# Patient Record
Sex: MALE | Race: WHITE | NOT HISPANIC OR LATINO | Employment: OTHER | URBAN - METROPOLITAN AREA
[De-identification: names, ages, dates, MRNs, and addresses within clinical notes are randomized per-mention and may not be internally consistent; named-entity substitution may affect disease eponyms.]

---

## 2017-01-23 ENCOUNTER — NON-PROVIDER VISIT (OUTPATIENT)
Dept: WOUND CARE | Facility: MEDICAL CENTER | Age: 57
End: 2017-01-23
Attending: NURSE PRACTITIONER
Payer: COMMERCIAL

## 2017-01-23 PROCEDURE — 11055 PARING/CUTG B9 HYPRKER LES 1: CPT

## 2017-01-23 PROCEDURE — 11721 DEBRIDE NAIL 6 OR MORE: CPT | Mod: XS

## 2017-01-23 PROCEDURE — A6402 STERILE GAUZE <= 16 SQ IN: HCPCS

## 2017-01-23 NOTE — CERTIFICATION
"[facility]    [address]    [address]    [place] [address]    [phone] Fax: [phone]    Foot and Nail Recertification  1/23/2017 - 4/23/2017  Referring Physician:LEATHA Lima  Primary Physician:   Consulting Physicians:   Start of Care: 7/14/2016      Subjective:       HPI:  56 year old male living with wife and children. HX of dislocated toes 3-4 years ago while walking, had toes \"fixed,\" but \"it didn't work.\"   12/22/2017 PROCEDURES with Dr Kang:  1.  Left modified Arndt bunionectomy.  2.  Left distal metatarsal osteotomy.  3.  Left distal metatarsal osteotomy of the hallux.  4.  Left distal metatarsal osteotomy, 2, 3, 4, and 5.  5.  Left flexor digitorum longus transfer with a flexor to extensor transfer    of 2 and 3.  6.  Left soft tissue reconstruction, metatarsophalangeal joints 2, 3, 4, and    5.  7.  Left hammertoe correction with proximal interphalangeal arthroplasty, 2    and 3.  8.  Right metatarsophalangeal joint capsulotomy of soft tissue release, 2 and    3.  9.  Right hammertoe correction with proximal interphalangeal arthroplasty, 2    and 3.    Had F/U with Dr. Sutherland 1/5/2017, pt put on antibiotics, still taking them.     History of foot ulceration(s): Yes____ No___x__ Location:   History of foot surgery: Yes__x__ No____Describe:______see above; also,k to fix dislocated toes, shortened rt foot 2nd toe, fix hammer toes   10/13/16=  Hip replacement done 8 weeks ago  ______________________________________________    Employed: Y ___ N __x_ Job description: ____disability_____________    Current Residence: ___lives with wife and kids______  home    Pain: 4-5/10    Past Medical History:   Past Medical History   Diagnosis Date   • Diabetes mellitus (CMS-HCC)    • Gout    • Hypertension    • Hypothyroidism      chronic thyroiditis   • Bunion    • Anxiety and depression    • Hammertoe    • Arthritis    • Dental disorder      upper plate denture   • Anesthesia      " "post op nausea and vomiting   • Chronic autoimmune thyroiditis       + US / + TPO = 57   • Heart burn    • Indigestion    • Snoring          Current Medications:   Current Outpatient Prescriptions on File Prior to Visit   Medication Sig Dispense Refill   • temazepam (RESTORIL) 30 MG capsule Take 30 mg by mouth at bedtime as needed for Sleep.     • LEVOXYL 88 MCG Tab Take 1 Tab by mouth Every morning on an empty stomach. 30 Tab 6   • venlafaxine XR (EFFEXOR XR) 75 MG CAPSULE SR 24 HR Take 75 mg by mouth every day.     • allopurinol (ZYLOPRIM) 300 MG Tab Take 300 mg by mouth every day.     • lamotrigine (LAMICTAL) 100 MG Tab Take 100 mg by mouth every day.     • hydrOXYzine (ATARAX) 50 MG Tab Take 50 mg by mouth 2 Times a Day.     • alprazolam (XANAX) 0.25 MG Tab Take 0.25-0.5 mg by mouth 4 times a day as needed for Sleep.     • losartan (COZAAR) 100 MG Tab Take 100 mg by mouth every day.     • oxycodone-acetaminophen (PERCOCET) 5-325 MG TABS Take 1-2 Tabs by mouth every four hours as needed.     • sitagliptin (JANUVIA) 100 MG TABS Take 100 mg by mouth every day.       No current facility-administered medications on file prior to visit.       Allergies: Demerol and Septra  Social History:   Social History     Social History   • Marital Status:      Spouse Name: N/A   • Number of Children: N/A   • Years of Education: N/A     Occupational History   • Not on file.     Social History Main Topics   • Smoking status: Former Smoker -- 1.00 packs/day for 20 years     Types: Cigarettes   • Smokeless tobacco: Not on file   • Alcohol Use: Yes      Comment: 4-5 per day beer   • Drug Use: No   • Sexual Activity: Not on file     Other Topics Concern   • Not on file     Social History Narrative       Fall Risk Assessment (william all that apply with an X):completed with pt 1/23/2017   65 years or older     Fall within the last 2 years, uses walker x \"several times\"  Ambulatory devices walker  Loss of protective sensation in " feet, impaired X  Use of prostethic/orthotic, years X   Presence of lower extremity/foot/toe amputation   Taking medication that increases risk (per facility policy) x    Interventions Recommended (if any of the above are selected):   Use of Assistive Device:____walker____________________   Supervision with ambulation:  Caregiver   Assistance with ambulation:  Caregiver   Home safety education:  Educational material provided             Objective:     Tests and Measures:  BS today 186, Pulses DP AIDAN palpable; Lt foot DP triphasic, Rt foot DP biphasic; LT PT monophasic; RT PT biphasic  5.07 Monofilament testing (10 pt): Right__3/10_ ______ Left_4/10__  HgbA1C: 12/19/2017 8.2    Vascular   R   L       Not palp  Biphasic with doppler Not palp -   Biphasic with doppler DP pulse     palp palp PT pulse     yes yes Capillary refill < 3 sec         Deformities    R   L        3, 4 and 5  3, 4 and 5 Hammer/claw toe         Hallux Valgus         Prominent Metatarsal Heads         Charcot Foot         Drop Foot         Rocker Bottom         Prior amputation:         Other:  nails missing from RT foot 2nd digit, 0.25x0.25 oncomychotic nail to great toe; Lt foot nails missing from great and 2nd toe   4/5 1-5 dislocation     Pt has 2nd/3rd toes bilaterally with pins, dressings covering those toes. No TX to these those.       Clinical appearance/skin    Dryness___moderate_ _________ Swelling_____none_________ Redness______none_______Temperature__warm, hair growth present__ ______    Callus_____ ___rt toe distal tip____________________________________    Ulceration_none  Pt has peeling skin, similar to tinnea pedis. Pt has F/U with Dr. Kang next week, will ask for DX and scrip, if necessary      Clinical appearance/toenails    Onychomycosis__6/10__ ______________    Ingrown toenails_______    Deformities____hypoplasic nails to great toes, BIL_____________________________    Other______________________________________         Orthotic, protective, supportive devices: post op shoes  Procedures: Pt's feet were washed with soapy water, then dried. In between toes rubbed with gauze to remove debris. No wounds  noted. Peeling dry skin all over both feet similar to tinnea pedis Cuticle and nailbed margins were established and debris removed from around and beneath toes with a curette. Scalpel to trim callus to RT foot great toe distal tip. All 6 onychomycotic nails (pt has rough nail area to brad great toes) were trimmed with clippers and then ground down with dremel, clipped again, then smoothed and finished with dremel and deshawn board. LE were moisturized bilaterally, not feet due to possible tinea pedis. Pinpoint abrasion to RT foot 4th toe dorsum. Cleansed with Betadine, bandaid applied. Pt tolerated procedure well.     Patient Education: Instructed pt on s/s infection - chills, fever, malaise, NV, increased redness/swelling/pain/exudate - and to go to ER/Urgent Care; Pt instructed on implications of LOPS, need to check feet daily and see PCP for any changes/new wounds; that because of diabetes, he is prone to infection; necessity to keep -140 for any healing if he gets any wounds; to wear protective footwear at all times; to return for foot/nail care every two -  three months or sooner if necessary.           Professional Collaboration:  cert sent to referring provider via EPIC.       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

## 2017-02-07 ENCOUNTER — APPOINTMENT (OUTPATIENT)
Dept: RADIOLOGY | Facility: MEDICAL CENTER | Age: 57
End: 2017-02-07
Attending: ORTHOPAEDIC SURGERY
Payer: COMMERCIAL

## 2017-02-07 DIAGNOSIS — M25.561 RIGHT KNEE PAIN, UNSPECIFIED CHRONICITY: ICD-10-CM

## 2017-02-07 PROCEDURE — 73721 MRI JNT OF LWR EXTRE W/O DYE: CPT | Mod: RT

## 2017-02-08 ENCOUNTER — HOSPITAL ENCOUNTER (OUTPATIENT)
Dept: LAB | Facility: MEDICAL CENTER | Age: 57
End: 2017-02-08
Attending: FAMILY MEDICINE
Payer: COMMERCIAL

## 2017-02-08 LAB
ALBUMIN SERPL BCP-MCNC: 4.9 G/DL (ref 3.2–4.9)
ALBUMIN/GLOB SERPL: 1.7 G/DL
ALP SERPL-CCNC: 124 U/L (ref 30–99)
ALT SERPL-CCNC: 69 U/L (ref 2–50)
ANION GAP SERPL CALC-SCNC: 9 MMOL/L (ref 0–11.9)
AST SERPL-CCNC: 62 U/L (ref 12–45)
BILIRUB SERPL-MCNC: 0.9 MG/DL (ref 0.1–1.5)
BUN SERPL-MCNC: 19 MG/DL (ref 8–22)
CALCIUM SERPL-MCNC: 10 MG/DL (ref 8.5–10.5)
CHLORIDE SERPL-SCNC: 98 MMOL/L (ref 96–112)
CO2 SERPL-SCNC: 23 MMOL/L (ref 20–33)
CREAT SERPL-MCNC: 1.12 MG/DL (ref 0.5–1.4)
EST. AVERAGE GLUCOSE BLD GHB EST-MCNC: 252 MG/DL
GLOBULIN SER CALC-MCNC: 2.9 G/DL (ref 1.9–3.5)
GLUCOSE SERPL-MCNC: 351 MG/DL (ref 65–99)
HBA1C MFR BLD: 10.4 % (ref 0–5.6)
POTASSIUM SERPL-SCNC: 4.4 MMOL/L (ref 3.6–5.5)
PROT SERPL-MCNC: 7.8 G/DL (ref 6–8.2)
PSA SERPL DL<=0.01 NG/ML-MCNC: 0.31 NG/ML (ref 0–4)
SODIUM SERPL-SCNC: 130 MMOL/L (ref 135–145)
T4 SERPL-MCNC: 7.8 UG/DL (ref 4–12)
TSH SERPL DL<=0.005 MIU/L-ACNC: 3.96 UIU/ML (ref 0.3–3.7)

## 2017-02-08 PROCEDURE — 84153 ASSAY OF PSA TOTAL: CPT

## 2017-02-08 PROCEDURE — 84436 ASSAY OF TOTAL THYROXINE: CPT

## 2017-02-08 PROCEDURE — 84443 ASSAY THYROID STIM HORMONE: CPT

## 2017-02-08 PROCEDURE — 36415 COLL VENOUS BLD VENIPUNCTURE: CPT

## 2017-02-08 PROCEDURE — 83036 HEMOGLOBIN GLYCOSYLATED A1C: CPT

## 2017-02-08 PROCEDURE — 80053 COMPREHEN METABOLIC PANEL: CPT

## 2017-03-13 ENCOUNTER — HOSPITAL ENCOUNTER (OUTPATIENT)
Dept: LAB | Facility: MEDICAL CENTER | Age: 57
End: 2017-03-13
Attending: INTERNAL MEDICINE
Payer: COMMERCIAL

## 2017-03-13 DIAGNOSIS — E06.3 HYPOTHYROIDISM, ACQUIRED, AUTOIMMUNE: ICD-10-CM

## 2017-03-13 LAB
T4 FREE SERPL-MCNC: 0.73 NG/DL (ref 0.53–1.43)
TSH SERPL DL<=0.005 MIU/L-ACNC: 3.12 UIU/ML (ref 0.3–3.7)

## 2017-03-13 PROCEDURE — 36415 COLL VENOUS BLD VENIPUNCTURE: CPT

## 2017-03-13 PROCEDURE — 84439 ASSAY OF FREE THYROXINE: CPT

## 2017-03-13 PROCEDURE — 84443 ASSAY THYROID STIM HORMONE: CPT

## 2017-03-30 ENCOUNTER — NON-PROVIDER VISIT (OUTPATIENT)
Dept: WOUND CARE | Facility: MEDICAL CENTER | Age: 57
End: 2017-03-30
Attending: FAMILY MEDICINE
Payer: COMMERCIAL

## 2017-03-30 PROCEDURE — 11721 DEBRIDE NAIL 6 OR MORE: CPT | Mod: XS

## 2017-03-30 PROCEDURE — 11055 PARING/CUTG B9 HYPRKER LES 1: CPT

## 2017-03-30 NOTE — CERTIFICATION
"[facility]    [address]    [address]    [place] [address]    [phone] Fax: [phone]    Foot and Nail Encounter 03/30/2017 1/23/2017 - 4/23/2017  Referring Physician:LEATHA Lima  Primary Physician:   Consulting Physicians:   Start of Care: 7/14/2016      Subjective:       HPI:  56 year old male living with wife and children. HX of dislocated toes 3-4 years ago while walking, had toes \"fixed,\" but \"it didn't work.\"     12/22/2017 PROCEDURES with Dr Kang:    1.  Left modified Arndt bunionectomy.  2.  Left distal metatarsal osteotomy.  3.  Left distal metatarsal osteotomy of the hallux.  4.  Left distal metatarsal osteotomy, 2, 3, 4, and 5.  5.  Left flexor digitorum longus transfer with a flexor to extensor transfer    of 2 and 3.  6.  Left soft tissue reconstruction, metatarsophalangeal joints 2, 3, 4, and    5.  7.  Left hammertoe correction with proximal interphalangeal arthroplasty, 2    and 3.  8.  Right metatarsophalangeal joint capsulotomy of soft tissue release, 2 and    3.  9.  Right hammertoe correction with proximal interphalangeal arthroplasty, 2    and 3.    Had F/U with Dr. Sutherland 1/5/2017, pt put on antibiotics, still taking them.     History of foot ulceration(s): Yes____ No___x__ Location:   History of foot surgery: Yes__x__ No____Describe:______see above; also,k to fix dislocated toes, shortened rt foot 2nd toe, fix hammer toes   10/13/16=  Hip replacement done 8 weeks ago  ______________________________________________    Employed: Y ___ N __x_ Job description: ____disability_____________    Current Residence: ___lives with wife and kids______  home    Pain: pt denies foot pain presently    Past Medical History:   Past Medical History   Diagnosis Date   • Diabetes mellitus (CMS-HCC)    • Gout    • Hypertension    • Hypothyroidism      chronic thyroiditis   • Bunion    • Anxiety and depression    • Hammertoe    • Arthritis    • Dental disorder      upper " "plate denture   • Anesthesia      post op nausea and vomiting   • Chronic autoimmune thyroiditis       + US / + TPO = 57   • Heart burn    • Indigestion    • Snoring          Current Medications:   Current Outpatient Prescriptions on File Prior to Visit   Medication Sig Dispense Refill   • temazepam (RESTORIL) 30 MG capsule Take 30 mg by mouth at bedtime as needed for Sleep.     • LEVOXYL 88 MCG Tab Take 1 Tab by mouth Every morning on an empty stomach. 30 Tab 6   • venlafaxine XR (EFFEXOR XR) 75 MG CAPSULE SR 24 HR Take 75 mg by mouth every day.     • allopurinol (ZYLOPRIM) 300 MG Tab Take 300 mg by mouth every day.     • lamotrigine (LAMICTAL) 100 MG Tab Take 100 mg by mouth every day.     • hydrOXYzine (ATARAX) 50 MG Tab Take 50 mg by mouth 2 Times a Day.     • alprazolam (XANAX) 0.25 MG Tab Take 0.25-0.5 mg by mouth 4 times a day as needed for Sleep.     • losartan (COZAAR) 100 MG Tab Take 100 mg by mouth every day.     • oxycodone-acetaminophen (PERCOCET) 5-325 MG TABS Take 1-2 Tabs by mouth every four hours as needed.     • sitagliptin (JANUVIA) 100 MG TABS Take 100 mg by mouth every day.       No current facility-administered medications on file prior to visit.       Allergies: Demerol and Septra  Social History:   Social History     Social History   • Marital Status:      Spouse Name: N/A   • Number of Children: N/A   • Years of Education: N/A     Occupational History   • Not on file.     Social History Main Topics   • Smoking status: Former Smoker -- 1.00 packs/day for 20 years     Types: Cigarettes   • Smokeless tobacco: Not on file   • Alcohol Use: Yes      Comment: 4-5 per day beer   • Drug Use: No   • Sexual Activity: Not on file     Other Topics Concern   • Not on file     Social History Narrative       Fall Risk Assessment (william all that apply with an X):completed with pt 1/23/2017   65 years or older     Fall within the last 2 years, uses walker x \"several times\"  Ambulatory devices " walker  Loss of protective sensation in feet, impaired X  Use of prostethic/orthotic, years X   Presence of lower extremity/foot/toe amputation   Taking medication that increases risk (per facility policy) x    Interventions Recommended (if any of the above are selected):   Use of Assistive Device:____walker____________________   Supervision with ambulation:  Caregiver   Assistance with ambulation:  Caregiver   Home safety education:  Educational material provided             Objective:     Tests and Measures:  BS today 186, Pulses DP AIDAN palpable; Lt foot DP triphasic, Rt foot DP biphasic; LT PT monophasic; RT PT biphasic  5.07 Monofilament testing (10 pt): Right__3/10_ ______ Left_4/10__  HgbA1C: 12/19/2017 8.2    Vascular   R   L       Not palp  Biphasic with doppler Not palp -   Biphasic with doppler DP pulse     palp palp PT pulse     yes yes Capillary refill < 3 sec         Deformities    R   L        3, 4 and 5  3, 4 and 5 Hammer/claw toe         Hallux Valgus         Prominent Metatarsal Heads         Charcot Foot         Drop Foot         Rocker Bottom         Prior amputation:         Other:  nails missing from aidan feet 1st and  2nd digits   4/5 1-5 dislocation          Clinical appearance/skin    Dryness___moderate_ _________ Swelling_____none_________ Redness______none_______Temperature__warm, hair growth present__ ______    Callus_____ ___rt plantar foot____________________________________    Ulceration_none      Clinical appearance/toenails    Onychomycosis__6/10__ ______________    Ingrown toenails_______    Deformities_______________________________    Other___1st and 2nd nails aidan feet were ablated by podiatrist due to problematic deformities per pt___________________________________        Orthotic, protective, supportive devices: post op shoes  Procedures: Pt's feet were washed with soapy water, then dried. In between toes cleaned with gauze to remove debris. No wounds noted. Cuticle and nailbed  margins were established and debris removed from around and beneath toes with a curette. Scalpel to pare down callus to RT plantar foot. All 6 onychomycotic nails were trimmed with clippers and then ground down with dremel, clipped again, then smoothed and finished with dremel. Feet and LE were moisturized bilaterally, except between toes. Pt's tinea pedis noted on last note has resolved.      Patient Education: Instructed pt on s/s infection - chills, fever, malaise, NV, increased redness/swelling/pain/exudate - and to go to ER/Urgent Care; Pt instructed on implications of LOPS, need to check feet daily and see PCP for any changes/new wounds; that because of diabetes, he is prone to infection; necessity to keep -140 for any healing if he gets any wounds; to wear protective footwear at all times; to return for foot/nail care every two -  three months or sooner if necessary.           Professional Collaboration:  cert sent to referring provider via EPIC.       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

## 2017-03-31 ENCOUNTER — HOSPITAL ENCOUNTER (OUTPATIENT)
Dept: RADIOLOGY | Facility: MEDICAL CENTER | Age: 57
DRG: 460 | End: 2017-03-31
Attending: ORTHOPAEDIC SURGERY | Admitting: ORTHOPAEDIC SURGERY
Payer: COMMERCIAL

## 2017-03-31 DIAGNOSIS — Z01.811 PRE-OPERATIVE RESPIRATORY EXAMINATION: ICD-10-CM

## 2017-03-31 DIAGNOSIS — Z01.812 PRE-OPERATIVE LABORATORY EXAMINATION: ICD-10-CM

## 2017-03-31 LAB
ANION GAP SERPL CALC-SCNC: 12 MMOL/L (ref 0–11.9)
APPEARANCE UR: CLEAR
BASOPHILS # BLD AUTO: 0.8 % (ref 0–1.8)
BASOPHILS # BLD: 0.05 K/UL (ref 0–0.12)
BILIRUB UR QL STRIP.AUTO: NEGATIVE
BUN SERPL-MCNC: 20 MG/DL (ref 8–22)
CALCIUM SERPL-MCNC: 10.1 MG/DL (ref 8.5–10.5)
CHLORIDE SERPL-SCNC: 99 MMOL/L (ref 96–112)
CO2 SERPL-SCNC: 27 MMOL/L (ref 20–33)
COLOR UR: COLORLESS
CREAT SERPL-MCNC: 1.11 MG/DL (ref 0.5–1.4)
CULTURE IF INDICATED INDCX: NO UA CULTURE
EOSINOPHIL # BLD AUTO: 0.08 K/UL (ref 0–0.51)
EOSINOPHIL NFR BLD: 1.3 % (ref 0–6.9)
ERYTHROCYTE [DISTWIDTH] IN BLOOD BY AUTOMATED COUNT: 40.1 FL (ref 35.9–50)
GFR SERPL CREATININE-BSD FRML MDRD: >60 ML/MIN/1.73 M 2
GLUCOSE SERPL-MCNC: 111 MG/DL (ref 65–99)
GLUCOSE UR STRIP.AUTO-MCNC: NEGATIVE MG/DL
HCT VFR BLD AUTO: 43.1 % (ref 42–52)
HGB BLD-MCNC: 13.8 G/DL (ref 14–18)
IMM GRANULOCYTES # BLD AUTO: 0.08 K/UL (ref 0–0.11)
IMM GRANULOCYTES NFR BLD AUTO: 1.3 % (ref 0–0.9)
KETONES UR STRIP.AUTO-MCNC: NEGATIVE MG/DL
LEUKOCYTE ESTERASE UR QL STRIP.AUTO: NEGATIVE
LYMPHOCYTES # BLD AUTO: 1.76 K/UL (ref 1–4.8)
LYMPHOCYTES NFR BLD: 28.2 % (ref 22–41)
MCH RBC QN AUTO: 27.3 PG (ref 27–33)
MCHC RBC AUTO-ENTMCNC: 32 G/DL (ref 33.7–35.3)
MCV RBC AUTO: 85.3 FL (ref 81.4–97.8)
MICRO URNS: NORMAL
MONOCYTES # BLD AUTO: 0.62 K/UL (ref 0–0.85)
MONOCYTES NFR BLD AUTO: 9.9 % (ref 0–13.4)
NEUTROPHILS # BLD AUTO: 3.66 K/UL (ref 1.82–7.42)
NEUTROPHILS NFR BLD: 58.5 % (ref 44–72)
NITRITE UR QL STRIP.AUTO: NEGATIVE
NRBC # BLD AUTO: 0 K/UL
NRBC BLD AUTO-RTO: 0 /100 WBC
PH UR STRIP.AUTO: 6 [PH]
PLATELET # BLD AUTO: 208 K/UL (ref 164–446)
PMV BLD AUTO: 8.7 FL (ref 9–12.9)
POTASSIUM SERPL-SCNC: 4.8 MMOL/L (ref 3.6–5.5)
PROT UR QL STRIP: NEGATIVE MG/DL
RBC # BLD AUTO: 5.05 M/UL (ref 4.7–6.1)
RBC UR QL AUTO: NEGATIVE
SODIUM SERPL-SCNC: 138 MMOL/L (ref 135–145)
SP GR UR STRIP.AUTO: 1.01
WBC # BLD AUTO: 6.3 K/UL (ref 4.8–10.8)

## 2017-03-31 PROCEDURE — 81003 URINALYSIS AUTO W/O SCOPE: CPT

## 2017-03-31 PROCEDURE — 85025 COMPLETE CBC W/AUTO DIFF WBC: CPT

## 2017-03-31 PROCEDURE — 36415 COLL VENOUS BLD VENIPUNCTURE: CPT

## 2017-03-31 PROCEDURE — 71010 DX-CHEST-LIMITED (1 VIEW): CPT

## 2017-03-31 PROCEDURE — 80048 BASIC METABOLIC PNL TOTAL CA: CPT

## 2017-03-31 RX ORDER — PIOGLITAZONEHYDROCHLORIDE 15 MG/1
15 TABLET ORAL EVERY MORNING
Status: ON HOLD | COMMUNITY
End: 2017-04-14

## 2017-04-03 ENCOUNTER — SLEEP CENTER VISIT (OUTPATIENT)
Dept: SLEEP MEDICINE | Facility: MEDICAL CENTER | Age: 57
End: 2017-04-03
Payer: COMMERCIAL

## 2017-04-03 VITALS
SYSTOLIC BLOOD PRESSURE: 124 MMHG | HEIGHT: 70 IN | OXYGEN SATURATION: 94 % | TEMPERATURE: 98.1 F | HEART RATE: 98 BPM | BODY MASS INDEX: 36.22 KG/M2 | DIASTOLIC BLOOD PRESSURE: 80 MMHG | RESPIRATION RATE: 16 BRPM | WEIGHT: 253 LBS

## 2017-04-03 DIAGNOSIS — G47.33 OSA (OBSTRUCTIVE SLEEP APNEA): ICD-10-CM

## 2017-04-03 DIAGNOSIS — R06.83 SNORING: ICD-10-CM

## 2017-04-03 DIAGNOSIS — M10.9 GOUT, UNSPECIFIED CAUSE, UNSPECIFIED CHRONICITY, UNSPECIFIED SITE: ICD-10-CM

## 2017-04-03 PROCEDURE — 99244 OFF/OP CNSLTJ NEW/EST MOD 40: CPT | Performed by: INTERNAL MEDICINE

## 2017-04-03 RX ORDER — ESOMEPRAZOLE MAGNESIUM 40 MG/1
40 GRANULE, DELAYED RELEASE ORAL
COMMUNITY
Start: 2014-06-09 | End: 2014-12-01

## 2017-04-03 RX ORDER — HYDROXYZINE PAMOATE 50 MG/1
CAPSULE ORAL
Refills: 1 | COMMUNITY
Start: 2017-02-07 | End: 2017-03-01

## 2017-04-03 RX ORDER — NAPROXEN 500 MG/1
500 TABLET ORAL
COMMUNITY
Start: 2014-06-09 | End: 2014-12-01

## 2017-04-03 RX ORDER — LOSARTAN POTASSIUM 100 MG/1
100 TABLET ORAL
COMMUNITY
Start: 2014-06-09 | End: 2014-12-01

## 2017-04-03 RX ORDER — SITAGLIPTIN 100 MG/1
100 TABLET, FILM COATED ORAL
Refills: 11 | COMMUNITY
Start: 2017-01-11 | End: 2017-02-01

## 2017-04-03 RX ORDER — AMOXICILLIN 500 MG/1
CAPSULE ORAL
Refills: 3 | COMMUNITY
Start: 2017-02-06 | End: 2017-03-01

## 2017-04-03 RX ORDER — LANCETS 33 GAUGE
EACH MISCELLANEOUS
Refills: 11 | Status: ON HOLD | COMMUNITY
Start: 2017-02-10 | End: 2017-04-14

## 2017-04-03 RX ORDER — LEVOTHYROXINE SODIUM 0.07 MG/1
75 TABLET ORAL
COMMUNITY
Start: 2014-06-09 | End: 2014-12-01

## 2017-04-03 RX ORDER — TEMAZEPAM 15 MG/1
15 CAPSULE ORAL
COMMUNITY
Start: 2014-06-09 | End: 2014-12-01

## 2017-04-03 RX ORDER — POLYETHYLENE GLYCOL 3350 17 G/17G
POWDER, FOR SOLUTION ORAL
Refills: 5 | COMMUNITY
Start: 2017-03-16 | End: 2017-11-03

## 2017-04-03 RX ORDER — ZOLPIDEM TARTRATE 5 MG/1
TABLET ORAL
Qty: 3 TAB | Refills: 0 | Status: ON HOLD | OUTPATIENT
Start: 2017-04-03 | End: 2017-04-14

## 2017-04-03 RX ORDER — OXYCODONE HYDROCHLORIDE AND ACETAMINOPHEN 5; 325 MG/1; MG/1
TABLET ORAL
COMMUNITY
Start: 2014-06-09 | End: 2014-12-01

## 2017-04-03 RX ORDER — CLOTRIMAZOLE 1 %
CREAM (GRAM) TOPICAL
Refills: 3 | COMMUNITY
Start: 2017-03-16 | End: 2018-03-16

## 2017-04-03 RX ORDER — ALLOPURINOL 100 MG/1
100 TABLET ORAL
COMMUNITY
Start: 2014-06-09 | End: 2014-12-01

## 2017-04-03 RX ORDER — FLUCONAZOLE 150 MG/1
TABLET ORAL
Refills: 0 | COMMUNITY
Start: 2017-02-01 | End: 2017-02-05

## 2017-04-03 RX ORDER — CEPHALEXIN 500 MG/1
CAPSULE ORAL
Refills: 0 | COMMUNITY
Start: 2017-01-20 | End: 2017-02-01

## 2017-04-03 NOTE — PROGRESS NOTES
CC: Referred by Dr. Chavez for evaluation of sleep apnea      HPI:  57-year-old male kindly referred by Dr. Chavez for evaluation of possible obstructive sleep apnea syndrome. He has a history dating to age 16 of difficulty falling asleep and staying asleep and having very violent sleeping episodes characterized by tearing the bed and pillows apart. He has used temazepam to assist with sleep onset and maintenance in addition to other medications including over-the-counter medications. He has nocturia 1-2 times per night and estimates that he wakes up 2-5 times per night. He usually sleeps 7-8 hours a night. He may nap or return to bed after arising, complains of sleepiness during the day, complains of too much sleep at night, feels tired during the mid afternoon. He may fall asleep when he doesn't mean to her and sleep for 15-60 minutes. He has been told that his snoring is loud and disturbing. He may suffer from restless legs, have teeth grinding, sleepwalking, disturbing dreams, and unusual nocturnal movements. He is a former smoker of a pack a day. Sleep is unrefreshing. Has severe bone pain, currently to have surgery for spondy. Has had 17 orthopedic surgeries.    Review of his sleep diary shows that he napped every day and awoke feeling tired and sore every day.    His roommate questionnaire reveals loud snoring, light snoring, twitching of legs or feet during sleep, grinding teeth, sleep talking, and kicking with legs during sleep. His wife has noted these behaviors for 25 years and they occur most nights. He may have very loud snorting. His total [place] score is 10.    Hx of depression 2 years ago and sleep was even worse then.     Used to work as a , disabled for about 2 years, lives in [place], wife works at [employer] as a . 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    Patient Active Problem List    Diagnosis Date Noted   • JULIAN (obstructive sleep apnea) 04/03/2017   •  Snoring 04/03/2017   • Acquired hallux valgus of left foot 12/22/2016   • Type 2 diabetes mellitus without complication (CMS-HCC) 12/13/2016   • Chronic autoimmune thyroiditis 10/03/2016   • Hypothyroidism, acquired, autoimmune 10/03/2016   • Primary osteoarthritis of left hip 08/18/2016   • Diabetes (CMS-[facility]) 09/05/2014   • Lumbar disc disease 09/05/2014   • Gout 09/05/2014   • Hypertension 09/05/2014   • H/O arthroscopy of knee 09/05/2014   • Hx of elbow surgery 09/05/2014   • Obesity 09/05/2014   • Osteoarthritis 09/05/2014   • H/O shoulder replacement 09/05/2014   • Rotator cuff arthropathy 09/05/2014   • Bilateral bunions 09/05/2014       Past Medical History   Diagnosis Date   • Diabetes mellitus (CMS-HCC)    • Gout    • Hypertension    • Hypothyroidism      chronic thyroiditis   • Bunion    • Hammertoe    • Arthritis    • Anesthesia      post op nausea and vomiting   • Chronic autoimmune thyroiditis       + US / + TPO = 57   • Snoring    • High cholesterol    • Dental disorder      upper plate denture   • Pain      everyplace   • Anxiety and depression      depression/anxiety   • Tonsillitis         Past Surgical History   Procedure Laterality Date   • Hip arth anterior total Left 8/18/2016     Procedure: HIP ARTHROPLASTY ANTERIOR TOTAL;  Surgeon: Emiliano Vang M.D.;  Location: SURGERY [facility];  Service:    • Other orthopedic surgery  6/2014     right shoulder  arthroplasty   • Other orthopedic surgery  11/1/2012     left knee/left ankle/left shoulder   • Other orthopedic surgery  2004     elbow surgery   • Other       septolasty   • Other  2000     [facility]   • Hammertoe correction Bilateral 12/22/2016     Procedure: HAMMERTOE CORRECTION/METATARSALPHALANGEAL JOINT RELEASE 2/3 RIGHT, 2-3 LEFT;  Surgeon: Haroldo Kang M.D.;  Location: SURGERY [facility];  Service:    • Bunionectomy Left 12/22/2016     Procedure: BUNIONECTOMY FOR DISTAL HALLUX VALGUS CORRECTION WITH BRANDY;   Surgeon: Haroldo Kang M.D.;  Location: SURGERY [facility];  Service:    • Orthopedic osteotomy Left 12/22/2016     Procedure: ORTHOPEDIC OSTEOTOMY DISTAL METATARSAL 2-5;  Surgeon: Haroldo Kang M.D.;  Location: SURGERY [facility];  Service:        Family History   Problem Relation Age of Onset   • Heart Disease Mother    • Depression Mother    • Cancer Father    • Sleep Apnea Neg Hx        Social History     Social History   • Marital Status:      Spouse Name: N/A   • Number of Children: N/A   • Years of Education: N/A     Occupational History   • Not on file.     Social History Main Topics   • Smoking status: Former Smoker -- 1.00 packs/day for 20 years     Types: Cigarettes     Quit date: 01/01/2012   • Smokeless tobacco: Former User     Types: Chew     Quit date: 01/01/1997   • Alcohol Use: Yes      Comment: 1 per month   • Drug Use: No   • Sexual Activity: Not on file     Other Topics Concern   • Not on file     Social History Narrative       Current Outpatient Prescriptions   Medication Sig Dispense Refill   • clotrimazole (LOTRIMIN) 1 % Cream APPLY TO AFFECTED AREA EVERY 12 HOURS  3   • ONETOUCH DELICA LANCETS 33G Misc TEST DAILY  11   • polyethylene glycol 3350 (MIRALAX) Powder TAKE 1 CAPFUL WITH ANY LIQUID BY MOUTH ONCE A DAY  5   • zolpidem (AMBIEN) 5 MG Tab Take 1-2 tablets by mouth every evening as needed for insomnia. Bring to sleep study. 3 Tab 0   • pioglitazone (ACTOS) 15 MG Tab Take 15 mg by mouth every morning.     • temazepam (RESTORIL) 30 MG capsule Take 30 mg by mouth at bedtime as needed for Sleep.     • LEVOXYL 88 MCG Tab Take 1 Tab by mouth Every morning on an empty stomach. 30 Tab 6   • venlafaxine XR (EFFEXOR XR) 75 MG CAPSULE SR 24 HR Take 75 mg by mouth every morning.     • lamotrigine (LAMICTAL) 100 MG Tab Take 100 mg by mouth every morning.     • hydrOXYzine (ATARAX) 50 MG Tab Take 200 mg by mouth 2 Times a Day.     • alprazolam (XANAX) 0.25 MG Tab Take 0.25-0.5  "mg by mouth 4 times a day as needed for Sleep.     • losartan (COZAAR) 100 MG Tab Take 100 mg by mouth every morning.     • oxycodone-acetaminophen (PERCOCET) 5-325 MG TABS Take 1-2 Tabs by mouth every four hours as needed.       No current facility-administered medications for this visit.    \"CURRENT RX\"    ALLERGIES: Demerol and Septra    ROS  Constitutional: Denies fever, chills, sweats. He complains of fatigue and weight loss  Eyes: Denies drainage, double vision. He complains of vision loss and he wears glasses. Ears/Nose/Mouth/Throat: Denies earache, difficulty hearing, rhinitis/nasal congestion, injury, recurrent sore throat, persistent hoarseness, decayed teeth/toothaches. Has ringing or buzzing in ears Cardiovascular: Denies chest pain, tightness, palpitations, swelling in legs/feet, fainting, difficulty breathing when lying down but gets better when sitting up.   Respiratory: Denies shortness of breath, cough, sputum, wheezing, painful breathing, coughing up blood.   Sleep: See history of present illness  Gastrointestinal: Complains of heartburn, difficulty swallowing, diarrhea, constipation, but denies nausea and abdominal pain.  Genitourinary: Denies frequent urination, painful urination, blood in urine, discharge.   Musculoskeletal: He complains of painful joints, sore muscles, back pain.   Integumentary: Denies rashes, lumps, color changes.   Neurological: Denies frequent headaches, dizziness. Complains of weakness    PHYSICAL EXAM    MSEW   /80 mmHg  Pulse 98  Temp(Src) 36.7 °C (98.1 °F)  Resp 16  Ht 1.778 m (5' 10\")  Wt 114.76 kg (253 lb)  BMI 36.30 kg/m2  SpO2 94%  Appearance: Well-nourished, well-developed, no acute distress  Eyes:  PERRLA, EOMI  Hearing:  Grossly intact  Nose:  Normal, no lesions or deformities, turbinates moist  Oropharynx:  Tongue normal, posterior pharynx without erythema or exudate  Mallampati classification:  4  Neck: Supple, trachea midline, no " masses  Respiratory effort:  No intercostal retractions or use of accessory muscles  Lung auscultation:  No wheezes rhonchi rubs or rales  Cardiac auscultation:  No murmurs, rubs, or gallops, no regular rhythm, normal rate  Abdomen:  No tenderness, no organomegaly  Extremities:  No cyanosis, clubbing, edema  Gait and Station:  Normal  Digits and nails: No clubbing, cyanosis, petechiae, or nodes  Musculoskeletal:  Grossly normal; has a back brace on  Skin:  No rashes  Orientation:  Oriented time, place, and person  Mood and affect:  No depression, anxiety, agitation  Judgment:  Intact    PROBLEMS:    1. JULIAN (obstructive sleep apnea)  - POLYSOMNOGRAPHY, 4 OR MORE; Future  - zolpidem (AMBIEN) 5 MG Tab; Take 1-2 tablets by mouth every evening as needed for insomnia. Bring to sleep study.  Dispense: 3 Tab; Refill: 0  2. Snoring  3. Gout, unspecified cause, unspecified chronicity, unspecified site  4. Multiple orthopedic surgeries, 17 and ready for number 18 soon.      PLAN:   The patient has signs and symptoms consistent with obstructive sleep apnea hypopnea syndrome. Will schedule to have a nocturnal polysomnogram using zolpidem to assist with sleep onset and maintenance should the need arise. Will return after the results are available to determine further diagnostic needs and/or treatment options.   The risks of untreated sleep apnea were discussed with the patient at length. Patients with JULIAN are at increased risk of cardiovascular disease including coronary artery disease, systemic arterial hypertension, pulmonary arterial hypertension, cardiac arrythmias, and stroke. JULIAN patients have an increased risk of motor vehicle accidents, type 2 diabetes, chronic kidney disease, and non-alcoholic liver disease. The patient was advised to avoid driving a motor vehicle when drowsy.      RTC after PSG.

## 2017-04-03 NOTE — MR AVS SNAPSHOT
"        Cristino Sims Massimo   4/3/2017 11:00 AM   Sleep Center Visit   MRN: [id number]    Department:  [facility]   Dept Phone:  [phone]    Description:  Male : 1960   Provider:  David Anderson M.D.           Reason for Visit     New Patient Evaluate JULIAN      Allergies as of 4/3/2017     Allergen Noted Reactions    Demerol 2014   Vomiting, Nausea    Rxn = years ago     Septra [Bactrim Ds] 2014   Hives    Rxn = approx       You were diagnosed with     JULIAN (obstructive sleep apnea)   [939965]       Snoring   [869005]       Gout, unspecified cause, unspecified chronicity, unspecified site   [[id number]]         Vital Signs     Blood Pressure Pulse Temperature Respirations Height Weight    124/80 mmHg 98 36.7 °C (98.1 °F) 16 1.778 m (5' 10\") 114.76 kg (253 lb)    Body Mass Index Oxygen Saturation Smoking Status             36.30 kg/m2 94% Former Smoker         Basic Information     Date Of Birth Sex Race Ethnicity Preferred Language    1960 Male White Non- English      Your appointments     May 15, 2017 10:15 AM   Standing Order with [facility]   [facility]    [address] [facility] [address]   [phone]            May 22, 2017  8:05 PM   Sleep Study Diagnostic with SLEEP TECH   [facility] (--)    [address]  [place] [address]   [phone]            May 26, 2017 11:40 AM   Follow UP with JAYLON Valencia   [facility] (--)    [address]  [place] [address]   [phone]            2017 10:30 AM   FOOT AND NAIL CARE FOLLOW UP with Micky Moore R.N.   [facility] ([address] [place] [address])    [address]  [place] [address]   [phone]            2017 10:00 AM   Extended Visit - Health Mngt with Tiffanie Marina R.N., Jayla Ledesma, ARIANA   [facility]    [address] [facility]  [address]  [place] [address]   "   [phone]           It is the patient's responsibility to check with your insurance for benefit coverage for visit/visits.  Arrive 15 minutes before your scheduled appt time  24 hour notice is required for all appointment chnages or cancellations.  Please bring the following with you: 1) Picture ID 2) Insurance card 3) New patient forms 4) 2-3 days of detailed food intake logs 5) Blood glucose monitor and blood glucose logs     (If you have them)              Problem List              ICD-10-CM Priority Class Noted - Resolved    Diabetes (CMS-HCC) E11.9   9/5/2014 - Present    Lumbar disc disease M51.9   9/5/2014 - Present    Gout M10.9   9/5/2014 - Present    Hypertension I10   9/5/2014 - Present    H/O arthroscopy of knee Z98.890   9/5/2014 - Present    Hx of elbow surgery Z98.890   9/5/2014 - Present    Obesity E66.9   9/5/2014 - Present    Osteoarthritis M19.90   9/5/2014 - Present    H/O shoulder replacement Z96.619   9/5/2014 - Present    Rotator cuff arthropathy M12.819   9/5/2014 - Present    Bilateral bunions M21.611, M21.612   9/5/2014 - Present    Primary osteoarthritis of left hip M16.12   8/18/2016 - Present    Chronic autoimmune thyroiditis E06.3   10/3/2016 - Present    Hypothyroidism, acquired, autoimmune E03.8   10/3/2016 - Present    Type 2 diabetes mellitus without complication (CMS-HCC) E11.9   12/13/2016 - Present    Acquired hallux valgus of left foot M20.12   12/22/2016 - Present    JULIAN (obstructive sleep apnea) G47.33   4/3/2017 - Present    Snoring R06.83   4/3/2017 - Present      Health Maintenance        Date Due Completion Dates    IMM HEP B VACCINE (1 of 3 - Primary Series) 1960 ---    DIABETES MONOFILAMENT / LE EXAM 1960 ---    RETINAL SCREENING 3/26/1976 ---    URINE ACR / MICROALBUMIN 3/26/1976 ---    IMM DTaP/Tdap/Td Vaccine (1 - Tdap) 3/26/1977 ---    IMM PNEUMOCOCCAL 19-64 (ADULT) MEDIUM RISK SERIES (1 of 1 - PPSV23) 3/26/1977 ---    COLONOSCOPY 3/26/2008 ---    A1C  SCREENING 8/8/2017 2/8/2017, 12/19/2016    FASTING LIPID PROFILE 12/19/2017 12/19/2016, 3/21/2016    SERUM CREATININE 3/31/2018 3/31/2017, 2/8/2017, 12/19/2016, 12/13/2016, 7/26/2016, 3/21/2016            Current Immunizations     Influenza Vaccine Quad Inj (Pf) 9/27/2016 11:12 AM      Below and/or attached are the medications your provider expects you to take. Review all of your home medications and newly ordered medications with your provider and/or pharmacist. Follow medication instructions as directed by your provider and/or pharmacist. Please keep your medication list with you and share with your provider. Update the information when medications are discontinued, doses are changed, or new medications (including over-the-counter products) are added; and carry medication information at all times in the event of emergency situations     Allergies:  DEMEROL - Vomiting,Nausea     SEPTRA - Hives               Medications  Valid as of: April 03, 2017 - 11:38 AM    Generic Name Brand Name Tablet Size Instructions for use    ALPRAZolam (Tab) XANAX 0.25 MG Take 0.25-0.5 mg by mouth 4 times a day as needed for Sleep.        Clotrimazole (Cream) LOTRIMIN 1 % APPLY TO AFFECTED AREA EVERY 12 HOURS        HydrOXYzine HCl (Tab) ATARAX 50 MG Take 200 mg by mouth 2 Times a Day.        LamoTRIgine (Tab) LAMICTAL 100 MG Take 100 mg by mouth every morning.        Lancets (Misc) ONETOUCH DELICA LANCETS 33G  TEST DAILY        Levothyroxine Sodium (Tab) LEVOXYL 88 MCG Take 1 Tab by mouth Every morning on an empty stomach.        Losartan Potassium (Tab) COZAAR 100 MG Take 100 mg by mouth every morning.        Oxycodone-Acetaminophen (Tab) PERCOCET 5-325 MG Take 1-2 Tabs by mouth every four hours as needed.        Pioglitazone HCl (Tab) ACTOS 15 MG Take 15 mg by mouth every morning.        Polyethylene Glycol 3350 (Powder) MIRALAX  TAKE 1 CAPFUL WITH ANY LIQUID BY MOUTH ONCE A DAY        Temazepam (Cap) RESTORIL 30 MG Take 30 mg by  mouth at bedtime as needed for Sleep.        Venlafaxine HCl (CAPSULE SR 24 HR) EFFEXOR XR 75 MG Take 75 mg by mouth every morning.        Zolpidem Tartrate (Tab) AMBIEN 5 MG Take 1-2 tablets by mouth every evening as needed for insomnia. Bring to sleep study.        .                 Medicines prescribed today were sent to:     [facility] #[id number] - [place], [place] - [address] [place] [address]    [address] [place] [address] [place] [address]    Phone: [phone] Fax: [phone]    Open 24 Hours?: No      Medication refill instructions:       If your prescription bottle indicates you have medication refills left, it is not necessary to call your provider’s office. Please contact your pharmacy and they will refill your medication.    If your prescription bottle indicates you do not have any refills left, you may request refills at any time through one of the following ways: The online [employer] system (except Urgent Care), by calling your provider’s office, or by asking your pharmacy to contact your provider’s office with a refill request. Medication refills are processed only during regular business hours and may not be available until the next business day. Your provider may request additional information or to have a follow-up visit with you prior to refilling your medication.   *Please Note: Medication refills are assigned a new Rx number when refilled electronically. Your pharmacy may indicate that no refills were authorized even though a new prescription for the same medication is available at the pharmacy. Please request the medicine by name with the pharmacy before contacting your provider for a refill.        Your To Do List     Future Labs/Procedures Complete By Expires    POLYSOMNOGRAPHY, 4 OR MORE  As directed 4/3/2018         [employer] Access Code: Activation code not generated  Current [employer] Status: Active

## 2017-04-14 ENCOUNTER — HOSPITAL ENCOUNTER (INPATIENT)
Facility: MEDICAL CENTER | Age: 57
LOS: 1 days | DRG: 460 | End: 2017-04-15
Attending: ORTHOPAEDIC SURGERY | Admitting: ORTHOPAEDIC SURGERY
Payer: COMMERCIAL

## 2017-04-14 ENCOUNTER — APPOINTMENT (OUTPATIENT)
Dept: RADIOLOGY | Facility: MEDICAL CENTER | Age: 57
DRG: 460 | End: 2017-04-14
Attending: ORTHOPAEDIC SURGERY
Payer: COMMERCIAL

## 2017-04-14 PROBLEM — M51.36 DEGENERATION OF LUMBAR INTERVERTEBRAL DISC: Status: ACTIVE | Noted: 2017-04-14

## 2017-04-14 PROBLEM — M51.369 DEGENERATION OF LUMBAR INTERVERTEBRAL DISC: Status: ACTIVE | Noted: 2017-04-14

## 2017-04-14 PROBLEM — M54.50 LOWER BACK PAIN: Status: ACTIVE | Noted: 2017-04-14

## 2017-04-14 LAB
EKG IMPRESSION: NORMAL
GLUCOSE BLD-MCNC: 105 MG/DL (ref 65–99)
GLUCOSE BLD-MCNC: 130 MG/DL (ref 65–99)
GLUCOSE BLD-MCNC: 157 MG/DL (ref 65–99)
TROPONIN I SERPL-MCNC: <0.01 NG/ML (ref 0–0.04)

## 2017-04-14 PROCEDURE — 160002 HCHG RECOVERY MINUTES (STAT): Performed by: ORTHOPAEDIC SURGERY

## 2017-04-14 PROCEDURE — 110382 HCHG SHELL REV 271: Performed by: ORTHOPAEDIC SURGERY

## 2017-04-14 PROCEDURE — 160048 HCHG OR STATISTICAL LEVEL 1-5: Performed by: ORTHOPAEDIC SURGERY

## 2017-04-14 PROCEDURE — 160036 HCHG PACU - EA ADDL 30 MINS PHASE I: Performed by: ORTHOPAEDIC SURGERY

## 2017-04-14 PROCEDURE — C1713 ANCHOR/SCREW BN/BN,TIS/BN: HCPCS | Performed by: ORTHOPAEDIC SURGERY

## 2017-04-14 PROCEDURE — 93010 ELECTROCARDIOGRAM REPORT: CPT | Performed by: INTERNAL MEDICINE

## 2017-04-14 PROCEDURE — 502000 HCHG MISC OR IMPLANTS RC 0278: Performed by: ORTHOPAEDIC SURGERY

## 2017-04-14 PROCEDURE — 502626 HCHG SURGIFLO HEMOSTATIC MATRIX 6ML: Performed by: ORTHOPAEDIC SURGERY

## 2017-04-14 PROCEDURE — 500002 HCHG ADHESIVE, DERMABOND: Performed by: ORTHOPAEDIC SURGERY

## 2017-04-14 PROCEDURE — 0SG00AJ FUSION OF LUMBAR VERTEBRAL JOINT WITH INTERBODY FUSION DEVICE, POSTERIOR APPROACH, ANTERIOR COLUMN, OPEN APPROACH: ICD-10-PCS | Performed by: ORTHOPAEDIC SURGERY

## 2017-04-14 PROCEDURE — 110371 HCHG SHELL REV 272: Performed by: ORTHOPAEDIC SURGERY

## 2017-04-14 PROCEDURE — A4606 OXYGEN PROBE USED W OXIMETER: HCPCS | Performed by: ORTHOPAEDIC SURGERY

## 2017-04-14 PROCEDURE — 51798 US URINE CAPACITY MEASURE: CPT

## 2017-04-14 PROCEDURE — 700101 HCHG RX REV CODE 250

## 2017-04-14 PROCEDURE — 84484 ASSAY OF TROPONIN QUANT: CPT

## 2017-04-14 PROCEDURE — A6402 STERILE GAUZE <= 16 SQ IN: HCPCS | Performed by: ORTHOPAEDIC SURGERY

## 2017-04-14 PROCEDURE — 500864 HCHG NEEDLE, SPINAL 18G: Performed by: ORTHOPAEDIC SURGERY

## 2017-04-14 PROCEDURE — A9270 NON-COVERED ITEM OR SERVICE: HCPCS | Performed by: ORTHOPAEDIC SURGERY

## 2017-04-14 PROCEDURE — 160042 HCHG SURGERY MINUTES - EA ADDL 1 MIN LEVEL 5: Performed by: ORTHOPAEDIC SURGERY

## 2017-04-14 PROCEDURE — 500122 HCHG BOVIE, BLADE: Performed by: ORTHOPAEDIC SURGERY

## 2017-04-14 PROCEDURE — 72100 X-RAY EXAM L-S SPINE 2/3 VWS: CPT

## 2017-04-14 PROCEDURE — 502240 HCHG MISC OR SUPPLY RC 0272: Performed by: ORTHOPAEDIC SURGERY

## 2017-04-14 PROCEDURE — 82962 GLUCOSE BLOOD TEST: CPT | Mod: 91

## 2017-04-14 PROCEDURE — 160009 HCHG ANES TIME/MIN: Performed by: ORTHOPAEDIC SURGERY

## 2017-04-14 PROCEDURE — 4A11X4G MONITORING OF PERIPHERAL NERVOUS ELECTRICAL ACTIVITY, INTRAOPERATIVE, EXTERNAL APPROACH: ICD-10-PCS | Performed by: ORTHOPAEDIC SURGERY

## 2017-04-14 PROCEDURE — 770006 HCHG ROOM/CARE - MED/SURG/GYN SEMI*

## 2017-04-14 PROCEDURE — 160035 HCHG PACU - 1ST 60 MINS PHASE I: Performed by: ORTHOPAEDIC SURGERY

## 2017-04-14 PROCEDURE — 700102 HCHG RX REV CODE 250 W/ 637 OVERRIDE(OP): Performed by: ORTHOPAEDIC SURGERY

## 2017-04-14 PROCEDURE — 500885 HCHG PACK, JACKSON TABLE: Performed by: ORTHOPAEDIC SURGERY

## 2017-04-14 PROCEDURE — 501838 HCHG SUTURE GENERAL: Performed by: ORTHOPAEDIC SURGERY

## 2017-04-14 PROCEDURE — 160031 HCHG SURGERY MINUTES - 1ST 30 MINS LEVEL 5: Performed by: ORTHOPAEDIC SURGERY

## 2017-04-14 PROCEDURE — 36415 COLL VENOUS BLD VENIPUNCTURE: CPT

## 2017-04-14 PROCEDURE — A9270 NON-COVERED ITEM OR SERVICE: HCPCS

## 2017-04-14 PROCEDURE — 93005 ELECTROCARDIOGRAM TRACING: CPT | Performed by: ORTHOPAEDIC SURGERY

## 2017-04-14 PROCEDURE — 700111 HCHG RX REV CODE 636 W/ 250 OVERRIDE (IP): Performed by: ORTHOPAEDIC SURGERY

## 2017-04-14 PROCEDURE — 0SB20ZZ EXCISION OF LUMBAR VERTEBRAL DISC, OPEN APPROACH: ICD-10-PCS | Performed by: ORTHOPAEDIC SURGERY

## 2017-04-14 PROCEDURE — 700111 HCHG RX REV CODE 636 W/ 250 OVERRIDE (IP)

## 2017-04-14 PROCEDURE — 700112 HCHG RX REV CODE 229: Performed by: ORTHOPAEDIC SURGERY

## 2017-04-14 PROCEDURE — 700102 HCHG RX REV CODE 250 W/ 637 OVERRIDE(OP)

## 2017-04-14 DEVICE — DBM PUTTY PROGENIX 5.0CC: Type: IMPLANTABLE DEVICE | Status: FUNCTIONAL

## 2017-04-14 DEVICE — IMPLANTABLE DEVICE: Type: IMPLANTABLE DEVICE | Status: FUNCTIONAL

## 2017-04-14 RX ORDER — TEMAZEPAM 15 MG/1
30 CAPSULE ORAL NIGHTLY
Status: DISCONTINUED | OUTPATIENT
Start: 2017-04-14 | End: 2017-04-15 | Stop reason: HOSPADM

## 2017-04-14 RX ORDER — VANCOMYCIN HYDROCHLORIDE 500 MG/10ML
INJECTION, POWDER, LYOPHILIZED, FOR SOLUTION INTRAVENOUS
Status: DISCONTINUED | OUTPATIENT
Start: 2017-04-14 | End: 2017-04-14 | Stop reason: HOSPADM

## 2017-04-14 RX ORDER — ALPRAZOLAM 0.25 MG/1
.25-.5 TABLET ORAL 4 TIMES DAILY PRN
Status: DISCONTINUED | OUTPATIENT
Start: 2017-04-14 | End: 2017-04-14

## 2017-04-14 RX ORDER — SODIUM CHLORIDE 9 MG/ML
1000 INJECTION, SOLUTION INTRAVENOUS
Status: COMPLETED | OUTPATIENT
Start: 2017-04-14 | End: 2017-04-14

## 2017-04-14 RX ORDER — BISACODYL 10 MG
10 SUPPOSITORY, RECTAL RECTAL
Status: DISCONTINUED | OUTPATIENT
Start: 2017-04-14 | End: 2017-04-15 | Stop reason: HOSPADM

## 2017-04-14 RX ORDER — ENEMA 19; 7 G/133ML; G/133ML
1 ENEMA RECTAL
Status: DISCONTINUED | OUTPATIENT
Start: 2017-04-14 | End: 2017-04-15 | Stop reason: HOSPADM

## 2017-04-14 RX ORDER — LAMOTRIGINE 100 MG/1
100 TABLET ORAL EVERY MORNING
Status: DISCONTINUED | OUTPATIENT
Start: 2017-04-14 | End: 2017-04-15 | Stop reason: HOSPADM

## 2017-04-14 RX ORDER — LIDOCAINE HCL/EPINEPHRINE/PF 2%-1:200K
VIAL (ML) INJECTION
Status: DISCONTINUED | OUTPATIENT
Start: 2017-04-14 | End: 2017-04-14 | Stop reason: HOSPADM

## 2017-04-14 RX ORDER — OXYCODONE HCL 5 MG/5 ML
SOLUTION, ORAL ORAL
Status: COMPLETED
Start: 2017-04-14 | End: 2017-04-14

## 2017-04-14 RX ORDER — MIDAZOLAM HYDROCHLORIDE 1 MG/ML
INJECTION INTRAMUSCULAR; INTRAVENOUS
Status: COMPLETED
Start: 2017-04-14 | End: 2017-04-14

## 2017-04-14 RX ORDER — DOCUSATE SODIUM 100 MG/1
100 CAPSULE, LIQUID FILLED ORAL 2 TIMES DAILY
Status: DISCONTINUED | OUTPATIENT
Start: 2017-04-14 | End: 2017-04-15 | Stop reason: HOSPADM

## 2017-04-14 RX ORDER — BACITRACIN 50000 [IU]/1
INJECTION, POWDER, FOR SOLUTION INTRAMUSCULAR
Status: DISCONTINUED | OUTPATIENT
Start: 2017-04-14 | End: 2017-04-14 | Stop reason: HOSPADM

## 2017-04-14 RX ORDER — ALPRAZOLAM 0.5 MG/1
0.5 TABLET ORAL 4 TIMES DAILY PRN
Status: DISCONTINUED | OUTPATIENT
Start: 2017-04-14 | End: 2017-04-15 | Stop reason: HOSPADM

## 2017-04-14 RX ORDER — LEVOTHYROXINE SODIUM 88 UG/1
88 TABLET ORAL
Status: DISCONTINUED | OUTPATIENT
Start: 2017-04-15 | End: 2017-04-15 | Stop reason: HOSPADM

## 2017-04-14 RX ORDER — CEFAZOLIN SODIUM 2 G/100ML
2 INJECTION, SOLUTION INTRAVENOUS EVERY 8 HOURS
Status: COMPLETED | OUTPATIENT
Start: 2017-04-14 | End: 2017-04-15

## 2017-04-14 RX ORDER — ALPRAZOLAM 0.25 MG/1
0.25 TABLET ORAL 4 TIMES DAILY PRN
Status: DISCONTINUED | OUTPATIENT
Start: 2017-04-14 | End: 2017-04-15 | Stop reason: HOSPADM

## 2017-04-14 RX ORDER — HYDROMORPHONE HYDROCHLORIDE 2 MG/ML
INJECTION, SOLUTION INTRAMUSCULAR; INTRAVENOUS; SUBCUTANEOUS
Status: COMPLETED
Start: 2017-04-14 | End: 2017-04-14

## 2017-04-14 RX ORDER — HYDROXYZINE 50 MG/1
100 TABLET, FILM COATED ORAL 2 TIMES DAILY
Status: DISCONTINUED | OUTPATIENT
Start: 2017-04-14 | End: 2017-04-15 | Stop reason: HOSPADM

## 2017-04-14 RX ORDER — LIDOCAINE HYDROCHLORIDE 10 MG/ML
INJECTION, SOLUTION INFILTRATION; PERINEURAL
Status: COMPLETED
Start: 2017-04-14 | End: 2017-04-14

## 2017-04-14 RX ORDER — VENLAFAXINE 37.5 MG/1
75 TABLET ORAL EVERY MORNING
Status: DISCONTINUED | OUTPATIENT
Start: 2017-04-14 | End: 2017-04-15 | Stop reason: HOSPADM

## 2017-04-14 RX ORDER — OXYCODONE HYDROCHLORIDE 10 MG/1
10 TABLET ORAL
Status: DISCONTINUED | OUTPATIENT
Start: 2017-04-14 | End: 2017-04-15 | Stop reason: HOSPADM

## 2017-04-14 RX ORDER — ACETAMINOPHEN 500 MG
1000 TABLET ORAL EVERY 6 HOURS
Status: DISCONTINUED | OUTPATIENT
Start: 2017-04-14 | End: 2017-04-15 | Stop reason: HOSPADM

## 2017-04-14 RX ORDER — LOSARTAN POTASSIUM 50 MG/1
100 TABLET ORAL EVERY MORNING
Status: DISCONTINUED | OUTPATIENT
Start: 2017-04-14 | End: 2017-04-15 | Stop reason: HOSPADM

## 2017-04-14 RX ORDER — OXYCODONE HYDROCHLORIDE 5 MG/1
5 TABLET ORAL
Status: DISCONTINUED | OUTPATIENT
Start: 2017-04-14 | End: 2017-04-15 | Stop reason: HOSPADM

## 2017-04-14 RX ORDER — POLYETHYLENE GLYCOL 3350 17 G/17G
1 POWDER, FOR SOLUTION ORAL 2 TIMES DAILY PRN
Status: DISCONTINUED | OUTPATIENT
Start: 2017-04-14 | End: 2017-04-15 | Stop reason: HOSPADM

## 2017-04-14 RX ORDER — BUPIVACAINE HYDROCHLORIDE 5 MG/ML
INJECTION, SOLUTION PERINEURAL
Status: DISCONTINUED | OUTPATIENT
Start: 2017-04-14 | End: 2017-04-14 | Stop reason: HOSPADM

## 2017-04-14 RX ADMIN — MIDAZOLAM 1 MG: 1 INJECTION INTRAMUSCULAR; INTRAVENOUS at 13:10

## 2017-04-14 RX ADMIN — TEMAZEPAM 30 MG: 15 CAPSULE ORAL at 21:23

## 2017-04-14 RX ADMIN — HYDROXYZINE HYDROCHLORIDE 100 MG: 50 TABLET, FILM COATED ORAL at 21:23

## 2017-04-14 RX ADMIN — CEFAZOLIN SODIUM 2 G: 2 INJECTION, SOLUTION INTRAVENOUS at 21:29

## 2017-04-14 RX ADMIN — HYDROMORPHONE HYDROCHLORIDE 0.2 MG: 1 INJECTION, SOLUTION INTRAMUSCULAR; INTRAVENOUS; SUBCUTANEOUS at 13:20

## 2017-04-14 RX ADMIN — DOCUSATE SODIUM 100 MG: 100 CAPSULE ORAL at 21:24

## 2017-04-14 RX ADMIN — ACETAMINOPHEN 1000 MG: 500 TABLET, FILM COATED ORAL at 17:44

## 2017-04-14 RX ADMIN — HYDROMORPHONE HYDROCHLORIDE 0.2 MG: 2 INJECTION INTRAMUSCULAR; INTRAVENOUS; SUBCUTANEOUS at 12:51

## 2017-04-14 RX ADMIN — MIDAZOLAM 1 MG: 1 INJECTION INTRAMUSCULAR; INTRAVENOUS at 13:00

## 2017-04-14 RX ADMIN — SODIUM CHLORIDE 1000 ML: 9 INJECTION, SOLUTION INTRAVENOUS at 06:45

## 2017-04-14 RX ADMIN — HYDROMORPHONE HYDROCHLORIDE 0.5 MG: 1 INJECTION, SOLUTION INTRAMUSCULAR; INTRAVENOUS; SUBCUTANEOUS at 22:27

## 2017-04-14 RX ADMIN — HYDROMORPHONE HYDROCHLORIDE 0.2 MG: 2 INJECTION INTRAMUSCULAR; INTRAVENOUS; SUBCUTANEOUS at 12:41

## 2017-04-14 RX ADMIN — HYDROMORPHONE HYDROCHLORIDE 0.2 MG: 1 INJECTION, SOLUTION INTRAMUSCULAR; INTRAVENOUS; SUBCUTANEOUS at 14:49

## 2017-04-14 RX ADMIN — LIDOCAINE HYDROCHLORIDE: 10 INJECTION, SOLUTION INFILTRATION; PERINEURAL at 06:45

## 2017-04-14 RX ADMIN — HYDROMORPHONE HYDROCHLORIDE 0.5 MG: 1 INJECTION, SOLUTION INTRAMUSCULAR; INTRAVENOUS; SUBCUTANEOUS at 16:04

## 2017-04-14 RX ADMIN — HYDROMORPHONE HYDROCHLORIDE 0.2 MG: 1 INJECTION, SOLUTION INTRAMUSCULAR; INTRAVENOUS; SUBCUTANEOUS at 15:41

## 2017-04-14 RX ADMIN — ALPRAZOLAM 0.5 MG: 0.5 TABLET ORAL at 22:46

## 2017-04-14 RX ADMIN — HYDROMORPHONE HYDROCHLORIDE 0.2 MG: 2 INJECTION INTRAMUSCULAR; INTRAVENOUS; SUBCUTANEOUS at 12:26

## 2017-04-14 RX ADMIN — OXYCODONE HYDROCHLORIDE 10 MG: 5 SOLUTION ORAL at 12:24

## 2017-04-14 RX ADMIN — HYDROMORPHONE HYDROCHLORIDE 0.2 MG: 2 INJECTION INTRAMUSCULAR; INTRAVENOUS; SUBCUTANEOUS at 12:16

## 2017-04-14 RX ADMIN — HYDROMORPHONE HYDROCHLORIDE 0.2 MG: 2 INJECTION INTRAMUSCULAR; INTRAVENOUS; SUBCUTANEOUS at 12:56

## 2017-04-14 RX ADMIN — HYDROMORPHONE HYDROCHLORIDE 0.2 MG: 2 INJECTION INTRAMUSCULAR; INTRAVENOUS; SUBCUTANEOUS at 12:36

## 2017-04-14 RX ADMIN — HYDROMORPHONE HYDROCHLORIDE 0.2 MG: 2 INJECTION INTRAMUSCULAR; INTRAVENOUS; SUBCUTANEOUS at 13:01

## 2017-04-14 RX ADMIN — FENTANYL CITRATE 50 MCG: 50 INJECTION, SOLUTION INTRAMUSCULAR; INTRAVENOUS at 12:02

## 2017-04-14 RX ADMIN — HYDROMORPHONE HYDROCHLORIDE 0.2 MG: 1 INJECTION, SOLUTION INTRAMUSCULAR; INTRAVENOUS; SUBCUTANEOUS at 13:15

## 2017-04-14 RX ADMIN — HYDROMORPHONE HYDROCHLORIDE 0.2 MG: 2 INJECTION INTRAMUSCULAR; INTRAVENOUS; SUBCUTANEOUS at 12:21

## 2017-04-14 RX ADMIN — HYDROMORPHONE HYDROCHLORIDE 0.2 MG: 2 INJECTION INTRAMUSCULAR; INTRAVENOUS; SUBCUTANEOUS at 12:46

## 2017-04-14 RX ADMIN — OXYCODONE HYDROCHLORIDE 10 MG: 10 TABLET ORAL at 21:21

## 2017-04-14 RX ADMIN — OXYCODONE HYDROCHLORIDE 10 MG: 10 TABLET ORAL at 17:44

## 2017-04-14 RX ADMIN — HYDROMORPHONE HYDROCHLORIDE 0.2 MG: 1 INJECTION, SOLUTION INTRAMUSCULAR; INTRAVENOUS; SUBCUTANEOUS at 13:35

## 2017-04-14 RX ADMIN — MIDAZOLAM 1 MG: 1 INJECTION INTRAMUSCULAR; INTRAVENOUS at 13:45

## 2017-04-14 RX ADMIN — HYDROMORPHONE HYDROCHLORIDE 0.2 MG: 2 INJECTION INTRAMUSCULAR; INTRAVENOUS; SUBCUTANEOUS at 12:31

## 2017-04-14 RX ADMIN — FENTANYL CITRATE 50 MCG: 50 INJECTION, SOLUTION INTRAMUSCULAR; INTRAVENOUS at 11:57

## 2017-04-14 ASSESSMENT — PAIN SCALES - GENERAL
PAINLEVEL_OUTOF10: 5
PAINLEVEL_OUTOF10: 8
PAINLEVEL_OUTOF10: 7
PAINLEVEL_OUTOF10: 8
PAINLEVEL_OUTOF10: ASSUMED PAIN PRESENT
PAINLEVEL_OUTOF10: 7
PAINLEVEL_OUTOF10: 8
PAINLEVEL_OUTOF10: 7

## 2017-04-14 ASSESSMENT — LIFESTYLE VARIABLES
ALCOHOL_USE: NO
EVER_SMOKED: YES

## 2017-04-14 NOTE — IP AVS SNAPSHOT
" Home Care Instructions                                                                                                                  Name:Cristino Keys  Medical Record Number:[id number]  CSN: [id number]    YOB: 1960   Age: 57 y.o.  Sex: male  HT:1.778 m (5' 10\") WT: 116.1 kg (255 lb 15.3 oz)          Admit Date: 4/14/2017     Discharge Date:   Today's Date: 4/15/2017  Attending Doctor:  Bossman Caro M.D.                  Allergies:  Demerol and Septra            Discharge Instructions       Discharge Instructions    Discharged to home by car with relative. Discharged via wheelchair, hospital escort: Yes.  Special equipment needed: Walker    Be sure to schedule a follow-up appointment with your primary care doctor or any specialists as instructed.     Discharge Plan:   Diet Plan: Discussed  Activity Level: Discussed  Confirmed Follow up Appointment: Patient to Call and Schedule Appointment  Confirmed Symptoms Management: Discussed  Medication Reconciliation Updated: Yes  Influenza Vaccine Indication: Not indicated: Previously immunized this influenza season and > 8 years of age    I understand that a diet low in cholesterol, fat, and sodium is recommended for good health. Unless I have been given specific instructions below for another diet, I accept this instruction as my diet prescription.   Other diet: Diet as tolerated    Special Instructions: Discharge instructions for the Orthopedic Patient    Follow up with Primary Care Physician within 2 weeks of discharge to home, regarding:  Review of medications and diagnostic testing.  Surveillance for medical complications.  Workup and treatment of osteoporosis, if appropriate.     -Is this a Joint Replacement patient? No    -Is this patient being discharged with medication to prevent blood clots?  No    · Is patient discharged on Warfarin / Coumadin?   No     · Is patient Post Blood Transfusion?  No    Depression / Suicide Risk    As you are " discharged from this [facility] facility, it is important to learn how to keep safe from harming yourself.    Recognize the warning signs:  · Abrupt changes in personality, positive or negative- including increase in energy   · Giving away possessions  · Change in eating patterns- significant weight changes-  positive or negative  · Change in sleeping patterns- unable to sleep or sleeping all the time   · Unwillingness or inability to communicate  · Depression  · Unusual sadness, discouragement and loneliness  · Talk of wanting to die  · Neglect of personal appearance   · Rebelliousness- reckless behavior  · Withdrawal from people/activities they love  · Confusion- inability to concentrate     If you or a loved one observes any of these behaviors or has concerns about self-harm, here's what you can do:  · Talk about it- your feelings and reasons for harming yourself  · Remove any means that you might use to hurt yourself (examples: pills, rope, extension cords, firearm)  · Get professional help from the community (Mental Health, Substance Abuse, psychological counseling)  · Do not be alone:Call your Safe Contact- someone whom you trust who will be there for you.  · Call your local CRISIS HOTLINE 898-0819 or 620-066-8468  · Call your local Children's Mobile Crisis Response Team [place] (726) 091-3824 or www.[employer]  · Call the toll free National Suicide Prevention Hotlines   · National Suicide Prevention Lifeline 532-715-ESWY (0993)  · National Hope Line Network 800-SUICIDE (821-9212)        Your appointments     May 15, 2017 10:15 AM   Standing Order with [facility]   [facility] (--)    [address] [place] [address]   [phone]            May 28, 2017  8:00 PM   Sleep Study Diagnostic with SLEEP TECH   [facility] (--)    [address] [place] [address]   [phone]            Jun 01, 2017 10:30 AM   FOOT AND NAIL CARE FOLLOW UP with  Micky Moore R.N.   [facility]    [address]  [place] [address]   [phone]            Jun 12, 2017  3:20 PM   Follow UP with JAYLON Valencia   [facility] (--)    [address]  [place] [address]   [phone]            Jun 30, 2017 10:00 AM   Extended Visit - Health Mngt with Tiffanie Marina R.N., Jayla Ledesma, ARIANA   [employer] [facility])    [address]  [address]  [place] [address] [phone]           It is the patient's responsibility to check with your insurance for benefit coverage for visit/visits.  Arrive 15 minutes before your scheduled appt time  24 hour notice is required for all appointment chnages or cancellations.  Please bring the following with you: 1) Picture ID 2) Insurance card 3) New patient forms 4) 2-3 days of detailed food intake logs 5) Blood glucose monitor and blood glucose logs     (If you have them)              Follow-up Information     1. Follow up with Bossman Caro M.D. In 2 weeks.    Specialty:  Orthopaedics    Why:  for appointment    Contact information    [address] [place] [address]  [phone]           Discharge Medication Instructions:    Below are the medications your physician expects you to take upon discharge:    Review all your home medications and newly ordered medications with your doctor and/or pharmacist. Follow medication instructions as directed by your doctor and/or pharmacist.    Please keep your medication list with you and share with your physician.               Medication List      START taking these medications        Instructions    Morning Afternoon Evening Bedtime    oxycodone immediate release 10 MG immediate release tablet   Last time this was given:  10 mg on 4/15/2017  1:35 PM   Commonly known as:  ROXICODONE        Take 1 Tab by mouth every 3 hours as needed (Severe Pain (NRS Pain Scale 7-10; CPOT Pain Scale 6-8)).      Dose:  10 mg                          CONTINUE taking these medications        Instructions    Morning Afternoon Evening Bedtime    alprazolam 0.25 MG Tabs   Last time this was given:  0.5 mg on 4/14/2017 10:46 PM   Commonly known as:  XANAX        Take 0.25-0.5 mg by mouth 4 times a day as needed for Sleep.   Dose:  0.25-0.5 mg                        clotrimazole 1 % Crea   Commonly known as:  LOTRIMIN        APPLY TO AFFECTED AREA EVERY 12 HOURS                        hydrOXYzine 50 MG Tabs   Last time this was given:  100 mg on 4/15/2017  9:19 AM   Commonly known as:  ATARAX        Take 200 mg by mouth 2 Times a Day.   Dose:  200 mg                        lamotrigine 100 MG Tabs   Last time this was given:  100 mg on 4/15/2017  9:19 AM   Commonly known as:  LAMICTAL        Take 100 mg by mouth every morning.   Dose:  100 mg                        LEVOXYL 88 MCG Tabs   Last time this was given:  88 mcg on 4/15/2017  6:27 AM   Generic drug:  levothyroxine        Doctor's comments:  Dispense brand Levoxyl only   Take 1 Tab by mouth Every morning on an empty stomach.   Dose:  88 mcg                        losartan 100 MG Tabs   Last time this was given:  100 mg on 4/15/2017  9:19 AM   Commonly known as:  COZAAR        Take 100 mg by mouth every morning.   Dose:  100 mg                        oxycodone-acetaminophen 5-325 MG Tabs   Commonly known as:  PERCOCET        Take 1-2 Tabs by mouth every four hours as needed.   Dose:  1-2 Tab                        polyethylene glycol 3350 Powd   Commonly known as:  MIRALAX        TAKE 1 CAPFUL WITH ANY LIQUID BY MOUTH ONCE A DAY                        temazepam 30 MG capsule   Last time this was given:  30 mg on 4/14/2017  9:23 PM   Commonly known as:  RESTORIL        Take 30 mg by mouth at bedtime as needed for Sleep.   Dose:  30 mg                        venlafaxine XR 75 MG Cp24   Commonly known as:  EFFEXOR XR        Take 75 mg by mouth every morning.   Dose:  75 mg                         * VICTOZA 18 MG/3ML Sopn injection   Generic drug:  liraglutide        Inject 1.8 mg as instructed every morning.   Dose:  1.8 mg                        * Notice:  This list has 1 medication(s) that are the same as other medications prescribed for you. Read the directions carefully, and ask your doctor or other care provider to review them with you.      ASK your doctor about these medications        Instructions    Morning Afternoon Evening Bedtime    * VICTOZA SC   Ask about: Should I take this medication?        Inject 1.8 mL as instructed every morning.   Dose:  1.8 mL                        * Notice:  This list has 1 medication(s) that are the same as other medications prescribed for you. Read the directions carefully, and ask your doctor or other care provider to review them with you.         Where to Get Your Medications      Information about where to get these medications is not yet available     ! Ask your nurse or doctor about these medications    - oxycodone immediate release 10 MG immediate release tablet            Instructions           Diet / Nutrition:    Follow any diet instructions given to you by your doctor or the dietician, including how much salt (sodium) you are allowed each day.    If you are overweight, talk to your doctor about a weight reduction plan.    Activity:    Remain physically active following your doctor's instructions about exercise and activity.    Rest often.     Any time you become even a little tired or short of breath, SIT DOWN and rest.    Worsening Symptoms:    Report any of the following signs and symptoms to the doctor's office immediately:    *Pain of jaw, arm, or neck  *Chest pain not relieved by medication                               *Dizziness or loss of consciousness  *Difficulty breathing even when at rest   *More tired than usual                                       *Bleeding drainage or swelling of surgical site  *Swelling of feet, ankles,  legs or stomach                 *Fever (>100ºF)  *Pink or blood tinged sputum  *Weight gain (3lbs/day or 5lbs /week)           *Shock from internal defibrillator (if applicable)  *Palpitations or irregular heartbeats                *Cool and/or numb extremities    Stroke Awareness    Common Risk Factors for Stroke include:    Age  Atrial Fibrillation  Carotid Artery Stenosis  Diabetes Mellitus  Excessive alcohol consumption  High blood pressure  Overweight   Physical inactivity  Smoking    Warning signs and symptoms of a stroke include:    *Sudden numbness or weakness of the face, arm or leg (especially on one side of the body).  *Sudden confusion, trouble speaking or understanding.  *Sudden trouble seeing in one or both eyes.  *Sudden trouble walking, dizziness, loss of balance or coordination.Sudden severe headache with no known cause.    It is very important to get treatment quickly when a stroke occurs. If you experience any of the above warning signs, call 911 immediately.                   Disclaimer         Quit Smoking / Tobacco Use:    I understand the use of any tobacco products increases my chance of suffering from future heart disease or stroke and could cause other illnesses which may shorten my life. Quitting the use of tobacco products is the single most important thing I can do to improve my health. For further information on smoking / tobacco cessation call a Toll Free Quit Line at 1-215.398.2785 (*National Cancer [place]) or 1-228.578.7925 (American Lung Association) or you can access the web based program at www.lungusa.org.    [place] Tobacco Users Help Line:  (857) 371-5993       Toll Free: 1-593.810.5472  Quit Tobacco Program [facility] (076)577-2617    Crisis Hotline:    [place] Crisis Hotline:  7-518-FGFWIVI or 1-484.442.5185    [place] Crisis Hotline:    1-793.462.7964 or 051-632-6693    Discharge Survey:   Thank you for choosing [facility]. We hope we did  everything we could to make your hospital stay a pleasant one. You may be receiving a phone survey and we would appreciate your time and participation in answering the questions. Your input is very valuable to us in our efforts to improve our service to our patients and their families.        My signature on this form indicates that:    1. I have reviewed and understand the above information.  2. My questions regarding this information have been answered to my satisfaction.  3. I have formulated a plan with my discharge nurse to obtain my prescribed medications for home.                  Disclaimer         __________________________________                     __________       ________                       Patient Signature                                                 Date                    Time

## 2017-04-14 NOTE — IP AVS SNAPSHOT
" <p align=\"LEFT\"><IMG SRC=\"//[email or web address]/blob$/[email or web address]\" alt=\"Image\" WIDTH=\"50%\" HEIGHT=\"200\" BORDER=\"\"></p>                   Name:Cristino Keys  Medical Record Number:[id number]  CSN: [id number]    YOB: 1960   Age: 57 y.o.  Sex: male  HT:1.778 m (5' 10\") WT: 116.1 kg (255 lb 15.3 oz)          Admit Date: 4/14/2017     Discharge Date:   Today's Date: 4/15/2017  Attending Doctor:  Bossman Caro M.D.                  Allergies:  Demerol and Septra          Your appointments     May 15, 2017 10:15 AM   Standing Order with [facility] [place]   [facility] [place] (--)    [address]  [facility] [address]   [phone]            May 28, 2017  8:00 PM   Sleep Study Diagnostic with SLEEP TECH   [facility] (--)    [address]  [place] [address]   [phone]            Jun 01, 2017 10:30 AM   FOOT AND NAIL CARE FOLLOW UP with Micky Moore R.N.   [facility] ([address] [place] [address])    [address] [place] [address] [address]  [facility] [address]   [phone]            Jun 12, 2017  3:20 PM   Follow UP with JAYLON Valencia   [facility] (--)    [address] [facility] [address]  [place] [address]   [phone]            Jun 30, 2017 10:00 AM   Extended Visit - Health Mngt with Tiffanie Marina R.N., Jayla Ledesma RD   [facility])    [address] [facility]  [address]  [place] [address] [phone]           It is the patient's responsibility to check with your insurance for benefit coverage for visit/visits.  Arrive 15 minutes before your scheduled appt time  24 hour notice is required for all appointment chnages or cancellations.  Please bring the following with you: 1) Picture ID 2) Insurance card 3) New patient forms 4) 2-3 days of detailed food intake logs 5) Blood glucose monitor and blood glucose logs     (If you have them)              Follow-up Information     1. Follow up with Bossman Caro M.D. In 2 weeks.   " Specialty:  Orthopaedics    Why:  for appointment    Contact information    [address] [place] [address]  [phone]           Medication List      Take these Medications        Instructions    alprazolam 0.25 MG Tabs   Commonly known as:  XANAX    Take 0.25-0.5 mg by mouth 4 times a day as needed for Sleep.   Dose:  0.25-0.5 mg       clotrimazole 1 % Crea   Commonly known as:  LOTRIMIN    APPLY TO AFFECTED AREA EVERY 12 HOURS       hydrOXYzine 50 MG Tabs   Commonly known as:  ATARAX    Take 200 mg by mouth 2 Times a Day.   Dose:  200 mg       lamotrigine 100 MG Tabs   Commonly known as:  LAMICTAL    Take 100 mg by mouth every morning.   Dose:  100 mg       LEVOXYL 88 MCG Tabs   Generic drug:  levothyroxine    Doctor's comments:  Dispense brand Levoxyl only   Take 1 Tab by mouth Every morning on an empty stomach.   Dose:  88 mcg       losartan 100 MG Tabs   Commonly known as:  COZAAR    Take 100 mg by mouth every morning.   Dose:  100 mg       oxycodone immediate release 10 MG immediate release tablet   Commonly known as:  ROXICODONE    Take 1 Tab by mouth every 3 hours as needed (Severe Pain (NRS Pain Scale 7-10; CPOT Pain Scale 6-8)).   Dose:  10 mg       oxycodone-acetaminophen 5-325 MG Tabs   Commonly known as:  PERCOCET    Take 1-2 Tabs by mouth every four hours as needed.   Dose:  1-2 Tab       polyethylene glycol 3350 Powd   Commonly known as:  MIRALAX    TAKE 1 CAPFUL WITH ANY LIQUID BY MOUTH ONCE A DAY       temazepam 30 MG capsule   Commonly known as:  RESTORIL    Take 30 mg by mouth at bedtime as needed for Sleep.   Dose:  30 mg       venlafaxine XR 75 MG Cp24   Commonly known as:  EFFEXOR XR    Take 75 mg by mouth every morning.   Dose:  75 mg       * VICTOZA 18 MG/3ML Sopn injection   Generic drug:  liraglutide    Inject 1.8 mg as instructed every morning.   Dose:  1.8 mg       * Notice:  This list has 1 medication(s) that are the same as other medications prescribed for you. Read the directions  carefully, and ask your doctor or other care provider to review them with you.      Ask your Physician about these medications        Instructions    * VICTOZA SC   Ask about: Should I take this medication?    Inject 1.8 mL as instructed every morning.   Dose:  1.8 mL       * Notice:  This list has 1 medication(s) that are the same as other medications prescribed for you. Read the directions carefully, and ask your doctor or other care provider to review them with you.

## 2017-04-14 NOTE — OR NURSING
"Patient complaining of \"chest pain\". States feels like his skin is being \"torn off\".  States he has \"never felt anything like it\".  EKG ordered.  Ice pack to chest.  Dr Hollins notified.    "

## 2017-04-14 NOTE — IP AVS SNAPSHOT
Plutonium Paint Access Code: Activation code not generated  Current Plutonium Paint Status: Active    Pinstripe[employer]  A secure, online tool to manage your health information     [employer]’s Plutonium Paint® is a secure, online tool that connects you to your personalized health information from the privacy of your home -- day or night - making it very easy for you to manage your healthcare. Once the activation process is completed, you can even access your medical information using the Plutonium Paint andree, which is available for free in the Apple Andree store or Google Play store.     Plutonium Paint provides the following levels of access (as shown below):   My Chart Features   [facility] Primary Care Doctor [facility]  Specialists [facility]  Urgent  Care Non-[facility]  Primary Care  Doctor   Email your healthcare team securely and privately 24/7 X X X X   Manage appointments: schedule your next appointment; view details of past/upcoming appointments X      Request prescription refills. X      View recent personal medical records, including lab and immunizations X X X X   View health record, including health history, allergies, medications X X X X   Read reports about your outpatient visits, procedures, consult and ER notes X X X X   See your discharge summary, which is a recap of your hospital and/or ER visit that includes your diagnosis, lab results, and care plan. X X       How to register for Plutonium Paint:  1. Go to  [email or web address].  2. Click on the Sign Up Now box, which takes you to the New Member Sign Up page. You will need to provide the following information:  a. Enter your Plutonium Paint Access Code exactly as it appears at the top of this page. (You will not need to use this code after you’ve completed the sign-up process. If you do not sign up before the expiration date, you must request a new code.)   b. Enter your date of birth.   c. Enter your home email address.   d. Click Submit, and follow the next screen’s instructions.  3. Create a Plutonium Paint ID. This will  be your [employer]knot login ID and cannot be changed, so think of one that is secure and easy to remember.  4. Create a Squareknot password. You can change your password at any time.  5. Enter your Password Reset Question and Answer. This can be used at a later time if you forget your password.   6. Enter your e-mail address. This allows you to receive e-mail notifications when new information is available in Squareknot.  7. Click Sign Up. You can now view your health information.    For assistance activating your Squareknot account, call [phone]

## 2017-04-14 NOTE — IP AVS SNAPSHOT
4/15/2017    Cristino Keys  [address] [address]    Dear Cristino:    Critical access hospital wants to ensure your discharge home is safe and you or your loved ones have had all of your questions answered regarding your care after you leave the hospital.    Below is a list of resources and contact information should you have any questions regarding your hospital stay, follow-up instructions, or active medical symptoms.    Questions or Concerns Regarding… Contact   Medical Questions Related to Your Discharge  (7 days a week, 8am-5pm) Contact a Nurse Care Coordinator   [phone]   Medical Questions Not Related to Your Discharge  (24 hours a day / 7 days a week)  Contact the Nurse Health Line   [phone]    Medications or Discharge Instructions Refer to your discharge packet   or contact your [facility] Primary Care Provider   [phone]   Follow-up Appointment(s) Schedule your appointment via ShareDesk   or contact Scheduling [phone]   Billing Review your statement via ShareDesk  or contact Billing [phone]   Medical Records Review your records via ShareDesk   or contact Medical Records [phone]     You may receive a telephone call within two days of discharge. This call is to make certain you understand your discharge instructions and have the opportunity to have any questions answered. You can also easily access your medical information, test results and upcoming appointments via the ShareDesk free online health management tool. You can learn more and sign up at inContact/ShareDesk. For assistance setting up your ShareDesk account, please call [phone].    Once again, we want to ensure your discharge home is safe and that you have a clear understanding of any next steps in your care. If you have any questions or concerns, please do not hesitate to contact us, we are here for you. Thank you for choosing [facility] for your healthcare needs.    Sincerely,    Your [facility] Healthcare Team

## 2017-04-15 VITALS
HEIGHT: 70 IN | DIASTOLIC BLOOD PRESSURE: 73 MMHG | HEART RATE: 100 BPM | BODY MASS INDEX: 36.64 KG/M2 | TEMPERATURE: 98.7 F | WEIGHT: 255.95 LBS | RESPIRATION RATE: 17 BRPM | SYSTOLIC BLOOD PRESSURE: 131 MMHG | OXYGEN SATURATION: 91 %

## 2017-04-15 PROCEDURE — G8979 MOBILITY GOAL STATUS: HCPCS | Mod: CJ | Performed by: PHYSICAL THERAPIST

## 2017-04-15 PROCEDURE — 700112 HCHG RX REV CODE 229: Performed by: ORTHOPAEDIC SURGERY

## 2017-04-15 PROCEDURE — A9270 NON-COVERED ITEM OR SERVICE: HCPCS | Performed by: ORTHOPAEDIC SURGERY

## 2017-04-15 PROCEDURE — 700111 HCHG RX REV CODE 636 W/ 250 OVERRIDE (IP): Performed by: ORTHOPAEDIC SURGERY

## 2017-04-15 PROCEDURE — 700102 HCHG RX REV CODE 250 W/ 637 OVERRIDE(OP): Performed by: ORTHOPAEDIC SURGERY

## 2017-04-15 PROCEDURE — 700102 HCHG RX REV CODE 250 W/ 637 OVERRIDE(OP): Performed by: PHYSICIAN ASSISTANT

## 2017-04-15 PROCEDURE — G8978 MOBILITY CURRENT STATUS: HCPCS | Mod: CJ | Performed by: PHYSICAL THERAPIST

## 2017-04-15 PROCEDURE — 97161 PT EVAL LOW COMPLEX 20 MIN: CPT | Performed by: PHYSICAL THERAPIST

## 2017-04-15 PROCEDURE — A9270 NON-COVERED ITEM OR SERVICE: HCPCS | Performed by: PHYSICIAN ASSISTANT

## 2017-04-15 RX ORDER — DIAZEPAM 5 MG/1
5 TABLET ORAL EVERY 6 HOURS PRN
Status: DISCONTINUED | OUTPATIENT
Start: 2017-04-15 | End: 2017-04-15 | Stop reason: HOSPADM

## 2017-04-15 RX ORDER — DIAZEPAM 5 MG/1
10 TABLET ORAL ONCE
Status: COMPLETED | OUTPATIENT
Start: 2017-04-15 | End: 2017-04-15

## 2017-04-15 RX ORDER — OXYCODONE HYDROCHLORIDE 10 MG/1
10 TABLET ORAL
Qty: 40 TAB | Refills: 0 | Status: SHIPPED | OUTPATIENT
Start: 2017-04-15 | End: 2017-08-15

## 2017-04-15 RX ADMIN — LOSARTAN POTASSIUM 100 MG: 50 TABLET, FILM COATED ORAL at 09:19

## 2017-04-15 RX ADMIN — HYDROXYZINE HYDROCHLORIDE 100 MG: 50 TABLET, FILM COATED ORAL at 09:19

## 2017-04-15 RX ADMIN — VENLAFAXINE 75 MG: 37.5 TABLET ORAL at 09:19

## 2017-04-15 RX ADMIN — DIAZEPAM 10 MG: 5 TABLET ORAL at 01:02

## 2017-04-15 RX ADMIN — ACETAMINOPHEN 1000 MG: 500 TABLET, FILM COATED ORAL at 06:27

## 2017-04-15 RX ADMIN — OXYCODONE HYDROCHLORIDE 10 MG: 10 TABLET ORAL at 03:24

## 2017-04-15 RX ADMIN — DOCUSATE SODIUM 100 MG: 100 CAPSULE ORAL at 09:19

## 2017-04-15 RX ADMIN — OXYCODONE HYDROCHLORIDE 10 MG: 10 TABLET ORAL at 09:30

## 2017-04-15 RX ADMIN — LAMOTRIGINE 100 MG: 100 TABLET ORAL at 09:19

## 2017-04-15 RX ADMIN — DIAZEPAM 5 MG: 5 TABLET ORAL at 14:28

## 2017-04-15 RX ADMIN — ACETAMINOPHEN 1000 MG: 500 TABLET, FILM COATED ORAL at 13:35

## 2017-04-15 RX ADMIN — LEVOTHYROXINE SODIUM 88 MCG: 88 TABLET ORAL at 06:27

## 2017-04-15 RX ADMIN — OXYCODONE HYDROCHLORIDE 10 MG: 10 TABLET ORAL at 16:41

## 2017-04-15 RX ADMIN — OXYCODONE HYDROCHLORIDE 10 MG: 10 TABLET ORAL at 06:27

## 2017-04-15 RX ADMIN — HYDROMORPHONE HYDROCHLORIDE 0.5 MG: 1 INJECTION, SOLUTION INTRAMUSCULAR; INTRAVENOUS; SUBCUTANEOUS at 11:52

## 2017-04-15 RX ADMIN — ACETAMINOPHEN 1000 MG: 500 TABLET, FILM COATED ORAL at 00:24

## 2017-04-15 RX ADMIN — OXYCODONE HYDROCHLORIDE 10 MG: 10 TABLET ORAL at 00:24

## 2017-04-15 RX ADMIN — CEFAZOLIN SODIUM 2 G: 2 INJECTION, SOLUTION INTRAVENOUS at 03:24

## 2017-04-15 RX ADMIN — OXYCODONE HYDROCHLORIDE 10 MG: 10 TABLET ORAL at 13:35

## 2017-04-15 ASSESSMENT — PAIN SCALES - GENERAL
PAINLEVEL_OUTOF10: 9
PAINLEVEL_OUTOF10: 4
PAINLEVEL_OUTOF10: 8
PAINLEVEL_OUTOF10: 9

## 2017-04-15 ASSESSMENT — GAIT ASSESSMENTS
GAIT LEVEL OF ASSIST: STAND BY ASSIST
ASSISTIVE DEVICE: FRONT WHEEL WALKER
DEVIATION: ANTALGIC;DECREASED HEEL STRIKE;DECREASED TOE OFF
DISTANCE (FEET): 100

## 2017-04-15 NOTE — DISCHARGE INSTRUCTIONS
Discharge Instructions    Discharged to home by car with relative. Discharged via wheelchair, hospital escort: Yes.  Special equipment needed: Walker    Be sure to schedule a follow-up appointment with your primary care doctor or any specialists as instructed.     Discharge Plan:   Diet Plan: Discussed  Activity Level: Discussed  Confirmed Follow up Appointment: Patient to Call and Schedule Appointment  Confirmed Symptoms Management: Discussed  Medication Reconciliation Updated: Yes  Influenza Vaccine Indication: Not indicated: Previously immunized this influenza season and > 8 years of age    I understand that a diet low in cholesterol, fat, and sodium is recommended for good health. Unless I have been given specific instructions below for another diet, I accept this instruction as my diet prescription.   Other diet: Diet as tolerated    Special Instructions: Discharge instructions for the Orthopedic Patient    Follow up with Primary Care Physician within 2 weeks of discharge to home, regarding:  Review of medications and diagnostic testing.  Surveillance for medical complications.  Workup and treatment of osteoporosis, if appropriate.     -Is this a Joint Replacement patient? No    -Is this patient being discharged with medication to prevent blood clots?  No    · Is patient discharged on Warfarin / Coumadin?   No     · Is patient Post Blood Transfusion?  No    Depression / Suicide Risk    As you are discharged from this [facility] facility, it is important to learn how to keep safe from harming yourself.    Recognize the warning signs:  · Abrupt changes in personality, positive or negative- including increase in energy   · Giving away possessions  · Change in eating patterns- significant weight changes-  positive or negative  · Change in sleeping patterns- unable to sleep or sleeping all the time   · Unwillingness or inability to communicate  · Depression  · Unusual sadness, discouragement and  loneliness  · Talk of wanting to die  · Neglect of personal appearance   · Rebelliousness- reckless behavior  · Withdrawal from people/activities they love  · Confusion- inability to concentrate     If you or a loved one observes any of these behaviors or has concerns about self-harm, here's what you can do:  · Talk about it- your feelings and reasons for harming yourself  · Remove any means that you might use to hurt yourself (examples: pills, rope, extension cords, firearm)  · Get professional help from the community (Mental Health, Substance Abuse, psychological counseling)  · Do not be alone:Call your Safe Contact- someone whom you trust who will be there for you.  · Call your local CRISIS HOTLINE 230-2710 or 592-255-2120  · Call your local Children's Mobile Crisis Response Team [place] (904) 954-0201 or www.Clean Mobile  · Call the toll free National Suicide Prevention Hotlines   · National Suicide Prevention Lifeline 359-954-KBQJ (2562)  · National Hope Line Network 800-SUICIDE (214-7416)

## 2017-04-15 NOTE — PROGRESS NOTES
Patient discharged home, all belongings with patient. Per Dr. Caro Hemovac left in place and will be removed by MD on Monday. RX given to patient. Discharge complete.

## 2017-04-15 NOTE — OP REPORT
Date: 4/14/17  Pre-operative Diagnosis: Degenerative disc disease and spondylolisthesis, degenerative spondylolisthesis with bilateral pars insufficiency fractures and lumbar spinal stenosis  Post-operative Diagnosis: Same  Procedure: L4-5 Decompression and Fusion with Instrumentation (PLDFI) and left Transforaminal Lumbar Interbody Fusion (TLIF)  Surgeon: Bossman Caro MD  Anesthesia: General endotracheal anesthesia  Findings: As expected  Estimated Blood Loss: 50mL  Drains: Hemovac sewn to skin  Specimens: None  Complications: None  Condition: Transferred to PACU in stable condition.  Implants:     Screws: 6.0x45mm x4    Rods: 35mm x 5.5mm (left), 40mm x 5.5mm (right) rods    TLIF cage: [id number] OPAL TLIF    Allograft: 5mL allograft used structurally in the disc space and interbody cage.    Autograft: local bone from decompression used structurally in the disc space and interbody cage.    Indications:  This patient was seen in my office and diagnosed with degenerative spondylolisthesis and spinal stenosis. His MRI demonstrated chronic pars insufficiency fractures and a resultant spondylolisthesis with weightbearing. He had failed all conservative measures, and had decades of leg and back pain. I have explained all options of treatment for the patient, and he has elected to proceed with operative management. I have explained all risks, benefits, and alternatives of the proposed procedure. The risks that we have discussed include death, blindness, nerve damage, infection, dural tear, failure of surgery to alleviate pre-operative symptoms, and possible need for further operation. I explained separately the risks of allograft, including infection and disease transfer. In addition to the aforementioned procedure, I discussed with the patient that other procedures may be indicated during the course of surgery. The patient expressed understanding of this and agreed to move forward with operative  management.    Pre-operative:  The proposed incision site was marked in the pre-operative holding area by me. The patient was then taken to the operating room in stable condition. Following smooth induction of general anesthesia, the patient was positioned prone on a Emiliano table in mild reverse Trendelenburg with all down surfaces well-padded. The patient was then prepped and draped in the usual sterile fashion. Pre-operative antibiotics were administered within one hour of the incision. A surgical timeout was performed, and all parties involved in the procedure were in agreement on the correct patient, location, and procedure to be performed.    Level localization and spinal pause:  The proposed levels were identified using C-arm fluoroscopy and the skin was marked for the proposed incision. The pedicles were marked out. An incision 4cm lateral to midline was made, approximately 4cm in length. Electrocautery was used to dissect the subdermal fat layer down to fascia and to coagulate bleeding vessels. The fascia was then incised longitudinally in line with the incision. A pointed dilator was introduced onto the lateral pars of the caudal level to be fused. Serial dilators were used and the XLIF retractor system was then docked. Positioning was confirmed parallel to the disc space to be fused by lateral fluoroscopy. A secondary “spinal pause” was then performed, and the level was confirmed with all parties participating in the operation.    Pedicle screw placement (right side):  These screws were placed in a percutaneous fashion on the right side. Using biplanar fluoroscopy, a Jamshidi needle was gently malleted into place at the appropriate pedicle screw starting position. The needle was advanced just past the pedicle into the vertebral body. The central trocar was then removed, and a K-wire was placed into the cannulated pedicle. The Jamshidi was then removed, with the K-wire anchored in the vertebral body. A  5.0mm tap was then used past the pedicle in anticipation of the screw. A 5.5x45mm screw was placed. The process was repeated for both the L4 and L5 pedicles. Each level and confirmed fluoroscopically using biplanar fluoroscopically.    Pedicle screw placement (left side):  Using a freehand technique with fluoroscopic assistance, a standard entry point was selected based on bony landmarks. A  hole was created with a high speed carlito. Using biplanar fluoroscopy, a Jamshidi needle was gently malleted into place at the appropriate pedicle screw starting position determined by the carlito hole. The needle was advanced just past the pedicle into the vertebral body. The central trocar was then removed, and a K-wire was placed into the cannulated pedicle. The Jamshidi was then removed, with the K-wire anchored in the vertebral body. A 5.0mm tap was then used past the pedicle in anticipation of the screw. A 5.5x45mm screw was placed. The process was repeated for both the L4 and L5 pedicles. Each level and confirmed fluoroscopically using biplanar fluoroscopically.    Neuromonitoring - screw stimulation:  Each screw was stimulated and potentials were all above 10 milliamps.    TLIF:  Based on pre-operative neurological symptoms, a decision was made with the patient to place a TLIF cage on the left side. Using a transpedicular decompression technique and taking care to avoid breach into the previously created pedicle tract, the inferior facet of the cephalad level to be fused, and the superior facet of the caudal level to be fused were removed using a high speed carlito and Kerrison rongeurs. Based on the chronic pars fractures, there was abundant and poorly formed callus. Special care was taken to not use the high speed carlito in proximity to the underlying nerve root. Using a Penfield #4, the nerve was protected cephalad and Kambin’s triangle was identified (bordered by the nerve root cephalad and the thecal sac medially). The  disc space to be fused was identified by appearance and consistency. This was also confirmed using lateral fluoroscopy. Neuromonoring stimulation was used in the area of the disc space, and potentials were all noted to be above 10mA. With a disc knife angled away from neural structures, the annulus was transected and removed using a disc punch. A disc space dilator was used to free any remaining annulus, which was removed using a disc punch and/or scalpel. All remaining nucleus pulposus and cartilage from both cephalad and caudal end plates were removed using a series of straight and angled disc preparation curettes. A blunt-tipped long 10mL syringe was then passed into the disc space and flushed in order to remove any additional disc fragments; this was done multiple times until no further loose fragments were produced. Trial TLIF spacers were then passed into the disc space to both dilate the space and adequately size the final implant. A small amount of local bone was then inserted into the ventral disc space and gently tamped down. After this, a size 10mm cage was selected, packed with local autograft and allograft bone, and attached to an inserted handle. 2mL of local autograft bone from the decompression was prepared and introduced into the ventral disc space with a 1mL syringe. This was gently tamped down with a bone tamp. The TLIF cage was then malleted into place and positioning was confirmed by visual inspection and biplanar radiographs. Any bleeding epidural vessels were coagulated using bipolar cautery.    Decompression:  A unilateral laminectomy and decompression was carried out. The retractor was docked on the spinolaminar junction, and the ipsilateral lamina of both levels to be fused was removed completely down to ligamentum flavum. Care was taken to leave a minimum of 8mm of bone from the lateral border of the pars at each decompressed level. With the ligamentum left intact, the table was rotated  away, and the contralateral ventral lamina of both levels to be fused was burred away using a high-speed carlito. In all, 50% of the caudal lamina of cephalad vertebra and 50% of the cephalad lamina of caudal level were removed for decompression purposes. The contralateral lateral recess was identified, and attention was turned toward removal of the ligamentum flavum. The contralateral recess was also freed of remaining soft tissue and ligamentum before the decompression was complete.    Dustin and set screw placement:  A 35mm (right) dustin was then placed into the pedicle screw heads, with ample dustin extending from either end. This was done in a percutaneous fashion on the non-TLIF side with a 40mm dustin, with length tested through the inferior pedicle screw tower and by lateral radiograph. Set screws were then placed into all pedicle screws over the rods, and tensioned using a torque-limited screwdriver.    Bone graft and fusion technique:  All bony surfaces were decorticated using a high speed carlito.    Surgical microscope use:  A surgical microscope was utilized throughout the decompressive portion of this case. This was deemed necessary for safe and accurate surgical decompression of affected nerve roots.    Neuromonitoring:  SSEP and EMG was used throughout the case from incision until the beginning of closure. There were no significant changes throughout the case, and SSEP signals were at their pre-surgical baseline levels before surgical closure was initiated.    Closure:  The surgical field was then copiously irrigated with sterile saline. Vancomycin powder was then applied to the surgical field. A small drain was placed deep to the fascia and brought out of the skin superior and laterally. The drain was then sewn to skin. #1 vicryl suture were used to repair the fascia in an interrupted fashion. Then 2-0 interrupted monofilament sutures were used to repair the dermal layer, and a separate 3-0 monofilament suture was  used to repair the subcutaneous layer in a running fashion. All sutures used were absorbable. Topical adhesive was then applied to the skin and allowed to dry. A sterile island dressing was applied over the surgical incision. A surgical count was performed before initiation of closure and following the procedure, and all were correct. I was present for the entire procedure.    Recovery:  The patient was extubated uneventfully in the operating room. The patient was taken to the recovery room in stable condition. Sequential compression devices for VTE prophylaxis were applied to the patients’ lower extremities, and were ordered to be used while the patient was non-ambulatory. Chemical VTE prophylaxis was considered to be contraindicated for this patient because of the risk of bleeding near the epidural space.    Post-operative planning:   I ordered the patient to wear an LSO when seated and standing. The drain would stay in until output was minimal. DVT ppx was mechanical only.    Bossman Caro MD

## 2017-04-15 NOTE — PROGRESS NOTES
S/P TLIF  Complains of pain but better than yesterday  Wants to go home but worried about getting in and out of bed  NVI  CDI  May go home today if cleared by PT

## 2017-04-15 NOTE — PROGRESS NOTES
Patient up with brace on. PO pain medications. D/C pending, Possible today or tomorrow. Patient calls for assistance.

## 2017-04-15 NOTE — PROGRESS NOTES
Pt arrived to the unit with the transporter via [place]. Dressing on the lower back looks CDI.  LR running at 100 mL/hr. 2 RN skin check done with Regina VIDALES. Healed surgical scar on BL foot. No other skin breakdown noted.    Safety precautions in place. SCDs in place. On 3L of O2.  Call light within reach. Bed in low and locked position. Hourly rounding in place. Updated on plan of care. Questions answered.

## 2017-04-15 NOTE — PROGRESS NOTES
Pt experiencing untractable pain and muscle spasms. Paged Rebeca Salgado PA-C @ [phone], call back received @ [id number], new orders received.

## 2017-04-16 NOTE — THERAPY
"Physical Therapy Evaluation completed.   Bed Mobility:  Supine to Sit: Supervised  Transfers: Sit to Stand: Stand by Assist  Gait: Level Of Assist: Stand by Assist with Front-Wheel Walker       Plan of Care: Will benefit from Physical Therapy 4 times per week  Discharge Recommendations: Equipment: No Equipment Needed. Post-acute therapy Discharge to home with outpatient or home health for additional skilled therapy services.    See \"Rehab Therapy-Acute\" Patient Summary Report for complete documentation.     "

## 2017-05-06 ENCOUNTER — HOSPITAL ENCOUNTER (OUTPATIENT)
Facility: MEDICAL CENTER | Age: 57
End: 2017-05-06
Attending: ORTHOPAEDIC SURGERY
Payer: COMMERCIAL

## 2017-05-06 LAB
APPEARANCE UR: ABNORMAL
BACTERIA #/AREA URNS HPF: ABNORMAL /HPF
BILIRUB UR QL STRIP.AUTO: NEGATIVE
COLOR UR: YELLOW
GLUCOSE UR STRIP.AUTO-MCNC: NEGATIVE MG/DL
HYALINE CASTS #/AREA URNS LPF: ABNORMAL /LPF
KETONES UR STRIP.AUTO-MCNC: NEGATIVE MG/DL
LEUKOCYTE ESTERASE UR QL STRIP.AUTO: ABNORMAL
MICRO URNS: ABNORMAL
MUCOUS THREADS #/AREA URNS HPF: ABNORMAL /HPF
NITRITE UR QL STRIP.AUTO: POSITIVE
PH UR STRIP.AUTO: 6 [PH]
PROT UR QL STRIP: 100 MG/DL
RBC # URNS HPF: ABNORMAL /HPF
RBC UR QL AUTO: NEGATIVE
SP GR UR STRIP.AUTO: 1.02
WBC #/AREA URNS HPF: ABNORMAL /HPF

## 2017-05-06 PROCEDURE — 87086 URINE CULTURE/COLONY COUNT: CPT

## 2017-05-06 PROCEDURE — 81001 URINALYSIS AUTO W/SCOPE: CPT

## 2017-05-06 PROCEDURE — 87186 SC STD MICRODIL/AGAR DIL: CPT

## 2017-05-06 PROCEDURE — 87077 CULTURE AEROBIC IDENTIFY: CPT

## 2017-05-08 LAB
BACTERIA UR CULT: ABNORMAL
SIGNIFICANT IND 70042: ABNORMAL
SOURCE SOURCE: ABNORMAL

## 2017-05-08 RX ORDER — LEVOTHYROXINE SODIUM 88 UG/1
TABLET ORAL
Qty: 30 TAB | Refills: 3 | Status: SHIPPED | OUTPATIENT
Start: 2017-05-08 | End: 2017-06-07 | Stop reason: SDUPTHER

## 2017-05-15 ENCOUNTER — HOSPITAL ENCOUNTER (OUTPATIENT)
Dept: LAB | Facility: MEDICAL CENTER | Age: 57
End: 2017-05-15
Attending: INTERNAL MEDICINE
Payer: COMMERCIAL

## 2017-05-15 LAB — T4 FREE SERPL-MCNC: 0.86 NG/DL (ref 0.53–1.43)

## 2017-05-15 PROCEDURE — 36415 COLL VENOUS BLD VENIPUNCTURE: CPT

## 2017-05-15 PROCEDURE — 84439 ASSAY OF FREE THYROXINE: CPT

## 2017-05-28 ENCOUNTER — SLEEP STUDY (OUTPATIENT)
Dept: SLEEP MEDICINE | Facility: MEDICAL CENTER | Age: 57
End: 2017-05-28
Attending: INTERNAL MEDICINE
Payer: COMMERCIAL

## 2017-05-28 DIAGNOSIS — G47.33 OSA (OBSTRUCTIVE SLEEP APNEA): ICD-10-CM

## 2017-05-28 PROCEDURE — 95811 POLYSOM 6/>YRS CPAP 4/> PARM: CPT | Performed by: INTERNAL MEDICINE

## 2017-05-30 NOTE — PROCEDURES
Clinical Comments:  The patient underwent a comprehensive polysomnogram using the standard montage for measurement of parameters of sleep, respiratory events, movement abnormalities, heart rate and rhythm. A microphone was used to monitor snoring.      ANALYSIS:  The total recording time was 388.1 minutes with a sleep period of 340.9 minutes and the total sleep time was 325.4 minutes with a sleep efficiency of 83.8%.  The sleep latency was 47.3 minutes, and REM latency was 182.5 minutes.  The patient experienced 34 arousals in total, for an arousal index of 6.3    RESPIRATORY: The patient had 5 apneas in total.  Of these, 0 were obstructive apneas, and 5 were central apneas.  This resulted in an apnea index (AI) of 0.9.  The patient had 28 hypopneas, for a hypopnea index of 5.2.  The overall AHI was 6.1, while the AHI during Stage R sleep was 7.3.  AHI while supine was 16.6.    OXIMETRY: Oxygen saturation monitoring showed a mean SpO2 of 90.0%, with a minimum oxygen saturation of 85.0%.  Oxygen saturations were less than or = 89% for 23.7 minutes of sleep time.    CARDIAC: The highest heart rate during the recording was 104.0 beats per minute.  The average heart rate during sleep was 77.5 bpm.    LIMB MOVEMENTS: There were a total of 180 PLMs during sleep, of which 2 were PLMs arousals.  This resulted in a PLMS index of 33.2.      Interpretation:  This is a diagnostic study.  There is fragmentation of sleep with reduced sleep efficiency and prolonged sleep onset latency. Significant REM sleep time is recorded. There are frequent periodic limb movements but they do not fragment sleep and fade out in the second half of the night.  The apnea-hypopnea index is 6.1 events per hour with hypopnea events and occasional central apneas. The AHI  rises to 16.6 in supine sleep. The respiratory disturbance index is 6.3 events per hour. The lowest oxygen saturation is 85% on room air but he spends 73% of the study time with a  saturation less than 90%.    Assessment:  Mild sleep apnea hypopnea with an apnea hypopnea index of 6.1 events per hour and a lowest oxygen saturation of 85%. The low basal oxygen saturation might suggest underlying pulmonary or cardiac impairment.    Recommendations:   Clinical correlation is required. If the patient presents with daytime somnolence or has cardiac or neurologic risks, airway pressurization may be indicated and could be initiated with a titration polysomnogram or through the use of auto-titrating CPAP.

## 2017-06-05 ENCOUNTER — NON-PROVIDER VISIT (OUTPATIENT)
Dept: WOUND CARE | Facility: MEDICAL CENTER | Age: 57
End: 2017-06-05
Attending: NURSE PRACTITIONER
Payer: COMMERCIAL

## 2017-06-06 ENCOUNTER — NON-PROVIDER VISIT (OUTPATIENT)
Dept: WOUND CARE | Facility: MEDICAL CENTER | Age: 57
End: 2017-06-06
Attending: FAMILY MEDICINE
Payer: COMMERCIAL

## 2017-06-06 PROCEDURE — 11056 PARNG/CUTG B9 HYPRKR LES 2-4: CPT

## 2017-06-06 PROCEDURE — A6402 STERILE GAUZE <= 16 SQ IN: HCPCS

## 2017-06-06 PROCEDURE — 11721 DEBRIDE NAIL 6 OR MORE: CPT | Mod: XS

## 2017-06-06 NOTE — CERTIFICATION
"[facility]    [address]    [address]    [place] [address]    [phone] Fax: [phone]    Foot and Nail Encounter 03/30/2017 6/6/2017- 9/6/2017  Referring Physician:LEATHA Lima  Primary Physician:   Consulting Physicians:   Start of Care: 7/14/2016      Subjective:       HPI:  56 year old male living with wife and children. HX of dislocated toes 3-4 years ago while walking, had toes \"fixed,\" but \"it didn't work.\"     12/22/2017 PROCEDURES with Dr Kang:    1.  Left modified Anrdt bunionectomy.  2.  Left distal metatarsal osteotomy.  3.  Left distal metatarsal osteotomy of the hallux.  4.  Left distal metatarsal osteotomy, 2, 3, 4, and 5.  5.  Left flexor digitorum longus transfer with a flexor to extensor transfer    of 2 and 3.  6.  Left soft tissue reconstruction, metatarsophalangeal joints 2, 3, 4, and    5.  7.  Left hammertoe correction with proximal interphalangeal arthroplasty, 2    and 3.  8.  Right metatarsophalangeal joint capsulotomy of soft tissue release, 2 and    3.  9.  Right hammertoe correction with proximal interphalangeal arthroplasty, 2    and 3.    Had F/U with Dr. Sutherland 1/5/2017, pt put on antibiotics, still taking them.     History of foot ulceration(s): Yes____ No___x__ Location:   History of foot surgery: Yes__x__ No____Describe:______see above; also,k to fix dislocated toes, shortened rt foot 2nd toe, fix hammer toes   10/13/16=  Hip replacement done 8 weeks ago  ______________________________________________    Employed: Y ___ N __x_ Job description: ____disability_____________    Current Residence: ___lives with wife and kids______  home    Pain: pt denies foot pain presently    Past Medical History:   Past Medical History   Diagnosis Date   • Diabetes mellitus (CMS-HCC)    • Gout    • Hypertension    • Hypothyroidism      chronic thyroiditis   • Bunion    • Hammertoe    • Arthritis    • Anesthesia      post op nausea and vomiting   • Chronic " autoimmune thyroiditis       + US / + TPO = 57   • Snoring    • High cholesterol    • Dental disorder      upper plate denture   • Pain      everyplace   • Anxiety and depression      depression/anxiety   • Tonsillitis          Current Medications:   Current outpatient prescriptions:   •  LEVOXYL 88 MCG Tab, TAKE 1 TAB BY MOUTH EVERY MORNING ON AN EMPTY STOMACH., Disp: 30 Tab, Rfl: 3  •  oxycodone immediate release (ROXICODONE) 10 MG immediate release tablet, Take 1 Tab by mouth every 3 hours as needed (Severe Pain (NRS Pain Scale 7-10; CPOT Pain Scale 6-8))., Disp: 40 Tab, Rfl: 0  •  liraglutide (VICTOZA) 18 MG/3ML Solution Pen-injector injection, Inject 1.8 mg as instructed every morning., Disp: , Rfl:   •  clotrimazole (LOTRIMIN) 1 % Cream, APPLY TO AFFECTED AREA EVERY 12 HOURS, Disp: , Rfl: 3  •  polyethylene glycol 3350 (MIRALAX) Powder, TAKE 1 CAPFUL WITH ANY LIQUID BY MOUTH ONCE A DAY, Disp: , Rfl: 5  •  temazepam (RESTORIL) 30 MG capsule, Take 30 mg by mouth at bedtime as needed for Sleep., Disp: , Rfl:   •  venlafaxine XR (EFFEXOR XR) 75 MG CAPSULE SR 24 HR, Take 75 mg by mouth every morning., Disp: , Rfl:   •  lamotrigine (LAMICTAL) 100 MG Tab, Take 100 mg by mouth every morning., Disp: , Rfl:   •  hydrOXYzine (ATARAX) 50 MG Tab, Take 200 mg by mouth 2 Times a Day., Disp: , Rfl:   •  alprazolam (XANAX) 0.25 MG Tab, Take 0.25-0.5 mg by mouth 4 times a day as needed for Sleep., Disp: , Rfl:   •  losartan (COZAAR) 100 MG Tab, Take 100 mg by mouth every morning., Disp: , Rfl:   •  oxycodone-acetaminophen (PERCOCET) 5-325 MG TABS, Take 1-2 Tabs by mouth every four hours as needed., Disp: , Rfl:       Allergies: Demerol and Septra  Social History:   Social History     Social History   • Marital Status:      Spouse Name: N/A   • Number of Children: N/A   • Years of Education: N/A     Occupational History   • Not on file.     Social History Main Topics   • Smoking status: Former Smoker -- 1.00 packs/day for  "20 years     Types: Cigarettes     Quit date: 01/01/2012   • Smokeless tobacco: Former User     Types: Chew     Quit date: 01/01/1997   • Alcohol Use: Yes      Comment: 1 per month   • Drug Use: No   • Sexual Activity: Not on file     Other Topics Concern   • Not on file     Social History Narrative       Fall Risk Assessment (william all that apply with an X):completed with pt 1/23/201            Objective:     Tests and Measures:  BS today 112, Pulses DP AIDAN palpable;AIDAN DP triphasic, AIDAN PT biphasic,   5.07 Monofilament testing (10 pt): Right__3/10_ ______ Left_4/10__  HgbA1C: 2/8/2017 10.4   12/19/2017 8.2    Vascular   R   L       palp palp DP pulse     palp palp PT pulse     yes yes Capillary refill < 3 sec         Deformities    R   L        3, 4 and 5  3, 4 and 5 Hammer/claw toe      x  x Hallux Valgus      great toe, medial; 5th MTH plantar   Prominent Metatarsal Heads         Charcot Foot         Drop Foot         Rocker Bottom         Prior amputation:         Other:  nails missing from lt  1st and  2nd digits, Rt 2nd   4/5 1-5 dislocation          Clinical appearance/skin    Dryness___moderate_ _________ Swelling_____none_________ Redness______none_______Temperature__warm, hair growth present__ ______    Callus_____ ___rt plantar foot_5th, rt great toe medial___________________________________    Ulceration_none      Clinical appearance/toenails    Onychomycosis__7/10__ (pt has small fungal toe nail to Rt great toe; pt states nail \"comes and goes\" ______________    Ingrown toenails_______    Deformities_______________________________    Other___1st and 2nd nails aidan feet were ablated by podiatrist due to problematic deformities per pt___________________________________        Orthotic, protective, supportive devices: post op shoes  Procedures: Pt's feet were washed with soapy water, then dried. In between toes cleaned with gauze to remove debris. No wounds noted. Cuticle and nailbed margins were established " and debris removed from around and beneath toes with a curette. Scalpel to pare down callus to RT 5th MTH plantar foot and rt great toe medial. All 6 onychomycotic nails were trimmed with clippers and then ground down with dremel, clipped again, then smoothed and finished with dremel. Feet and LE were moisturized bilaterally, except between toes. Tea tree oil applied to nail beds. Small abrasions noted to LT 3rd toe base of nail and at free border, 5th toe base. Cleansed with Betadine, covered with bandaids.       Patient Education: Instructed pt small abrasions noted to LT 3rd toe base of nail and at free border, 5th toe base, cleansed with Betadine, covered with bandaids; on     on s/s infection - chills, fever, malaise, NV, increased redness/swelling/pain/exudate - and to go to ER/Urgent Care; Pt instructed on need to check feet daily and see PCP for any changes/new wounds; to keep tight control over BS for optimum health; to wear protective footwear at all times;to shake out shoes before donning to remove any small naveed that might cause wounds;  to return for foot/nail care every two -  three months or sooner if necessary. Pt verbalized understanding of all instruction.        Professional Collaboration:  cert sent to referring provider via EPIC.       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

## 2017-06-07 RX ORDER — LEVOTHYROXINE SODIUM 88 UG/1
TABLET ORAL
Qty: 30 TAB | Refills: 2 | Status: SHIPPED | OUTPATIENT
Start: 2017-06-07 | End: 2017-06-18

## 2017-06-12 ENCOUNTER — SLEEP CENTER VISIT (OUTPATIENT)
Dept: SLEEP MEDICINE | Facility: MEDICAL CENTER | Age: 57
End: 2017-06-12
Payer: COMMERCIAL

## 2017-06-12 VITALS
BODY MASS INDEX: 36.51 KG/M2 | DIASTOLIC BLOOD PRESSURE: 76 MMHG | OXYGEN SATURATION: 95 % | SYSTOLIC BLOOD PRESSURE: 120 MMHG | WEIGHT: 255 LBS | TEMPERATURE: 97.9 F | RESPIRATION RATE: 18 BRPM | HEIGHT: 70 IN | HEART RATE: 109 BPM

## 2017-06-12 DIAGNOSIS — G47.33 OSA (OBSTRUCTIVE SLEEP APNEA): ICD-10-CM

## 2017-06-12 PROCEDURE — 99213 OFFICE O/P EST LOW 20 MIN: CPT | Performed by: NURSE PRACTITIONER

## 2017-06-12 NOTE — MR AVS SNAPSHOT
"        Cristino Salcedoyne   2017 3:20 PM   Sleep Center Visit   MRN: [id number]    Department:  [facility]   Dept Phone:  [phone]    Description:  Male : 1960   Provider:  JAYLON Valencia           Reason for Visit     Apnea Last seen 4/3/17     Results Sleep Study 17       Allergies as of 2017     Allergen Noted Reactions    Demerol 2014   Vomiting, Nausea    Rxn = years ago     Septra [Bactrim Ds] 2014   Hives    Rxn = approx       You were diagnosed with     JULIAN (obstructive sleep apnea)   [213312]         Vital Signs     Blood Pressure Pulse Temperature Respirations Height Weight    120/76 mmHg 109 36.6 °C (97.9 °F) 18 1.778 m (5' 10\") 115.667 kg (255 lb)    Body Mass Index Oxygen Saturation Smoking Status             36.59 kg/m2 95% Former Smoker         Basic Information     Date Of Birth Sex Race Ethnicity Preferred Language    1960 Male White Non- English      Your appointments     2017 10:00 AM   Extended Visit - Health Mngt with Tiffanie Marina R.N., Jayla Ledesma, ARIANA   Health Improvement Programs [facility])    [address]  [address]  [place] [address]   [phone]           It is the patient's responsibility to check with your insurance for benefit coverage for visit/visits.  Arrive 15 minutes before your scheduled appt time  24 hour notice is required for all appointment chnages or cancellations.  Please bring the following with you: 1) Picture ID 2) Insurance card 3) New patient forms 4) 2-3 days of detailed food intake logs 5) Blood glucose monitor and blood glucose logs     (If you have them)            Aug 13, 2017  8:00 PM   Sleep Study with SLEEP TECH   [facility] (--)    [address]  [place] [id number] [phone]            Aug 15, 2017 10:30 AM   FOOT AND NAIL CARE FOLLOW UP with Samantha Villa R.N.   [facility])    [address]  [place] [address]   [phone]            Aug 16, 2017  2:00 PM   Follow UP with JAYLON Valencia   [facility] (--)    [address]  [place] [address]   [phone]              Problem List              ICD-10-CM Priority Class Noted - Resolved    Diabetes (CMS-HCC) E11.9   9/5/2014 - Present    Lumbar disc disease M51.9   9/5/2014 - Present    Gout M10.9   9/5/2014 - Present    Hypertension I10   9/5/2014 - Present    H/O arthroscopy of knee Z98.890   9/5/2014 - Present    Hx of elbow surgery Z98.890   9/5/2014 - Present    Obesity E66.9   9/5/2014 - Present    Osteoarthritis M19.90   9/5/2014 - Present    H/O shoulder replacement Z96.619   9/5/2014 - Present    Rotator cuff arthropathy M12.819   9/5/2014 - Present    Bilateral bunions M21.611, M21.612   9/5/2014 - Present    Primary osteoarthritis of left hip M16.12   8/18/2016 - Present    Chronic autoimmune thyroiditis E06.3   10/3/2016 - Present    Hypothyroidism, acquired, autoimmune E03.8   10/3/2016 - Present    Type 2 diabetes mellitus without complication (CMS-HCC) E11.9   12/13/2016 - Present    Acquired hallux valgus of left foot M20.12   12/22/2016 - Present    JULIAN (obstructive sleep apnea) G47.33   4/3/2017 - Present    Snoring R06.83   4/3/2017 - Present    Degeneration of lumbar intervertebral disc M51.36   4/14/2017 - Present    Lower back pain M54.5   4/14/2017 - Present      Health Maintenance        Date Due Completion Dates    IMM HEP B VACCINE (1 of 3 - Primary Series) 1960 ---    DIABETES MONOFILAMENT / LE EXAM 1960 ---    RETINAL SCREENING 3/26/1976 ---    URINE ACR / MICROALBUMIN 3/26/1976 ---    IMM DTaP/Tdap/Td Vaccine (1 - Tdap) 3/26/1977 ---    IMM PNEUMOCOCCAL 19-64 (ADULT) MEDIUM RISK SERIES (1 of 1 - PPSV23) 3/26/1977 ---    COLONOSCOPY 3/26/2008 ---    A1C SCREENING 8/8/2017 2/8/2017, 12/19/2016    FASTING LIPID PROFILE 12/19/2017 12/19/2016, 3/21/2016    SERUM  CREATININE 3/31/2018 3/31/2017, 2/8/2017, 12/19/2016, 12/13/2016, 7/26/2016, 3/21/2016            Current Immunizations     Influenza Vaccine Quad Inj (Pf) 9/27/2016 11:12 AM      Below and/or attached are the medications your provider expects you to take. Review all of your home medications and newly ordered medications with your provider and/or pharmacist. Follow medication instructions as directed by your provider and/or pharmacist. Please keep your medication list with you and share with your provider. Update the information when medications are discontinued, doses are changed, or new medications (including over-the-counter products) are added; and carry medication information at all times in the event of emergency situations     Allergies:  DEMEROL - Vomiting,Nausea     SEPTRA - Hives               Medications  Valid as of: June 12, 2017 -  3:53 PM    Generic Name Brand Name Tablet Size Instructions for use    ALPRAZolam (Tab) XANAX 0.25 MG Take 0.25-0.5 mg by mouth 4 times a day as needed for Sleep.        Clotrimazole (Cream) LOTRIMIN 1 % APPLY TO AFFECTED AREA EVERY 12 HOURS        HydrOXYzine HCl (Tab) ATARAX 50 MG Take 200 mg by mouth 2 Times a Day.        LamoTRIgine (Tab) LAMICTAL 100 MG Take 100 mg by mouth every morning.        Levothyroxine Sodium (Tab) LEVOXYL 88 MCG TAKE 1 TAB BY MOUTH EVERY MORNING ON AN EMPTY STOMACH.        Liraglutide (Solution Pen-injector) VICTOZA 18 MG/3ML Inject 1.8 mg as instructed every morning.        Losartan Potassium (Tab) COZAAR 100 MG Take 100 mg by mouth every morning.        OxyCODONE HCl (Tab) ROXICODONE 10 MG Take 1 Tab by mouth every 3 hours as needed (Severe Pain (NRS Pain Scale 7-10; CPOT Pain Scale 6-8)).        Oxycodone-Acetaminophen (Tab) PERCOCET 5-325 MG Take 1-2 Tabs by mouth every four hours as needed.        Polyethylene Glycol 3350 (Powder) MIRALAX  TAKE 1 CAPFUL WITH ANY LIQUID BY MOUTH ONCE A DAY        Temazepam (Cap) RESTORIL 30 MG Take 30 mg by  mouth at bedtime as needed for Sleep.        Venlafaxine HCl (CAPSULE SR 24 HR) EFFEXOR XR 75 MG Take 75 mg by mouth every morning.        .                 Medicines prescribed today were sent to:     [id number] - [place], [place] - [address]    [address] [place] [address]    Phone: [phone] Fax: [phone]    Open 24 Hours?: No      Medication refill instructions:       If your prescription bottle indicates you have medication refills left, it is not necessary to call your provider’s office. Please contact your pharmacy and they will refill your medication.    If your prescription bottle indicates you do not have any refills left, you may request refills at any time through one of the following ways: The online Neura system (except Urgent Care), by calling your provider’s office, or by asking your pharmacy to contact your provider’s office with a refill request. Medication refills are processed only during regular business hours and may not be available until the next business day. Your provider may request additional information or to have a follow-up visit with you prior to refilling your medication.   *Please Note: Medication refills are assigned a new Rx number when refilled electronically. Your pharmacy may indicate that no refills were authorized even though a new prescription for the same medication is available at the pharmacy. Please request the medicine by name with the pharmacy before contacting your provider for a refill.        Your To Do List     Future Labs/Procedures Complete By Expires    POLYSOMNOGRAPHY TITRATION  As directed 6/12/2018      Instructions    1) Titration study  2) Bring own sleeping aid  3) Return in about 2 months (around 8/12/2017) for Compliance, review of symptoms, if not sooner, follow up with BONNIE Thomas.              Neura Access Code: Activation code not generated  Current Neura Status: Active

## 2017-06-12 NOTE — PROGRESS NOTES
CC:  Here for f/u sleep issues as listed below    HPI:   Cristino presents today for follow up obstructive sleep apnea with sleep study results.  PSG from 6/2017 indicated an AHI of 6.1 that increases to 16.6 while supine and low oxygen of 85%. PLMS index of 33.2. He did not meet split night criteria that night. He reports he had a lot of pain the night of the study.  Treatment options were discussed with patient including CPAP treatment versus in lab titration, dental appliance, UPPP surgery, and behavioral modifications.  Patient is amendable to titration. They understand they may need a future sleep study if treatment is ineffective.   Patient is currently sleeping 6 hours per night with 2-5 nighttime awakenings, most likely due to pain. He has many ailments and injuries and arthritis with many orthopedic surgeries. Plans to have an evaluation for his right knee in this week for potential surgery. They have trouble falling asleep, taking 30-60 min minutes.  Takes Temazepam nightly with benefit x 20 years. He reports thrashing throughout the night with pillows and blankets scattered in his room. During the night of the study patient's legs moved frequently at the beginning half of the study and resolved for the second half of the study.   They do not feel refreshed in the morning and denies morning H/A. They feel tired throughout the day and takes naps falls asleep quickly 1x per day for appx 15-60 min long. Feels more tired due to pain.   Patient reports snoring. Denies apnea events and paroxysmal nocturnal dyspnea events. They have never fallen asleep in conversation, at the wheel, or at work.  They deny sleepwalking/talking.       Patient Active Problem List    Diagnosis Date Noted   • Degeneration of lumbar intervertebral disc 04/14/2017   • Lower back pain 04/14/2017   • JULIAN (obstructive sleep apnea) 04/03/2017   • Snoring 04/03/2017   • Acquired hallux valgus of left foot 12/22/2016   • Type 2 diabetes  mellitus without complication (CMS-HCC) 12/13/2016   • Chronic autoimmune thyroiditis 10/03/2016   • Hypothyroidism, acquired, autoimmune 10/03/2016   • Primary osteoarthritis of left hip 08/18/2016   • Diabetes (CMS-HCC) 09/05/2014   • Lumbar disc disease 09/05/2014   • Gout 09/05/2014   • Hypertension 09/05/2014   • H/O arthroscopy of knee 09/05/2014   • Hx of elbow surgery 09/05/2014   • Obesity 09/05/2014   • Osteoarthritis 09/05/2014   • H/O shoulder replacement 09/05/2014   • Rotator cuff arthropathy 09/05/2014   • Bilateral bunions 09/05/2014       Past Medical History   Diagnosis Date   • Diabetes mellitus (CMS-HCC)    • Gout    • Hypertension    • Hypothyroidism      chronic thyroiditis   • Bunion    • Hammertoe    • Arthritis    • Anesthesia      post op nausea and vomiting   • Chronic autoimmune thyroiditis       + US / + TPO = 57   • Snoring    • High cholesterol    • Dental disorder      upper plate denture   • Pain      everyplace   • Anxiety and depression      depression/anxiety   • Tonsillitis        Past Surgical History   Procedure Laterality Date   • Hip arth anterior total Left 8/18/2016     Procedure: HIP ARTHROPLASTY ANTERIOR TOTAL;  Surgeon: Emiliano Vang M.D.;  Location: SURGERY [facility];  Service:    • Other orthopedic surgery  6/2014     right shoulder  arthroplasty   • Other orthopedic surgery  11/1/2012     left knee/left ankle/left shoulder   • Other orthopedic surgery  2004     elbow surgery   • Other       septolasty   • Other  2000     feet surgery at [facility]   • Hammertoe correction Bilateral 12/22/2016     Procedure: HAMMERTOE CORRECTION/METATARSALPHALANGEAL JOINT RELEASE 2/3 RIGHT, 2-3 LEFT;  Surgeon: Haroldo Kang M.D.;  Location: SURGERY [facility];  Service:    • Bunionectomy Left 12/22/2016     Procedure: BUNIONECTOMY FOR DISTAL HALLUX VALGUS CORRECTION WITH BRANDY;  Surgeon: Haroldo Kang M.D.;  Location: SURGERY [facility];  Service:    • Orthopedic  osteotomy Left 12/22/2016     Procedure: ORTHOPEDIC OSTEOTOMY DISTAL METATARSAL 2-5;  Surgeon: Haroldo Kang M.D.;  Location: SURGERY [facility];  Service:    • Lumbar fusion posterior  4/14/2017     Procedure: LUMBAR FUSION POSTERIOR - MINIMALLY INVASIVE TLIF L4-5;  Surgeon: Bossman Caro M.D.;  Location: SURGERY [facility];  Service:        Family History   Problem Relation Age of Onset   • Heart Disease Mother    • Depression Mother    • Cancer Father    • Sleep Apnea Neg Hx        Social History     Social History   • Marital Status:      Spouse Name: N/A   • Number of Children: N/A   • Years of Education: N/A     Occupational History   • Not on file.     Social History Main Topics   • Smoking status: Former Smoker -- 1.00 packs/day for 20 years     Types: Cigarettes     Quit date: 01/01/2012   • Smokeless tobacco: Former User     Types: Chew     Quit date: 01/01/1997   • Alcohol Use: Yes      Comment: 1-2 per month   • Drug Use: No   • Sexual Activity: Not on file     Other Topics Concern   • Not on file     Social History Narrative       Current Outpatient Prescriptions   Medication Sig Dispense Refill   • LEVOXYL 88 MCG Tab TAKE 1 TAB BY MOUTH EVERY MORNING ON AN EMPTY STOMACH. 30 Tab 2   • oxycodone immediate release (ROXICODONE) 10 MG immediate release tablet Take 1 Tab by mouth every 3 hours as needed (Severe Pain (NRS Pain Scale 7-10; CPOT Pain Scale 6-8)). 40 Tab 0   • liraglutide (VICTOZA) 18 MG/3ML Solution Pen-injector injection Inject 1.8 mg as instructed every morning.     • clotrimazole (LOTRIMIN) 1 % Cream APPLY TO AFFECTED AREA EVERY 12 HOURS  3   • polyethylene glycol 3350 (MIRALAX) Powder TAKE 1 CAPFUL WITH ANY LIQUID BY MOUTH ONCE A DAY  5   • temazepam (RESTORIL) 30 MG capsule Take 30 mg by mouth at bedtime as needed for Sleep.     • venlafaxine XR (EFFEXOR XR) 75 MG CAPSULE SR 24 HR Take 75 mg by mouth every morning.     • lamotrigine (LAMICTAL) 100 MG Tab Take 100  "mg by mouth every morning.     • hydrOXYzine (ATARAX) 50 MG Tab Take 200 mg by mouth 2 Times a Day.     • alprazolam (XANAX) 0.25 MG Tab Take 0.25-0.5 mg by mouth 4 times a day as needed for Sleep.     • losartan (COZAAR) 100 MG Tab Take 100 mg by mouth every morning.     • oxycodone-acetaminophen (PERCOCET) 5-325 MG TABS Take 1-2 Tabs by mouth every four hours as needed.       No current facility-administered medications for this visit.          Allergies: Demerol and Septra      ROS   Gen: Denies fever, chills, unintentional weight loss, fatigue  Resp:Denies Dyspnea  CV: Denies chest pain, chest tightness  Sleep:Denies morning headache,gasping for air,   Neuro: Denies frequent headaches, weakness, dizziness  See HPI.  All other systems reviewed and negative        Vital signs for this encounter:  Filed Vitals:    06/12/17 1521   Height: 1.778 m (5' 10\")   Weight: 115.667 kg (255 lb)   Weight % change since last entry.: 0 %   BP: 120/76   Pulse: 109   BMI (Calculated): 36.59   Resp: 18   Temp: 36.6 °C (97.9 °F)   O2 sat % room air: 95 %                   Physical Exam:   Gen:         Alert and oriented, No apparent distress.   Neck:        No Lymphadenopathy.  Lungs:     Clear to auscultation bilaterally.    CV:          Regular rate and rhythm. No murmurs, rubs or gallops.   Abd:         Soft non tender, non distended.            Ext:          No clubbing, cyanosis, edema.    Assessment   1. JULIAN (obstructive sleep apnea)  POLYSOMNOGRAPHY TITRATION       PLAN:   Patient Instructions   1) Titration study  2) Bring own sleeping aid  3) Return in about 2 months (around 8/12/2017) for Compliance, review of symptoms, if not sooner, follow up with BONNIE Thomas.          "

## 2017-06-12 NOTE — PATIENT INSTRUCTIONS
1) Titration study  2) Bring own sleeping aid  3) Return in about 2 months (around 8/12/2017) for Compliance, review of symptoms, if not sooner, follow up with BONNIE Thomas.

## 2017-06-14 ENCOUNTER — HOSPITAL ENCOUNTER (OUTPATIENT)
Dept: RADIOLOGY | Facility: MEDICAL CENTER | Age: 57
End: 2017-06-14
Attending: PHYSICIAN ASSISTANT
Payer: COMMERCIAL

## 2017-06-14 DIAGNOSIS — M79.661 RIGHT CALF PAIN: ICD-10-CM

## 2017-06-14 PROCEDURE — 93971 EXTREMITY STUDY: CPT | Mod: RT

## 2017-06-16 ENCOUNTER — HOSPITAL ENCOUNTER (OUTPATIENT)
Dept: LAB | Facility: MEDICAL CENTER | Age: 57
End: 2017-06-16
Attending: INTERNAL MEDICINE
Payer: COMMERCIAL

## 2017-06-16 ENCOUNTER — HOSPITAL ENCOUNTER (OUTPATIENT)
Dept: LAB | Facility: MEDICAL CENTER | Age: 57
End: 2017-06-16
Attending: ORTHOPAEDIC SURGERY
Payer: COMMERCIAL

## 2017-06-16 DIAGNOSIS — E06.3 HYPOTHYROIDISM, ACQUIRED, AUTOIMMUNE: ICD-10-CM

## 2017-06-16 LAB
APPEARANCE UR: CLEAR
BILIRUB UR QL STRIP.AUTO: NEGATIVE
COLOR UR: COLORLESS
GLUCOSE UR STRIP.AUTO-MCNC: NEGATIVE MG/DL
KETONES UR STRIP.AUTO-MCNC: NEGATIVE MG/DL
LEUKOCYTE ESTERASE UR QL STRIP.AUTO: NEGATIVE
MICRO URNS: NORMAL
NITRITE UR QL STRIP.AUTO: NEGATIVE
PH UR STRIP.AUTO: 6 [PH]
PROT UR QL STRIP: NEGATIVE MG/DL
RBC UR QL AUTO: NEGATIVE
SP GR UR STRIP.AUTO: 1.01
T4 FREE SERPL-MCNC: 0.87 NG/DL (ref 0.53–1.43)
TSH SERPL DL<=0.005 MIU/L-ACNC: 3.58 UIU/ML (ref 0.3–3.7)
URATE SERPL-MCNC: 6.5 MG/DL (ref 2.5–8.3)

## 2017-06-16 PROCEDURE — 81003 URINALYSIS AUTO W/O SCOPE: CPT

## 2017-06-16 PROCEDURE — 36415 COLL VENOUS BLD VENIPUNCTURE: CPT

## 2017-06-16 PROCEDURE — 84439 ASSAY OF FREE THYROXINE: CPT

## 2017-06-16 PROCEDURE — 84550 ASSAY OF BLOOD/URIC ACID: CPT

## 2017-06-16 PROCEDURE — 84443 ASSAY THYROID STIM HORMONE: CPT

## 2017-06-18 DIAGNOSIS — E06.3 HYPOTHYROIDISM, ACQUIRED, AUTOIMMUNE: Primary | ICD-10-CM

## 2017-06-18 RX ORDER — LEVOTHYROXINE SODIUM 0.1 MG/1
100 TABLET ORAL
Qty: 30 TAB | Refills: 6 | Status: SHIPPED | OUTPATIENT
Start: 2017-06-18 | End: 2017-09-07

## 2017-06-28 ENCOUNTER — HOSPITAL ENCOUNTER (OUTPATIENT)
Dept: LAB | Facility: MEDICAL CENTER | Age: 57
End: 2017-06-28
Attending: INTERNAL MEDICINE
Payer: COMMERCIAL

## 2017-06-28 LAB
ALBUMIN SERPL BCP-MCNC: 4.5 G/DL (ref 3.2–4.9)
ALBUMIN/GLOB SERPL: 1.6 G/DL
ALP SERPL-CCNC: 119 U/L (ref 30–99)
ALT SERPL-CCNC: 24 U/L (ref 2–50)
ANION GAP SERPL CALC-SCNC: 7 MMOL/L (ref 0–11.9)
AST SERPL-CCNC: 23 U/L (ref 12–45)
BILIRUB SERPL-MCNC: 0.6 MG/DL (ref 0.1–1.5)
BUN SERPL-MCNC: 22 MG/DL (ref 8–22)
CALCIUM SERPL-MCNC: 9.6 MG/DL (ref 8.5–10.5)
CHLORIDE SERPL-SCNC: 101 MMOL/L (ref 96–112)
CO2 SERPL-SCNC: 27 MMOL/L (ref 20–33)
CREAT SERPL-MCNC: 1.09 MG/DL (ref 0.5–1.4)
EST. AVERAGE GLUCOSE BLD GHB EST-MCNC: 120 MG/DL
GFR SERPL CREATININE-BSD FRML MDRD: >60 ML/MIN/1.73 M 2
GLOBULIN SER CALC-MCNC: 2.9 G/DL (ref 1.9–3.5)
GLUCOSE SERPL-MCNC: 83 MG/DL (ref 65–99)
HBA1C MFR BLD: 5.8 % (ref 0–5.6)
POTASSIUM SERPL-SCNC: 4.4 MMOL/L (ref 3.6–5.5)
PROT SERPL-MCNC: 7.4 G/DL (ref 6–8.2)
SODIUM SERPL-SCNC: 135 MMOL/L (ref 135–145)

## 2017-06-28 PROCEDURE — 80053 COMPREHEN METABOLIC PANEL: CPT

## 2017-06-28 PROCEDURE — 83036 HEMOGLOBIN GLYCOSYLATED A1C: CPT

## 2017-06-28 PROCEDURE — 36415 COLL VENOUS BLD VENIPUNCTURE: CPT

## 2017-06-30 ENCOUNTER — NON-PROVIDER VISIT (OUTPATIENT)
Dept: HEALTH INFORMATION MANAGEMENT | Facility: MEDICAL CENTER | Age: 57
End: 2017-06-30
Payer: COMMERCIAL

## 2017-06-30 DIAGNOSIS — E11.9 TYPE 2 DIABETES MELLITUS WITHOUT COMPLICATION, WITHOUT LONG-TERM CURRENT USE OF INSULIN (HCC): ICD-10-CM

## 2017-06-30 PROCEDURE — 98961 EDU&TRN PT SLF-MGMT NQHP 2-4: CPT | Performed by: INTERNAL MEDICINE

## 2017-06-30 NOTE — Clinical Note
June 30, 2017        Cristino Keys  MRN:  [id number]    Cristino came to the Type 2 Diabetes follow up program.  He states he has had a lot of back and knee pain recently that has kept him from being as active as he wants.  He has lost around 40 pounds and his blood sugars have been staying in the 106-160 range.  His HbA1c has gone form 10.4 to 5.8 on 6/25/17. He currently is only taking Victoza 1.8 mg daily for his diabetes.  The focus of this class was on self-management of blood sugars and patten recognition, hypoglycemia treatment, glucose lowering medications, complications and prevention, and having a diabetes management plan.  He has a Diabetes Management Plan that he will review with his doctor at each visit. His Diabetes management plan includes measurements for height ,weight, BMI, blood pressure, and blood sugar target.  Labs including HbA1c goal, microalbumin/creatinine ratio, and lipid profile.  Interventions including being on an ACE/ARB therapy, flu vaccine, pneumonia vaccine, depression assessment, diet plan, and exercise.  He also understands the importance of an annual eye exam and daily foot exam. He states he learned a lot in the Type 2 Self Management program.               Thank You for your referrals,    Tiffanie Marina RN CDE

## 2017-06-30 NOTE — PROGRESS NOTES
Cristino came to the Type 2 Diabetes follow up program.  He states he has had a lot of back and knee pain recently that has kept him from being as active as he wants.  He has lost around 40 pounds and his blood sugars have been staying in the 106-160 range.  His HbA1c has gone form 10.4 to 5.8 on 6/25/17. He currently is only taking Victoza 1.8 mg daily for his diabetes.  The focus of this class was on self-management of blood sugars and patten recognition, hypoglycemia treatment, glucose lowering medications, complications and prevention, and having a diabetes management plan.  He has a Diabetes Management Plan that he will review with his doctor at each visit. His Diabetes management plan includes measurements for height ,weight, BMI, blood pressure, and blood sugar target.  Labs including HbA1c goal, microalbumin/creatinine ratio, and lipid profile.  Interventions including being on an ACE/ARB therapy, flu vaccine, pneumonia vaccine, depression assessment, diet plan, and exercise.  He also understands the importance of an annual eye exam and daily foot exam. He states he learned a lot in the Type 2 Self Management program.

## 2017-06-30 NOTE — MR AVS SNAPSHOT
Cristino Salcedoyne   2017 10:00 AM   Non-Provider Visit   MRN: [id number]    Department:  [facility]   Dept Phone:  [phone]    Description:  Male : 1960   Provider:  Jayla Ledesma RD; Tiffanie Marina R.N.           Reason for Visit     Diabetes Type 2 Follow up program      Allergies as of 2017     Allergen Noted Reactions    Demerol 2014   Vomiting, Nausea    Rxn = years ago     Septra [Bactrim Ds] 2014   Hives    Rxn = approx       You were diagnosed with     Type 2 diabetes mellitus without complication, without long-term current use of insulin (CMS-HCC)   [1625571]         Vital Signs     Smoking Status                   Former Smoker           Basic Information     Date Of Birth Sex Race Ethnicity Preferred Language    1960 Male White Non- English      Your appointments     Aug 13, 2017  8:00 PM   Sleep Study with SLEEP TECH   [facility] (--)    [address] [facility] [address]  [place] [id number] [phone]            Aug 15, 2017 10:30 AM   FOOT AND NAIL CARE FOLLOW UP with Samantha Villa R.N.   [facility] ([address] [place] [address])    [address] [place] [address] [address]  [employer] [place] [address] [phone]            Aug 16, 2017  2:00 PM   Follow UP with JAYLON Valencia   [facility] (--)    [address]  [facility] [address]  [place] [id number] [phone]              Problem List              ICD-10-CM Priority Class Noted - Resolved    Diabetes (CMS-HCC) E11.9   2014 - Present    Lumbar disc disease M51.9   2014 - Present    Gout M10.9   2014 - Present    Hypertension I10   2014 - Present    H/O arthroscopy of knee Z98.890   2014 - Present    Hx of elbow surgery Z98.890   2014 - Present    Obesity E66.9   2014 - Present    Osteoarthritis M19.90   2014 - Present    H/O shoulder replacement Z96.619   2014 - Present    Rotator cuff arthropathy M12.819   2014 - Present     Bilateral bunions M21.611, M21.612   9/5/2014 - Present    Primary osteoarthritis of left hip M16.12   8/18/2016 - Present    Chronic autoimmune thyroiditis E06.3   10/3/2016 - Present    Hypothyroidism, acquired, autoimmune E03.8   10/3/2016 - Present    Type 2 diabetes mellitus without complication (CMS-HCC) E11.9   12/13/2016 - Present    Acquired hallux valgus of left foot M20.12   12/22/2016 - Present    JULIAN (obstructive sleep apnea) G47.33   4/3/2017 - Present    Snoring R06.83   4/3/2017 - Present    Degeneration of lumbar intervertebral disc M51.36   4/14/2017 - Present    Lower back pain M54.5   4/14/2017 - Present      Health Maintenance        Date Due Completion Dates    IMM HEP B VACCINE (1 of 3 - Primary Series) 1960 ---    DIABETES MONOFILAMENT / LE EXAM 1960 ---    RETINAL SCREENING 3/26/1976 ---    URINE ACR / MICROALBUMIN 3/26/1976 ---    IMM DTaP/Tdap/Td Vaccine (1 - Tdap) 3/26/1977 ---    IMM PNEUMOCOCCAL 19-64 (ADULT) MEDIUM RISK SERIES (1 of 1 - PPSV23) 3/26/1977 ---    COLONOSCOPY 3/26/2008 ---    FASTING LIPID PROFILE 12/19/2017 12/19/2016, 3/21/2016    A1C SCREENING 12/28/2017 6/28/2017, 2/8/2017, 12/19/2016    SERUM CREATININE 6/28/2018 6/28/2017, 3/31/2017, 2/8/2017, 12/19/2016, 12/13/2016, 7/26/2016, 3/21/2016            Current Immunizations     Influenza Vaccine Quad Inj (Pf) 9/27/2016 11:12 AM      Below and/or attached are the medications your provider expects you to take. Review all of your home medications and newly ordered medications with your provider and/or pharmacist. Follow medication instructions as directed by your provider and/or pharmacist. Please keep your medication list with you and share with your provider. Update the information when medications are discontinued, doses are changed, or new medications (including over-the-counter products) are added; and carry medication information at all times in the event of emergency situations     Allergies:  DEMEROL -  Vomiting,Nausea     SEPTRA - Hives               Medications  Valid as of: June 30, 2017 - 11:18 AM    Generic Name Brand Name Tablet Size Instructions for use    ALPRAZolam (Tab) XANAX 0.25 MG Take 0.25-0.5 mg by mouth 4 times a day as needed for Sleep.        Clotrimazole (Cream) LOTRIMIN 1 % APPLY TO AFFECTED AREA EVERY 12 HOURS        HydrOXYzine HCl (Tab) ATARAX 50 MG Take 200 mg by mouth 2 Times a Day.        LamoTRIgine (Tab) LAMICTAL 100 MG Take 100 mg by mouth every morning.        Levothyroxine Sodium (Tab) SYNTHROID 100 MCG Take 1 Tab by mouth Every morning on an empty stomach.        Liraglutide (Solution Pen-injector) VICTOZA 18 MG/3ML Inject 1.8 mg as instructed every morning.        Losartan Potassium (Tab) COZAAR 100 MG Take 100 mg by mouth every morning.        OxyCODONE HCl (Tab) ROXICODONE 10 MG Take 1 Tab by mouth every 3 hours as needed (Severe Pain (NRS Pain Scale 7-10; CPOT Pain Scale 6-8)).        Oxycodone-Acetaminophen (Tab) PERCOCET 5-325 MG Take 1-2 Tabs by mouth every four hours as needed.        Polyethylene Glycol 3350 (Powder) MIRALAX  TAKE 1 CAPFUL WITH ANY LIQUID BY MOUTH ONCE A DAY        Temazepam (Cap) RESTORIL 30 MG Take 30 mg by mouth at bedtime as needed for Sleep.        Venlafaxine HCl (CAPSULE SR 24 HR) EFFEXOR XR 75 MG Take 75 mg by mouth every morning.        .                 Medicines prescribed today were sent to:     [id number] - [place] - [address]    [address] [place] [address]    Phone: [phone] Fax: [phone]    Open 24 Hours?: No      Medication refill instructions:       If your prescription bottle indicates you have medication refills left, it is not necessary to call your provider’s office. Please contact your pharmacy and they will refill your medication.    If your prescription bottle indicates you do not have any refills left, you may request refills at any time through one of the following ways: The online [employer] system  (except Urgent Care), by calling your provider’s office, or by asking your pharmacy to contact your provider’s office with a refill request. Medication refills are processed only during regular business hours and may not be available until the next business day. Your provider may request additional information or to have a follow-up visit with you prior to refilling your medication.   *Please Note: Medication refills are assigned a new Rx number when refilled electronically. Your pharmacy may indicate that no refills were authorized even though a new prescription for the same medication is available at the pharmacy. Please request the medicine by name with the pharmacy before contacting your provider for a refill.           [email or web address] Access Code: Activation code not generated  Current [employer] Status: Active

## 2017-08-02 ENCOUNTER — HOSPITAL ENCOUNTER (OUTPATIENT)
Dept: RADIOLOGY | Facility: MEDICAL CENTER | Age: 57
End: 2017-08-02
Attending: FAMILY MEDICINE
Payer: COMMERCIAL

## 2017-08-02 DIAGNOSIS — R41.2 AMNESIA (RETROGRADE): ICD-10-CM

## 2017-08-02 PROCEDURE — 70450 CT HEAD/BRAIN W/O DYE: CPT

## 2017-08-13 ENCOUNTER — SLEEP STUDY (OUTPATIENT)
Dept: SLEEP MEDICINE | Facility: MEDICAL CENTER | Age: 57
End: 2017-08-13
Attending: NURSE PRACTITIONER
Payer: COMMERCIAL

## 2017-08-13 DIAGNOSIS — G47.33 OSA (OBSTRUCTIVE SLEEP APNEA): ICD-10-CM

## 2017-08-13 PROCEDURE — 95811 POLYSOM 6/>YRS CPAP 4/> PARM: CPT | Performed by: INTERNAL MEDICINE

## 2017-08-14 NOTE — WOUND TEAM
"[facility]    [address]    [address]    [place] [address]    [phone] Fax: [phone]    Foot and Nail Encounter 8/15/2017  6/6/2017- 9/6/2017  Referring Physician:LEATHA Lima  Primary Physician:   Consulting Physicians:   Start of Care: 7/14/2016      Subjective:       HPI:  Pt states he will have Surgery with Dr. Kang on Left foot on August 21, 2017 for bunionectomy revision and possilbe joint revision of Lt 3rd/4th toe?   56 year old male living with wife and children. HX of dislocated toes 3-4 years ago while walking, had toes \"fixed,\" but \"it didn't work.\"     12/22/2017 PROCEDURES with Dr Kang:    1.  Left modified Arndt bunionectomy.  2.  Left distal metatarsal osteotomy.  3.  Left distal metatarsal osteotomy of the hallux.  4.  Left distal metatarsal osteotomy, 2, 3, 4, and 5.  5.  Left flexor digitorum longus transfer with a flexor to extensor transfer    of 2 and 3.  6.  Left soft tissue reconstruction, metatarsophalangeal joints 2, 3, 4, and    5.  7.  Left hammertoe correction with proximal interphalangeal arthroplasty, 2    and 3.  8.  Right metatarsophalangeal joint capsulotomy of soft tissue release, 2 and    3.  9.  Right hammertoe correction with proximal interphalangeal arthroplasty, 2    and 3.    Had F/U with Dr. Sutherland 1/5/2017, pt put on antibiotics, still taking them.     History of foot ulceration(s): Yes____ No___x__ Location:   History of foot surgery: Yes__x__ No____Describe:______see above;   10/13/16=  Hip replacement done 8 weeks ago  ______________________________________________    Employed: Y ___ N __x_ Job description: ____disability_____________    Current Residence: ___lives with wife and kids______  home    Pain: pt denies foot pain presently    Past Medical History:   Past Medical History   Diagnosis Date   • Diabetes mellitus (CMS-HCC)    • Gout    • Hypertension    • Hypothyroidism      chronic thyroiditis   • Bunion    • Hammertoe  "   • Arthritis    • Anesthesia      post op nausea and vomiting   • Chronic autoimmune thyroiditis       + US / + TPO = 57   • Snoring    • High cholesterol    • Dental disorder      upper plate denture   • Pain      everyplace   • Anxiety and depression      depression/anxiety   • Tonsillitis          Current Medications:   Current outpatient prescriptions:   •  levothyroxine (SYNTHROID) 100 MCG Tab, Take 1 Tab by mouth Every morning on an empty stomach., Disp: 30 Tab, Rfl: 6  •  oxycodone immediate release (ROXICODONE) 10 MG immediate release tablet, Take 1 Tab by mouth every 3 hours as needed (Severe Pain (NRS Pain Scale 7-10; CPOT Pain Scale 6-8))., Disp: 40 Tab, Rfl: 0  •  liraglutide (VICTOZA) 18 MG/3ML Solution Pen-injector injection, Inject 1.8 mg as instructed every morning., Disp: , Rfl:   •  clotrimazole (LOTRIMIN) 1 % Cream, APPLY TO AFFECTED AREA EVERY 12 HOURS, Disp: , Rfl: 3  •  polyethylene glycol 3350 (MIRALAX) Powder, TAKE 1 CAPFUL WITH ANY LIQUID BY MOUTH ONCE A DAY, Disp: , Rfl: 5  •  temazepam (RESTORIL) 30 MG capsule, Take 30 mg by mouth at bedtime as needed for Sleep., Disp: , Rfl:   •  venlafaxine XR (EFFEXOR XR) 75 MG CAPSULE SR 24 HR, Take 75 mg by mouth every morning., Disp: , Rfl:   •  lamotrigine (LAMICTAL) 100 MG Tab, Take 100 mg by mouth every morning., Disp: , Rfl:   •  hydrOXYzine (ATARAX) 50 MG Tab, Take 200 mg by mouth 2 Times a Day., Disp: , Rfl:   •  alprazolam (XANAX) 0.25 MG Tab, Take 0.25-0.5 mg by mouth 4 times a day as needed for Sleep., Disp: , Rfl:   •  losartan (COZAAR) 100 MG Tab, Take 100 mg by mouth every morning., Disp: , Rfl:   •  oxycodone-acetaminophen (PERCOCET) 5-325 MG TABS, Take 1-2 Tabs by mouth every four hours as needed., Disp: , Rfl:     8/15/2017 pt states he was prescribed Pensaid (pain reliever) 2 pumps of liquid for knee, for surgery next Monday  Allergies: Demerol and Septra  Social History:   Social History     Social History   • Marital Status:  "     Spouse Name: N/A   • Number of Children: N/A   • Years of Education: N/A     Occupational History   • Not on file.     Social History Main Topics   • Smoking status: Former Smoker -- 1.00 packs/day for 20 years     Types: Cigarettes     Quit date: 01/01/2012   • Smokeless tobacco: Former User     Types: Chew     Quit date: 01/01/1997   • Alcohol Use: Yes      Comment: 1-2 per month   • Drug Use: No   • Sexual Activity: Not on file     Other Topics Concern   • Not on file     Social History Narrative       Fall Risk Assessment (william all that apply with an X):completed with pt 1/23/201            Objective:     Tests and Measures: 8/15/2017 FBS today  118 . Good resistance strength AIDAN feet dorsiflextion and plantar flexion against clinician's hands. Good ROM of ankles. Denies pain in calves when walking.     HgbA1C: 5/28/2017 6.8  12/19/2017 8.2    Vascular   R   L       palp palp DP pulse     palp palp PT pulse     yes yes Capillary refill < 3 sec         Deformities    R   L        4 and 5  Hammer/claw toe      x  x Hallux Valgus      great toe, medial; 5th MTH plantar   Prominent Metatarsal Heads         Charcot Foot         Drop Foot         Rocker Bottom         Prior amputation:         Other:  nails missing from lt  1st and  2nd digits, Rt 2nd   4/5 1-5 dislocation          Clinical appearance/skin    Dryness___moderate_ _________ Swelling_____none_________ Redness______none_______Temperature__warm, hair growth present__ ______    Callus_____ __ rt great toe medial, Rt hallux distal tip  LT 3rd/4th plantar; RT 5th  Lateral pt states he \"couldn't stand it anymore, used my own dremel to trim those) _________________________________    Ulceration_none      Clinical appearance/toenails    Onychomycosis__7/10__ (pt has small fungal toe nail to Rt great toe; pt states nail \"comes and goes\" ______________    Ingrown toenails_______    Deformities_______________________________    Other___HX: 1st and 2nd " nails brad feet were ablated by podiatrist due to problematic deformities per pt___________________________________        Orthotic, protective, supportive devices: post op shoes  Procedures: Pt's feet were washed with soapy water, then dried. In between toes cleaned with gauze to remove debris. No wounds noted. Cuticle and nailbed margins were established and debris removed from around and beneath toes with a curette. Scalpel to pare down calluses, as above. All 6 onychomycotic nails were trimmed with clippers and then ground down with dremel, clipped again, then smoothed and finished with dremel. Feet and LE were moisturized bilaterally, except between toes. Tea tree oil applied to nail beds. Small abrasions noted to LT 5th toe base of nail. Cleansed with Betadine. Oakley board to smooth RT 5th lateral MTH and LT 3rd plantar MTH where pt used own dremel. Callus to LT 3rd distal tip smoothed with deshawn board.        Patient Education: Instructed pt small abrasion noted to LT 5th toe base of nail  cleansed with Betadine; on    on s/s infection - chills, fever, malaise, [place], increased redness/swelling/pain/exudate - and to go to ER/Urgent Care; Pt instructed on need to check feet daily and see PCP for any changes/new wounds; to keep tight control over BS for optimum health; to wear protective footwear at all times;to shake out shoes before donning to remove any small naveed that might cause wounds;  On tx (oral and topical scrip, OTC topicals) for fungus; to return for foot/nail care every two -  three months or sooner if necessary. Pt verbalized understanding of all instruction.        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

## 2017-08-15 ENCOUNTER — NON-PROVIDER VISIT (OUTPATIENT)
Dept: WOUND CARE | Facility: MEDICAL CENTER | Age: 57
End: 2017-08-15
Attending: FAMILY MEDICINE
Payer: COMMERCIAL

## 2017-08-15 DIAGNOSIS — Z01.812 PRE-OPERATIVE LABORATORY EXAMINATION: ICD-10-CM

## 2017-08-15 LAB
ANION GAP SERPL CALC-SCNC: 9 MMOL/L (ref 0–11.9)
BUN SERPL-MCNC: 22 MG/DL (ref 8–22)
CALCIUM SERPL-MCNC: 9.7 MG/DL (ref 8.5–10.5)
CHLORIDE SERPL-SCNC: 106 MMOL/L (ref 96–112)
CO2 SERPL-SCNC: 25 MMOL/L (ref 20–33)
CREAT SERPL-MCNC: 0.97 MG/DL (ref 0.5–1.4)
EST. AVERAGE GLUCOSE BLD GHB EST-MCNC: 128 MG/DL
GFR SERPL CREATININE-BSD FRML MDRD: >60 ML/MIN/1.73 M 2
GLUCOSE SERPL-MCNC: 129 MG/DL (ref 65–99)
HBA1C MFR BLD: 6.1 % (ref 0–5.6)
POTASSIUM SERPL-SCNC: 4 MMOL/L (ref 3.6–5.5)
SODIUM SERPL-SCNC: 140 MMOL/L (ref 135–145)

## 2017-08-15 PROCEDURE — 11056 PARNG/CUTG B9 HYPRKR LES 2-4: CPT

## 2017-08-15 PROCEDURE — 83036 HEMOGLOBIN GLYCOSYLATED A1C: CPT

## 2017-08-15 PROCEDURE — 11721 DEBRIDE NAIL 6 OR MORE: CPT | Mod: XS

## 2017-08-15 PROCEDURE — 80048 BASIC METABOLIC PNL TOTAL CA: CPT

## 2017-08-15 PROCEDURE — 36415 COLL VENOUS BLD VENIPUNCTURE: CPT

## 2017-08-15 RX ORDER — TAMSULOSIN HYDROCHLORIDE 0.4 MG/1
0.4 CAPSULE ORAL
COMMUNITY

## 2017-08-15 NOTE — PROCEDURES
Clinical Comments:  The patient underwent a CPAP titration using the standard montage for measurement of parameters of sleep, respiratory events, movement abnormalities, heart rate and rhythm. A microphone was used to monitor snoring.      ANALYSIS:  The total recording time was 439.0 minutes with a sleep period of 422.1 minutes and the total sleep time was 402.0 minutes with a sleep efficiency of 91.6%.  The sleep latency was 16.8 minutes, and REM latency was 88.5 minutes.  The patient experienced 33 arousals in total, for an arousal index of 4.9    RESPIRATORY: The patient had 36 apneas in total.  Of these, 0 were obstructive apneas, and 36 were central apneas.  This resulted in an apnea index (AI) of 5.4.  The patient had 55 hypopneas, for a hypopnea index of 8.2.  The overall AHI was 13.6, while the AHI during Stage R sleep was 15.4.  AHI while supine was 14.4.    OXIMETRY: Oxygen saturation monitoring showed a mean SpO2 of 92.8%, with a minimum oxygen saturation of 83.0%.  Oxygen saturations were less than or = 89% for 2.0 minutes of sleep time.    CARDIAC: The highest heart rate during the recording was 102.0 beats per minute.  The average heart rate during sleep was 86.3 bpm.    LIMB MOVEMENTS: There were a total of 0 PLMs during sleep, of which 0 were PLMs arousals.  This resulted in a PLMS index of 0.0.      Interpretation:    Mr. Keys presented with snoring and excessive daytime somnolence. Diagnostic polysomnography demonstrated an apnea hypopnea index of 6.1 events per hour with a lowest arterial oxygen saturation of 85%. With his daytime somnolence and comorbid conditions, treatment for his sleep-disordered breathing was indicated. The study was designed to titrate therapy.    There is reasonable continuity of sleep with a minimal elevation in the arousal and awakening index. Almost 3 hours of REM sleep time was recorded through the night, suggesting a rebound effect on treatment. There are no periodic  limb movements. CPAP was adjusted across a pressure range of 5-15 cm water. He did well with expiratory pressures of 12 cm water and above in non-REM sleep but some treatment onset central apnea episodes were identified. BiPAP therapy was briefly used but associated with an increase in the number of hypopnea and central apnea events. At a CPAP pressure of 14 cm water, the apnea hypopnea index was 4.7 events per hour with a lowest arterial oxygen saturation of 91% on room air. That step in the pressure titration included 48 minutes of REM sleep time, 3 minutes of non-REM sleep time, and at least some time in the supine body position.    Assessment:  This study demonstrates a good response to CPAP therapy in a patient with previously demonstrated sleep apnea hypopnea.    Recommendations:  CPAP at 14 cm water should be effective. Because the higher pressures were required primarily during REM sleep, auto titrating CPAP with a pressure range of 11-15 cm water could be used. He did best with a medium air fit N20 nasal mask and heated humidification.

## 2017-08-21 ENCOUNTER — HOSPITAL ENCOUNTER (OUTPATIENT)
Facility: MEDICAL CENTER | Age: 57
End: 2017-08-21
Attending: ORTHOPAEDIC SURGERY | Admitting: ORTHOPAEDIC SURGERY
Payer: COMMERCIAL

## 2017-08-21 ENCOUNTER — APPOINTMENT (OUTPATIENT)
Dept: RADIOLOGY | Facility: MEDICAL CENTER | Age: 57
End: 2017-08-21
Attending: ORTHOPAEDIC SURGERY
Payer: COMMERCIAL

## 2017-08-21 VITALS
OXYGEN SATURATION: 98 % | HEART RATE: 96 BPM | BODY MASS INDEX: 36.45 KG/M2 | RESPIRATION RATE: 16 BRPM | SYSTOLIC BLOOD PRESSURE: 85 MMHG | DIASTOLIC BLOOD PRESSURE: 68 MMHG | HEIGHT: 70 IN | WEIGHT: 254.63 LBS | TEMPERATURE: 97.5 F

## 2017-08-21 PROBLEM — M19.072 ARTHRITIS OF LEFT ANKLE: Status: ACTIVE | Noted: 2017-08-21

## 2017-08-21 LAB — GLUCOSE BLD-MCNC: 105 MG/DL (ref 65–99)

## 2017-08-21 PROCEDURE — A9270 NON-COVERED ITEM OR SERVICE: HCPCS

## 2017-08-21 PROCEDURE — 160002 HCHG RECOVERY MINUTES (STAT): Performed by: ORTHOPAEDIC SURGERY

## 2017-08-21 PROCEDURE — C1713 ANCHOR/SCREW BN/BN,TIS/BN: HCPCS | Performed by: ORTHOPAEDIC SURGERY

## 2017-08-21 PROCEDURE — 500881 HCHG PACK, EXTREMITY: Performed by: ORTHOPAEDIC SURGERY

## 2017-08-21 PROCEDURE — 700101 HCHG RX REV CODE 250

## 2017-08-21 PROCEDURE — 82962 GLUCOSE BLOOD TEST: CPT

## 2017-08-21 PROCEDURE — 160047 HCHG PACU  - EA ADDL 30 MINS PHASE II: Performed by: ORTHOPAEDIC SURGERY

## 2017-08-21 PROCEDURE — 501838 HCHG SUTURE GENERAL: Performed by: ORTHOPAEDIC SURGERY

## 2017-08-21 PROCEDURE — 160039 HCHG SURGERY MINUTES - EA ADDL 1 MIN LEVEL 3: Performed by: ORTHOPAEDIC SURGERY

## 2017-08-21 PROCEDURE — 160028 HCHG SURGERY MINUTES - 1ST 30 MINS LEVEL 3: Performed by: ORTHOPAEDIC SURGERY

## 2017-08-21 PROCEDURE — C1769 GUIDE WIRE: HCPCS | Performed by: ORTHOPAEDIC SURGERY

## 2017-08-21 PROCEDURE — 501736 HCHG WIRE, K-5M DBL END: Performed by: ORTHOPAEDIC SURGERY

## 2017-08-21 PROCEDURE — 700111 HCHG RX REV CODE 636 W/ 250 OVERRIDE (IP)

## 2017-08-21 PROCEDURE — 500053 HCHG BANDAGE, ELASTIC 4: Performed by: ORTHOPAEDIC SURGERY

## 2017-08-21 PROCEDURE — 700102 HCHG RX REV CODE 250 W/ 637 OVERRIDE(OP)

## 2017-08-21 PROCEDURE — 73620 X-RAY EXAM OF FOOT: CPT | Mod: LT

## 2017-08-21 PROCEDURE — 160009 HCHG ANES TIME/MIN: Performed by: ORTHOPAEDIC SURGERY

## 2017-08-21 PROCEDURE — 160046 HCHG PACU - 1ST 60 MINS PHASE II: Performed by: ORTHOPAEDIC SURGERY

## 2017-08-21 PROCEDURE — 160036 HCHG PACU - EA ADDL 30 MINS PHASE I: Performed by: ORTHOPAEDIC SURGERY

## 2017-08-21 PROCEDURE — 160035 HCHG PACU - 1ST 60 MINS PHASE I: Performed by: ORTHOPAEDIC SURGERY

## 2017-08-21 PROCEDURE — A6223 GAUZE >16<=48 NO W/SAL W/O B: HCPCS | Performed by: ORTHOPAEDIC SURGERY

## 2017-08-21 PROCEDURE — 160048 HCHG OR STATISTICAL LEVEL 1-5: Performed by: ORTHOPAEDIC SURGERY

## 2017-08-21 PROCEDURE — 160022 HCHG BLOCK: Performed by: ORTHOPAEDIC SURGERY

## 2017-08-21 PROCEDURE — 160025 RECOVERY II MINUTES (STATS): Performed by: ORTHOPAEDIC SURGERY

## 2017-08-21 PROCEDURE — 500088 HCHG BLADE, SAGITTAL: Performed by: ORTHOPAEDIC SURGERY

## 2017-08-21 DEVICE — IMPLANTABLE DEVICE: Type: IMPLANTABLE DEVICE | Status: FUNCTIONAL

## 2017-08-21 RX ORDER — LIDOCAINE AND PRILOCAINE 25; 25 MG/G; MG/G
1 CREAM TOPICAL
Status: COMPLETED | OUTPATIENT
Start: 2017-08-21 | End: 2017-08-21

## 2017-08-21 RX ORDER — LIDOCAINE HYDROCHLORIDE 10 MG/ML
0.5 INJECTION, SOLUTION INFILTRATION; PERINEURAL
Status: COMPLETED | OUTPATIENT
Start: 2017-08-21 | End: 2017-08-21

## 2017-08-21 RX ORDER — LIDOCAINE HYDROCHLORIDE 10 MG/ML
INJECTION, SOLUTION INFILTRATION; PERINEURAL
Status: COMPLETED
Start: 2017-08-21 | End: 2017-08-21

## 2017-08-21 RX ORDER — SODIUM CHLORIDE 9 MG/ML
INJECTION, SOLUTION INTRAVENOUS CONTINUOUS
Status: DISCONTINUED | OUTPATIENT
Start: 2017-08-21 | End: 2017-08-21 | Stop reason: HOSPADM

## 2017-08-21 RX ORDER — OXYCODONE HCL 5 MG/5 ML
SOLUTION, ORAL ORAL
Status: COMPLETED
Start: 2017-08-21 | End: 2017-08-21

## 2017-08-21 RX ORDER — BUPIVACAINE HYDROCHLORIDE 5 MG/ML
INJECTION, SOLUTION EPIDURAL; INTRACAUDAL
Status: DISCONTINUED | OUTPATIENT
Start: 2017-08-21 | End: 2017-08-21 | Stop reason: HOSPADM

## 2017-08-21 RX ADMIN — SODIUM CHLORIDE: 9 INJECTION, SOLUTION INTRAVENOUS at 06:22

## 2017-08-21 RX ADMIN — OXYCODONE HYDROCHLORIDE 10 MG: 5 SOLUTION ORAL at 08:45

## 2017-08-21 RX ADMIN — LIDOCAINE HYDROCHLORIDE 0.5 ML: 10 INJECTION, SOLUTION INFILTRATION; PERINEURAL at 06:22

## 2017-08-21 ASSESSMENT — PAIN SCALES - GENERAL
PAINLEVEL_OUTOF10: 0
PAINLEVEL_OUTOF10: 2
PAINLEVEL_OUTOF10: 0
PAINLEVEL_OUTOF10: 2
PAINLEVEL_OUTOF10: 0
PAINLEVEL_OUTOF10: 2
PAINLEVEL_OUTOF10: 0
PAINLEVEL_OUTOF10: 2
PAINLEVEL_OUTOF10: 7
PAINLEVEL_OUTOF10: 0
PAINLEVEL_OUTOF10: 2
PAINLEVEL_OUTOF10: 2

## 2017-08-21 NOTE — OR NURSING
Pt states he has pain meds at home but will run out before his next Dr's visit. Dr Kang called and he spoke with patient about his MA calling his DR to see if he can have a small pain script today. Have not heard back from Dr Kang's MA. Pt does have enough medication to cover himself for a day or more.

## 2017-08-21 NOTE — OR NURSING
0955:[facility] Awake. Denies any pain. Ace wrap dressing to left foot CDI. Post op shoe in place. Waiting for wife to come and  patient.  1010: Dr. Kang here to talk to patient. Dr Abbott to write prescription for pain for patient.  1100: eating lunch. Tolerated well.  1200: wife here.  Helping patient to get dressed.  1230: meets criteria for discharge. Discharged via wheelchair to private car. Stable.

## 2017-08-21 NOTE — IP AVS SNAPSHOT
" Home Care Instructions                                                                                                                Name:Cristino Keys  Medical Record Number:[id number]  CSN: [id number]    YOB: 1960   Age: 57 y.o.  Sex: male  HT:1.778 m (5' 10\") WT: 115.5 kg (254 lb 10.1 oz)          Admit Date: 8/21/2017     Discharge Date:   Today's Date: 8/21/2017  Attending Doctor:  Haroldo Kang M.D.                  Allergies:  Demerol and Septra                Discharge Instructions         ACTIVITY: Rest and take it easy for the first 24 hours.  A responsible adult is recommended to remain with you during that time.  It is normal to feel sleepy.  We encourage you to not do anything that requires balance, judgment or coordination.    MILD FLU-LIKE SYMPTOMS ARE NORMAL. YOU MAY EXPERIENCE GENERALIZED MUSCLE ACHES, THROAT IRRITATION, HEADACHE AND/OR SOME NAUSEA.    FOR 24 HOURS DO NOT:  Drive, operate machinery or run household appliances.  Drink beer or alcoholic beverages.   Make important decisions or sign legal documents.        DIET: To avoid nausea, slowly advance diet as tolerated, avoiding spicy or greasy foods for the first day.  Add more substantial food to your diet according to your physician's instructions.  Babies can be fed formula or breast milk as soon as they are hungry.  INCREASE FLUIDS AND FIBER TO AVOID CONSTIPATION.    SURGICAL DRESSING/BATHING & SPECIAL INSTRUCTIONS:    Elevate/ice   Take pain medications scheduled until block wears off.  Hold for sedation.   Heel touch weightbearing   Post op shoe at all times   rewrap ace looser 6-8 hours post op    FOLLOW-UP APPOINTMENT:  A follow-up appointment should be arranged with your doctor ; call to schedule.    You should CALL YOUR PHYSICIAN if you develop:  Fever greater than 101 degrees F.  Pain not relieved by medication, or persistent nausea or vomiting.  Excessive bleeding (blood soaking through dressing) or unexpected " drainage from the wound.  Extreme redness or swelling around the incision site, drainage of pus or foul smelling drainage.  Inability to urinate or empty your bladder within 8 hours.  Problems with breathing or chest pain.    You should call 911 if you develop problems with breathing or chest pain.  If you are unable to contact your doctor or surgical center, you should go to the nearest emergency room or urgent care center.  Physician's telephone #: [phone]    If any questions arise, call your doctor.  If your doctor is not available, please feel free to call the Surgical Center at [phone].  The Center is open Monday through Friday from 7AM to 7PM.  You can also call the [facility] HOTLINE open 24 hours/day, 7 days/week and speak to a nurse at [phone], or toll free at [phone].    A registered nurse may call you a few days after your surgery to see how you are doing after your procedure.    MEDICATIONS: Resume taking daily medication.  Take prescribed pain medication with food.  If no medication is prescribed, you may take non-aspirin pain medication if needed.  PAIN MEDICATION CAN BE VERY CONSTIPATING.  Take a stool softener or laxative such as senokot, pericolace, or milk of magnesia if needed.    Dr Abbott to write prescription  .  Last pain medication given at 8:45 AM.    If your physician has prescribed pain medication that includes Acetaminophen (Tylenol), do not take additional Acetaminophen (Tylenol) while taking the prescribed medication.    Depression / Suicide Risk    As you are discharged from this [facility] facility, it is important to learn how to keep safe from harming yourself.    Recognize the warning signs:  · Abrupt changes in personality, positive or negative- including increase in energy   · Giving away possessions  · Change in eating patterns- significant weight changes-  positive or negative  · Change in sleeping patterns- unable to sleep or sleeping all the  time   · Unwillingness or inability to communicate  · Depression  · Unusual sadness, discouragement and loneliness  · Talk of wanting to die  · Neglect of personal appearance   · Rebelliousness- reckless behavior  · Withdrawal from people/activities they love  · Confusion- inability to concentrate     If you or a loved one observes any of these behaviors or has concerns about self-harm, here's what you can do:  · Talk about it- your feelings and reasons for harming yourself  · Remove any means that you might use to hurt yourself (examples: pills, rope, extension cords, firearm)  · Get professional help from the community (Mental Health, Substance Abuse, psychological counseling)  · Do not be alone:Call your Safe Contact- someone whom you trust who will be there for you.  · Call your local CRISIS HOTLINE 696-5408 or 960-683-2770  · Call your local Children's Mobile Crisis Response Team [place] (125) 323-6653 or www.ADstruc  · Call the toll free National Suicide Prevention Hotlines   · National Suicide Prevention Lifeline 095-516-NAZM (5505)  [place] [facility] 800-SUICIDE (406-3721)    Peripheral Nerve Block Discharge Instructions from Same Day Surgery and Inpatient :    What to Expect - Lower Extremity  · The block may cause you to experience numbness and weakness in your thigh and knee or calf and foot on the same side as your surgery  · Numbness, tingling and / or weakness are all normal. For some people, this may be an unpleasant sensation  · These issues will be resolved when the local anesthetic wears off   · You may experience numbness and tingling in your thigh on the same side as your surgery if the block medicine was injected at your groin area  · Numbness will make it difficult to walk  · You may have problems with balance and walking so be very careful   · Follow your surgeon's direction regarding weight bearing on your surgical limb  · Be very careful with your numb  "limb  Precautions  · The numbness may affect your balance  · Be careful when walking or moving around  · Your leg may be weak: be very careful putting weight on it  · If your surgeon did not specify a time, you should not bear weight for 24 hours  · Be sure to ask for help when you need it  · It is better to have help than to fall and hurt yourself  ·   Prevent Injury  · Protect the limb like a baby  · Beware of exposing your limb to extreme heat or cold or trauma  · The limb may be injured without you noticing because it is numb  · Keep the limb elevated whenever possible  · Do not sleep on the limb  · Change the position of the limb regularly  · Avoid putting pressure on your surgical limb  Pain Control  · The initial block on the day of surgery will make your extremity feel \"numb\"  · Any consecutive injection including prior to discharge from the hospital will make your extremity feel \"numb\"  · You may feel an aching or burning when the local anesthesia starts to wear off  · Take pain pills as prescribed by your surgeon  · Call your surgeon or anesthesiologist if you do not have adequate pain control  ·        Medication List      CONTINUE taking these medications        Instructions    Morning Afternoon Evening Bedtime    alprazolam 0.25 MG Tabs   Commonly known as:  XANAX        Take 0.25-0.5 mg by mouth 4 times a day as needed for Sleep.   Dose:  0.25-0.5 mg                        clotrimazole 1 % Crea   Commonly known as:  LOTRIMIN        APPLY TO AFFECTED AREA EVERY 12 HOURS                        hydrOXYzine 50 MG Tabs   Commonly known as:  ATARAX        Take 100 mg by mouth 2 Times a Day.   Dose:  100 mg                        lamotrigine 100 MG Tabs   Commonly known as:  LAMICTAL        Take 100 mg by mouth every morning.   Dose:  100 mg                        levothyroxine 100 MCG Tabs   Commonly known as:  SYNTHROID        Doctor's comments:  Dispense brand Levoxyl only   Take 1 Tab by mouth Every " morning on an empty stomach.   Dose:  100 mcg                        losartan 100 MG Tabs   Commonly known as:  COZAAR        Take 100 mg by mouth every morning.   Dose:  100 mg                        oxycodone-acetaminophen 5-325 MG Tabs   Commonly known as:  PERCOCET        Take 1-2 Tabs by mouth every four hours as needed.   Dose:  1-2 Tab                        PENNSAID TD        Apply 1 Application to skin as directed 1 time daily as needed.   Dose:  1 Application                        polyethylene glycol 3350 Powd   Commonly known as:  MIRALAX        TAKE 1 CAPFUL WITH ANY LIQUID BY MOUTH ONCE A DAY                        tamsulosin 0.4 MG capsule   Commonly known as:  FLOMAX        Take 0.4 mg by mouth ONE-HALF HOUR AFTER BREAKFAST.   Dose:  0.4 mg                        temazepam 30 MG capsule   Commonly known as:  RESTORIL        Take 30 mg by mouth at bedtime as needed for Sleep.   Dose:  30 mg                        venlafaxine XR 75 MG Cp24   Commonly known as:  EFFEXOR XR        Take 75 mg by mouth every morning.   Dose:  75 mg                        VICTOZA 18 MG/3ML Sopn injection   Generic drug:  liraglutide        Inject 1.8 mg as instructed every morning.   Dose:  1.8 mg                                Medication Information     Above and/or attached are the medications your physician expects you to take upon discharge. Review all of your home medications and newly ordered medications with your doctor and/or pharmacist. Follow medication instructions as directed by your doctor and/or pharmacist. Please keep your medication list with you and share with your physician. Update the information when medications are discontinued, doses are changed, or new medications (including over-the-counter products) are added; and carry medication information at all times in the event of emergency situations.        Resources     Quit Smoking / Tobacco Use:    I understand the use of any tobacco products increases  my chance of suffering from future heart disease or stroke and could cause other illnesses which may shorten my life. Quitting the use of tobacco products is the single most important thing I can do to improve my health. For further information on smoking / tobacco cessation call a Toll Free Quit Line at 1-502.940.7617 (*National Cancer [place]) or 1-261.667.3790 (American Lung Association) or you can access the web based program at www.lungusa.org.    [place] Tobacco Users Help Line:  (963) 623-9825       Toll Free: 1-953.471.3104  Quit Tobacco Program [facility] (992)002-1147    Crisis Hotline:    [place] Crisis Hotline:  9-615-RCQPDGA or 1-554.191.2096    [place] Crisis Hotline:    1-567.919.3224 or 306-679-8467    Discharge Survey:   Thank you for choosing [facility]. We hope we did everything we could to make your hospital stay a pleasant one. You may be receiving a survey and we would appreciate your time and participation in answering the questions. Your input is very valuable to us in our efforts to improve our service to our patients and their families.            Signatures     My signature on this form indicates that:    1. I acknowledge receipt and understanding of these Home Care Instruction.  2. My questions regarding this information have been answered to my satisfaction.  3. I have formulated a plan with my discharge nurse to obtain my prescribed medications for home.    __________________________________      __________________________________                   Patient Signature                                 Guardian/Responsible Adult Signature      __________________________________                 __________       ________                       Nurse Signature                                               Date                 Time

## 2017-08-21 NOTE — OP REPORT
DATE OF SERVICE:  08/21/2017    PREOPERATIVE DIAGNOSES:  1.  Left recurrent hallux valgus deformity.  2.  Left metatarsalgia with metatarsal head dislocation 2 through 5.    POSTOPERATIVE DIAGNOSES:  1.  Left recurrent hallux valgus deformity.  2.  Left metatarsalgia with metatarsal head dislocation 2 through 5.    PROCEDURES PERFORMED:  1.  Left first metatarsophalangeal joint fusion.  2.  Left removal of hardware, proximal phalanx and metatarsal head.  3.  Left metatarsal head excision, 2 through 5.    SURGEON:  Haroldo Kang MD    SECOND ASSISTANT:  Matilda Mattson.    ANESTHESIA:  General endotracheal.    ESTIMATED BLOOD LOSS:  None.    COMPLICATIONS:  None.    POSTOPERATIVE PLAN:  1.  Heel-touch weightbearing in a postop shoe.  2.  Preoperative antibiotics.  3.  Follow up in 2 weeks.    INDICATIONS:  The patient is a pleasant 57-year-old male who has had   longstanding problems with his left foot.  He had previous reconstruction.    _____ dislocation of pain in his foot.  The above diagnoses made and options   were discussed with him including operative and nonoperative.  He elected to   undergo operative intervention.  The above procedure was discussed and all   questions were answered.  Risks of surgery explained which include but not   limited to wound problems, infection, nerve injury, vascular injury, need for   further surgery.  He understands he could have persistent risk of his pain   and/or deformity, malunion, nonunion, need for further surgery.  He   understands and accepts these risks and agrees to proceed.  His site was   marked by myself prior to receiving psychotropic medicines.    PROCEDURE IN DETAIL:  The patient was given a popliteal block per my request   for postoperative pain management.  He was brought into the operating room and   underwent general endotracheal anesthesia without complications.  His left   lower extremity was prepped and draped in standard fashion in the supine    position with all appropriate padding.  Positive site verification confirmed   his left lower extremity as well as procedure and confirmation, he received   preoperative antibiotics.  Esmarch was used to exsanguinate his foot and ankle   and leg tourniquet was inflated to 250 mmHg.  Next, a dorsal incision was   made over the first MTPJ.  It was dissected down, EHL was identified and   protected.  The joint was then freed exposing the joint.  Further dissection   was necessary to get around to the screw into the proximal phalanx, which was   then removed with the appropriate screwdriver and rongeur was used to rongeur   down to the top of the head of the metatarsal screw, which was then removed.    The Arthrex conical reamers were used to ream down into good subchondral bone   on the metatarsal head and proximal phalanx.  This was irrigated out and the   toe was then held in slight dorsiflexion, neutral supination and pronation and   slight valgus.  It was provisionally held with a K wire.  A large lid was   then used to demonstrate just slight dorsiflexion and C-arm imaging confirmed   proper varus/valgus.  Arthrex first MTPJ fusion plate was placed with a   nonlocking followed by locking screws into the proximal phalanx.  Then, using   double compression screws _____ was placed in the metatarsal and the pin was   pulled at that time to allow for adequate compression.  Remaining holes were   filled with locking screws.  C-arm imaging demonstrates satisfactory position   for internal fixation alignment of the first MTPJ.  Next, a dorsal incision   was made over the second and fourth webspace.  It was dissected down the level   of the extensor tendon to the second toe.  This was cut as proximal as   possible and then it was brought distal.  He had some screw, was then removed   out of the metatarsal head from his previous DMO.  Microsagittal saw was used   to excise the metatarsal head.  A 0.062 K-wire was then ran  from the proximal   aspect of the proximal phalanx out through the tip of the toe and then back   into the metatarsal.  The exact procedure was performed on the 3rd, 4th and   5th toes without deviations or complications.  Final C-arm imaging   demonstrates satisfactory position, internal fixation, alignment of the foot.    Wounds were irrigated with copious irrigation and closed in layered fashion   using 3-0 Vicryl and 3-0 nylon.  Sterile dressings applied.  Tourniquet   released.  All toes were pink.  He was transferred to the recovery room in   good condition.    Utilization of Matilda Mattson was necessary for patient positioning,   holding, retracting, wound closure, dressing placement.  He was present   throughout the entire procedure.       ____________________________________     MD SAÚL MOREL / NTS    DD:  08/21/2017 08:35:17  DT:  08/21/2017 09:06:09    D#:  [id number]  Job#:  [id number]    cc: [facility]

## 2017-08-21 NOTE — IP AVS SNAPSHOT
8/21/2017    Cristino Keys  [address] [address]    Dear Cristino:    [facility] wants to ensure your discharge home is safe and you or your loved ones have had all of your questions answered regarding your care after you leave the hospital.    Below is a list of resources and contact information should you have any questions regarding your hospital stay, follow-up instructions, or active medical symptoms.    Questions or Concerns Regarding… Contact   Medical Questions Related to Your Discharge  (7 days a week, 8am-5pm) Contact a Nurse Care Coordinator   [phone]   Medical Questions Not Related to Your Discharge  (24 hours a day / 7 days a week)  Contact the Nurse Health Line   [phone]    Medications or Discharge Instructions Refer to your discharge packet   or contact your [facility] Primary Care Provider   [phone]   Follow-up Appointment(s) Schedule your appointment via [employer]   or contact Scheduling [phone]   Billing Review your statement via [employer]  or contact Billing [phone]   Medical Records Review your records via [employer]   or contact Medical Records [phone]     You may receive a telephone call within two days of discharge. This call is to make certain you understand your discharge instructions and have the opportunity to have any questions answered. You can also easily access your medical information, test results and upcoming appointments via the [employer] free online health management tool. You can learn more and sign up at [employer]. For assistance setting up your [employer] account, please call [phone].    Once again, we want to ensure your discharge home is safe and that you have a clear understanding of any next steps in your care. If you have any questions or concerns, please do not hesitate to contact us, we are here for you. Thank you for choosing [facility] for your healthcare needs.    Sincerely,    Your [facility] Healthcare Team

## 2017-08-28 ENCOUNTER — SLEEP CENTER VISIT (OUTPATIENT)
Dept: SLEEP MEDICINE | Facility: MEDICAL CENTER | Age: 57
End: 2017-08-28
Payer: COMMERCIAL

## 2017-08-28 VITALS
RESPIRATION RATE: 16 BRPM | DIASTOLIC BLOOD PRESSURE: 78 MMHG | HEIGHT: 70 IN | SYSTOLIC BLOOD PRESSURE: 118 MMHG | HEART RATE: 99 BPM | BODY MASS INDEX: 37.51 KG/M2 | OXYGEN SATURATION: 95 % | WEIGHT: 262 LBS

## 2017-08-28 DIAGNOSIS — E66.01 SEVERE OBESITY (BMI 35.0-39.9): ICD-10-CM

## 2017-08-28 DIAGNOSIS — G89.29 OTHER CHRONIC PAIN: ICD-10-CM

## 2017-08-28 DIAGNOSIS — I10 ESSENTIAL HYPERTENSION: ICD-10-CM

## 2017-08-28 DIAGNOSIS — E11.9 TYPE 2 DIABETES MELLITUS WITHOUT COMPLICATION, WITHOUT LONG-TERM CURRENT USE OF INSULIN (HCC): ICD-10-CM

## 2017-08-28 DIAGNOSIS — G47.33 OSA (OBSTRUCTIVE SLEEP APNEA): ICD-10-CM

## 2017-08-28 DIAGNOSIS — F11.90 CHRONIC NARCOTIC USE: ICD-10-CM

## 2017-08-28 PROCEDURE — 99213 OFFICE O/P EST LOW 20 MIN: CPT | Performed by: NURSE PRACTITIONER

## 2017-08-28 NOTE — PATIENT INSTRUCTIONS
Patient understands the need to use device every night for >4hrs to meet compliance standards for insurance purposes.  Patient understands they may have mask fits within first 30 days of therapy covered by insurance to obtain best fit.

## 2017-08-28 NOTE — PROGRESS NOTES
Chief Complaint   Patient presents with   • Results     ss       HPI:  Cristino Keys is a 57 y.o. year old male here today for follow-up on sleep study results. He was initially evaluated 4/3/17 and has been completing sleep studies in between surgeries for his feel/legs/back due to chronic pain. PSG 6/2017 indicated overall AHI of 6.1 and increased to 16.6. Supine and a minimum oxygen saturation of 85%. PLMS index of 33.2. Patient opted for titration study which was completed 8/13/2017 indicating good response to CPAP therapy with most improvement on CPAP 14 cm. On that pressure patient achieved REM sleep while on his right side and supine, AHI 4.7, and normal oximetry. PLMS index was 0.0 on cpap therapy. I reviewed testing with patient. He is a minimal starting CPAP. He denies any cardiac or respiratory symptoms currently. He continues to toscano with chronic pain using oxycodone as needed, Xanax 2-3 times a week and temazepam nightly to help with sleep. He currently denies GERD, sinus issues headache or dizziness. He is pending further surgeries to help with his joint issues.          ROS: As per HPI and otherwise negative if not stated.    Past Medical History:   Diagnosis Date   • Anesthesia     post op nausea and vomiting   • Anxiety and depression     depression/anxiety   • Arthritis    • Bunion    • Chronic autoimmune thyroiditis      + US / + TPO = 57   • Dental disorder     upper plate denture   • Diabetes mellitus (CMS-HCC)    • Gout    • Hammertoe    • High cholesterol    • Hypertension    • Hypothyroidism     chronic thyroiditis   • Pain     everyplace   • Sleep apnea     just diagnosed - no CPAP yet - ordered (8/2017)   • Snoring    • Tonsillitis        Past Surgical History:   Procedure Laterality Date   • TOE FUSION Left 8/21/2017    Procedure: TOE FUSION - 1ST MTP;  Surgeon: Haroldo Kang M.D.;  Location: SURGERY [facility];  Service:    • METATARSAL HEAD RESECTION Left 8/21/2017     Procedure: METATARSAL HEAD RESECTION - 2-5;  Surgeon: Haroldo Kang M.D.;  Location: SURGERY [facility];  Service:    • HARDWARE REMOVAL ORTHO Left 8/21/2017    Procedure: HARDWARE REMOVAL ORTHO;  Surgeon: Haroldo Kang M.D.;  Location: SURGERY [facility];  Service:    • LUMBAR FUSION POSTERIOR  4/14/2017    Procedure: LUMBAR FUSION POSTERIOR - MINIMALLY INVASIVE TLIF L4-5;  Surgeon: Bossman Caro M.D.;  Location: SURGERY [facility];  Service:    • HAMMERTOE CORRECTION Bilateral 12/22/2016    Procedure: HAMMERTOE CORRECTION/METATARSALPHALANGEAL JOINT RELEASE 2/3 RIGHT, 2-3 LEFT;  Surgeon: Haroldo Kang M.D.;  Location: SURGERY [facility];  Service:    • BUNIONECTOMY Left 12/22/2016    Procedure: BUNIONECTOMY FOR DISTAL HALLUX VALGUS CORRECTION WITH BRANDY;  Surgeon: Haroldo Kang M.D.;  Location: SURGERY [facility];  Service:    • ORTHOPEDIC OSTEOTOMY Left 12/22/2016    Procedure: ORTHOPEDIC OSTEOTOMY DISTAL METATARSAL 2-5;  Surgeon: Haroldo Kang M.D.;  Location: SURGERY [facility];  Service:    • HIP ARTH ANTERIOR TOTAL Left 8/18/2016    Procedure: HIP ARTHROPLASTY ANTERIOR TOTAL;  Surgeon: Emiliano Vang M.D.;  Location: [facility];  Service:    • OTHER ORTHOPEDIC SURGERY  6/2014    right shoulder  arthroplasty   • OTHER ORTHOPEDIC SURGERY  11/1/2012    left knee/left ankle/left shoulder   • OTHER ORTHOPEDIC SURGERY  2004    elbow surgery   • OTHER  2000    feet surgery at [facility]   • OTHER      septolasty       Family History   Problem Relation Age of Onset   • Heart Disease Mother    • Depression Mother    • Cancer Father    • Sleep Apnea Neg Hx        Social History     Social History   • Marital status:      Spouse name: N/A   • Number of children: N/A   • Years of education: N/A     Occupational History   • Not on file.     Social History Main Topics   • Smoking status: Former Smoker     Packs/day: 1.00     Years: 20.00     Types: Cigarettes  "    Quit date: 1/1/2012   • Smokeless tobacco: Former User     Types: Chew     Quit date: 1/1/1997   • Alcohol use Yes      Comment: 1-2 per month   • Drug use: No   • Sexual activity: Not on file     Other Topics Concern   • Not on file     Social History Narrative   • No narrative on file       Allergies as of 08/28/2017 - Reviewed 08/28/2017   Allergen Reaction Noted   • Demerol Vomiting and Nausea 09/05/2014   • Septra [bactrim ds] Hives 09/05/2014        @Vital signs for this encounter:  Vitals:    08/28/17 1501   Height: 1.778 m (5' 10\")   Weight: 118.8 kg (262 lb)   Weight % change since last entry.: 0 %   BP: 118/78   Pulse: 99   BMI (Calculated): 37.59   Resp: 16   O2 sat % room air: 95 %       Current medications as of today   Current Outpatient Prescriptions   Medication Sig Dispense Refill   • tamsulosin (FLOMAX) 0.4 MG capsule Take 0.4 mg by mouth ONE-HALF HOUR AFTER BREAKFAST.     • Diclofenac Sodium (PENNSAID TD) Apply 1 Application to skin as directed 1 time daily as needed.     • levothyroxine (SYNTHROID) 100 MCG Tab Take 1 Tab by mouth Every morning on an empty stomach. 30 Tab 6   • liraglutide (VICTOZA) 18 MG/3ML Solution Pen-injector injection Inject 1.8 mg as instructed every morning.     • clotrimazole (LOTRIMIN) 1 % Cream APPLY TO AFFECTED AREA EVERY 12 HOURS  3   • polyethylene glycol 3350 (MIRALAX) Powder TAKE 1 CAPFUL WITH ANY LIQUID BY MOUTH ONCE A DAY  5   • temazepam (RESTORIL) 30 MG capsule Take 30 mg by mouth at bedtime as needed for Sleep.     • venlafaxine XR (EFFEXOR XR) 75 MG CAPSULE SR 24 HR Take 75 mg by mouth every morning.     • lamotrigine (LAMICTAL) 100 MG Tab Take 100 mg by mouth every morning.     • hydrOXYzine (ATARAX) 50 MG Tab Take 100 mg by mouth 2 Times a Day.     • alprazolam (XANAX) 0.25 MG Tab Take 0.25-0.5 mg by mouth 4 times a day as needed for Sleep.     • losartan (COZAAR) 100 MG Tab Take 100 mg by mouth every morning.     • oxycodone-acetaminophen (PERCOCET) " 5-325 MG TABS Take 1-2 Tabs by mouth every four hours as needed.     • PENNSAID 2 % Solution   5     No current facility-administered medications for this visit.          Physical Exam:   Gen:           Alert and oriented, No apparent distress. Mood and affect appropriate, normal interaction with examiner.  Eyes:          PERRL, EOM intact, sclere white, conjunctive moist.  Ears:          Not examined.   Hearing:     Grossly intact.  Nose:          Normal, no lesions or deformities.  Dentition:    Good dentition.  Oropharynx:   Tongue normal, posterior pharynx without erythema or exudate.  Mallampati Classification: not examined.  Neck:        Supple, trachea midline, no masses.  Respiratory Effort: No intercostal retractions or use of accessory muscles.   Lung Auscultation:      Clear to auscultation bilaterally; no rales, rhonchi or wheezing.  CV:            Regular rate and rhythm. No murmurs, rubs or gallops.  Abd:           Not examined.  Lymphadenopathy: Not examined.  Gait and Station: Uses walker.  Digits and Nails: No clubbing, cyanosis, petechiae, or nodes.   Cranial Nerves: II-XII grossly intact.  Skin:        No rashes, lesions or ulcers noted.               Ext:           No cyanosis or edema. LLE in boot.      Assessment:  1. JULIAN (obstructive sleep apnea)  DME CPAP   2. Essential hypertension     3. Type 2 diabetes mellitus without complication, without long-term current use of insulin (CMS-HCC)     4. Severe obesity (BMI 35.0-39.9) (CMS-HCC)  HEIGHT AND WEIGHT   5. Other chronic pain     6. Chronic narcotic use         Immunizations:    Flu:2016  Pneumovax 23:not given  Prevnar 13: not given    Plan:  1. DME order start Autocpap 12-16cm h20 nightly. Patient understands they may have mask fits within first 30 days of therapy covered by insurance to obtain best fit.  Patient understands the need to use device every night for >4hrs to meet compliance standards for insurance purposes.  2. Discussed sleep  hygiene.  3. Encouraged weight loss through dietary changes.  4. Follow up in 6-8 weeks with compliance card with APRN, sooner if needed.

## 2017-09-01 ENCOUNTER — HOSPITAL ENCOUNTER (OUTPATIENT)
Dept: LAB | Facility: MEDICAL CENTER | Age: 57
End: 2017-09-01
Attending: INTERNAL MEDICINE
Payer: COMMERCIAL

## 2017-09-01 ENCOUNTER — HOSPITAL ENCOUNTER (OUTPATIENT)
Facility: MEDICAL CENTER | Age: 57
End: 2017-09-01
Attending: INTERNAL MEDICINE
Payer: COMMERCIAL

## 2017-09-01 LAB
EST. AVERAGE GLUCOSE BLD GHB EST-MCNC: 128 MG/DL
HBA1C MFR BLD: 6.1 % (ref 0–5.6)
T4 FREE SERPL-MCNC: 0.78 NG/DL (ref 0.53–1.43)
TSH SERPL DL<=0.005 MIU/L-ACNC: 2.94 UIU/ML (ref 0.3–3.7)

## 2017-09-01 PROCEDURE — 84443 ASSAY THYROID STIM HORMONE: CPT

## 2017-09-01 PROCEDURE — 36415 COLL VENOUS BLD VENIPUNCTURE: CPT

## 2017-09-01 PROCEDURE — 84439 ASSAY OF FREE THYROXINE: CPT

## 2017-09-01 PROCEDURE — 83036 HEMOGLOBIN GLYCOSYLATED A1C: CPT

## 2017-09-07 DIAGNOSIS — E06.3 HYPOTHYROIDISM, ACQUIRED, AUTOIMMUNE: ICD-10-CM

## 2017-09-07 RX ORDER — LEVOTHYROXINE SODIUM 112 UG/1
112 TABLET ORAL
Qty: 60 TAB | Refills: 3 | Status: SHIPPED | OUTPATIENT
Start: 2017-09-07 | End: 2018-05-02

## 2017-09-07 NOTE — PROGRESS NOTES
Bone density results are very favorable. In particular, the spine bone density increased by 10.7% which is considered very significant. This brings the density up to a level that is almost out of the osteoporosis category. One more year and it might be.    The hips did not improve as much. The left hip improved more than the right and I cannot tell you why but we see these differences regularly. The left femoral neck (this is the part of the hip bone that fractures when you fall) improved by 5.8%. The computer did not william this as a significant change but anything over 3% is significant. The right hip improved by 1.6% which is significant in that it did not diminish. So that is a net positive result.  Thyroid levels are still not optimal. We will increase the dose up to 112 µg per day. I will put a new order in your pharmacy [facility]. You should make an appointment to see me in October and do another thyroid blood level prior to that visit.      Florentin Weiss M.D.

## 2017-09-12 ENCOUNTER — HOSPITAL ENCOUNTER (OUTPATIENT)
Dept: RADIOLOGY | Facility: MEDICAL CENTER | Age: 57
End: 2017-09-12
Attending: ORTHOPAEDIC SURGERY
Payer: COMMERCIAL

## 2017-09-12 DIAGNOSIS — M54.5 LOW BACK PAIN, UNSPECIFIED BACK PAIN LATERALITY, UNSPECIFIED CHRONICITY, WITH SCIATICA PRESENCE UNSPECIFIED: ICD-10-CM

## 2017-09-12 DIAGNOSIS — M51.36 DEGENERATION OF LUMBAR INTERVERTEBRAL DISC: ICD-10-CM

## 2017-09-12 PROCEDURE — 72148 MRI LUMBAR SPINE W/O DYE: CPT

## 2017-10-05 ENCOUNTER — APPOINTMENT (OUTPATIENT)
Dept: SOCIAL WORK | Facility: CLINIC | Age: 57
End: 2017-10-05

## 2017-10-05 PROCEDURE — 90686 IIV4 VACC NO PRSV 0.5 ML IM: CPT | Performed by: REGISTERED NURSE

## 2017-10-16 ENCOUNTER — HOSPITAL ENCOUNTER (OUTPATIENT)
Dept: RADIOLOGY | Facility: MEDICAL CENTER | Age: 57
End: 2017-10-16
Attending: FAMILY MEDICINE
Payer: COMMERCIAL

## 2017-10-16 DIAGNOSIS — M25.512 LEFT SHOULDER PAIN, UNSPECIFIED CHRONICITY: ICD-10-CM

## 2017-10-16 PROCEDURE — 73221 MRI JOINT UPR EXTREM W/O DYE: CPT | Mod: LT

## 2017-11-01 ENCOUNTER — SLEEP CENTER VISIT (OUTPATIENT)
Dept: SLEEP MEDICINE | Facility: MEDICAL CENTER | Age: 57
End: 2017-11-01
Payer: COMMERCIAL

## 2017-11-01 VITALS
RESPIRATION RATE: 16 BRPM | SYSTOLIC BLOOD PRESSURE: 128 MMHG | BODY MASS INDEX: 38.22 KG/M2 | HEIGHT: 70 IN | HEART RATE: 88 BPM | OXYGEN SATURATION: 95 % | DIASTOLIC BLOOD PRESSURE: 76 MMHG | WEIGHT: 267 LBS

## 2017-11-01 DIAGNOSIS — G47.33 OSA (OBSTRUCTIVE SLEEP APNEA): ICD-10-CM

## 2017-11-01 PROCEDURE — 99213 OFFICE O/P EST LOW 20 MIN: CPT | Performed by: NURSE PRACTITIONER

## 2017-11-01 RX ORDER — AMOXICILLIN 500 MG/1
CAPSULE ORAL
Refills: 3 | COMMUNITY
Start: 2017-10-12 | End: 2017-11-03

## 2017-11-01 RX ORDER — HYDROXYZINE PAMOATE 50 MG/1
CAPSULE ORAL
Refills: 1 | COMMUNITY
Start: 2017-10-08 | End: 2017-11-03

## 2017-11-01 RX ORDER — TEMAZEPAM 15 MG/1
15 CAPSULE ORAL
COMMUNITY
End: 2017-11-03

## 2017-11-01 RX ORDER — ESOMEPRAZOLE MAGNESIUM 40 MG/1
40 GRANULE, DELAYED RELEASE ORAL
COMMUNITY
End: 2017-11-03

## 2017-11-01 RX ORDER — DOXYCYCLINE HYCLATE 100 MG
TABLET ORAL
Refills: 0 | COMMUNITY
Start: 2017-09-19 | End: 2017-11-03

## 2017-11-01 RX ORDER — FLUCONAZOLE 150 MG/1
150 TABLET ORAL
Refills: 0 | COMMUNITY
Start: 2017-10-05 | End: 2017-11-03

## 2017-11-01 RX ORDER — NAPROXEN 500 MG/1
500 TABLET ORAL
COMMUNITY
End: 2017-11-03

## 2017-11-01 RX ORDER — CEPHALEXIN 500 MG/1
CAPSULE ORAL
Refills: 0 | COMMUNITY
Start: 2017-08-17 | End: 2017-11-03

## 2017-11-01 RX ORDER — OXYCODONE HYDROCHLORIDE AND ACETAMINOPHEN 5; 325 MG/1; MG/1
1 TABLET ORAL
COMMUNITY
End: 2017-11-03

## 2017-11-01 RX ORDER — LAMOTRIGINE 200 MG/1
200 TABLET ORAL
Refills: 0 | COMMUNITY
Start: 2017-10-09 | End: 2018-03-16

## 2017-11-01 NOTE — PROGRESS NOTES
CC:  Here for f/u sleep issues as listed below    HPI:   Cristino presents today for follow up obstructive sleep apnea with sleep study results.  PSG from 6/2017 indicated an AHI of 6.1 that increases to 16.6 while supine and low oxygen of 85%. PLMS index of 33.2. He required titration study in which she did best at a CPAP pressure of 14.  Currently he is being treated with autoCPAP @ [id number].  Compliance download from the dates 10/2/2017 - 10/31/2017 indicates he is wearing the device 97% for an avg of 6 hours and 39 minutes per night with a reduced AHI of 3.2. Average pressure is 12.1 with a high of 14.6. He does tolerate pressure and mask well.  He wakes up refreshed and is less tired throughout the day and does not nap. They deny morning H/A. He sleeps better overall. He continues to take temazepam nightly but will start to decrease his dosage because he has no trouble falling asleep now and staying asleep. He will continue to clean supplies weekly and change them as insurance allows.        Patient Active Problem List    Diagnosis Date Noted   • BMI 38.0-38.9,adult 11/01/2017   • Chronic pain 08/28/2017   • Chronic narcotic use 08/28/2017   • Arthritis of left ankle 08/21/2017   • Degeneration of lumbar intervertebral disc 04/14/2017   • Lower back pain 04/14/2017   • JULIAN (obstructive sleep apnea) 04/03/2017   • Snoring 04/03/2017   • Acquired hallux valgus of left foot 12/22/2016   • Type 2 diabetes mellitus without complication (CMS-HCC) 12/13/2016   • Chronic autoimmune thyroiditis 10/03/2016   • Hypothyroidism, acquired, autoimmune 10/03/2016   • Primary osteoarthritis of left hip 08/18/2016   • Diabetes (CMS-HCC) 09/05/2014   • Lumbar disc disease 09/05/2014   • Gout 09/05/2014   • Hypertension 09/05/2014   • H/O arthroscopy of knee 09/05/2014   • Hx of elbow surgery 09/05/2014   • Severe obesity (BMI 35.0-39.9) (CMS-HCC) 09/05/2014   • Osteoarthritis 09/05/2014   • H/O shoulder replacement 09/05/2014   •  Rotator cuff arthropathy 09/05/2014   • Bilateral bunions 09/05/2014       Past Medical History:   Diagnosis Date   • Anesthesia     post op nausea and vomiting   • Anxiety and depression     depression/anxiety   • Arthritis    • Bunion    • Chronic autoimmune thyroiditis      + US / + TPO = 57   • Dental disorder     upper plate denture   • Diabetes mellitus (CMS-[facility])    • Gout    • Hammertoe    • High cholesterol    • Hypertension    • Hypothyroidism     chronic thyroiditis   • Pain     everyplace   • Sleep apnea     just diagnosed - no CPAP yet - ordered (8/2017)   • Snoring    • Tonsillitis        Past Surgical History:   Procedure Laterality Date   • TOE FUSION Left 8/21/2017    Procedure: TOE FUSION - 1ST MTP;  Surgeon: Haroldo Kang M.D.;  Location: SURGERY [facility];  Service:    • METATARSAL HEAD RESECTION Left 8/21/2017    Procedure: METATARSAL HEAD RESECTION - 2-5;  Surgeon: Haroldo Kang M.D.;  Location: SURGERY [facility];  Service:    • HARDWARE REMOVAL ORTHO Left 8/21/2017    Procedure: HARDWARE REMOVAL ORTHO;  Surgeon: Haroldo Kang M.D.;  Location: SURGERY [facility];  Service:    • LUMBAR FUSION POSTERIOR  4/14/2017    Procedure: LUMBAR FUSION POSTERIOR - MINIMALLY INVASIVE TLIF L4-5;  Surgeon: Bossman Caro M.D.;  Location: SURGERY [facility];  Service:    • HAMMERTOE CORRECTION Bilateral 12/22/2016    Procedure: HAMMERTOE CORRECTION/METATARSALPHALANGEAL JOINT RELEASE 2/3 RIGHT, 2-3 LEFT;  Surgeon: Haroldo Kang M.D.;  Location: SURGERY [facility];  Service:    • BUNIONECTOMY Left 12/22/2016    Procedure: BUNIONECTOMY FOR DISTAL HALLUX VALGUS CORRECTION WITH BRANDY;  Surgeon: Harlodo Kang M.D.;  Location: SURGERY [facility];  Service:    • ORTHOPEDIC OSTEOTOMY Left 12/22/2016    Procedure: ORTHOPEDIC OSTEOTOMY DISTAL METATARSAL 2-5;  Surgeon: Haroldo Kang M.D.;  Location: SURGERY [facility];  Service:    • HIP ARTH ANTERIOR TOTAL Left  8/18/2016    Procedure: HIP ARTHROPLASTY ANTERIOR TOTAL;  Surgeon: Emiliano Vang M.D.;  Location: SURGERY [facility];  Service:    • OTHER ORTHOPEDIC SURGERY  6/2014    right shoulder  arthroplasty   • OTHER ORTHOPEDIC SURGERY  11/1/2012    left knee/left ankle/left shoulder   • OTHER ORTHOPEDIC SURGERY  2004    elbow surgery   • OTHER  2000    feet surgery at [facility]   • OTHER      septolasty       Family History   Problem Relation Age of Onset   • Heart Disease Mother    • Depression Mother    • Cancer Father    • Sleep Apnea Neg Hx        Social History     Social History   • Marital status:      Spouse name: N/A   • Number of children: N/A   • Years of education: N/A     Occupational History   • Not on file.     Social History Main Topics   • Smoking status: Former Smoker     Packs/day: 1.00     Years: 20.00     Types: Cigarettes     Quit date: 1/1/2012   • Smokeless tobacco: Former User     Types: Chew     Quit date: 1/1/1997   • Alcohol use Yes      Comment: 1-2 per month   • Drug use: No   • Sexual activity: Not on file     Other Topics Concern   • Not on file     Social History Narrative   • No narrative on file       Current Outpatient Prescriptions   Medication Sig Dispense Refill   • lamotrigine (LAMICTAL) 200 MG tablet Take 200 mg by mouth every day. TAKE 1 TAB BY MOUTH EVERY DAY.  0   • hydrOXYzine pamoate (VISTARIL) 50 MG Cap TAKE 2 CAPS BY MOUTH TWICE A DAY  1   • cephALEXin (KEFLEX) 500 MG Cap TAKE ONE CAPSULE BY MOUTH EVERY 6 HOURS FOR 3 DAYS  0   • levothyroxine (SYNTHROID) 112 MCG Tab Take 1 Tab by mouth Every morning on an empty stomach. 60 Tab 3   • PENNSAID 2 % Solution   5   • Diclofenac Sodium (PENNSAID TD) Apply 1 Application to skin as directed 1 time daily as needed.     • liraglutide (VICTOZA) 18 MG/3ML Solution Pen-injector injection Inject 1.8 mg as instructed every morning.     • clotrimazole (LOTRIMIN) 1 % Cream APPLY TO AFFECTED AREA EVERY 12 HOURS  3   • temazepam  (RESTORIL) 30 MG capsule Take 30 mg by mouth at bedtime as needed for Sleep.     • venlafaxine XR (EFFEXOR XR) 75 MG CAPSULE SR 24 HR Take 75 mg by mouth every morning.     • hydrOXYzine (ATARAX) 50 MG Tab Take 100 mg by mouth 2 Times a Day.     • alprazolam (XANAX) 0.25 MG Tab Take 0.25-0.5 mg by mouth 4 times a day as needed for Sleep.     • losartan (COZAAR) 100 MG Tab Take 100 mg by mouth every morning.     • oxycodone-acetaminophen (PERCOCET) 5-325 MG TABS Take 1-2 Tabs by mouth every four hours as needed.     • esomeprazole magnesium (NEXIUM) 40 MG Pack Take 40 mg by mouth.     • naproxen (NAPROSYN) 500 MG Tab Take 500 mg by mouth.     • sitagliptin (JANUVIA) 100 MG Tab Take 100 mg by mouth.     • oxycodone-acetaminophen (PERCOCET) 5-325 MG Tab Take 1 tablet by mouth.     • temazepam (RESTORIL) 15 MG Cap Take 15 mg by mouth.     • ONE TOUCH ULTRA TEST strip TEST DAILY  10   • fluconazole (DIFLUCAN) 150 MG tablet Take 150 mg by mouth every day.  0   • doxycycline (VIBRAMYCIN) 100 MG Tab TAKE 2 TABLETS BY MOUTH FOR THE FIRST DOSE THEN 1 TABLET EVERY 12 HOURS FOR 10 DAYS  0   • amoxicillin (AMOXIL) 500 MG Cap TAKE 4 CAPS 1 HR PRIOR TO DENTAL PROCEDURE  3   • tamsulosin (FLOMAX) 0.4 MG capsule Take 0.4 mg by mouth ONE-HALF HOUR AFTER BREAKFAST.     • polyethylene glycol 3350 (MIRALAX) Powder TAKE 1 CAPFUL WITH ANY LIQUID BY MOUTH ONCE A DAY  5   • lamotrigine (LAMICTAL) 100 MG Tab Take 100 mg by mouth every morning.       No current facility-administered medications for this visit.           Allergies: Demerol and Septra [bactrim ds]      ROS   Gen: Denies fever, chills, unintentional weight loss, fatigue  Resp:Denies Dyspnea  CV: Denies chest pain, chest tightness  Sleep:Denies morning headache, insomnia, daytime somnolence, snoring, gasping for air, apnea  Neuro: Denies frequent headaches, weakness, dizziness  See HPI.  All other systems reviewed and negative        Vital signs for this encounter:  Vitals:     "11/01/17 1018   Height: 1.778 m (5' 10\")   Weight: 121.1 kg (267 lb)   Weight % change since last entry.: 0 %   BP: 128/76   Pulse: 88   BMI (Calculated): 38.31   Resp: 16   O2 sat % room air: 95 %                   Physical Exam:   Gen:         Alert and oriented, No apparent distress.   Neck:        No Lymphadenopathy.  Lungs:     Clear to auscultation bilaterally.    CV:          Regular rate and rhythm. No murmurs, rubs or gallops.   Abd:         Soft non tender, non distended.            Ext:          No clubbing, cyanosis, edema.    Assessment   1. JULIAN (obstructive sleep apnea)     2. BMI 38.0-38.9,adult         PLAN:   Patient Instructions   1) Continue autoCPAP at [id number]  2) Clean mask and supplies weekly and change them as insurance allows  3) Vaccines: Up to date with flu  4) Return in about 3 months (around 2/1/2018) for Compliance, review of symptoms, if not sooner, follow up with BONNIE Thomas.        "

## 2017-11-01 NOTE — PATIENT INSTRUCTIONS
1) Continue autoCPAP at [id number]  2) Clean mask and supplies weekly and change them as insurance allows  3) Vaccines: Up to date with flu  4) Return in about 3 months (around 2/1/2018) for Compliance, review of symptoms, if not sooner, follow up with BONNIE Thomas.

## 2017-11-02 ENCOUNTER — NON-PROVIDER VISIT (OUTPATIENT)
Dept: WOUND CARE | Facility: MEDICAL CENTER | Age: 57
End: 2017-11-02
Attending: NURSE PRACTITIONER
Payer: COMMERCIAL

## 2017-11-02 PROCEDURE — 11055 PARING/CUTG B9 HYPRKER LES 1: CPT

## 2017-11-02 PROCEDURE — 11055 PARING/CUTG B9 HYPRKER LES 1: CPT | Mod: XS

## 2017-11-02 PROCEDURE — 11721 DEBRIDE NAIL 6 OR MORE: CPT | Mod: XS

## 2017-11-02 PROCEDURE — 11721 DEBRIDE NAIL 6 OR MORE: CPT

## 2017-11-02 NOTE — CERTIFICATION
"[facility]    [address]    [address]    [place] [address]    [phone] Fax: [phone]    Foot and Nail Encounter 8/15/2017   9/6/2017 - 12/06/2017      Referring Physician: LEATHA Lima  Primary Physician: Kevin Chavez MD  Consulting Physicians:   Start of Care: 7/14/2016      Subjective:       HPI:  Pt states he will have Surgery with Dr. Kang on Left foot on August 21, 2017 for bunionectomy revision and possilbe joint revision of Lt 3rd/4th toe?   56 year old male living with wife and children. HX of dislocated toes 3-4 years ago while walking, had toes \"fixed,\" but \"it didn't work.\"     12/22/2016 PROCEDURES with Dr Kang:    1.  Left modified Arndt bunionectomy.  2.  Left distal metatarsal osteotomy.  3.  Left distal metatarsal osteotomy of the hallux.  4.  Left distal metatarsal osteotomy, 2, 3, 4, and 5.  5.  Left flexor digitorum longus transfer with a flexor to extensor transfer    of 2 and 3.  6.  Left soft tissue reconstruction, metatarsophalangeal joints 2, 3, 4, and    5.  7.  Left hammertoe correction with proximal interphalangeal arthroplasty, 2    and 3.  8.  Right metatarsophalangeal joint capsulotomy of soft tissue release, 2 and    3.  9.  Right hammertoe correction with proximal interphalangeal arthroplasty, 2    and 3.      History of foot ulceration(s): Yes____ No___x__ Location:     History of foot surgery: Yes__x__ No____Describe:______see above;   ______________________________________________      Employed: Y ___ N __x_ Job description: ____disability_____________      Current Residence: ___lives with wife and kids______  home    Pain: pt denies foot pain presently    Past Medical History:   Past Medical History:   Diagnosis Date   • Anesthesia     post op nausea and vomiting   • Anxiety and depression     depression/anxiety   • Arthritis    • Bunion    • Chronic autoimmune thyroiditis      + US / + TPO = 57   • Dental disorder     upper plate denture "   • Diabetes mellitus (CMS-HCC)    • Gout    • Hammertoe    • High cholesterol    • Hypertension    • Hypothyroidism     chronic thyroiditis   • Pain     everyplace   • Sleep apnea     just diagnosed - no CPAP yet - ordered (8/2017)   • Snoring    • Tonsillitis          Current Medications:   Current Outpatient Prescriptions:   •  esomeprazole magnesium (NEXIUM) 40 MG Pack, Take 40 mg by mouth., Disp: , Rfl:   •  naproxen (NAPROSYN) 500 MG Tab, Take 500 mg by mouth., Disp: , Rfl:   •  sitagliptin (JANUVIA) 100 MG Tab, Take 100 mg by mouth., Disp: , Rfl:   •  oxycodone-acetaminophen (PERCOCET) 5-325 MG Tab, Take 1 tablet by mouth., Disp: , Rfl:   •  temazepam (RESTORIL) 15 MG Cap, Take 15 mg by mouth., Disp: , Rfl:   •  lamotrigine (LAMICTAL) 200 MG tablet, Take 200 mg by mouth every day. TAKE 1 TAB BY MOUTH EVERY DAY., Disp: , Rfl: 0  •  hydrOXYzine pamoate (VISTARIL) 50 MG Cap, TAKE 2 CAPS BY MOUTH TWICE A DAY, Disp: , Rfl: 1  •  ONE TOUCH ULTRA TEST strip, TEST DAILY, Disp: , Rfl: 10  •  fluconazole (DIFLUCAN) 150 MG tablet, Take 150 mg by mouth every day., Disp: , Rfl: 0  •  doxycycline (VIBRAMYCIN) 100 MG Tab, TAKE 2 TABLETS BY MOUTH FOR THE FIRST DOSE THEN 1 TABLET EVERY 12 HOURS FOR 10 DAYS, Disp: , Rfl: 0  •  cephALEXin (KEFLEX) 500 MG Cap, TAKE ONE CAPSULE BY MOUTH EVERY 6 HOURS FOR 3 DAYS, Disp: , Rfl: 0  •  amoxicillin (AMOXIL) 500 MG Cap, TAKE 4 CAPS 1 HR PRIOR TO DENTAL PROCEDURE, Disp: , Rfl: 3  •  levothyroxine (SYNTHROID) 112 MCG Tab, Take 1 Tab by mouth Every morning on an empty stomach., Disp: 60 Tab, Rfl: 3  •  PENNSAID 2 % Solution, , Disp: , Rfl: 5  •  tamsulosin (FLOMAX) 0.4 MG capsule, Take 0.4 mg by mouth ONE-HALF HOUR AFTER BREAKFAST., Disp: , Rfl:   •  Diclofenac Sodium (PENNSAID TD), Apply 1 Application to skin as directed 1 time daily as needed., Disp: , Rfl:   •  liraglutide (VICTOZA) 18 MG/3ML Solution Pen-injector injection, Inject 1.8 mg as instructed every morning., Disp: , Rfl:   •   clotrimazole (LOTRIMIN) 1 % Cream, APPLY TO AFFECTED AREA EVERY 12 HOURS, Disp: , Rfl: 3  •  polyethylene glycol 3350 (MIRALAX) Powder, TAKE 1 CAPFUL WITH ANY LIQUID BY MOUTH ONCE A DAY, Disp: , Rfl: 5  •  temazepam (RESTORIL) 30 MG capsule, Take 30 mg by mouth at bedtime as needed for Sleep., Disp: , Rfl:   •  venlafaxine XR (EFFEXOR XR) 75 MG CAPSULE SR 24 HR, Take 75 mg by mouth every morning., Disp: , Rfl:   •  lamotrigine (LAMICTAL) 100 MG Tab, Take 100 mg by mouth every morning., Disp: , Rfl:   •  hydrOXYzine (ATARAX) 50 MG Tab, Take 100 mg by mouth 2 Times a Day., Disp: , Rfl:   •  alprazolam (XANAX) 0.25 MG Tab, Take 0.25-0.5 mg by mouth 4 times a day as needed for Sleep., Disp: , Rfl:   •  losartan (COZAAR) 100 MG Tab, Take 100 mg by mouth every morning., Disp: , Rfl:   •  oxycodone-acetaminophen (PERCOCET) 5-325 MG TABS, Take 1-2 Tabs by mouth every four hours as needed., Disp: , Rfl:     8/15/2017 pt states he was prescribed Pensaid (pain reliever) 2 pumps of liquid for knee, for surgery next Monday  Allergies: Demerol and Septra  Social History:   Social History     Social History   • Marital status:      Spouse name: N/A   • Number of children: N/A   • Years of education: N/A     Occupational History   • Not on file.     Social History Main Topics   • Smoking status: Former Smoker     Packs/day: 1.00     Years: 20.00     Types: Cigarettes     Quit date: 1/1/2012   • Smokeless tobacco: Former User     Types: Chew     Quit date: 1/1/1997   • Alcohol use Yes      Comment: 1-2 per month   • Drug use: No   • Sexual activity: Not on file     Other Topics Concern   • Not on file     Social History Narrative   • No narrative on file       Fall Risk Assessment (william all that apply with an X):completed with pt             Objective:     Tests and Measures:  FBS today 110 per pt . Good resistance strength AIDAN feet dorsiflextion and plantar flexion against clinician's hands. Good ROM of ankles. Denies pain  in calves when walking.     HgbA1C: 5/28/2017 6.8  09/01/2017 6.1    Vascular   R   L       palp palp DP pulse     palp palp PT pulse     yes yes Capillary refill < 3 sec         Deformities    R   L        4 and 5  Hammer/claw toe      x  x Hallux Valgus      great toe, medial; 5th MTH plantar   Prominent Metatarsal Heads         Charcot Foot         Drop Foot         Rocker Bottom         Prior amputation:         Other:  nails missing from lt  1st and  2nd digits, Rt 2nd     dislocation          Clinical appearance/skin    Dryness___moderate_ _________ Swelling_____none_________ Redness______none_______Temperature__warm, hair growth present__ ______    Callus_____ __LT 3rd/4th plantar;  _________________________________    Ulceration_none      Clinical appearance/toenails    Onychomycosis__6/10__ _____________    Ingrown toenails_______    Deformities_______________________________    Other___HX: 1st and 2nd nails brad feet were ablated by podiatrist due to problematic deformities per pt___________________________________        Orthotic, protective, supportive devices: post op shoes    Procedures: Pt's feet were washed with soapy water, then dried. In between toes cleaned with gauze to remove debris. No wounds noted. Cuticle and nailbed margins were established and debris removed from around and beneath toes with a curette. Scalpel to pare down callus, as above. All 6 onychomycotic nails were trimmed with clippers and then ground down smoothed and finished with dremel. Feet and LE were moisturized bilaterally, except between toes. Tea tree oil applied to nail beds.     Patient Education: pt instr s/s infection, when to call MD/go to ER. Instr to moisturize feet and BLE daily with water based moisturizer, except between toes.Instr daily foot checks, report any blisters/open areas and medical attention promptly. Pt instr to make an appt again for 3 months for foot care. Pt with good understanding.     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

## 2017-11-03 DIAGNOSIS — Z01.812 PRE-PROCEDURAL LABORATORY EXAMINATION: ICD-10-CM

## 2017-11-03 DIAGNOSIS — Z01.810 PRE-OPERATIVE CARDIOVASCULAR EXAMINATION: ICD-10-CM

## 2017-11-03 LAB
ANION GAP SERPL CALC-SCNC: 9 MMOL/L (ref 0–11.9)
BUN SERPL-MCNC: 17 MG/DL (ref 8–22)
CALCIUM SERPL-MCNC: 9.8 MG/DL (ref 8.5–10.5)
CHLORIDE SERPL-SCNC: 103 MMOL/L (ref 96–112)
CO2 SERPL-SCNC: 27 MMOL/L (ref 20–33)
CREAT SERPL-MCNC: 0.95 MG/DL (ref 0.5–1.4)
EKG IMPRESSION: NORMAL
GFR SERPL CREATININE-BSD FRML MDRD: >60 ML/MIN/1.73 M 2
GLUCOSE SERPL-MCNC: 82 MG/DL (ref 65–99)
POTASSIUM SERPL-SCNC: 4.3 MMOL/L (ref 3.6–5.5)
SODIUM SERPL-SCNC: 139 MMOL/L (ref 135–145)

## 2017-11-03 PROCEDURE — 93010 ELECTROCARDIOGRAM REPORT: CPT | Performed by: INTERNAL MEDICINE

## 2017-11-03 PROCEDURE — 36415 COLL VENOUS BLD VENIPUNCTURE: CPT

## 2017-11-03 PROCEDURE — 93005 ELECTROCARDIOGRAM TRACING: CPT

## 2017-11-03 PROCEDURE — 80048 BASIC METABOLIC PNL TOTAL CA: CPT

## 2017-11-03 RX ORDER — HYDROXYZINE PAMOATE 100 MG
200 CAPSULE ORAL 2 TIMES DAILY
COMMUNITY
End: 2020-06-18

## 2017-11-09 ENCOUNTER — HOSPITAL ENCOUNTER (OUTPATIENT)
Dept: LAB | Facility: MEDICAL CENTER | Age: 57
End: 2017-11-09
Attending: INTERNAL MEDICINE
Payer: COMMERCIAL

## 2017-11-09 ENCOUNTER — APPOINTMENT (OUTPATIENT)
Dept: LAB | Facility: MEDICAL CENTER | Age: 57
End: 2017-11-09
Payer: COMMERCIAL

## 2017-11-09 DIAGNOSIS — E06.3 HYPOTHYROIDISM, ACQUIRED, AUTOIMMUNE: ICD-10-CM

## 2017-11-09 LAB
T4 FREE SERPL-MCNC: 0.74 NG/DL (ref 0.53–1.43)
TSH SERPL DL<=0.005 MIU/L-ACNC: 1.83 UIU/ML (ref 0.3–3.7)

## 2017-11-09 PROCEDURE — 36415 COLL VENOUS BLD VENIPUNCTURE: CPT

## 2017-11-09 PROCEDURE — 84443 ASSAY THYROID STIM HORMONE: CPT

## 2017-11-09 PROCEDURE — 83036 HEMOGLOBIN GLYCOSYLATED A1C: CPT

## 2017-11-09 PROCEDURE — 84439 ASSAY OF FREE THYROXINE: CPT

## 2017-11-10 LAB
EST. AVERAGE GLUCOSE BLD GHB EST-MCNC: 128 MG/DL
HBA1C MFR BLD: 6.1 % (ref 0–5.6)

## 2017-11-13 ENCOUNTER — HOSPITAL ENCOUNTER (OUTPATIENT)
Facility: MEDICAL CENTER | Age: 57
End: 2017-11-13
Attending: ORTHOPAEDIC SURGERY | Admitting: ORTHOPAEDIC SURGERY
Payer: COMMERCIAL

## 2017-11-13 ENCOUNTER — APPOINTMENT (OUTPATIENT)
Dept: RADIOLOGY | Facility: MEDICAL CENTER | Age: 57
End: 2017-11-13
Attending: ORTHOPAEDIC SURGERY
Payer: COMMERCIAL

## 2017-11-13 VITALS
OXYGEN SATURATION: 95 % | WEIGHT: 264.99 LBS | HEART RATE: 86 BPM | DIASTOLIC BLOOD PRESSURE: 81 MMHG | HEIGHT: 70 IN | SYSTOLIC BLOOD PRESSURE: 133 MMHG | BODY MASS INDEX: 37.94 KG/M2 | TEMPERATURE: 98.1 F | RESPIRATION RATE: 16 BRPM

## 2017-11-13 LAB — GLUCOSE BLD-MCNC: 104 MG/DL (ref 65–99)

## 2017-11-13 PROCEDURE — 501838 HCHG SUTURE GENERAL: Performed by: ORTHOPAEDIC SURGERY

## 2017-11-13 PROCEDURE — A9270 NON-COVERED ITEM OR SERVICE: HCPCS

## 2017-11-13 PROCEDURE — 160046 HCHG PACU - 1ST 60 MINS PHASE II: Performed by: ORTHOPAEDIC SURGERY

## 2017-11-13 PROCEDURE — 160022 HCHG BLOCK: Performed by: ORTHOPAEDIC SURGERY

## 2017-11-13 PROCEDURE — 160041 HCHG SURGERY MINUTES - EA ADDL 1 MIN LEVEL 4: Performed by: ORTHOPAEDIC SURGERY

## 2017-11-13 PROCEDURE — 160035 HCHG PACU - 1ST 60 MINS PHASE I: Performed by: ORTHOPAEDIC SURGERY

## 2017-11-13 PROCEDURE — 160029 HCHG SURGERY MINUTES - 1ST 30 MINS LEVEL 4: Performed by: ORTHOPAEDIC SURGERY

## 2017-11-13 PROCEDURE — 500423 HCHG DRESSING, ABD COMBINE: Performed by: ORTHOPAEDIC SURGERY

## 2017-11-13 PROCEDURE — 160047 HCHG PACU  - EA ADDL 30 MINS PHASE II: Performed by: ORTHOPAEDIC SURGERY

## 2017-11-13 PROCEDURE — 501736 HCHG WIRE, K-5M DBL END: Performed by: ORTHOPAEDIC SURGERY

## 2017-11-13 PROCEDURE — 500881 HCHG PACK, EXTREMITY: Performed by: ORTHOPAEDIC SURGERY

## 2017-11-13 PROCEDURE — 700111 HCHG RX REV CODE 636 W/ 250 OVERRIDE (IP)

## 2017-11-13 PROCEDURE — 502240 HCHG MISC OR SUPPLY RC 0272: Performed by: ORTHOPAEDIC SURGERY

## 2017-11-13 PROCEDURE — 160048 HCHG OR STATISTICAL LEVEL 1-5: Performed by: ORTHOPAEDIC SURGERY

## 2017-11-13 PROCEDURE — 73630 X-RAY EXAM OF FOOT: CPT | Mod: RT

## 2017-11-13 PROCEDURE — 160009 HCHG ANES TIME/MIN: Performed by: ORTHOPAEDIC SURGERY

## 2017-11-13 PROCEDURE — 700102 HCHG RX REV CODE 250 W/ 637 OVERRIDE(OP)

## 2017-11-13 PROCEDURE — A6223 GAUZE >16<=48 NO W/SAL W/O B: HCPCS | Performed by: ORTHOPAEDIC SURGERY

## 2017-11-13 PROCEDURE — 160002 HCHG RECOVERY MINUTES (STAT): Performed by: ORTHOPAEDIC SURGERY

## 2017-11-13 PROCEDURE — 160025 RECOVERY II MINUTES (STATS): Performed by: ORTHOPAEDIC SURGERY

## 2017-11-13 PROCEDURE — 82962 GLUCOSE BLOOD TEST: CPT

## 2017-11-13 PROCEDURE — 502000 HCHG MISC OR IMPLANTS RC 0278: Performed by: ORTHOPAEDIC SURGERY

## 2017-11-13 PROCEDURE — 160036 HCHG PACU - EA ADDL 30 MINS PHASE I: Performed by: ORTHOPAEDIC SURGERY

## 2017-11-13 PROCEDURE — 700101 HCHG RX REV CODE 250

## 2017-11-13 RX ORDER — SODIUM CHLORIDE 9 MG/ML
INJECTION, SOLUTION INTRAVENOUS CONTINUOUS
Status: DISCONTINUED | OUTPATIENT
Start: 2017-11-13 | End: 2017-11-13 | Stop reason: HOSPADM

## 2017-11-13 RX ORDER — OXYCODONE HYDROCHLORIDE AND ACETAMINOPHEN 5; 325 MG/1; MG/1
TABLET ORAL
Status: COMPLETED
Start: 2017-11-13 | End: 2017-11-13

## 2017-11-13 RX ORDER — BUPIVACAINE HYDROCHLORIDE 5 MG/ML
INJECTION, SOLUTION EPIDURAL; INTRACAUDAL
Status: DISCONTINUED | OUTPATIENT
Start: 2017-11-13 | End: 2017-11-13 | Stop reason: HOSPADM

## 2017-11-13 RX ADMIN — SODIUM CHLORIDE: 9 INJECTION, SOLUTION INTRAVENOUS at 08:10

## 2017-11-13 RX ADMIN — FENTANYL CITRATE 50 MCG: 50 INJECTION, SOLUTION INTRAMUSCULAR; INTRAVENOUS at 11:45

## 2017-11-13 RX ADMIN — OXYCODONE AND ACETAMINOPHEN 2 TABLET: 5; 325 TABLET ORAL at 11:00

## 2017-11-13 RX ADMIN — FENTANYL CITRATE 25 MCG: 50 INJECTION, SOLUTION INTRAMUSCULAR; INTRAVENOUS at 11:19

## 2017-11-13 RX ADMIN — FENTANYL CITRATE 25 MCG: 50 INJECTION, SOLUTION INTRAMUSCULAR; INTRAVENOUS at 11:11

## 2017-11-13 ASSESSMENT — PAIN SCALES - GENERAL
PAINLEVEL_OUTOF10: 6
PAINLEVEL_OUTOF10: 3
PAINLEVEL_OUTOF10: 6
PAINLEVEL_OUTOF10: 6
PAINLEVEL_OUTOF10: 5
PAINLEVEL_OUTOF10: 6
PAINLEVEL_OUTOF10: 6

## 2017-11-13 NOTE — OR NURSING
1200pt to PACU 2.  Awake alert.  Family at bedside.  Eating and drinking with no issues.  Pain 3/10.  Ice to foot.  Dc instructions given.    1230 post op shoe placed..  VS stable.  No c/o at this time.  1250 up to dress.  Dc home 1300

## 2017-11-13 NOTE — PROGRESS NOTES
Post op shoe was delivered and fitted to patient LLE.  If any further assistance needed, please call extension [phone] or place order for Ortho Technician assistance as a communication order in EPIC

## 2017-11-13 NOTE — OR NURSING
Pt did well in the recovery room. Pain well managed with ordered pain medication. o2 sats high 90's on room air with occ brief desat in 80's very quick to recover back to high 90's he ais awake and very alert and talkative. Has lots of chronic pains to elbows and r shoulder which he states is hurting more than r foot. Ice pack provided for more comfort to that ice as well as ice to r foot. Toes with good cap refill pins intact elevated foot up on pillows. Tolerated po fluids without nausea. No void yet post op. Report to hazel  In stage 2 and tranferred via gurney bu rn.

## 2017-11-13 NOTE — OP REPORT
DATE OF SERVICE:  11/13/2017    PREOPERATIVE DIAGNOSES:  1.  Right hallux valgus deformity.  2.  Right metatarsalgia 2 through 5.  3.  Right fixed hammertoes 3 through 5.    POSTOPERATIVE DIAGNOSES:  1.  Right hallux valgus deformity.  2.  Right metatarsalgia 2 through 5.  3.  Right fixed hammertoes 3 through 5.    PROCEDURE PERFORMED:  1.  Right first metatarsophalangeal joint fusion.  2.  Right metatarsal head excision, 2 through 5.  3.  Right hammertoe correction 3 through 5 with PIP arthroplasty.    SURGEON:  Haroldo Kang MD    FIRST ASSISTANT:  Rickey Whitfield MD    SECOND ASSISTANT:  Matilda Mattson.    ANESTHESIA:  General endotracheal with popliteal block per my request for   postoperative pain management.    ESTIMATED BLOOD LOSS:  None.    COMPLICATIONS:  None.    POSTOPERATIVE PLAN:  1.  Heel-touch weightbearing.  2.  Perioperative antibiotics.  3.  Follow up in 2 weeks.    INDICATIONS:  The patient is a pleasant 57-year-old male who has had   longstanding problems with his right foot.  He had previous lesser toe   reconstruction beside persistent pain and contractures.  The above diagnoses   made and options were discussed with him including operative and nonoperative.    He elected to undergo operative intervention.  The above procedure was   discussed and all questions were answered.  Risks of surgery explained, which   include but not limited to wound problems, infection, nerve injury, vascular   injury, and need for further surgery.  He understands he could have persistent   risk of his pain and/or deformity, malunion, nonunion, joint stiffness, and   need for further surgery.  He understands and accepts these risks and agrees   to proceed.  His site was marked by myself prior to receiving psychotropic   medicines.    PROCEDURE IN DETAIL:  The patient was given a popliteal block per my request   for postoperative pain management.  He was brought into the operating room and   general  endotracheal anesthesia without complications.  His right lower   extremity was prepped and draped in standard fashion.  Positive site   verification confirmed his right lower extremity as well as the above   procedure and confirmation that he received preoperative antibiotics.  Esmarch   was used to exsanguinate his foot and ankle and leg tourniquet was inflated   to 250 mmHg.  Dorsal incision was made over the first MTPJ.  It was dissected   down, the EHL was identified and protected.  The joint was released exposing   the first MTPJ.  Using the Chester conical reamers, these were ream down into   good subchondral bone on the metatarsal head and proximal aspect of the   proximal phalanx.  The medial eminence was removed with a sagittal saw.  The   toe was then held in slight dorsiflexion, neutral supination plantar flexion   and slight valgus.  It was provisionally held with a K wire.  Using a lid it   was used to identify a slight dorsiflexion of the first MTPJ and the C-arm   imaging was used to show satisfactory axial alignment.  Chester First MTPJ   fusion plate was placed to transfixed with a nonlocking followed by 2 locking   screws into the proximal phalanx followed by compression and manual   compression into the metatarsal followed by nonlocking and 2 locking screws.    Toe had good clinical alignment.  C-arm imaging demonstrates satisfactory   position of internal fixation and alignment of the toe.  A lid was used to   show slight dorsiflexion.  Next, a dorsal incision was made over the second   and fourth webspace.  It was dissected down the extensor tendon of the second   toe was released as proximal as possible.  It was then traced to the level of   the MTPJ.  Soft tissue was released.  The metatarsal head was dorsally   subluxated.  Microsagittal saw was used to remove the metatarsal head and neck   junction.  Metatarsal head was excised.  The exact procedure was performed on   the 3rd, 4th and 5th  toes without deviations or complications.  Next, he had   fixed hammertoes over the 3rd, 4th and 5th toes.  Dorsal incision made over   the PIP joint of the third toe.  It was dissected down.  Microsagittal saw was   used to remove the distal aspect of the proximal phalanx.  The exact   procedure was performed on the 4th and 5th toes without deviations or   complications.  On second toe, a 0.062 K-wire was ran from the proximal aspect   of the proximal phalanx out the tip of the toe and then back into the   metatarsal, to do that the extensor tendon was anchovied around the K-wire.    On the third toe, the K-wire was ran retrograde out through the PIP joint back   into the proximal phalanx and crossed over into the metatarsal.  The extensor   tendon was anchovied as it extended all the way to the proximal aspect of the   proximal phalanx.  The exact procedure was performed on the 4th and 5th toes   including PIP fixation and MTPJ fixation with a 0.062 K-wire.  Final C-arm   imaging demonstrates satisfactory position for internal fixation alignment of   the foot.  Wounds were irrigated with copious irrigation and closed in layered   fashion using 3-0 Vicryl and 3-0 nylon.  Sterile dressings were applied.    Tourniquet was released.  All toes were pink.  He was transferred to recovery   room in good condition.    Utilization of Dr. Whitfield was necessary for patient positioning, holding,   retracting, wound closure, and dressing placement.  He was present throughout   the entire procedure.       ____________________________________     MD SAÚL MOREL / EMILI    DD:  11/13/2017 10:45:41  DT:  11/13/2017 11:34:31    D#:  [id number]  Job#:  [id number]    cc: [facility]

## 2017-11-13 NOTE — DISCHARGE INSTRUCTIONS
ACTIVITY: Rest and take it easy for the first 24 hours.  A responsible adult is recommended to remain with you during that time.  It is normal to feel sleepy.  We encourage you to not do anything that requires balance, judgment or coordination.    MILD FLU-LIKE SYMPTOMS ARE NORMAL. YOU MAY EXPERIENCE GENERALIZED MUSCLE ACHES, THROAT IRRITATION, HEADACHE AND/OR SOME NAUSEA.    FOR 24 HOURS DO NOT:  Drive, operate machinery or run household appliances.  Drink beer or alcoholic beverages.   Make important decisions or sign legal documents.    SPECIAL INSTRUCTIONS: *follow MD instructions**    DIET: To avoid nausea, slowly advance diet as tolerated, avoiding spicy or greasy foods for the first day.  Add more substantial food to your diet according to your physician's instructions.  Babies can be fed formula or breast milk as soon as they are hungry.  INCREASE FLUIDS AND FIBER TO AVOID CONSTIPATION.    SURGICAL DRESSING/BATHING: * keep dressing dry**    FOLLOW-UP APPOINTMENT:  A follow-up appointment should be arranged with your doctor in **in 2 weeks*; call to schedule.    You should CALL YOUR PHYSICIAN if you develop:  Fever greater than 101 degrees F.  Pain not relieved by medication, or persistent nausea or vomiting.  Excessive bleeding (blood soaking through dressing) or unexpected drainage from the wound.  Extreme redness or swelling around the incision site, drainage of pus or foul smelling drainage.  Inability to urinate or empty your bladder within 8 hours.  Problems with breathing or chest pain.    You should call 911 if you develop problems with breathing or chest pain.  If you are unable to contact your doctor or surgical center, you should go to the nearest emergency room or urgent care center.  Physician's telephone #: *[phone]**    If any questions arise, call your doctor.  If your doctor is not available, please feel free to call the Surgical Center at [phone].  The Center is open Monday  through Friday from 7AM to 7PM.  You can also call the HEALTH HOTLINE open 24 hours/day, 7 days/week and speak to a nurse at [phone], or toll free at [phone].    A registered nurse may call you a few days after your surgery to see how you are doing after your procedure.    MEDICATIONS: Resume taking daily medication.  Take prescribed pain medication with food.  If no medication is prescribed, you may take non-aspirin pain medication if needed.  PAIN MEDICATION CAN BE VERY CONSTIPATING.  Take a stool softener or laxative such as senokot, pericolace, or milk of magnesia if needed.    Prescription given for **pt has at home*.  Last pain medication given at *1100, percocet x 2 pills po**.    If your physician has prescribed pain medication that includes Acetaminophen (Tylenol), do not take additional Acetaminophen (Tylenol) while taking the prescribed medication.    Depression / Suicide Risk    As you are discharged from this [facility] facility, it is important to learn how to keep safe from harming yourself.    Recognize the warning signs:  · Abrupt changes in personality, positive or negative- including increase in energy   · Giving away possessions  · Change in eating patterns- significant weight changes-  positive or negative  · Change in sleeping patterns- unable to sleep or sleeping all the time   · Unwillingness or inability to communicate  · Depression  · Unusual sadness, discouragement and loneliness  · Talk of wanting to die  · Neglect of personal appearance   · Rebelliousness- reckless behavior  · Withdrawal from people/activities they love  · Confusion- inability to concentrate     If you or a loved one observes any of these behaviors or has concerns about self-harm, here's what you can do:  · Talk about it- your feelings and reasons for harming yourself  · Remove any means that you might use to hurt yourself (examples: pills, rope, extension cords, firearm)  · Get professional help from  the community (Mental Health, Substance Abuse, psychological counseling)  · Do not be alone:Call your Safe Contact- someone whom you trust who will be there for you.  · Call your local CRISIS HOTLINE 596-7736 or 822-414-1802  · Call your local Children's Mobile Crisis Response Team [place] (066) 902-1950 or www.ContractRoom  · Call the toll free National Suicide Prevention Hotlines   · National Suicide Prevention Lifeline 799-351-ANCD (8349)  · National Hope Line Network 800-SUICIDE (611-1215)

## 2017-11-21 ENCOUNTER — HOSPITAL ENCOUNTER (OUTPATIENT)
Dept: RADIOLOGY | Facility: MEDICAL CENTER | Age: 57
End: 2017-11-21
Attending: FAMILY MEDICINE
Payer: COMMERCIAL

## 2017-11-21 DIAGNOSIS — M25.529 ARTHRALGIA OF UPPER ARM, UNSPECIFIED LATERALITY: ICD-10-CM

## 2017-11-21 DIAGNOSIS — R52 PAIN: ICD-10-CM

## 2017-11-21 DIAGNOSIS — M25.529 ELBOW PAIN, UNSPECIFIED LATERALITY: ICD-10-CM

## 2017-11-21 DIAGNOSIS — M25.512 PAIN OF BOTH SHOULDER JOINTS: ICD-10-CM

## 2017-11-21 DIAGNOSIS — M25.511 PAIN OF BOTH SHOULDER JOINTS: ICD-10-CM

## 2017-11-21 PROCEDURE — 73030 X-RAY EXAM OF SHOULDER: CPT | Mod: RT

## 2017-11-21 PROCEDURE — 73080 X-RAY EXAM OF ELBOW: CPT | Mod: LT

## 2017-11-21 PROCEDURE — 71020 DX-CHEST-2 VIEWS: CPT

## 2017-11-21 PROCEDURE — 73080 X-RAY EXAM OF ELBOW: CPT | Mod: RT

## 2017-11-21 PROCEDURE — 73030 X-RAY EXAM OF SHOULDER: CPT | Mod: LT

## 2018-01-30 ENCOUNTER — NON-PROVIDER VISIT (OUTPATIENT)
Dept: WOUND CARE | Facility: MEDICAL CENTER | Age: 58
End: 2018-01-30
Attending: NURSE PRACTITIONER
Payer: COMMERCIAL

## 2018-01-30 DIAGNOSIS — S91.302A OPEN WOUND OF LEFT FOOT EXCLUDING ONE OR MORE TOES, INITIAL ENCOUNTER: Primary | ICD-10-CM

## 2018-01-30 PROCEDURE — 11721 DEBRIDE NAIL 6 OR MORE: CPT | Mod: XS

## 2018-01-30 PROCEDURE — 11055 PARING/CUTG B9 HYPRKER LES 1: CPT | Mod: XS

## 2018-01-30 PROCEDURE — 11721 DEBRIDE NAIL 6 OR MORE: CPT

## 2018-01-30 PROCEDURE — 99211 OFF/OP EST MAY X REQ PHY/QHP: CPT | Mod: 25

## 2018-01-30 PROCEDURE — 11055 PARING/CUTG B9 HYPRKER LES 1: CPT

## 2018-01-30 NOTE — CERTIFICATION
"[facility]    [address]    [address]    [place] [address]    [phone] Fax: [phone]    Foot and Nail Encounter 8/15/2017   9/6/2017 - 12/06/2017      Referring Physician: LEATHA Lima  Primary Physician: Kevin Chavez MD  Consulting Physicians:   Start of Care: 7/14/2016      Subjective:       HPI:  Pt is a 57 year old gentleman with DM who has attended [facility] in the past, had Surgery with Dr. Kang on Left foot on August 21, 2017 for bunionectomy revision. Referred by Rafia NICOLE due to mycotic, dystrophic nails he is not able to care for himself any longer.    56 year old male living with wife and children. HX of dislocated toes 3-4 years ago while walking, had toes \"fixed,\" but \"it didn't work.\"     12/22/2016 PROCEDURES with Dr Kang:    1.  Left modified Arndt bunionectomy.  2.  Left distal metatarsal osteotomy.  3.  Left distal metatarsal osteotomy of the hallux.  4.  Left distal metatarsal osteotomy, 2, 3, 4, and 5.  5.  Left flexor digitorum longus transfer with a flexor to extensor transfer    of 2 and 3.  6.  Left soft tissue reconstruction, metatarsophalangeal joints 2, 3, 4, and    5.  7.  Left hammertoe correction with proximal interphalangeal arthroplasty, 2    and 3.  8.  Right metatarsophalangeal joint capsulotomy of soft tissue release, 2 and    3.  9.  Right hammertoe correction with proximal interphalangeal arthroplasty, 2    and 3.    Past surgical Hx:   Past Surgical History:   Procedure Laterality Date   • TOE FUSION Right 11/13/2017    Procedure: TOE FUSION - 1ST METATARSALPHALANGEAL;  Surgeon: Haroldo Kang M.D.;  Location: SURGERY [facility];  Service: Orthopedics   • METATARSAL HEAD RESECTION Right 11/13/2017    Procedure: METATARSAL HEAD RESECTION - EXCISION;  Surgeon: Haroldo Kang M.D.;  Location: SURGERY [facility];  Service: Orthopedics   • HAMMERTOE CORRECTION Right 11/13/2017    Procedure: HAMMERTOE CORRECTION 2-5;  Surgeon: " "Haroldo Kang M.D.;  Location: [facility];  Service: Orthopedics   • METATARSAL HEAD RESECTION Left 8/21/2017    Procedure: METATARSAL HEAD RESECTION - 2-5;  Surgeon: Haroldo Kang M.D.;  Location: SURGERY [facility];  Service:    • TOE FUSION Left 8/21/2017    Procedure: TOE FUSION - 1ST MTP;  Surgeon: Haroldo Kang M.D.;  Location: [facility];  Service:    • HARDWARE REMOVAL ORTHO Left 8/21/2017    Procedure: HARDWARE REMOVAL ORTHO;  Surgeon: Haroldo Kang M.D.;  Location: [facility];  Service:    • LUMBAR FUSION POSTERIOR  4/14/2017    Procedure: LUMBAR FUSION POSTERIOR - MINIMALLY INVASIVE TLIF L4-5;  Surgeon: Bossman Caro M.D.;  Location: [facility];  Service:    • HAMMERTOE CORRECTION Bilateral 12/22/2016    Procedure: HAMMERTOE CORRECTION/METATARSALPHALANGEAL JOINT RELEASE 2/3 RIGHT, 2-3 LEFT;  Surgeon: Haroldo Kang M.D.;  Location: [facility];  Service:    • BUNIONECTOMY Left 12/22/2016    Procedure: BUNIONECTOMY FOR DISTAL HALLUX VALGUS CORRECTION WITH BRANDY;  Surgeon: Haroldo Kang M.D.;  Location: [facility];  Service:    • ORTHOPEDIC OSTEOTOMY Left 12/22/2016    Procedure: ORTHOPEDIC OSTEOTOMY DISTAL METATARSAL 2-5;  Surgeon: Haroldo Kang M.D.;  Location: [facility];  Service:    • HIP ARTH ANTERIOR TOTAL Left 8/18/2016    Procedure: HIP ARTHROPLASTY ANTERIOR TOTAL;  Surgeon: Emiliano Vang M.D.;  Location: [facility];  Service:    • OTHER ORTHOPEDIC SURGERY  6/2014    right shoulder  arthroplasty   • OTHER ORTHOPEDIC SURGERY  11/1/2012    left knee/left ankle/left shoulder   • OTHER ORTHOPEDIC SURGERY  2004    elbow surgery   • OTHER  2000    dislocation left foot surgery at [facility]   • OTHER      \"septoplasty 20 years ago\"     History of foot ulceration(s): Yes__x__ No____ Location: today pt comes in with wound to L distal hallux, apparently from friction/pressure from " "his shoe that he has not noticed due to PN/LOPS    History of foot surgery: Yes__x__ No____Describe:______see above;   ______________________________________________      Employed: Y ___ N __x_ Job description: ____disability_____________      Current Residence: ___lives with wife and kids______  home    Pain: pt denies foot pain presently. Taking analgesia for shoulder pain which he says is controlled    Past Medical History:   Past Medical History:   Diagnosis Date   • Anesthesia 11/03/2017    PONV with shoulder surgery approx 20 years ago.   • Anxiety and depression 11/03/2017   • Arthritis 11/03/2017    \"All Over\"   • Bunion    • Chronic autoimmune thyroiditis      + US / + TPO = 57   • Dental disorder 11/03/2017    \"Upper Plate\"   • Diabetes mellitus (CMS-HCC) 11/03/2017    \"Controlled with diet & Victoza\"   • Gout    • Hammertoe    • High cholesterol 11/03/2017    HX OF, not currently on medication for.   • Hypertension    • Hypothyroidism     chronic thyroiditis   • Pain 11/03/2017    \"Pain all over except right hip & ankle\"   • Sleep apnea 11/03/2017    CPAP USE   • Snoring 11/03/2017    DX JULIAN   • Tonsillitis          Current Medications:   Current Outpatient Prescriptions:   •  hydroxyzine pamoate (VISTARIL) 100 MG Cap, Take 200 mg by mouth 2 Times a Day., Disp: , Rfl:   •  lamotrigine (LAMICTAL) 200 MG tablet, Take 200 mg by mouth every day. TAKE 1 TAB BY MOUTH EVERY DAY., Disp: , Rfl: 0  •  levothyroxine (SYNTHROID) 112 MCG Tab, Take 1 Tab by mouth Every morning on an empty stomach., Disp: 60 Tab, Rfl: 3  •  tamsulosin (FLOMAX) 0.4 MG capsule, Take 0.4 mg by mouth ONE-HALF HOUR AFTER BREAKFAST., Disp: , Rfl:   •  liraglutide (VICTOZA) 18 MG/3ML Solution Pen-injector injection, Inject 1.8 mg as instructed every morning., Disp: , Rfl:   •  clotrimazole (LOTRIMIN) 1 % Cream, APPLY TO AFFECTED AREA EVERY 12 HOURS, Disp: , Rfl: 3  •  temazepam (RESTORIL) 30 MG capsule, Take 30 mg by mouth at bedtime as needed " "for Sleep., Disp: , Rfl:   •  venlafaxine XR (EFFEXOR XR) 75 MG CAPSULE SR 24 HR, Take 75 mg by mouth every morning., Disp: , Rfl:   •  alprazolam (XANAX) 0.25 MG Tab, Take 0.25-0.5 mg by mouth 4 times a day as needed for Anxiety., Disp: , Rfl:   •  losartan (COZAAR) 100 MG Tab, Take 100 mg by mouth every morning., Disp: , Rfl:   •  oxycodone-acetaminophen (PERCOCET) 5-325 MG TABS, Take 1-2 Tabs by mouth every four hours as needed., Disp: , Rfl:       Allergies: Demerol and Septra    Social History:   Social History     Social History   • Marital status:      Spouse name: N/A   • Number of children: N/A   • Years of education: N/A     Occupational History   • Not on file.     Social History Main Topics   • Smoking status: Former Smoker     Packs/day: 1.00     Years: 20.00     Types: Cigarettes     Quit date: 1/1/2012   • Smokeless tobacco: Former User     Types: Chew     Quit date: 1/1/1997   • Alcohol use Yes      Comment: \"Few per month\"   • Drug use: No   • Sexual activity: Not on file     Other Topics Concern   • Not on file     Social History Narrative   • No narrative on file       Fall Risk Assessment (william all that apply with an X):completed with pt             Objective:     Tests and Measures:  Pt states his FBS have been consistently <140 . Good resistance strength AIDAN feet dorsiflextion and plantar flexion against clinician's hands. Good ROM of ankles. Denies pain in calves when walking.     HgbA1C: 5/28/2017 6.8  09/01/2017 6.1  11/09/2018 - 6.1      Vascular     R   L       palp palp DP pulse     palp palp PT pulse     yes yes Capillary refill < 3 sec         Deformities    R   L        4 and 5  Hammer/claw toe      x  x Hallux Valgus      great toe, medial; 5th MTH plantar   Prominent Metatarsal Heads         Charcot Foot         Drop Foot         Rocker Bottom         Prior amputation:         Other:  nails missing from lt  1st,  2nd and 3rd digits, Rt 2nd     dislocation          Clinical " appearance/skin    Dryness___minimal _________ Swelling_____none_________ Redness______none_______Temperature__warm, hair growth present__ ______    Callus_____ __LT 3rd/4th plantar;  _________________________________    Ulceration_ - L distal hallux DFU with 80% dry yellow/brown non viable tissue, 20% viable at margins. Measures 1.0x1.3x<0.1cm.      Clinical appearance/toenails    Onychomycosis__6/10__ _____________    Ingrown toenails_______    Deformities_______________________________    Other___HX: 1st and 2nd nails brad feet were ablated by podiatrist due to problematic deformities per pt___________________________________        Orthotic, protective, supportive devices: post op shoes    Procedures: Pt's feet were washed with soapy water, then dried. In between toes cleaned with gauze to remove debris. Cuticle and nailbed margins were established and debris removed from around and beneath toes with a curette. Scalpel to pare down callus, as above. All 6 onychomycotic nails were trimmed with clippers and then ground down smoothed and finished with dremel. Feet and LE were moisturized bilaterally, except between toes. Tea tree oil applied to nail beds. Wound care performed to wound on distal L hallux - cleansed with NSS, skin prep and zinc barrier paste applied to alma rosa wound skin, covered with medihoney colloid, non ad foam, secured with hypafix. Wound care referral made.      Patient Education: pt instr s/s infection, when to call MD/go to ER. Instr to moisturize feet and BLE daily with water based moisturizer, except between toes.Instr daily foot checks, report any blisters/open areas and medical attention promptly. Pt instr to make an appt again for 3 months for foot care, and schedule a wound eval here asap. Instr pt to go to [facility] for revision of L custom shoe to relieve pressure on hallux wound. Pt with good understanding, says he will go.     Professional Collaboration:  Report given to Dez Rhoades  APRN, orders received for wound eval.      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

## 2018-02-01 ENCOUNTER — SLEEP CENTER VISIT (OUTPATIENT)
Dept: SLEEP MEDICINE | Facility: MEDICAL CENTER | Age: 58
End: 2018-02-01
Payer: COMMERCIAL

## 2018-02-01 VITALS
RESPIRATION RATE: 16 BRPM | HEART RATE: 115 BPM | WEIGHT: 263 LBS | TEMPERATURE: 98 F | BODY MASS INDEX: 37.65 KG/M2 | SYSTOLIC BLOOD PRESSURE: 126 MMHG | HEIGHT: 70 IN | OXYGEN SATURATION: 96 % | DIASTOLIC BLOOD PRESSURE: 80 MMHG

## 2018-02-01 DIAGNOSIS — G47.33 OSA (OBSTRUCTIVE SLEEP APNEA): ICD-10-CM

## 2018-02-01 PROCEDURE — 99213 OFFICE O/P EST LOW 20 MIN: CPT | Performed by: NURSE PRACTITIONER

## 2018-02-01 NOTE — PROGRESS NOTES
CC:  Here for f/u sleep issues as listed below    HPI:   Cristino presents today for follow up obstructive sleep apnea with sleep study results.  PSG from 6/2017 indicated an AHI of 6.1 that increases to 16.6 while supine and low oxygen of 85%. PLMS index of 33.2. Currently he is being treated with autoCPAP @ [id number]. Compliance download from the dates 1/3/2018 - 2/1/2018 indicates he is wearing the device 97% for an avg of 7 hours and 50 minutes per night with a reduced AHI of 12.7.  Average pressure is 12.1 with a high of 13.9. He does tolerate pressure and mask well.  He wakes up refreshed and is less tired throughout the day and does not nap. He recently had shoulder surgery and in a sling, not sleeping as well and taking pain medicine, most likely contributing to his elevated AHI. They deny morning H/A. He sleeps better overall. He continues to take temazepam nightly but will start to decrease his dosage because he has no trouble falling asleep now and staying asleep. He will continue to clean supplies weekly and change them as insurance allows.           Patient Active Problem List    Diagnosis Date Noted   • BMI 38.0-38.9,adult 11/01/2017   • Chronic pain 08/28/2017   • Chronic narcotic use 08/28/2017   • Arthritis of left ankle 08/21/2017   • Degeneration of lumbar intervertebral disc 04/14/2017   • Lower back pain 04/14/2017   • JULIAN (obstructive sleep apnea) 04/03/2017   • Snoring 04/03/2017   • Acquired hallux valgus of left foot 12/22/2016   • Type 2 diabetes mellitus without complication (CMS-HCC) 12/13/2016   • Chronic autoimmune thyroiditis 10/03/2016   • Hypothyroidism, acquired, autoimmune 10/03/2016   • Primary osteoarthritis of left hip 08/18/2016   • Diabetes (CMS-HCC) 09/05/2014   • Lumbar disc disease 09/05/2014   • Gout 09/05/2014   • Hypertension 09/05/2014   • H/O arthroscopy of knee 09/05/2014   • Hx of elbow surgery 09/05/2014   • Severe obesity (BMI 35.0-39.9) (CMS-HCC) 09/05/2014   •  "Osteoarthritis 09/05/2014   • H/O shoulder replacement 09/05/2014   • Rotator cuff arthropathy 09/05/2014   • Bilateral bunions 09/05/2014       Past Medical History:   Diagnosis Date   • Anesthesia 11/03/2017    PONV with shoulder surgery approx 20 years ago.   • Anxiety and depression 11/03/2017   • Arthritis 11/03/2017    \"All Over\"   • Bunion    • Chronic autoimmune thyroiditis      + US / + TPO = 57   • Dental disorder 11/03/2017    \"Upper Plate\"   • Diabetes mellitus (CMS-[facility]) 11/03/2017    \"Controlled with diet & Victoza\"   • Gout    • Hammertoe    • High cholesterol 11/03/2017    HX OF, not currently on medication for.   • Hypertension    • Hypothyroidism     chronic thyroiditis   • Pain 11/03/2017    \"Pain all over except right hip & ankle\"   • Sleep apnea 11/03/2017    CPAP USE   • Snoring 11/03/2017    DX JULIAN   • Tonsillitis        Past Surgical History:   Procedure Laterality Date   • TOE FUSION Right 11/13/2017    Procedure: TOE FUSION - 1ST METATARSALPHALANGEAL;  Surgeon: Haroldo Kang M.D.;  Location: [facility];  Service: Orthopedics   • METATARSAL HEAD RESECTION Right 11/13/2017    Procedure: METATARSAL HEAD RESECTION - EXCISION;  Surgeon: Haroldo Kang M.D.;  Location: [facility];  Service: Orthopedics   • HAMMERTOE CORRECTION Right 11/13/2017    Procedure: HAMMERTOE CORRECTION 2-5;  Surgeon: Haroldo Kang M.D.;  Location: [facility];  Service: Orthopedics   • METATARSAL HEAD RESECTION Left 8/21/2017    Procedure: METATARSAL HEAD RESECTION - 2-5;  Surgeon: Haroldo Kang M.D.;  Location: [facility];  Service:    • TOE FUSION Left 8/21/2017    Procedure: TOE FUSION - 1ST MTP;  Surgeon: Haroldo Kang M.D.;  Location: [facility];  Service:    • HARDWARE REMOVAL ORTHO Left 8/21/2017    Procedure: HARDWARE REMOVAL ORTHO;  Surgeon: Haroldo Kang M.D.;  Location: [facility];  Service:    • LUMBAR FUSION " "POSTERIOR  4/14/2017    Procedure: LUMBAR FUSION POSTERIOR - MINIMALLY INVASIVE TLIF L4-5;  Surgeon: Bossman Caro M.D.;  Location: SURGERY [facility];  Service:    • HAMMERTOE CORRECTION Bilateral 12/22/2016    Procedure: HAMMERTOE CORRECTION/METATARSALPHALANGEAL JOINT RELEASE 2/3 RIGHT, 2-3 LEFT;  Surgeon: Haroldo Kang M.D.;  Location: SURGERY [facility];  Service:    • BUNIONECTOMY Left 12/22/2016    Procedure: BUNIONECTOMY FOR DISTAL HALLUX VALGUS CORRECTION WITH BRANDY;  Surgeon: Haroldo Kang M.D.;  Location: SURGERY [facility];  Service:    • ORTHOPEDIC OSTEOTOMY Left 12/22/2016    Procedure: ORTHOPEDIC OSTEOTOMY DISTAL METATARSAL 2-5;  Surgeon: Haroldo Kang M.D.;  Location: SURGERY [facility];  Service:    • HIP ARTH ANTERIOR TOTAL Left 8/18/2016    Procedure: HIP ARTHROPLASTY ANTERIOR TOTAL;  Surgeon: Emiliano Vang M.D.;  Location: SURGERY [facility];  Service:    • OTHER ORTHOPEDIC SURGERY  6/2014    right shoulder  arthroplasty   • OTHER ORTHOPEDIC SURGERY  11/1/2012    left knee/left ankle/left shoulder   • OTHER ORTHOPEDIC SURGERY  2004    elbow surgery   • OTHER  2000    dislocation left foot surgery at [facility]   • OTHER      \"septoplasty 20 years ago\"       Family History   Problem Relation Age of Onset   • Heart Disease Mother    • Depression Mother    • Cancer Father    • Sleep Apnea Neg Hx        Social History     Social History   • Marital status:      Spouse name: N/A   • Number of children: N/A   • Years of education: N/A     Occupational History   • Not on file.     Social History Main Topics   • Smoking status: Former Smoker     Packs/day: 1.00     Years: 20.00     Types: Cigarettes     Quit date: 1/1/2012   • Smokeless tobacco: Former User     Types: Chew     Quit date: 1/1/1997   • Alcohol use Yes      Comment: \"Few per month\"   • Drug use: No   • Sexual activity: Not on file     Other Topics Concern   • Not on file     Social History Narrative " "  • No narrative on file       Current Outpatient Prescriptions   Medication Sig Dispense Refill   • hydroxyzine pamoate (VISTARIL) 100 MG Cap Take 200 mg by mouth 2 Times a Day.     • lamotrigine (LAMICTAL) 200 MG tablet Take 200 mg by mouth every day. TAKE 1 TAB BY MOUTH EVERY DAY.  0   • levothyroxine (SYNTHROID) 112 MCG Tab Take 1 Tab by mouth Every morning on an empty stomach. 60 Tab 3   • tamsulosin (FLOMAX) 0.4 MG capsule Take 0.4 mg by mouth ONE-HALF HOUR AFTER BREAKFAST.     • liraglutide (VICTOZA) 18 MG/3ML Solution Pen-injector injection Inject 1.8 mg as instructed every morning.     • clotrimazole (LOTRIMIN) 1 % Cream APPLY TO AFFECTED AREA EVERY 12 HOURS  3   • temazepam (RESTORIL) 30 MG capsule Take 30 mg by mouth at bedtime as needed for Sleep.     • venlafaxine XR (EFFEXOR XR) 75 MG CAPSULE SR 24 HR Take 75 mg by mouth every morning.     • losartan (COZAAR) 100 MG Tab Take 100 mg by mouth every morning.     • oxycodone-acetaminophen (PERCOCET) 5-325 MG TABS Take 1-2 Tabs by mouth every four hours as needed.     • alprazolam (XANAX) 0.25 MG Tab Take 0.25-0.5 mg by mouth 4 times a day as needed for Anxiety.       No current facility-administered medications for this visit.           Allergies: Demerol and Septra [bactrim ds]      ROS   Gen: Denies fever, chills, unintentional weight loss, fatigue  Resp:Denies Dyspnea  CV: Denies chest pain, chest tightness  Sleep:Denies morning headache, insomnia, daytime somnolence, snoring, gasping for air, apnea  Neuro: Denies frequent headaches, weakness, dizziness  See HPI.  All other systems reviewed and negative        Vital signs for this encounter:  Vitals:    02/01/18 1257   Height: 1.778 m (5' 10\")   Weight: 119.3 kg (263 lb)   Weight % change since last entry.: 0 %   BP: 126/80   Pulse: (!) 115   BMI (Calculated): 37.74   Resp: 16   Temp: 36.7 °C (98 °F)   O2 sat % room air: 96 %                   Physical Exam:   Gen:         Alert and oriented, No " apparent distress.   Neck:        No Lymphadenopathy.  Lungs:     Clear to auscultation bilaterally.    CV:          Regular rate and rhythm. No murmurs, rubs or gallops.   Abd:         Soft non tender, non distended.            Ext:          No clubbing, cyanosis, edema.    Assessment   1. JULIAN (obstructive sleep apnea)  DME OTHER       PLAN:   Patient Instructions   1) Continue autoCPAP and change pressure [id number]  2) Clean mask and supplies weekly and change them as insurance allows  3) Vaccines: Up to date with flu  4) Return in about 3 months (around 5/1/2018) for Compliance, review of symptoms, if not sooner, follow up with BONNIE Thomas.

## 2018-02-01 NOTE — PATIENT INSTRUCTIONS
1) Continue autoCPAP and change pressure [id number]  2) Clean mask and supplies weekly and change them as insurance allows  3) Vaccines: Up to date with flu  4) Return in about 3 months (around 5/1/2018) for Compliance, review of symptoms, if not sooner, follow up with BONNEI Thomas.

## 2018-02-08 ENCOUNTER — NON-PROVIDER VISIT (OUTPATIENT)
Dept: WOUND CARE | Facility: MEDICAL CENTER | Age: 58
End: 2018-02-08
Attending: NURSE PRACTITIONER
Payer: COMMERCIAL

## 2018-02-08 PROCEDURE — 97597 DBRDMT OPN WND 1ST 20 CM/<: CPT

## 2018-02-08 NOTE — CERTIFICATION
"Advanced Wound Care  [facility]  [address]  [address]  [place] [address]  [phone] Fax: [phone]      Initial Evaluation  For Certification Period:  2/8/18-4/28/18      Referring Physician: BONNIE Garcia  Primary Physician: Kevin Chavez MD        Consulting Physicians: Dr. Kang       Wound(s):  L distal hallux    Start of Care:    2/8/18  Subjective:        HPI:     Referring Physician: LEATHA Lima  Primary Physician: Kevin Chavez MD  Consulting Physicians:   Start of Care: 7/14/2016        Subjective:        HPI:  57 yr old pt with DM (controlled) presents to clinic for evaluation and treatment of his L foot distal hallux wound.  He states that he first noticed it approximately a month ago and has been leaving it open to air.  Pt saw Dr. Kang yesterday and Dr. Kang ordered a CT scan which still needs to be scheduled.    From Micky Moore note during foot/nail care last visit 1/30/18:  Pt is a 57 year old gentleman with DM who has attended [facility] in the past, had Surgery with Dr. Kang on Left foot on August 21, 2017 for bunionectomy revision. Referred by Rafia NICOLE due to mycotic, dystrophic nails he is not able to care for himself any longer.    56 year old male living with wife and children. HX of dislocated toes 3-4 years ago while walking, had toes \"fixed,\" but \"it didn't work.\"      12/22/2016 PROCEDURES with Dr Kang:     1.  Left modified Arndt bunionectomy.  2.  Left distal metatarsal osteotomy.  3.  Left distal metatarsal osteotomy of the hallux.  4.  Left distal metatarsal osteotomy, 2, 3, 4, and 5.  5.  Left flexor digitorum longus transfer with a flexor to extensor transfer    of 2 and 3.  6.  Left soft tissue reconstruction, metatarsophalangeal joints 2, 3, 4, and    5.  7.  Left hammertoe correction with proximal interphalangeal arthroplasty, 2    and 3.  8.  Right metatarsophalangeal joint capsulotomy of soft tissue release, 2 and    3.  9.  " "Right hammertoe correction with proximal interphalangeal arthroplasty, 2    and 3.                 Pain: denies today         Past Medical History:   Past Medical History:   Diagnosis Date   • Anesthesia 11/03/2017    PONV with shoulder surgery approx 20 years ago.   • Anxiety and depression 11/03/2017   • Arthritis 11/03/2017    \"All Over\"   • Bunion    • Chronic autoimmune thyroiditis      + US / + TPO = 57   • Dental disorder 11/03/2017    \"Upper Plate\"   • Diabetes mellitus (CMS-HCC) 11/03/2017    \"Controlled with diet & Victoza\"   • Gout    • Hammertoe    • High cholesterol 11/03/2017    HX OF, not currently on medication for.   • Hypertension    • Hypothyroidism     chronic thyroiditis   • Pain 11/03/2017    \"Pain all over except right hip & ankle\"   • Sleep apnea 11/03/2017    CPAP USE   • Snoring 11/03/2017    DX JULIAN   • Tonsillitis      Current Medications:   Current Outpatient Prescriptions:   •  hydroxyzine pamoate (VISTARIL) 100 MG Cap, Take 200 mg by mouth 2 Times a Day., Disp: , Rfl:   •  lamotrigine (LAMICTAL) 200 MG tablet, Take 200 mg by mouth every day. TAKE 1 TAB BY MOUTH EVERY DAY., Disp: , Rfl: 0  •  levothyroxine (SYNTHROID) 112 MCG Tab, Take 1 Tab by mouth Every morning on an empty stomach., Disp: 60 Tab, Rfl: 3  •  tamsulosin (FLOMAX) 0.4 MG capsule, Take 0.4 mg by mouth ONE-HALF HOUR AFTER BREAKFAST., Disp: , Rfl:   •  liraglutide (VICTOZA) 18 MG/3ML Solution Pen-injector injection, Inject 1.8 mg as instructed every morning., Disp: , Rfl:   •  clotrimazole (LOTRIMIN) 1 % Cream, APPLY TO AFFECTED AREA EVERY 12 HOURS, Disp: , Rfl: 3  •  temazepam (RESTORIL) 30 MG capsule, Take 30 mg by mouth at bedtime as needed for Sleep., Disp: , Rfl:   •  venlafaxine XR (EFFEXOR XR) 75 MG CAPSULE SR 24 HR, Take 75 mg by mouth every morning., Disp: , Rfl:   •  alprazolam (XANAX) 0.25 MG Tab, Take 0.25-0.5 mg by mouth 4 times a day as needed for Anxiety., Disp: , Rfl:   •  losartan (COZAAR) 100 MG Tab, Take " 100 mg by mouth every morning., Disp: , Rfl:   •  oxycodone-acetaminophen (PERCOCET) 5-325 MG TABS, Take 1-2 Tabs by mouth every four hours as needed., Disp: , Rfl:   Allergies: Demerol and Septra [bactrim ds]  Past Surgical History:   Past Surgical History:   Procedure Laterality Date   • TOE FUSION Right 11/13/2017    Procedure: TOE FUSION - 1ST METATARSALPHALANGEAL;  Surgeon: Haroldo Kang M.D.;  Location: [facility];  Service: Orthopedics   • METATARSAL HEAD RESECTION Right 11/13/2017    Procedure: METATARSAL HEAD RESECTION - EXCISION;  Surgeon: Haroldo Kang M.D.;  Location: [facility];  Service: Orthopedics   • HAMMERTOE CORRECTION Right 11/13/2017    Procedure: HAMMERTOE CORRECTION 2-5;  Surgeon: Haroldo Kang M.D.;  Location: [facility];  Service: Orthopedics   • METATARSAL HEAD RESECTION Left 8/21/2017    Procedure: METATARSAL HEAD RESECTION - 2-5;  Surgeon: Haroldo Kang M.D.;  Location: [facility];  Service:    • TOE FUSION Left 8/21/2017    Procedure: TOE FUSION - 1ST MTP;  Surgeon: Haroldo Kang M.D.;  Location: [facility];  Service:    • HARDWARE REMOVAL ORTHO Left 8/21/2017    Procedure: HARDWARE REMOVAL ORTHO;  Surgeon: Haroldo Kang M.D.;  Location: [facility];  Service:    • LUMBAR FUSION POSTERIOR  4/14/2017    Procedure: LUMBAR FUSION POSTERIOR - MINIMALLY INVASIVE TLIF L4-5;  Surgeon: Bossman Caro M.D.;  Location: [facility];  Service:    • HAMMERTOE CORRECTION Bilateral 12/22/2016    Procedure: HAMMERTOE CORRECTION/METATARSALPHALANGEAL JOINT RELEASE 2/3 RIGHT, 2-3 LEFT;  Surgeon: Haroldo Kang M.D.;  Location: [facility];  Service:    • BUNIONECTOMY Left 12/22/2016    Procedure: BUNIONECTOMY FOR DISTAL HALLUX VALGUS CORRECTION WITH BRANDY;  Surgeon: Haroldo Kang M.D.;  Location: [facility];  Service:    • ORTHOPEDIC OSTEOTOMY Left 12/22/2016     "Procedure: ORTHOPEDIC OSTEOTOMY DISTAL METATARSAL 2-5;  Surgeon: Haroldo Kang M.D.;  Location: SUR[facility]ERY [facility];  Service:    • HIP ARTH ANTERIOR TOTAL Left 8/18/2016    Procedure: HIP ARTHROPLASTY ANTERIOR TOTAL;  Surgeon: Emiliano Vang M.D.;  Location: SURGERY [facility];  Service:    • OTHER ORTHOPEDIC SURGERY  6/2014    right shoulder  arthroplasty   • OTHER ORTHOPEDIC SURGERY  11/1/2012    left knee/left ankle/left shoulder   • OTHER ORTHOPEDIC SURGERY  2004    elbow surgery   • OTHER  2000    dislocation left foot surgery at [facility]   • OTHER      \"septoplasty 20 years ago\"     Social History:    Social History     Social History   • Marital status:      Spouse name: N/A   • Number of children: N/A   • Years of education: N/A     Occupational History   • Not on file.     Social History Main Topics   • Smoking status: Former Smoker     Packs/day: 1.00     Years: 20.00     Types: Cigarettes     Quit date: 1/1/2012   • Smokeless tobacco: Former User     Types: Chew     Quit date: 1/1/1997   • Alcohol use Yes      Comment: \"Few per month\"   • Drug use: No   • Sexual activity: Not on file     Other Topics Concern   • Not on file     Social History Narrative   • No narrative on file         Objective:    Tests and Measures: Pt reports FBS today at 118.  ANT 1.0 taken by Florentin wound tech.  L foot warm with strong and palpable DP pulses, hair growth present on LLE     Orthotic, protective, supportive devices:     Fall Risk Assessment (william all that apply with an X):   65 years or older     XFall within the last 2 years, uses   Ambulatory devices  XLoss of protective sensation in feet,   Use of prostethic/orthotic, years    XPresence of lower extremity/foot/toe amputation   XTaking medication that increases risk (per facility policy)    Interventions Recommended (if any of the above are selected):   Use of Assistive Device:________________________   Supervision with ambulation:  " Caregiver   Assistance with ambulation:  Caregiver   Home safety education:  Educational material provided  Provided and reviewed fall risk prevention form with pt.  Pt verbalized understanding.     Wound Characteristics                                                    Location: L distal hallux   Initial Evaluation  Date: 2/8/18   Tissue Type and %: 95% adherent yellow, 5% pink viable   Periwound: Macerated callous   Drainage: Mod SS   Exposed structures None visible   Wound Edges:   Open    Odor: none   S&S of Infection:   Mild erythema   Edema: local   Sensation: LOPS, neuropathy                     Measurements: L distal hallux Initial Evaluation  Date: 2/8/18   Length (cm) 1   Width (cm) 1.2   Depth (cm) GAYLE   Area (cm2) 1.2   Tract/undermine none          Procedures:     Debridement : CSWD using curette to remove 1cm2 periwound callous and wound biofilm.  Pt tolerated well.   Cleansed with: NS                                                                        Periwound protected with: moisture barrier cream   Primary dressing: AqAg   Secondary Dressing: non ad foam    Other: hypafix     Patient Education: Reviewed POC, importance of keeping the secondary dressing dry and intact, glucose control, nutrition for wound healing, s/s of complications/infection, when to notify MD/go to ER.  Pt verbalized understanding to all.    Professional Collaboration: note sent to APRN via [employer].  POC pending Dr. Kang and CT scan.      Assessment:      Wound etiology: trauma vs DFU    Wound Progress:  initial    Rationale for Treatment: Aquacel Ag for it's antimicrobial properties, to absorb excess exudate, and to maintain moist wound environment.    Patient tolerance/compliance: pt agreeable to care     Complicating factors: DM, HTN, pain    Need for ongoing Advanced Wound Care services: Patient requires skilled therapeutic wound care services for product selection, application of product, debridement, close monitoring  with clinical assessment, compression for expedite of wound healing.        Plan:      Treatment Plan and Recommendations:  Diagnosis/ICD9: No diagnosis found.  Procedures/CPT:  S91.302A    Frequency: 2x/week      Treatment Goals: STG 2 Weeks  LTG 4 Weeks   Granulation Tissue: 50% 75%   Decrease Necrotic Tissue to: 50% 25%   Wound Phase:  inflammatory inflammatory   Decrease Size by: 50% 75%   Periwound:  intact intact   Decrease tracts/undermining by: n/a n/a   Decrease Pain:  0 0                        At the time of each visit a thorough assessment of the patient is completed to assure the  appropriateness of our plan of care.  The dressings or modalities may need to be adapted   from the original plan to address any significant changes in the wound environment.          Clinician Signature:_______________________________Date__________________      Physician Signature:______________________________Date:__________________

## 2018-02-09 ENCOUNTER — HOSPITAL ENCOUNTER (OUTPATIENT)
Dept: RADIOLOGY | Facility: MEDICAL CENTER | Age: 58
End: 2018-02-09
Attending: ORTHOPAEDIC SURGERY
Payer: COMMERCIAL

## 2018-02-09 DIAGNOSIS — M20.12 ACQUIRED HALLUX VALGUS OF LEFT FOOT: ICD-10-CM

## 2018-02-09 DIAGNOSIS — M79.672 LEFT FOOT PAIN: ICD-10-CM

## 2018-02-09 DIAGNOSIS — M19.072 ARTHRITIS OF LEFT ANKLE: ICD-10-CM

## 2018-02-09 PROCEDURE — 73700 CT LOWER EXTREMITY W/O DYE: CPT | Mod: LT

## 2018-02-12 ENCOUNTER — NON-PROVIDER VISIT (OUTPATIENT)
Dept: WOUND CARE | Facility: MEDICAL CENTER | Age: 58
End: 2018-02-12
Attending: NURSE PRACTITIONER
Payer: COMMERCIAL

## 2018-02-12 PROCEDURE — 97597 DBRDMT OPN WND 1ST 20 CM/<: CPT

## 2018-02-12 NOTE — WOUND TEAM
"Advanced Wound Care  [facility]  [address]  [address]  [place] [address]  [phone] Fax: [phone]        Encounter Note    For Certification Period:  2/8/18-4/28/18        Referring Physician: BONNIE Garcia  Primary Physician: Kevin Chavez MD        Consulting Physicians: Dr. Kang        Wound(s):  L distal hallux     Start of Care:    2/8/18  Subjective:       HPI:     Referring Physician: LEATHA Lima  Primary Physician: Kevin Chavez MD  Consulting Physicians:   Start of Care: 7/14/2016        Subjective:        HPI:  57 yr old pt with DM (controlled) presents to clinic for evaluation and treatment of his L foot distal hallux wound.  He states that he first noticed it approximately a month ago and has been leaving it open to air.  Pt saw Dr. Kang yesterday and Dr. Kang ordered a CT scan which still needs to be scheduled.     From Micky Moore note during foot/nail care last visit 1/30/18:  Pt is a 57 year old gentleman with DM who has attended [facility] in the past, had Surgery with Dr. Kang on Left foot on August 21, 2017 for bunionectomy revision. Referred by Rafia NICOLE due to mycotic, dystrophic nails he is not able to care for himself any longer.    56 year old male living with wife and children. HX of dislocated toes 3-4 years ago while walking, had toes \"fixed,\" but \"it didn't work.\"      12/22/2016 PROCEDURES with Dr Kang:     1.  Left modified Arndt bunionectomy.  2.  Left distal metatarsal osteotomy.  3.  Left distal metatarsal osteotomy of the hallux.  4.  Left distal metatarsal osteotomy, 2, 3, 4, and 5.  5.  Left flexor digitorum longus transfer with a flexor to extensor transfer    of 2 and 3.  6.  Left soft tissue reconstruction, metatarsophalangeal joints 2, 3, 4, and    5.  7.  Left hammertoe correction with proximal interphalangeal arthroplasty, 2    and 3.  8.  Right metatarsophalangeal joint capsulotomy of soft tissue release, 2 and " "   3.  9.  Right hammertoe correction with proximal interphalangeal arthroplasty, 2    and 3.                  Pain: pt reports mild tenderness with debridement of wound, otherwise denies pain.          Past Medical History:   Past Medical History        Past Medical History:   Diagnosis Date   • Anesthesia 11/03/2017     PONV with shoulder surgery approx 20 years ago.   • Anxiety and depression 11/03/2017   • Arthritis 11/03/2017     \"All Over\"   • Bunion     • Chronic autoimmune thyroiditis        + US / + TPO = 57   • Dental disorder 11/03/2017     \"Upper Plate\"   • Diabetes mellitus (CMS-HCC) 11/03/2017     \"Controlled with diet & Victoza\"   • Gout     • Hammertoe     • High cholesterol 11/03/2017     HX OF, not currently on medication for.   • Hypertension     • Hypothyroidism       chronic thyroiditis   • Pain 11/03/2017     \"Pain all over except right hip & ankle\"   • Sleep apnea 11/03/2017     CPAP USE   • Snoring 11/03/2017     DX JULIAN   • Tonsillitis           Current Medications:   Current Outpatient Prescriptions:   •  hydroxyzine pamoate (VISTARIL) 100 MG Cap, Take 200 mg by mouth 2 Times a Day., Disp: , Rfl:   •  lamotrigine (LAMICTAL) 200 MG tablet, Take 200 mg by mouth every day. TAKE 1 TAB BY MOUTH EVERY DAY., Disp: , Rfl: 0  •  levothyroxine (SYNTHROID) 112 MCG Tab, Take 1 Tab by mouth Every morning on an empty stomach., Disp: 60 Tab, Rfl: 3  •  tamsulosin (FLOMAX) 0.4 MG capsule, Take 0.4 mg by mouth ONE-HALF HOUR AFTER BREAKFAST., Disp: , Rfl:   •  liraglutide (VICTOZA) 18 MG/3ML Solution Pen-injector injection, Inject 1.8 mg as instructed every morning., Disp: , Rfl:   •  clotrimazole (LOTRIMIN) 1 % Cream, APPLY TO AFFECTED AREA EVERY 12 HOURS, Disp: , Rfl: 3  •  temazepam (RESTORIL) 30 MG capsule, Take 30 mg by mouth at bedtime as needed for Sleep., Disp: , Rfl:   •  venlafaxine XR (EFFEXOR XR) 75 MG CAPSULE SR 24 HR, Take 75 mg by mouth every morning., Disp: , Rfl:   •  alprazolam (XANAX) 0.25 " MG Tab, Take 0.25-0.5 mg by mouth 4 times a day as needed for Anxiety., Disp: , Rfl:   •  losartan (COZAAR) 100 MG Tab, Take 100 mg by mouth every morning., Disp: , Rfl:   •  oxycodone-acetaminophen (PERCOCET) 5-325 MG TABS, Take 1-2 Tabs by mouth every four hours as needed., Disp: , Rfl:   Allergies: Demerol and Septra [bactrim ds]  Past Surgical History:   Past Surgical History         Past Surgical History:   Procedure Laterality Date   • TOE FUSION Right 11/13/2017     Procedure: TOE FUSION - 1ST METATARSALPHALANGEAL;  Surgeon: Haroldo Kang M.D.;  Location: [facility];  Service: Orthopedics   • METATARSAL HEAD RESECTION Right 11/13/2017     Procedure: METATARSAL HEAD RESECTION - EXCISION;  Surgeon: Haroldo Kang M.D.;  Location: [facility];  Service: Orthopedics   • HAMMERTOE CORRECTION Right 11/13/2017     Procedure: HAMMERTOE CORRECTION 2-5;  Surgeon: Haroldo Kang M.D.;  Location: [facility];  Service: Orthopedics   • METATARSAL HEAD RESECTION Left 8/21/2017     Procedure: METATARSAL HEAD RESECTION - 2-5;  Surgeon: Haroldo Kang M.D.;  Location: [facility];  Service:    • TOE FUSION Left 8/21/2017     Procedure: TOE FUSION - 1ST MTP;  Surgeon: Haroldo Kang M.D.;  Location: [facility];  Service:    • HARDWARE REMOVAL ORTHO Left 8/21/2017     Procedure: HARDWARE REMOVAL ORTHO;  Surgeon: Haroldo Kang M.D.;  Location: [facility];  Service:    • LUMBAR FUSION POSTERIOR   4/14/2017     Procedure: LUMBAR FUSION POSTERIOR - MINIMALLY INVASIVE TLIF L4-5;  Surgeon: Bossman Caro M.D.;  Location: [facility];  Service:    • HAMMERTOE CORRECTION Bilateral 12/22/2016     Procedure: HAMMERTOE CORRECTION/METATARSALPHALANGEAL JOINT RELEASE 2/3 RIGHT, 2-3 LEFT;  Surgeon: Haroldo Kang M.D.;  Location: [facility];  Service:    • BUNIONECTOMY Left 12/22/2016     Procedure: BUNIONECTOMY FOR DISTAL  "HALLUX VALGUS CORRECTION WITH BRANDY;  Surgeon: Haroldo Kang M.D.;  Location: [facility];  Service:    • ORTHOPEDIC OSTEOTOMY Left 12/22/2016     Procedure: ORTHOPEDIC OSTEOTOMY DISTAL METATARSAL 2-5;  Surgeon: Haroldo Kang M.D.;  Location: [facility];  Service:    • HIP ARTH ANTERIOR TOTAL Left 8/18/2016     Procedure: HIP ARTHROPLASTY ANTERIOR TOTAL;  Surgeon: Emiliano Vang M.D.;  Location: SUR[facility];  Service:    • OTHER ORTHOPEDIC SURGERY   6/2014     right shoulder  arthroplasty   • OTHER ORTHOPEDIC SURGERY   11/1/2012     left knee/left ankle/left shoulder   • OTHER ORTHOPEDIC SURGERY   2004     elbow surgery   • OTHER   2000     dislocation left foot surgery [facility]   • OTHER         \"septoplasty 20 years ago\"         Social History:    Social History   Social History            Social History   • Marital status:        Spouse name: N/A   • Number of children: N/A   • Years of education: N/A          Occupational History   • Not on file.             Social History Main Topics   • Smoking status: Former Smoker       Packs/day: 1.00       Years: 20.00       Types: Cigarettes       Quit date: 1/1/2012   • Smokeless tobacco: Former User       Types: Chew       Quit date: 1/1/1997   • Alcohol use Yes         Comment: \"Few per month\"   • Drug use: No   • Sexual activity: Not on file           Other Topics Concern   • Not on file          Social History Narrative   • No narrative on file             Objective:    Tests and Measures:   02/12/18: DP/PT pulses 2+ easily palpable.  Foot warm.  Pt reports FBS today at 118.  ANT 1.0 taken by Florentin wound tech.  L foot warm with strong and palpable DP pulses, hair growth present on LLE      Orthotic, protective, supportive devices:      Fall Risk Assessment (william all that apply with an X): High fall risk.      Wound Characteristics                                                      Location: L distal hallux    " Initial Evaluation  Date: 2/8/18 Encounter Date: 02/12/18   Tissue Type and %: 95% adherent yellow, 5% pink viable 90% adherent yellow/10% red moist   Periwound: Macerated callous intact   Drainage: Mod SS Mod ss   Exposed structures None visible GAYLE   Wound Edges:   Open  open   Odor: None none   S&S of Infection:   Mild erythema Mild erythema and warmth   Edema: Local Local to toe   Sensation: LOPS, neuropathy LOPS, but able to feel tenderness with debridement                        Measurements: L distal hallux Initial Evaluation  Date: 2/8/18   Length (cm) 1   Width (cm) 1.2   Depth (cm) GAYLE   Area (cm2) 1.2   Tract/undermine none         Procedures:                Debridement : CSWD using forceps/scissors to remove ~1 to 2 cm2 alma rosa wound callus and using curette to remove ~0.5cm2 slough from wound bed.              Cleansed with: NS                                                                                   Periwound protected with: skin prep, zinc paste              Primary dressing: honey gel, alginate x2 small pieces              Secondary Dressing: non ad foam               Other: hypafix     Patient Education: Reviewed POC, importance of keeping dressing dry and intact/covering when bathing, glucose control, nutrition for wound healing, s/s of complications/infection, when to notify MD/go to ER.  Pt verbalized understanding to all.     Professional Collaboration: Pt had CT scan done 2/9/18 and awaiting review by Dr. Kang.     Assessment:       Wound etiology: trauma vs DFU     Wound Progress: less callus per photo     Rationale for Treatment: Medihoney to provide moist wound environment, and facilitate autolytic debridement.  Alginate for absorption.  Nonad foam to pad/protect/additional absorption.     Patient tolerance/compliance: pt agreeable to poc and appt schedule     Complicating factors: DM, HTN, pain     Need for ongoing Advanced Wound Care services: Patient requires skilled therapeutic  wound care services for product selection, application of product, debridement, close monitoring with clinical assessment, compression for expedite of wound healing.           Plan:       Treatment Plan and Recommendations:  Diagnosis/ICD10: S91.302A    Procedures/CPT: Selective debridement 49204     Frequency: 2x/week        Treatment Goals: STG 2 Weeks          LTG 4 Weeks   Granulation Tissue: 50% 75%   Decrease Necrotic Tissue to: 50% 25%   Wound Phase:             proliferation proliferation   Decrease Size by: 25% 50%   Periwound:       intact intact   Decrease tracts/undermining by: n/a n/a   Decrease Pain:    0 0                                  At the time of each visit a thorough assessment of the patient is completed to assure the  appropriateness of our plan of care.  The dressings or modalities may need to be adapted   from the original plan to address any significant changes in the wound environment.              Clinician Signature:_______________________________Date__________________        Physician Signature:______________________________Date:__________________

## 2018-02-16 ENCOUNTER — NON-PROVIDER VISIT (OUTPATIENT)
Dept: WOUND CARE | Facility: MEDICAL CENTER | Age: 58
End: 2018-02-16
Attending: NURSE PRACTITIONER
Payer: COMMERCIAL

## 2018-02-16 PROCEDURE — 97597 DBRDMT OPN WND 1ST 20 CM/<: CPT

## 2018-02-16 NOTE — WOUND TEAM
"Advanced Wound Care  [facility]  [address]  [address]  [place] [address]  [phone] Fax: [phone]        Encounter Note    For Certification Period:  2/8/18-4/28/18        Referring Physician: BONNIE Garcia  Primary Physician: Kevin Chavez MD        Consulting Physicians: Dr. Kang        Wound(s):  L distal hallux     Start of Care:    2/8/18  Subjective:       HPI:     Referring Physician: LEATHA Lima  Primary Physician: Kevin Chavez MD  Consulting Physicians:   Start of Care: 7/14/2016        Subjective:        HPI:  57 yr old pt with DM (controlled) presents to clinic for evaluation and treatment of his L foot distal hallux wound.  He states that he first noticed it approximately a month ago and has been leaving it open to air.  Pt saw Dr. Kang yesterday and Dr. Kang ordered a CT scan which still needs to be scheduled.     From Micky Moore note during foot/nail care last visit 1/30/18:  Pt is a 57 year old gentleman with DM who has attended [facility] in the past, had Surgery with Dr. Kang on Left foot on August 21, 2017 for bunionectomy revision. Referred by Rafia NICOLE due to mycotic, dystrophic nails he is not able to care for himself any longer.    56 year old male living with wife and children. HX of dislocated toes 3-4 years ago while walking, had toes \"fixed,\" but \"it didn't work.\"      12/22/2016 PROCEDURES with Dr Kang:     1.  Left modified Arndt bunionectomy.  2.  Left distal metatarsal osteotomy.  3.  Left distal metatarsal osteotomy of the hallux.  4.  Left distal metatarsal osteotomy, 2, 3, 4, and 5.  5.  Left flexor digitorum longus transfer with a flexor to extensor transfer    of 2 and 3.  6.  Left soft tissue reconstruction, metatarsophalangeal joints 2, 3, 4, and    5.  7.  Left hammertoe correction with proximal interphalangeal arthroplasty, 2    and 3.  8.  Right metatarsophalangeal joint capsulotomy of soft tissue release, 2 and " "   3.  9.  Right hammertoe correction with proximal interphalangeal arthroplasty, 2    and 3.                  Pain: pt reports mild tenderness with debridement of wound, otherwise denies pain.          Past Medical History:   Past Medical History        Past Medical History:   Diagnosis Date   • Anesthesia 11/03/2017     PONV with shoulder surgery approx 20 years ago.   • Anxiety and depression 11/03/2017   • Arthritis 11/03/2017     \"All Over\"   • Bunion     • Chronic autoimmune thyroiditis        + US / + TPO = 57   • Dental disorder 11/03/2017     \"Upper Plate\"   • Diabetes mellitus (CMS-HCC) 11/03/2017     \"Controlled with diet & Victoza\"   • Gout     • Hammertoe     • High cholesterol 11/03/2017     HX OF, not currently on medication for.   • Hypertension     • Hypothyroidism       chronic thyroiditis   • Pain 11/03/2017     \"Pain all over except right hip & ankle\"   • Sleep apnea 11/03/2017     CPAP USE   • Snoring 11/03/2017     DX JULIAN   • Tonsillitis           Current Medications:   Current Outpatient Prescriptions:   •  hydroxyzine pamoate (VISTARIL) 100 MG Cap, Take 200 mg by mouth 2 Times a Day., Disp: , Rfl:   •  lamotrigine (LAMICTAL) 200 MG tablet, Take 200 mg by mouth every day. TAKE 1 TAB BY MOUTH EVERY DAY., Disp: , Rfl: 0  •  levothyroxine (SYNTHROID) 112 MCG Tab, Take 1 Tab by mouth Every morning on an empty stomach., Disp: 60 Tab, Rfl: 3  •  tamsulosin (FLOMAX) 0.4 MG capsule, Take 0.4 mg by mouth ONE-HALF HOUR AFTER BREAKFAST., Disp: , Rfl:   •  liraglutide (VICTOZA) 18 MG/3ML Solution Pen-injector injection, Inject 1.8 mg as instructed every morning., Disp: , Rfl:   •  clotrimazole (LOTRIMIN) 1 % Cream, APPLY TO AFFECTED AREA EVERY 12 HOURS, Disp: , Rfl: 3  •  temazepam (RESTORIL) 30 MG capsule, Take 30 mg by mouth at bedtime as needed for Sleep., Disp: , Rfl:   •  venlafaxine XR (EFFEXOR XR) 75 MG CAPSULE SR 24 HR, Take 75 mg by mouth every morning., Disp: , Rfl:   •  alprazolam (XANAX) 0.25 " MG Tab, Take 0.25-0.5 mg by mouth 4 times a day as needed for Anxiety., Disp: , Rfl:   •  losartan (COZAAR) 100 MG Tab, Take 100 mg by mouth every morning., Disp: , Rfl:   •  oxycodone-acetaminophen (PERCOCET) 5-325 MG TABS, Take 1-2 Tabs by mouth every four hours as needed., Disp: , Rfl:   Allergies: Demerol and Septra [bactrim ds]  Past Surgical History:   Past Surgical History         Past Surgical History:   Procedure Laterality Date   • TOE FUSION Right 11/13/2017     Procedure: TOE FUSION - 1ST METATARSALPHALANGEAL;  Surgeon: Haroldo Kang M.D.;  Location: [facility];  Service: Orthopedics   • METATARSAL HEAD RESECTION Right 11/13/2017     Procedure: METATARSAL HEAD RESECTION - EXCISION;  Surgeon: Haroldo Kang M.D.;  Location: [facility];  Service: Orthopedics   • HAMMERTOE CORRECTION Right 11/13/2017     Procedure: HAMMERTOE CORRECTION 2-5;  Surgeon: Haroldo Kang M.D.;  Location: [facility];  Service: Orthopedics   • METATARSAL HEAD RESECTION Left 8/21/2017     Procedure: METATARSAL HEAD RESECTION - 2-5;  Surgeon: Haroldo Kang M.D.;  Location: [facility];  Service:    • TOE FUSION Left 8/21/2017     Procedure: TOE FUSION - 1ST MTP;  Surgeon: Haroldo Kang M.D.;  Location: [facility];  Service:    • HARDWARE REMOVAL ORTHO Left 8/21/2017     Procedure: HARDWARE REMOVAL ORTHO;  Surgeon: Haroldo Kang M.D.;  Location: [facility];  Service:    • LUMBAR FUSION POSTERIOR   4/14/2017     Procedure: LUMBAR FUSION POSTERIOR - MINIMALLY INVASIVE TLIF L4-5;  Surgeon: Bossman Caro M.D.;  Location: [facility];  Service:    • HAMMERTOE CORRECTION Bilateral 12/22/2016     Procedure: HAMMERTOE CORRECTION/METATARSALPHALANGEAL JOINT RELEASE 2/3 RIGHT, 2-3 LEFT;  Surgeon: Haroldo Kang M.D.;  Location: [facility];  Service:    • BUNIONECTOMY Left 12/22/2016     Procedure: BUNIONECTOMY FOR DISTAL  "HALLUX VALGUS CORRECTION WITH BRANDY;  Surgeon: Haroldo Kang M.D.;  Location: SUR[facility]ERY [facility];  Service:    • ORTHOPEDIC OSTEOTOMY Left 12/22/2016     Procedure: ORTHOPEDIC OSTEOTOMY DISTAL METATARSAL 2-5;  Surgeon: Haroldo Kang M.D.;  Location: SURGERY [facility];  Service:    • HIP ARTH ANTERIOR TOTAL Left 8/18/2016     Procedure: HIP ARTHROPLASTY ANTERIOR TOTAL;  Surgeon: Emiliano Vang M.D.;  Location: SURGERY [facility];  Service:    • OTHER ORTHOPEDIC SURGERY   6/2014     right shoulder  arthroplasty   • OTHER ORTHOPEDIC SURGERY   11/1/2012     left knee/left ankle/left shoulder   • OTHER ORTHOPEDIC SURGERY   2004     elbow surgery   • OTHER   2000     dislocation left foot surgery at [facility]   • OTHER         \"septoplasty 20 years ago\"         Social History:    Social History   Social History            Social History   • Marital status:        Spouse name: N/A   • Number of children: N/A   • Years of education: N/A          Occupational History   • Not on file.             Social History Main Topics   • Smoking status: Former Smoker       Packs/day: 1.00       Years: 20.00       Types: Cigarettes       Quit date: 1/1/2012   • Smokeless tobacco: Former User       Types: Chew       Quit date: 1/1/1997   • Alcohol use Yes         Comment: \"Few per month\"   • Drug use: No   • Sexual activity: Not on file           Other Topics Concern   • Not on file          Social History Narrative   • No narrative on file             Objective:    Tests and Measures:   02/12/18: DP/PT pulses 2+ easily palpable.  Foot warm.  Pt reports FBS today at 118.  ANT 1.0 taken by Florentin wound tech.  L foot warm with strong and palpable DP pulses, hair growth present on LLE      Orthotic, protective, supportive devices:      Fall Risk Assessment (william all that apply with an X): High fall risk.      Wound Characteristics                                                      Location: L distal hallux    " Initial Evaluation  Date: 2/8/18 Encounter Date: 02/12/18 Encounter Date: 02/16/18   Tissue Type and %: 95% adherent yellow, 5% pink viable 90% adherent yellow/10% red moist 70% adherent yellow; 30% red hypergranular.  All tissue above surface of skin and boggy.   Periwound: Macerated callous intact Dry, flaking   Drainage: Mod SS Mod ss Mod ss   Exposed structures None visible GAYLE GAYLE   Wound Edges:   Open  Open open   Odor: None None none   S&S of Infection:   Mild erythema Mild erythema and warmth Mild erythema   Edema: Local Local to toe Local to toe   Sensation: LOPS, neuropathy LOPS, but able to feel tenderness with debridement LOPS, but tender         Measurements: L distal hallux Initial Evaluation  Date: 2/8/18 Encounter Date: 02/16/18   Length (cm) 1 1   Width (cm) 1.2 1.2   Depth (cm) GAYLE GAYLE   Area (cm2) 1.2 1.2cm2   Tract/undermine None GAYLE          Procedures:                Debridement : CSWD using forceps/scissors to remove ~1 to 2 cm2 alma rosa wound callus and using scalpel to remove ~0.5cm2 slough from wound bed.  Silver nitrate to treat hypergranular tissue.              Cleansed with: NS                                                                                   Periwound protected with: skin prep              Primary dressing: honey gel, alginate              Secondary Dressing: non ad foam               Other: hypafix     Patient Education: Reviewed POC, importance of keeping dressing dry and intact/covering when bathing (pt accidentally got wet this week and changed bandage), glucose control, nutrition for wound healing, s/s of complications/infection, when to notify MD/go to ER.  Pt verbalized understanding to all.     Professional Collaboration: Pt had CT scan done 2/9/18 and still awaiting review by Dr. Kang.     Assessment:       Wound etiology: trauma vs DFU     Wound Progress: less callus per photo to alma rosa wound.  Wound with no change.     Rationale for Treatment: Medihoney to  provide moist wound environment, and facilitate autolytic debridement.  Alginate for absorption.  Nonad foam to pad/protect/additional absorption.     Patient tolerance/compliance: pt agreeable to poc and appt schedule     Complicating factors: DM, HTN, pain     Need for ongoing Advanced Wound Care services: Patient requires skilled therapeutic wound care services for product selection, application of product, debridement, close monitoring with clinical assessment, compression for expedite of wound healing.           Plan:       Treatment Plan and Recommendations:  Diagnosis/ICD10: S91.302A    Procedures/CPT: Selective debridement 08032     Frequency: 2x/week        Treatment Goals: STG 2 Weeks          LTG 4 Weeks   Granulation Tissue: 50% 75%   Decrease Necrotic Tissue to: 50% 25%   Wound Phase:             proliferation proliferation   Decrease Size by: 25% 50%   Periwound:       intact intact   Decrease tracts/undermining by: n/a n/a   Decrease Pain:    0 0                                  At the time of each visit a thorough assessment of the patient is completed to assure the  appropriateness of our plan of care.  The dressings or modalities may need to be adapted   from the original plan to address any significant changes in the wound environment.              Clinician Signature:_______________________________Date__________________        Physician Signature:______________________________Date:__________________

## 2018-02-19 ENCOUNTER — NON-PROVIDER VISIT (OUTPATIENT)
Dept: WOUND CARE | Facility: MEDICAL CENTER | Age: 58
End: 2018-02-19
Attending: NURSE PRACTITIONER
Payer: COMMERCIAL

## 2018-02-19 PROCEDURE — 97597 DBRDMT OPN WND 1ST 20 CM/<: CPT

## 2018-02-19 NOTE — WOUND TEAM
"Advanced Wound Care  [facility]  [address]  [address]  [place] [address]  [phone] Fax: [phone]        Encounter Note    For Certification Period:  2/8/18-4/28/18        Referring Physician: BONNIE Garcia  Primary Physician: Kevin Chavez MD        Consulting Physicians: Dr. Kang        Wound(s):  L distal hallux     Start of Care:    2/8/18  Subjective:       HPI:     Referring Physician: LEATHA Lima  Primary Physician: Kevin Chavez MD  Consulting Physicians:   Start of Care: 7/14/2016        Subjective:        HPI:  57 yr old pt with DM (controlled) presents to clinic for evaluation and treatment of his L foot distal hallux wound.  He states that he first noticed it approximately a month ago and has been leaving it open to air.  Pt saw Dr. Kang yesterday and Dr. Kang ordered a CT scan which still needs to be scheduled.     From Micky Moore note during foot/nail care last visit 1/30/18:  Pt is a 57 year old gentleman with DM who has attended [facility] in the past, had Surgery with Dr. Kang on Left foot on August 21, 2017 for bunionectomy revision. Referred by Rafia NICOLE due to mycotic, dystrophic nails he is not able to care for himself any longer.    56 year old male living with wife and children. HX of dislocated toes 3-4 years ago while walking, had toes \"fixed,\" but \"it didn't work.\"      12/22/2016 PROCEDURES with Dr Kang:     1.  Left modified Arndt bunionectomy.  2.  Left distal metatarsal osteotomy.  3.  Left distal metatarsal osteotomy of the hallux.  4.  Left distal metatarsal osteotomy, 2, 3, 4, and 5.  5.  Left flexor digitorum longus transfer with a flexor to extensor transfer    of 2 and 3.  6.  Left soft tissue reconstruction, metatarsophalangeal joints 2, 3, 4, and    5.  7.  Left hammertoe correction with proximal interphalangeal arthroplasty, 2    and 3.  8.  Right metatarsophalangeal joint capsulotomy of soft tissue release, 2 and "    3.  9.  Right hammertoe correction with proximal interphalangeal arthroplasty, 2    and 3.                  Pain: pt reports mild tenderness with debridement of wound, otherwise denies pain.         Current Medications:   Current Outpatient Prescriptions:   •  hydroxyzine pamoate (VISTARIL) 100 MG Cap, Take 200 mg by mouth 2 Times a Day., Disp: , Rfl:   •  lamotrigine (LAMICTAL) 200 MG tablet, Take 200 mg by mouth every day. TAKE 1 TAB BY MOUTH EVERY DAY., Disp: , Rfl: 0  •  levothyroxine (SYNTHROID) 112 MCG Tab, Take 1 Tab by mouth Every morning on an empty stomach., Disp: 60 Tab, Rfl: 3  •  tamsulosin (FLOMAX) 0.4 MG capsule, Take 0.4 mg by mouth ONE-HALF HOUR AFTER BREAKFAST., Disp: , Rfl:   •  liraglutide (VICTOZA) 18 MG/3ML Solution Pen-injector injection, Inject 1.8 mg as instructed every morning., Disp: , Rfl:   •  clotrimazole (LOTRIMIN) 1 % Cream, APPLY TO AFFECTED AREA EVERY 12 HOURS, Disp: , Rfl: 3  •  temazepam (RESTORIL) 30 MG capsule, Take 30 mg by mouth at bedtime as needed for Sleep., Disp: , Rfl:   •  venlafaxine XR (EFFEXOR XR) 75 MG CAPSULE SR 24 HR, Take 75 mg by mouth every morning., Disp: , Rfl:   •  alprazolam (XANAX) 0.25 MG Tab, Take 0.25-0.5 mg by mouth 4 times a day as needed for Anxiety., Disp: , Rfl:   •  losartan (COZAAR) 100 MG Tab, Take 100 mg by mouth every morning., Disp: , Rfl:   •  oxycodone-acetaminophen (PERCOCET) 5-325 MG TABS, Take 1-2 Tabs by mouth every four hours as needed., Disp: , Rfl:     Allergies: Demerol and Septra [bactrim ds]      Past Surgical History:   Past Surgical History         Past Surgical History:   Procedure Laterality Date   • TOE FUSION Right 11/13/2017     Procedure: TOE FUSION - 1ST METATARSALPHALANGEAL;  Surgeon: Haroldo Kang M.D.;  Location: SURGERY [facility];  Service: Orthopedics   • METATARSAL HEAD RESECTION Right 11/13/2017     Procedure: METATARSAL HEAD RESECTION - EXCISION;  Surgeon: Haroldo Kang M.D.;  [facility]: [facility]ERY [facility]  [facility];  Service: Orthopedics   • HAMMERTOE CORRECTION Right 11/13/2017     Procedure: HAMMERTOE CORRECTION 2-5;  Surgeon: Haroldo Kang M.D.;  Location: [facility];  Service: Orthopedics   • METATARSAL HEAD RESECTION Left 8/21/2017     Procedure: METATARSAL HEAD RESECTION - 2-5;  Surgeon: Haroldo Kang M.D.;  Location: [facility];  Service:    • TOE FUSION Left 8/21/2017     Procedure: TOE FUSION - 1ST MTP;  Surgeon: Haroldo Kang M.D.;  Location: [facility];  Service:    • HARDWARE REMOVAL ORTHO Left 8/21/2017     Procedure: HARDWARE REMOVAL ORTHO;  Surgeon: Haroldo Kang M.D.;  Location: [facility];  Service:    • LUMBAR FUSION POSTERIOR   4/14/2017     Procedure: LUMBAR FUSION POSTERIOR - MINIMALLY INVASIVE TLIF L4-5;  Surgeon: Bossman Caro M.D.;  Location: [facility];  Service:    • HAMMERTOE CORRECTION Bilateral 12/22/2016     Procedure: HAMMERTOE CORRECTION/METATARSALPHALANGEAL JOINT RELEASE 2/3 RIGHT, 2-3 LEFT;  Surgeon: Haroldo Kang M.D.;  Location: [facility];  Service:    • BUNIONECTOMY Left 12/22/2016     Procedure: BUNIONECTOMY FOR DISTAL HALLUX VALGUS CORRECTION WITH BRANDY;  Surgeon: Haroldo Kang M.D.;  Location: [facility];  Service:    • ORTHOPEDIC OSTEOTOMY Left 12/22/2016     Procedure: ORTHOPEDIC OSTEOTOMY DISTAL METATARSAL 2-5;  Surgeon: Haroldo Kang M.D.;  Location: [facility];  Service:    • HIP ARTH ANTERIOR TOTAL Left 8/18/2016     Procedure: HIP ARTHROPLASTY ANTERIOR TOTAL;  Surgeon: Emiliano Vang M.D.;  Location: [facility];  Service:    • OTHER ORTHOPEDIC SURGERY   6/2014     right shoulder  arthroplasty   • OTHER ORTHOPEDIC SURGERY   11/1/2012     left knee/left ankle/left shoulder   • OTHER ORTHOPEDIC SURGERY   2004     elbow surgery   • OTHER   2000     dislocation left foot surgery at [facility]   • OTHER         \"septoplasty 20 years ago\"    "                  Objective:    Tests and Measures:   02/12/18: DP/PT pulses 2+ easily palpable.  Foot warm.  Pt reports FBS today at 118.  ANT 1.0 taken by Florentin wound tech.  L foot warm with strong and palpable DP pulses, hair growth present on LLE      Orthotic, protective, supportive devices:      Fall Risk Assessment (william all that apply with an X): High fall risk.      Wound Characteristics                                                      Location: L distal hallux    Initial Evaluation  Date: 2/8/18 Encounter Date: 02/12/18 Encounter Date: 02/19/18   Tissue Type and %: 95% adherent yellow, 5% pink viable 90% adherent yellow/10% red moist 70% adherent yellow; 30% red    Periwound: Macerated callous intact Dry, thin callus   Drainage: Mod SS Mod ss Mod ss   Exposed structures None visible GAYLE GAYLE   Wound Edges:   Open  Open open   Odor: None None none   S&S of Infection:   Mild erythema Mild erythema and warmth Mild erythema   Edema: Local Local to toe Local to toe   Sensation: LOPS, neuropathy LOPS, but able to feel tenderness with debridement LOPS, but tender         Measurements: L distal hallux Initial Evaluation  Date: 2/8/18 Encounter Date: 02/16/18   Length (cm) 1 1   Width (cm) 1.2 1.2   Depth (cm) GAYLE GAYLE   Area (cm2) 1.2 1.2cm2   Tract/undermine None GAYLE          Procedures:                Debridement : CSWD using curette to remove white/yellow slough from wound bed, approx. 0.5 cm2 removed.               Cleansed with: NS                                                                                   Periwound protected with: skin prep              Primary dressing: honey gel, alginate              Secondary Dressing: non ad foam               Other: hypafix     Patient Education: Reviewed POC, importance of keeping dressing dry and intact/covering when bathing (pt accidentally got wet this week and changed bandage), glucose control, nutrition for wound healing, s/s of complications/infection,  when to notify MD/go to ER.  Pt verbalized understanding to all.     Professional Collaboration: Pt had CT scan done 2/9/18 and still awaiting review by Dr. Kang.     Assessment:       Wound etiology: trauma vs DFU     Wound Progress: less callus per photo to alma rosa wound. Continued slough build up.     Rationale for Treatment: Medihoney to provide moist wound environment, and facilitate autolytic debridement.  Alginate for absorption.  Nonad foam to pad/protect/additional absorption.     Patient tolerance/compliance: pt agreeable to poc and appt schedule     Complicating factors: DM, HTN, pain     Need for ongoing Advanced Wound Care services: Patient requires skilled therapeutic wound care services for product selection, application of product, debridement, close monitoring with clinical assessment, compression for expedite of wound healing.           Plan:       Treatment Plan and Recommendations:  Diagnosis/ICD10: S91.302A    Procedures/CPT: Selective debridement 84305     Frequency: 2x/week        Treatment Goals: STG 2 Weeks          LTG 4 Weeks   Granulation Tissue: 50% 75%   Decrease Necrotic Tissue to: 50% 25%   Wound Phase:             proliferation proliferation   Decrease Size by: 25% 50%   Periwound:       intact intact   Decrease tracts/undermining by: n/a n/a   Decrease Pain:    0 0                                  At the time of each visit a thorough assessment of the patient is completed to assure the  appropriateness of our plan of care.  The dressings or modalities may need to be adapted   from the original plan to address any significant changes in the wound environment.              Clinician Signature:_______________________________Date__________________        Physician Signature:______________________________Date:__________________

## 2018-02-22 ENCOUNTER — HOSPITAL ENCOUNTER (OUTPATIENT)
Dept: LAB | Facility: MEDICAL CENTER | Age: 58
End: 2018-02-22
Attending: INTERNAL MEDICINE
Payer: COMMERCIAL

## 2018-02-22 ENCOUNTER — HOSPITAL ENCOUNTER (OUTPATIENT)
Dept: LAB | Facility: MEDICAL CENTER | Age: 58
End: 2018-02-22
Attending: FAMILY MEDICINE
Payer: COMMERCIAL

## 2018-02-22 LAB
ALBUMIN SERPL BCP-MCNC: 4.5 G/DL (ref 3.2–4.9)
ALBUMIN/GLOB SERPL: 1.6 G/DL
ALP SERPL-CCNC: 116 U/L (ref 30–99)
ALT SERPL-CCNC: 20 U/L (ref 2–50)
ANION GAP SERPL CALC-SCNC: 10 MMOL/L (ref 0–11.9)
APPEARANCE UR: CLEAR
AST SERPL-CCNC: 21 U/L (ref 12–45)
BACTERIA #/AREA URNS HPF: NEGATIVE /HPF
BASOPHILS # BLD AUTO: 0.6 % (ref 0–1.8)
BASOPHILS # BLD: 0.03 K/UL (ref 0–0.12)
BILIRUB SERPL-MCNC: 0.5 MG/DL (ref 0.1–1.5)
BILIRUB UR QL STRIP.AUTO: NEGATIVE
BUN SERPL-MCNC: 16 MG/DL (ref 8–22)
CALCIUM SERPL-MCNC: 9.3 MG/DL (ref 8.5–10.5)
CHLORIDE SERPL-SCNC: 103 MMOL/L (ref 96–112)
CHOLEST SERPL-MCNC: 171 MG/DL (ref 100–199)
CO2 SERPL-SCNC: 25 MMOL/L (ref 20–33)
COLOR UR: YELLOW
CREAT SERPL-MCNC: 1.04 MG/DL (ref 0.5–1.4)
EOSINOPHIL # BLD AUTO: 0.14 K/UL (ref 0–0.51)
EOSINOPHIL NFR BLD: 2.9 % (ref 0–6.9)
EPI CELLS #/AREA URNS HPF: NEGATIVE /HPF
ERYTHROCYTE [DISTWIDTH] IN BLOOD BY AUTOMATED COUNT: 41.1 FL (ref 35.9–50)
ERYTHROCYTE [SEDIMENTATION RATE] IN BLOOD BY WESTERGREN METHOD: 22 MM/HOUR (ref 0–20)
EST. AVERAGE GLUCOSE BLD GHB EST-MCNC: 128 MG/DL
GLOBULIN SER CALC-MCNC: 2.9 G/DL (ref 1.9–3.5)
GLUCOSE SERPL-MCNC: 131 MG/DL (ref 65–99)
GLUCOSE UR STRIP.AUTO-MCNC: NEGATIVE MG/DL
HBA1C MFR BLD: 6.1 % (ref 0–5.6)
HCT VFR BLD AUTO: 40.6 % (ref 42–52)
HDLC SERPL-MCNC: 34 MG/DL
HGB BLD-MCNC: 13 G/DL (ref 14–18)
HYALINE CASTS #/AREA URNS LPF: ABNORMAL /LPF
IMM GRANULOCYTES # BLD AUTO: 0.03 K/UL (ref 0–0.11)
IMM GRANULOCYTES NFR BLD AUTO: 0.6 % (ref 0–0.9)
KETONES UR STRIP.AUTO-MCNC: NEGATIVE MG/DL
LDLC SERPL CALC-MCNC: ABNORMAL MG/DL
LEUKOCYTE ESTERASE UR QL STRIP.AUTO: NEGATIVE
LYMPHOCYTES # BLD AUTO: 1.18 K/UL (ref 1–4.8)
LYMPHOCYTES NFR BLD: 24.8 % (ref 22–41)
MCH RBC QN AUTO: 26.7 PG (ref 27–33)
MCHC RBC AUTO-ENTMCNC: 32 G/DL (ref 33.7–35.3)
MCV RBC AUTO: 83.5 FL (ref 81.4–97.8)
MICRO URNS: ABNORMAL
MONOCYTES # BLD AUTO: 0.45 K/UL (ref 0–0.85)
MONOCYTES NFR BLD AUTO: 9.5 % (ref 0–13.4)
NEUTROPHILS # BLD AUTO: 2.93 K/UL (ref 1.82–7.42)
NEUTROPHILS NFR BLD: 61.6 % (ref 44–72)
NITRITE UR QL STRIP.AUTO: NEGATIVE
NRBC # BLD AUTO: 0 K/UL
NRBC BLD-RTO: 0 /100 WBC
PH UR STRIP.AUTO: 5.5 [PH]
PLATELET # BLD AUTO: 200 K/UL (ref 164–446)
PMV BLD AUTO: 8.9 FL (ref 9–12.9)
POTASSIUM SERPL-SCNC: 4.1 MMOL/L (ref 3.6–5.5)
PROT SERPL-MCNC: 7.4 G/DL (ref 6–8.2)
PROT UR QL STRIP: 100 MG/DL
PSA SERPL-MCNC: 0.43 NG/ML (ref 0–4)
RBC # BLD AUTO: 4.86 M/UL (ref 4.7–6.1)
RBC # URNS HPF: ABNORMAL /HPF
RBC UR QL AUTO: NEGATIVE
RHEUMATOID FACT SER IA-ACNC: <10 IU/ML (ref 0–14)
SODIUM SERPL-SCNC: 138 MMOL/L (ref 135–145)
SP GR UR STRIP.AUTO: 1.02
T4 SERPL-MCNC: 9.8 UG/DL (ref 4–12)
TRIGL SERPL-MCNC: 477 MG/DL (ref 0–149)
TSH SERPL DL<=0.005 MIU/L-ACNC: 2.36 UIU/ML (ref 0.38–5.33)
URATE SERPL-MCNC: 7.3 MG/DL (ref 2.5–8.3)
UROBILINOGEN UR STRIP.AUTO-MCNC: 0.2 MG/DL
WBC # BLD AUTO: 4.8 K/UL (ref 4.8–10.8)
WBC #/AREA URNS HPF: ABNORMAL /HPF

## 2018-02-22 PROCEDURE — 84443 ASSAY THYROID STIM HORMONE: CPT

## 2018-02-22 PROCEDURE — 83036 HEMOGLOBIN GLYCOSYLATED A1C: CPT

## 2018-02-22 PROCEDURE — 84153 ASSAY OF PSA TOTAL: CPT

## 2018-02-22 PROCEDURE — 80053 COMPREHEN METABOLIC PANEL: CPT

## 2018-02-22 PROCEDURE — 80061 LIPID PANEL: CPT

## 2018-02-22 PROCEDURE — 84436 ASSAY OF TOTAL THYROXINE: CPT

## 2018-02-22 PROCEDURE — 36415 COLL VENOUS BLD VENIPUNCTURE: CPT

## 2018-02-22 PROCEDURE — 84550 ASSAY OF BLOOD/URIC ACID: CPT

## 2018-02-22 PROCEDURE — 85652 RBC SED RATE AUTOMATED: CPT

## 2018-02-22 PROCEDURE — 81001 URINALYSIS AUTO W/SCOPE: CPT

## 2018-02-22 PROCEDURE — 86431 RHEUMATOID FACTOR QUANT: CPT

## 2018-02-22 PROCEDURE — 85025 COMPLETE CBC W/AUTO DIFF WBC: CPT

## 2018-02-23 ENCOUNTER — NON-PROVIDER VISIT (OUTPATIENT)
Dept: WOUND CARE | Facility: MEDICAL CENTER | Age: 58
End: 2018-02-23
Attending: NURSE PRACTITIONER
Payer: COMMERCIAL

## 2018-02-23 PROCEDURE — 97597 DBRDMT OPN WND 1ST 20 CM/<: CPT

## 2018-02-23 NOTE — WOUND TEAM
"Advanced Wound Care  [facility]  [address]  [address]  [place] [address]  [phone] Fax: [phone]        Encounter Note    For Certification Period:  2/8/18-4/28/18        Referring Physician: BONNIE Garcia  Primary Physician: Kevin Chavez MD        Consulting Physicians: Dr. Kang        Wound(s):  L distal hallux     Start of Care:    2/8/18  Subjective:       HPI:     Referring Physician: LEATHA Lima  Primary Physician: Kevin Chavez MD  Consulting Physicians:   Start of Care: 7/14/2016        Subjective:        HPI:  57 yr old pt with DM (controlled) presents to clinic for evaluation and treatment of his L foot distal hallux wound.  He states that he first noticed it approximately a month ago and has been leaving it open to air.  Pt saw Dr. Kang yesterday and Dr. Kang ordered a CT scan which still needs to be scheduled.     From Micky Moore note during foot/nail care last visit 1/30/18:  Pt is a 57 year old gentleman with DM who has attended [facility] in the past, had Surgery with Dr. Kang on Left foot on August 21, 2017 for bunionectomy revision. Referred by Rafia NICOLE due to mycotic, dystrophic nails he is not able to care for himself any longer.    56 year old male living with wife and children. HX of dislocated toes 3-4 years ago while walking, had toes \"fixed,\" but \"it didn't work.\"      12/22/2016 PROCEDURES with Dr Kang:     1.  Left modified Arndt bunionectomy.  2.  Left distal metatarsal osteotomy.  3.  Left distal metatarsal osteotomy of the hallux.  4.  Left distal metatarsal osteotomy, 2, 3, 4, and 5.  5.  Left flexor digitorum longus transfer with a flexor to extensor transfer    of 2 and 3.  6.  Left soft tissue reconstruction, metatarsophalangeal joints 2, 3, 4, and    5.  7.  Left hammertoe correction with proximal interphalangeal arthroplasty, 2    and 3.  8.  Right metatarsophalangeal joint capsulotomy of soft tissue release, 2 and "    3.  9.  Right hammertoe correction with proximal interphalangeal arthroplasty, 2    and 3.                  Pain: pt reports mild tenderness with debridement of wound, otherwise denies pain.         Current Medications:   Current Outpatient Prescriptions:   •  hydroxyzine pamoate (VISTARIL) 100 MG Cap, Take 200 mg by mouth 2 Times a Day., Disp: , Rfl:   •  lamotrigine (LAMICTAL) 200 MG tablet, Take 200 mg by mouth every day. TAKE 1 TAB BY MOUTH EVERY DAY., Disp: , Rfl: 0  •  levothyroxine (SYNTHROID) 112 MCG Tab, Take 1 Tab by mouth Every morning on an empty stomach., Disp: 60 Tab, Rfl: 3  •  tamsulosin (FLOMAX) 0.4 MG capsule, Take 0.4 mg by mouth ONE-HALF HOUR AFTER BREAKFAST., Disp: , Rfl:   •  liraglutide (VICTOZA) 18 MG/3ML Solution Pen-injector injection, Inject 1.8 mg as instructed every morning., Disp: , Rfl:   •  clotrimazole (LOTRIMIN) 1 % Cream, APPLY TO AFFECTED AREA EVERY 12 HOURS, Disp: , Rfl: 3  •  temazepam (RESTORIL) 30 MG capsule, Take 30 mg by mouth at bedtime as needed for Sleep., Disp: , Rfl:   •  venlafaxine XR (EFFEXOR XR) 75 MG CAPSULE SR 24 HR, Take 75 mg by mouth every morning., Disp: , Rfl:   •  alprazolam (XANAX) 0.25 MG Tab, Take 0.25-0.5 mg by mouth 4 times a day as needed for Anxiety., Disp: , Rfl:   •  losartan (COZAAR) 100 MG Tab, Take 100 mg by mouth every morning., Disp: , Rfl:   •  oxycodone-acetaminophen (PERCOCET) 5-325 MG TABS, Take 1-2 Tabs by mouth every four hours as needed., Disp: , Rfl:     Allergies: Demerol and Septra [bactrim ds]      Past Surgical History:   Past Surgical History         Past Surgical History:   Procedure Laterality Date   • TOE FUSION Right 11/13/2017     Procedure: TOE FUSION - 1ST METATARSALPHALANGEAL;  Surgeon: Haroldo Kang M.D.;  Location: SURGERY [facility];  Service: Orthopedics   • METATARSAL HEAD RESECTION Right 11/13/2017     Procedure: METATARSAL HEAD RESECTION - EXCISION;  Surgeon: Haroldo Kang M.D.;  Location: SURGERY [facility];  Service: Orthopedics   • HAMMERTOE CORRECTION Right 11/13/2017     Procedure: HAMMERTOE CORRECTION 2-5;  Surgeon: Haroldo Kang M.D.;  Location: [facility];  Service: Orthopedics   • METATARSAL HEAD RESECTION Left 8/21/2017     Procedure: METATARSAL HEAD RESECTION - 2-5;  Surgeon: Haroldo Kang M.D.;  Location: [facility];  Service:    • TOE FUSION Left 8/21/2017     Procedure: TOE FUSION - 1ST MTP;  Surgeon: Haroldo Kang M.D.;  Location: [facility];  Service:    • HARDWARE REMOVAL ORTHO Left 8/21/2017     Procedure: HARDWARE REMOVAL ORTHO;  Surgeon: Haroldo Kang M.D.;  Location: [facility];  Service:    • LUMBAR FUSION POSTERIOR   4/14/2017     Procedure: LUMBAR FUSION POSTERIOR - MINIMALLY INVASIVE TLIF L4-5;  Surgeon: Bossman Caro M.D.;  Location: [facility];  Service:    • HAMMERTOE CORRECTION Bilateral 12/22/2016     Procedure: HAMMERTOE CORRECTION/METATARSALPHALANGEAL JOINT RELEASE 2/3 RIGHT, 2-3 LEFT;  Surgeon: Haroldo Kang M.D.;  Location: [facility];  Service:    • BUNIONECTOMY Left 12/22/2016     Procedure: BUNIONECTOMY FOR DISTAL HALLUX VALGUS CORRECTION WITH BRANDY;  Surgeon: Haroldo Kang M.D.;  Location: [facility];  Service:    • ORTHOPEDIC OSTEOTOMY Left 12/22/2016     Procedure: ORTHOPEDIC OSTEOTOMY DISTAL METATARSAL 2-5;  Surgeon: Haroldo Kang M.D.;  Location: [facility];  Service:    • HIP ARTH ANTERIOR TOTAL Left 8/18/2016     Procedure: HIP ARTHROPLASTY ANTERIOR TOTAL;  Surgeon: Emiliano Vang M.D.;  Location: [facility];  Service:    • OTHER ORTHOPEDIC SURGERY   6/2014     right shoulder  arthroplasty   • OTHER ORTHOPEDIC SURGERY   11/1/2012     left knee/left ankle/left shoulder   • OTHER ORTHOPEDIC SURGERY   2004     elbow surgery   • OTHER   2000     dislocation left foot surgery at [facility]   • OTHER         \"septoplasty 20 years ago\"    "                  Objective:    Tests and Measures:   02/12/18: DP/PT pulses 2+ easily palpable.  Foot warm.  Pt reports FBS today at 118.  ANT 1.0 taken by Florentin wound tech.  L foot warm with strong and palpable DP pulses, hair growth present on LLE      Orthotic, protective, supportive devices:      Fall Risk Assessment (william all that apply with an X): High fall risk.      Wound Characteristics                                                      Location: L distal hallux    Initial Evaluation  Date: 2/8/18 Encounter Date: 02/19/18 Encounter Date: 02/23/2018   Tissue Type and %: 95% adherent yellow, 5% pink viable 70% adherent yellow; 30% red  60% adherent yellow, 40% moist pink/red   Periwound: Macerated callous Dry, thin callus Thin callus   Drainage: Mod SS Mod ss Mod SS   Exposed structures None visible GAYLE GAYLE   Wound Edges:   Open  open Open   Odor: None none None   S&S of Infection:   Mild erythema Mild erythema Mild erythema   Edema: Local Local to toe Local   Sensation: LOPS, neuropathy LOPS, but tender LOPS, but pain when walking a lot         Measurements: L distal hallux Initial Evaluation  Date: 2/8/18 Encounter Date: 02/16/18 Encounter Date: 02/23/2018   Length (cm) 1 1 0.8   Width (cm) 1.2 1.2 1   Depth (cm) GAYLE GAYLE GAYLE   Area (cm2) 1.2 1.2cm2 0.8 cm2   Tract/undermine None GAYLE GAYLE        Procedures:                Debridement : CSWD using curette to remove ~0.5cm2 loose white/yellow slough from wound bed and ~0.5 cm2 of periwound callus              Cleansed with: NS                                                                                   Periwound protected with: skin prep              Primary dressing: honey alginate              Secondary Dressing: non ad foam               Other: hypafix     Patient Education: POC and wound progress discussed with pt. Wound smaller per measurement. Pt still awaiting to hear back from Dr. Kang on CT result. Reviewed s/s of infection, to keep dressing  CDI, importance of offloading and when to go to ER. Pt verbalizes understanding.     Professional Collaboration: None today.     Assessment:       Wound etiology: trauma vs DFU     Wound Progress: Wound smaller per measurement     Rationale for Treatment: Medihoney to provide moist wound environment, and facilitate autolytic debridement.  Alginate for absorption.  Nonad foam to pad/protect/additional absorption.     Patient tolerance/compliance: pt agreeable to poc and appt schedule     Complicating factors: DM, HTN, pain     Need for ongoing Advanced Wound Care services: Patient requires skilled therapeutic wound care services for product selection, application of product, debridement, close monitoring with clinical assessment, compression for expedite of wound healing.           Plan:       Treatment Plan and Recommendations:  Diagnosis/ICD10: S91.302A    Procedures/CPT: Selective debridement 19758     Frequency: 2x/week        Treatment Goals: STG 2 Weeks          LTG 4 Weeks   Granulation Tissue: 50% 75%   Decrease Necrotic Tissue to: 50% 25%   Wound Phase:             proliferation proliferation   Decrease Size by: 25% 50%   Periwound:       intact intact   Decrease tracts/undermining by: n/a n/a   Decrease Pain:    0 0                                  At the time of each visit a thorough assessment of the patient is completed to assure the  appropriateness of our plan of care.  The dressings or modalities may need to be adapted   from the original plan to address any significant changes in the wound environment.              Clinician Signature:_______________________________Date__________________        Physician Signature:______________________________Date:__________________

## 2018-02-26 ENCOUNTER — NON-PROVIDER VISIT (OUTPATIENT)
Dept: WOUND CARE | Facility: MEDICAL CENTER | Age: 58
End: 2018-02-26
Payer: COMMERCIAL

## 2018-02-26 PROCEDURE — 97597 DBRDMT OPN WND 1ST 20 CM/<: CPT

## 2018-02-26 NOTE — WOUND TEAM
"Advanced Wound Care  [facility]  [address]  [address]  [place] [address]  [phone] Fax: [phone]        Encounter Note    For Certification Period:  2/8/18-4/28/18        Referring Physician: BONNIE Garcia  Primary Physician: Kevin Chavez MD        Consulting Physicians: Dr. Kang        Wound(s):  L distal hallux     Start of Care:    2/8/18  Subjective:  Pt have continuing pain from surgery, will see Dr. Kang tomorrow      HPI:     Referring Physician: LEATHA Lima  Primary Physician: Kevin Chavez MD  Consulting Physicians:   Start of Care: 7/14/2016        Subjective:        HPI:  57 yr old pt with DM (controlled) presents to clinic for evaluation and treatment of his L foot distal hallux wound.  He states that he first noticed it approximately a month ago and has been leaving it open to air.  Pt saw Dr. Kang yesterday and Dr. Kang ordered a CT scan which still needs to be scheduled.     From Micky Moore note during foot/nail care last visit 1/30/18:  Pt is a 57 year old gentleman with DM who has attended [facility] in the past, had Surgery with Dr. Kang on Left foot on August 21, 2017 for bunionectomy revision. Referred by Rafia NICOLE due to mycotic, dystrophic nails he is not able to care for himself any longer.    56 year old male living with wife and children. HX of dislocated toes 3-4 years ago while walking, had toes \"fixed,\" but \"it didn't work.\"      12/22/2016 PROCEDURES with Dr Kang:     1.  Left modified Arndt bunionectomy.  2.  Left distal metatarsal osteotomy.  3.  Left distal metatarsal osteotomy of the hallux.  4.  Left distal metatarsal osteotomy, 2, 3, 4, and 5.  5.  Left flexor digitorum longus transfer with a flexor to extensor transfer    of 2 and 3.  6.  Left soft tissue reconstruction, metatarsophalangeal joints 2, 3, 4, and    5.  7.  Left hammertoe correction with proximal interphalangeal arthroplasty, 2    and 3.  8.  Right " metatarsophalangeal joint capsulotomy of soft tissue release, 2 and    3.  9.  Right hammertoe correction with proximal interphalangeal arthroplasty, 2    and 3.                  Pain: pt reports mild tenderness with debridement of wound, otherwise denies pain.         Current Medications:   Current Outpatient Prescriptions:   •  hydroxyzine pamoate (VISTARIL) 100 MG Cap, Take 200 mg by mouth 2 Times a Day., Disp: , Rfl:   •  lamotrigine (LAMICTAL) 200 MG tablet, Take 200 mg by mouth every day. TAKE 1 TAB BY MOUTH EVERY DAY., Disp: , Rfl: 0  •  levothyroxine (SYNTHROID) 112 MCG Tab, Take 1 Tab by mouth Every morning on an empty stomach., Disp: 60 Tab, Rfl: 3  •  tamsulosin (FLOMAX) 0.4 MG capsule, Take 0.4 mg by mouth ONE-HALF HOUR AFTER BREAKFAST., Disp: , Rfl:   •  liraglutide (VICTOZA) 18 MG/3ML Solution Pen-injector injection, Inject 1.8 mg as instructed every morning., Disp: , Rfl:   •  clotrimazole (LOTRIMIN) 1 % Cream, APPLY TO AFFECTED AREA EVERY 12 HOURS, Disp: , Rfl: 3  •  temazepam (RESTORIL) 30 MG capsule, Take 30 mg by mouth at bedtime as needed for Sleep., Disp: , Rfl:   •  venlafaxine XR (EFFEXOR XR) 75 MG CAPSULE SR 24 HR, Take 75 mg by mouth every morning., Disp: , Rfl:   •  alprazolam (XANAX) 0.25 MG Tab, Take 0.25-0.5 mg by mouth 4 times a day as needed for Anxiety., Disp: , Rfl:   •  losartan (COZAAR) 100 MG Tab, Take 100 mg by mouth every morning., Disp: , Rfl:   •  oxycodone-acetaminophen (PERCOCET) 5-325 MG TABS, Take 1-2 Tabs by mouth every four hours as needed., Disp: , Rfl:     Allergies: Demerol and Septra [bactrim ds]      Past Surgical History:   Past Surgical History        Past Surgical History:   Procedure Date   • TOE FUSION 11/13/2017     Procedure: TOE FUSION - 1ST METATARSALPHALANGEAL;  Surgeon: Haroldo Kang M.D.;  Location: SURGERY [facility];  Service: Orthopedics   • METATARSAL HEAD RESECTION 11/13/2017     Procedure: METATARSAL HEAD RESECTION - EXCISION;  Surgeon:  "Haroldo Kang M.D.;  Location: [facility];  Service: Orthopedics   • HAMMERTOE CORRECTION 11/13/2017     Procedure: HAMMERTOE CORRECTION 2-5;  Surgeon: Haroldo Kang M.D.;  Location: SURGERY [facility];  Service: Orthopedics   • METATARSAL HEAD RESECTION 8/21/2017     Procedure: METATARSAL HEAD RESECTION - 2-5;  Surgeon: Haroldo Kang M.D.;  Location: SURGERY [facility];  Service:    • TOE FUSION 8/21/2017     Procedure: TOE FUSION - 1ST MTP;  Surgeon: Haroldo Kang M.D.;  Location: SURGERY [facility];  Service:    • HARDWARE REMOVAL ORTHO 8/21/2017     Procedure: HARDWARE REMOVAL ORTHO;  Surgeon: Haroldo Kang M.D.;  Location: SURGERY [facility];  Service:    • LUMBAR FUSION POSTERIOR 4/14/2017     Procedure: LUMBAR FUSION POSTERIOR - MINIMALLY INVASIVE TLIF L4-5;  Surgeon: Bossman Caro M.D.;  Location: [facility];  Service:    • HAMMERTOE CORRECTION 12/22/2016     Procedure: HAMMERTOE CORRECTION/METATARSALPHALANGEAL JOINT RELEASE 2/3 RIGHT, 2-3 LEFT;  Surgeon: Haroldo Kang M.D.;  Location: [facility];  Service:    • BUNIONECTOMY 12/22/2016     Procedure: BUNIONECTOMY FOR DISTAL HALLUX VALGUS CORRECTION WITH BRANDY;  Surgeon: Haroldo Kang M.D.;  Location: [facility];  Service:    • ORTHOPEDIC OSTEOTOMY 12/22/2016     Procedure: ORTHOPEDIC OSTEOTOMY DISTAL METATARSAL 2-5;  Surgeon: Haroldo Kang M.D.;  Location: SURGERY [facility];  Service:    • HIP ARTH ANTERIOR TOTAL 8/18/2016     Procedure: HIP ARTHROPLASTY ANTERIOR TOTAL;  Surgeon: Emiliano Vang M.D.;  Location: SURGERY [facility];  Service:    • OTHER ORTHOPEDIC SURGERY 6/2014     right shoulder  arthroplasty   • OTHER ORTHOPEDIC SURGERY 11/1/2012     left knee/left ankle/left shoulder   • OTHER ORTHOPEDIC SURGERY 2004     elbow surgery   • OTHER 2000     dislocation left foot surgery at [facility]   • OTHER       \"septoplasty 20 years ago\"            "          Objective:    Tests and Measures: 2/9/2018 CT scan findings:   There is no acute fracture.    There is no focal soft tissue abnormality.  BS today 126    .  ANT 1.0 taken by Florentin wound tech.  L foot warm with strong and palpable DP pulses, hair growth present on LLE      Orthotic, protective, supportive devices:      Fall Risk Assessment (william all that apply with an X): High fall risk.      Wound Characteristics                                                      Location: L distal hallux    Initial Evaluation  Date: 2/8/18 Encounter Date: 02/26/2018   Tissue Type and %: 95% adherent yellow, 5% pink viable 30% adherent yellow, 70% moist pink/red   Periwound: Macerated callous Mod  callus   Drainage: Mod SS scant SS   Exposed structures None visible None visible or palpable   Wound Edges:   Open  Open   Odor: None None   S&S of Infection:   Mild erythema Mild erythema   Edema: Local Local   Sensation: LOPS, neuropathy LOPS         Measurements: L distal hallux Initial Evaluation  Date: 2/8/18 Encounter Date: 02/16/18 Encounter Date: 02/23/2018   Length (cm) 1 1 0.8   Width (cm) 1.2 1.2 1   Depth (cm) GAYLE GAYLE GAYLE   Area (cm2) 1.2 1.2cm2 0.8 cm2   Tract/undermine None GAYLE GAYLE        Procedures:                Debridement : CSWD using curette and scalpel to remove approx 1.5  loose white/yellow slough from wound bed and periwound callus              Cleansed with: NS                                                                                   Periwound protected with: skin prep              Primary dressing: honey gel              Secondary Dressing: non ad foam               Other: hypafix     Patient Education: Instructed pt on wound progress. Pt to see Dr. Kang tomorrow, continues to have pain post-surgery throughout foot. Pt knowledgeable on s/s infection, when to see medical care. Wearing open toed sandal to alleviate pressure to toe. Pt verbalized understanding.   Professional Collaboration:  None today.     Assessment:       Wound etiology: trauma vs DFU     Wound Progress: Wound smaller per measurement     Rationale for Treatment: Medihoney to provide moist wound environment, and facilitate autolytic debridement.  Alginate for absorption.  Nonad foam to pad/protect/additional absorption.     Patient tolerance/compliance: pt agreeable to poc and appt schedule     Complicating factors: DM, HTN, pain     Need for ongoing Advanced Wound Care services: Patient requires skilled therapeutic wound care services for product selection, application of product, debridement, close monitoring with clinical assessment, compression for expedite of wound healing.           Plan:       Treatment Plan and Recommendations:  Diagnosis/ICD10: S91.302A    Procedures/CPT: Selective debridement 05873     Frequency: 2x/week        Treatment Goals: STG 2 Weeks          LTG 4 Weeks   Granulation Tissue: 50% 75%   Decrease Necrotic Tissue to: 50% 25%   Wound Phase:             proliferation proliferation   Decrease Size by: 25% 50%   Periwound:       intact intact   Decrease tracts/undermining by: n/a n/a   Decrease Pain:    0 0                                  At the time of each visit a thorough assessment of the patient is completed to assure the  appropriateness of our plan of care.  The dressings or modalities may need to be adapted   from the original plan to address any significant changes in the wound environment.              Clinician Signature:_______________________________Date__________________        Physician Signature:______________________________Date:__________________

## 2018-03-02 ENCOUNTER — NON-PROVIDER VISIT (OUTPATIENT)
Dept: WOUND CARE | Facility: MEDICAL CENTER | Age: 58
End: 2018-03-02
Attending: NURSE PRACTITIONER
Payer: COMMERCIAL

## 2018-03-02 ENCOUNTER — HOSPITAL ENCOUNTER (OUTPATIENT)
Facility: MEDICAL CENTER | Age: 58
End: 2018-03-02
Attending: NURSE PRACTITIONER
Payer: COMMERCIAL

## 2018-03-02 DIAGNOSIS — T14.8XXA WOUND INFECTION: ICD-10-CM

## 2018-03-02 DIAGNOSIS — L08.9 WOUND INFECTION: ICD-10-CM

## 2018-03-02 LAB
GRAM STN SPEC: NORMAL
SIGNIFICANT IND 70042: NORMAL
SITE SITE: NORMAL
SOURCE SOURCE: NORMAL

## 2018-03-02 PROCEDURE — 87205 SMEAR GRAM STAIN: CPT

## 2018-03-02 PROCEDURE — 87070 CULTURE OTHR SPECIMN AEROBIC: CPT

## 2018-03-02 PROCEDURE — 97597 DBRDMT OPN WND 1ST 20 CM/<: CPT

## 2018-03-02 NOTE — WOUND TEAM
"Advanced Wound Care  [facility]  [address]  [address]  [place] [address]  [phone] Fax: [phone]        Encounter Note    For Certification Period:  2/8/18-4/28/18        Referring Physician: BONNIE Garcia  Primary Physician: Kevin Chavez MD        Consulting Physicians: Dr. Kang        Wound(s):  L distal hallux    Start of Care:    2/8/18  Subjective:  Pt have continuing pain from surgery, will see Dr. Kang tomorrow      HPI:   Primary Physician: Kevin Chavez MD  Consulting Physicians:   Start of Care: 7/14/2016        Subjective:        HPI:  57 yr old pt with DM (controlled) presents to clinic for evaluation and treatment of his L foot distal hallux wound.  He states that he first noticed it approximately a month ago and has been leaving it open to air.  Pt saw Dr. Kang yesterday and Dr. Kang ordered a CT scan which still needs to be scheduled.     From Micky Moore note during foot/nail care last visit 1/30/18:  Pt is a 57 year old gentleman with DM who has attended [facility] in the past, had Surgery with Dr. Kang on Left foot on August 21, 2017 for bunionectomy revision. Referred by Rafia NICOLE due to mycotic, dystrophic nails he is not able to care for himself any longer.    56 year old male living with wife and children. HX of dislocated toes 3-4 years ago while walking, had toes \"fixed,\" but \"it didn't work.\"      12/22/2016 PROCEDURES with Dr Kang:     1.  Left modified Arndt bunionectomy.  2.  Left distal metatarsal osteotomy.  3.  Left distal metatarsal osteotomy of the hallux.  4.  Left distal metatarsal osteotomy, 2, 3, 4, and 5.  5.  Left flexor digitorum longus transfer with a flexor to extensor transfer    of 2 and 3.  6.  Left soft tissue reconstruction, metatarsophalangeal joints 2, 3, 4, and    5.  7.  Left hammertoe correction with proximal interphalangeal arthroplasty, 2    and 3.  8.  Right metatarsophalangeal joint capsulotomy of soft " tissue release, 2 and    3.  9.  Right hammertoe correction with proximal interphalangeal arthroplasty, 2    and 3.                  Pain: Pt reports toe is painful.      Current Medications:   Current Outpatient Prescriptions:   •  hydroxyzine pamoate (VISTARIL) 100 MG Cap, Take 200 mg by mouth 2 Times a Day., Disp: , Rfl:   •  lamotrigine (LAMICTAL) 200 MG tablet, Take 200 mg by mouth every day. TAKE 1 TAB BY MOUTH EVERY DAY., Disp: , Rfl: 0  •  levothyroxine (SYNTHROID) 112 MCG Tab, Take 1 Tab by mouth Every morning on an empty stomach., Disp: 60 Tab, Rfl: 3  •  tamsulosin (FLOMAX) 0.4 MG capsule, Take 0.4 mg by mouth ONE-HALF HOUR AFTER BREAKFAST., Disp: , Rfl:   •  liraglutide (VICTOZA) 18 MG/3ML Solution Pen-injector injection, Inject 1.8 mg as instructed every morning., Disp: , Rfl:   •  clotrimazole (LOTRIMIN) 1 % Cream, APPLY TO AFFECTED AREA EVERY 12 HOURS, Disp: , Rfl: 3  •  temazepam (RESTORIL) 30 MG capsule, Take 30 mg by mouth at bedtime as needed for Sleep., Disp: , Rfl:   •  venlafaxine XR (EFFEXOR XR) 75 MG CAPSULE SR 24 HR, Take 75 mg by mouth every morning., Disp: , Rfl:   •  alprazolam (XANAX) 0.25 MG Tab, Take 0.25-0.5 mg by mouth 4 times a day as needed for Anxiety., Disp: , Rfl:   •  losartan (COZAAR) 100 MG Tab, Take 100 mg by mouth every morning., Disp: , Rfl:   •  oxycodone-acetaminophen (PERCOCET) 5-325 MG TABS, Take 1-2 Tabs by mouth every four hours as needed., Disp: , Rfl:     Allergies: Demerol and Septra [bactrim ds]      Past Surgical History:   Past Surgical History        Past Surgical History:   Procedure Date   • TOE FUSION 11/13/2017     Procedure: TOE FUSION - 1ST METATARSALPHALANGEAL;  Surgeon: Haroldo Kang M.D.;  Location: SURGERY [facility];  Service: Orthopedics   • METATARSAL HEAD RESECTION 11/13/2017     Procedure: METATARSAL HEAD RESECTION - EXCISION;  Surgeon: Haroldo Kang M.D.;  Location: SURGERY [facility];  Service: Orthopedics   • [facility]  "CORRECTION 11/13/2017     Procedure: HAMMERTOE CORRECTION 2-5;  Surgeon: Haroldo Kang M.D.;  Location: SURGERY [facility];  Service: Orthopedics   • METATARSAL HEAD RESECTION 8/21/2017     Procedure: METATARSAL HEAD RESECTION - 2-5;  Surgeon: Haroldo Kang M.D.;  Location: SURGERY [facility];  Service:    • TOE FUSION 8/21/2017     Procedure: TOE FUSION - 1ST MTP;  Surgeon: Haroldo Kang M.D.;  Location: SURGERY [facility];  Service:    • HARDWARE REMOVAL ORTHO 8/21/2017     Procedure: HARDWARE REMOVAL ORTHO;  Surgeon: Haroldo Kang M.D.;  Location: SURGERY [facility];  Service:    • LUMBAR FUSION POSTERIOR 4/14/2017     Procedure: LUMBAR FUSION POSTERIOR - MINIMALLY INVASIVE TLIF L4-5;  Surgeon: Bossman Caro M.D.;  Location: [facility];  Service:    • HAMMERTOE CORRECTION 12/22/2016     Procedure: HAMMERTOE CORRECTION/METATARSALPHALANGEAL JOINT RELEASE 2/3 RIGHT, 2-3 LEFT;  Surgeon: Haroldo Kang M.D.;  Location: [facility];  Service:    • BUNIONECTOMY 12/22/2016     Procedure: BUNIONECTOMY FOR DISTAL HALLUX VALGUS CORRECTION WITH BRANDY;  Surgeon: Haroldo Kang M.D.;  Location: [facility];  Service:    • ORTHOPEDIC OSTEOTOMY 12/22/2016     Procedure: ORTHOPEDIC OSTEOTOMY DISTAL METATARSAL 2-5;  Surgeon: Haroldo Kang M.D.;  Location: SURGERY [facility];  Service:    • HIP ARTH ANTERIOR TOTAL 8/18/2016     Procedure: HIP ARTHROPLASTY ANTERIOR TOTAL;  Surgeon: Emiliano Vang M.D.;  Location: SURGERY [facility];  Service:    • OTHER ORTHOPEDIC SURGERY 6/2014     right shoulder  arthroplasty   • OTHER ORTHOPEDIC SURGERY 11/1/2012     left knee/left ankle/left shoulder   • OTHER ORTHOPEDIC SURGERY 2004     elbow surgery   • OTHER 2000     dislocation left foot surgery at [facility]   • OTHER       \"septoplasty 20 years ago\"                     Objective:    Tests and Measures:   03/02/18: Culture taken due to erythema, hot, " pain.  2/9/2018 CT scan findings:   There is no acute fracture.    There is no focal soft tissue abnormality.  BS today 126    .  ANT 1.0 taken by Florentin wound tech.  L foot warm with strong and palpable DP pulses, hair growth present on LLE      Orthotic, protective, supportive devices:      Fall Risk Assessment (william all that apply with an X): High fall risk.      Wound Characteristics                                                      Location: L distal hallux    Initial Evaluation  Date: 2/8/18 Encounter Date: 02/26/2018 Encounter Date: 03/02/18   Tissue Type and %: 95% adherent yellow, 5% pink viable 30% adherent yellow, 70% moist pink/red Prior to silver nitrate application: 80% adherent yellow; 20% moist red.  After silver nitrate: 100% grey.   Periwound: Macerated callous Mod  callus Thin callus, erythema, hot   Drainage: Mod SS scant SS GAYLE, dsng not in place   Exposed structures None visible None visible or palpable GAYLE   Wound Edges:   Open  Open open   Odor: None None none   S&S of Infection:   Mild erythema Mild erythema Pain, erythema, hot   Edema: Local Local Local to toe   Sensation: LOPS, neuropathy LOPS LOPS, but painful         Measurements: L distal hallux Initial Evaluation  Date: 2/8/18 Encounter Date: 02/23/2018 Encounter Date: 03/02/18   Length (cm) 1 0.8 1   Width (cm) 1.2 1 1   Depth (cm) GAYLE GAYLE GAYLE   Area (cm2) 1.2 0.8 cm2 1cm2   Tract/undermine None GAYLE GAYLE           Procedures:                Debridement : CSWD using curette to remove ~1cm2 slough from wound bed and using forceps/scissors to remove ~0.5cm2 alma rosa wound callus.  Silver Nitrate after debridement to stop bleeding.              Cleansed with: NS                                                                                   Periwound protected with: skin prep              Primary dressing: Aquacel ag              Secondary Dressing: non ad foam               Other: hypafix, offloading sandal     Patient Education: POC and  wound progress reviewed with pt.  He saw Dr. Kang earlier this week and there was no change to the poc other than adding gabapentin.  Signs infection noted today.  Pt notes he has had gout in the past, but had uric acid level about 10 days ago that was normal.  Culture taken.  Need to monitor for s/sx advancing infection taught.  Pt feeling fine, denies fevers/chills.  Advised if s/sx infection worsen, to go to UC/ER.  Pt expresses good understanding of instructions.      Professional Collaboration: Case reviewed with BONNIE Garcia.  Plan to wait for culture result and perhaps add to LPS next week.  Will decide pending cx result and how pt is doing.      Assessment:       Wound etiology: trauma vs DFU     Wound Progress: s/sx infection noted.  cx taken.     Rationale for Treatment: AqAg to manage bioburden, absorb exudate, and maintain moist wound environment without laterally wicking exudate therefore reducing alma rosa-wound maceration.  Nonad foam to pad/protect.  Offloading sandal to keep pressure off site.    Patient tolerance/compliance: pt agreeable to poc and appt schedule     Complicating factors: DM, HTN, pain     Need for ongoing Advanced Wound Care services: Patient requires skilled therapeutic wound care services for product selection, application of product, debridement, close monitoring with clinical assessment, compression for expedite of wound healing.           Plan:       Treatment Plan and Recommendations:  Diagnosis/ICD10: S91.302A    Procedures/CPT: Selective debridement 51829     Frequency: 2x/week        Treatment Goals: STG 2 Weeks          LTG 4 Weeks   Granulation Tissue: 50% 75%   Decrease Necrotic Tissue to: 50% 25%   Wound Phase:             proliferation proliferation   Decrease Size by: 25% 50%   Periwound:       intact intact   Decrease tracts/undermining by: n/a n/a   Decrease Pain:    0 0                                  At the time of each visit a thorough assessment of the  patient is completed to assure the  appropriateness of our plan of care.  The dressings or modalities may need to be adapted   from the original plan to address any significant changes in the wound environment.              Clinician Signature:_______________________________Date__________________        Physician Signature:______________________________Date:__________________

## 2018-03-04 LAB
BACTERIA WND AEROBE CULT: NORMAL
GRAM STN SPEC: NORMAL
SIGNIFICANT IND 70042: NORMAL
SITE SITE: NORMAL
SOURCE SOURCE: NORMAL

## 2018-03-05 ENCOUNTER — NON-PROVIDER VISIT (OUTPATIENT)
Dept: WOUND CARE | Facility: MEDICAL CENTER | Age: 58
End: 2018-03-05
Attending: NURSE PRACTITIONER
Payer: COMMERCIAL

## 2018-03-05 PROCEDURE — 97597 DBRDMT OPN WND 1ST 20 CM/<: CPT

## 2018-03-05 NOTE — WOUND TEAM
"Advanced Wound Care  [facility]  [address]  [address]  [place] [address]  [phone] Fax: [phone]        Encounter Note    For Certification Period:  2/8/18-4/28/18        Referring Physician: BONNIE Garcia  Primary Physician: Kevin Chavez MD        Consulting Physicians: Dr. Kang        Wound(s):  L distal hallux    Start of Care:    2/8/18  Subjective:  Pt have continuing pain from surgery, will see Dr. Kang tomorrow      HPI:   Primary Physician: Kevin Chavez MD  Consulting Physicians:   Start of Care: 7/14/2016        Subjective:        HPI:  57 yr old pt with DM (controlled) presents to clinic for evaluation and treatment of his L foot distal hallux wound.  He states that he first noticed it approximately a month ago and has been leaving it open to air.  Pt saw Dr. Kang yesterday and Dr. Kang ordered a CT scan which still needs to be scheduled.     From Micky Moore note during foot/nail care last visit 1/30/18:  Pt is a 57 year old gentleman with DM who has attended [facility] in the past, had Surgery with Dr. Kang on Left foot on August 21, 2017 for bunionectomy revision. Referred by Rafia NICOLE due to mycotic, dystrophic nails he is not able to care for himself any longer.    56 year old male living with wife and children. HX of dislocated toes 3-4 years ago while walking, had toes \"fixed,\" but \"it didn't work.\"      12/22/2016 PROCEDURES with Dr Kang:     1.  Left modified Arndt bunionectomy.  2.  Left distal metatarsal osteotomy.  3.  Left distal metatarsal osteotomy of the hallux.  4.  Left distal metatarsal osteotomy, 2, 3, 4, and 5.  5.  Left flexor digitorum longus transfer with a flexor to extensor transfer    of 2 and 3.  6.  Left soft tissue reconstruction, metatarsophalangeal joints 2, 3, 4, and    5.  7.  Left hammertoe correction with proximal interphalangeal arthroplasty, 2    and 3.  8.  Right metatarsophalangeal joint capsulotomy of soft " tissue release, 2 and    3.  9.  Right hammertoe correction with proximal interphalangeal arthroplasty, 2    and 3.                  Pain: Pt reports toe is less painful than last week.    Current Medications:   Current Outpatient Prescriptions:   •  hydroxyzine pamoate (VISTARIL) 100 MG Cap, Take 200 mg by mouth 2 Times a Day., Disp: , Rfl:   •  lamotrigine (LAMICTAL) 200 MG tablet, Take 200 mg by mouth every day. TAKE 1 TAB BY MOUTH EVERY DAY., Disp: , Rfl: 0  •  levothyroxine (SYNTHROID) 112 MCG Tab, Take 1 Tab by mouth Every morning on an empty stomach., Disp: 60 Tab, Rfl: 3  •  tamsulosin (FLOMAX) 0.4 MG capsule, Take 0.4 mg by mouth ONE-HALF HOUR AFTER BREAKFAST., Disp: , Rfl:   •  liraglutide (VICTOZA) 18 MG/3ML Solution Pen-injector injection, Inject 1.8 mg as instructed every morning., Disp: , Rfl:   •  clotrimazole (LOTRIMIN) 1 % Cream, APPLY TO AFFECTED AREA EVERY 12 HOURS, Disp: , Rfl: 3  •  temazepam (RESTORIL) 30 MG capsule, Take 30 mg by mouth at bedtime as needed for Sleep., Disp: , Rfl:   •  venlafaxine XR (EFFEXOR XR) 75 MG CAPSULE SR 24 HR, Take 75 mg by mouth every morning., Disp: , Rfl:   •  alprazolam (XANAX) 0.25 MG Tab, Take 0.25-0.5 mg by mouth 4 times a day as needed for Anxiety., Disp: , Rfl:   •  losartan (COZAAR) 100 MG Tab, Take 100 mg by mouth every morning., Disp: , Rfl:   •  oxycodone-acetaminophen (PERCOCET) 5-325 MG TABS, Take 1-2 Tabs by mouth every four hours as needed., Disp: , Rfl:     Allergies: Demerol and Septra [bactrim ds]      Past Surgical History:   Past Surgical History        Past Surgical History:   Procedure Date   • TOE FUSION 11/13/2017     Procedure: TOE FUSION - 1ST METATARSALPHALANGEAL;  Surgeon: Haroldo Kang M.D.;  Location: SURGERY [facility];  Service: Orthopedics   • METATARSAL HEAD RESECTION 11/13/2017     Procedure: METATARSAL HEAD RESECTION - EXCISION;  Surgeon: Haroldo Kang M.D.;  Location: SURGERY [facility];  Service: Orthopedics   •  "HAMMERTOE CORRECTION 11/13/2017     Procedure: HAMMERTOE CORRECTION 2-5;  Surgeon: Haroldo Kang M.D.;  Location: SURGERY [facility];  Service: Orthopedics   • METATARSAL HEAD RESECTION 8/21/2017     Procedure: METATARSAL HEAD RESECTION - 2-5;  Surgeon: Haroldo Kang M.D.;  Location: SURGERY [facility];  Service:    • TOE FUSION 8/21/2017     Procedure: TOE FUSION - 1ST MTP;  Surgeon: Haroldo Kang M.D.;  Location: SURGERY [facility];  Service:    • HARDWARE REMOVAL ORTHO 8/21/2017     Procedure: HARDWARE REMOVAL ORTHO;  Surgeon: Haroldo Kang M.D.;  Location: SURGERY [facility];  Service:    • LUMBAR FUSION POSTERIOR 4/14/2017     Procedure: LUMBAR FUSION POSTERIOR - MINIMALLY INVASIVE TLIF L4-5;  Surgeon: Bossman Caro M.D.;  Location: [facility];  Service:    • HAMMERTOE CORRECTION 12/22/2016     Procedure: HAMMERTOE CORRECTION/METATARSALPHALANGEAL JOINT RELEASE 2/3 RIGHT, 2-3 LEFT;  Surgeon: Haroldo Kang M.D.;  Location: [facility];  Service:    • BUNIONECTOMY 12/22/2016     Procedure: BUNIONECTOMY FOR DISTAL HALLUX VALGUS CORRECTION WITH BRANDY;  Surgeon: Haroldo Kang M.D.;  Location: [facility];  Service:    • ORTHOPEDIC OSTEOTOMY 12/22/2016     Procedure: ORTHOPEDIC OSTEOTOMY DISTAL METATARSAL 2-5;  Surgeon: Haroldo Kang M.D.;  Location: [facility];  Service:    • HIP ARTH ANTERIOR TOTAL 8/18/2016     Procedure: HIP ARTHROPLASTY ANTERIOR TOTAL;  Surgeon: Emiliano Vang M.D.;  Location: SURGERY [facility];  Service:    • OTHER ORTHOPEDIC SURGERY 6/2014     right shoulder  arthroplasty   • OTHER ORTHOPEDIC SURGERY 11/1/2012     left knee/left ankle/left shoulder   • OTHER ORTHOPEDIC SURGERY 2004     elbow surgery   • OTHER 2000     dislocation left foot surgery at [facility]   • OTHER       \"septoplasty 20 years ago\"                     Objective:    Tests and Measures:   03/02/18: Culture taken due to erythema, " hot, pain. (3/5/18: negative)  2/9/2018 CT scan findings:   There is no acute fracture.    There is no focal soft tissue abnormality.  BS today 126    .  ANT 1.0 taken by Florentin wound tech.  L foot warm with strong and palpable DP pulses, hair growth present on LLE      Orthotic, protective, supportive devices:      Fall Risk Assessment (william all that apply with an X): High fall risk.      Wound Characteristics                                                      Location: L distal hallux    Initial Evaluation  Date: 2/8/18 Encounter Date: 02/26/2018 Encounter Date: 03/05/18   Tissue Type and %: 95% adherent yellow, 5% pink viable 30% adherent yellow, 70% moist pink/red 90% pale/pink and 10% moist red   Periwound: Macerated callous Mod  callus Thin callus   Drainage: Mod SS scant SS Mod ss   Exposed structures None visible None visible or palpable None   Wound Edges:   Open  Open Open   Odor: None None none   S&S of Infection:   Mild erythema Mild erythema none   Edema: Local Local Local to toe   Sensation: LOPS, neuropathy LOPS LOPS         Measurements: L distal hallux Initial Evaluation  Date: 2/8/18 Encounter Date: 02/23/2018 Encounter Date: 03/02/18   Length (cm) 1 0.8 1   Width (cm) 1.2 1 1   Depth (cm) GAYLE GAYLE GAYLE   Area (cm2) 1.2 0.8 cm2 1cm2   Tract/undermine None GAYLE GAYLE       Procedures:                Debridement : CSWD using scissors/forceps to remove ~1cm2 alma rosa wound callus and using scalpel to remove ~1cm2 biofilm from wound bed.              Cleansed with: NS                                                                                   Periwound protected with: skin prep              Primary dressing: Aquacel ag              Secondary Dressing: non ad foam               Other: hypafix, offloading sandal     Patient Education: S/sx infection resolved today.  Culture negative.  Pt reports pain in toe is much improved after resting all weekend.  He feels it likely hurt last week due to being up on  his feet too much.  Reviewed need for daily dm foot care: check feet, apply lotion, always wear shoes.  Pt expresses understanding.    Professional Collaboration: none today   Assessment:       Wound etiology: trauma vs DFU     Wound Progress: s/sx infection resolved.  cx negative.     Rationale for Treatment: AqAg to manage bioburden, absorb exudate, and maintain moist wound environment without laterally wicking exudate therefore reducing alma rosa-wound maceration.  Nonad foam to pad/protect.  Offloading sandal to keep pressure off site.    Patient tolerance/compliance: pt agreeable to poc and appt schedule     Complicating factors: DM, HTN, pain     Need for ongoing Advanced Wound Care services: Patient requires skilled therapeutic wound care services for product selection, application of product, debridement, close monitoring with clinical assessment, compression for expedite of wound healing.        Plan:       Treatment Plan and Recommendations:  Diagnosis/ICD10: S91.302A    Procedures/CPT: Selective debridement 69737     Frequency: 2x/week        Treatment Goals: STG 2 Weeks          LTG 4 Weeks   Granulation Tissue: 50% 75%   Decrease Necrotic Tissue to: 50% 25%   Wound Phase:             proliferation proliferation   Decrease Size by: 25% 50%   Periwound:       intact intact   Decrease tracts/undermining by: n/a n/a   Decrease Pain:    0 0                                  At the time of each visit a thorough assessment of the patient is completed to assure the  appropriateness of our plan of care.  The dressings or modalities may need to be adapted   from the original plan to address any significant changes in the wound environment.              Clinician Signature:_______________________________Date__________________        Physician Signature:______________________________Date:__________________

## 2018-03-09 ENCOUNTER — NON-PROVIDER VISIT (OUTPATIENT)
Dept: WOUND CARE | Facility: MEDICAL CENTER | Age: 58
End: 2018-03-09
Attending: NURSE PRACTITIONER
Payer: COMMERCIAL

## 2018-03-09 DIAGNOSIS — L97.529 DIABETIC ULCER OF LEFT GREAT TOE (HCC): ICD-10-CM

## 2018-03-09 DIAGNOSIS — E11.621 DIABETIC ULCER OF LEFT GREAT TOE (HCC): ICD-10-CM

## 2018-03-09 PROCEDURE — 11042 DBRDMT SUBQ TIS 1ST 20SQCM/<: CPT

## 2018-03-09 PROCEDURE — 97597 DBRDMT OPN WND 1ST 20 CM/<: CPT | Performed by: NURSE PRACTITIONER

## 2018-03-09 NOTE — WOUND TEAM
"Advanced Wound Care  [facility]  [address]  [address]  [place] [address]  [phone] Fax: [phone]        Encounter Note    For Certification Period:  2/8/18-4/28/18        Referring Physician: BONNIE Garcia  Primary Physician: Kevin Chavez MD        Consulting Physicians: Dr. Kang        Wound(s):  L distal hallux    Start of Care:    2/8/18  Subjective:       HPI:   Primary Physician: Kevin Chavez MD  Consulting Physicians:   Start of Care: 7/14/2016        Subjective:        HPI:  57 yr old pt with DM (controlled) presents to clinic for evaluation and treatment of his L foot distal hallux wound.  He states that he first noticed it approximately a month ago and has been leaving it open to air.  Pt saw Dr. Kang yesterday and Dr. Kang ordered a CT scan which still needs to be scheduled.     From Micky Moore note during foot/nail care last visit 1/30/18:  Pt is a 57 year old gentleman with DM who has attended [facility] in the past, had Surgery with Dr. Kang on Left foot on August 21, 2017 for bunionectomy revision. Referred by Rafia NICOLE due to mycotic, dystrophic nails he is not able to care for himself any longer.    56 year old male living with wife and children. HX of dislocated toes 3-4 years ago while walking, had toes \"fixed,\" but \"it didn't work.\"      12/22/2016 PROCEDURES with Dr Kang:     1.  Left modified Arndt bunionectomy.  2.  Left distal metatarsal osteotomy.  3.  Left distal metatarsal osteotomy of the hallux.  4.  Left distal metatarsal osteotomy, 2, 3, 4, and 5.  5.  Left flexor digitorum longus transfer with a flexor to extensor transfer    of 2 and 3.  6.  Left soft tissue reconstruction, metatarsophalangeal joints 2, 3, 4, and    5.  7.  Left hammertoe correction with proximal interphalangeal arthroplasty, 2    and 3.  8.  Right metatarsophalangeal joint capsulotomy of soft tissue release, 2 and    3.  9.  Right hammertoe correction with " proximal interphalangeal arthroplasty, 2    and 3.                  Pain: Pt reports toe is less painful than last week.    Current Medications:   Current Outpatient Prescriptions:   •  hydroxyzine pamoate (VISTARIL) 100 MG Cap, Take 200 mg by mouth 2 Times a Day., Disp: , Rfl:   •  lamotrigine (LAMICTAL) 200 MG tablet, Take 200 mg by mouth every day. TAKE 1 TAB BY MOUTH EVERY DAY., Disp: , Rfl: 0  •  levothyroxine (SYNTHROID) 112 MCG Tab, Take 1 Tab by mouth Every morning on an empty stomach., Disp: 60 Tab, Rfl: 3  •  tamsulosin (FLOMAX) 0.4 MG capsule, Take 0.4 mg by mouth ONE-HALF HOUR AFTER BREAKFAST., Disp: , Rfl:   •  liraglutide (VICTOZA) 18 MG/3ML Solution Pen-injector injection, Inject 1.8 mg as instructed every morning., Disp: , Rfl:   •  clotrimazole (LOTRIMIN) 1 % Cream, APPLY TO AFFECTED AREA EVERY 12 HOURS, Disp: , Rfl: 3  •  temazepam (RESTORIL) 30 MG capsule, Take 30 mg by mouth at bedtime as needed for Sleep., Disp: , Rfl:   •  venlafaxine XR (EFFEXOR XR) 75 MG CAPSULE SR 24 HR, Take 75 mg by mouth every morning., Disp: , Rfl:   •  alprazolam (XANAX) 0.25 MG Tab, Take 0.25-0.5 mg by mouth 4 times a day as needed for Anxiety., Disp: , Rfl:   •  losartan (COZAAR) 100 MG Tab, Take 100 mg by mouth every morning., Disp: , Rfl:   •  oxycodone-acetaminophen (PERCOCET) 5-325 MG TABS, Take 1-2 Tabs by mouth every four hours as needed., Disp: , Rfl:     Allergies: Demerol and Septra [bactrim ds]      Past Surgical History:   Past Surgical History        Past Surgical History:   Procedure Date   • TOE FUSION 11/13/2017     Procedure: TOE FUSION - 1ST METATARSALPHALANGEAL;  Surgeon: Haroldo Kang M.D.;  Location: SURGERY [facility];  Service: Orthopedics   • METATARSAL HEAD RESECTION 11/13/2017     Procedure: METATARSAL HEAD RESECTION - EXCISION;  Surgeon: Haroldo Kang M.D.;  Location: SURGERY [facility];  Service: Orthopedics   • HAMMERTOE CORRECTION 11/13/2017     Procedure: ANEUDY  "CORRECTION 2-5;  Surgeon: Haroldo Kang M.D.;  Location: [facility];  Service: Orthopedics   • METATARSAL HEAD RESECTION 8/21/2017     Procedure: METATARSAL HEAD RESECTION - 2-5;  Surgeon: Haroldo Kang M.D.;  Location: SURGERY [facility];  Service:    • TOE FUSION 8/21/2017     Procedure: TOE FUSION - 1ST MTP;  Surgeon: Haroldo Kang M.D.;  Location: SURGERY [facility];  Service:    • HARDWARE REMOVAL ORTHO 8/21/2017     Procedure: HARDWARE REMOVAL ORTHO;  Surgeon: Haroldo Kang M.D.;  Location: SURGERY [facility];  Service:    • LUMBAR FUSION POSTERIOR 4/14/2017     Procedure: LUMBAR FUSION POSTERIOR - MINIMALLY INVASIVE TLIF L4-5;  Surgeon: Bossman Caro M.D.;  Location: [facility];  Service:    • HAMMERTOE CORRECTION 12/22/2016     Procedure: HAMMERTOE CORRECTION/METATARSALPHALANGEAL JOINT RELEASE 2/3 RIGHT, 2-3 LEFT;  Surgeon: Haroldo Kang M.D.;  Location: [facility];  Service:    • BUNIONECTOMY 12/22/2016     Procedure: BUNIONECTOMY FOR DISTAL HALLUX VALGUS CORRECTION WITH BRANDY;  Surgeon: Haroldo Kang M.D.;  Location: [facility];  Service:    • ORTHOPEDIC OSTEOTOMY 12/22/2016     Procedure: ORTHOPEDIC OSTEOTOMY DISTAL METATARSAL 2-5;  Surgeon: Haroldo Kang M.D.;  Location: [facility];  Service:    • HIP ARTH ANTERIOR TOTAL 8/18/2016     Procedure: HIP ARTHROPLASTY ANTERIOR TOTAL;  Surgeon: Emiliano Vang M.D.;  Location: [facility];  Service:    • OTHER ORTHOPEDIC SURGERY 6/2014     right shoulder  arthroplasty   • OTHER ORTHOPEDIC SURGERY 11/1/2012     left knee/left ankle/left shoulder   • OTHER ORTHOPEDIC SURGERY 2004     elbow surgery   • OTHER 2000     dislocation left foot surgery at [facility]   • OTHER       \"septoplasty 20 years ago\"                     Objective:    Tests and Measures:   03/02/18: Culture taken due to erythema, hot, pain. (3/5/18: negative)  2/9/2018 CT scan findings: "   There is no acute fracture.    There is no focal soft tissue abnormality.  BS today 126    .  ANT 1.0 taken by Florentin wound tech.  L foot warm with strong and palpable DP pulses, hair growth present on LLE      Orthotic, protective, supportive devices:      Fall Risk Assessment (william all that apply with an X): High fall risk.      Wound Characteristics                                                      Location: L distal hallux    Initial Evaluation  Date: 2/8/18 Encounter Date: 03/09/2018   Tissue Type and %: 95% adherent yellow, 5% pink viable 80% adherent yellow 20% moist pink/red   Periwound: Macerated callous Thin callus   Drainage: Mod SS Mon SS   Exposed structures None visible GAYLE   Wound Edges:   Open  Open   Odor: None None   S&S of Infection:   Mild erythema None   Edema: Local Local to toe   Sensation: LOPS, neuropathy LOPS         Measurements: L distal hallux Initial Evaluation  Date: 2/8/18 Encounter Date: 03/09/2018   Length (cm) 1 0.6   Width (cm) 1.2 1   Depth (cm) GAYLE GAYLE   Area (cm2) 1.2 0.6 cm2   Tract/undermine None None           Procedures:                Debridement : CSWD using curette to remove ~1cm2 periwound callus and biofilm from wound bed. CSWD by Grace NICOLE with forcept to remove dark/black stringy spot that was in the middle of wound bed.              Cleansed with: NS                                                                                   Periwound protected with: skin prep, zinc              Primary dressing: Honey colloid              Secondary Dressing: non ad foam secured with hypafix              Other: offloading sandal     Patient Education: POC and wound progress discussed with pt. Wound had dark/black spot in the middle that seemed to be adhering or embedded to wound. Grace NICOLE at bedside to assess then was able to remove it with forcept, and advises dressing choice with autolytic property to remove slough from wound. Honey colloid  applied over dressing, pt agreeable. Pt states he is seeing Dr. Kang 3/29/18 for toe nail removal.     Professional Collaboration: Grace NICOLE for CSWD.    Assessment:       Wound etiology: trauma vs DFU     Wound Progress: Wound slightly smaller per measurement     Rationale for Treatment: Medihoney colloid to provide moist wound environment, and facilitate autolytic debridement. Nonad foam to pad/protect.  Offloading sandal to keep pressure off site.    Patient tolerance/compliance: pt agreeable to poc and appt schedule     Complicating factors: DM, HTN, pain     Need for ongoing Advanced Wound Care services: Patient requires skilled therapeutic wound care services for product selection, application of product, debridement, close monitoring with clinical assessment, compression for expedite of wound healing.        Plan:       Treatment Plan and Recommendations:  Diagnosis/ICD10: S91.302A    Procedures/CPT: Selective debridement 92799     Frequency: 2x/week        Treatment Goals: STG 2 Weeks          LTG 4 Weeks   Granulation Tissue: 50% 75%   Decrease Necrotic Tissue to: 50% 25%   Wound Phase:             proliferation proliferation   Decrease Size by: 25% 50%   Periwound:       intact intact   Decrease tracts/undermining by: n/a n/a   Decrease Pain:    0 0                                  At the time of each visit a thorough assessment of the patient is completed to assure the  appropriateness of our plan of care.  The dressings or modalities may need to be adapted   from the original plan to address any significant changes in the wound environment.              Clinician Signature:_______________________________Date__________________        Physician Signature:______________________________Date:__________________

## 2018-03-11 NOTE — PROGRESS NOTES
Asked to assess pt’s left great toe distal ulcer by Aravind Villa RN for possible suture to wound  s/p bunion and hammertoe correction by Dr. Kang 11/2017. Sutures removed, incisions have resolved.     L great toe distal ulcer:   Slough to wound bed, black dot. Using forceps removed black fiber dot and debrided slough, total area <20cm2, subq tissue layer. No suture was noted.   Wound care completed by Aravind PHILLIP  Recommend moist dressing to autolytically debrided residual slough

## 2018-03-12 ENCOUNTER — NON-PROVIDER VISIT (OUTPATIENT)
Dept: WOUND CARE | Facility: MEDICAL CENTER | Age: 58
End: 2018-03-12
Attending: NURSE PRACTITIONER
Payer: COMMERCIAL

## 2018-03-12 PROCEDURE — 97597 DBRDMT OPN WND 1ST 20 CM/<: CPT

## 2018-03-12 NOTE — WOUND TEAM
"Advanced Wound Care  [facility]  [address]  [address]  [place] [address]  [phone] Fax: [phone]        Encounter Note    For Certification Period:  2/8/18-4/28/18        Referring Physician: BONNIE Garcia  Primary Physician: Kevin Chavez MD        Consulting Physicians: Dr. Kang        Wound(s):  L distal hallux    Start of Care:    2/8/18  Subjective:       HPI:   Primary Physician: Kevin Chavez MD  Consulting Physicians:   Start of Care: 7/14/2016        Subjective:        HPI:  57 yr old pt with DM (controlled) presents to clinic for evaluation and treatment of his L foot distal hallux wound.  He states that he first noticed it approximately a month ago and has been leaving it open to air.  Pt saw Dr. Kang yesterday and Dr. Kang ordered a CT scan which still needs to be scheduled.     From Micky Moore note during foot/nail care last visit 1/30/18:  Pt is a 57 year old gentleman with DM who has attended [facility] in the past, had Surgery with Dr. Kang on Left foot on August 21, 2017 for bunionectomy revision. Referred by Rafia NICOLE due to mycotic, dystrophic nails he is not able to care for himself any longer.    56 year old male living with wife and children. HX of dislocated toes 3-4 years ago while walking, had toes \"fixed,\" but \"it didn't work.\"      12/22/2016 PROCEDURES with Dr Kang:     1.  Left modified Arndt bunionectomy.  2.  Left distal metatarsal osteotomy.  3.  Left distal metatarsal osteotomy of the hallux.  4.  Left distal metatarsal osteotomy, 2, 3, 4, and 5.  5.  Left flexor digitorum longus transfer with a flexor to extensor transfer    of 2 and 3.  6.  Left soft tissue reconstruction, metatarsophalangeal joints 2, 3, 4, and    5.  7.  Left hammertoe correction with proximal interphalangeal arthroplasty, 2    and 3.  8.  Right metatarsophalangeal joint capsulotomy of soft tissue release, 2 and    3.  9.  Right hammertoe correction with " proximal interphalangeal arthroplasty, 2    and 3.                  Pain: Pt reports toe is less painful than last week.    Current Medications:   Current Outpatient Prescriptions:   •  hydroxyzine pamoate (VISTARIL) 100 MG Cap, Take 200 mg by mouth 2 Times a Day., Disp: , Rfl:   •  lamotrigine (LAMICTAL) 200 MG tablet, Take 200 mg by mouth every day. TAKE 1 TAB BY MOUTH EVERY DAY., Disp: , Rfl: 0  •  levothyroxine (SYNTHROID) 112 MCG Tab, Take 1 Tab by mouth Every morning on an empty stomach., Disp: 60 Tab, Rfl: 3  •  tamsulosin (FLOMAX) 0.4 MG capsule, Take 0.4 mg by mouth ONE-HALF HOUR AFTER BREAKFAST., Disp: , Rfl:   •  liraglutide (VICTOZA) 18 MG/3ML Solution Pen-injector injection, Inject 1.8 mg as instructed every morning., Disp: , Rfl:   •  clotrimazole (LOTRIMIN) 1 % Cream, APPLY TO AFFECTED AREA EVERY 12 HOURS, Disp: , Rfl: 3  •  temazepam (RESTORIL) 30 MG capsule, Take 30 mg by mouth at bedtime as needed for Sleep., Disp: , Rfl:   •  venlafaxine XR (EFFEXOR XR) 75 MG CAPSULE SR 24 HR, Take 75 mg by mouth every morning., Disp: , Rfl:   •  alprazolam (XANAX) 0.25 MG Tab, Take 0.25-0.5 mg by mouth 4 times a day as needed for Anxiety., Disp: , Rfl:   •  losartan (COZAAR) 100 MG Tab, Take 100 mg by mouth every morning., Disp: , Rfl:   •  oxycodone-acetaminophen (PERCOCET) 5-325 MG TABS, Take 1-2 Tabs by mouth every four hours as needed., Disp: , Rfl:     Allergies: Demerol and Septra [bactrim ds]      Past Surgical History:   Past Surgical History        Past Surgical History:   Procedure Date   • TOE FUSION 11/13/2017     Procedure: TOE FUSION - 1ST METATARSALPHALANGEAL;  Surgeon: Haroldo Kang M.D.;  Location: SUR[facility];  Service: Orthopedics   • METATARSAL HEAD RESECTION 11/13/2017     Procedure: METATARSAL HEAD RESECTION - EXCISION;  Surgeon: Haroldo Kang M.D.;  Location: SURGERY [facility];  Service: Orthopedics   • HAMMERTOE CORRECTION 11/13/2017     Procedure: ANEUDY  "CORRECTION 2-5;  Surgeon: Haroldo Kang M.D.;  Location: [facility];  Service: Orthopedics   • METATARSAL HEAD RESECTION 8/21/2017     Procedure: METATARSAL HEAD RESECTION - 2-5;  Surgeon: Haroldo Kang M.D.;  Location: SURGERY [facility];  Service:    • TOE FUSION 8/21/2017     Procedure: TOE FUSION - 1ST MTP;  Surgeon: Haroldo Kang M.D.;  Location: SURGERY [facility];  Service:    • HARDWARE REMOVAL ORTHO 8/21/2017     Procedure: HARDWARE REMOVAL ORTHO;  Surgeon: Haroldo Kang M.D.;  Location: SURGERY [facility];  Service:    • LUMBAR FUSION POSTERIOR 4/14/2017     Procedure: LUMBAR FUSION POSTERIOR - MINIMALLY INVASIVE TLIF L4-5;  Surgeon: Bossman Caro M.D.;  Location: [facility];  Service:    • HAMMERTOE CORRECTION 12/22/2016     Procedure: HAMMERTOE CORRECTION/METATARSALPHALANGEAL JOINT RELEASE 2/3 RIGHT, 2-3 LEFT;  Surgeon: Haroldo Kang M.D.;  Location: [facility];  Service:    • BUNIONECTOMY 12/22/2016     Procedure: BUNIONECTOMY FOR DISTAL HALLUX VALGUS CORRECTION WITH BRANDY;  Surgeon: Haroldo Kang M.D.;  Location: [facility];  Service:    • ORTHOPEDIC OSTEOTOMY 12/22/2016     Procedure: ORTHOPEDIC OSTEOTOMY DISTAL METATARSAL 2-5;  Surgeon: Haroldo Kang M.D.;  Location: [facility];  Service:    • HIP ARTH ANTERIOR TOTAL 8/18/2016     Procedure: HIP ARTHROPLASTY ANTERIOR TOTAL;  Surgeon: Emiliano Vang M.D.;  Location: [facility];  Service:    • OTHER ORTHOPEDIC SURGERY 6/2014     right shoulder  arthroplasty   • OTHER ORTHOPEDIC SURGERY 11/1/2012     left knee/left ankle/left shoulder   • OTHER ORTHOPEDIC SURGERY 2004     elbow surgery   • OTHER 2000     dislocation left foot surgery at [facility]   • OTHER       \"septoplasty 20 years ago\"                     Objective:    Tests and Measures:   03/02/18: Culture taken due to erythema, hot, pain. (3/5/18: negative)  2/9/2018 CT scan findings: "   There is no acute fracture.    There is no focal soft tissue abnormality.  BS today 126    .  ANT 1.0 taken by Florentin wound tech.  L foot warm with strong and palpable DP pulses, hair growth present on LLE      Orthotic, protective, supportive devices:      Fall Risk Assessment (william all that apply with an X): High fall risk.      Wound Characteristics                                                      Location: L distal hallux    Initial Evaluation  Date: 2/8/18 Encounter Date: 03/09/2018 Encounter Date: 03/12/18   Tissue Type and %: 95% adherent yellow, 5% pink viable 80% adherent yellow 20% moist pink/red 70% adherent yellow; 30% moist pink/red   Periwound: Macerated callous Thin callus intact   Drainage: Mod SS Mon SS Min ss   Exposed structures None visible GAYLE GAYLE   Wound Edges:   Open  Open Open after debridement   Odor: None None none   S&S of Infection:   Mild erythema None none   Edema: Local Local to toe Local to toe   Sensation: LOPS, neuropathy LOPS LOPS         Measurements: L distal hallux Initial Evaluation  Date: 2/8/18 Encounter Date: 03/09/2018   Length (cm) 1 0.6   Width (cm) 1.2 1   Depth (cm) GAYLE GAYLE   Area (cm2) 1.2 0.6 cm2   Tract/undermine None None     Procedures:                Debridement : CSWD using forceps/scissors to remove ~1cm2 alma rosa wound callus and using curette to remove ~0.5cm2 slough from wound bed.              Cleansed with: NS                                                                                   Periwound protected with: skin prep              Primary dressing: Honey colloid              Secondary Dressing: non ad foam secured with hypafix              Other: offloading sandal     Patient Education: POC and wound progress discussed with pt.  Pt states he is scheduled to see Dr. Kang 3/29/18 for toe nail removal.  Erythema from a few weeks ago now completely resolved.    Professional Collaboration: none today    Assessment:       Wound etiology: trauma vs  DFU     Wound Progress: no measurement today.  Trending smaller.  No erythema.     Rationale for Treatment: Medihoney colloid to provide moist wound environment, and facilitate autolytic debridement. Nonad foam to pad/protect.  Offloading sandal to keep pressure off site.    Patient tolerance/compliance: pt agreeable to poc and appt schedule     Complicating factors: DM, HTN, pain     Need for ongoing Advanced Wound Care services: Patient requires skilled therapeutic wound care services for product selection, application of product, debridement, close monitoring with clinical assessment, compression for expedite of wound healing.        Plan:       Treatment Plan and Recommendations:  Diagnosis/ICD10: S91.302A    Procedures/CPT: Selective debridement 60830     Frequency: 2x/week        Treatment Goals: STG 2 Weeks          LTG 4 Weeks   Granulation Tissue: 50% 75%   Decrease Necrotic Tissue to: 50% 25%   Wound Phase:             proliferation proliferation   Decrease Size by: 25% 50%   Periwound:       intact intact   Decrease tracts/undermining by: n/a n/a   Decrease Pain:    0 0                                  At the time of each visit a thorough assessment of the patient is completed to assure the  appropriateness of our plan of care.  The dressings or modalities may need to be adapted   from the original plan to address any significant changes in the wound environment.              Clinician Signature:_______________________________Date__________________        Physician Signature:______________________________Date:__________________

## 2018-03-16 ENCOUNTER — NON-PROVIDER VISIT (OUTPATIENT)
Dept: WOUND CARE | Facility: MEDICAL CENTER | Age: 58
End: 2018-03-16
Attending: NURSE PRACTITIONER
Payer: COMMERCIAL

## 2018-03-16 DIAGNOSIS — Z01.812 PRE-OPERATIVE LABORATORY EXAMINATION: ICD-10-CM

## 2018-03-16 DIAGNOSIS — B35.1 ONYCHOMYCOSIS: Primary | ICD-10-CM

## 2018-03-16 LAB
ANION GAP SERPL CALC-SCNC: 8 MMOL/L (ref 0–11.9)
BUN SERPL-MCNC: 15 MG/DL (ref 8–22)
CALCIUM SERPL-MCNC: 10.4 MG/DL (ref 8.5–10.5)
CHLORIDE SERPL-SCNC: 102 MMOL/L (ref 96–112)
CO2 SERPL-SCNC: 27 MMOL/L (ref 20–33)
CREAT SERPL-MCNC: 1.09 MG/DL (ref 0.5–1.4)
ERYTHROCYTE [DISTWIDTH] IN BLOOD BY AUTOMATED COUNT: 43.2 FL (ref 35.9–50)
GLUCOSE SERPL-MCNC: 114 MG/DL (ref 65–99)
HCT VFR BLD AUTO: 44.8 % (ref 42–52)
HGB BLD-MCNC: 14.1 G/DL (ref 14–18)
MCH RBC QN AUTO: 26.6 PG (ref 27–33)
MCHC RBC AUTO-ENTMCNC: 31.5 G/DL (ref 33.7–35.3)
MCV RBC AUTO: 84.4 FL (ref 81.4–97.8)
PLATELET # BLD AUTO: 227 K/UL (ref 164–446)
PMV BLD AUTO: 8.8 FL (ref 9–12.9)
POTASSIUM SERPL-SCNC: 4.3 MMOL/L (ref 3.6–5.5)
RBC # BLD AUTO: 5.31 M/UL (ref 4.7–6.1)
SODIUM SERPL-SCNC: 137 MMOL/L (ref 135–145)
WBC # BLD AUTO: 6.2 K/UL (ref 4.8–10.8)

## 2018-03-16 PROCEDURE — G0127 TRIM NAIL(S): HCPCS

## 2018-03-16 PROCEDURE — 80048 BASIC METABOLIC PNL TOTAL CA: CPT

## 2018-03-16 PROCEDURE — 85027 COMPLETE CBC AUTOMATED: CPT

## 2018-03-16 PROCEDURE — 36415 COLL VENOUS BLD VENIPUNCTURE: CPT

## 2018-03-16 PROCEDURE — 306637 HCHG MISC ORTHO ITEM RC 0274

## 2018-03-16 PROCEDURE — 97597 DBRDMT OPN WND 1ST 20 CM/<: CPT | Mod: XS

## 2018-03-16 RX ORDER — DEXTROAMPHETAMINE SACCHARATE, AMPHETAMINE ASPARTATE, DEXTROAMPHETAMINE SULFATE AND AMPHETAMINE SULFATE 5; 5; 5; 5 MG/1; MG/1; MG/1; MG/1
10 TABLET ORAL DAILY
Status: ON HOLD | COMMUNITY
End: 2018-10-15

## 2018-03-16 RX ORDER — GABAPENTIN 300 MG/1
300 CAPSULE ORAL
Status: ON HOLD | COMMUNITY
End: 2018-04-03

## 2018-03-16 NOTE — OR NURSING
Pre admit apt: Pt. Instructed to continue regularly prescribed medications through day before surgery.  Instructed to take the following medications, the day of surgery, with a sip of water per anesthesia protocol: Xanax, levothyroxine, percocet

## 2018-03-16 NOTE — WOUND TEAM
Asked by Marybeth to debride toe nail. New order obtained from BONNIE Gongora, to clip nail. Lt great nail onycholytic, pulled away from nail bed. Using scissors and forceps clipped nail to bed. Parisa Terrazas smoothed with Macy board.

## 2018-03-16 NOTE — WOUND TEAM
"Advanced Wound Care  [facility]  [address]  [address]  [place] [address]  [phone] Fax: [phone]        Encounter Note    For Certification Period:  2/8/18-4/28/18        Referring Physician: BONNIE Garcia  Primary Physician: Kevin Chavez MD        Consulting Physicians: Dr. Kang        Wound(s):  L distal hallux    Start of Care:    2/8/18  Subjective:       HPI:   Primary Physician: Kevin Chavez MD  Consulting Physicians:   Start of Care: 7/14/2016        Subjective:        HPI:  57 yr old pt with DM (controlled) presents to clinic for evaluation and treatment of his L foot distal hallux wound.  He states that he first noticed it approximately a month ago and has been leaving it open to air.  Pt saw Dr. Kang yesterday and Dr. Kang ordered a CT scan which still needs to be scheduled.     From Micky Moore note during foot/nail care last visit 1/30/18:  Pt is a 57 year old gentleman with DM who has attended [facility] in the past, had Surgery with Dr. Kang on Left foot on August 21, 2017 for bunionectomy revision. Referred by Rafia NICOLE due to mycotic, dystrophic nails he is not able to care for himself any longer.    56 year old male living with wife and children. HX of dislocated toes 3-4 years ago while walking, had toes \"fixed,\" but \"it didn't work.\"      12/22/2016 PROCEDURES with Dr Kang:     1.  Left modified Arndt bunionectomy.  2.  Left distal metatarsal osteotomy.  3.  Left distal metatarsal osteotomy of the hallux.  4.  Left distal metatarsal osteotomy, 2, 3, 4, and 5.  5.  Left flexor digitorum longus transfer with a flexor to extensor transfer    of 2 and 3.  6.  Left soft tissue reconstruction, metatarsophalangeal joints 2, 3, 4, and    5.  7.  Left hammertoe correction with proximal interphalangeal arthroplasty, 2    and 3.  8.  Right metatarsophalangeal joint capsulotomy of soft tissue release, 2 and    3.  9.  Right hammertoe correction with " proximal interphalangeal arthroplasty, 2    and 3.                  Pain: Pt reports toe is less painful than last week.    Current Medications:   Current Outpatient Prescriptions:   •  hydroxyzine pamoate (VISTARIL) 100 MG Cap, Take 200 mg by mouth 2 Times a Day., Disp: , Rfl:   •  lamotrigine (LAMICTAL) 200 MG tablet, Take 200 mg by mouth every day. TAKE 1 TAB BY MOUTH EVERY DAY., Disp: , Rfl: 0  •  levothyroxine (SYNTHROID) 112 MCG Tab, Take 1 Tab by mouth Every morning on an empty stomach., Disp: 60 Tab, Rfl: 3  •  tamsulosin (FLOMAX) 0.4 MG capsule, Take 0.4 mg by mouth ONE-HALF HOUR AFTER BREAKFAST., Disp: , Rfl:   •  liraglutide (VICTOZA) 18 MG/3ML Solution Pen-injector injection, Inject 1.8 mg as instructed every morning., Disp: , Rfl:   •  clotrimazole (LOTRIMIN) 1 % Cream, APPLY TO AFFECTED AREA EVERY 12 HOURS, Disp: , Rfl: 3  •  temazepam (RESTORIL) 30 MG capsule, Take 30 mg by mouth at bedtime as needed for Sleep., Disp: , Rfl:   •  venlafaxine XR (EFFEXOR XR) 75 MG CAPSULE SR 24 HR, Take 75 mg by mouth every morning., Disp: , Rfl:   •  alprazolam (XANAX) 0.25 MG Tab, Take 0.25-0.5 mg by mouth 4 times a day as needed for Anxiety., Disp: , Rfl:   •  losartan (COZAAR) 100 MG Tab, Take 100 mg by mouth every morning., Disp: , Rfl:   •  oxycodone-acetaminophen (PERCOCET) 5-325 MG TABS, Take 1-2 Tabs by mouth every four hours as needed., Disp: , Rfl:     Allergies: Demerol and Septra [bactrim ds]      Past Surgical History:   Past Surgical History        Past Surgical History:   Procedure Date   • TOE FUSION 11/13/2017     Procedure: TOE FUSION - 1ST METATARSALPHALANGEAL;  Surgeon: Haroldo Kang M.D.;  Location: SURGERY [facility];  Service: Orthopedics   • METATARSAL HEAD RESECTION 11/13/2017     Procedure: METATARSAL HEAD RESECTION - EXCISION;  Surgeon: Haroldo Kang M.D.;  Location: SURGERY [facility];  Service: Orthopedics   • HAMMERTOE CORRECTION 11/13/2017     Procedure: ANEUDY  "CORRECTION 2-5;  Surgeon: Haroldo Kang M.D.;  Location: [facility];  Service: Orthopedics   • METATARSAL HEAD RESECTION 8/21/2017     Procedure: METATARSAL HEAD RESECTION - 2-5;  Surgeon: Haroldo Kang M.D.;  Location: SURGERY [facility];  Service:    • TOE FUSION 8/21/2017     Procedure: TOE FUSION - 1ST MTP;  Surgeon: Haroldo Kang M.D.;  Location: SURGERY [facility];  Service:    • HARDWARE REMOVAL ORTHO 8/21/2017     Procedure: HARDWARE REMOVAL ORTHO;  Surgeon: Haroldo Kang M.D.;  Location: SURGERY [facility];  Service:    • LUMBAR FUSION POSTERIOR 4/14/2017     Procedure: LUMBAR FUSION POSTERIOR - MINIMALLY INVASIVE TLIF L4-5;  Surgeon: Bossman Caro M.D.;  Location: [facility];  Service:    • HAMMERTOE CORRECTION 12/22/2016     Procedure: HAMMERTOE CORRECTION/METATARSALPHALANGEAL JOINT RELEASE 2/3 RIGHT, 2-3 LEFT;  Surgeon: Haroldo Kang M.D.;  Location: [facility];  Service:    • BUNIONECTOMY 12/22/2016     Procedure: BUNIONECTOMY FOR DISTAL HALLUX VALGUS CORRECTION WITH BRANDY;  Surgeon: Haroldo Kang M.D.;  Location: [facility];  Service:    • ORTHOPEDIC OSTEOTOMY 12/22/2016     Procedure: ORTHOPEDIC OSTEOTOMY DISTAL METATARSAL 2-5;  Surgeon: Haroldo Kang M.D.;  Location: [facility];  Service:    • HIP ARTH ANTERIOR TOTAL 8/18/2016     Procedure: HIP ARTHROPLASTY ANTERIOR TOTAL;  Surgeon: Emiliano Vang M.D.;  Location: [facility];  Service:    • OTHER ORTHOPEDIC SURGERY 6/2014     right shoulder  arthroplasty   • OTHER ORTHOPEDIC SURGERY 11/1/2012     left knee/left ankle/left shoulder   • OTHER ORTHOPEDIC SURGERY 2004     elbow surgery   • OTHER 2000     dislocation left foot surgery at [facility]   • OTHER       \"septoplasty 20 years ago\"                     Objective:    Tests and Measures:   03/02/18: Culture taken due to erythema, hot, pain. (3/5/18: negative)  2/9/2018 CT scan findings: "   There is no acute fracture.    There is no focal soft tissue abnormality.  BS today 126    .  ANT 1.0 taken by Florentin wound tech.  L foot warm with strong and palpable DP pulses, hair growth present on LLE      Orthotic, protective, supportive devices:      Fall Risk Assessment (william all that apply with an X): High fall risk.      Wound Characteristics                                                      Location: L distal hallux    Initial Evaluation  Date: 2/8/18 Encounter Date: 03/16/2018   Tissue Type and %: 95% adherent yellow, 5% pink viable 60% adherent yellow, 40% moist pink/red   Periwound: Macerated callous Intact   Drainage: Mod SS Min SS   Exposed structures None visible GAYLE   Wound Edges:   Open  Open   Odor: None None   S&S of Infection:   Mild erythema None   Edema: Local Local to toe   Sensation: LOPS, neuropathy LOPS         Measurements: L distal hallux Initial Evaluation  Date: 2/8/18 Encounter Date: 03/16/2018   Length (cm) 1 0.7   Width (cm) 1.2 0.9   Depth (cm) GAYLE GAYLE   Area (cm2) 1.2 0.63 cm2   Tract/undermine None None             Procedures:                Debridement : CSWD using curette to remove ~1cm2 alma rosa wound callus and ~0.5cm2 slough from wound bed. CSWD by Phyllis Villa RN to remove toenail, see separate note.              Cleansed with: NS                                                                                   Periwound protected with: skin prep              Primary dressing: Honey colloid              Secondary Dressing: non ad foam secured with hypafix              Other: offloading sandal     Patient Education: POC and wound progress discussed with pt. Pt knowledgeable on s/s of infection and when to go to ER.    Professional Collaboration: Phyllis Villa RN for toenail removal.    Assessment:       Wound etiology: trauma vs DFU     Wound Progress: Erythemia improved.      Rationale for Treatment: Medihoney colloid to provide moist wound environment, and facilitate autolytic  debridement. Nonad foam to pad/protect.  Offloading sandal to keep pressure off site.    Patient tolerance/compliance: pt agreeable to poc and appt schedule     Complicating factors: DM, HTN, pain     Need for ongoing Advanced Wound Care services: Patient requires skilled therapeutic wound care services for product selection, application of product, debridement, close monitoring with clinical assessment, compression for expedite of wound healing.        Plan:       Treatment Plan and Recommendations:  Diagnosis/ICD10: S91.302A    Procedures/CPT: Selective debridement 98583     Frequency: 2x/week        Treatment Goals: STG 2 Weeks          LTG 4 Weeks   Granulation Tissue: 50% 75%   Decrease Necrotic Tissue to: 50% 25%   Wound Phase:             proliferation proliferation   Decrease Size by: 25% 50%   Periwound:       intact intact   Decrease tracts/undermining by: n/a n/a   Decrease Pain:    0 0                                  At the time of each visit a thorough assessment of the patient is completed to assure the  appropriateness of our plan of care.  The dressings or modalities may need to be adapted   from the original plan to address any significant changes in the wound environment.              Clinician Signature:_______________________________Date__________________        Physician Signature:______________________________Date:__________________

## 2018-03-19 ENCOUNTER — NON-PROVIDER VISIT (OUTPATIENT)
Dept: WOUND CARE | Facility: MEDICAL CENTER | Age: 58
End: 2018-03-19
Attending: NURSE PRACTITIONER
Payer: COMMERCIAL

## 2018-03-19 PROCEDURE — 97597 DBRDMT OPN WND 1ST 20 CM/<: CPT

## 2018-03-19 NOTE — WOUND TEAM
"Advanced Wound Care  [facility]  [address]  [address]  [place] [address]  [phone] Fax: [phone]        Encounter Note    For Certification Period:  2/8/18-4/28/18        Referring Physician: BONNIE Garcia  Primary Physician: Kevin Chavez MD        Consulting Physicians: Dr. Kang        Wound(s):  L distal hallux    Start of Care:    2/8/18  Subjective:            Subjective:        HPI:  57 yr old pt with DM (controlled) presents to clinic for evaluation and treatment of his L foot distal hallux wound.  He states that he first noticed it approximately a month ago and has been leaving it open to air.  Pt saw Dr. Kang yesterday and Dr. Kang ordered a CT scan which still needs to be scheduled.     From Micky Moore note during foot/nail care last visit 1/30/18:  Pt is a 57 year old gentleman with DM who has attended [facility] in the past, had Surgery with Dr. Kang on Left foot on August 21, 2017 for bunionectomy revision. Referred by Rafia NICOLE due to mycotic, dystrophic nails he is not able to care for himself any longer.    56 year old male living with wife and children. HX of dislocated toes 3-4 years ago while walking, had toes \"fixed,\" but \"it didn't work.\"      12/22/2016 PROCEDURES with Dr Kang:     1.  Left modified Arndt bunionectomy.  2.  Left distal metatarsal osteotomy.  3.  Left distal metatarsal osteotomy of the hallux.  4.  Left distal metatarsal osteotomy, 2, 3, 4, and 5.  5.  Left flexor digitorum longus transfer with a flexor to extensor transfer    of 2 and 3.  6.  Left soft tissue reconstruction, metatarsophalangeal joints 2, 3, 4, and    5.  7.  Left hammertoe correction with proximal interphalangeal arthroplasty, 2    and 3.  8.  Right metatarsophalangeal joint capsulotomy of soft tissue release, 2 and    3.  9.  Right hammertoe correction with proximal interphalangeal arthroplasty, 2    and 3.                  Pain: Pt reports toe is less " painful than last week.    Current Medications:   Current Outpatient Prescriptions:   •  hydroxyzine pamoate (VISTARIL) 100 MG Cap, Take 200 mg by mouth 2 Times a Day., Disp: , Rfl:   •  lamotrigine (LAMICTAL) 200 MG tablet, Take 200 mg by mouth every day. TAKE 1 TAB BY MOUTH EVERY DAY., Disp: , Rfl: 0  •  levothyroxine (SYNTHROID) 112 MCG Tab, Take 1 Tab by mouth Every morning on an empty stomach., Disp: 60 Tab, Rfl: 3  •  tamsulosin (FLOMAX) 0.4 MG capsule, Take 0.4 mg by mouth ONE-HALF HOUR AFTER BREAKFAST., Disp: , Rfl:   •  liraglutide (VICTOZA) 18 MG/3ML Solution Pen-injector injection, Inject 1.8 mg as instructed every morning., Disp: , Rfl:   •  clotrimazole (LOTRIMIN) 1 % Cream, APPLY TO AFFECTED AREA EVERY 12 HOURS, Disp: , Rfl: 3  •  temazepam (RESTORIL) 30 MG capsule, Take 30 mg by mouth at bedtime as needed for Sleep., Disp: , Rfl:   •  venlafaxine XR (EFFEXOR XR) 75 MG CAPSULE SR 24 HR, Take 75 mg by mouth every morning., Disp: , Rfl:   •  alprazolam (XANAX) 0.25 MG Tab, Take 0.25-0.5 mg by mouth 4 times a day as needed for Anxiety., Disp: , Rfl:   •  losartan (COZAAR) 100 MG Tab, Take 100 mg by mouth every morning., Disp: , Rfl:   •  oxycodone-acetaminophen (PERCOCET) 5-325 MG TABS, Take 1-2 Tabs by mouth every four hours as needed., Disp: , Rfl:     Allergies: Demerol and Septra [bactrim ds]      Past Surgical History:   Past Surgical History        Past Surgical History:   Procedure Date   • TOE FUSION 11/13/2017     Procedure: TOE FUSION - 1ST METATARSALPHALANGEAL;  Surgeon: Haroldo Kang M.D.;  Location: [facility];  Service: Orthopedics   • METATARSAL HEAD RESECTION 11/13/2017     Procedure: METATARSAL HEAD RESECTION - EXCISION;  Surgeon: Haroldo Kang M.D.;  Location: [facility];  Service: Orthopedics   • HAMMERTOE CORRECTION 11/13/2017     Procedure: HAMMERTOE CORRECTION 2-5;  Surgeon: Haroldo Kang M.D.;  Location: SURGERY [facility];  Service:  "Orthopedics   • METATARSAL HEAD RESECTION 8/21/2017     Procedure: METATARSAL HEAD RESECTION - 2-5;  Surgeon: Haroldo Kang M.D.;  Location: SURGERY [facility];  Service:    • TOE FUSION 8/21/2017     Procedure: TOE FUSION - 1ST MTP;  Surgeon: Haroldo Kang M.D.;  Location: SURGERY [facility];  Service:    • HARDWARE REMOVAL ORTHO 8/21/2017     Procedure: HARDWARE REMOVAL ORTHO;  Surgeon: Haroldo Kang M.D.;  Location: SURGERY [facility];  Service:    • LUMBAR FUSION POSTERIOR 4/14/2017     Procedure: LUMBAR FUSION POSTERIOR - MINIMALLY INVASIVE TLIF L4-5;  Surgeon: Bossman Caro M.D.;  Location: [facility];  Service:    • HAMMERTOE CORRECTION 12/22/2016     Procedure: HAMMERTOE CORRECTION/METATARSALPHALANGEAL JOINT RELEASE 2/3 RIGHT, 2-3 LEFT;  Surgeon: Haroldo Kang M.D.;  Location: [facility];  Service:    • BUNIONECTOMY 12/22/2016     Procedure: BUNIONECTOMY FOR DISTAL HALLUX VALGUS CORRECTION WITH BRANDY;  Surgeon: Haroldo Kang M.D.;  Location: SURGERY [facility];  Service:    • ORTHOPEDIC OSTEOTOMY 12/22/2016     Procedure: ORTHOPEDIC OSTEOTOMY DISTAL METATARSAL 2-5;  Surgeon: Haroldo Kang M.D.;  Location: [facility];  Service:    • HIP ARTH ANTERIOR TOTAL 8/18/2016     Procedure: HIP ARTHROPLASTY ANTERIOR TOTAL;  Surgeon: Emiliano Vang M.D.;  Location: [facility];  Service:    • OTHER ORTHOPEDIC SURGERY 6/2014     right shoulder  arthroplasty   • OTHER ORTHOPEDIC SURGERY 11/1/2012     left knee/left ankle/left shoulder   • OTHER ORTHOPEDIC SURGERY 2004     elbow surgery   • OTHER 2000     dislocation left foot surgery at [facility]   • OTHER       \"septoplasty 20 years ago\"                     Objective:    Tests and Measures: L foot warm with strong and palpable DP pulses. Pt reports FBS today at 130.  03/02/18: Culture taken due to erythema, hot, pain. (3/5/18: negative)  2/9/2018 CT scan findings:   There is no " acute fracture.    There is no focal soft tissue abnormality.  BS today 126    .  ANT 1.0 taken by Florentin wound tech.  L foot warm with strong and palpable DP pulses, hair growth present on LLE      Orthotic, protective, supportive devices:      Fall Risk Assessment (william all that apply with an X): High fall risk.      Wound Characteristics                                                      Location: L distal hallux    Initial Evaluation  Date: 2/8/18 Encounter Date: 03/16/2018 Encounter Date: 03/19/2018   Tissue Type and %: 95% adherent yellow, 5% pink viable 60% adherent yellow, 40% moist pink/red 70% pink viable with 30% marbled yellow   Periwound: Macerated callous Intact intact   Drainage: Mod SS Min SS Mod SS   Exposed structures None visible GAYLE GAYLE   Wound Edges:   Open  Open open   Odor: None None none   S&S of Infection:   Mild erythema None Mild erythema   Edema: Local Local to toe 1+   Sensation: LOPS, neuropathy LOPS LOPS         Measurements: L distal hallux Initial Evaluation  Date: 2/8/18 Encounter Date: 03/16/2018   Length (cm) 1 0.7   Width (cm) 1.2 0.9   Depth (cm) GAYLE GAYLE   Area (cm2) 1.2 0.63 cm2   Tract/undermine None None             Procedures:                Debridement : CSWD using curette to remove ~1cm2 loose nonviable tissue and alma rosa wound callus.  Pt tolerated well.                Cleansed with: NS                                                                                   Periwound protected with: no sting skin barrier              Primary dressing: abigail              Secondary Dressing: non ad foam secured with hypafix              Other: TUBI E, offloading sandal     Patient Education: Reviewed POC, importance of keeping the secondary dressing dry and intact, nutrition for wound healing, s/s of complications/infection, when to notify MD/go to ER.  Pt verbalized understanding to all.    Professional Collaboration: none today    Assessment:       Wound etiology: trauma vs  DFU     Wound Progress: tissue quality improved     Rationale for Treatment: abigail for it's antimicrobial properties, to jumpstart wound bed, and provide scafflold.  Non ad foam to maintain moist wound environment and for absorption.  Tubi for light compression to manage edema.    Patient tolerance/compliance: pt agreeable to poc and appt schedule     Complicating factors: DM, HTN, pain     Need for ongoing Advanced Wound Care services: Patient requires skilled therapeutic wound care services for product selection, application of product, debridement, close monitoring with clinical assessment, compression for expedite of wound healing.        Plan:       Treatment Plan and Recommendations:  Diagnosis/ICD10: S91.302A    Procedures/CPT: Selective debridement 75676     Frequency: 2x/week        Treatment Goals: STG 2 Weeks          LTG 4 Weeks   Granulation Tissue: 50% 75%   Decrease Necrotic Tissue to: 50% 25%   Wound Phase:             proliferation proliferation   Decrease Size by: 25% 50%   Periwound:       intact intact   Decrease tracts/undermining by: n/a n/a   Decrease Pain:    0 0                                  At the time of each visit a thorough assessment of the patient is completed to assure the  appropriateness of our plan of care.  The dressings or modalities may need to be adapted   from the original plan to address any significant changes in the wound environment.              Clinician Signature:_______________________________Date__________________        Physician Signature:______________________________Date:__________________

## 2018-03-21 ENCOUNTER — NON-PROVIDER VISIT (OUTPATIENT)
Dept: WOUND CARE | Facility: MEDICAL CENTER | Age: 58
End: 2018-03-21
Attending: NURSE PRACTITIONER
Payer: COMMERCIAL

## 2018-03-21 PROCEDURE — 97597 DBRDMT OPN WND 1ST 20 CM/<: CPT

## 2018-03-21 NOTE — WOUND TEAM
"Advanced Wound Care  [facility]  [address]  [address]  [place] [address]  [phone] Fax: [phone]        Encounter Note    For Certification Period:  2/8/18-4/28/18        Referring Physician: BONNIE Garcia  Primary Physician: Kevin Chavez MD        Consulting Physicians: Dr. Kang        Wound(s):  L distal hallux    Start of Care:    2/8/18  Subjective:            Subjective:        HPI:  57 yr old pt with DM (controlled) presents to clinic for evaluation and treatment of his L foot distal hallux wound.  He states that he first noticed it approximately a month ago and has been leaving it open to air.  Pt saw Dr. Kang yesterday and Dr. Kang ordered a CT scan which still needs to be scheduled.     From Micky Moore note during foot/nail care last visit 1/30/18:  Pt is a 57 year old gentleman with DM who has attended [facility] in the past, had Surgery with Dr. Kang on Left foot on August 21, 2017 for bunionectomy revision. Referred by Rafia NICOLE due to mycotic, dystrophic nails he is not able to care for himself any longer.    56 year old male living with wife and children. HX of dislocated toes 3-4 years ago while walking, had toes \"fixed,\" but \"it didn't work.\"      12/22/2016 PROCEDURES with Dr Kang:     1.  Left modified Arndt bunionectomy.  2.  Left distal metatarsal osteotomy.  3.  Left distal metatarsal osteotomy of the hallux.  4.  Left distal metatarsal osteotomy, 2, 3, 4, and 5.  5.  Left flexor digitorum longus transfer with a flexor to extensor transfer    of 2 and 3.  6.  Left soft tissue reconstruction, metatarsophalangeal joints 2, 3, 4, and    5.  7.  Left hammertoe correction with proximal interphalangeal arthroplasty, 2    and 3.  8.  Right metatarsophalangeal joint capsulotomy of soft tissue release, 2 and    3.  9.  Right hammertoe correction with proximal interphalangeal arthroplasty, 2    and 3.                  Pain: Patient denies pain " today.    Current Medications:   Current Outpatient Prescriptions:   •  hydroxyzine pamoate (VISTARIL) 100 MG Cap, Take 200 mg by mouth 2 Times a Day., Disp: , Rfl:   •  lamotrigine (LAMICTAL) 200 MG tablet, Take 200 mg by mouth every day. TAKE 1 TAB BY MOUTH EVERY DAY., Disp: , Rfl: 0  •  levothyroxine (SYNTHROID) 112 MCG Tab, Take 1 Tab by mouth Every morning on an empty stomach., Disp: 60 Tab, Rfl: 3  •  tamsulosin (FLOMAX) 0.4 MG capsule, Take 0.4 mg by mouth ONE-HALF HOUR AFTER BREAKFAST., Disp: , Rfl:   •  liraglutide (VICTOZA) 18 MG/3ML Solution Pen-injector injection, Inject 1.8 mg as instructed every morning., Disp: , Rfl:   •  clotrimazole (LOTRIMIN) 1 % Cream, APPLY TO AFFECTED AREA EVERY 12 HOURS, Disp: , Rfl: 3  •  temazepam (RESTORIL) 30 MG capsule, Take 30 mg by mouth at bedtime as needed for Sleep., Disp: , Rfl:   •  venlafaxine XR (EFFEXOR XR) 75 MG CAPSULE SR 24 HR, Take 75 mg by mouth every morning., Disp: , Rfl:   •  alprazolam (XANAX) 0.25 MG Tab, Take 0.25-0.5 mg by mouth 4 times a day as needed for Anxiety., Disp: , Rfl:   •  losartan (COZAAR) 100 MG Tab, Take 100 mg by mouth every morning., Disp: , Rfl:   •  oxycodone-acetaminophen (PERCOCET) 5-325 MG TABS, Take 1-2 Tabs by mouth every four hours as needed., Disp: , Rfl:     Allergies: Demerol and Septra [bactrim ds]      Past Surgical History:   Past Surgical History        Past Surgical History:   Procedure Date   • TOE FUSION 11/13/2017     Procedure: TOE FUSION - 1ST METATARSALPHALANGEAL;  Surgeon: Haroldo Kang M.D.;  Location: [facility];  Service: Orthopedics   • METATARSAL HEAD RESECTION 11/13/2017     Procedure: METATARSAL HEAD RESECTION - EXCISION;  Surgeon: Haroldo Kang M.D.;  Location: [facility];  Service: Orthopedics   • HAMMERTOE CORRECTION 11/13/2017     Procedure: HAMMERTOE CORRECTION 2-5;  Surgeon: Haroldo Kang M.D.;  Location: SURGERY [facility];  Service: Orthopedics   • METATARSAL  "HEAD RESECTION 8/21/2017     Procedure: METATARSAL HEAD RESECTION - 2-5;  Surgeon: Haroldo Kang M.D.;  Location: SURGERY [facility];  Service:    • TOE FUSION 8/21/2017     Procedure: TOE FUSION - 1ST MTP;  Surgeon: Haroldo Kang M.D.;  Location: SURGERY [facility];  Service:    • HARDWARE REMOVAL ORTHO 8/21/2017     Procedure: HARDWARE REMOVAL ORTHO;  Surgeon: Haroldo Kang M.D.;  Location: SURGERY [facility];  Service:    • LUMBAR FUSION POSTERIOR 4/14/2017     Procedure: LUMBAR FUSION POSTERIOR - MINIMALLY INVASIVE TLIF L4-5;  Surgeon: Bossman Caro M.D.;  Location: SURGERY [facility];  Service:    • HAMMERTOE CORRECTION 12/22/2016     Procedure: HAMMERTOE CORRECTION/METATARSALPHALANGEAL JOINT RELEASE 2/3 RIGHT, 2-3 LEFT;  Surgeon: Haroldo Kang M.D.;  Location: SURGERY [facility];  Service:    • BUNIONECTOMY 12/22/2016     Procedure: BUNIONECTOMY FOR DISTAL HALLUX VALGUS CORRECTION WITH BRANDY;  Surgeon: Haroldo Kang M.D.;  Location: SURGERY [facility];  Service:    • ORTHOPEDIC OSTEOTOMY 12/22/2016     Procedure: ORTHOPEDIC OSTEOTOMY DISTAL METATARSAL 2-5;  Surgeon: Haroldo Kang M.D.;  Location: SURGERY [facility];  Service:    • HIP ARTH ANTERIOR TOTAL 8/18/2016     Procedure: HIP ARTHROPLASTY ANTERIOR TOTAL;  Surgeon: Emiliano Vang M.D.;  Location: [facility];  Service:    • OTHER ORTHOPEDIC SURGERY 6/2014     right shoulder  arthroplasty   • OTHER ORTHOPEDIC SURGERY 11/1/2012     left knee/left ankle/left shoulder   • OTHER ORTHOPEDIC SURGERY 2004     elbow surgery   • OTHER 2000     dislocation left foot surgery at [facility]   • OTHER       \"septoplasty 20 years ago\"                     Objective:    Tests and Measures: L foot warm with strong and palpable DP pulses. Pt reports FBS today at 130.  03/02/18: Culture taken due to erythema, hot, pain. (3/5/18: negative)  2/9/2018 CT scan findings:   There is no acute fracture.    There is no " focal soft tissue abnormality.  BS today 126    .  ANT 1.0 taken by Florentin wound tech.  L foot warm with strong and palpable DP pulses, hair growth present on LLE      Orthotic, protective, supportive devices:      Fall Risk Assessment (william all that apply with an X): High fall risk.      Wound Characteristics                                                      Location: L distal hallux    Initial Evaluation  Date: 2/8/18 Encounter Date: 03/21/2018   Tissue Type and %: 95% adherent yellow, 5% pink viable 70% pink moist, 30% adherent white   Periwound: Macerated callous Dry/flaky skin   Drainage: Mod SS Minimal serosanguinous   Exposed structures None visible None   Wound Edges:   Open  Open   Odor: None None   S&S of Infection:   Mild erythema None   Edema: Local Local to toe   Sensation: LOPS, neuropathy LOPS         Measurements: L distal hallux Initial Evaluation  Date: 2/8/18 Encounter Date: 03/21/2018   Length (cm) 1 0.5   Width (cm) 1.2 0.8   Depth (cm) GAYLE 0.2   Area (cm2) 1.2 0.4 cm2   Tract/undermine None None                 Procedures:                Debridement : CSWD using scalpel to remove ~0.5 cm2 nonviable tissue from wound bed and alma rosa-wound. Nonselective debridement with deshawn board to remove additional alma rosa-wound callus.              Cleansed with: NS                                                                                   Periwound protected with: skin prep              Primary dressing: Coty moistened with NS.              Secondary Dressing: non-adhesive foam secured with hypafix.              Other: Tubi E to LLE. Patient's own offloading sandal.     Patient Education: POC and wound progress reviewed. Patient reports that his blood glucose this morning was 125. He also reports that he is going to [place] next week for his son's baseball tournament, he is going to obtain a knee scooter so that he does not walk on his left foot while he is out of town. Patient advised to keep  dressing clean/dry/intact. Patient verbalized understanding of instructions.    Professional Collaboration: none today    Assessment:       Wound etiology: trauma vs DFU     Wound Progress: Wound measures smaller.     Rationale for Treatment: Coty to combat chronic inflammation and provide collagen scaffolding to promote granulation. Non-adhesive foam to pad and absorb. Tubi E for compression.    Patient tolerance/compliance: Patient tolerated treatment well, compliant with appointments and POC.     Complicating factors: DM, HTN, pain     Need for ongoing Advanced Wound Care services: Patient requires skilled therapeutic wound care services for product selection, application of product, debridement, close monitoring with clinical assessment, compression for expedite of wound healing.        Plan:       Treatment Plan and Recommendations:  Diagnosis/ICD10: S91.302A    Procedures/CPT: Selective debridement 51545     Frequency: 2x/week        Treatment Goals: STG 2 Weeks          LTG 4 Weeks   Granulation Tissue: 100% 100%   Decrease Necrotic Tissue to: 0% 0%   Wound Phase:             proliferation proliferation   Decrease Size by: 25% 50%   Periwound:       intact intact   Decrease tracts/undermining by: n/a n/a   Decrease Pain:    0 0                           At the time of each visit a thorough assessment of the patient is completed to assure the  appropriateness of our plan of care.  The dressings or modalities may need to be adapted   from the original plan to address any significant changes in the wound environment.

## 2018-03-29 ENCOUNTER — NON-PROVIDER VISIT (OUTPATIENT)
Dept: WOUND CARE | Facility: MEDICAL CENTER | Age: 58
End: 2018-03-29
Attending: NURSE PRACTITIONER
Payer: COMMERCIAL

## 2018-03-29 PROCEDURE — 97597 DBRDMT OPN WND 1ST 20 CM/<: CPT

## 2018-03-29 NOTE — WOUND TEAM
"Advanced Wound Care  [facility]  [address]  [address]  [place] [address]  [phone] Fax: [phone]        Encounter Note    For Certification Period:  2/8/18-4/28/18        Referring Physician: BONNIE Garcia  Primary Physician: Kevin Chavez MD        Consulting Physicians: Dr. Kang        Wound(s):  L distal hallux    Start of Care:    2/8/18  Subjective:            Subjective:        HPI:  57 yr old pt with DM (controlled) presents to clinic for evaluation and treatment of his L foot distal hallux wound.  He states that he first noticed it approximately a month ago and has been leaving it open to air.  Pt saw Dr. Kang yesterday and Dr. Kang ordered a CT scan which still needs to be scheduled.     From Micky Moore note during foot/nail care last visit 1/30/18:  Pt is a 57 year old gentleman with DM who has attended [facility] in the past, had Surgery with Dr. Kang on Left foot on August 21, 2017 for bunionectomy revision. Referred by Rafia NICOLE due to mycotic, dystrophic nails he is not able to care for himself any longer.    56 year old male living with wife and children. HX of dislocated toes 3-4 years ago while walking, had toes \"fixed,\" but \"it didn't work.\"      12/22/2016 PROCEDURES with Dr Kang:     1.  Left modified Arndt bunionectomy.  2.  Left distal metatarsal osteotomy.  3.  Left distal metatarsal osteotomy of the hallux.  4.  Left distal metatarsal osteotomy, 2, 3, 4, and 5.  5.  Left flexor digitorum longus transfer with a flexor to extensor transfer    of 2 and 3.  6.  Left soft tissue reconstruction, metatarsophalangeal joints 2, 3, 4, and    5.  7.  Left hammertoe correction with proximal interphalangeal arthroplasty, 2    and 3.  8.  Right metatarsophalangeal joint capsulotomy of soft tissue release, 2 and    3.  9.  Right hammertoe correction with proximal interphalangeal arthroplasty, 2    and 3.                  Pain: Patient denies pain " today.    Current Medications:   Current Outpatient Prescriptions:   •  hydroxyzine pamoate (VISTARIL) 100 MG Cap, Take 200 mg by mouth 2 Times a Day., Disp: , Rfl:   •  lamotrigine (LAMICTAL) 200 MG tablet, Take 200 mg by mouth every day. TAKE 1 TAB BY MOUTH EVERY DAY., Disp: , Rfl: 0  •  levothyroxine (SYNTHROID) 112 MCG Tab, Take 1 Tab by mouth Every morning on an empty stomach., Disp: 60 Tab, Rfl: 3  •  tamsulosin (FLOMAX) 0.4 MG capsule, Take 0.4 mg by mouth ONE-HALF HOUR AFTER BREAKFAST., Disp: , Rfl:   •  liraglutide (VICTOZA) 18 MG/3ML Solution Pen-injector injection, Inject 1.8 mg as instructed every morning., Disp: , Rfl:   •  clotrimazole (LOTRIMIN) 1 % Cream, APPLY TO AFFECTED AREA EVERY 12 HOURS, Disp: , Rfl: 3  •  temazepam (RESTORIL) 30 MG capsule, Take 30 mg by mouth at bedtime as needed for Sleep., Disp: , Rfl:   •  venlafaxine XR (EFFEXOR XR) 75 MG CAPSULE SR 24 HR, Take 75 mg by mouth every morning., Disp: , Rfl:   •  alprazolam (XANAX) 0.25 MG Tab, Take 0.25-0.5 mg by mouth 4 times a day as needed for Anxiety., Disp: , Rfl:   •  losartan (COZAAR) 100 MG Tab, Take 100 mg by mouth every morning., Disp: , Rfl:   •  oxycodone-acetaminophen (PERCOCET) 5-325 MG TABS, Take 1-2 Tabs by mouth every four hours as needed., Disp: , Rfl:     Allergies: Demerol and Septra [bactrim ds]      Past Surgical History:   Past Surgical History        Past Surgical History:   Procedure Date   • TOE FUSION 11/13/2017     Procedure: TOE FUSION - 1ST METATARSALPHALANGEAL;  Surgeon: Haroldo Kang M.D.;  Location: [facility];  Service: Orthopedics   • METATARSAL HEAD RESECTION 11/13/2017     Procedure: METATARSAL HEAD RESECTION - EXCISION;  Surgeon: Haroldo Kang M.D.;  Location: [facility];  Service: Orthopedics   • HAMMERTOE CORRECTION 11/13/2017     Procedure: HAMMERTOE CORRECTION 2-5;  Surgeon: Haroldo Kang M.D.;  Location: SURGERY [facility];  Service: Orthopedics   • METATARSAL  "HEAD RESECTION 8/21/2017     Procedure: METATARSAL HEAD RESECTION - 2-5;  Surgeon: Haroldo Kang M.D.;  Location: SURGERY [facility];  Service:    • TOE FUSION 8/21/2017     Procedure: TOE FUSION - 1ST MTP;  Surgeon: Haroldo Kang M.D.;  Location: SURGERY [facility];  Service:    • HARDWARE REMOVAL ORTHO 8/21/2017     Procedure: HARDWARE REMOVAL ORTHO;  Surgeon: Haroldo Kang M.D.;  Location: SURGERY [facility];  Service:    • LUMBAR FUSION POSTERIOR 4/14/2017     Procedure: LUMBAR FUSION POSTERIOR - MINIMALLY INVASIVE TLIF L4-5;  Surgeon: Bossman Caro M.D.;  Location: SURGERY [facility];  Service:    • HAMMERTOE CORRECTION 12/22/2016     Procedure: HAMMERTOE CORRECTION/METATARSALPHALANGEAL JOINT RELEASE 2/3 RIGHT, 2-3 LEFT;  Surgeon: Haroldo Kang M.D.;  Location: SURGERY [facility];  Service:    • BUNIONECTOMY 12/22/2016     Procedure: BUNIONECTOMY FOR DISTAL HALLUX VALGUS CORRECTION WITH BRANDY;  Surgeon: Haroldo Kang M.D.;  Location: SURGERY [facility];  Service:    • ORTHOPEDIC OSTEOTOMY 12/22/2016     Procedure: ORTHOPEDIC OSTEOTOMY DISTAL METATARSAL 2-5;  Surgeon: Haroldo Kang M.D.;  Location: SURGERY [facility];  Service:    • HIP ARTH ANTERIOR TOTAL 8/18/2016     Procedure: HIP ARTHROPLASTY ANTERIOR TOTAL;  Surgeon: Emiliano Vang M.D.;  Location: [facility];  Service:    • OTHER ORTHOPEDIC SURGERY 6/2014     right shoulder  arthroplasty   • OTHER ORTHOPEDIC SURGERY 11/1/2012     left knee/left ankle/left shoulder   • OTHER ORTHOPEDIC SURGERY 2004     elbow surgery   • OTHER 2000     dislocation left foot surgery at [facility]   • OTHER       \"septoplasty 20 years ago\"                     Objective:    Tests and Measures: L foot warm with strong and palpable DP pulses. Pt reports FBS today at 150.  03/02/18: Culture taken due to erythema, hot, pain. (3/5/18: negative)  2/9/2018 CT scan findings:   There is no acute fracture.    There is no " focal soft tissue abnormality.  BS today 126    .  ANT 1.0 taken by Florentin wound tech.  L foot warm with strong and palpable DP pulses, hair growth present on LLE      Orthotic, protective, supportive devices:      Fall Risk Assessment (william all that apply with an X): High fall risk.      Wound Characteristics                                                      Location: L distal hallux    Initial Evaluation  Date: 2/8/18 Encounter Date: 03/29/2018   Tissue Type and %: 95% adherent yellow, 5% pink viable 80% pink moist, 20% adherent white   Periwound: Macerated callous Thin callus, dry/flaky skin   Drainage: Mod SS Minimal serosanguinous   Exposed structures None visible None   Wound Edges:   Open  Open   Odor: None None   S&S of Infection:   Mild erythema None   Edema: Local Local to toe   Sensation: LOPS, neuropathy LOPS         Measurements: L distal hallux Initial Evaluation  Date: 2/8/18 Encounter Date: 03/29/2018   Length (cm) 1 0.4   Width (cm) 1.2 0.9   Depth (cm) GAYLE 0.2   Area (cm2) 1.2 0.36 cm2   Tract/undermine None None       Procedures:                Debridement : CSWD using scalpel to remove ~1 cm2 nonviable tissue from wound bed and alma rosa-wound.              Cleansed with: NS                                                                                   Periwound protected with: skin prep              Primary dressing: Coty moistened with NS.              Secondary Dressing: non-adhesive foam secured with hypafix.              Other: Tubi E to LLE. Patient's own offloading sandal.     Patient Education: POC and wound progress reviewed with patient. Wound measures slightly smaller this visit. Patient states he is going to see podiatrist today regarding a metal plate that had been placed in his foot some time ago which he thinks has moved. Reviewed s/s infection and when to present to ED.Patient verbalized understanding of instructions.    Professional Collaboration: none today    Assessment:        Wound etiology: trauma vs DFU     Wound Progress: Wound measures smaller.     Rationale for Treatment: Coty to combat chronic inflammation and provide collagen scaffolding to promote granulation. Non-adhesive foam to pad and absorb. Tubi E for compression.    Patient tolerance/compliance: Patient tolerated treatment well, compliant with appointments and POC.     Complicating factors: DM, HTN, pain     Need for ongoing Advanced Wound Care services: Patient requires skilled therapeutic wound care services for product selection, application of product, debridement, close monitoring with clinical assessment, compression for expedite of wound healing.        Plan:       Treatment Plan and Recommendations:  Diagnosis/ICD10: S91.302A    Procedures/CPT: Selective debridement 19109     Frequency: 2x/week - 30 minutes        Treatment Goals: STG 2 Weeks          LTG 4 Weeks   Granulation Tissue: 100% 100%   Decrease Necrotic Tissue to: 0% 0%   Wound Phase:             proliferation proliferation   Decrease Size by: 25% 50%   Periwound:       intact intact   Decrease tracts/undermining by: n/a n/a   Decrease Pain:    0 0                           At the time of each visit a thorough assessment of the patient is completed to assure the  appropriateness of our plan of care.  The dressings or modalities may need to be adapted   from the original plan to address any significant changes in the wound environment.

## 2018-04-02 ENCOUNTER — NON-PROVIDER VISIT (OUTPATIENT)
Dept: WOUND CARE | Facility: MEDICAL CENTER | Age: 58
End: 2018-04-02
Attending: NURSE PRACTITIONER
Payer: COMMERCIAL

## 2018-04-02 PROCEDURE — 97597 DBRDMT OPN WND 1ST 20 CM/<: CPT

## 2018-04-02 NOTE — WOUND TEAM
"Advanced Wound Care  [facility]  [address]  [address]  [place] [address]  [phone] Fax: [phone]        Encounter Note    For Certification Period:  2/8/18-4/28/18        Referring Physician: BONNIE Garcia  Primary Physician: Kevin Chavez MD        Consulting Physicians: Dr. Kang        Wound(s):  L distal hallux    Start of Care:    2/8/18  Subjective:            Subjective:        HPI:  57 yr old pt with DM (controlled) presents to clinic for evaluation and treatment of his L foot distal hallux wound.  He states that he first noticed it approximately a month ago and has been leaving it open to air.  Pt saw Dr. Kang yesterday and Dr. Kang ordered a CT scan which still needs to be scheduled.     From Micky Moore note during foot/nail care last visit 1/30/18:  Pt is a 57 year old gentleman with DM who has attended [facility] in the past, had Surgery with Dr. Kang on Left foot on August 21, 2017 for bunionectomy revision. Referred by Rafia NICOLE due to mycotic, dystrophic nails he is not able to care for himself any longer.    56 year old male living with wife and children. HX of dislocated toes 3-4 years ago while walking, had toes \"fixed,\" but \"it didn't work.\"      12/22/2016 PROCEDURES with Dr Kang:     1.  Left modified Arndt bunionectomy.  2.  Left distal metatarsal osteotomy.  3.  Left distal metatarsal osteotomy of the hallux.  4.  Left distal metatarsal osteotomy, 2, 3, 4, and 5.  5.  Left flexor digitorum longus transfer with a flexor to extensor transfer    of 2 and 3.  6.  Left soft tissue reconstruction, metatarsophalangeal joints 2, 3, 4, and    5.  7.  Left hammertoe correction with proximal interphalangeal arthroplasty, 2    and 3.  8.  Right metatarsophalangeal joint capsulotomy of soft tissue release, 2 and    3.  9.  Right hammertoe correction with proximal interphalangeal arthroplasty, 2    and 3.                  Pain: Patient denies pain " today.    Current Medications:   Current Outpatient Prescriptions:   •  hydroxyzine pamoate (VISTARIL) 100 MG Cap, Take 200 mg by mouth 2 Times a Day., Disp: , Rfl:   •  lamotrigine (LAMICTAL) 200 MG tablet, Take 200 mg by mouth every day. TAKE 1 TAB BY MOUTH EVERY DAY., Disp: , Rfl: 0  •  levothyroxine (SYNTHROID) 112 MCG Tab, Take 1 Tab by mouth Every morning on an empty stomach., Disp: 60 Tab, Rfl: 3  •  tamsulosin (FLOMAX) 0.4 MG capsule, Take 0.4 mg by mouth ONE-HALF HOUR AFTER BREAKFAST., Disp: , Rfl:   •  liraglutide (VICTOZA) 18 MG/3ML Solution Pen-injector injection, Inject 1.8 mg as instructed every morning., Disp: , Rfl:   •  clotrimazole (LOTRIMIN) 1 % Cream, APPLY TO AFFECTED AREA EVERY 12 HOURS, Disp: , Rfl: 3  •  temazepam (RESTORIL) 30 MG capsule, Take 30 mg by mouth at bedtime as needed for Sleep., Disp: , Rfl:   •  venlafaxine XR (EFFEXOR XR) 75 MG CAPSULE SR 24 HR, Take 75 mg by mouth every morning., Disp: , Rfl:   •  alprazolam (XANAX) 0.25 MG Tab, Take 0.25-0.5 mg by mouth 4 times a day as needed for Anxiety., Disp: , Rfl:   •  losartan (COZAAR) 100 MG Tab, Take 100 mg by mouth every morning., Disp: , Rfl:   •  oxycodone-acetaminophen (PERCOCET) 5-325 MG TABS, Take 1-2 Tabs by mouth every four hours as needed., Disp: , Rfl:     Allergies: Demerol and Septra [bactrim ds]     Objective:    Tests and Measures: L foot warm with strong and palpable DP pulses. Pt reports FBS today at 150.  03/02/18: Culture taken due to erythema, hot, pain. (3/5/18: negative)  2/9/2018 CT scan findings:   There is no acute fracture.    There is no focal soft tissue abnormality.  BS today 126    .  ANT 1.0 taken by Florentin wound tech.  L foot warm with strong and palpable DP pulses, hair growth present on LLE      Orthotic, protective, supportive devices:      Fall Risk Assessment (william all that apply with an X): High fall risk.      Wound Characteristics                                                      Location: L  distal hallux    Initial Evaluation  Date: 2/8/18 Encounter Date: 04/02/2018   Tissue Type and %: 95% adherent yellow, 5% pink viable 100% moist red   Periwound: Macerated callous Intact   Drainage: Mod SS Minimal serosanguinous   Exposed structures None visible None   Wound Edges:   Open  Open   Odor: None None   S&S of Infection:   Mild erythema None   Edema: Local Local to toe   Sensation: LOPS, neuropathy LOPS         Measurements: L distal hallux Initial Evaluation  Date: 2/8/18 Encounter Date: 04/02/2018   Length (cm) 1 0.3   Width (cm) 1.2 0.7   Depth (cm) GAYLE 0.2   Area (cm2) 1.2 0.21 cm2   Tract/undermine None None             Procedures:                Debridement : CSWD using scalpel to remove ~0.5 cm2 slough and biofilm from wound bed              Cleansed with: NS                                                                                   Periwound protected with: skin prep              Primary dressing: Coty moistened with NS.              Secondary Dressing: non-adhesive foam secured with hypafix.              Other: Tubi E to LLE. Patient's own offloading sandal.     Patient Education: POC and wound progress reviewed with patient. Wound measures slightly smaller this visit. Pt saw Dr. Kang and states Dr. Kang advised possible procedure in the future for additional tendon repair. Pt has follow up with him in 4 weeks. Also pt is having elbow repair and carpal tunnel repair procedure tomorrow 4/3/2018, pt states it is outpatient procedure but he may be admitted. Requested pt to let AWC know if he does get admitted. Reviewed s/s infection and when to present to ED. Patient verbalized understanding to all.    Professional Collaboration: none today    Assessment:       Wound etiology: trauma vs DFU     Wound Progress: Wound measures smaller.     Rationale for Treatment: Coty to combat chronic inflammation and provide collagen scaffolding to promote granulation. Non-adhesive foam to pad and  absorb. Tubi E for compression.    Patient tolerance/compliance: Patient tolerated treatment well, compliant with appointments and POC.     Complicating factors: DM, HTN, pain     Need for ongoing Advanced Wound Care services: Patient requires skilled therapeutic wound care services for product selection, application of product, debridement, close monitoring with clinical assessment, compression for expedite of wound healing.        Plan:       Treatment Plan and Recommendations:  Diagnosis/ICD10: S91.302A    Procedures/CPT: Selective debridement 64400     Frequency: 2x/week - 30 minutes        Treatment Goals: STG 2 Weeks          LTG 4 Weeks   Granulation Tissue: 100% 100%   Decrease Necrotic Tissue to: 0% 0%   Wound Phase:             proliferation proliferation   Decrease Size by: 25% 50%   Periwound:       intact intact   Decrease tracts/undermining by: n/a n/a   Decrease Pain:    0 0                           At the time of each visit a thorough assessment of the patient is completed to assure the  appropriateness of our plan of care.  The dressings or modalities may need to be adapted   from the original plan to address any significant changes in the wound environment.

## 2018-04-03 ENCOUNTER — HOSPITAL ENCOUNTER (OUTPATIENT)
Facility: MEDICAL CENTER | Age: 58
End: 2018-04-04
Attending: ORTHOPAEDIC SURGERY | Admitting: ORTHOPAEDIC SURGERY
Payer: COMMERCIAL

## 2018-04-03 PROBLEM — M24.521: Status: ACTIVE | Noted: 2018-04-03

## 2018-04-03 LAB — GLUCOSE BLD-MCNC: 127 MG/DL (ref 65–99)

## 2018-04-03 PROCEDURE — 160036 HCHG PACU - EA ADDL 30 MINS PHASE I: Performed by: ORTHOPAEDIC SURGERY

## 2018-04-03 PROCEDURE — 500380 HCHG DRAIN, PENROSE 1/4X12: Performed by: ORTHOPAEDIC SURGERY

## 2018-04-03 PROCEDURE — 160041 HCHG SURGERY MINUTES - EA ADDL 1 MIN LEVEL 4: Performed by: ORTHOPAEDIC SURGERY

## 2018-04-03 PROCEDURE — 160048 HCHG OR STATISTICAL LEVEL 1-5: Performed by: ORTHOPAEDIC SURGERY

## 2018-04-03 PROCEDURE — 700101 HCHG RX REV CODE 250

## 2018-04-03 PROCEDURE — 160035 HCHG PACU - 1ST 60 MINS PHASE I: Performed by: ORTHOPAEDIC SURGERY

## 2018-04-03 PROCEDURE — 500440 HCHG DRESSING, STERILE ROLL (KERLIX): Performed by: ORTHOPAEDIC SURGERY

## 2018-04-03 PROCEDURE — A4565 SLINGS: HCPCS | Performed by: ORTHOPAEDIC SURGERY

## 2018-04-03 PROCEDURE — 700101 HCHG RX REV CODE 250: Performed by: ORTHOPAEDIC SURGERY

## 2018-04-03 PROCEDURE — A9270 NON-COVERED ITEM OR SERVICE: HCPCS

## 2018-04-03 PROCEDURE — 160009 HCHG ANES TIME/MIN: Performed by: ORTHOPAEDIC SURGERY

## 2018-04-03 PROCEDURE — 160002 HCHG RECOVERY MINUTES (STAT): Performed by: ORTHOPAEDIC SURGERY

## 2018-04-03 PROCEDURE — 160029 HCHG SURGERY MINUTES - 1ST 30 MINS LEVEL 4: Performed by: ORTHOPAEDIC SURGERY

## 2018-04-03 PROCEDURE — 700111 HCHG RX REV CODE 636 W/ 250 OVERRIDE (IP)

## 2018-04-03 PROCEDURE — 501838 HCHG SUTURE GENERAL: Performed by: ORTHOPAEDIC SURGERY

## 2018-04-03 PROCEDURE — G0378 HOSPITAL OBSERVATION PER HR: HCPCS

## 2018-04-03 PROCEDURE — 82962 GLUCOSE BLOOD TEST: CPT

## 2018-04-03 PROCEDURE — 502576 HCHG PACK, HAND: Performed by: ORTHOPAEDIC SURGERY

## 2018-04-03 PROCEDURE — 700102 HCHG RX REV CODE 250 W/ 637 OVERRIDE(OP)

## 2018-04-03 PROCEDURE — 700111 HCHG RX REV CODE 636 W/ 250 OVERRIDE (IP): Performed by: ORTHOPAEDIC SURGERY

## 2018-04-03 PROCEDURE — A6222 GAUZE <=16 IN NO W/SAL W/O B: HCPCS | Performed by: ORTHOPAEDIC SURGERY

## 2018-04-03 PROCEDURE — 96374 THER/PROPH/DIAG INJ IV PUSH: CPT

## 2018-04-03 PROCEDURE — A9270 NON-COVERED ITEM OR SERVICE: HCPCS | Performed by: ORTHOPAEDIC SURGERY

## 2018-04-03 PROCEDURE — 700102 HCHG RX REV CODE 250 W/ 637 OVERRIDE(OP): Performed by: ORTHOPAEDIC SURGERY

## 2018-04-03 RX ORDER — SODIUM CHLORIDE, SODIUM LACTATE, POTASSIUM CHLORIDE, CALCIUM CHLORIDE 600; 310; 30; 20 MG/100ML; MG/100ML; MG/100ML; MG/100ML
INJECTION, SOLUTION INTRAVENOUS
Status: DISCONTINUED | OUTPATIENT
Start: 2018-04-03 | End: 2018-04-04 | Stop reason: HOSPADM

## 2018-04-03 RX ORDER — OXYCODONE HCL 5 MG/5 ML
SOLUTION, ORAL ORAL
Status: COMPLETED
Start: 2018-04-03 | End: 2018-04-03

## 2018-04-03 RX ORDER — BUPIVACAINE HYDROCHLORIDE 5 MG/ML
INJECTION, SOLUTION EPIDURAL; INTRACAUDAL
Status: DISCONTINUED | OUTPATIENT
Start: 2018-04-03 | End: 2018-04-03 | Stop reason: HOSPADM

## 2018-04-03 RX ORDER — OXYCODONE HYDROCHLORIDE 10 MG/1
10 TABLET ORAL
Status: DISCONTINUED | OUTPATIENT
Start: 2018-04-03 | End: 2018-04-04

## 2018-04-03 RX ORDER — LIDOCAINE HYDROCHLORIDE 10 MG/ML
INJECTION, SOLUTION INFILTRATION; PERINEURAL
Status: DISPENSED
Start: 2018-04-03 | End: 2018-04-03

## 2018-04-03 RX ORDER — KETOROLAC TROMETHAMINE 30 MG/ML
INJECTION, SOLUTION INTRAMUSCULAR; INTRAVENOUS
Status: COMPLETED
Start: 2018-04-03 | End: 2018-04-03

## 2018-04-03 RX ORDER — DIPHENHYDRAMINE HYDROCHLORIDE 50 MG/ML
25 INJECTION INTRAMUSCULAR; INTRAVENOUS EVERY 6 HOURS PRN
Status: DISCONTINUED | OUTPATIENT
Start: 2018-04-03 | End: 2018-04-04 | Stop reason: HOSPADM

## 2018-04-03 RX ORDER — SCOLOPAMINE TRANSDERMAL SYSTEM 1 MG/1
1 PATCH, EXTENDED RELEASE TRANSDERMAL
Status: DISCONTINUED | OUTPATIENT
Start: 2018-04-03 | End: 2018-04-04 | Stop reason: HOSPADM

## 2018-04-03 RX ORDER — ONDANSETRON 2 MG/ML
4 INJECTION INTRAMUSCULAR; INTRAVENOUS EVERY 4 HOURS PRN
Status: DISCONTINUED | OUTPATIENT
Start: 2018-04-03 | End: 2018-04-04 | Stop reason: HOSPADM

## 2018-04-03 RX ORDER — DEXAMETHASONE SODIUM PHOSPHATE 4 MG/ML
4 INJECTION, SOLUTION INTRA-ARTICULAR; INTRALESIONAL; INTRAMUSCULAR; INTRAVENOUS; SOFT TISSUE
Status: DISCONTINUED | OUTPATIENT
Start: 2018-04-03 | End: 2018-04-04 | Stop reason: HOSPADM

## 2018-04-03 RX ORDER — OXYCODONE HYDROCHLORIDE 10 MG/1
10 TABLET ORAL EVERY 4 HOURS PRN
Status: DISCONTINUED | OUTPATIENT
Start: 2018-04-03 | End: 2018-04-03

## 2018-04-03 RX ORDER — HALOPERIDOL 5 MG/ML
1 INJECTION INTRAMUSCULAR EVERY 6 HOURS PRN
Status: DISCONTINUED | OUTPATIENT
Start: 2018-04-03 | End: 2018-04-04 | Stop reason: HOSPADM

## 2018-04-03 RX ADMIN — FENTANYL CITRATE 50 MCG: 50 INJECTION, SOLUTION INTRAMUSCULAR; INTRAVENOUS at 15:16

## 2018-04-03 RX ADMIN — OXYCODONE HYDROCHLORIDE 10 MG: 5 SOLUTION ORAL at 15:05

## 2018-04-03 RX ADMIN — OXYCODONE HYDROCHLORIDE 10 MG: 10 TABLET ORAL at 19:58

## 2018-04-03 RX ADMIN — FENTANYL CITRATE 50 MCG: 50 INJECTION, SOLUTION INTRAMUSCULAR; INTRAVENOUS at 15:35

## 2018-04-03 RX ADMIN — OXYCODONE HYDROCHLORIDE 10 MG: 10 TABLET ORAL at 23:04

## 2018-04-03 RX ADMIN — FENTANYL CITRATE 50 MCG: 50 INJECTION, SOLUTION INTRAMUSCULAR; INTRAVENOUS at 15:05

## 2018-04-03 RX ADMIN — FENTANYL CITRATE 50 MCG: 50 INJECTION, SOLUTION INTRAMUSCULAR; INTRAVENOUS at 15:10

## 2018-04-03 RX ADMIN — FENTANYL CITRATE 50 MCG: 50 INJECTION, SOLUTION INTRAMUSCULAR; INTRAVENOUS at 15:45

## 2018-04-03 RX ADMIN — WATER 2 G: 100 INJECTION, SOLUTION INTRAVENOUS at 23:04

## 2018-04-03 RX ADMIN — FENTANYL CITRATE 50 MCG: 50 INJECTION, SOLUTION INTRAMUSCULAR; INTRAVENOUS at 15:29

## 2018-04-03 RX ADMIN — KETOROLAC TROMETHAMINE 30 MG: 30 INJECTION, SOLUTION INTRAMUSCULAR at 15:03

## 2018-04-03 ASSESSMENT — PAIN SCALES - GENERAL
PAINLEVEL_OUTOF10: 8
PAINLEVEL_OUTOF10: 6
PAINLEVEL_OUTOF10: 8
PAINLEVEL_OUTOF10: 5
PAINLEVEL_OUTOF10: 8
PAINLEVEL_OUTOF10: 7
PAINLEVEL_OUTOF10: 8
PAINLEVEL_OUTOF10: 4
PAINLEVEL_OUTOF10: 8

## 2018-04-03 NOTE — OR NURSING
Pt allergies and NPO status verified, home meds reconcilled. Belongings secured. Pt verbalizes understanding of the pain scale, expected course of stay, and plan of care.  Surgical site verified with pt.  IV access established.  Sequentials placed on legs. FSBG 127

## 2018-04-03 NOTE — OR NURSING
1456 To PACU from OR via [place], respirations spontaneous and non-labored. Dressing to RUE c/d/i VSS. Pt states pain is 8/10. Plan to medicate. See MAR. Pt awake, alert and oriented. Cap refill to fingers < 3 seconds   1510 No change   1517 Pt placed on CPAP. VSS. Pt states pain remains 8/10. Will continue to medicate.   1525 No change   1540 Pt awake and alert at this time. Does not require CPAP. VSS. Pt states pain remains 8/10 and denies nausea.  Dr. Almendarez aware of pts pain level. Dr. Almendarez to give pt nerve block   1555 No change. VSS   1610 Pt tolerating water. VSS Spoke with Dr. Almendarez for additional pain medication. No new orders received.  1625    1700 Dr. Almendarez at bedside to perform nerve block. VSS.   1715 Pt tolerated nerve block well. VSS.   1735 Pt dropping oxygen saturation, spoke with Dr. Almendarez, will continue to monitor saturations post nerve block. Pt states pain has decreased to 4/10.   1755 Dr. Luna aware of  Hypoxia post nerve block, will admit pt for hypoxia. Pt agrees to being admitted. Pt tolerating crackers and soda.   1820 Pts wife at bedside. Awaiting room assignment. VSS  1840 Pt meets criteria to transfer to room at this time. VSS.

## 2018-04-03 NOTE — OP REPORT
DATE OF SERVICE:  04/03/2018    PREOPERATIVE DIAGNOSES:  1.  Right elbow contracture.  2.  Right ulnar nerve neuropathy, cubital tunnel.  3.  Right carpal tunnel syndrome.    POSTOPERATIVE DIAGNOSES:  1.  Right elbow contracture.  2.  Right ulnar nerve neuropathy, cubital tunnel.  3.  Right carpal tunnel syndrome.    PROCEDURES PERFORMED:  1.  Right elbow contracture release, removal of loose bodies or osteophytes.  2.  Right ulnar nerve transposition.  3.  Right open carpal tunnel release.  4.  Radial head excision after contracture release of the elbow.    SURGEON:  Ayad Luna MD    ANESTHESIA:  General.    ESTIMATED BLOOD LOSS:  Minimal.    DRAINS:  None.    COMPLICATIONS:  None.    INDICATIONS:  Patient is a gentleman who has had ongoing arthritis.  He had   some contracture release years ago, now having ongoing symptoms.  The patient   felt to benefit from contracture release, radial head excision, cubital tunnel   ulnar nerve decompression and open carpal tunnel release, potential for   ongoing pain, neurovascular injury, infection, need for further surgery.  All   questions were answered.  Signed consent was obtained.    DESCRIPTION OF PROCEDURE:  Patient was taken to the operating room and laid   supine on the table.  All bony prominences well padded.  Adequate general was   obtained without difficulty.  Right upper extremity was prepped and draped in   the usual sterile fashion using a ChloraPrep.  Limb was exsanguinated with   elevation, tourniquet raised, total tourniquet time was about an hour and   half.  I used previous lateral incision through skin and subcutaneous tissues.    I elevated the fascia off the anterior supracondylar ridge, staying out   anteriorly, leaving the lateral collateral ligament intact.  Entering the   joint, I elevated the muscle off the distal humerus anteriorly.  I then   elevated and subsequently excised the radial head.  Once I excised the radial   head, I then had  access to the cornoid, I elevated some of the soft tissues   and also excise portion of the cornoid that was impinging anteriorly.  I   elevated the flap of capsule and subsequently excised a strip going medially   of the entire anterior capsule.  There was also couple of loose bodies that   extruded out into the anterior wound from posteriorly these were removed.    Wounds were irrigated.  I then made a medial incision, identified the ulnar   nerve, freed it proximally and distally up to the arcade of [place], split   the FCU muscle belly distally.  I mobilized the nerve.  Once I had mobilized   the nerve, I went anteriorly, the medial intermuscular septum was removed.  I   did a medial approach taking the anterior maybe two-thirds of the flexor   pronator mass, leaving a cuff of tissue to repair to, came down on to the   humerus and capsule and removed the remainder of the anterior capsule from the   medial wound.  I also trimmed little bit more of the osteophytes medially.  I   then irrigated.  I closed the flexor pronator mass with #1 Vicryl.  The ulnar   nerve, I transposed anteriorly, placed a couple loose sutures in a   subcutaneous tissues to keep it anteriorly.  There did not appear to be   impingement through a full range of motion.  I went back to the lateral wound.    I made an extension essentially a Y incision going into the posterior aspect   of the elbow, removing some posterior osteophytes, and removed the posterior   capsule and olecranon osteophytes from the posterior aspect of the lateral   incision.  This did seem to help the extension and prevent impingement from   the anterior aspect of the elbow.  The elbow had ranged from about 10 degrees   up to about 120 degrees and markedly improved pronation and supination.  The   wounds were irrigated.  I closed the posterior aspect of the lateral wound   with #1 Vicryl.  I went to the wrist and made an incision along the radial   border of the fourth  digit over the transverse carpal ligament opened it,   carried proximally and distally, found to be a nice release.  This wound was   irrigated, closed with nylon in mattress fashion.  The medial wound was   irrigated, closed with Vicryl and staples.  The lateral wound, I then let down   the tourniquet.  There appeared to be adequate hemostasis.  There was good   blood flow distally.  I then closed the anterior aspect of the lateral wound   with #1 Vicryl, subQ with 2-0 Vicryl, 3-0 Vicryl, and skin with staples.    Local without epinephrine was injected.  Sterile dressing of Xeroform, 4x4   soft roll, Kerlix, and a splint in extension with bias was applied.  All   needle and sponge counts correct.  Patient tolerated procedure well and was   transferred to recovery in stable condition.       ____________________________________     MD RUFINO BAILON / EMILI    DD:  04/03/2018 15:27:27  DT:  04/03/2018 16:16:55    D#:  [id number]  Job#:  [id number]

## 2018-04-04 VITALS
RESPIRATION RATE: 18 BRPM | SYSTOLIC BLOOD PRESSURE: 107 MMHG | HEART RATE: 84 BPM | BODY MASS INDEX: 38.28 KG/M2 | DIASTOLIC BLOOD PRESSURE: 67 MMHG | OXYGEN SATURATION: 95 % | TEMPERATURE: 98.7 F | WEIGHT: 267.42 LBS | HEIGHT: 70 IN

## 2018-04-04 PROCEDURE — A9270 NON-COVERED ITEM OR SERVICE: HCPCS | Performed by: ORTHOPAEDIC SURGERY

## 2018-04-04 PROCEDURE — 96375 TX/PRO/DX INJ NEW DRUG ADDON: CPT

## 2018-04-04 PROCEDURE — 700102 HCHG RX REV CODE 250 W/ 637 OVERRIDE(OP): Performed by: ORTHOPAEDIC SURGERY

## 2018-04-04 PROCEDURE — 96376 TX/PRO/DX INJ SAME DRUG ADON: CPT

## 2018-04-04 PROCEDURE — 700111 HCHG RX REV CODE 636 W/ 250 OVERRIDE (IP): Performed by: ORTHOPAEDIC SURGERY

## 2018-04-04 PROCEDURE — G0378 HOSPITAL OBSERVATION PER HR: HCPCS

## 2018-04-04 PROCEDURE — 94760 N-INVAS EAR/PLS OXIMETRY 1: CPT

## 2018-04-04 PROCEDURE — 700101 HCHG RX REV CODE 250: Performed by: ORTHOPAEDIC SURGERY

## 2018-04-04 PROCEDURE — 94660 CPAP INITIATION&MGMT: CPT

## 2018-04-04 PROCEDURE — 700111 HCHG RX REV CODE 636 W/ 250 OVERRIDE (IP): Performed by: PHYSICIAN ASSISTANT

## 2018-04-04 RX ORDER — OXYCODONE HYDROCHLORIDE 10 MG/1
20 TABLET ORAL
Status: DISCONTINUED | OUTPATIENT
Start: 2018-04-04 | End: 2018-04-04

## 2018-04-04 RX ORDER — KETOROLAC TROMETHAMINE 30 MG/ML
30 INJECTION, SOLUTION INTRAMUSCULAR; INTRAVENOUS ONCE
Status: COMPLETED | OUTPATIENT
Start: 2018-04-04 | End: 2018-04-04

## 2018-04-04 RX ORDER — HYDROMORPHONE HYDROCHLORIDE 2 MG/1
4 TABLET ORAL EVERY 4 HOURS PRN
Status: DISCONTINUED | OUTPATIENT
Start: 2018-04-04 | End: 2018-04-04 | Stop reason: HOSPADM

## 2018-04-04 RX ORDER — HYDROMORPHONE HYDROCHLORIDE 2 MG/ML
.5-1 INJECTION, SOLUTION INTRAMUSCULAR; INTRAVENOUS; SUBCUTANEOUS
Status: DISCONTINUED | OUTPATIENT
Start: 2018-04-04 | End: 2018-04-04 | Stop reason: HOSPADM

## 2018-04-04 RX ADMIN — HYDROMORPHONE HYDROCHLORIDE 1 MG: 2 INJECTION, SOLUTION INTRAMUSCULAR; INTRAVENOUS; SUBCUTANEOUS at 06:05

## 2018-04-04 RX ADMIN — HYDROMORPHONE HYDROCHLORIDE 1 MG: 2 INJECTION, SOLUTION INTRAMUSCULAR; INTRAVENOUS; SUBCUTANEOUS at 10:30

## 2018-04-04 RX ADMIN — OXYCODONE HYDROCHLORIDE 10 MG: 10 TABLET ORAL at 01:50

## 2018-04-04 RX ADMIN — KETOROLAC TROMETHAMINE 30 MG: 30 INJECTION, SOLUTION INTRAMUSCULAR; INTRAVENOUS at 08:22

## 2018-04-04 RX ADMIN — HYDROMORPHONE HYDROCHLORIDE 1 MG: 2 INJECTION, SOLUTION INTRAMUSCULAR; INTRAVENOUS; SUBCUTANEOUS at 14:01

## 2018-04-04 RX ADMIN — OXYCODONE HYDROCHLORIDE 10 MG: 10 TABLET ORAL at 05:22

## 2018-04-04 RX ADMIN — HYDROMORPHONE HYDROCHLORIDE 4 MG: 2 TABLET ORAL at 15:18

## 2018-04-04 RX ADMIN — OXYCODONE HYDROCHLORIDE 20 MG: 10 TABLET ORAL at 08:24

## 2018-04-04 RX ADMIN — WATER 2 G: 100 INJECTION, SOLUTION INTRAVENOUS at 06:10

## 2018-04-04 RX ADMIN — HYDROMORPHONE HYDROCHLORIDE 0.5 MG: 2 INJECTION, SOLUTION INTRAMUSCULAR; INTRAVENOUS; SUBCUTANEOUS at 03:52

## 2018-04-04 ASSESSMENT — PAIN SCALES - GENERAL
PAINLEVEL_OUTOF10: 7
PAINLEVEL_OUTOF10: 7
PAINLEVEL_OUTOF10: 8
PAINLEVEL_OUTOF10: 8
PAINLEVEL_OUTOF10: 7
PAINLEVEL_OUTOF10: 8

## 2018-04-04 ASSESSMENT — LIFESTYLE VARIABLES
ALCOHOL_USE: NO
EVER_SMOKED: NEVER

## 2018-04-04 NOTE — DISCHARGE INSTRUCTIONS
Discharge Instructions    Discharged to home by car with relative. Discharged via wheelchair, hospital escort: Yes.  Special equipment needed: Not Applicable    Be sure to schedule a follow-up appointment with your primary care doctor or any specialists as instructed.     Discharge Plan:   Diet Plan: Discussed  Activity Level: Discussed  Confirmed Follow up Appointment: Patient to Call and Schedule Appointment  Confirmed Symptoms Management: Discussed  Medication Reconciliation Updated: Yes  Influenza Vaccine Indication: Not indicated: Previously immunized this influenza season and > 8 years of age    I understand that a diet low in cholesterol, fat, and sodium is recommended for good health. Unless I have been given specific instructions below for another diet, I accept this instruction as my diet prescription.   Other diet:regular  Special Instructions: None   Keep arm elevated    · Is patient discharged on Warfarin / Coumadin?   No     Depression / Suicide Risk    As you are discharged from this [facility] facility, it is important to learn how to keep safe from harming yourself.    Recognize the warning signs:  · Abrupt changes in personality, positive or negative- including increase in energy   · Giving away possessions  · Change in eating patterns- significant weight changes-  positive or negative  · Change in sleeping patterns- unable to sleep or sleeping all the time   · Unwillingness or inability to communicate  · Depression  · Unusual sadness, discouragement and loneliness  · Talk of wanting to die  · Neglect of personal appearance   · Rebelliousness- reckless behavior  · Withdrawal from people/activities they love  · Confusion- inability to concentrate     If you or a loved one observes any of these behaviors or has concerns about self-harm, here's what you can do:  · Talk about it- your feelings and reasons for harming yourself  · Remove any means that you might use to hurt yourself (examples: pills,  rope, extension cords, firearm)  · Get professional help from the community (Mental Health, Substance Abuse, psychological counseling)  · Do not be alone:Call your Safe Contact- someone whom you trust who will be there for you.  · Call your local CRISIS HOTLINE 334-5644 or 532-104-9467  · Call your local Children's Mobile Crisis Response Team [place] (506) 636-0355 or www.MitoProd  · Call the toll free National Suicide Prevention Hotlines   · National Suicide Prevention Lifeline 636-555-LRUU (8551)  · [employer] Hope Line Network 800-SUICIDE (769-1185)      ACTIVITY: Rest and take it easy for the first 24 hours.  A responsible adult is recommended to remain with you during that time.  It is normal to feel sleepy.  We encourage you to not do anything that requires balance, judgment or coordination.    MILD FLU-LIKE SYMPTOMS ARE NORMAL. YOU MAY EXPERIENCE GENERALIZED MUSCLE ACHES, THROAT IRRITATION, HEADACHE AND/OR SOME NAUSEA.    FOR 24 HOURS DO NOT:  Drive, operate machinery or run household appliances.  Drink beer or alcoholic beverages.   Make important decisions or sign legal documents.    SPECIAL INSTRUCTIONS:     Keep dressing clean and dry  Elevate extremity  Keep dressing on until next appointment  Encourage moving all fingers    DIET: To avoid nausea, slowly advance diet as tolerated, avoiding spicy or greasy foods for the first day.  Add more substantial food to your diet according to your physician's instructions.  Babies can be fed formula or breast milk as soon as they are hungry.  INCREASE FLUIDS AND FIBER TO AVOID CONSTIPATION.        FOLLOW-UP APPOINTMENT:  A follow-up appointment should be arranged with your doctor ; call to schedule.    You should CALL YOUR PHYSICIAN if you develop:  Fever greater than 101 degrees F.  Pain not relieved by medication, or persistent nausea or vomiting.  Excessive bleeding (blood soaking through dressing) or unexpected drainage from the wound.  Extreme  redness or swelling around the incision site, drainage of pus or foul smelling drainage.  Inability to urinate or empty your bladder within 8 hours.  Problems with breathing or chest pain.    You should call 911 if you develop problems with breathing or chest pain.  If you are unable to contact your doctor or surgical center, you should go to the nearest emergency room or urgent care center.  Physician's telephone #: Dr. Luna [phone]    If any questions arise, call your doctor.  If your doctor is not available, please feel free to call the Surgical Center at [phone].  The Center is open Monday through Friday from 7AM to 7PM.  You can also call the HEALTH HOTLINE open 24 hours/day, 7 days/week and speak to a nurse at [phone], or toll free at [phone].    A registered nurse may call you a few days after your surgery to see how you are doing after your procedure.    MEDICATIONS: Resume taking daily medication.  Take prescribed pain medication with food.  If no medication is prescribed, you may take non-aspirin pain medication if needed.  PAIN MEDICATION CAN BE VERY CONSTIPATING.  Take a stool softener or laxative such as senokot, pericolace, or milk of magnesia if needed.    Prescription given for Oxycodone .  Last pain medication given at 3:05 pm 10 mg oxycodone .    If your physician has prescribed pain medication that includes Acetaminophen (Tylenol), do not take additional Acetaminophen (Tylenol) while taking the prescribed medication.        Depression / Suicide Risk    As you are discharged from this [facility] facility, it is important to learn how to keep safe from harming yourself.    Recognize the warning signs:  · Abrupt changes in personality, positive or negative- including increase in energy   · Giving away possessions  · Change in eating patterns- significant weight changes-  positive or negative  · Change in sleeping patterns- unable to sleep or sleeping all the  time   · Unwillingness or inability to communicate  · Depression  · Unusual sadness, discouragement and loneliness  · Talk of wanting to die  · Neglect of personal appearance   · Rebelliousness- reckless behavior  · Withdrawal from people/activities they love  · Confusion- inability to concentrate     If you or a loved one observes any of these behaviors or has concerns about self-harm, here's what you can do:  · Talk about it- your feelings and reasons for harming yourself  · Remove any means that you might use to hurt yourself (examples: pills, rope, extension cords, firearm)  · Get professional help from the community (Mental Health, Substance Abuse, psychological counseling)  · Do not be alone:Call your Safe Contact- someone whom you trust who will be there for you.  · Call your local CRISIS HOTLINE 750-1311 or 925-036-7441  · Call your local Children's Mobile Crisis Response Team [place] (232) 218-5825 or www.[employer].com Application  · Call the toll free National Suicide Prevention Hotlines   · National Suicide Prevention Lifeline 666-442-LQDT (4999)  [employer] Hope Line Network 800-SUICIDE (158-3850)    Peripheral Nerve Block Discharge Instructions from Same Day Surgery and Inpatient :    Precautions  · The numbness may affect your balance  · Be careful when walking or moving around  · Your leg may be weak: be very careful putting weight on it  · If your surgeon did not specify a time, you should not bear weight for 24 hours  · Be sure to ask for help when you need it  · It is better to have help than to fall and hurt yourself  What to Expect - Upper Extremity  · You may experience numbness and weakness in shoulder, arm and hand  on the same side as your surgery  · This is normal. For some people, this may be an unpleasant sensation. Be very careful with your numb limb  · Ask for help when you need it  Shoulder Surgery Side Effects  · In addition to numbness and weakness you may experience other  "symptoms  · Other nerves that are close to those nerves injected can also be affected by local anesthesia  · You may experience a hoarseness in your voice  · Your breathing may feel different  · You may also notice drooping of your eyelid, pupil constriction, and decreased sweating, on the side of your surgery  · All of these side effects are normal and will resolve when the local anesthetic wears off   Prevent Injury  · Protect the limb like a baby  · Beware of exposing your limb to extreme heat or cold or trauma  · The limb may be injured without you noticing because it is numb  · Keep the limb elevated whenever possible  · Do not sleep on the limb  · Change the position of the limb regularly  · Avoid putting pressure on your surgical limb  Pain Control  · The initial block on the day of surgery will make your extremity feel \"numb\"  · Any consecutive injection including prior to discharge from the hospital will make your extremity feel \"numb\"  · You may feel an aching or burning when the local anesthesia starts to wear off  · Take pain pills as prescribed by your surgeon  · Call your surgeon or anesthesiologist if you do not have adequate pain control  ·   "

## 2018-04-04 NOTE — DIETARY
"Nutrition services: Day 1 of admit.  Cristino Keys is a 58 y.o. male with admitting DX of Lesion of ulnar nerve bilateral limbs, carpal tunnel syndrome, ulnar neuropathy of both upper extremities.    Nutrition Admit Triggers for poor oral intake and pressure ulcers or non-healing wound prior to admit.    Assessment:  Height: 177 cm (5' 9.69\")  Weight: 121.3 kg (267 lb 6.7 oz)  Body mass index is 38.72 kg/m².   Diet Rx:  Regular    Evaluation:  1.  PO intake:  % of dinner last night  2.  Skin:  Surgical incision to right arm; wound to left big toe from toe rubbing on his shoe.  No pressure ulcers documented.  Wound care is not following.  3.  Surgical:  S/P Nerve ulnar transfer, carpal tunnel release, elbow arthrotomy, radial head incision.  4.  Labs and medications reviewed.  5.  Patient has discharge orders.    Recommendations/Plan:  1. Nutrition Services to continue to work with patient for food preferences.  2. RD available prn.  3. Monitor PO if patient does not d/c.    "

## 2018-04-04 NOTE — FLOWSHEET NOTE
04/04/18 0050   Events/Summary/Plan   Events/Summary/Plan Set up BIPAP   General Vent Information   Pulse Oximetry 96 %   Heart Rate (Monitored) (!) 104   CPAP/BiPAP [facility]   Nocturnal CPAP or BiPAP BiPap   #System Evaluation Yes   Home Unit Used? Yes   Equipment Inspected for Cleanliness, Operation, and Safety? Yes   Settings (If Known) auto   FiO2 or LPM 1.5   Home Mask Used? Yes

## 2018-04-04 NOTE — PROGRESS NOTES
Pt continues to have severe pain despite hiving Oxy IR about every 3 hours. RN called & spoke with Hernandez MCPHERSON. Hernandez given update regarding severe pain with PRN Oxy IR given. New order received - PRN IV dilaudid. Pt updated regarding new order received. PRN IV dilaudid given - see MAR. Will continue to monitor. Hourly rounds continue.

## 2018-04-04 NOTE — PROGRESS NOTES
Pt awake, alert and oriented, no signs of distress, pt on 3 liters of oxygen saturating above 90%.  CMS intact. Report given to FLASH Curran.

## 2018-04-04 NOTE — PROGRESS NOTES
2 RN skin assessment done; skin not WDL - incision to right arm from surgery & wound to left big toe. Pt reports wound to left toe from rubbing on his shoe . See Wound flowsheet.

## 2018-04-04 NOTE — CARE PLAN
Patient verbalized understanding of discharge instructions including order not to drive or drink alcohol for 6-12 hrs following narcotic use, no questions at this time. VS stable, patient will ambulate to exit with d/c instructions and rx in hand.      Problem: Pain Management  Goal: Pain level will decrease to patient's comfort goal  PRN Oxy IR & IV dilaudid given - see MAR     Problem: Safety  Goal: Will remain free from falls  Able to demo call light & within reach, non-skid socks in place, personal belongings within reach, room floor free of clutter

## 2018-04-05 NOTE — PROGRESS NOTES
1635   Meets all criteria for discharge O2 sats 94-96% and pain well controlled with po dilaudid discharged to home to wife.

## 2018-04-06 ENCOUNTER — APPOINTMENT (OUTPATIENT)
Dept: WOUND CARE | Facility: MEDICAL CENTER | Age: 58
End: 2018-04-06
Attending: NURSE PRACTITIONER
Payer: COMMERCIAL

## 2018-04-09 ENCOUNTER — NON-PROVIDER VISIT (OUTPATIENT)
Dept: WOUND CARE | Facility: MEDICAL CENTER | Age: 58
End: 2018-04-09
Attending: NURSE PRACTITIONER
Payer: COMMERCIAL

## 2018-04-09 PROCEDURE — 97597 DBRDMT OPN WND 1ST 20 CM/<: CPT

## 2018-04-09 NOTE — WOUND TEAM
"Advanced Wound Care  [facility]  [address]  [address]  [place] [address]  [phone] Fax: [phone]        Encounter Note    For Certification Period:  2/8/18-4/28/18        Referring Physician: BONNIE Garcia  Primary Physician: Kevin Chavez MD        Consulting Physicians: Dr. Kang        Wound(s):  L distal hallux    Start of Care:    2/8/18  Subjective:            Subjective:        HPI:  57 yr old pt with DM (controlled) presents to clinic for evaluation and treatment of his L foot distal hallux wound.  He states that he first noticed it approximately a month ago and has been leaving it open to air.  Pt saw Dr. Kang yesterday and Dr. Kang ordered a CT scan which still needs to be scheduled.     From Micky Moore note during foot/nail care last visit 1/30/18:  Pt is a 57 year old gentleman with DM who has attended [facility] in the past, had Surgery with Dr. Kang on Left foot on August 21, 2017 for bunionectomy revision. Referred by Rafia NICOLE due to mycotic, dystrophic nails he is not able to care for himself any longer.    56 year old male living with wife and children. HX of dislocated toes 3-4 years ago while walking, had toes \"fixed,\" but \"it didn't work.\"      12/22/2016 PROCEDURES with Dr Kang:     1.  Left modified Arndt bunionectomy.  2.  Left distal metatarsal osteotomy.  3.  Left distal metatarsal osteotomy of the hallux.  4.  Left distal metatarsal osteotomy, 2, 3, 4, and 5.  5.  Left flexor digitorum longus transfer with a flexor to extensor transfer    of 2 and 3.  6.  Left soft tissue reconstruction, metatarsophalangeal joints 2, 3, 4, and    5.  7.  Left hammertoe correction with proximal interphalangeal arthroplasty, 2    and 3.  8.  Right metatarsophalangeal joint capsulotomy of soft tissue release, 2 and    3.  9.  Right hammertoe correction with proximal interphalangeal arthroplasty, 2    and 3.                  Pain: Patient reports passing " pain with debridement of wound.  Also reports elbow pain after recent elbow surgery.    Current Medications:   Current Outpatient Prescriptions:   •  hydroxyzine pamoate (VISTARIL) 100 MG Cap, Take 200 mg by mouth 2 Times a Day., Disp: , Rfl:   •  lamotrigine (LAMICTAL) 200 MG tablet, Take 200 mg by mouth every day. TAKE 1 TAB BY MOUTH EVERY DAY., Disp: , Rfl: 0  •  levothyroxine (SYNTHROID) 112 MCG Tab, Take 1 Tab by mouth Every morning on an empty stomach., Disp: 60 Tab, Rfl: 3  •  tamsulosin (FLOMAX) 0.4 MG capsule, Take 0.4 mg by mouth ONE-HALF HOUR AFTER BREAKFAST., Disp: , Rfl:   •  liraglutide (VICTOZA) 18 MG/3ML Solution Pen-injector injection, Inject 1.8 mg as instructed every morning., Disp: , Rfl:   •  clotrimazole (LOTRIMIN) 1 % Cream, APPLY TO AFFECTED AREA EVERY 12 HOURS, Disp: , Rfl: 3  •  temazepam (RESTORIL) 30 MG capsule, Take 30 mg by mouth at bedtime as needed for Sleep., Disp: , Rfl:   •  venlafaxine XR (EFFEXOR XR) 75 MG CAPSULE SR 24 HR, Take 75 mg by mouth every morning., Disp: , Rfl:   •  alprazolam (XANAX) 0.25 MG Tab, Take 0.25-0.5 mg by mouth 4 times a day as needed for Anxiety., Disp: , Rfl:   •  losartan (COZAAR) 100 MG Tab, Take 100 mg by mouth every morning., Disp: , Rfl:   •  oxycodone-acetaminophen (PERCOCET) 5-325 MG TABS, Take 1-2 Tabs by mouth every four hours as needed., Disp: , Rfl:     Allergies: Demerol and Septra [bactrim ds]     Objective:    Tests and Measures: L foot warm with strong and palpable DP pulses. Pt reports FBS today at 150.  03/02/18: Culture taken due to erythema, hot, pain. (3/5/18: negative)  2/9/2018 CT scan findings:   There is no acute fracture.    There is no focal soft tissue abnormality.  BS today 126    .  ANT 1.0 taken by Florentin wound tech.  L foot warm with strong and palpable DP pulses, hair growth present on LLE      Orthotic, protective, supportive devices:      Fall Risk Assessment (william all that apply with an X): High fall risk.      Wound  Characteristics                                                      Location: L distal hallux    Initial Evaluation  Date: 2/8/18 Encounter Date: 04/02/2018 Encounter Date: 04/09/18   Tissue Type and %: 95% adherent yellow, 5% pink viable 100% moist red 100% moist pink   Periwound: Macerated callous Intact intact   Drainage: Mod SS Minimal serosanguinous Min ss   Exposed structures None visible None none   Wound Edges:   Open  Open open   Odor: None None none   S&S of Infection:   Mild erythema None none   Edema: Local Local to toe Local to toe   Sensation: LOPS, neuropathy LOPS LOPS         Measurements: L distal hallux Initial Evaluation  Date: 2/8/18 Encounter Date: 04/02/2018 Encounter Date: 04/09/18   Length (cm) 1 0.3 0.8   Width (cm) 1.2 0.7 1   Depth (cm) GAYLE 0.2 0.2   Area (cm2) 1.2 0.21 cm2 0.8 cm2   Tract/undermine None None none       Procedures:                Debridement : CSWD using scalpel to remove ~0.8 cm2 slough and ~1cm2 alma rosa wound callus              Cleansed with: NS                                                                                   Periwound protected with: skin prep              Primary dressing: Coty              Secondary Dressing: non-adhesive foam secured with hypafix.              Other: Tubi E to LLE. Patient's own offloading sandal.     Patient Education: POC and wound progress reviewed with patient. Wound measures slightly smaller this visit. Pt saw Dr. Kang and states Dr. Kang advised possible procedure in the future for additional tendon repair. Pt has follow up with him in 3 to 4 weeks.  Reviewed s/s infection and when to present to ED. Patient verbalized understanding to all.    Professional Collaboration: none today    Assessment:       Wound etiology: trauma vs DFU     Wound Progress: measures larger     Rationale for Treatment: Coty to combat chronic inflammation and provide collagen scaffolding to promote granulation. Non-adhesive foam to pad and  absorb. Tubi E for compression.    Patient tolerance/compliance: Patient tolerated treatment well, compliant with appointments and POC.     Complicating factors: DM, HTN, pain     Need for ongoing Advanced Wound Care services: Patient requires skilled therapeutic wound care services for product selection, application of product, debridement, close monitoring with clinical assessment, compression for expedite of wound healing.        Plan:       Treatment Plan and Recommendations:  Diagnosis/ICD10: S91.302A    Procedures/CPT: Selective debridement 13767     Frequency: 2x/week - 30 minutes        Treatment Goals: STG 2 Weeks          LTG 4 Weeks   Granulation Tissue: 100% 100%   Decrease Necrotic Tissue to: 0% 0%   Wound Phase:             proliferation proliferation   Decrease Size by: 25% 50%   Periwound:       intact intact   Decrease tracts/undermining by: n/a n/a   Decrease Pain:    0 0                           At the time of each visit a thorough assessment of the patient is completed to assure the  appropriateness of our plan of care.  The dressings or modalities may need to be adapted   from the original plan to address any significant changes in the wound environment.

## 2018-04-12 ENCOUNTER — NON-PROVIDER VISIT (OUTPATIENT)
Dept: WOUND CARE | Facility: MEDICAL CENTER | Age: 58
End: 2018-04-12
Attending: NURSE PRACTITIONER
Payer: COMMERCIAL

## 2018-04-12 PROCEDURE — 97597 DBRDMT OPN WND 1ST 20 CM/<: CPT

## 2018-04-12 NOTE — WOUND TEAM
"Advanced Wound Care  [facility]  [address]  [address]  [place] [address]  [phone] Fax: [phone]        Encounter Note    For Certification Period:  2/8/18-4/28/18        Referring Physician: BONNIE Garcia  Primary Physician: Kevin Chavez MD        Consulting Physicians: Dr. Kang        Wound(s):  L distal hallux    Start of Care:    2/8/18  Subjective:            Subjective:        HPI:  57 yr old pt with DM (controlled) presents to clinic for evaluation and treatment of his L foot distal hallux wound.  He states that he first noticed it approximately a month ago and has been leaving it open to air.  Pt saw Dr. Kang yesterday and Dr. Kang ordered a CT scan which still needs to be scheduled.     From Micky Moore note during foot/nail care last visit 1/30/18:  Pt is a 57 year old gentleman with DM who has attended [facility] in the past, had Surgery with Dr. Kang on Left foot on August 21, 2017 for bunionectomy revision. Referred by Rafia NICOLE due to mycotic, dystrophic nails he is not able to care for himself any longer.    56 year old male living with wife and children. HX of dislocated toes 3-4 years ago while walking, had toes \"fixed,\" but \"it didn't work.\"      12/22/2016 PROCEDURES with Dr Kang:     1.  Left modified Arndt bunionectomy.  2.  Left distal metatarsal osteotomy.  3.  Left distal metatarsal osteotomy of the hallux.  4.  Left distal metatarsal osteotomy, 2, 3, 4, and 5.  5.  Left flexor digitorum longus transfer with a flexor to extensor transfer    of 2 and 3.  6.  Left soft tissue reconstruction, metatarsophalangeal joints 2, 3, 4, and    5.  7.  Left hammertoe correction with proximal interphalangeal arthroplasty, 2    and 3.  8.  Right metatarsophalangeal joint capsulotomy of soft tissue release, 2 and    3.  9.  Right hammertoe correction with proximal interphalangeal arthroplasty, 2    and 3.                  Pain: Patient reports passing " pain with debridement of wound.  Also reports elbow pain after recent elbow surgery.    Current Medications:   Current Outpatient Prescriptions:   •  hydroxyzine pamoate (VISTARIL) 100 MG Cap, Take 200 mg by mouth 2 Times a Day., Disp: , Rfl:   •  lamotrigine (LAMICTAL) 200 MG tablet, Take 200 mg by mouth every day. TAKE 1 TAB BY MOUTH EVERY DAY., Disp: , Rfl: 0  •  levothyroxine (SYNTHROID) 112 MCG Tab, Take 1 Tab by mouth Every morning on an empty stomach., Disp: 60 Tab, Rfl: 3  •  tamsulosin (FLOMAX) 0.4 MG capsule, Take 0.4 mg by mouth ONE-HALF HOUR AFTER BREAKFAST., Disp: , Rfl:   •  liraglutide (VICTOZA) 18 MG/3ML Solution Pen-injector injection, Inject 1.8 mg as instructed every morning., Disp: , Rfl:   •  clotrimazole (LOTRIMIN) 1 % Cream, APPLY TO AFFECTED AREA EVERY 12 HOURS, Disp: , Rfl: 3  •  temazepam (RESTORIL) 30 MG capsule, Take 30 mg by mouth at bedtime as needed for Sleep., Disp: , Rfl:   •  venlafaxine XR (EFFEXOR XR) 75 MG CAPSULE SR 24 HR, Take 75 mg by mouth every morning., Disp: , Rfl:   •  alprazolam (XANAX) 0.25 MG Tab, Take 0.25-0.5 mg by mouth 4 times a day as needed for Anxiety., Disp: , Rfl:   •  losartan (COZAAR) 100 MG Tab, Take 100 mg by mouth every morning., Disp: , Rfl:   •  oxycodone-acetaminophen (PERCOCET) 5-325 MG TABS, Take 1-2 Tabs by mouth every four hours as needed., Disp: , Rfl:     Allergies: Demerol and Septra [bactrim ds]     Objective:    Tests and Measures: L foot warm with strong and palpable DP pulses. Pt reports FBS today at 150.  03/02/18: Culture taken due to erythema, hot, pain. (3/5/18: negative)  2/9/2018 CT scan findings:   There is no acute fracture.    There is no focal soft tissue abnormality.  BS today 126    .  ANT 1.0 taken by Florentin wound tech.  L foot warm with strong and palpable DP pulses, hair growth present on LLE      Orthotic, protective, supportive devices:      Fall Risk Assessment (william all that apply with an X): High fall risk.      Wound  Characteristics                                                      Location: L distal hallux    Initial Evaluation  Date: 2/8/18 Encounter Date: 04/12/18   Tissue Type and %: 95% adherent yellow, 5% pink viable 50% moist red, 50% pale pink   Periwound: Macerated callous Mod maceration   Drainage: Mod SS Mod ss   Exposed structures None visible none   Wound Edges:   Open  open   Odor: None none   S&S of Infection:   Mild erythema none   Edema: Local generalized   Sensation: LOPS, neuropathy LOPS         Measurements: L distal hallux Initial Evaluation  Date: 2/8/18 Encounter Date: 04/09/18   Length (cm) 1 0.8   Width (cm) 1.2 1   Depth (cm) GAYLE 0.2   Area (cm2) 1.2 0.8 cm2   Tract/undermine None none       Procedures:                Debridement : CSWD using scalpel to remove ~0.8 cm2 slough and ~1cm2 alma rosa wound callus              Cleansed with: NS                                                                                   Periwound protected with: skin prep, zinc barrier cream              Primary dressing: Coty              Secondary Dressing: non-adhesive foam secured with hypafix.              Other: Tubi E to LLE. Patient's own offloading sandal.     Patient Education: POC and wound progress discussed with pt. Pt satisfied with Coty and wishes to continue with it. Pt had recent surgery on RUE and has general edema, however pt states he feels and able to move much better. Reviewed s/s of infection, offload, elevate LE, and when to go to ER. Pt verbalizes understanding to all.    Professional Collaboration: none today    Assessment:       Wound etiology: trauma vs DFU     Wound Progress: Increased maceration.     Rationale for Treatment: Coty to combat chronic inflammation and provide collagen scaffolding to promote granulation. Non-adhesive foam to pad and absorb. Tubi E for compression.    Patient tolerance/compliance: Patient tolerated treatment well, compliant with appointments and  POC.     Complicating factors: DM, HTN, pain     Need for ongoing Advanced Wound Care services: Patient requires skilled therapeutic wound care services for product selection, application of product, debridement, close monitoring with clinical assessment, compression for expedite of wound healing.        Plan:       Treatment Plan and Recommendations:  Diagnosis/ICD10: S91.302A    Procedures/CPT: Selective debridement 28112     Frequency: 2x/week - 30 minutes        Treatment Goals: STG 2 Weeks          LTG 4 Weeks   Granulation Tissue: 100% 100%   Decrease Necrotic Tissue to: 0% 0%   Wound Phase:             proliferation proliferation   Decrease Size by: 25% 50%   Periwound:       intact intact   Decrease tracts/undermining by: n/a n/a   Decrease Pain:    0 0                           At the time of each visit a thorough assessment of the patient is completed to assure the  appropriateness of our plan of care.  The dressings or modalities may need to be adapted   from the original plan to address any significant changes in the wound environment.

## 2018-04-17 ENCOUNTER — APPOINTMENT (OUTPATIENT)
Dept: ENDOCRINOLOGY | Facility: MEDICAL CENTER | Age: 58
End: 2018-04-17
Payer: COMMERCIAL

## 2018-04-17 ENCOUNTER — NON-PROVIDER VISIT (OUTPATIENT)
Dept: WOUND CARE | Facility: MEDICAL CENTER | Age: 58
End: 2018-04-17
Attending: NURSE PRACTITIONER
Payer: COMMERCIAL

## 2018-04-17 PROCEDURE — 97597 DBRDMT OPN WND 1ST 20 CM/<: CPT

## 2018-04-17 NOTE — WOUND TEAM
"Advanced Wound Care  [facility]  [address]  [address]  [place] [address]  [phone] Fax: [phone]        Encounter Note    For Certification Period:  2/8/18-4/28/18        Referring Physician: BONNIE Garcia  Primary Physician: Kevin Chavez MD        Consulting Physicians: Dr. Kang        Wound(s):  L distal hallux    Start of Care:    2/8/18  Subjective:            Subjective:        HPI:  57 yr old pt with DM (controlled) presents to clinic for evaluation and treatment of his L foot distal hallux wound.  He states that he first noticed it approximately a month ago and has been leaving it open to air.  Pt saw Dr. Kang yesterday and Dr. Kang ordered a CT scan which still needs to be scheduled.     From Micky Moore note during foot/nail care last visit 1/30/18:  Pt is a 57 year old gentleman with DM who has attended [facility] in the past, had Surgery with Dr. Kang on Left foot on August 21, 2017 for bunionectomy revision. Referred by Rafia NICOLE due to mycotic, dystrophic nails he is not able to care for himself any longer.    56 year old male living with wife and children. HX of dislocated toes 3-4 years ago while walking, had toes \"fixed,\" but \"it didn't work.\"      12/22/2016 PROCEDURES with Dr Kang:     1.  Left modified Arndt bunionectomy.  2.  Left distal metatarsal osteotomy.  3.  Left distal metatarsal osteotomy of the hallux.  4.  Left distal metatarsal osteotomy, 2, 3, 4, and 5.  5.  Left flexor digitorum longus transfer with a flexor to extensor transfer    of 2 and 3.  6.  Left soft tissue reconstruction, metatarsophalangeal joints 2, 3, 4, and    5.  7.  Left hammertoe correction with proximal interphalangeal arthroplasty, 2    and 3.  8.  Right metatarsophalangeal joint capsulotomy of soft tissue release, 2 and    3.  9.  Right hammertoe correction with proximal interphalangeal arthroplasty, 2    and 3.                  Pain: Patient reports passing " pain with debridement of wound.  Also reports elbow pain after recent elbow surgery.    Current Medications:   Current Outpatient Prescriptions:   •  hydroxyzine pamoate (VISTARIL) 100 MG Cap, Take 200 mg by mouth 2 Times a Day., Disp: , Rfl:   •  lamotrigine (LAMICTAL) 200 MG tablet, Take 200 mg by mouth every day. TAKE 1 TAB BY MOUTH EVERY DAY., Disp: , Rfl: 0  •  levothyroxine (SYNTHROID) 112 MCG Tab, Take 1 Tab by mouth Every morning on an empty stomach., Disp: 60 Tab, Rfl: 3  •  tamsulosin (FLOMAX) 0.4 MG capsule, Take 0.4 mg by mouth ONE-HALF HOUR AFTER BREAKFAST., Disp: , Rfl:   •  liraglutide (VICTOZA) 18 MG/3ML Solution Pen-injector injection, Inject 1.8 mg as instructed every morning., Disp: , Rfl:   •  clotrimazole (LOTRIMIN) 1 % Cream, APPLY TO AFFECTED AREA EVERY 12 HOURS, Disp: , Rfl: 3  •  temazepam (RESTORIL) 30 MG capsule, Take 30 mg by mouth at bedtime as needed for Sleep., Disp: , Rfl:   •  venlafaxine XR (EFFEXOR XR) 75 MG CAPSULE SR 24 HR, Take 75 mg by mouth every morning., Disp: , Rfl:   •  alprazolam (XANAX) 0.25 MG Tab, Take 0.25-0.5 mg by mouth 4 times a day as needed for Anxiety., Disp: , Rfl:   •  losartan (COZAAR) 100 MG Tab, Take 100 mg by mouth every morning., Disp: , Rfl:   •  oxycodone-acetaminophen (PERCOCET) 5-325 MG TABS, Take 1-2 Tabs by mouth every four hours as needed., Disp: , Rfl:     Allergies: Demerol and Septra [bactrim ds]     Objective:    Tests and Measures: L foot warm with strong and palpable DP pulses. Pt reports FBS today at 150.  03/02/18: Culture taken due to erythema, hot, pain. (3/5/18: negative)  2/9/2018 CT scan findings:   There is no acute fracture.    There is no focal soft tissue abnormality.  BS today 126    .  ANT 1.0 taken by Florentin wound tech.  L foot warm with strong and palpable DP pulses, hair growth present on LLE      Orthotic, protective, supportive devices:      Fall Risk Assessment (william all that apply with an X): High fall risk.      Wound  Characteristics                                                      Location: L distal hallux    Initial Evaluation  Date: 2/8/18 Encounter Date: 04/17/18   Tissue Type and %: 95% adherent yellow, 5% pink viable 100% moist pink   Periwound: Macerated callous Callus, staining   Drainage: Mod SS Mod ss   Exposed structures None visible none   Wound Edges:   Open  open   Odor: None none   S&S of Infection:   Mild erythema none   Edema: Local generalized   Sensation: LOPS, neuropathy LOPS         Measurements: L distal hallux Initial Evaluation  Date: 2/8/18 Encounter Date: 04/17/18   Length (cm) 1 0.8   Width (cm) 1.2 1   Depth (cm) GAYLE 0.1   Area (cm2) 1.2 0.8 cm2   Tract/undermine None none           Procedures:                Debridement : CSWD using curette to remove ~0.8 cm2 slough and ~0.2 cm2 alma rosa wound callus              Cleansed with: NS                                                                                   Periwound protected with: skin prep, zinc barrier cream              Primary dressing: Coty              Secondary Dressing: non-adhesive foam secured with hypafix.              Other: Tubi E to LLE. Patient's own offloading sandal.     Patient Education: POC and wound progress discussed with pt. Reviewed s/s of infection, offload, elevate LE, and when to go to ER. Pt verbalizes understanding to all.    Professional Collaboration: none today    Assessment:       Wound etiology: trauma vs DFU     Wound Progress: No significant changes     Rationale for Treatment: Coty to combat chronic inflammation and provide collagen scaffolding to promote granulation. Non-adhesive foam to pad and absorb. Tubi E for compression.    Patient tolerance/compliance: Patient tolerated treatment well, compliant with appointments and POC.     Complicating factors: DM, HTN, pain     Need for ongoing Advanced Wound Care services: Patient requires skilled therapeutic wound care services for product selection,  application of product, debridement, close monitoring with clinical assessment, compression for expedite of wound healing.        Plan:       Treatment Plan and Recommendations:  Diagnosis/ICD10: S91.302A    Procedures/CPT: Selective debridement 13533     Frequency: 2x/week - 30 minutes        Treatment Goals: STG 2 Weeks          LTG 4 Weeks   Granulation Tissue: 100% 100%   Decrease Necrotic Tissue to: 0% 0%   Wound Phase:             proliferation proliferation   Decrease Size by: 25% 50%   Periwound:       intact intact   Decrease tracts/undermining by: n/a n/a   Decrease Pain:    0 0                           At the time of each visit a thorough assessment of the patient is completed to assure the  appropriateness of our plan of care.  The dressings or modalities may need to be adapted   from the original plan to address any significant changes in the wound environment.

## 2018-04-19 ENCOUNTER — NON-PROVIDER VISIT (OUTPATIENT)
Dept: WOUND CARE | Facility: MEDICAL CENTER | Age: 58
End: 2018-04-19
Attending: NURSE PRACTITIONER
Payer: COMMERCIAL

## 2018-04-19 PROCEDURE — 97597 DBRDMT OPN WND 1ST 20 CM/<: CPT

## 2018-04-19 NOTE — WOUND TEAM
"Advanced Wound Care  [facility]  [address]  [address]  [place] [address]  [phone] Fax: [phone]        Encounter Note    For Certification Period:  2/8/18-4/28/18        Referring Physician: BONNIE Garcia  Primary Physician: Kevin Chavez MD        Consulting Physicians: Dr. Kang        Wound(s):  L distal hallux    Start of Care:    2/8/18  Subjective:            Subjective:        HPI:  57 yr old pt with DM (controlled) presents to clinic for evaluation and treatment of his L foot distal hallux wound.  He states that he first noticed it approximately a month ago and has been leaving it open to air.  Pt saw Dr. Kang yesterday and Dr. Kang ordered a CT scan which still needs to be scheduled.     From Micky Moore note during foot/nail care last visit 1/30/18:  Pt is a 57 year old gentleman with DM who has attended [facility] in the past, had Surgery with Dr. Kang on Left foot on August 21, 2017 for bunionectomy revision. Referred by Rafia NICOLE due to mycotic, dystrophic nails he is not able to care for himself any longer.    56 year old male living with wife and children. HX of dislocated toes 3-4 years ago while walking, had toes \"fixed,\" but \"it didn't work.\"      12/22/2016 PROCEDURES with Dr Kagn:     1.  Left modified Arndt bunionectomy.  2.  Left distal metatarsal osteotomy.  3.  Left distal metatarsal osteotomy of the hallux.  4.  Left distal metatarsal osteotomy, 2, 3, 4, and 5.  5.  Left flexor digitorum longus transfer with a flexor to extensor transfer    of 2 and 3.  6.  Left soft tissue reconstruction, metatarsophalangeal joints 2, 3, 4, and    5.  7.  Left hammertoe correction with proximal interphalangeal arthroplasty, 2    and 3.  8.  Right metatarsophalangeal joint capsulotomy of soft tissue release, 2 and    3.  9.  Right hammertoe correction with proximal interphalangeal arthroplasty, 2    and 3.                  Pain: Patient reports passing " pain with debridement of wound.  Also reports elbow pain after recent elbow surgery.    Current Medications:   Current Outpatient Prescriptions:   •  hydroxyzine pamoate (VISTARIL) 100 MG Cap, Take 200 mg by mouth 2 Times a Day., Disp: , Rfl:   •  lamotrigine (LAMICTAL) 200 MG tablet, Take 200 mg by mouth every day. TAKE 1 TAB BY MOUTH EVERY DAY., Disp: , Rfl: 0  •  levothyroxine (SYNTHROID) 112 MCG Tab, Take 1 Tab by mouth Every morning on an empty stomach., Disp: 60 Tab, Rfl: 3  •  tamsulosin (FLOMAX) 0.4 MG capsule, Take 0.4 mg by mouth ONE-HALF HOUR AFTER BREAKFAST., Disp: , Rfl:   •  liraglutide (VICTOZA) 18 MG/3ML Solution Pen-injector injection, Inject 1.8 mg as instructed every morning., Disp: , Rfl:   •  clotrimazole (LOTRIMIN) 1 % Cream, APPLY TO AFFECTED AREA EVERY 12 HOURS, Disp: , Rfl: 3  •  temazepam (RESTORIL) 30 MG capsule, Take 30 mg by mouth at bedtime as needed for Sleep., Disp: , Rfl:   •  venlafaxine XR (EFFEXOR XR) 75 MG CAPSULE SR 24 HR, Take 75 mg by mouth every morning., Disp: , Rfl:   •  alprazolam (XANAX) 0.25 MG Tab, Take 0.25-0.5 mg by mouth 4 times a day as needed for Anxiety., Disp: , Rfl:   •  losartan (COZAAR) 100 MG Tab, Take 100 mg by mouth every morning., Disp: , Rfl:   •  oxycodone-acetaminophen (PERCOCET) 5-325 MG TABS, Take 1-2 Tabs by mouth every four hours as needed., Disp: , Rfl:     Allergies: Demerol and Septra [bactrim ds]     Objective:    Tests and Measures: L foot warm with strong and palpable DP pulses. Pt reports FBS today at 150.  03/02/18: Culture taken due to erythema, hot, pain. (3/5/18: negative)  2/9/2018 CT scan findings:   There is no acute fracture.    There is no focal soft tissue abnormality.  BS today 126    .  ANT 1.0 taken by Florentin wound tech.  L foot warm with strong and palpable DP pulses, hair growth present on LLE      Orthotic, protective, supportive devices:      Fall Risk Assessment (william all that apply with an X): High fall risk.      Wound  Characteristics                                                      Location: L distal hallux    Initial Evaluation  Date: 2/8/18 Encounter Date: 04/19/2018   Tissue Type and %: 95% adherent yellow, 5% pink viable 100% moist pink   Periwound: Macerated callous Callus   Drainage: Mod SS Min ss   Exposed structures None visible None   Wound Edges:   Open  Open   Odor: None None   S&S of Infection:   Mild erythema None   Edema: Local Generalized   Sensation: LOPS, neuropathy LOPS         Measurements: L distal hallux Initial Evaluation  Date: 2/8/18 Encounter Date: 04/17/2018   Length (cm) 1 0.8   Width (cm) 1.2 1   Depth (cm) GAYLE 0.1   Area (cm2) 1.2 0.8 cm2   Tract/undermine None none           Procedures:                Debridement : CSWD using curette to remove ~2 cm2 slough and alma rosa wound callus              Cleansed with: NS                                                                                   Periwound protected with: Skin prep, zinc barrier cream              Primary dressing: Coty              Secondary Dressing: non-adhesive foam secured with hypafix.              Other: Tubi E to LLE. Patient's own offloading sandal.     Patient Education: POC and wound progress discussed with pt. Reviewed s/s of infection, offload, elevate LE, and when to go to ER. Pt verbalizes understanding to all.    Professional Collaboration: none today    Assessment:       Wound etiology: trauma vs DFU     Wound Progress: No significant changes this visit.     Rationale for Treatment: Coty to combat chronic inflammation and provide collagen scaffolding to promote granulation. Non-adhesive foam to pad and absorb. Tubi E for compression.    Patient tolerance/compliance: Patient tolerated treatment well, compliant with appointments and POC.     Complicating factors: DM, HTN, pain     Need for ongoing Advanced Wound Care services: Patient requires skilled therapeutic wound care services for product selection, application  of product, debridement, close monitoring with clinical assessment, compression for expedite of wound healing.        Plan:       Treatment Plan and Recommendations:  Diagnosis/ICD10: S91.302A    Procedures/CPT: Selective debridement 38967     Frequency: 2x/week - 30 minutes        Treatment Goals: STG 2 Weeks          LTG 4 Weeks   Granulation Tissue: 100% 100%   Decrease Necrotic Tissue to: 0% 0%   Wound Phase:             proliferation proliferation   Decrease Size by: 25% 50%   Periwound:       intact intact   Decrease tracts/undermining by: n/a n/a   Decrease Pain:    0 0                           At the time of each visit a thorough assessment of the patient is completed to assure the  appropriateness of our plan of care.  The dressings or modalities may need to be adapted   from the original plan to address any significant changes in the wound environment.

## 2018-04-23 ENCOUNTER — NON-PROVIDER VISIT (OUTPATIENT)
Dept: WOUND CARE | Facility: MEDICAL CENTER | Age: 58
End: 2018-04-23
Attending: NURSE PRACTITIONER
Payer: COMMERCIAL

## 2018-04-23 PROCEDURE — 11721 DEBRIDE NAIL 6 OR MORE: CPT

## 2018-04-23 NOTE — CERTIFICATION
"[facility]    [address]    [address]    [place] [address]    [phone] Fax: [phone]    Foot and Nail Recertification  4/23/2018 - 7/23/2018        Referring Physician: LEATHA Lima  Primary Physician: Kevin Chavez MD  Consulting Physicians:   Start of Care: 7/14/2016      Subjective:       HPI:  Pt is a 57 year old gentleman with DM who has attended [facility] in the past, had Surgery with Dr. Kang on Left foot on August 21, 2017 for bunionectomy revision. Referred by Rafia NICOLE due to mycotic, dystrophic nails he is not able to care for himself any longer.    56 year old male living with wife and children. HX of dislocated toes 3-4 years ago while walking, had toes \"fixed,\" but \"it didn't work.\"     12/22/2016 PROCEDURES with Dr Kang:    1.  Left modified Arndt bunionectomy.  2.  Left distal metatarsal osteotomy.  3.  Left distal metatarsal osteotomy of the hallux.  4.  Left distal metatarsal osteotomy, 2, 3, 4, and 5.  5.  Left flexor digitorum longus transfer with a flexor to extensor transfer    of 2 and 3.  6.  Left soft tissue reconstruction, metatarsophalangeal joints 2, 3, 4, and    5.  7.  Left hammertoe correction with proximal interphalangeal arthroplasty, 2    and 3.  8.  Right metatarsophalangeal joint capsulotomy of soft tissue release, 2 and    3.  9.  Right hammertoe correction with proximal interphalangeal arthroplasty, 2    and 3.    Past surgical Hx:   Past Surgical History:   Procedure Laterality Date   • NERVE ULNAR TRANSFER Right 4/3/2018    Procedure: NERVE ULNAR TRANSFER - TRANSPOSITION;  Surgeon: Ayad Luna M.D.;  Location: SURGERY [facility];  Service: Orthopedics   • CARPAL TUNNEL RELEASE Right 4/3/2018    Procedure: CARPAL TUNNEL RELEASE;  Surgeon: Ayad Luna M.D.;  Location: SURGERY [facility];  Service: Orthopedics   • ELBOW ARTHROTOMY Right 4/3/2018    Procedure: ELBOW ARTHROTOMY - FOR CONTRACTURE RELEASE AT THE ELBOW, " RADIAL HEAD EXCISION;  Surgeon: Ayad Luna M.D.;  Location: [facility];  Service: Orthopedics   • TOE FUSION Right 11/13/2017    Procedure: TOE FUSION - 1ST METATARSALPHALANGEAL;  Surgeon: Haroldo Kang M.D.;  Location: [facility];  Service: Orthopedics   • METATARSAL HEAD RESECTION Right 11/13/2017    Procedure: METATARSAL HEAD RESECTION - EXCISION;  Surgeon: Haroldo Kang M.D.;  Location: [facility];  Service: Orthopedics   • HAMMERTOE CORRECTION Right 11/13/2017    Procedure: HAMMERTOE CORRECTION 2-5;  Surgeon: Haroldo Kang M.D.;  Location: [facility];  Service: Orthopedics   • METATARSAL HEAD RESECTION Left 8/21/2017    Procedure: METATARSAL HEAD RESECTION - 2-5;  Surgeon: Haroldo Kang M.D.;  Location: [facility];  Service:    • TOE FUSION Left 8/21/2017    Procedure: TOE FUSION - 1ST MTP;  Surgeon: Haroldo Kang M.D.;  Location: [facility];  Service:    • HARDWARE REMOVAL ORTHO Left 8/21/2017    Procedure: HARDWARE REMOVAL ORTHO;  Surgeon: Haroldo Kang M.D.;  Location: [facility];  Service:    • LUMBAR FUSION POSTERIOR  4/14/2017    Procedure: LUMBAR FUSION POSTERIOR - MINIMALLY INVASIVE TLIF L4-5;  Surgeon: Bossman Caro M.D.;  Location: [facility];  Service:    • HAMMERTOE CORRECTION Bilateral 12/22/2016    Procedure: HAMMERTOE CORRECTION/METATARSALPHALANGEAL JOINT RELEASE 2/3 RIGHT, 2-3 LEFT;  Surgeon: Haroldo Kang M.D.;  Location: [facility];  Service:    • BUNIONECTOMY Left 12/22/2016    Procedure: BUNIONECTOMY FOR DISTAL HALLUX VALGUS CORRECTION WITH BRANDY;  Surgeon: Haroldo Kang M.D.;  Location: [facility];  Service:    • ORTHOPEDIC OSTEOTOMY Left 12/22/2016    Procedure: ORTHOPEDIC OSTEOTOMY DISTAL METATARSAL 2-5;  Surgeon: Haroldo Kang M.D.;  Location: [facility];  Service:    • HIP ARTH ANTERIOR TOTAL Left 8/18/2016  "   Procedure: HIP ARTHROPLASTY ANTERIOR TOTAL;  Surgeon: Emiliano Vang M.D.;  Location: SURGERY [facility];  Service:    • OTHER ORTHOPEDIC SURGERY  6/2014    right shoulder  arthroplasty   • OTHER ORTHOPEDIC SURGERY  11/1/2012    left knee/left ankle/left shoulder   • OTHER ORTHOPEDIC SURGERY  2004    elbow surgery   • OTHER  2000    dislocation left foot surgery at [facility]   • OTHER      \"septoplasty 20 years ago\"     History of foot ulceration(s): Yes__x__ No____ Location: today pt comes in with wound to L distal hallux will have wound care appt tomorrow as he mixed up appointments and was 30 min late.   History of foot surgery: Yes__x__ No____Describe:______see above;   ______________________________________________      Employed: Y ___ N __x_ Job description: ____disability_____________      Current Residence: ___lives with wife and kids______  home    Pain: pt denies foot pain presently.     Past Medical History:   Past Medical History:   Diagnosis Date   • Anesthesia 11/03/2017    PONV with shoulder surgery approx 20 years ago.   • Anxiety and depression 11/03/2017   • Arthritis 11/03/2017    Osteoarthritis, \" all over\"   • Bunion    • Cancer (CMS-HCC) 2017    skin lesion cut out, on left shoulder   • Chronic autoimmune thyroiditis      + US / + TPO = 57   • Dental disorder 11/03/2017    \"Upper Plate\"   • Diabetes mellitus (CMS-HCC) 11/03/2017    \"Controlled with diet & Victoza\"   • Gout    • Hammertoe    • High cholesterol 11/03/2017    HX OF, not currently on medication for.   • Hypertension    • Hypothyroidism     chronic thyroiditis   • Open toe wound 03/2018    Left big toe, open wound, started 2/2018, receiving wound care therapy two times a week   • Pain 11/03/2017    \"Pain all over except right hip & ankle\"   • Sleep apnea 11/03/2017    CPAP USE   • Snoring 11/03/2017    DX JULIAN   • Tonsillitis            Current Medications:   Current Outpatient Prescriptions:   •  amphetamine-dextroamphetamine " "(ADDERALL, 20MG,) 20 MG Tab, Take 20 mg by mouth every day., Disp: , Rfl:   •  hydroxyzine pamoate (VISTARIL) 100 MG Cap, Take 200 mg by mouth 2 Times a Day., Disp: , Rfl:   •  levothyroxine (SYNTHROID) 112 MCG Tab, Take 1 Tab by mouth Every morning on an empty stomach., Disp: 60 Tab, Rfl: 3  •  tamsulosin (FLOMAX) 0.4 MG capsule, Take 0.4 mg by mouth ONE-HALF HOUR AFTER BREAKFAST., Disp: , Rfl:   •  liraglutide (VICTOZA) 18 MG/3ML Solution Pen-injector injection, Inject 1.8 mg as instructed every morning., Disp: , Rfl:   •  temazepam (RESTORIL) 30 MG capsule, Take 30 mg by mouth at bedtime as needed for Sleep., Disp: , Rfl:   •  venlafaxine XR (EFFEXOR XR) 75 MG CAPSULE SR 24 HR, Take 75 mg by mouth every morning., Disp: , Rfl:   •  alprazolam (XANAX) 0.25 MG Tab, Take 0.25-0.5 mg by mouth 4 times a day as needed for Anxiety., Disp: , Rfl:   •  losartan (COZAAR) 100 MG Tab, Take 100 mg by mouth every morning., Disp: , Rfl:   •  oxycodone-acetaminophen (PERCOCET) 5-325 MG TABS, Take 1-2 Tabs by mouth every four hours as needed., Disp: , Rfl:       Allergies: Demerol and Septra    Social History:   Social History     Social History   • Marital status:      Spouse name: N/A   • Number of children: N/A   • Years of education: N/A     Occupational History   • Not on file.     Social History Main Topics   • Smoking status: Former Smoker     Packs/day: 1.00     Years: 20.00     Types: Cigarettes     Quit date: 1/1/2014   • Smokeless tobacco: Former User     Types: Chew     Quit date: 1/1/1997   • Alcohol use Yes      Comment: \"Few per month\"   • Drug use: No   • Sexual activity: Not on file     Other Topics Concern   • Not on file     Social History Narrative   • No narrative on file       Fall Risk Assessment (william all that apply with an X):completed with pt             Objective:     Tests and Measures:  Pt states his FBS was 126 today..    2/8/2018 ANT per Florentin 1.0  HgbA1C: 5/28/2017 6.8  09/01/2017 " "6.1  11/09/2018 - 6.1      Vascular     R   L       palp palp DP pulse     palp palp PT pulse     yes yes Capillary refill < 3 sec         Deformities    R   L        4 and 5  Hammer/claw toe      x  x Hallux Valgus      great toe, medial; 5th MTH plantar   Prominent Metatarsal Heads         Charcot Foot         Drop Foot         Rocker Bottom         Prior amputation:         Other:  nails missing from lt  1st,  2nd and 3rd digits, Rt 2nd     dislocation          Clinical appearance/skin    Dryness___minimal _________ Swelling_____none_________ Redness______none_______Temperature__warm, hair growth present__ ______    Callus_____ __LT 3rd/4th plantar;  _________________________________    Ulceration_ - L distal hallux DFU       Clinical appearance/toenails    Onychomycosis__8/10__ _(nails that had been removed to 1st, 2nd toes LT foot growing back_________    Ingrown toenails_______    Deformities_______________________________    Other___HX: 1st and 2nd nails brad feet were ablated by podiatrist due to problematic deformities per pt___________________________________        Orthotic, protective, supportive devices: post op shoes    Procedures: Pt arrived late for wound care appointment, rescheduled for tomorrow. When pt in room at 3:15, stated \"I have another appointment at the [facility] at 4 PM. I have to be out of here at 3:45 PM.\" Pt's feet were washed with soapy water, then dried. In between toes cleaned with gauze to remove debris. All 8 onychomycotic nails were ground down with dremel, then clipped as needed, smoothed again with Dremel and deshawn board.. Wound dressing had been removed by tech. [employer], foam, secured with tape. Due to time constraints, unable to debride callus, but pt stated, \"I have my own Dremel. I take it off with that.\"     Patient Education:Instructed pt to shake out shoes before donning; to wear protective footwear at all times, including when at home; to examine feet daily for any " new redness/wounds and report to PCP; to moisturize feet daily except between toes with water-based moisturizer; to keep tight control over BS for optimum health; to [facility] care every 2-3 months. Pt verbalized understanding of all instruction.      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

## 2018-04-26 ENCOUNTER — NON-PROVIDER VISIT (OUTPATIENT)
Dept: WOUND CARE | Facility: MEDICAL CENTER | Age: 58
End: 2018-04-26
Attending: NURSE PRACTITIONER
Payer: COMMERCIAL

## 2018-04-26 PROCEDURE — 97597 DBRDMT OPN WND 1ST 20 CM/<: CPT

## 2018-04-26 NOTE — WOUND TEAM
"Advanced Wound Care  [facility]  [address]  [address]  [place] [address]  [phone] Fax: [phone]        Encounter Note    For Certification Period:  2/8/18-4/28/18        Referring Physician: BONNIE Garcia  Primary Physician: Kevin Chavez MD        Consulting Physicians: Dr. Kang        Wound(s):  L distal hallux    Start of Care:    2/8/18  Subjective:            Subjective:        HPI:  57 yr old pt with DM (controlled) presents to clinic for evaluation and treatment of his L foot distal hallux wound.  He states that he first noticed it approximately a month ago and has been leaving it open to air.  Pt saw Dr. Kang yesterday and Dr. Kang ordered a CT scan which still needs to be scheduled.     From Micky Moore note during foot/nail care last visit 1/30/18:  Pt is a 57 year old gentleman with DM who has attended [facility] in the past, had Surgery with Dr. Kang on Left foot on August 21, 2017 for bunionectomy revision. Referred by Rafia NICOLE due to mycotic, dystrophic nails he is not able to care for himself any longer.    56 year old male living with wife and children. HX of dislocated toes 3-4 years ago while walking, had toes \"fixed,\" but \"it didn't work.\"      12/22/2016 PROCEDURES with Dr Kang:     1.  Left modified Arndt bunionectomy.  2.  Left distal metatarsal osteotomy.  3.  Left distal metatarsal osteotomy of the hallux.  4.  Left distal metatarsal osteotomy, 2, 3, 4, and 5.  5.  Left flexor digitorum longus transfer with a flexor to extensor transfer    of 2 and 3.  6.  Left soft tissue reconstruction, metatarsophalangeal joints 2, 3, 4, and    5.  7.  Left hammertoe correction with proximal interphalangeal arthroplasty, 2    and 3.  8.  Right metatarsophalangeal joint capsulotomy of soft tissue release, 2 and    3.  9.  Right hammertoe correction with proximal interphalangeal arthroplasty, 2    and 3.                  Pain: Patient reports passing " pain with debridement of wound.  Also reports elbow pain after recent elbow surgery.    Current Medications:   Current Outpatient Prescriptions:   •  hydroxyzine pamoate (VISTARIL) 100 MG Cap, Take 200 mg by mouth 2 Times a Day., Disp: , Rfl:   •  lamotrigine (LAMICTAL) 200 MG tablet, Take 200 mg by mouth every day. TAKE 1 TAB BY MOUTH EVERY DAY., Disp: , Rfl: 0  •  levothyroxine (SYNTHROID) 112 MCG Tab, Take 1 Tab by mouth Every morning on an empty stomach., Disp: 60 Tab, Rfl: 3  •  tamsulosin (FLOMAX) 0.4 MG capsule, Take 0.4 mg by mouth ONE-HALF HOUR AFTER BREAKFAST., Disp: , Rfl:   •  liraglutide (VICTOZA) 18 MG/3ML Solution Pen-injector injection, Inject 1.8 mg as instructed every morning., Disp: , Rfl:   •  clotrimazole (LOTRIMIN) 1 % Cream, APPLY TO AFFECTED AREA EVERY 12 HOURS, Disp: , Rfl: 3  •  temazepam (RESTORIL) 30 MG capsule, Take 30 mg by mouth at bedtime as needed for Sleep., Disp: , Rfl:   •  venlafaxine XR (EFFEXOR XR) 75 MG CAPSULE SR 24 HR, Take 75 mg by mouth every morning., Disp: , Rfl:   •  alprazolam (XANAX) 0.25 MG Tab, Take 0.25-0.5 mg by mouth 4 times a day as needed for Anxiety., Disp: , Rfl:   •  losartan (COZAAR) 100 MG Tab, Take 100 mg by mouth every morning., Disp: , Rfl:   •  oxycodone-acetaminophen (PERCOCET) 5-325 MG TABS, Take 1-2 Tabs by mouth every four hours as needed., Disp: , Rfl:     Allergies: Demerol and Septra [bactrim ds]     Objective:    Tests and Measures: L foot warm with strong and palpable DP pulses. Pt reports FBS today at 150.  03/02/18: Culture taken due to erythema, hot, pain. (3/5/18: negative)  2/9/2018 CT scan findings:   There is no acute fracture.    There is no focal soft tissue abnormality.  BS today 126    .  ANT 1.0 taken by Florentin wound tech.  L foot warm with strong and palpable DP pulses, hair growth present on LLE      Orthotic, protective, supportive devices:      Fall Risk Assessment (william all that apply with an X): High fall risk.      Wound  Characteristics                                                      Location: L distal hallux    Initial Evaluation  Date: 2/8/18 Encounter Date: 04/26/2018   Tissue Type and %: 95% adherent yellow, 5% pink viable 100% moist pink   Periwound: Macerated callous Mild maceration   Drainage: Mod SS Min ss   Exposed structures None visible None   Wound Edges:   Open  Open   Odor: None None   S&S of Infection:   Mild erythema None   Edema: Local Generalized   Sensation: LOPS, neuropathy LOPS         Measurements: L distal hallux Initial Evaluation  Date: 2/8/18 Encounter Date: 04/26/2018   Length (cm) 1 0.4   Width (cm) 1.2 0.9   Depth (cm) GAYLE 0.1   Area (cm2) 1.2 0.36 cm2   Tract/undermine None none               Procedures:                Debridement : CSWD using scalpel, forceps and scissors to remove ~5 cm2 slough and alma rosa wound callus              Cleansed with: NS                                                                                   Periwound protected with: Skin prep, zinc barrier cream              Primary dressing: Coty              Secondary Dressing: non-adhesive foam secured with hypafix.              Other: Tubi E to LLE. Patient's own offloading sandal.     Patient Education: POC and wound progress discussed with pt. Pt states he has appointment with Dr. Kang next week because he is very concerned ot how long the wound is taking to heal and has concern of bone being right underneath skin on x-ray. Also states his PCP mentioned concern of bone infection and wants Dr. Kang to assess it. Reviewed s/s of infection, offload, elevate LE, and when to go to ER. Pt verbalizes understanding to all.    Professional Collaboration: none today    Assessment:       Wound etiology: trauma vs DFU     Wound Progress: Wound smaller per measurement.     Rationale for Treatment: Coty to combat chronic inflammation and provide collagen scaffolding to promote granulation. Non-adhesive foam to pad and  absorb. Tubi E for compression.    Patient tolerance/compliance: Patient tolerated treatment well, compliant with appointments and POC.     Complicating factors: DM, HTN, pain     Need for ongoing Advanced Wound Care services: Patient requires skilled therapeutic wound care services for product selection, application of product, debridement, close monitoring with clinical assessment, compression for expedite of wound healing.        Plan:       Treatment Plan and Recommendations:  Diagnosis/ICD10: S91.302A    Procedures/CPT: Selective debridement 68128     Frequency: 2x/week - 30 minutes        Treatment Goals: STG 2 Weeks          LTG 4 Weeks   Granulation Tissue: 100% 100%   Decrease Necrotic Tissue to: 0% 0%   Wound Phase:             proliferation proliferation   Decrease Size by: 25% 50%   Periwound:       intact intact   Decrease tracts/undermining by: n/a n/a   Decrease Pain:    0 0                           At the time of each visit a thorough assessment of the patient is completed to assure the  appropriateness of our plan of care.  The dressings or modalities may need to be adapted   from the original plan to address any significant changes in the wound environment.

## 2018-05-01 ENCOUNTER — HOSPITAL ENCOUNTER (OUTPATIENT)
Dept: LAB | Facility: MEDICAL CENTER | Age: 58
End: 2018-05-01
Attending: INTERNAL MEDICINE
Payer: COMMERCIAL

## 2018-05-01 LAB
EST. AVERAGE GLUCOSE BLD GHB EST-MCNC: 140 MG/DL
HBA1C MFR BLD: 6.5 % (ref 0–5.6)
T4 FREE SERPL-MCNC: 0.83 NG/DL (ref 0.53–1.43)
TSH SERPL DL<=0.005 MIU/L-ACNC: 3.03 UIU/ML (ref 0.38–5.33)

## 2018-05-01 PROCEDURE — 36415 COLL VENOUS BLD VENIPUNCTURE: CPT

## 2018-05-01 PROCEDURE — 84439 ASSAY OF FREE THYROXINE: CPT

## 2018-05-01 PROCEDURE — 83036 HEMOGLOBIN GLYCOSYLATED A1C: CPT

## 2018-05-01 PROCEDURE — 84443 ASSAY THYROID STIM HORMONE: CPT

## 2018-05-02 ENCOUNTER — OFFICE VISIT (OUTPATIENT)
Dept: ENDOCRINOLOGY | Facility: MEDICAL CENTER | Age: 58
End: 2018-05-02
Payer: COMMERCIAL

## 2018-05-02 VITALS
OXYGEN SATURATION: 93 % | HEART RATE: 119 BPM | WEIGHT: 267 LBS | HEIGHT: 70 IN | BODY MASS INDEX: 38.22 KG/M2 | DIASTOLIC BLOOD PRESSURE: 90 MMHG | SYSTOLIC BLOOD PRESSURE: 125 MMHG

## 2018-05-02 DIAGNOSIS — E06.3 HYPOTHYROIDISM, ACQUIRED, AUTOIMMUNE: Primary | ICD-10-CM

## 2018-05-02 DIAGNOSIS — E06.3 CHRONIC AUTOIMMUNE THYROIDITIS: ICD-10-CM

## 2018-05-02 PROCEDURE — 99213 OFFICE O/P EST LOW 20 MIN: CPT | Performed by: INTERNAL MEDICINE

## 2018-05-02 RX ORDER — LEVOTHYROXINE SODIUM 0.12 MG/1
125 TABLET ORAL
Qty: 90 TAB | Refills: 3 | Status: SHIPPED | OUTPATIENT
Start: 2018-05-02 | End: 2019-04-29

## 2018-05-02 NOTE — PROGRESS NOTES
"Chief Complaint   Patient presents with   • Hypothyroidism     chronic thyroiditis        HPI:    See assessment and recommendations below    ROS:   Multiple orthopedic problems and surgery in the past and anticipating more surgery in the future.  Disabled relatively inactive with associated weight gain      Allergies:   Allergies   Allergen Reactions   • Demerol Vomiting and Nausea     Rxn = years ago    • Septra [Bactrim Ds] Hives     Rxn = approx 2010   • Sulfa Drugs Hives     .       Current medicines including changes today:  Current Outpatient Prescriptions   Medication Sig Dispense Refill   • amphetamine-dextroamphetamine (ADDERALL, 20MG,) 20 MG Tab Take 20 mg by mouth every day.     • hydroxyzine pamoate (VISTARIL) 100 MG Cap Take 200 mg by mouth 2 Times a Day.     • levothyroxine (SYNTHROID) 112 MCG Tab Take 1 Tab by mouth Every morning on an empty stomach. 60 Tab 3   • tamsulosin (FLOMAX) 0.4 MG capsule Take 0.4 mg by mouth ONE-HALF HOUR AFTER BREAKFAST.     • liraglutide (VICTOZA) 18 MG/3ML Solution Pen-injector injection Inject 1.8 mg as instructed every morning.     • temazepam (RESTORIL) 30 MG capsule Take 30 mg by mouth at bedtime as needed for Sleep.     • venlafaxine XR (EFFEXOR XR) 75 MG CAPSULE SR 24 HR Take 75 mg by mouth every morning.     • losartan (COZAAR) 100 MG Tab Take 100 mg by mouth every morning.     • alprazolam (XANAX) 0.25 MG Tab Take 0.25-0.5 mg by mouth 4 times a day as needed for Anxiety.     • oxycodone-acetaminophen (PERCOCET) 5-325 MG TABS Take 1-2 Tabs by mouth every four hours as needed.       No current facility-administered medications for this visit.         Past Medical History:   Diagnosis Date   • Anesthesia 11/03/2017    PONV with shoulder surgery approx 20 years ago.   • Anxiety and depression 11/03/2017   • Arthritis 11/03/2017    Osteoarthritis, \" all over\"   • Bunion    • Cancer (HCC) 2017    skin lesion cut out, on left shoulder   • Chronic autoimmune thyroiditis  " "    + US / + TPO = 57   • Dental disorder 11/03/2017    \"Upper Plate\"   • Diabetes mellitus (HCC) 11/03/2017    \"Controlled with diet & Victoza\"   • Gout    • Hammertoe    • High cholesterol 11/03/2017    HX OF, not currently on medication for.   • Hypertension    • Hypothyroidism     chronic thyroiditis   • Open toe wound 03/2018    Left big toe, open wound, started 2/2018, receiving wound care therapy two times a week   • Pain 11/03/2017    \"Pain all over except right hip & ankle\"   • Sleep apnea 11/03/2017    CPAP USE   • Snoring 11/03/2017    DX JULIAN   • Tonsillitis        PHYSICAL EXAM:    /90   Pulse (!) 119   Ht 1.778 m (5' 10\")   Wt 121.1 kg (267 lb)   SpO2 93%   BMI 38.31 kg/m²      Gen.   Obese but otherwise appears healthy    Skin   appropriate for sex and age    HEENT  unremarkable    Neck    thyroid gland is palpable but is small. Probably atrophic    Heart  regular    Extremities  no edema    Neuro  gait slight limp    Psych  appropriate, good spirits    ASSESSMENT AND RECOMMENDATIONS    1. Hypothyroidism, acquired, autoimmune             Currently taking 112 µg levothyroxine per day             TSH has gone up from 2.3 up to 3.0 since February. Free T4 is low normal at 0.8             Increase levothyroxine up to 125 µg per day and reevaluate in 3 months  - FREE THYROXINE; Future  - TSH; Future    2. Chronic autoimmune thyroiditis             Thyroid gland is asymptomatic. Patient is not aware of any change in her gland. No discomfort. No difficulty swallowing, breathing, or voice change.             Date the gland is atrophic. It is small on examination      DISPOSITION: Return in 3 months and update thyroid lab at that time       Florentin Weiss M.D.    Copies to: Kevin Chavez M.D. [phone]  "

## 2018-05-03 ENCOUNTER — NON-PROVIDER VISIT (OUTPATIENT)
Dept: WOUND CARE | Facility: MEDICAL CENTER | Age: 58
End: 2018-05-03
Attending: NURSE PRACTITIONER
Payer: COMMERCIAL

## 2018-05-03 PROCEDURE — 97597 DBRDMT OPN WND 1ST 20 CM/<: CPT

## 2018-05-03 NOTE — CERTIFICATION
"Advanced Wound Care  [facility]  [address]  [address]  [place] [address]  [phone] Fax: [phone]        80 Day Summary Note    For Certification Period: 05/03/2018 - 06/23/2018         Referring Physician: BONNIE Garcia  Primary Physician: Kevin Chavez MD        Consulting Physicians: Dr. Kang        Wound(s):  L distal hallux    Start of Care:    2/8/18  Subjective:            Subjective:        HPI:  57 yr old pt with DM (controlled) presents to clinic for evaluation and treatment of his L foot distal hallux wound.  He states that he first noticed it approximately a month ago and has been leaving it open to air.  Pt saw Dr. Kang yesterday and Dr. Kang ordered a CT scan which still needs to be scheduled.     Update 05/03/2018 - Pt continues care at [facility]. Pt wound has not made tremendous progress. Pt met with Dr. Kang on 05/02/208, and plan is to surgically remove a possible bone spur that is behind wound location, and do a tendon release so that toe no longer \"sticks up.\" This surgery has yet to be scheduled, pt will notify [facility] of this when he is scheduled. Pt aware he may need a new referral to continue care.      12/22/2016 PROCEDURES with Dr Kang:     1.  Left modified Arndt bunionectomy.  2.  Left distal metatarsal osteotomy.  3.  Left distal metatarsal osteotomy of the hallux.  4.  Left distal metatarsal osteotomy, 2, 3, 4, and 5.  5.  Left flexor digitorum longus transfer with a flexor to extensor transfer    of 2 and 3.  6.  Left soft tissue reconstruction, metatarsophalangeal joints 2, 3, 4, and    5.  7.  Left hammertoe correction with proximal interphalangeal arthroplasty, 2    and 3.  8.  Right metatarsophalangeal joint capsulotomy of soft tissue release, 2 and    3.  9.  Right hammertoe correction with proximal interphalangeal arthroplasty, 2    and 3.               Pain: Pt denies pain this visit.       Current Outpatient Prescriptions:   •  " levothyroxine, 125 mcg, Oral, AM ES  •  amphetamine-dextroamphetamine, 20 mg, Oral, DAILY, 5/2/2018 at Unknown time  •  hydrOXYzine pamoate, 200 mg, Oral, BID, 5/2/2018 at pm  •  tamsulosin, 0.4 mg, Oral, AFTER BREAKFAST, 5/2/2018 at Unknown time  •  liraglutide, 1.8 mg, Subcutaneous, QAM, 5/2/2018 at Unknown time  •  temazepam, 30 mg, Oral, HS PRN, 5/2/2018 at Unknown time  •  venlafaxine XR, 75 mg, Oral, QAM, 5/2/2018 at Unknown time  •  ALPRAZolam, 0.25-0.5 mg, Oral, 4X/DAY PRN, 3/31/2018  •  losartan, 100 mg, Oral, QAM, 5/2/2018 at Unknown time  •  oxyCODONE-acetaminophen, 1-2 Tab, Oral, Q4HRS PRN, 4/3/2018 at Unknown time    Allergies: Demerol and Septra [bactrim ds]     Objective:    Tests and Measures:   05/03/2018 - L foot warm with strong and palpable DP/PT pulses.    ANT 1.0 taken by Florentin wound tech.  L foot warm with strong and palpable DP pulses, hair growth present on LLE      Orthotic, protective, supportive devices:      Fall Risk Assessment (william all that apply with an X): High fall risk.      Wound Characteristics                                                      Location: L distal hallux    Initial Evaluation  Date: 2/8/18 80 Day Certification Date: 05/03/2018   Tissue Type and %: 95% adherent yellow, 5% pink viable 100% moist pink   Periwound: Macerated callous Moderate maceration   Drainage: Mod SS Moderate serosanguinous   Exposed structures None visible None   Wound Edges:   Open  Open   Odor: None None   S&S of Infection:   Mild erythema None   Edema: Local 1+ pitting to BLE   Sensation: LOPS, neuropathy LOPS          Measurements: L distal hallux Initial Evaluation  Date: 2/8/18 80 Day Certification Date: 05/03/2018   Length (cm) 1 0.4   Width (cm) 1.2 1.1   Depth (cm) GAYLE 0.2   Area (cm2) 1.2 0.44 cm2   Tract/undermine None None       Procedures:                Debridement : CSWD with curette to remove ~1 cm2 of biofilm and callus from wound bed and alma rosa wound              Cleansed  with: NS                                                                                   Periwound protected with: Zinc barrier paste              Primary dressing: Coty              Secondary Dressing: Non-adhesive foam secured with hypafix.              Other: Tubi E to LLE. Patient's own offloading sandal.     Patient Education: POC and wound progress discussed with pt. Pt states that he did meet with Dr. Kang and the plan is to surgically remove the bit of bone at the tip of the L hallux that pt is concerned about, as well as do a tendon release so that the toe no longer sticks up. Surgery is not scheduled yet. Instructed patient to obtain a new referral post surgery to continue care. Reviewed s/s infection and when to present to the ED. Pt verbalizes understanding to all.    Professional Collaboration: 80 Day Certification sent to referring provider via Epic    Assessment:       Wound etiology: trauma vs DFU     Wound Progress: Wound larger per measurement.     Rationale for Treatment: Coty to combat chronic inflammation and provide collagen scaffolding to promote granulation. Non-adhesive foam to pad and absorb. Tubi E for compression.    Patient tolerance/compliance: Patient tolerated treatment well, compliant with appointments and POC.     Complicating factors: DM, HTN, pain     Need for ongoing Advanced Wound Care services: Patient requires skilled therapeutic wound care services for product selection, application of product, debridement, close monitoring with clinical assessment, compression for expedite of wound healing.        Plan:       Treatment Plan and Recommendations:  Diagnosis/ICD10: S91.302A    Procedures/CPT: Selective debridement 76651     Frequency: 2x/week - 30 minutes        Treatment Goals: STG 2 Weeks          LTG 4 Weeks   Granulation Tissue: 100% 100%   Decrease Necrotic Tissue to: 0% 0%   Wound Phase:             proliferation proliferation   Decrease Size by: 25% 50%    Periwound:       intact intact   Decrease tracts/undermining by: n/a n/a   Decrease Pain:    0 0                           At the time of each visit a thorough assessment of the patient is completed to assure the  appropriateness of our plan of care.  The dressings or modalities may need to be adapted   from the original plan to address any significant changes in the wound environment.

## 2018-05-08 ENCOUNTER — NON-PROVIDER VISIT (OUTPATIENT)
Dept: WOUND CARE | Facility: MEDICAL CENTER | Age: 58
End: 2018-05-08
Attending: NURSE PRACTITIONER
Payer: COMMERCIAL

## 2018-05-08 PROCEDURE — 97597 DBRDMT OPN WND 1ST 20 CM/<: CPT

## 2018-05-08 NOTE — WOUND TEAM
"Advanced Wound Care  [facility]  [address]  [address]  [place] [address]  [phone] Fax: [phone]     Encounter Note  For Certification Period: 05/03/2018 - 06/23/2018        Referring Physician: BONNIE Garcia  Primary Physician: Kevin Chavez MD        Consulting Physicians: Dr. Kang        Wound(s): L distal hallux    Start of Care:    2/8/18  Subjective:    HPI:  57 yr old pt with DM (controlled) presents to clinic for evaluation and treatment of his L foot distal hallux wound.  He states that he first noticed it approximately a month ago and has been leaving it open to air.  Pt saw Dr. Kang yesterday and Dr. Kang ordered a CT scan which still needs to be scheduled.     Update 05/03/2018 - Pt continues care at [facility]. Pt wound has not made tremendous progress. Pt met with Dr. Kang on 05/02/208, and plan is to surgically remove a possible bone spur that is behind wound location, and do a tendon release so that toe no longer \"sticks up.\" This surgery has yet to be scheduled, pt will notify [facility] of this when he is scheduled. Pt aware he may need a new referral to continue care.      12/22/2016 PROCEDURES with Dr Kang:     1.  Left modified Arndt bunionectomy.  2.  Left distal metatarsal osteotomy.  3.  Left distal metatarsal osteotomy of the hallux.  4.  Left distal metatarsal osteotomy, 2, 3, 4, and 5.  5.  Left flexor digitorum longus transfer with a flexor to extensor transfer    of 2 and 3.  6.  Left soft tissue reconstruction, metatarsophalangeal joints 2, 3, 4, and    5.  7.  Left hammertoe correction with proximal interphalangeal arthroplasty, 2    and 3.  8.  Right metatarsophalangeal joint capsulotomy of soft tissue release, 2 and    3.  9.  Right hammertoe correction with proximal interphalangeal arthroplasty, 2    and 3.           Pain: Pt denies pain this visit.       Current Outpatient Prescriptions:   •  levothyroxine, 125 mcg, Oral, AM ES  •  " amphetamine-dextroamphetamine, 20 mg, Oral, DAILY, 5/2/2018 at Unknown time  •  hydrOXYzine pamoate, 200 mg, Oral, BID, 5/2/2018 at pm  •  tamsulosin, 0.4 mg, Oral, AFTER BREAKFAST, 5/2/2018 at Unknown time  •  liraglutide, 1.8 mg, Subcutaneous, QAM, 5/2/2018 at Unknown time  •  temazepam, 30 mg, Oral, HS PRN, 5/2/2018 at Unknown time  •  venlafaxine XR, 75 mg, Oral, QAM, 5/2/2018 at Unknown time  •  ALPRAZolam, 0.25-0.5 mg, Oral, 4X/DAY PRN, 3/31/2018  •  losartan, 100 mg, Oral, QAM, 5/2/2018 at Unknown time  •  oxyCODONE-acetaminophen, 1-2 Tab, Oral, Q4HRS PRN, 4/3/2018 at Unknown time    Allergies: Demerol and Septra [bactrim ds]     Objective:    Tests and Measures:   05/03/2018 - L foot warm with strong and palpable DP/PT pulses.    ANT 1.0 taken by Florentin wound tech.  L foot warm with strong and palpable DP pulses, hair growth present on LLE      Orthotic, protective, supportive devices:      Fall Risk Assessment (william all that apply with an X): High fall risk.      Wound Characteristics                                                      Location:   L distal hallux  Initial Evaluation  Date: 2/8/18 80 Day Certification Date: 05/03/2018 Encounter Date:  05/08/2018   Tissue Type and %: 95% adherent yellow, 5% pink viable 100% moist pink 100% moist pink   Periwound: Macerated callous Moderate maceration Moderate callus   Drainage: Mod SS Moderate serosanguinous Minimal ss   Exposed structures None visible None None   Wound Edges:   Open  Open Open   Odor: None None None   S&S of Infection:   Mild erythema None None   Edema: Local 1+ pitting to BLE Local to toe   Sensation: LOPS, neuropathy LOPS LOPS          Measurements:   L distal hallux Initial Evaluation  Date: 2/8/18 80 Day Certification Date: 05/03/2018 Encounter Date:  05/08/2018   Length (cm) 1 0.4 0.3   Width (cm) 1.2 1.1 0.9   Depth (cm) GAYLE 0.2 <0.1   Area (cm2) 1.2 0.44 cm2 0.27 cm2   Tract/undermine None None None       Procedures:                 Debridement: CSWD with scalpel and forceps to remove ~1 cm2 of biofilm and callus from wound bed and alma rosa wound              Cleansed with: NS                                                                                   Periwound protected with: Zinc barrier paste              Primary dressing: Coty              Secondary Dressing: Non-adhesive foam secured with hypafix.              Other: Tubi E to LLE. Patient's own offloading sandal.     Patient Education: POC and wound progress discussed with patient. Wound is smaller today per measurements. Patient states that he still has not heard from Dr. Kang's office to schedule surgery yet but will let us know as soon as he knows. Reviewed ss infection - erythema, edema, localized heat, fever/chills/N+V, when to call MD/go to ER. Pt with good understanding.    Previous appt: POC and wound progress discussed with pt. Pt states that he did meet with Dr. Kang and the plan is to surgically remove the bit of bone at the tip of the L hallux that pt is concerned about, as well as do a tendon release so that the toe no longer sticks up. Surgery is not scheduled yet. Instructed patient to obtain a new referral post surgery to continue care. Reviewed s/s infection and when to present to the ED. Pt verbalizes understanding to all.    Professional Collaboration: None today    Assessment:       Wound etiology: trauma vs DFU     Wound Progress: Wound smaller per measurement.     Rationale for Treatment: Coty to combat chronic inflammation and provide collagen scaffolding to promote granulation. Non-adhesive foam to pad and absorb. Tubi E for compression.    Patient tolerance/compliance: Patient tolerated treatment well, compliant with appointments and POC.     Complicating factors: DM, HTN, pain     Need for ongoing Advanced Wound Care services: Patient requires skilled therapeutic wound care services for product selection, application of product, debridement, close  monitoring with clinical assessment, compression for expedite of wound healing.        Plan:       Treatment Plan and Recommendations:  Diagnosis/ICD10: S91.302A    Procedures/CPT: Selective debridement 78428     Frequency: 2x/week - 30 minutes        Treatment Goals: STG 2 Weeks          LTG 4 Weeks   Granulation Tissue: 100% 100%   Decrease Necrotic Tissue to: 0% 0%   Wound Phase:             proliferation proliferation   Decrease Size by: 25% 50%   Periwound:       intact intact   Decrease tracts/undermining by: n/a n/a   Decrease Pain:    0 0                           At the time of each visit a thorough assessment of the patient is completed to assure the  appropriateness of our plan of care.  The dressings or modalities may need to be adapted   from the original plan to address any significant changes in the wound environment.

## 2018-05-11 ENCOUNTER — HOSPITAL ENCOUNTER (OUTPATIENT)
Facility: MEDICAL CENTER | Age: 58
End: 2018-05-11
Attending: NURSE PRACTITIONER
Payer: COMMERCIAL

## 2018-05-11 ENCOUNTER — NON-PROVIDER VISIT (OUTPATIENT)
Dept: WOUND CARE | Facility: MEDICAL CENTER | Age: 58
End: 2018-05-11
Attending: NURSE PRACTITIONER
Payer: COMMERCIAL

## 2018-05-11 DIAGNOSIS — M79.674 PAIN OF RIGHT GREAT TOE: ICD-10-CM

## 2018-05-11 DIAGNOSIS — L08.9 WOUND INFECTION: ICD-10-CM

## 2018-05-11 DIAGNOSIS — T14.8XXA WOUND INFECTION: ICD-10-CM

## 2018-05-11 DIAGNOSIS — L53.9 ERYTHEMA OF TOE: ICD-10-CM

## 2018-05-11 PROCEDURE — 87077 CULTURE AEROBIC IDENTIFY: CPT

## 2018-05-11 PROCEDURE — 87186 SC STD MICRODIL/AGAR DIL: CPT

## 2018-05-11 PROCEDURE — 97597 DBRDMT OPN WND 1ST 20 CM/<: CPT

## 2018-05-11 PROCEDURE — 87070 CULTURE OTHR SPECIMN AEROBIC: CPT

## 2018-05-11 PROCEDURE — 87205 SMEAR GRAM STAIN: CPT

## 2018-05-11 NOTE — WOUND TEAM
"Advanced Wound Care  [facility]  [address]  [address]  [place] [address]  [phone] Fax: [phone]     Encounter Note  For Certification Period: 05/03/2018 - 06/23/2018        Referring Physician: BONNIE Garcia  Primary Physician: Kevin Chavez MD        Consulting Physicians: Dr. Kang        Wound(s): Left distal hallux    Start of Care: 2/8/2018  Subjective:    HPI:  57 yr old pt with DM (controlled) presents to clinic for evaluation and treatment of his L foot distal hallux wound.  He states that he first noticed it approximately a month ago and has been leaving it open to air.  Pt saw Dr. Kang yesterday and Dr. Kang ordered a CT scan which still needs to be scheduled.     Update 05/03/2018 - Pt continues care at [facility]. Pt wound has not made tremendous progress. Pt met with Dr. Kang on 05/02/208, and plan is to surgically remove a possible bone spur that is behind wound location, and do a tendon release so that toe no longer \"sticks up.\" This surgery has yet to be scheduled, pt will notify [facility] of this when he is scheduled. Pt aware he may need a new referral to continue care.      12/22/2016 PROCEDURES with Dr Kang:     1.  Left modified Arndt bunionectomy.  2.  Left distal metatarsal osteotomy.  3.  Left distal metatarsal osteotomy of the hallux.  4.  Left distal metatarsal osteotomy, 2, 3, 4, and 5.  5.  Left flexor digitorum longus transfer with a flexor to extensor transfer    of 2 and 3.  6.  Left soft tissue reconstruction, metatarsophalangeal joints 2, 3, 4, and    5.  7.  Left hammertoe correction with proximal interphalangeal arthroplasty, 2    and 3.  8.  Right metatarsophalangeal joint capsulotomy of soft tissue release, 2 and    3.  9.  Right hammertoe correction with proximal interphalangeal arthroplasty, 2    and 3.           Pain: Pt denies pain this visit.       Current Outpatient Prescriptions:   •  levothyroxine, 125 mcg, Oral, AM ES  •  " amphetamine-dextroamphetamine, 20 mg, Oral, DAILY, 5/2/2018 at Unknown time  •  hydrOXYzine pamoate, 200 mg, Oral, BID, 5/2/2018 at pm  •  tamsulosin, 0.4 mg, Oral, AFTER BREAKFAST, 5/2/2018 at Unknown time  •  liraglutide, 1.8 mg, Subcutaneous, QAM, 5/2/2018 at Unknown time  •  temazepam, 30 mg, Oral, HS PRN, 5/2/2018 at Unknown time  •  venlafaxine XR, 75 mg, Oral, QAM, 5/2/2018 at Unknown time  •  ALPRAZolam, 0.25-0.5 mg, Oral, 4X/DAY PRN, 3/31/2018  •  losartan, 100 mg, Oral, QAM, 5/2/2018 at Unknown time  •  oxyCODONE-acetaminophen, 1-2 Tab, Oral, Q4HRS PRN, 4/3/2018 at Unknown time    Allergies: Demerol and Septra [bactrim ds]     Objective:    Tests and Measures:   05/03/2018 - L foot warm with strong and palpable DP/PT pulses.    ANT 1.0 taken by Florentin wound tech.  L foot warm with strong and palpable DP pulses, hair growth present on LLE      Orthotic, protective, supportive devices:      Fall Risk Assessment (william all that apply with an X): High fall risk.      Wound Characteristics                                                    Location:   L distal hallux  Initial Evaluation  Date: 2/8/18 80 Day Certification Date: 05/03/2018 Encounter Date:  5/11/2018   Tissue Type and %: 95% adherent yellow, 5% pink viable 100% moist pink  100% moist pink tissue   Periwound: Macerated callous Moderate maceration Erythema   Drainage: Mod SS Moderate serosanguinous Minimal serosanguinous   Exposed structures None visible None None   Wound Edges:   Open  Open Open   Odor: None None None   S&S of Infection:   Mild erythema None Increased pain and erythema. Wound culture obtained 5/11/2018   Edema: Local 1+ pitting to BLE  Local to wound site.   Sensation: LOPS, neuropathy LOPS LOPS          Measurements:   L distal hallux Initial Evaluation  Date: 2/8/18 80 Day Certification Date: 05/03/2018 Encounter Date:  05/08/2018   Length (cm) 1 0.4 0.3   Width (cm) 1.2 1.1 0.9   Depth (cm) GAYLE 0.2 <0.1   Area (cm2) 1.2 0.44 cm2  0.27 cm2   Tract/undermine None None None     *5/11/2018: Wound culture obtained and sent to lab.    Procedures:                Debridement: CSWD with scalpel to remove ~1 cm2 of slough and callus from wound bed and alma rosa wound.              Cleansed with: NS                                                                                   Periwound protected with: Zinc barrier paste              Primary dressing: Coty              Secondary Dressing: Non-adhesive foam secured with hypafix.              Other: Tubi E to LLE. Patient's own offloading sandal.     Patient Education: POC and wound progress discussed with patient. He reports increased pain and erythema, wound culture obtained. Patient is also still waiting for a call from Dr. Kang's office to schedule surgery on his toe. Reviewed s/s of infection (increased redness/pain/swelling/drainage, fever, fatigue, malaise, N/V), and advised patient to call the office or go to Urgent Care/ER if necessary. Patient verbalized understanding of instructions.    Professional Collaboration: Dez NICOLE for wound culture order.    Assessment:       Wound etiology: trauma vs DFU     Wound Progress: No measurement today.     Rationale for Treatment: Coty as an antimicrobial, to provide a biodegradable matrix for cellular invasion and capillary growth, and to absorb components of wound exudate. Non-adhesive foam to pad and absorb. Tubi E for compression / to control edema.    Patient tolerance/compliance: Patient tolerated treatment well, compliant with appointments and plan of care.     Complicating factors: DM, hypertension, pain     Need for ongoing Advanced Wound Care services: Patient requires skilled therapeutic wound care services for product selection, application of product, debridement, close monitoring with clinical assessment, compression for expedite of wound healing.        Plan:       Treatment Plan and Recommendations:  Diagnosis/ICD10:  S91.302A    Procedures/CPT: Selective debridement 91363     Frequency: 2x/week - 30 minutes        Treatment Goals: STG 2 Weeks          LTG 4 Weeks   Granulation Tissue: 100% 100%   Decrease Necrotic Tissue to: 0% 0%   Wound Phase:             proliferation proliferation   Decrease Size by: 25% 50%   Periwound:       intact intact   Decrease tracts/undermining by: n/a n/a   Decrease Pain:    0 0                           At the time of each visit a thorough assessment of the patient is completed to assure the  appropriateness of our plan of care.  The dressings or modalities may need to be adapted   from the original plan to address any significant changes in the wound environment.

## 2018-05-12 LAB
GRAM STN SPEC: NORMAL
SIGNIFICANT IND 70042: NORMAL
SITE SITE: NORMAL
SOURCE SOURCE: NORMAL

## 2018-05-13 LAB
BACTERIA WND AEROBE CULT: ABNORMAL
GRAM STN SPEC: ABNORMAL
SIGNIFICANT IND 70042: ABNORMAL
SITE SITE: ABNORMAL
SOURCE SOURCE: ABNORMAL

## 2018-05-14 DIAGNOSIS — L08.9 WOUND INFECTION: ICD-10-CM

## 2018-05-14 DIAGNOSIS — E11.621 DIABETIC ULCER OF TOE OF LEFT FOOT ASSOCIATED WITH TYPE 2 DIABETES MELLITUS, WITH FAT LAYER EXPOSED (HCC): ICD-10-CM

## 2018-05-14 DIAGNOSIS — L97.522 DIABETIC ULCER OF TOE OF LEFT FOOT ASSOCIATED WITH TYPE 2 DIABETES MELLITUS, WITH FAT LAYER EXPOSED (HCC): ICD-10-CM

## 2018-05-14 DIAGNOSIS — T14.8XXA WOUND INFECTION: ICD-10-CM

## 2018-05-14 RX ORDER — AMOXICILLIN AND CLAVULANATE POTASSIUM 875; 125 MG/1; MG/1
1 TABLET, FILM COATED ORAL 2 TIMES DAILY
Qty: 20 TAB | Refills: 0 | Status: SHIPPED | OUTPATIENT
Start: 2018-05-14 | End: 2018-05-24

## 2018-05-14 NOTE — PROGRESS NOTES
Wound culture results reviewed.  Discussed with pt via phone, pharmacy info verified. Augentin 875-125 BID x 10 days.

## 2018-05-15 ENCOUNTER — NON-PROVIDER VISIT (OUTPATIENT)
Dept: WOUND CARE | Facility: MEDICAL CENTER | Age: 58
End: 2018-05-15
Attending: NURSE PRACTITIONER
Payer: COMMERCIAL

## 2018-05-15 PROCEDURE — 97597 DBRDMT OPN WND 1ST 20 CM/<: CPT

## 2018-05-15 NOTE — WOUND TEAM
"Advanced Wound Care  [facility]  [address]  [address]  [place] [address]  [phone] Fax: [phone]     Encounter Note  For Certification Period: 05/03/2018 - 06/23/2018        Referring Physician: BONNIE Garcia  Primary Physician: Kevin Chavez MD        Consulting Physicians: Dr. Kang        Wound(s): Left distal hallux    Start of Care: 2/8/2018  Subjective:    HPI:  57 yr old pt with DM (controlled) presents to clinic for evaluation and treatment of his L foot distal hallux wound.  He states that he first noticed it approximately a month ago and has been leaving it open to air.  Pt saw Dr. Kang yesterday and Dr. Kang ordered a CT scan which still needs to be scheduled.     Update 05/03/2018 - Pt continues care at [facility]. Pt wound has not made tremendous progress. Pt met with Dr. Kang on 05/02/208, and plan is to surgically remove a possible bone spur that is behind wound location, and do a tendon release so that toe no longer \"sticks up.\" This surgery has yet to be scheduled, pt will notify [facility] of this when he is scheduled. Pt aware he may need a new referral to continue care.      12/22/2016 PROCEDURES with Dr Kang:     1.  Left modified Arndt bunionectomy.  2.  Left distal metatarsal osteotomy.  3.  Left distal metatarsal osteotomy of the hallux.  4.  Left distal metatarsal osteotomy, 2, 3, 4, and 5.  5.  Left flexor digitorum longus transfer with a flexor to extensor transfer    of 2 and 3.  6.  Left soft tissue reconstruction, metatarsophalangeal joints 2, 3, 4, and    5.  7.  Left hammertoe correction with proximal interphalangeal arthroplasty, 2    and 3.  8.  Right metatarsophalangeal joint capsulotomy of soft tissue release, 2 and    3.  9.  Right hammertoe correction with proximal interphalangeal arthroplasty, 2    and 3.           Pain: Pt denies pain this visit.       Current Outpatient Prescriptions:   •  amoxicillin-clavulanate, 1 Tab, Oral, BID  •  " levothyroxine, 125 mcg, Oral, AM ES  •  amphetamine-dextroamphetamine, 20 mg, Oral, DAILY, 5/2/2018 at Unknown time  •  hydrOXYzine pamoate, 200 mg, Oral, BID, 5/2/2018 at pm  •  tamsulosin, 0.4 mg, Oral, AFTER BREAKFAST, 5/2/2018 at Unknown time  •  liraglutide, 1.8 mg, Subcutaneous, QAM, 5/2/2018 at Unknown time  •  temazepam, 30 mg, Oral, HS PRN, 5/2/2018 at Unknown time  •  venlafaxine XR, 75 mg, Oral, QAM, 5/2/2018 at Unknown time  •  ALPRAZolam, 0.25-0.5 mg, Oral, 4X/DAY PRN, 3/31/2018  •  losartan, 100 mg, Oral, QAM, 5/2/2018 at Unknown time  •  oxyCODONE-acetaminophen, 1-2 Tab, Oral, Q4HRS PRN, 4/3/2018 at Unknown time    Allergies: Demerol and Septra [bactrim ds]     Objective:    Tests and Measures:   05/03/2018 - L foot warm with strong and palpable DP/PT pulses.    ANT 1.0 taken by Florentin wound tech.  L foot warm with strong and palpable DP pulses, hair growth present on LLE    05/15/2018 - culture POS - placed on abx by Rafia NICOLE    Orthotic, protective, supportive devices:      Fall Risk Assessment (william all that apply with an X): High fall risk.      Wound Characteristics                                                    Location:   L distal hallux  Initial Evaluation  Date: 2/8/18 80 Day Certification Date: 05/03/2018 Encounter Date:  5/15/2018   Tissue Type and %: 95% adherent yellow, 5% pink viable 100% moist pink  100% moist pink tissue   Periwound: Macerated callous Moderate maceration Erythema   Drainage: Mod SS Moderate serosanguinous Minimal serosanguinous   Exposed structures None visible None None   Wound Edges:   Open  Open Open   Odor: None None None   S&S of Infection:   Mild erythema None edema and erythema. Pt on abx   Edema: Local 1+ pitting to BLE  Local to wound site.   Sensation: LOPS, neuropathy LOPS LOPS          Measurements:   L distal hallux Initial Evaluation  Date: 2/8/18 80 Day Certification Date: 05/03/2018 Encounter Date:  05/15/2018   Length (cm) 1 0.4 0.5   Width (cm)  1.2 1.1 1.0   Depth (cm) GAYLE 0.2 0.2   Area (cm2) 1.2 0.44 cm2 0.5cm2   Tract/undermine None None None     *5/11/2018: Wound culture obtained and sent to lab.    Procedures:                Debridement: CSWD with tweezers to remove ~1 cm2 of slough and callus from wound bed and alma rosa wound.              Cleansed with: NS                                                                                   Periwound protected with: skin prep, Zinc barrier paste              Primary dressing: Coty              Secondary Dressing: Non-adhesive foam secured with hypafix.              Other: Tubi E to LLE. Patient's own offloading shoe     Patient Education: POC and wound progress discussed with patient. He reports increased pain and erythema in his R elbow surgical site. He sees ortho Dr. Luna tomorrow. Patient is also still waiting for a call from Dr. Kang's office to schedule surgery on his toe. Reviewed s/s of infection (increased redness/pain/swelling/drainage, fever, fatigue, malaise, N/V), and advised patient to call the office or go to Urgent Care/ER if necessary. Patient verbalized understanding of instructions.    Professional Collaboration: none    Assessment:       Wound etiology: trauma vs DFU     Wound Progress: per measurement wound is slightly larger today.     Rationale for Treatment: Coty as an antimicrobial, to provide a biodegradable matrix for cellular invasion and capillary growth, and to absorb components of wound exudate. Non-adhesive foam to pad and absorb. Tubi E for compression / to control edema.    Patient tolerance/compliance: Patient tolerated treatment well, compliant with appointments and plan of care.     Complicating factors: DM, hypertension, pain     Need for ongoing Advanced Wound Care services: Patient requires skilled therapeutic wound care services for product selection, application of product, debridement, close monitoring with clinical assessment, compression for expedite of  wound healing.        Plan:       Treatment Plan and Recommendations:  Diagnosis/ICD10: S91.302A    Procedures/CPT: Selective debridement 84014     Frequency: 2x/week - 30 minutes        Treatment Goals: STG 2 Weeks          LTG 4 Weeks   Granulation Tissue: 100% 100%   Decrease Necrotic Tissue to: 0% 0%   Wound Phase:             proliferation proliferation   Decrease Size by: 25% 50%   Periwound:       intact intact   Decrease tracts/undermining by: n/a n/a   Decrease Pain:    0 0                           At the time of each visit a thorough assessment of the patient is completed to assure the  appropriateness of our plan of care.  The dressings or modalities may need to be adapted   from the original plan to address any significant changes in the wound environment.

## 2018-05-18 ENCOUNTER — NON-PROVIDER VISIT (OUTPATIENT)
Dept: WOUND CARE | Facility: MEDICAL CENTER | Age: 58
End: 2018-05-18
Attending: NURSE PRACTITIONER
Payer: COMMERCIAL

## 2018-05-18 PROCEDURE — 97597 DBRDMT OPN WND 1ST 20 CM/<: CPT

## 2018-05-18 NOTE — WOUND TEAM
"Advanced Wound Care  [facility]  [address]  [address]  [place] [address]  [phone] Fax: [phone]     Encounter Note  For Certification Period: 05/03/2018 - 06/23/2018        Referring Physician: BONNIE Garcia  Primary Physician: Kevin Chavez MD        Consulting Physicians: Dr. Kang        Wound(s): Left distal hallux    Start of Care: 2/8/2018  Subjective:    HPI:  58 yr old pt with DM (controlled) presents to clinic for evaluation and treatment of his L foot distal hallux wound.  He states that he first noticed it approximately a month ago and has been leaving it open to air.  Pt saw Dr. Kang yesterday and Dr. Kang ordered a CT scan which still needs to be scheduled.     Update 05/03/2018 - Pt continues care at [facility]. Pt wound has not made tremendous progress. Pt met with Dr. Kang on 05/02/208, and plan is to surgically remove a possible bone spur that is behind wound location, and do a tendon release so that toe no longer \"sticks up.\" This surgery has yet to be scheduled, pt will notify [facility] of this when he is scheduled. Pt aware he may need a new referral to continue care.      12/22/2016 PROCEDURES with Dr Kang:     1.  Left modified Arndt bunionectomy.  2.  Left distal metatarsal osteotomy.  3.  Left distal metatarsal osteotomy of the hallux.  4.  Left distal metatarsal osteotomy, 2, 3, 4, and 5.  5.  Left flexor digitorum longus transfer with a flexor to extensor transfer    of 2 and 3.  6.  Left soft tissue reconstruction, metatarsophalangeal joints 2, 3, 4, and    5.  7.  Left hammertoe correction with proximal interphalangeal arthroplasty, 2    and 3.  8.  Right metatarsophalangeal joint capsulotomy of soft tissue release, 2 and    3.  9.  Right hammertoe correction with proximal interphalangeal arthroplasty, 2    and 3.           Pain: Pt denies pain this visit.       Current Outpatient Prescriptions:   •  amoxicillin-clavulanate, 1 Tab, Oral, BID  •  " levothyroxine, 125 mcg, Oral, AM ES  •  amphetamine-dextroamphetamine, 20 mg, Oral, DAILY, 5/2/2018 at Unknown time  •  hydrOXYzine pamoate, 200 mg, Oral, BID, 5/2/2018 at pm  •  tamsulosin, 0.4 mg, Oral, AFTER BREAKFAST, 5/2/2018 at Unknown time  •  liraglutide, 1.8 mg, Subcutaneous, QAM, 5/2/2018 at Unknown time  •  temazepam, 30 mg, Oral, HS PRN, 5/2/2018 at Unknown time  •  venlafaxine XR, 75 mg, Oral, QAM, 5/2/2018 at Unknown time  •  ALPRAZolam, 0.25-0.5 mg, Oral, 4X/DAY PRN, 3/31/2018  •  losartan, 100 mg, Oral, QAM, 5/2/2018 at Unknown time  •  oxyCODONE-acetaminophen, 1-2 Tab, Oral, Q4HRS PRN, 4/3/2018 at Unknown time    Allergies: Demerol and Septra [bactrim ds]     Objective:    Tests and Measures:   05/03/2018 - L foot warm with strong and palpable DP/PT pulses.    ANT 1.0 taken by Florentin wound tech.  L foot warm with strong and palpable DP pulses, hair growth present on LLE    05/15/2018 - culture POS - placed on abx by Rafia NICOLE    Orthotic, protective, supportive devices:      Fall Risk Assessment (william all that apply with an X): High fall risk.      Wound Characteristics                                                    Location:   L distal hallux  Initial Evaluation  Date: 2/8/18 80 Day Certification Date: 05/03/2018 Encounter Date:  5/18/2018   Tissue Type and %: 95% adherent yellow, 5% pink viable 100% moist pink  100% moist pink tissue   Periwound: Macerated callous Moderate maceration Improving erythema   Drainage: Mod SS Moderate serosanguinous Minimal serosanguinous   Exposed structures None visible None None   Wound Edges:   Open  Open Open   Odor: None None None   S&S of Infection:   Mild erythema None Erythema. Pt on abx   Edema: Local 1+ pitting to BLE  Local to wound site.   Sensation: LOPS, neuropathy LOPS LOPS          Measurements:   L distal hallux Initial Evaluation  Date: 2/8/18 80 Day Certification Date: 05/03/2018 Encounter Date:  05/15/2018   Length (cm) 1 0.4 0.5   Width (cm)  1.2 1.1 1.0   Depth (cm) GAYLE 0.2 0.2   Area (cm2) 1.2 0.44 cm2 0.5cm2   Tract/undermine None None None     *5/11/2018: Wound culture obtained and sent to lab.    Procedures:                Debridement: CSWD with curette to remove ~1 cm2 of slough and callus from wound bed and alma rosa wound.              Cleansed with: NS                                                                                   Periwound protected with: Skin prep, Zinc barrier paste              Primary dressing: Coty              Secondary Dressing: Non-adhesive foam secured with hypafix.              Other: Tubi E to LLE. Patient's own offloading shoe     Patient Education: POC and wound progress discussed with patient. Pt is in the process of scheduling surgery with Dr. Kang. Reviewed s/s of infection (increased redness/pain/swelling/drainage, fever, fatigue, malaise, N/V), and advised patient to call the office or go to Urgent Care/ER if necessary. Patient verbalized understanding of instructions.    Professional Collaboration: none    Assessment:       Wound etiology: trauma vs DFU     Wound Progress: Wound with minimal changes.      Rationale for Treatment: Coty as an antimicrobial, to provide a biodegradable matrix for cellular invasion and capillary growth, and to absorb components of wound exudate. Non-adhesive foam to pad and absorb. Tubi E for compression / to control edema.    Patient tolerance/compliance: Patient tolerated treatment well, compliant with appointments and plan of care.     Complicating factors: DM, hypertension, pain     Need for ongoing Advanced Wound Care services: Patient requires skilled therapeutic wound care services for product selection, application of product, debridement, close monitoring with clinical assessment, compression for expedite of wound healing.        Plan:       Treatment Plan and Recommendations:  Diagnosis/ICD10: S91.302A    Procedures/CPT: Selective debridement 19706     Frequency:  2x/week - 30 minutes        Treatment Goals: STG 2 Weeks          LTG 4 Weeks   Granulation Tissue: 100% 100%   Decrease Necrotic Tissue to: 0% 0%   Wound Phase:             proliferation proliferation   Decrease Size by: 25% 50%   Periwound:       intact intact   Decrease tracts/undermining by: n/a n/a   Decrease Pain:    0 0                           At the time of each visit a thorough assessment of the patient is completed to assure the  appropriateness of our plan of care.  The dressings or modalities may need to be adapted   from the original plan to address any significant changes in the wound environment.

## 2018-05-22 ENCOUNTER — NON-PROVIDER VISIT (OUTPATIENT)
Dept: WOUND CARE | Facility: MEDICAL CENTER | Age: 58
End: 2018-05-22
Attending: NURSE PRACTITIONER
Payer: COMMERCIAL

## 2018-05-22 PROCEDURE — 97597 DBRDMT OPN WND 1ST 20 CM/<: CPT

## 2018-05-22 NOTE — WOUND TEAM
"Advanced Wound Care  [facility]  [address]  [address]  [place] [address]  [phone] Fax: [phone]     Encounter Note  For Certification Period: 05/03/2018 - 06/23/2018        Referring Physician: BONNIE Garcia  Primary Physician: Kevin Chavez MD        Consulting Physicians: Dr. Kang        Wound(s): Left distal hallux    Start of Care: 2/8/2018  Subjective:    HPI:  58 yr old pt with DM (controlled) presents to clinic for evaluation and treatment of his L foot distal hallux wound.  He states that he first noticed it approximately a month ago and has been leaving it open to air.  Pt saw Dr. Kang yesterday and Dr. Kang ordered a CT scan which still needs to be scheduled.     Update 05/03/2018 - Pt continues care at [facility]. Pt wound has not made tremendous progress. Pt met with Dr. Kang on 05/02/208, and plan is to surgically remove a possible bone spur that is behind wound location, and do a tendon release so that toe no longer \"sticks up.\" This surgery has yet to be scheduled, pt will notify [facility] of this when he is scheduled. Pt aware he may need a new referral to continue care.      12/22/2016 PROCEDURES with Dr Kang:     1.  Left modified Arndt bunionectomy.  2.  Left distal metatarsal osteotomy.  3.  Left distal metatarsal osteotomy of the hallux.  4.  Left distal metatarsal osteotomy, 2, 3, 4, and 5.  5.  Left flexor digitorum longus transfer with a flexor to extensor transfer    of 2 and 3.  6.  Left soft tissue reconstruction, metatarsophalangeal joints 2, 3, 4, and    5.  7.  Left hammertoe correction with proximal interphalangeal arthroplasty, 2    and 3.  8.  Right metatarsophalangeal joint capsulotomy of soft tissue release, 2 and    3.  9.  Right hammertoe correction with proximal interphalangeal arthroplasty, 2    and 3.           Pain: Pt denies pain this visit.       Current Outpatient Prescriptions:   •  amoxicillin-clavulanate, 1 Tab, Oral, BID  •  " levothyroxine, 125 mcg, Oral, AM ES  •  amphetamine-dextroamphetamine, 20 mg, Oral, DAILY, 5/2/2018 at Unknown time  •  hydrOXYzine pamoate, 200 mg, Oral, BID, 5/2/2018 at pm  •  tamsulosin, 0.4 mg, Oral, AFTER BREAKFAST, 5/2/2018 at Unknown time  •  liraglutide, 1.8 mg, Subcutaneous, QAM, 5/2/2018 at Unknown time  •  temazepam, 30 mg, Oral, HS PRN, 5/2/2018 at Unknown time  •  venlafaxine XR, 75 mg, Oral, QAM, 5/2/2018 at Unknown time  •  ALPRAZolam, 0.25-0.5 mg, Oral, 4X/DAY PRN, 3/31/2018  •  losartan, 100 mg, Oral, QAM, 5/2/2018 at Unknown time  •  oxyCODONE-acetaminophen, 1-2 Tab, Oral, Q4HRS PRN, 4/3/2018 at Unknown time    Allergies: Demerol and Septra [bactrim ds]     Objective:    Tests and Measures:   05/03/2018 - L foot warm with strong and palpable DP/PT pulses.    ANT 1.0 taken by Florentin wound tech.  L foot warm with strong and palpable DP pulses, hair growth present on LLE    05/15/2018 - culture POS - placed on abx by Rafia NICOLE    Orthotic, protective, supportive devices:      Fall Risk Assessment (william all that apply with an X): High fall risk.      Wound Characteristics                                                    Location:   L distal hallux  Initial Evaluation  Date: 2/8/18 80 Day Certification Date: 05/03/2018 Encounter Date:  5/22/2018   Tissue Type and %: 95% adherent yellow, 5% pink viable 100% moist pink 100% moist pink tissue   Periwound: Macerated callous Moderate maceration Erythema, moderate maceration   Drainage: Mod SS Moderate serosanguinous Minimal serosanguinous   Exposed structures None visible None None   Wound Edges:   Open  Open Open   Odor: None None None   S&S of Infection:   Mild erythema None Erythema; pt completed antibiotic 5/21/18   Edema: Local 1+ pitting to BLE  Local to wound site.   Sensation: LOPS, neuropathy LOPS LOPS          Measurements:   L distal hallux Initial Evaluation  Date: 2/8/18 80 Day Certification Date: 05/03/2018 Encounter Date:  05/22/2018    Length (cm) 1 0.4 0.5   Width (cm) 1.2 1.1 1.0   Depth (cm) GAYLE 0.2 0.2   Area (cm2) 1.2 0.44 cm2 0.5cm2   Tract/undermine None None None         Procedures:                Debridement: CSWD with curette to remove ~0.5 cm2 of slough from wound bed and alma rosa wound.              Cleansed with: NS                                                                                   Periwound protected with: Skin prepx2              Primary dressing: Hydrofera Blue Ready beveled              Secondary Dressing: Secured with hypafix              Other: Tubi E to LLE. Patient's own offloading shoe     Patient Education: POC and wound progress discussed with patient. Wound measurement same. Discussed rationale for hydrofera blue ready, pt agreeable.  Pt is in the process of scheduling surgery with Dr. Kang. Reviewed s/s of infection (increased redness/pain/swelling/drainage, fever, fatigue, malaise, N/V), and advised patient to call the office or go to Urgent Care/ER if necessary. Patient verbalized understanding of instructions.    Professional Collaboration: none today    Assessment:       Wound etiology: trauma vs DFU     Wound Progress: Wound measures same     Rationale for Treatment: Hydrofera Blue Ready for antimicrobial property, absorb, pad, and protect. Tubi E for compression / to control edema.    Patient tolerance/compliance: Patient tolerated treatment well, compliant with appointments and plan of care.     Complicating factors: DM, hypertension, pain     Need for ongoing Advanced Wound Care services: Patient requires skilled therapeutic wound care services for product selection, application of product, debridement, close monitoring with clinical assessment, compression for expedite of wound healing.        Plan:       Treatment Plan and Recommendations:  Diagnosis/ICD10: S91.302A    Procedures/CPT: Selective debridement 84490     Frequency: 2x/week - 30 minutes        Treatment Goals: STG 2 Weeks          LTG  4 Weeks   Granulation Tissue: 100% 100%   Decrease Necrotic Tissue to: 0% 0%   Wound Phase:             proliferation proliferation   Decrease Size by: 25% 50%   Periwound:       intact intact   Decrease tracts/undermining by: n/a n/a   Decrease Pain:    0 0                           At the time of each visit a thorough assessment of the patient is completed to assure the  appropriateness of our plan of care.  The dressings or modalities may need to be adapted   from the original plan to address any significant changes in the wound environment.

## 2018-05-29 ENCOUNTER — NON-PROVIDER VISIT (OUTPATIENT)
Dept: WOUND CARE | Facility: MEDICAL CENTER | Age: 58
End: 2018-05-29
Attending: NURSE PRACTITIONER
Payer: COMMERCIAL

## 2018-05-29 PROCEDURE — 97597 DBRDMT OPN WND 1ST 20 CM/<: CPT

## 2018-05-29 NOTE — WOUND TEAM
"Advanced Wound Care  [facility]  [address]  [address]  [place] [address]  [phone] Fax: [phone]     Encounter Note  For Certification Period: 05/03/2018 - 06/23/2018        Referring Physician: BONNIE Garcia  Primary Physician: Kevin Chavez MD        Consulting Physicians: Dr. Kang        Wound(s): Left distal hallux    Start of Care: 2/8/2018  Subjective:    HPI:  58 yr old pt with DM (controlled) presents to clinic for evaluation and treatment of his L foot distal hallux wound.  He states that he first noticed it approximately a month ago and has been leaving it open to air.  Pt saw Dr. Kang yesterday and Dr. Kang ordered a CT scan which still needs to be scheduled.     Update 05/03/2018 - Pt continues care at [facility]. Pt wound has not made tremendous progress. Pt met with Dr. Kang on 05/02/208, and plan is to surgically remove a possible bone spur that is behind wound location, and do a tendon release so that toe no longer \"sticks up.\" This surgery has yet to be scheduled, pt will notify [facility] of this when he is scheduled. Pt aware he may need a new referral to continue care.      12/22/2016 PROCEDURES with Dr Kang:     1.  Left modified Arndt bunionectomy.  2.  Left distal metatarsal osteotomy.  3.  Left distal metatarsal osteotomy of the hallux.  4.  Left distal metatarsal osteotomy, 2, 3, 4, and 5.  5.  Left flexor digitorum longus transfer with a flexor to extensor transfer    of 2 and 3.  6.  Left soft tissue reconstruction, metatarsophalangeal joints 2, 3, 4, and    5.  7.  Left hammertoe correction with proximal interphalangeal arthroplasty, 2    and 3.  8.  Right metatarsophalangeal joint capsulotomy of soft tissue release, 2 and    3.  9.  Right hammertoe correction with proximal interphalangeal arthroplasty, 2    and 3.           Pain: Pt denies pain this visit.     Current Outpatient Prescriptions:   •  levothyroxine, 125 mcg, Oral, AM ES  •  " amphetamine-dextroamphetamine, 20 mg, Oral, DAILY, 5/2/2018 at Unknown time  •  hydrOXYzine pamoate, 200 mg, Oral, BID, 5/2/2018 at pm  •  tamsulosin, 0.4 mg, Oral, AFTER BREAKFAST, 5/2/2018 at Unknown time  •  liraglutide, 1.8 mg, Subcutaneous, QAM, 5/2/2018 at Unknown time  •  temazepam, 30 mg, Oral, HS PRN, 5/2/2018 at Unknown time  •  venlafaxine XR, 75 mg, Oral, QAM, 5/2/2018 at Unknown time  •  ALPRAZolam, 0.25-0.5 mg, Oral, 4X/DAY PRN, 3/31/2018  •  losartan, 100 mg, Oral, QAM, 5/2/2018 at Unknown time  •  oxyCODONE-acetaminophen, 1-2 Tab, Oral, Q4HRS PRN, 4/3/2018 at Unknown time    Allergies: Demerol and Septra [bactrim ds]     Objective:    Tests and Measures:   05/03/2018 - L foot warm with strong and palpable DP/PT pulses.    ANT 1.0 taken by Florentin wound tech.  L foot warm with strong and palpable DP pulses, hair growth present on LLE    05/15/2018 - culture POS - placed on abx by Rafia NICOLE    Orthotic, protective, supportive devices:      Fall Risk Assessment (william all that apply with an X): High fall risk.      Wound Characteristics                                                    Location:   L distal hallux  Initial Evaluation  Date: 2/8/18 80 Day Certification Date: 05/03/2018 Encounter Date:  05/29/2018   Tissue Type and %: 95% adherent yellow, 5% pink viable 100% moist pink 100% moist pink tissue   Periwound: Macerated callous Moderate maceration Resolving erythema, moderate maceration   Drainage: Mod SS Moderate serosanguinous Minimal serosanguinous   Exposed structures None visible None None   Wound Edges:   Open  Open Open   Odor: None None None   S&S of Infection:   Mild erythema None Resolving erythema   Edema: Local 1+ pitting to BLE  Local to wound site.   Sensation: LOPS, neuropathy LOPS LOPS          Measurements:   L distal hallux Initial Evaluation  Date: 2/8/18 80 Day Certification Date: 05/03/2018 Encounter Date:  05/29/2018   Length (cm) 1 0.4 0.4   Width (cm) 1.2 1.1 0.8   Depth  (cm) GAYLE 0.2 0.1   Area (cm2) 1.2 0.44 cm2 0.32 cm2   Tract/undermine None None None       Procedures:                Debridement: CSWD with scalpel, scissors and forceps to remove ~1 cm2 of slough from wound bed and alma rosa wound callus.              Cleansed with: NS                                                                                   Periwound protected with: Skin prep              Primary dressing: Hydrofera Blue Ready beveled              Secondary Dressing: Secured with hypafix              Other: Tubi E to LLE. Patient's own offloading shoe     Patient Education: POC and wound progress discussed with patient. Wound measurements are smaller today. Discussed rationale for continued use for hydrofera blue ready, pt agreeable. Pt states that he will be having surgery with Dr. Kang to remove hardware in his left foot at the beginning of June. Reviewed s/s of infection (increased redness/pain/swelling/drainage, fever, fatigue, malaise, N/V), and advised patient to call the office or go to Urgent Care/ER if necessary. Patient verbalized understanding of instructions.    Professional Collaboration: None today    Assessment:       Wound etiology: trauma vs DFU     Wound Progress: Wound measures smaller today     Rationale for Treatment: Hydrofera Blue Ready for antimicrobial property, absorb, pad, and protect. Tubi E for compression / to control edema.    Patient tolerance/compliance: Patient tolerated treatment well, compliant with appointments and plan of care.     Complicating factors: DM, hypertension, pain     Need for ongoing Advanced Wound Care services: Patient requires skilled therapeutic wound care services for product selection, application of product, debridement, close monitoring with clinical assessment, compression for expedite of wound healing.        Plan:       Treatment Plan and Recommendations:  Diagnosis/ICD10: S91.302A    Procedures/CPT: Selective debridement 62764     Frequency:  2x/week - 30 minutes        Treatment Goals: STG 2 Weeks          LTG 4 Weeks   Granulation Tissue: 100% 100%   Decrease Necrotic Tissue to: 0% 0%   Wound Phase:             proliferation proliferation   Decrease Size by: 25% 50%   Periwound:       intact intact   Decrease tracts/undermining by: n/a n/a   Decrease Pain:    0 0                           At the time of each visit a thorough assessment of the patient is completed to assure the  appropriateness of our plan of care.  The dressings or modalities may need to be adapted   from the original plan to address any significant changes in the wound environment.

## 2018-05-31 ENCOUNTER — NON-PROVIDER VISIT (OUTPATIENT)
Dept: WOUND CARE | Facility: MEDICAL CENTER | Age: 58
End: 2018-05-31
Attending: NURSE PRACTITIONER
Payer: COMMERCIAL

## 2018-05-31 PROCEDURE — 97597 DBRDMT OPN WND 1ST 20 CM/<: CPT

## 2018-06-05 ENCOUNTER — NON-PROVIDER VISIT (OUTPATIENT)
Dept: WOUND CARE | Facility: MEDICAL CENTER | Age: 58
End: 2018-06-05
Attending: NURSE PRACTITIONER
Payer: COMMERCIAL

## 2018-06-05 PROCEDURE — 97597 DBRDMT OPN WND 1ST 20 CM/<: CPT

## 2018-06-05 NOTE — WOUND TEAM
"Advanced Wound Care  [facility]  [address]  [address]  [place] [address]  [phone] Fax: [phone]     Encounter Note  For Certification Period: 05/03/2018 - 06/23/2018        Referring Physician: BONNIE Garcia  Primary Physician: Kevin Chavez MD        Consulting Physicians: Dr. Kang        Wound(s): Left distal hallux    Start of Care: 2/8/2018  Subjective:    HPI:  58 yr old pt with DM (controlled) presents to clinic for evaluation and treatment of his L foot distal hallux wound.  He states that he first noticed it approximately a month ago and has been leaving it open to air.  Pt saw Dr. Kang yesterday and Dr. Kang ordered a CT scan which still needs to be scheduled.     Update 05/03/2018 - Pt continues care at [facility]. Pt wound has not made tremendous progress. Pt met with Dr. Kang on 05/02/208, and plan is to surgically remove a possible bone spur that is behind wound location, and do a tendon release so that toe no longer \"sticks up.\" This surgery has yet to be scheduled, pt will notify [facility] of this when he is scheduled. Pt aware he may need a new referral to continue care.      12/22/2016 PROCEDURES with Dr Kang:     1.  Left modified Arndt bunionectomy.  2.  Left distal metatarsal osteotomy.  3.  Left distal metatarsal osteotomy of the hallux.  4.  Left distal metatarsal osteotomy, 2, 3, 4, and 5.  5.  Left flexor digitorum longus transfer with a flexor to extensor transfer    of 2 and 3.  6.  Left soft tissue reconstruction, metatarsophalangeal joints 2, 3, 4, and    5.  7.  Left hammertoe correction with proximal interphalangeal arthroplasty, 2    and 3.  8.  Right metatarsophalangeal joint capsulotomy of soft tissue release, 2 and    3.  9.  Right hammertoe correction with proximal interphalangeal arthroplasty, 2    and 3.           Pain: Pt denies pain this visit.     Current Outpatient Prescriptions:   •  levothyroxine, 125 mcg, Oral, AM ES  •  " amphetamine-dextroamphetamine, 20 mg, Oral, DAILY, 5/2/2018 at Unknown time  •  hydrOXYzine pamoate, 200 mg, Oral, BID, 5/2/2018 at pm  •  tamsulosin, 0.4 mg, Oral, AFTER BREAKFAST, 5/2/2018 at Unknown time  •  liraglutide, 1.8 mg, Subcutaneous, QAM, 5/2/2018 at Unknown time  •  temazepam, 30 mg, Oral, HS PRN, 5/2/2018 at Unknown time  •  venlafaxine XR, 75 mg, Oral, QAM, 5/2/2018 at Unknown time  •  ALPRAZolam, 0.25-0.5 mg, Oral, 4X/DAY PRN, 3/31/2018  •  losartan, 100 mg, Oral, QAM, 5/2/2018 at Unknown time  •  oxyCODONE-acetaminophen, 1-2 Tab, Oral, Q4HRS PRN, 4/3/2018 at Unknown time    Allergies: Demerol and Septra [bactrim ds]     Objective:    Tests and Measures:   05/03/2018 - L foot warm with strong and palpable DP/PT pulses.    ANT 1.0 taken by Florentin wound tech.  L foot warm with strong and palpable DP pulses, hair growth present on LLE    05/15/2018 - culture POS - placed on abx by Rafia NICOLE    Orthotic, protective, supportive devices:      Fall Risk Assessment (william all that apply with an X): High fall risk.      Wound Characteristics                                                    Location:   L distal hallux  Initial Evaluation  Date: 2/8/18 80 Day Certification Date: 05/03/2018 Encounter Date:  06/05/2018   Tissue Type and %: 95% adherent yellow, 5% pink viable 100% moist pink 100% moist pink tissue   Periwound: Macerated callous Moderate maceration Resolving erythema, min maceration   Drainage: Mod SS Moderate serosanguinous Minimal serosanguinous   Exposed structures None visible None None   Wound Edges:   Open  Open Open   Odor: None None None   S&S of Infection:   Mild erythema None Resolving erythema   Edema: Local 1+ pitting to BLE Local to wound site.   Sensation: LOPS, neuropathy LOPS LOPS          Measurements:   L distal hallux Initial Evaluation  Date: 2/8/18 80 Day Certification Date: 05/03/2018 Encounter Date:  06/05/2018   Length (cm) 1 0.4 0.4   Width (cm) 1.2 1.1 0.8   Depth (cm)  GAYLE 0.2 0.1   Area (cm2) 1.2 0.44 cm2 0.32 cm2   Tract/undermine None None None       Procedures:                Debridement: CSWD with scalpel remove ~0.5 cm2 of slough from wound bed, and ~0.5 cm2 of periwound callus ring.              Cleansed with: NS                                                                                   Periwound protected with: Skin prep              Primary dressing: Hydrofera Blue Ready beveled              Secondary Dressing: Secured with hypafix              Other: Tubi E to LLE. Patient's own offloading shoe     Patient Education: POC and wound progress discussed with pt. Pt knowledgeable on s/s of infection and when to go to ER. Surgery with Dr. Kang to remove hardware in L foot scheduled for 6/25/18.    Professional Collaboration: None today    Assessment:       Wound etiology: trauma vs DFU     Wound Progress: No significant changes     Rationale for Treatment: Hydrofera Blue Ready for antimicrobial property, absorb, pad, and protect. Tubi E for compression / to control edema.    Patient tolerance/compliance: Patient tolerated treatment well, compliant with appointments and plan of care.     Complicating factors: DM, hypertension, pain     Need for ongoing Advanced Wound Care services: Patient requires skilled therapeutic wound care services for product selection, application of product, debridement, close monitoring with clinical assessment, compression for expedite of wound healing.        Plan:       Treatment Plan and Recommendations:  Diagnosis/ICD10: S91.302A    Procedures/CPT: Selective debridement 60830     Frequency: 2x/week - 30 minutes        Treatment Goals: STG 2 Weeks          LTG 4 Weeks   Granulation Tissue: 100% 100%   Decrease Necrotic Tissue to: 0% 0%   Wound Phase:             proliferation proliferation   Decrease Size by: 25% 50%   Periwound:       intact intact   Decrease tracts/undermining by: n/a n/a   Decrease Pain:    0 0                            At the time of each visit a thorough assessment of the patient is completed to assure the  appropriateness of our plan of care.  The dressings or modalities may need to be adapted   from the original plan to address any significant changes in the wound environment.

## 2018-06-07 ENCOUNTER — NON-PROVIDER VISIT (OUTPATIENT)
Dept: WOUND CARE | Facility: MEDICAL CENTER | Age: 58
End: 2018-06-07
Attending: NURSE PRACTITIONER
Payer: COMMERCIAL

## 2018-06-07 PROCEDURE — 97597 DBRDMT OPN WND 1ST 20 CM/<: CPT

## 2018-06-07 NOTE — WOUND TEAM
"Advanced Wound Care  [facility]  [address]  [address]  [place] [address]  [phone] Fax: [phone]     Encounter Note  For Certification Period: 05/03/2018 - 06/23/2018        Referring Physician: BONNIE Garcia  Primary Physician: Kevin Chavez MD        Consulting Physicians: Dr. Kang        Wound(s): Left distal hallux    Start of Care: 2/8/2018  Subjective:    HPI:  58 yr old pt with DM (controlled) presents to clinic for evaluation and treatment of his L foot distal hallux wound.  He states that he first noticed it approximately a month ago and has been leaving it open to air.  Pt saw Dr. Kang yesterday and Dr. Kang ordered a CT scan which still needs to be scheduled.     Update 05/03/2018 - Pt continues care at [facility]. Pt wound has not made tremendous progress. Pt met with Dr. Kang on 05/02/208, and plan is to surgically remove a possible bone spur that is behind wound location, and do a tendon release so that toe no longer \"sticks up.\" This surgery has yet to be scheduled, pt will notify [facility] of this when he is scheduled. Pt aware he may need a new referral to continue care.      12/22/2016 PROCEDURES with Dr Kang:     1.  Left modified Arndt bunionectomy.  2.  Left distal metatarsal osteotomy.  3.  Left distal metatarsal osteotomy of the hallux.  4.  Left distal metatarsal osteotomy, 2, 3, 4, and 5.  5.  Left flexor digitorum longus transfer with a flexor to extensor transfer    of 2 and 3.  6.  Left soft tissue reconstruction, metatarsophalangeal joints 2, 3, 4, and    5.  7.  Left hammertoe correction with proximal interphalangeal arthroplasty, 2    and 3.  8.  Right metatarsophalangeal joint capsulotomy of soft tissue release, 2 and    3.  9.  Right hammertoe correction with proximal interphalangeal arthroplasty, 2    and 3.           Pain: Pt denies pain this visit.     Current Outpatient Prescriptions:   •  levothyroxine, 125 mcg, Oral, AM ES  •  " amphetamine-dextroamphetamine, 20 mg, Oral, DAILY, 5/2/2018 at Unknown time  •  hydrOXYzine pamoate, 200 mg, Oral, BID, 5/2/2018 at pm  •  tamsulosin, 0.4 mg, Oral, AFTER BREAKFAST, 5/2/2018 at Unknown time  •  liraglutide, 1.8 mg, Subcutaneous, QAM, 5/2/2018 at Unknown time  •  temazepam, 30 mg, Oral, HS PRN, 5/2/2018 at Unknown time  •  venlafaxine XR, 75 mg, Oral, QAM, 5/2/2018 at Unknown time  •  ALPRAZolam, 0.25-0.5 mg, Oral, 4X/DAY PRN, 3/31/2018  •  losartan, 100 mg, Oral, QAM, 5/2/2018 at Unknown time  •  oxyCODONE-acetaminophen, 1-2 Tab, Oral, Q4HRS PRN, 4/3/2018 at Unknown time    Allergies: Demerol and Septra [bactrim ds]     Objective:    Tests and Measures:   05/03/2018 - L foot warm with strong and palpable DP/PT pulses.    ANT 1.0 taken by Florentin wound tech.  L foot warm with strong and palpable DP pulses, hair growth present on LLE    05/15/2018 - culture POS - placed on abx by Rafia NICOLE    Orthotic, protective, supportive devices:      Fall Risk Assessment (william all that apply with an X): High fall risk.      Wound Characteristics                                                    Location:   L distal hallux  Initial Evaluation  Date: 2/8/18 80 Day Certification Date: 05/03/2018 Encounter Date:  06/07/2018   Tissue Type and %: 95% adherent yellow, 5% pink viable 100% moist pink 100% moist pink tissue   Periwound: Macerated callous Moderate maceration erythema, min maceration   Drainage: Mod SS Moderate serosanguinous Minimal serosanguinous   Exposed structures None visible None None   Wound Edges:   Open  Open Open   Odor: None None None   S&S of Infection:   Mild erythema None erythema   Edema: Local 1+ pitting to BLE Local to wound site.   Sensation: LOPS, neuropathy LOPS LOPS          Measurements:   L distal hallux Initial Evaluation  Date: 2/8/18 80 Day Certification Date: 05/03/2018 Encounter Date:  06/05/2018   Length (cm) 1 0.4 0.4   Width (cm) 1.2 1.1 0.8   Depth (cm) GAYLE 0.2 0.1   Area  (cm2) 1.2 0.44 cm2 0.32 cm2   Tract/undermine None None None       Procedures:                Debridement: CSWD with scalpel remove ~0.5 cm2 of slough from wound bed              Cleansed with: NS                                                                                   Periwound protected with: Skin prep              Primary dressing: AqAg rope gently packed into wound depth with tail folded over wound bed              Secondary Dressing: Secured with hypafix              Other: Tubi E to LLE. Patient's own offloading shoe     Patient Education: POC and wound progress discussed with pt. Rationale for dressing choice selection discussed with pt, pt agreeable. Pt knowledgeable on s/s of infection and when to go to ER. Surgery with Dr. Kang to remove hardware in L foot scheduled for 6/25/18.    Professional Collaboration: None today    Assessment:       Wound etiology: trauma vs DFU     Wound Progress: Wound depth appears to have increased, no measurement today.     Rationale for Treatment: AqAg to manage bioburden, absorb exudate, and maintain moist wound environment without laterally wicking exudate therefore reducing alma rosa-wound maceration. Tubi E for compression / to control edema.    Patient tolerance/compliance: Patient tolerated treatment well, compliant with appointments and plan of care.     Complicating factors: DM, hypertension, pain     Need for ongoing Advanced Wound Care services: Patient requires skilled therapeutic wound care services for product selection, application of product, debridement, close monitoring with clinical assessment, compression for expedite of wound healing.        Plan:       Treatment Plan and Recommendations:  Diagnosis/ICD10: S91.302A    Procedures/CPT: Selective debridement 30956     Frequency: 2x/week - 30 minutes        Treatment Goals: STG 2 Weeks          LTG 4 Weeks   Granulation Tissue: 100% 100%   Decrease Necrotic Tissue to: 0% 0%   Wound Phase:              proliferation proliferation   Decrease Size by: 25% 50%   Periwound:       intact intact   Decrease tracts/undermining by: n/a n/a   Decrease Pain:    0 0                           At the time of each visit a thorough assessment of the patient is completed to assure the  appropriateness of our plan of care.  The dressings or modalities may need to be adapted   from the original plan to address any significant changes in the wound environment.

## 2018-06-08 DIAGNOSIS — Z01.812 PRE-OPERATIVE LABORATORY EXAMINATION: ICD-10-CM

## 2018-06-08 DIAGNOSIS — Z01.810 PRE-OPERATIVE CARDIOVASCULAR EXAMINATION: ICD-10-CM

## 2018-06-08 LAB
ANION GAP SERPL CALC-SCNC: 11 MMOL/L (ref 0–11.9)
BUN SERPL-MCNC: 19 MG/DL (ref 8–22)
CALCIUM SERPL-MCNC: 9.7 MG/DL (ref 8.5–10.5)
CHLORIDE SERPL-SCNC: 103 MMOL/L (ref 96–112)
CO2 SERPL-SCNC: 25 MMOL/L (ref 20–33)
CREAT SERPL-MCNC: 0.96 MG/DL (ref 0.5–1.4)
GLUCOSE SERPL-MCNC: 150 MG/DL (ref 65–99)
POTASSIUM SERPL-SCNC: 3.8 MMOL/L (ref 3.6–5.5)
SODIUM SERPL-SCNC: 139 MMOL/L (ref 135–145)

## 2018-06-08 PROCEDURE — 93005 ELECTROCARDIOGRAM TRACING: CPT

## 2018-06-08 PROCEDURE — 80048 BASIC METABOLIC PNL TOTAL CA: CPT

## 2018-06-08 PROCEDURE — 36415 COLL VENOUS BLD VENIPUNCTURE: CPT

## 2018-06-08 PROCEDURE — 93010 ELECTROCARDIOGRAM REPORT: CPT | Performed by: INTERNAL MEDICINE

## 2018-06-09 LAB — EKG IMPRESSION: NORMAL

## 2018-06-14 ENCOUNTER — HOSPITAL ENCOUNTER (OUTPATIENT)
Facility: MEDICAL CENTER | Age: 58
End: 2018-06-14
Attending: NURSE PRACTITIONER
Payer: COMMERCIAL

## 2018-06-14 ENCOUNTER — NON-PROVIDER VISIT (OUTPATIENT)
Dept: WOUND CARE | Facility: MEDICAL CENTER | Age: 58
End: 2018-06-14
Attending: NURSE PRACTITIONER
Payer: COMMERCIAL

## 2018-06-14 DIAGNOSIS — T14.8XXA WOUND INFECTION: ICD-10-CM

## 2018-06-14 DIAGNOSIS — L08.9 WOUND INFECTION: ICD-10-CM

## 2018-06-14 PROCEDURE — 97597 DBRDMT OPN WND 1ST 20 CM/<: CPT

## 2018-06-14 PROCEDURE — 87070 CULTURE OTHR SPECIMN AEROBIC: CPT

## 2018-06-14 PROCEDURE — 87077 CULTURE AEROBIC IDENTIFY: CPT

## 2018-06-14 PROCEDURE — 87205 SMEAR GRAM STAIN: CPT

## 2018-06-14 PROCEDURE — 87186 SC STD MICRODIL/AGAR DIL: CPT

## 2018-06-14 NOTE — WOUND TEAM
"Advanced Wound Care  [facility]  [address]  [address]  [place] [address]  [phone] Fax: [phone]     Encounter Note  For Certification Period: 05/03/2018 - 06/23/2018        Referring Physician: BONNIE Garcia  Primary Physician: Kevin Chavez MD        Consulting Physicians: Dr. Kang        Wound(s): Left distal hallux    Start of Care: 2/8/2018  Subjective:    HPI:  58 yr old pt with DM (controlled) presents to clinic for evaluation and treatment of his L foot distal hallux wound.  He states that he first noticed it approximately a month ago and has been leaving it open to air.  Pt saw Dr. Kang yesterday and Dr. Kang ordered a CT scan which still needs to be scheduled.     Update 05/03/2018 - Pt continues care at [facility]. Pt wound has not made tremendous progress. Pt met with Dr. Kang on 05/02/208, and plan is to surgically remove a possible bone spur that is behind wound location, and do a tendon release so that toe no longer \"sticks up.\" This surgery has yet to be scheduled, pt will notify [facility] of this when he is scheduled. Pt aware he may need a new referral to continue care.      12/22/2016 PROCEDURES with Dr Kang:     1.  Left modified Arndt bunionectomy.  2.  Left distal metatarsal osteotomy.  3.  Left distal metatarsal osteotomy of the hallux.  4.  Left distal metatarsal osteotomy, 2, 3, 4, and 5.  5.  Left flexor digitorum longus transfer with a flexor to extensor transfer    of 2 and 3.  6.  Left soft tissue reconstruction, metatarsophalangeal joints 2, 3, 4, and    5.  7.  Left hammertoe correction with proximal interphalangeal arthroplasty, 2    and 3.  8.  Right metatarsophalangeal joint capsulotomy of soft tissue release, 2 and    3.  9.  Right hammertoe correction with proximal interphalangeal arthroplasty, 2    and 3.           Pain: Pt c/o severe pain and tenderness on L dorsal foot.    Current Outpatient Prescriptions:   •  levothyroxine, 125 mcg, Oral, " AM ES  •  amphetamine-dextroamphetamine, 15 mg, Oral, DAILY  •  hydrOXYzine pamoate, 200 mg, Oral, BID  •  tamsulosin, 0.4 mg, Oral, AFTER BREAKFAST  •  liraglutide, 1.8 mg, Subcutaneous, QAM  •  temazepam, 30 mg, Oral, HS PRN  •  venlafaxine XR, 75 mg, Oral, QAM  •  ALPRAZolam, 0.25-0.5 mg, Oral, 4X/DAY PRN  •  losartan, 100 mg, Oral, QAM  •  oxyCODONE-acetaminophen, 1-2 Tab, Oral, Q4HRS PRN    Allergies: Demerol and Septra [bactrim ds]     Objective:    Tests and Measures:   05/03/2018 - L foot warm with strong and palpable DP/PT pulses.    ANT 1.0 taken by Florentin wound tech.  L foot warm with strong and palpable DP pulses, hair growth present on LLE    05/15/2018 - culture POS - placed on abx by Rafia NICOLE    Orthotic, protective, supportive devices:      Fall Risk Assessment (william all that apply with an X): High fall risk.      Wound Characteristics                                                    Location:   L distal hallux  Initial Evaluation  Date: 2/8/18 80 Day Certification Date: 05/03/2018 Encounter Date:  06/14/2018   Tissue Type and %: 95% adherent yellow, 5% pink viable 100% moist pink 100% moist pink tissue   Periwound: Macerated callous Moderate maceration erythema, min to mod maceration, edema   Drainage: Mod SS Moderate serosanguinous Minimal serosanguinous   Exposed structures None visible None None   Wound Edges:   Open  Open Open   Odor: None None None   S&S of Infection:   Mild erythema None Erythema, severe pain, edema   Edema: Local 1+ pitting to BLE 2+ to 1st, 2nd, 3rd toe   Sensation: LOPS, neuropathy LOPS LOPS          Measurements:   L distal hallux Initial Evaluation  Date: 2/8/18 80 Day Certification Date: 05/03/2018 Encounter Date:  06/14/2018   Length (cm) 1 0.4 0.5   Width (cm) 1.2 1.1 0.8   Depth (cm) GAYLE 0.2 0.3   Area (cm2) 1.2 0.44 cm2 0.40 cm2   Tract/undermine None None None         6/14/18 wound culture sent to lab    Procedures:                Debridement: CSWD with scalpel  and curette to remove ~0.4 cm2 of slough from wound bed              Cleansed with: NS                                                                                   Periwound protected with: Skin prep              Primary dressing: AqAg rope gently packed into wound depth with tail folded over wound bed              Secondary Dressing: Non-adhesive foam secured with hypafix              Other: Patient's own offloading shoe     Patient Education: POC and wound progress discussed with pt. Pt went to [facility] yesterday for increased pain, redness, and swelling. Pt was advised to see [facility] and get wound cultured. Rationale for wound culture and dressing choice selection discussed with pt, pt agreeable. Pt knowledgeable on s/s of infection and when to go to ER. Surgery with Dr. Kang to remove hardware in L foot scheduled for 6/25/18.    Professional Collaboration:Dez NICOLE for wound culture.    Assessment:       Wound etiology: trauma vs DFU     Wound Progress: Increased erythema, edema, and pain today. Culture collected and sent to lab.     Rationale for Treatment: AqAg to manage bioburden, absorb exudate, and maintain moist wound environment without laterally wicking exudate therefore reducing alma rosa-wound maceration. Tubi E for compression / to control edema.    Patient tolerance/compliance: Patient tolerated treatment well, compliant with appointments and plan of care.     Complicating factors: DM, hypertension, pain     Need for ongoing Advanced Wound Care services: Patient requires skilled therapeutic wound care services for product selection, application of product, debridement, close monitoring with clinical assessment, compression for expedite of wound healing.        Plan:       Treatment Plan and Recommendations:  Diagnosis/ICD10: S91.302A    Procedures/CPT: Selective debridement 19189     Frequency: 2x/week - 30 minutes        Treatment Goals: STG 2 Weeks          LTG 4 Weeks   Granulation Tissue:  100% 100%   Decrease Necrotic Tissue to: 0% 0%   Wound Phase:             proliferation proliferation   Decrease Size by: 25% 50%   Periwound:       intact intact   Decrease tracts/undermining by: n/a n/a   Decrease Pain:    0 0                           At the time of each visit a thorough assessment of the patient is completed to assure the  appropriateness of our plan of care.  The dressings or modalities may need to be adapted   from the original plan to address any significant changes in the wound environment.

## 2018-06-15 LAB
GRAM STN SPEC: NORMAL
SIGNIFICANT IND 70042: NORMAL
SITE SITE: NORMAL
SOURCE SOURCE: NORMAL

## 2018-06-18 ENCOUNTER — HOSPITAL ENCOUNTER (OUTPATIENT)
Dept: LAB | Facility: MEDICAL CENTER | Age: 58
End: 2018-06-18
Attending: NURSE PRACTITIONER
Payer: COMMERCIAL

## 2018-06-18 ENCOUNTER — NON-PROVIDER VISIT (OUTPATIENT)
Dept: WOUND CARE | Facility: MEDICAL CENTER | Age: 58
End: 2018-06-18
Attending: NURSE PRACTITIONER
Payer: COMMERCIAL

## 2018-06-18 DIAGNOSIS — T14.8XXA WOUND INFECTION: ICD-10-CM

## 2018-06-18 DIAGNOSIS — L08.9 WOUND INFECTION: ICD-10-CM

## 2018-06-18 DIAGNOSIS — L97.529 DIABETIC ULCER OF LEFT GREAT TOE (HCC): ICD-10-CM

## 2018-06-18 DIAGNOSIS — E11.621 DIABETIC ULCER OF LEFT GREAT TOE (HCC): ICD-10-CM

## 2018-06-18 LAB
CRP SERPL HS-MCNC: 3.89 MG/DL (ref 0–0.75)
ERYTHROCYTE [SEDIMENTATION RATE] IN BLOOD BY WESTERGREN METHOD: 30 MM/HOUR (ref 0–20)

## 2018-06-18 PROCEDURE — 36415 COLL VENOUS BLD VENIPUNCTURE: CPT

## 2018-06-18 PROCEDURE — 86140 C-REACTIVE PROTEIN: CPT

## 2018-06-18 PROCEDURE — 97597 DBRDMT OPN WND 1ST 20 CM/<: CPT

## 2018-06-18 PROCEDURE — 85652 RBC SED RATE AUTOMATED: CPT

## 2018-06-18 RX ORDER — DOXYCYCLINE HYCLATE 100 MG
100 TABLET ORAL 2 TIMES DAILY
Qty: 20 TAB | Refills: 0 | Status: SHIPPED | OUTPATIENT
Start: 2018-06-18 | End: 2018-06-28

## 2018-06-18 NOTE — WOUND TEAM
"Advanced Wound Care  [facility]  [address]  [address]  [place] [address]  [phone] Fax: [phone]     Encounter Note  For Certification Period: 05/03/2018 - 06/23/2018        Referring Physician: BONNIE Garcia  Primary Physician: Kevin Chavez MD        Consulting Physicians: Dr. Kang        Wound(s): Left distal hallux    Start of Care: 2/8/2018  Subjective:    HPI:  58 yr old pt with DM (controlled) presents to clinic for evaluation and treatment of his L foot distal hallux wound.  He states that he first noticed it approximately a month ago and has been leaving it open to air.  Pt saw Dr. Kang yesterday and Dr. Kang ordered a CT scan which still needs to be scheduled.     Update 05/03/2018 - Pt continues care at [facility]. Pt wound has not made tremendous progress. Pt met with Dr. Kang on 05/02/208, and plan is to surgically remove a possible bone spur that is behind wound location, and do a tendon release so that toe no longer \"sticks up.\" This surgery has yet to be scheduled, pt will notify [facility] of this when he is scheduled. Pt aware he may need a new referral to continue care.      12/22/2016 PROCEDURES with Dr Kang:     1.  Left modified Arndt bunionectomy.  2.  Left distal metatarsal osteotomy.  3.  Left distal metatarsal osteotomy of the hallux.  4.  Left distal metatarsal osteotomy, 2, 3, 4, and 5.  5.  Left flexor digitorum longus transfer with a flexor to extensor transfer    of 2 and 3.  6.  Left soft tissue reconstruction, metatarsophalangeal joints 2, 3, 4, and    5.  7.  Left hammertoe correction with proximal interphalangeal arthroplasty, 2    and 3.  8.  Right metatarsophalangeal joint capsulotomy of soft tissue release, 2 and    3.  9.  Right hammertoe correction with proximal interphalangeal arthroplasty, 2    and 3.           Pain: Pt c/o severe pain and tenderness that is radiating up to lower leg today.    Current Outpatient Prescriptions:   •  " levothyroxine, 125 mcg, Oral, AM ES  •  amphetamine-dextroamphetamine, 15 mg, Oral, DAILY  •  hydrOXYzine pamoate, 200 mg, Oral, BID  •  tamsulosin, 0.4 mg, Oral, AFTER BREAKFAST  •  liraglutide, 1.8 mg, Subcutaneous, QAM  •  temazepam, 30 mg, Oral, HS PRN  •  venlafaxine XR, 75 mg, Oral, QAM  •  ALPRAZolam, 0.25-0.5 mg, Oral, 4X/DAY PRN  •  losartan, 100 mg, Oral, QAM  •  oxyCODONE-acetaminophen, 1-2 Tab, Oral, Q4HRS PRN    Allergies: Demerol and Septra [bactrim ds]     Objective:    Tests and Measures:   05/03/2018 - L foot warm with strong and palpable DP/PT pulses.    ANT 1.0 taken by Florentin wound tech.  L foot warm with strong and palpable DP pulses, hair growth present on LLE    05/15/2018 - culture POS - placed on abx by Rafia NICOLE    Orthotic, protective, supportive devices:      Fall Risk Assessment (william all that apply with an X): High fall risk.      Wound Characteristics                                                    Location:   L distal hallux  Initial Evaluation  Date: 2/8/18 80 Day Certification Date: 05/03/2018 Encounter Date:  06/18/2018   Tissue Type and %: 95% adherent yellow, 5% pink viable 100% moist pink 100% moist red   Periwound: Macerated callous Moderate maceration Erythema(improving), min to mod maceration, edema   Drainage: Mod SS Moderate serosanguinous Minimal serosanguinous   Exposed structures None visible None Probing to bone   Wound Edges:   Open  Open Open   Odor: None None None   S&S of Infection:   Mild erythema None Erythema(improving), severe pain, edema; culture +; ABX ordered by Solitario NICOLE this visit   Edema: Local 1+ pitting to BLE 1+ to foot and ankle   Sensation: LOPS, neuropathy LOPS LOPS          Measurements:   L distal hallux Initial Evaluation  Date: 2/8/18 80 Day Certification Date: 05/03/2018 Encounter Date:  06/14/2018   Length (cm) 1 0.4 0.5   Width (cm) 1.2 1.1 0.8   Depth (cm) GAYLE 0.2 0.3   Area (cm2) 1.2 0.44 cm2 0.40 cm2   Tract/undermine None None None          6/14/18 wound culture sent to lab  6/18/18 Wound + MRSA. Oral ABX ordered by Solitario NICOLE this visit    Procedures:                Debridement: CSWD with forceps and scissors to remove 1 cm2 of periwound peeling skin              Cleansed with: NS                                                                                   Periwound protected with: Skin prep, zinc              Primary dressing: AqAg rope gently packed into wound depth with tail folded over wound bed              Secondary Dressing: Non-adhesive foam secured with hypafix              Other: Patient's own offloading shoe     Patient Education: POC and wound progress discussed with pt. Solitario NICOLE at bedside to discuss wound culture result and POC. Pt to start taking Doxycycline and stop Augmentin. Wound is probing to bone today and Sloitario NICOLE ordered lab and will update Dr. Kang. Pt already has appointment with Dr. Kang set up for tomorrow and will get x-ray at [facility]. Pt was also advised to monitor for nausea, vomiting, fever, chill, increased swelling, or increased redness, and to go to ER if these symptoms arise. Pt verbalized understanding to all instruction and states he will  ABX and get lab work done today.    Professional Collaboration: Grace NICOLE for ABX prescription and lab order.    Assessment:       Wound etiology: trauma vs DFU     Wound Progress: Increased erythema, edema, and pain today. Culture collected and sent to lab.     Rationale for Treatment: AqAg to manage bioburden, absorb exudate, and maintain moist wound environment without laterally wicking exudate therefore reducing alma rosa-wound maceration.     Patient tolerance/compliance: Patient tolerated treatment well, compliant with appointments and plan of care.     Complicating factors: DM, hypertension, pain     Need for ongoing Advanced Wound Care services: Patient requires skilled therapeutic wound care services for product selection, application of  product, debridement, close monitoring with clinical assessment, compression for expedite of wound healing.        Plan:       Treatment Plan and Recommendations:  Diagnosis/ICD10: S91.302A    Procedures/CPT: Selective debridement 44207     Frequency: 2x/week - 30 minutes        Treatment Goals: STG 2 Weeks          LTG 4 Weeks   Granulation Tissue: 100% 100%   Decrease Necrotic Tissue to: 0% 0%   Wound Phase:             proliferation proliferation   Decrease Size by: 25% 50%   Periwound:       intact intact   Decrease tracts/undermining by: n/a n/a   Decrease Pain:    0 0                           At the time of each visit a thorough assessment of the patient is completed to assure the  appropriateness of our plan of care.  The dressings or modalities may need to be adapted   from the original plan to address any significant changes in the wound environment.

## 2018-06-18 NOTE — PROGRESS NOTES
Asked to assess pt by Aravind RN    Wound cx positive for MRSA and diphtheroids to L great toe distal wound. Currently on Augmentin from [facility]. Pt to discontinue medication. Allergy to sulfa in bactrim DS, not bactrim DS, rxn hives. Instructed pt if develops hives to discontinue doxycyline.   Erythema to great toe extending to MTPJ, per clinician this has slightly improved from last visit. Edema remains same. Pt complains of increased pain with light touch to foot. On gabapentin and percocet.      glucose this am. No significant fluctuations.   Denies fevers, chills, nausea, vomiting    Has appt with Dr. Kang tomorrow to discuss planned surgery to remove hardware to L great toe. Recommend xray at [facility] tomorrow.     PLAN  Referral to ID  Doxy for 10 days ordered  ESR ordered   CRP ordered     Pt advised to go to ER for any increased redness, swelling, drainage or odor, or if pt develops fever, chills, nausea or vomiting.    D/W: Dr. Kang

## 2018-06-21 ENCOUNTER — NON-PROVIDER VISIT (OUTPATIENT)
Dept: WOUND CARE | Facility: MEDICAL CENTER | Age: 58
End: 2018-06-21
Attending: NURSE PRACTITIONER
Payer: COMMERCIAL

## 2018-06-21 PROCEDURE — 99211 OFF/OP EST MAY X REQ PHY/QHP: CPT

## 2018-06-21 PROCEDURE — 97597 DBRDMT OPN WND 1ST 20 CM/<: CPT

## 2018-06-21 NOTE — CERTIFICATION
Advanced Wound Care   [facility]   [address]   [address]   [place] [address]   [phone] Fax: [phone]    Discharge Note      Referring Physician: Dez NICOLE  Wound Etiology: Trauma vs DFU  Wound location: Left distal hallux   ICD-10: S91.302A  Date of Discharge: 06/21/2018    Assessment:  Discharge patient at this time secondary to pending amputation and hardware removal with Dr. Kang on 6/25/2018. Discussed upcoming surgery with patient and that we will discharge him at this time. Solitario NICOLE came in to speak with patient and advised him to let Dr. Kang know to refer to LPS rounds for management of the sutures and surgical site. Patient verbalizes understanding.     Wounds measures 0.5x0.6x0.7 and probes to bone; mild erythema to periwound. Minimal drainage. Aquacel gently wicked into wound and covered with non ad foam and hypafix tape.     Thank you for the referral and the opportunity to treat your patient.      Clinician Signature: _____________________________ Date:_______________

## 2018-06-25 ENCOUNTER — HOSPITAL ENCOUNTER (OUTPATIENT)
Facility: MEDICAL CENTER | Age: 58
End: 2018-06-25
Attending: ORTHOPAEDIC SURGERY | Admitting: ORTHOPAEDIC SURGERY
Payer: COMMERCIAL

## 2018-06-25 ENCOUNTER — APPOINTMENT (OUTPATIENT)
Dept: RADIOLOGY | Facility: MEDICAL CENTER | Age: 58
End: 2018-06-25
Attending: ORTHOPAEDIC SURGERY
Payer: COMMERCIAL

## 2018-06-25 VITALS
RESPIRATION RATE: 16 BRPM | BODY MASS INDEX: 38.25 KG/M2 | TEMPERATURE: 97.3 F | WEIGHT: 267.2 LBS | HEART RATE: 79 BPM | SYSTOLIC BLOOD PRESSURE: 106 MMHG | HEIGHT: 70 IN | OXYGEN SATURATION: 93 % | DIASTOLIC BLOOD PRESSURE: 61 MMHG

## 2018-06-25 LAB
GLUCOSE BLD-MCNC: 123 MG/DL (ref 65–99)
GLUCOSE BLD-MCNC: 165 MG/DL (ref 65–99)
GRAM STN SPEC: NORMAL
SIGNIFICANT IND 70042: NORMAL
SITE SITE: NORMAL
SOURCE SOURCE: NORMAL

## 2018-06-25 PROCEDURE — A6222 GAUZE <=16 IN NO W/SAL W/O B: HCPCS | Performed by: ORTHOPAEDIC SURGERY

## 2018-06-25 PROCEDURE — 500881 HCHG PACK, EXTREMITY: Performed by: ORTHOPAEDIC SURGERY

## 2018-06-25 PROCEDURE — 87070 CULTURE OTHR SPECIMN AEROBIC: CPT

## 2018-06-25 PROCEDURE — 87186 SC STD MICRODIL/AGAR DIL: CPT

## 2018-06-25 PROCEDURE — 160009 HCHG ANES TIME/MIN: Performed by: ORTHOPAEDIC SURGERY

## 2018-06-25 PROCEDURE — 160035 HCHG PACU - 1ST 60 MINS PHASE I: Performed by: ORTHOPAEDIC SURGERY

## 2018-06-25 PROCEDURE — 160046 HCHG PACU - 1ST 60 MINS PHASE II: Performed by: ORTHOPAEDIC SURGERY

## 2018-06-25 PROCEDURE — 160002 HCHG RECOVERY MINUTES (STAT): Performed by: ORTHOPAEDIC SURGERY

## 2018-06-25 PROCEDURE — A9270 NON-COVERED ITEM OR SERVICE: HCPCS

## 2018-06-25 PROCEDURE — 160039 HCHG SURGERY MINUTES - EA ADDL 1 MIN LEVEL 3: Performed by: ORTHOPAEDIC SURGERY

## 2018-06-25 PROCEDURE — 87077 CULTURE AEROBIC IDENTIFY: CPT | Mod: 91

## 2018-06-25 PROCEDURE — 700101 HCHG RX REV CODE 250

## 2018-06-25 PROCEDURE — 700111 HCHG RX REV CODE 636 W/ 250 OVERRIDE (IP)

## 2018-06-25 PROCEDURE — 700102 HCHG RX REV CODE 250 W/ 637 OVERRIDE(OP)

## 2018-06-25 PROCEDURE — 160025 RECOVERY II MINUTES (STATS): Performed by: ORTHOPAEDIC SURGERY

## 2018-06-25 PROCEDURE — 82962 GLUCOSE BLOOD TEST: CPT

## 2018-06-25 PROCEDURE — 500440 HCHG DRESSING, STERILE ROLL (KERLIX): Performed by: ORTHOPAEDIC SURGERY

## 2018-06-25 PROCEDURE — 87075 CULTR BACTERIA EXCEPT BLOOD: CPT

## 2018-06-25 PROCEDURE — 160048 HCHG OR STATISTICAL LEVEL 1-5: Performed by: ORTHOPAEDIC SURGERY

## 2018-06-25 PROCEDURE — 501838 HCHG SUTURE GENERAL: Performed by: ORTHOPAEDIC SURGERY

## 2018-06-25 PROCEDURE — 87205 SMEAR GRAM STAIN: CPT

## 2018-06-25 PROCEDURE — 500122 HCHG BOVIE, BLADE: Performed by: ORTHOPAEDIC SURGERY

## 2018-06-25 PROCEDURE — 160028 HCHG SURGERY MINUTES - 1ST 30 MINS LEVEL 3: Performed by: ORTHOPAEDIC SURGERY

## 2018-06-25 RX ORDER — SODIUM CHLORIDE 9 MG/ML
INJECTION, SOLUTION INTRAVENOUS CONTINUOUS
Status: DISCONTINUED | OUTPATIENT
Start: 2018-06-25 | End: 2018-06-25 | Stop reason: HOSPADM

## 2018-06-25 RX ORDER — OXYCODONE HCL 5 MG/5 ML
SOLUTION, ORAL ORAL
Status: COMPLETED
Start: 2018-06-25 | End: 2018-06-25

## 2018-06-25 RX ORDER — CELECOXIB 200 MG/1
CAPSULE ORAL
Status: DISCONTINUED
Start: 2018-06-25 | End: 2018-06-25 | Stop reason: HOSPADM

## 2018-06-25 RX ORDER — BUPIVACAINE HYDROCHLORIDE 5 MG/ML
INJECTION, SOLUTION EPIDURAL; INTRACAUDAL
Status: DISCONTINUED | OUTPATIENT
Start: 2018-06-25 | End: 2018-06-25 | Stop reason: HOSPADM

## 2018-06-25 RX ORDER — LIDOCAINE HYDROCHLORIDE 10 MG/ML
INJECTION, SOLUTION INFILTRATION; PERINEURAL
Status: DISCONTINUED | OUTPATIENT
Start: 2018-06-25 | End: 2018-06-25 | Stop reason: HOSPADM

## 2018-06-25 RX ORDER — GABAPENTIN 300 MG/1
CAPSULE ORAL
Status: DISCONTINUED
Start: 2018-06-25 | End: 2018-06-25 | Stop reason: HOSPADM

## 2018-06-25 RX ORDER — LIDOCAINE HYDROCHLORIDE 10 MG/ML
0.5 INJECTION, SOLUTION INFILTRATION; PERINEURAL
Status: DISCONTINUED | OUTPATIENT
Start: 2018-06-25 | End: 2018-06-25 | Stop reason: HOSPADM

## 2018-06-25 RX ORDER — ONDANSETRON 2 MG/ML
INJECTION INTRAMUSCULAR; INTRAVENOUS
Status: COMPLETED
Start: 2018-06-25 | End: 2018-06-25

## 2018-06-25 RX ORDER — ACETAMINOPHEN 500 MG
TABLET ORAL
Status: DISCONTINUED
Start: 2018-06-25 | End: 2018-06-25 | Stop reason: HOSPADM

## 2018-06-25 RX ORDER — LIDOCAINE HYDROCHLORIDE 10 MG/ML
INJECTION, SOLUTION INFILTRATION; PERINEURAL
Status: COMPLETED
Start: 2018-06-25 | End: 2018-06-25

## 2018-06-25 RX ADMIN — FENTANYL CITRATE 50 MCG: 50 INJECTION, SOLUTION INTRAMUSCULAR; INTRAVENOUS at 09:30

## 2018-06-25 RX ADMIN — ONDANSETRON 4 MG: 2 INJECTION INTRAMUSCULAR; INTRAVENOUS at 09:45

## 2018-06-25 RX ADMIN — OXYCODONE HYDROCHLORIDE 10 MG: 5 SOLUTION ORAL at 09:35

## 2018-06-25 RX ADMIN — LIDOCAINE HYDROCHLORIDE 0.5 ML: 10 INJECTION, SOLUTION INFILTRATION; PERINEURAL at 07:40

## 2018-06-25 RX ADMIN — SODIUM CHLORIDE: 9 INJECTION, SOLUTION INTRAVENOUS at 07:44

## 2018-06-25 ASSESSMENT — PAIN SCALES - GENERAL
PAINLEVEL_OUTOF10: 4
PAINLEVEL_OUTOF10: 4
PAINLEVEL_OUTOF10: 5
PAINLEVEL_OUTOF10: 5

## 2018-06-25 NOTE — PROGRESS NOTES
Med rec complete per Pt at bedside  Allergies reviewed  Pt started a 10 day course of Doxycycline on 6/18  Pt was asked to stop this course on 6/23

## 2018-06-25 NOTE — OP REPORT
DATE OF SERVICE:  06/25/2018    PREOPERATIVE DIAGNOSES:  1.  Left painful internal fixation of the foot.  2.  Left chronic wound over the tip of his great toe with likely underlying   osteomyelitis.    POSTOPERATIVE DIAGNOSES:  1.  Left painful internal fixation of the foot.  2.  Left chronic wound over the tip of his great toe with likely underlying   osteomyelitis.    PROCEDURES PERFORMED:  1.  Left removal internal fixation.  2.  Left amputation great toe with an interphalangeal disarticulation.  3.  Left foot irrigation and sharp debridement.    SURGEON:  Haroldo Kang MD    FIRST ASSISTANT:  Hortensia Mcnulty MD    SECOND ASSISTANT:  Matilda Mattson.    ANESTHESIA:  General endotracheal with popliteal block per my request for   postoperative pain management.    ESTIMATED BLOOD LOSS:  None.    COMPLICATIONS:  None.    POSTOPERATIVE PLAN:  1.  Weightbearing as tolerated.  2.  Preoperative antibiotics.  3.  Follow up in 2 weeks.    CULTURES:  Distal great toe.    INDICATIONS:  The patient is a pleasant 58-year-old male who has had problems   with his left foot for sometime.  The above diagnoses made and options were   discussed with him including operative and nonoperative.  He elected to   undergo operative intervention.  The above procedure was discussed and all   questions were answered.  Risks of surgery explained which include but not   limited to wound problems, infection, nerve injury, vascular injury, need for   further surgery.  He understands and accepts these risks and agrees to   proceed.  His site was marked by myself prior to receiving psychotropic   medicines.    PROCEDURE IN DETAIL:  The patient was given general anesthesia.  His left   lower extremity was prepped and draped in standard fashion.  Positive site   verification confirmed his left lower extremity as well as procedure.    Antibiotics were held.  Esmarch was used to exsanguinate his foot and ankle   and leg tourniquet was inflated to  250 mmHg.   He was given an ankle block   using standard technique.  Next, an anterior incision was made following his   previous incision over his first MTPJ.  It was dissected down to the level of   the plate.  The plate was cleared of all soft tissue and all screws were   removed.  Next, an elliptical incision was made over the distal aspect of his   phalanx.  This allowed for removal of the toe, ____ soft tissue as well as a   disarticulation of the phalanx.  Further sharp excisional debridement was   performed of the great toe.  The wound measured approximately 3 cm in length.    Once the wound was debrided well, tourniquet was released, hemostasis was   obtained.  It was meticulously closed in layered fashion using 3-0 Vicryl and   3-0 nylon.  Sterile dressings were applied.  Tourniquet was released.  All   toes were pink.  He was transferred to the recovery room in good condition.    Utilization of Dr. Mcnulty was necessary for patient positioning, holding,   retracting, wound closure, dressing placement.  He was present throughout the   entire procedure.       ____________________________________     MD SAÚL MOREL / NTS    DD:  06/25/2018 09:08:04  DT:  06/25/2018 09:48:01    D#:  [id number]  Job#:  [id number]    cc: [facility]

## 2018-06-25 NOTE — DISCHARGE INSTRUCTIONS
ACTIVITY: Rest and take it easy for the first 24 hours.  A responsible adult is recommended to remain with you during that time.  It is normal to feel sleepy.  We encourage you to not do anything that requires balance, judgment or coordination.    MILD FLU-LIKE SYMPTOMS ARE NORMAL. YOU MAY EXPERIENCE GENERALIZED MUSCLE ACHES, THROAT IRRITATION, HEADACHE AND/OR SOME NAUSEA.    FOR 24 HOURS DO NOT:  Drive, operate machinery or run household appliances.  Drink beer or alcoholic beverages.   Make important decisions or sign legal documents.    SPECIAL INSTRUCTIONS: *Take pain medications scheduled until block wears off.  Hold for sedation.   Keep dressing on until follow up appointment   You may be Weight Bear As Tolerated to the lower extremity**    DIET: To avoid nausea, slowly advance diet as tolerated, avoiding spicy or greasy foods for the first day.  Add more substantial food to your diet according to your physician's instructions.  Babies can be fed formula or breast milk as soon as they are hungry.  INCREASE FLUIDS AND FIBER TO AVOID CONSTIPATION.      FOLLOW-UP APPOINTMENT:  A follow-up appointment should be arranged with your doctor in ***; call to schedule.    You should CALL YOUR PHYSICIAN if you develop:  Fever greater than 101 degrees F.  Pain not relieved by medication, or persistent nausea or vomiting.  Excessive bleeding (blood soaking through dressing) or unexpected drainage from the wound.  Extreme redness or swelling around the incision site, drainage of pus or foul smelling drainage.  Inability to urinate or empty your bladder within 8 hours.  Problems with breathing or chest pain.    You should call 911 if you develop problems with breathing or chest pain.  If you are unable to contact your doctor or surgical center, you should go to the nearest emergency room or urgent care center.  Physician's telephone #: *Dr. Kang  [phone] **    If any questions arise, call your doctor.  If your doctor is  not available, please feel free to call the Surgical Center at [phone].  The Center is open Monday through Friday from 7AM to 7PM.  You can also call the HEALTH HOTLINE open 24 hours/day, 7 days/week and speak to a nurse at [phone], or toll free at [phone].    A registered nurse may call you a few days after your surgery to see how you are doing after your procedure.    MEDICATIONS: Resume taking daily medication.  Take prescribed pain medication with food.  If no medication is prescribed, you may take non-aspirin pain medication if needed.  PAIN MEDICATION CAN BE VERY CONSTIPATING.  Take a stool softener or laxative such as senokot, pericolace, or milk of magnesia if needed.    Prescription given for *pain medication at home**.  Last pain medication given at *9:35 a.m. Next dose due at 1:35 p.m.**.    If your physician has prescribed pain medication that includes Acetaminophen (Tylenol), do not take additional Acetaminophen (Tylenol) while taking the prescribed medication.    Depression / Suicide Risk    As you are discharged from this [facility] facility, it is important to learn how to keep safe from harming yourself.    Recognize the warning signs:  · Abrupt changes in personality, positive or negative- including increase in energy   · Giving away possessions  · Change in eating patterns- significant weight changes-  positive or negative  · Change in sleeping patterns- unable to sleep or sleeping all the time   · Unwillingness or inability to communicate  · Depression  · Unusual sadness, discouragement and loneliness  · Talk of wanting to die  · Neglect of personal appearance   · Rebelliousness- reckless behavior  · Withdrawal from people/activities they love  · Confusion- inability to concentrate     If you or a loved one observes any of these behaviors or has concerns about self-harm, here's what you can do:  · Talk about it- your feelings and reasons for harming yourself  · Remove any  means that you might use to hurt yourself (examples: pills, rope, extension cords, firearm)  · Get professional help from the community (Mental Health, Substance Abuse, psychological counseling)  · Do not be alone:Call your Safe Contact- someone whom you trust who will be there for you.  · Call your local CRISIS HOTLINE 868-9764 or 129-241-0558  · Call your local Children's Mobile Crisis Response Team [place] (734) 643-5643 or www.[employer]  · Call the toll free National Suicide Prevention Hotlines   · National Suicide Prevention Lifeline 324-820-DIOZ (1050)  · National Hope Line Network 800-SUICIDE (959-9854)

## 2018-06-27 LAB
BACTERIA TISS AEROBE CULT: ABNORMAL
GRAM STN SPEC: ABNORMAL
SIGNIFICANT IND 70042: ABNORMAL
SITE SITE: ABNORMAL
SOURCE SOURCE: ABNORMAL

## 2018-06-28 ENCOUNTER — APPOINTMENT (OUTPATIENT)
Dept: SLEEP MEDICINE | Facility: MEDICAL CENTER | Age: 58
End: 2018-06-28
Payer: COMMERCIAL

## 2018-06-28 LAB
BACTERIA SPEC ANAEROBE CULT: NORMAL
SIGNIFICANT IND 70042: NORMAL
SITE SITE: NORMAL
SOURCE SOURCE: NORMAL

## 2018-07-02 ENCOUNTER — OFFICE VISIT (OUTPATIENT)
Dept: INFECTIOUS DISEASES | Facility: MEDICAL CENTER | Age: 58
End: 2018-07-02
Payer: COMMERCIAL

## 2018-07-02 VITALS
WEIGHT: 265 LBS | BODY MASS INDEX: 37.94 KG/M2 | SYSTOLIC BLOOD PRESSURE: 120 MMHG | OXYGEN SATURATION: 93 % | HEART RATE: 108 BPM | TEMPERATURE: 97.7 F | HEIGHT: 70 IN | DIASTOLIC BLOOD PRESSURE: 78 MMHG

## 2018-07-02 DIAGNOSIS — M86.172 OSTEOMYELITIS OF ANKLE OR FOOT, ACUTE, LEFT (HCC): Primary | ICD-10-CM

## 2018-07-02 DIAGNOSIS — E11.9 TYPE 2 DIABETES MELLITUS WITHOUT COMPLICATION, WITHOUT LONG-TERM CURRENT USE OF INSULIN (HCC): ICD-10-CM

## 2018-07-02 PROCEDURE — 99214 OFFICE O/P EST MOD 30 MIN: CPT | Performed by: INTERNAL MEDICINE

## 2018-07-02 RX ORDER — DOXYCYCLINE HYCLATE 100 MG
100 TABLET ORAL 2 TIMES DAILY
Status: ON HOLD | COMMUNITY
End: 2018-10-15

## 2018-07-02 NOTE — PROGRESS NOTES
Date of Service:  7/2/2018    Reason for Consult: Left great toe osteomyelitis    Referring Physician: BONNIE Vegas    HPI:   Mr. Keys is a 58-year-old gentleman with a history of well-controlled diabetes, hypertension and autoimmune thyroiditis who presents to the infectious disease clinic as a new patient referral from [facility] for further management of left great toe osteomyelitis. Patient has had multiple orthopedic surgeries including placement of hardware.  Patient has a history of left foot bunionectomy as well as hardware placement in the left great toe.  Patient also had a history of surgeries on his right foot.  He was doing well until April when he stubbed his left great toe and developed an open wound.  He has been following in the wound care clinic since then.  He has had conservative therapy and has been treated with oral antibiotics.  Prior wound cultures on May 11 grew MSSA.  He was treated with a short course of Augmentin at that time.  However, despite wound care and antibiotics, he continued to develop an open wound and bone was noted to be exposed.  Repeat wound cultures on June 14 grew MRSA with a vancomycin ROBBY of 2.  He subsequently underwent left great toe removal of internal fixation, left amputation great toe with an interphalangeal disarticulation with irrigation and debridement on June 25, 2018 with Dr. Kang.  Distal great toe cultures are now growing MRSA and diphtheroids.  He recently completed a 10 day course of doxycycline.  He continues to have erythema and edema at the surgical site.  Sutures remain in place.  Patient denies any recent fevers or chills but he does endorse pain that he is constantly on his feet.    ROS: All other ROS were reviewed and are negative except as noted above in the HPI.     Past Medical History:   Diagnosis Date   • Anesthesia 11/03/2017    PONV with shoulder surgery approx 20 years ago.   • Anxiety and depression 11/03/2017   • Arthritis  "11/03/2017    Osteoarthritis, \" all over\"   • Bunion    • Cancer (HCC) 2017    skin lesion cut out, on left shoulder   • Chronic autoimmune thyroiditis      + US / + TPO = 57   • Dental disorder 11/03/2017    \"Upper Plate\"   • Diabetes mellitus (HCC) 11/03/2017    \"Controlled with diet & Victoza\"   • Gout    • Hammertoe    • High cholesterol 11/03/2017    HX OF, not currently on medication for.   • Hypertension    • Hypothyroidism     chronic thyroiditis   • Open toe wound 03/2018    Left big toe, open wound, started 2/2018, receiving wound care therapy two times a week   • Pain 11/03/2017    \"Pain all over except right hip & ankle\"   • Sleep apnea 11/03/2017    CPAP USE   • Snoring 11/03/2017    DX JULIAN   • Tonsillitis        Past Surgical History:   Procedure Laterality Date   • TOE AMPUTATION Left 6/25/2018    Procedure: TOE AMPUTATION-FOR :PARTIAL 1ST DISTAL PHALANX EXCISION, GREAT TOE AMPUTATION;  Surgeon: Haroldo Kang M.D.;  Location: [facility];  Service: Orthopedics   • INTERNAL FIXATION REMOVAL Left 6/25/2018    Procedure: INTERNAL FIXATION REMOVAL;  Surgeon: Haroldo Kang M.D.;  Location: [facility];  Service: Orthopedics   • NERVE ULNAR TRANSFER Right 4/3/2018    Procedure: NERVE ULNAR TRANSFER - TRANSPOSITION;  Surgeon: Ayad Luna M.D.;  Location: [facility];  Service: Orthopedics   • CARPAL TUNNEL RELEASE Right 4/3/2018    Procedure: CARPAL TUNNEL RELEASE;  Surgeon: Ayad Luna M.D.;  Location: [facility];  Service: Orthopedics   • ELBOW ARTHROTOMY Right 4/3/2018    Procedure: ELBOW ARTHROTOMY - FOR CONTRACTURE RELEASE AT THE ELBOW, RADIAL HEAD EXCISION;  Surgeon: Ayad Luna M.D.;  Location: [facility];  Service: Orthopedics   • TOE FUSION Right 11/13/2017    Procedure: TOE FUSION - 1ST METATARSALPHALANGEAL;  Surgeon: Haroldo Kang M.D.;  Location: [facility];  Service: Orthopedics   • METATARSAL HEAD " RESECTION Right 11/13/2017    Procedure: METATARSAL HEAD RESECTION - EXCISION;  Surgeon: Haroldo Kang M.D.;  Location: [facility];  Service: Orthopedics   • HAMMERTOE CORRECTION Right 11/13/2017    Procedure: HAMMERTOE CORRECTION 2-5;  Surgeon: Haroldo Kang M.D.;  Location: [facility];  Service: Orthopedics   • METATARSAL HEAD RESECTION Left 8/21/2017    Procedure: METATARSAL HEAD RESECTION - 2-5;  Surgeon: Haroldo Kang M.D.;  Location: [facility];  Service:    • TOE FUSION Left 8/21/2017    Procedure: TOE FUSION - 1ST MTP;  Surgeon: Haroldo Kang M.D.;  Location: [facility];  Service:    • HARDWARE REMOVAL ORTHO Left 8/21/2017    Procedure: HARDWARE REMOVAL ORTHO;  Surgeon: Haroldo Kang M.D.;  Location: [facility];  Service:    • LUMBAR FUSION POSTERIOR  4/14/2017    Procedure: LUMBAR FUSION POSTERIOR - MINIMALLY INVASIVE TLIF L4-5;  Surgeon: Bossman Caro M.D.;  Location: [facility];  Service:    • HAMMERTOE CORRECTION Bilateral 12/22/2016    Procedure: HAMMERTOE CORRECTION/METATARSALPHALANGEAL JOINT RELEASE 2/3 RIGHT, 2-3 LEFT;  Surgeon: Haroldo Kang M.D.;  Location: [facility];  Service:    • BUNIONECTOMY Left 12/22/2016    Procedure: BUNIONECTOMY FOR DISTAL HALLUX VALGUS CORRECTION WITH BRANDY;  Surgeon: Haroldo Kang M.D.;  Location: [facility];  Service:    • ORTHOPEDIC OSTEOTOMY Left 12/22/2016    Procedure: ORTHOPEDIC OSTEOTOMY DISTAL METATARSAL 2-5;  Surgeon: Haroldo Kang M.D.;  Location: [facility];  Service:    • HIP ARTH ANTERIOR TOTAL Left 8/18/2016    Procedure: HIP ARTHROPLASTY ANTERIOR TOTAL;  Surgeon: Emiliano Vang M.D.;  Location: [facility];  Service:    • OTHER ORTHOPEDIC SURGERY  6/2014    right shoulder  arthroplasty   • OTHER ORTHOPEDIC SURGERY  11/1/2012    left knee/left ankle/left shoulder   • OTHER ORTHOPEDIC SURGERY  2004  "   elbow surgery   • OTHER  2000    dislocation left foot surgery at [facility]   • OTHER      \"septoplasty 20 years ago\"       Family History   Problem Relation Age of Onset   • Heart Disease Mother    • Depression Mother    • Cancer Father    • Sleep Apnea Neg Hx        Social History     Social History   • Marital status:      Spouse name: N/A   • Number of children: N/A   • Years of education: N/A     Occupational History   • Not on file.     Social History Main Topics   • Smoking status: Former Smoker     Packs/day: 1.00     Years: 20.00     Types: Cigarettes     Quit date: 1/1/2014   • Smokeless tobacco: Former User     Types: Chew     Quit date: 1/1/1997   • Alcohol use No   • Drug use: No   • Sexual activity: Not on file     Other Topics Concern   • Not on file     Social History Narrative   • No narrative on file     History   Smoking Status   • Former Smoker   • Packs/day: 1.00   • Years: 20.00   • Types: Cigarettes   • Quit date: 1/1/2014   Smokeless Tobacco   • Former User   • Types: Chew   • Quit date: 1/1/1997     History   Alcohol Use No         Allergies   Allergen Reactions   • Demerol Vomiting and Nausea     Rxn = years ago    • Sulfa Drugs Hives     .         Other Current Medications:  Current Outpatient Prescriptions on File Prior to Visit   Medication Sig Dispense Refill   • levothyroxine (SYNTHROID) 125 MCG Tab Take 1 Tab by mouth Every morning on an empty stomach. 90 Tab 3   • amphetamine-dextroamphetamine (ADDERALL, 20MG,) 20 MG Tab Take 15 mg by mouth every day.     • hydroxyzine pamoate (VISTARIL) 100 MG Cap Take 200 mg by mouth 2 Times a Day.     • tamsulosin (FLOMAX) 0.4 MG capsule Take 0.4 mg by mouth ONE-HALF HOUR AFTER BREAKFAST.     • liraglutide (VICTOZA) 18 MG/3ML Solution Pen-injector injection Inject 1.8 mg as instructed every morning.     • temazepam (RESTORIL) 30 MG capsule Take 30 mg by mouth at bedtime as needed for Sleep.     • venlafaxine XR (EFFEXOR XR) 75 MG CAPSULE SR 24 " "HR Take 75 mg by mouth every morning.     • losartan (COZAAR) 100 MG Tab Take 100 mg by mouth every morning.     • alprazolam (XANAX) 0.25 MG Tab Take 0.25-0.5 mg by mouth 4 times a day as needed for Anxiety.     • oxycodone-acetaminophen (PERCOCET) 5-325 MG TABS Take 1-2 Tabs by mouth every four hours as needed.       No current facility-administered medications on file prior to visit.          History reviewed with the patient       Most Recent Vital Signs:  /78   Pulse (!) 108   Temp 36.5 °C (97.7 °F)   Ht 1.778 m (5' 10\")   Wt 120.2 kg (265 lb)   SpO2 93%   BMI 38.02 kg/m²   Physical Exam:  General: well-appearing, in no acute distress, obese and nontoxic  HEENT: sclera anicteric, MMM, no lymphadenopathy, no oral lesions  Neck: supple, no LAD  Chest: CTAB, normal work of breathing, no rubs/rales/wheezing  Cardiac: RRR, normal S1 S2, no m/r/g   Abdomen: +bowel sounds, soft, non-tender, non-distended  Extremities: Right elbow surgical scar clean, left great toe with evidence of a distal phalanx amputation.  Surgical site with sutures in place with significant surrounding edema and erythema.  No drainage  Skin: no rashes or worrisome lesions  Neuro: Alert and oriented to person, place and time, non-focal, moves all extremities    Pertinent Lab Results:  No results found for this or any previous visit (from the past 24 hour(s)).    Lab Results   Component Value Date/Time    WBC 6.2 03/16/2018 04:44 PM    RBC 5.31 03/16/2018 04:44 PM    HEMOGLOBIN 14.1 03/16/2018 04:44 PM    HEMATOCRIT 44.8 03/16/2018 04:44 PM    MCV 84.4 03/16/2018 04:44 PM    MCH 26.6 (L) 03/16/2018 04:44 PM    MCHC 31.5 (L) 03/16/2018 04:44 PM    MPV 8.8 (L) 03/16/2018 04:44 PM    NEUTSPOLYS 61.60 02/22/2018 10:19 AM    LYMPHOCYTES 24.80 02/22/2018 10:19 AM    MONOCYTES 9.50 02/22/2018 10:19 AM    EOSINOPHILS 2.90 02/22/2018 10:19 AM    BASOPHILS 0.60 02/22/2018 10:19 AM          Lab Results   Component Value Date/Time    SODIUM 139 " 06/08/2018 09:25 AM    POTASSIUM 3.8 06/08/2018 09:25 AM    CHLORIDE 103 06/08/2018 09:25 AM    CO2 25 06/08/2018 09:25 AM    GLUCOSE 150 (H) 06/08/2018 09:25 AM    BUN 19 06/08/2018 09:25 AM    CREATININE 0.96 06/08/2018 09:25 AM            Microbiology:  6/14 left great toe wound culture - MRSA (vancomycin ROBBY of 2), diphtheroids  5/11 left great toe wound culture - MSSA    Studies:  None    Impression/Plan:     1. Osteomyelitis of ankle or foot, acute, left (HCC)  IR-PICC LINE PLACEMENT  Patient is status post left great toe distal amputation and hardware removal on 6/25.  OR bone cultures of the distal phalanx are positive for MRSA.  As this was only a distal amputation, residual osteomyelitis cannot be excluded.  Recommend aggressive therapy to treat osteomyelitis and hardware infection given MRSA with a 6 week course of daptomycin.  Will place a PICC line.  Side effects of daptomycin were discussed in detail with the patient.  Patient is not on a statin.  Patient would prefer to do home IV antibiotics.  Estimated stop date 8/16/18. Monitor CPK. Weekly CBC and BMP   2. Type 2 diabetes mellitus without complication, without long-term current use of insulin ([facility])   Diabetes is well controlled last hemoglobin A1c was 6.5 in May 2018.  Maintain blood sugars less than 150 to control infection and wound healing.       RV: 1 month.  Follow-up in the wound care clinic as scheduled.  Follow-up with PCP for chronic medical problems.      Diana Mak M.D.    7/2/2018

## 2018-07-03 ENCOUNTER — HOSPITAL ENCOUNTER (OUTPATIENT)
Dept: RADIOLOGY | Facility: MEDICAL CENTER | Age: 58
End: 2018-07-03
Attending: INTERNAL MEDICINE
Payer: COMMERCIAL

## 2018-07-03 DIAGNOSIS — T14.8XXA WOUND INFECTION: ICD-10-CM

## 2018-07-03 DIAGNOSIS — M86.172 OSTEOMYELITIS OF ANKLE OR FOOT, ACUTE, LEFT (HCC): ICD-10-CM

## 2018-07-03 DIAGNOSIS — L08.9 WOUND INFECTION: ICD-10-CM

## 2018-07-03 PROCEDURE — 36569 INSJ PICC 5 YR+ W/O IMAGING: CPT

## 2018-07-03 RX ORDER — LIDOCAINE HYDROCHLORIDE 10 MG/ML
2 INJECTION, SOLUTION INFILTRATION; PERINEURAL
Status: DISCONTINUED | OUTPATIENT
Start: 2018-07-03 | End: 2018-07-03

## 2018-07-09 ENCOUNTER — HOSPITAL ENCOUNTER (OUTPATIENT)
Dept: LAB | Facility: MEDICAL CENTER | Age: 58
End: 2018-07-09
Attending: INTERNAL MEDICINE
Payer: COMMERCIAL

## 2018-07-09 PROCEDURE — 80053 COMPREHEN METABOLIC PANEL: CPT

## 2018-07-09 PROCEDURE — 85025 COMPLETE CBC W/AUTO DIFF WBC: CPT

## 2018-07-09 PROCEDURE — 82550 ASSAY OF CK (CPK): CPT

## 2018-07-10 ENCOUNTER — HOSPITAL ENCOUNTER (EMERGENCY)
Facility: MEDICAL CENTER | Age: 58
End: 2018-07-10
Attending: EMERGENCY MEDICINE
Payer: COMMERCIAL

## 2018-07-10 ENCOUNTER — APPOINTMENT (OUTPATIENT)
Dept: WOUND CARE | Facility: MEDICAL CENTER | Age: 58
End: 2018-07-10
Attending: NURSE PRACTITIONER
Payer: COMMERCIAL

## 2018-07-10 VITALS
OXYGEN SATURATION: 94 % | WEIGHT: 267.2 LBS | SYSTOLIC BLOOD PRESSURE: 125 MMHG | TEMPERATURE: 98.4 F | HEART RATE: 108 BPM | BODY MASS INDEX: 38.34 KG/M2 | DIASTOLIC BLOOD PRESSURE: 68 MMHG | RESPIRATION RATE: 19 BRPM

## 2018-07-10 DIAGNOSIS — T50.905A ADVERSE EFFECT OF DRUG, INITIAL ENCOUNTER: ICD-10-CM

## 2018-07-10 DIAGNOSIS — M79.601 PAIN OF RIGHT UPPER EXTREMITY: ICD-10-CM

## 2018-07-10 DIAGNOSIS — Z95.828 STATUS POST PICC CENTRAL LINE PLACEMENT: ICD-10-CM

## 2018-07-10 LAB
ALBUMIN SERPL BCP-MCNC: 5.8 G/DL (ref 3.2–4.9)
ALBUMIN SERPL BCP-MCNC: 5.9 G/DL (ref 3.2–4.9)
ALBUMIN/GLOB SERPL: 3.9 G/DL
ALBUMIN/GLOB SERPL: 4.8 G/DL
ALP SERPL-CCNC: 95 U/L (ref 30–99)
ALP SERPL-CCNC: 99 U/L (ref 30–99)
ALT SERPL-CCNC: 23 U/L (ref 2–50)
ALT SERPL-CCNC: 25 U/L (ref 2–50)
ANION GAP SERPL CALC-SCNC: 9 MMOL/L (ref 0–11.9)
ANION GAP SERPL CALC-SCNC: 9 MMOL/L (ref 0–11.9)
AST SERPL-CCNC: 24 U/L (ref 12–45)
AST SERPL-CCNC: 24 U/L (ref 12–45)
BASOPHILS # BLD AUTO: 0.4 % (ref 0–1.8)
BASOPHILS # BLD AUTO: 0.6 % (ref 0–1.8)
BASOPHILS # BLD: 0.02 K/UL (ref 0–0.12)
BASOPHILS # BLD: 0.03 K/UL (ref 0–0.12)
BILIRUB SERPL-MCNC: 0.5 MG/DL (ref 0.1–1.5)
BILIRUB SERPL-MCNC: 0.5 MG/DL (ref 0.1–1.5)
BUN SERPL-MCNC: 15 MG/DL (ref 8–22)
BUN SERPL-MCNC: 16 MG/DL (ref 8–22)
CALCIUM SERPL-MCNC: 9.5 MG/DL (ref 8.5–10.5)
CALCIUM SERPL-MCNC: 9.5 MG/DL (ref 8.5–10.5)
CHLORIDE SERPL-SCNC: 103 MMOL/L (ref 96–112)
CHLORIDE SERPL-SCNC: 103 MMOL/L (ref 96–112)
CK SERPL-CCNC: 91 U/L (ref 0–154)
CO2 SERPL-SCNC: 25 MMOL/L (ref 20–33)
CO2 SERPL-SCNC: 28 MMOL/L (ref 20–33)
CREAT SERPL-MCNC: 0.89 MG/DL (ref 0.5–1.4)
CREAT SERPL-MCNC: 0.9 MG/DL (ref 0.5–1.4)
EOSINOPHIL # BLD AUTO: 0.11 K/UL (ref 0–0.51)
EOSINOPHIL # BLD AUTO: 0.12 K/UL (ref 0–0.51)
EOSINOPHIL NFR BLD: 2.2 % (ref 0–6.9)
EOSINOPHIL NFR BLD: 2.5 % (ref 0–6.9)
ERYTHROCYTE [DISTWIDTH] IN BLOOD BY AUTOMATED COUNT: 43.7 FL (ref 35.9–50)
ERYTHROCYTE [DISTWIDTH] IN BLOOD BY AUTOMATED COUNT: 45.9 FL (ref 35.9–50)
GLOBULIN SER CALC-MCNC: 1.2 G/DL (ref 1.9–3.5)
GLOBULIN SER CALC-MCNC: 1.5 G/DL (ref 1.9–3.5)
GLUCOSE SERPL-MCNC: 129 MG/DL (ref 65–99)
GLUCOSE SERPL-MCNC: 161 MG/DL (ref 65–99)
HCT VFR BLD AUTO: 39.7 % (ref 42–52)
HCT VFR BLD AUTO: 40.8 % (ref 42–52)
HGB BLD-MCNC: 12.7 G/DL (ref 14–18)
HGB BLD-MCNC: 12.7 G/DL (ref 14–18)
IMM GRANULOCYTES # BLD AUTO: 0.02 K/UL (ref 0–0.11)
IMM GRANULOCYTES # BLD AUTO: 0.03 K/UL (ref 0–0.11)
IMM GRANULOCYTES NFR BLD AUTO: 0.4 % (ref 0–0.9)
IMM GRANULOCYTES NFR BLD AUTO: 0.6 % (ref 0–0.9)
INR PPP: 0.98 (ref 0.87–1.13)
LYMPHOCYTES # BLD AUTO: 1.1 K/UL (ref 1–4.8)
LYMPHOCYTES # BLD AUTO: 1.15 K/UL (ref 1–4.8)
LYMPHOCYTES NFR BLD: 22.5 % (ref 22–41)
LYMPHOCYTES NFR BLD: 23 % (ref 22–41)
MCH RBC QN AUTO: 27 PG (ref 27–33)
MCH RBC QN AUTO: 27.2 PG (ref 27–33)
MCHC RBC AUTO-ENTMCNC: 31.1 G/DL (ref 33.7–35.3)
MCHC RBC AUTO-ENTMCNC: 32 G/DL (ref 33.7–35.3)
MCV RBC AUTO: 84.5 FL (ref 81.4–97.8)
MCV RBC AUTO: 87.4 FL (ref 81.4–97.8)
MONOCYTES # BLD AUTO: 0.45 K/UL (ref 0–0.85)
MONOCYTES # BLD AUTO: 0.48 K/UL (ref 0–0.85)
MONOCYTES NFR BLD AUTO: 9 % (ref 0–13.4)
MONOCYTES NFR BLD AUTO: 9.8 % (ref 0–13.4)
NEUTROPHILS # BLD AUTO: 3.13 K/UL (ref 1.82–7.42)
NEUTROPHILS # BLD AUTO: 3.25 K/UL (ref 1.82–7.42)
NEUTROPHILS NFR BLD: 64.2 % (ref 44–72)
NEUTROPHILS NFR BLD: 64.8 % (ref 44–72)
NRBC # BLD AUTO: 0 K/UL
NRBC # BLD AUTO: 0 K/UL
NRBC BLD-RTO: 0 /100 WBC
NRBC BLD-RTO: 0 /100 WBC
PLATELET # BLD AUTO: 208 K/UL (ref 164–446)
PLATELET # BLD AUTO: 211 K/UL (ref 164–446)
PMV BLD AUTO: 10.2 FL (ref 9–12.9)
PMV BLD AUTO: 8.5 FL (ref 9–12.9)
POTASSIUM SERPL-SCNC: 4 MMOL/L (ref 3.6–5.5)
POTASSIUM SERPL-SCNC: 4 MMOL/L (ref 3.6–5.5)
PROT SERPL-MCNC: 7 G/DL (ref 6–8.2)
PROT SERPL-MCNC: 7.4 G/DL (ref 6–8.2)
PROTHROMBIN TIME: 12.7 SEC (ref 12–14.6)
RBC # BLD AUTO: 4.67 M/UL (ref 4.7–6.1)
RBC # BLD AUTO: 4.7 M/UL (ref 4.7–6.1)
SODIUM SERPL-SCNC: 137 MMOL/L (ref 135–145)
SODIUM SERPL-SCNC: 140 MMOL/L (ref 135–145)
WBC # BLD AUTO: 4.9 K/UL (ref 4.8–10.8)
WBC # BLD AUTO: 5 K/UL (ref 4.8–10.8)

## 2018-07-10 PROCEDURE — 99283 EMERGENCY DEPT VISIT LOW MDM: CPT

## 2018-07-10 PROCEDURE — 85610 PROTHROMBIN TIME: CPT

## 2018-07-10 PROCEDURE — 85025 COMPLETE CBC W/AUTO DIFF WBC: CPT

## 2018-07-10 PROCEDURE — 93971 EXTREMITY STUDY: CPT

## 2018-07-10 PROCEDURE — 80053 COMPREHEN METABOLIC PANEL: CPT

## 2018-07-10 PROCEDURE — 36415 COLL VENOUS BLD VENIPUNCTURE: CPT

## 2018-07-10 ASSESSMENT — LIFESTYLE VARIABLES: DO YOU DRINK ALCOHOL: NO

## 2018-07-10 ASSESSMENT — ENCOUNTER SYMPTOMS
SHORTNESS OF BREATH: 0
VOMITING: 0
WEAKNESS: 1
DIAPHORESIS: 1
NAUSEA: 1

## 2018-07-10 NOTE — ED NOTES
PICC line inserted last week without difficulty due to MRSA infection in foot for long term antibx.      Pt had first round of new antibx admin into picc and now has severe pain in lower arm.  No redness or drainage noted to PICC.  Hand appears slightly swollen.

## 2018-07-10 NOTE — ED TRIAGE NOTES
Chief Complaint   Patient presents with   • Arm Pain     bilateral arms R>L   • Nausea     abdominal cramping   • Sent by MD     r/o blood clot     Pt ambulated to triage with above complaints. Per pt he had left toe surgery and developed MRSA and now on outpatient antibiotic. He has a picc line which he administers Cubicin 700mg daily for 5days now . He woke up last Saturday with painful arms R>L. He was sent here from her doctor's office to r/o blood clot.

## 2018-07-10 NOTE — DISCHARGE INSTRUCTIONS
There is no evidence on ultrasound, of clot in the right upper extremity.  I recommend following up with your doctor, to potentially, make a change in the antibiotics if they continue to make you feel poorly or cause increased pain in the arm.    Return to the ED if worse or new concerning symptoms.

## 2018-07-10 NOTE — ED PROVIDER NOTES
ED Provider Note    Scribed for Alexia Hernandez D.O. by Van Baez. 7/10/2018, 2:46 PM.    Primary care provider: Kevin Chavez M.D.  Means of arrival: walk in  History obtained from: patient  History limited by: none    CHIEF COMPLAINT  Chief Complaint   Patient presents with   • Arm Pain     bilateral arms R>L   • Nausea     abdominal cramping   • Sent by MD     r/o blood clot       HPI  Cristino Keys is a 58 y.o. male who presents to the Emergency Department as a referral from his primary complaining of intermittent right arm pain for the last 5 days. He describes his pain as sharp and severe, that comes on suddenly.  He is also having some diffuse myalgias.  Patient reports associated weakness, diaphoresis, nausea. Patient reports that he recently contracted MRSA from a toe surgery performed by Dr. Kang. He subsequently had a PICC line placed, on Wednesday last week and was discharged on outpatient antibiotics.  He is to follow up at the infusion center weekly.  Patient reports that he has been compliant with his antibiotic course, but has developed arm pain. He consulted Dr. Kang who referred him to come to the ED for evaluation and rule out of blood clot. Patient denies shortness of breath, vomiting.     REVIEW OF SYSTEMS  Review of Systems   Constitutional: Positive for diaphoresis and malaise/fatigue.   Respiratory: Negative for shortness of breath.    Gastrointestinal: Positive for nausea. Negative for vomiting.   Musculoskeletal:        Arm pain   Neurological: Positive for weakness.   All other systems reviewed and are negative.  C.    PAST MEDICAL HISTORY   has a past medical history of Anesthesia (11/03/2017); Anxiety and depression (11/03/2017); Arthritis (11/03/2017); Bunion; Cancer (HCC) (2017); Chronic autoimmune thyroiditis; Dental disorder (11/03/2017); Diabetes mellitus (HCC) (11/03/2017); Gout; Hammertoe; High cholesterol (11/03/2017); Hypertension; Hypothyroidism; Open toe wound  (03/2018); Pain (11/03/2017); Sleep apnea (11/03/2017); Snoring (11/03/2017); and Tonsillitis.    SURGICAL HISTORY   has a past surgical history that includes hip arth anterior total (Left, 8/18/2016); hammertoe correction (Bilateral, 12/22/2016); bunionectomy (Left, 12/22/2016); lumbar fusion posterior (4/14/2017); metatarsal head resection (Left, 8/21/2017); other orthopedic surgery (6/2014); other orthopedic surgery (11/1/2012); other orthopedic surgery (2004); orthopedic osteotomy (Left, 12/22/2016); toe fusion (Left, 8/21/2017); hardware removal ortho (Left, 8/21/2017); other; other (2000); toe fusion (Right, 11/13/2017); metatarsal head resection (Right, 11/13/2017); hammertoe correction (Right, 11/13/2017); nerve ulnar transfer (Right, 4/3/2018); carpal tunnel release (Right, 4/3/2018); elbow arthrotomy (Right, 4/3/2018); toe amputation (Left, 6/25/2018); and internal fixation removal (Left, 6/25/2018).    SOCIAL HISTORY  Social History   Substance Use Topics   • Smoking status: Former Smoker     Packs/day: 1.00     Years: 20.00     Types: Cigarettes     Quit date: 1/1/2014   • Smokeless tobacco: Former User     Types: Chew     Quit date: 1/1/1997   • Alcohol use No      History   Drug Use No       FAMILY HISTORY  Family History   Problem Relation Age of Onset   • Heart Disease Mother    • Depression Mother    • Cancer Father    • Sleep Apnea Neg Hx        CURRENT MEDICATIONS  Current Outpatient Prescriptions:   •  doxycycline (VIBRAMYCIN) 100 MG Tab, Take 100 mg by mouth 2 times a day., Disp: , Rfl:   •  levothyroxine (SYNTHROID) 125 MCG Tab, Take 1 Tab by mouth Every morning on an empty stomach., Disp: 90 Tab, Rfl: 3  •  amphetamine-dextroamphetamine (ADDERALL, 20MG,) 20 MG Tab, Take 15 mg by mouth every day., Disp: , Rfl:   •  hydroxyzine pamoate (VISTARIL) 100 MG Cap, Take 200 mg by mouth 2 Times a Day., Disp: , Rfl:   •  tamsulosin (FLOMAX) 0.4 MG capsule, Take 0.4 mg by mouth ONE-HALF HOUR AFTER  BREAKFAST., Disp: , Rfl:   •  liraglutide (VICTOZA) 18 MG/3ML Solution Pen-injector injection, Inject 1.8 mg as instructed every morning., Disp: , Rfl:   •  temazepam (RESTORIL) 30 MG capsule, Take 30 mg by mouth at bedtime as needed for Sleep., Disp: , Rfl:   •  venlafaxine XR (EFFEXOR XR) 75 MG CAPSULE SR 24 HR, Take 75 mg by mouth every morning., Disp: , Rfl:   •  alprazolam (XANAX) 0.25 MG Tab, Take 0.25-0.5 mg by mouth 4 times a day as needed for Anxiety., Disp: , Rfl:   •  losartan (COZAAR) 100 MG Tab, Take 100 mg by mouth every morning., Disp: , Rfl:   •  oxycodone-acetaminophen (PERCOCET) 5-325 MG TABS, Take 1-2 Tabs by mouth every four hours as needed., Disp: , Rfl:     ALLERGIES  Allergies   Allergen Reactions   • Demerol Vomiting and Nausea     Rxn = years ago    • Sulfa Drugs Hives     .       PHYSICAL EXAM  VITAL SIGNS: /68   Pulse (!) 108   Temp 36.9 °C (98.4 °F)   Resp 13   Wt 121.2 kg (267 lb 3.2 oz)   SpO2 94%   BMI 38.34 kg/m²   Vitals reviewed.  Constitutional: Patient is oriented to person, place, and time. Appears well-developed and well-nourished. No distress.    Head: Normocephalic and atraumatic.   Ears: Normal external ears bilaterally.   Mouth/Throat: Oropharynx is clear and moist  Eyes: Conjunctivae are normal.   Neck: Normal range of motion. Neck supple.  Cardiovascular: Normal rate, regular rhythm and normal heart sounds. Normal peripheral pulses, bilateral upper extremities.  Pulmonary/Chest: Effort normal and breath sounds normal. No respiratory distress, no wheezes, rhonchi, or rales.  Abdominal: Soft. Bowel sounds are normal. There is no tenderness, rebound or guarding, or peritoneal signs  Musculoskeletal: No edema. right upper extremity PICC line.  This peripheral IV appears to be well positioned.  There is no surrounding swelling, erythema to the insertion site.  Diffuse tenderness to the right arm.   Neurological: No focal deficits.   Skin: Skin is warm and dry. No  erythema. No pallor.   Psychiatric: Patient has a normal mood and affect.     LABS  Results for orders placed or performed during the hospital encounter of 07/10/18   CBC WITH DIFFERENTIAL   Result Value Ref Range    WBC 4.9 4.8 - 10.8 K/uL    RBC 4.70 4.70 - 6.10 M/uL    Hemoglobin 12.7 (L) 14.0 - 18.0 g/dL    Hematocrit 39.7 (L) 42.0 - 52.0 %    MCV 84.5 81.4 - 97.8 fL    MCH 27.0 27.0 - 33.0 pg    MCHC 32.0 (L) 33.7 - 35.3 g/dL    RDW 43.7 35.9 - 50.0 fL    Platelet Count 208 164 - 446 K/uL    MPV 8.5 (L) 9.0 - 12.9 fL    Neutrophils-Polys 64.20 44.00 - 72.00 %    Lymphocytes 22.50 22.00 - 41.00 %    Monocytes 9.80 0.00 - 13.40 %    Eosinophils 2.50 0.00 - 6.90 %    Basophils 0.60 0.00 - 1.80 %    Immature Granulocytes 0.40 0.00 - 0.90 %    Nucleated RBC 0.00 /100 WBC    Neutrophils (Absolute) 3.13 1.82 - 7.42 K/uL    Lymphs (Absolute) 1.10 1.00 - 4.80 K/uL    Monos (Absolute) 0.48 0.00 - 0.85 K/uL    Eos (Absolute) 0.12 0.00 - 0.51 K/uL    Baso (Absolute) 0.03 0.00 - 0.12 K/uL    Immature Granulocytes (abs) 0.02 0.00 - 0.11 K/uL    NRBC (Absolute) 0.00 K/uL   COMP METABOLIC PANEL   Result Value Ref Range    Sodium 137 135 - 145 mmol/L    Potassium 4.0 3.6 - 5.5 mmol/L    Chloride 103 96 - 112 mmol/L    Co2 25 20 - 33 mmol/L    Anion Gap 9.0 0.0 - 11.9    Glucose 129 (H) 65 - 99 mg/dL    Bun 16 8 - 22 mg/dL    Creatinine 0.90 0.50 - 1.40 mg/dL    Calcium 9.5 8.5 - 10.5 mg/dL    AST(SGOT) 24 12 - 45 U/L    ALT(SGPT) 23 2 - 50 U/L    Alkaline Phosphatase 95 30 - 99 U/L    Total Bilirubin 0.5 0.1 - 1.5 mg/dL    Albumin 5.9 (H) 3.2 - 4.9 g/dL    Total Protein 7.4 6.0 - 8.2 g/dL    Globulin 1.5 (L) 1.9 - 3.5 g/dL    A-G Ratio 3.9 g/dL   ESTIMATED GFR   Result Value Ref Range    GFR If African American >60 >60 mL/min/1.73 m 2    GFR If Non African American >60 >60 mL/min/1.73 m 2   PROTHROMBIN TIME   Result Value Ref Range    PT 12.7 12.0 - 14.6 sec    INR 0.98 0.87 - 1.13   All labs reviewed by me.    RADIOLOGY  UE  VENOUS DUPLEX (Specify in Comments Left, Right Or Bilateral)         The radiologist's interpretation of all radiological studies have been reviewed by me.    COURSE & MEDICAL DECISION MAKING  Pertinent Labs & Imaging studies reviewed. (See chart for details)    2:46 PM - Patient seen and examined at bedside.  This is a pleasant 58-year-old male presents with normal vital signs.  He was recently diagnosed with bone infection in his foot he had a PICC line placed and is on antibiotics which she administers at home.  He has been having right arm pain and was sent here for evaluation for possible DVT.  Is concerned, that it also may be a reaction to the antibiotic.  Ordered right upper extremity venous duplex, PT/INR, Estimated GFR, CBC with differential, CMP, POCT urinalysis to evaluate his symptoms. The differential diagnoses include but are not limited to: medication reaction, DVT     4:15 PM patient's reevaluated at the bedside.  We discussed ultrasound findings which show no evidence of DVT.  Labs were unrevealing we went over these previously.  At this time, I have advised the patient to continue his therapy.  If indeed, his pain in his arm is related to the medication, he will have to talk with the infectious disease doctor who recommended it for a change.  Patient is given strict return precautions.  He is advised to return to ED if worse or new concerning symptoms.    The patient will return for new or worsening symptoms and is stable at the time of discharge.    The patient is referred to a primary physician for blood pressure management, diabetic screening, and for all other preventative health concerns.    DISPOSITION:  Patient will be discharged home in stable condition.    FOLLOW UP:  [facility], Emergency Dept  [address] [address] [phone]    If symptoms worsen    Your Physician  Varies    In 1 day        OUTPATIENT MEDICATIONS:  New Prescriptions    No  medications on file         FINAL IMPRESSION  1. Adverse effect of drug, initial encounter    2. Pain of right upper extremity    3. Status post PICC central line placement          IVan (Scribe), am scribing for, and in the presence of, Alexia Hernandez D.O..    Electronically signed by: Van Baez (Scribe), 7/10/2018    IAlexia D.O. personally performed the services described in this documentation, as scribed by Van Baez in my presence, and it is both accurate and complete.    The note accurately reflects work and decisions made by me.  Alexia Hernandez  7/10/2018  4:15 PM

## 2018-07-13 ENCOUNTER — APPOINTMENT (OUTPATIENT)
Dept: WOUND CARE | Facility: MEDICAL CENTER | Age: 58
End: 2018-07-13
Attending: NURSE PRACTITIONER
Payer: COMMERCIAL

## 2018-07-13 ENCOUNTER — TELEPHONE (OUTPATIENT)
Dept: INFECTIOUS DISEASES | Facility: MEDICAL CENTER | Age: 58
End: 2018-07-13

## 2018-07-13 NOTE — TELEPHONE ENCOUNTER
Pt is calling and stating that since he has been on the Daptomycin he is projectile vomiting and having many bouts of diarrhea (smelly). He has not been able to keep any food or water in his system. Spoke with Dr. Mak and she states pt can hold the Dapto dose tonight, Saturday and Sunday night. He is to see her on Monday in the clinic. After stopping the Dapto and his N/V/ Diarrhea persist he needs to go to the nearest ER. All information/instructions were given to pt. Pt verbally understood and will comply with all given. ALBA

## 2018-07-16 ENCOUNTER — HOSPITAL ENCOUNTER (OUTPATIENT)
Dept: LAB | Facility: MEDICAL CENTER | Age: 58
End: 2018-07-16
Attending: INTERNAL MEDICINE
Payer: COMMERCIAL

## 2018-07-16 ENCOUNTER — OFFICE VISIT (OUTPATIENT)
Dept: INFECTIOUS DISEASES | Facility: MEDICAL CENTER | Age: 58
End: 2018-07-16
Payer: COMMERCIAL

## 2018-07-16 VITALS
WEIGHT: 267 LBS | HEART RATE: 109 BPM | HEIGHT: 70 IN | OXYGEN SATURATION: 93 % | BODY MASS INDEX: 38.22 KG/M2 | TEMPERATURE: 96.3 F | SYSTOLIC BLOOD PRESSURE: 120 MMHG | DIASTOLIC BLOOD PRESSURE: 70 MMHG

## 2018-07-16 DIAGNOSIS — E11.9 TYPE 2 DIABETES MELLITUS WITHOUT COMPLICATION, WITHOUT LONG-TERM CURRENT USE OF INSULIN (HCC): ICD-10-CM

## 2018-07-16 DIAGNOSIS — M86.9 OSTEOMYELITIS OF LEFT FOOT, UNSPECIFIED TYPE (HCC): Primary | ICD-10-CM

## 2018-07-16 PROCEDURE — 85025 COMPLETE CBC W/AUTO DIFF WBC: CPT

## 2018-07-16 PROCEDURE — 80053 COMPREHEN METABOLIC PANEL: CPT

## 2018-07-16 PROCEDURE — 99214 OFFICE O/P EST MOD 30 MIN: CPT | Performed by: INTERNAL MEDICINE

## 2018-07-16 NOTE — PROGRESS NOTES
Infectious Disease Follow up Note      Subjective:     Chief Complaint   Patient presents with   • Follow-Up     OM left foot and ankle problems with Dapto         Interval History:   Mr. Keys is a 58-year-old gentleman with history of well-controlled diabetes, hypertension, and history of left foot bunionectomy as well as hardware placement in the left great toe.  In April 2018 he stubbed his left great toe and developed an open wound.  He was following up in the wound care clinic.  Wound cultures on May 11 positive for MSSA.  Despite oral antibiotics he continued to develop an open wound and bone was noted to be exposed.Repeat wound cultures on June 14 grew MRSA with a vancomycin ROBBY of 2.  He subsequently underwent left great toe removal of internal fixation, left amputation great toe with an interphalangeal disarticulation with irrigation and debridement on June 25, 2018 with Dr. Kang.  Distal great toe cultures are now growing MRSA and diphtheroids.  Patient started on IV daptomycin for 6 weeks with planned stop date of 08/16/18.    Hospital records reviewed    Patient here for follow-up.  Patient states 2 days after he started antibiotic infusions with daptomycin, he developed malaise, nausea, abdominal pain, vomiting and body aches.  Last Friday, while he was in [place], his symptoms progressed and he was unable to leave the hotel room due to vomiting and abdominal pain.  He denies any associated fevers or chills.  On Friday 7/13, he notified the clinic given his symptoms and he was advised to discontinue daptomycin and follow-up in the clinic.  Patient states he felt back to baseline the following day.  At this time patient denies any fevers, chills, nausea, vomiting or abdominal pain.  He no longer has muscle aches.    Past Medical History:   Diagnosis Date   • Anesthesia 11/03/2017    PONV with shoulder surgery approx 20 years ago.   • Anxiety and depression 11/03/2017   • Arthritis 11/03/2017     "Osteoarthritis, \" all over\"   • Bunion    • Cancer (HCC) 2017    skin lesion cut out, on left shoulder   • Chronic autoimmune thyroiditis      + US / + TPO = 57   • Dental disorder 11/03/2017    \"Upper Plate\"   • Diabetes mellitus (HCC) 11/03/2017    \"Controlled with diet & Victoza\"   • Gout    • Hammertoe    • High cholesterol 11/03/2017    HX OF, not currently on medication for.   • Hypertension    • Hypothyroidism     chronic thyroiditis   • Open toe wound 03/2018    Left big toe, open wound, started 2/2018, receiving wound care therapy two times a week   • Pain 11/03/2017    \"Pain all over except right hip & ankle\"   • Sleep apnea 11/03/2017    CPAP USE   • Snoring 11/03/2017    DX JULIAN   • Tonsillitis        Past Surgical History:   Procedure Laterality Date   • TOE AMPUTATION Left 6/25/2018    Procedure: TOE AMPUTATION-FOR :PARTIAL 1ST DISTAL PHALANX EXCISION, GREAT TOE AMPUTATION;  Surgeon: Haroldo Kang M.D.;  Location: [facility];  Service: Orthopedics   • INTERNAL FIXATION REMOVAL Left 6/25/2018    Procedure: INTERNAL FIXATION REMOVAL;  Surgeon: Haroldo Kang M.D.;  Location: [facility];  Service: Orthopedics   • NERVE ULNAR TRANSFER Right 4/3/2018    Procedure: NERVE ULNAR TRANSFER - TRANSPOSITION;  Surgeon: Ayad Luna M.D.;  Location: [facility];  Service: Orthopedics   • CARPAL TUNNEL RELEASE Right 4/3/2018    Procedure: CARPAL TUNNEL RELEASE;  Surgeon: Ayad Luna M.D.;  Location: [facility];  Service: Orthopedics   • ELBOW ARTHROTOMY Right 4/3/2018    Procedure: ELBOW ARTHROTOMY - FOR CONTRACTURE RELEASE AT THE ELBOW, RADIAL HEAD EXCISION;  Surgeon: Ayad Luna M.D.;  Location: [facility];  Service: Orthopedics   • TOE FUSION Right 11/13/2017    Procedure: TOE FUSION - 1ST METATARSALPHALANGEAL;  Surgeon: Haroldo Kang M.D.;  Location: [facility];  Service: Orthopedics   • METATARSAL HEAD RESECTION " Right 11/13/2017    Procedure: METATARSAL HEAD RESECTION - EXCISION;  Surgeon: Haroldo Kang M.D.;  Location: [facility];  Service: Orthopedics   • HAMMERTOE CORRECTION Right 11/13/2017    Procedure: HAMMERTOE CORRECTION 2-5;  Surgeon: Haroldo Kang M.D.;  Location: [facility];  Service: Orthopedics   • METATARSAL HEAD RESECTION Left 8/21/2017    Procedure: METATARSAL HEAD RESECTION - 2-5;  Surgeon: Haroldo Kang M.D.;  Location: [facility];  Service:    • TOE FUSION Left 8/21/2017    Procedure: TOE FUSION - 1ST MTP;  Surgeon: Haroldo Kang M.D.;  Location: [facility];  Service:    • HARDWARE REMOVAL ORTHO Left 8/21/2017    Procedure: HARDWARE REMOVAL ORTHO;  Surgeon: Haroldo Kang M.D.;  Location: [facility];  Service:    • LUMBAR FUSION POSTERIOR  4/14/2017    Procedure: LUMBAR FUSION POSTERIOR - MINIMALLY INVASIVE TLIF L4-5;  Surgeon: Bossman Caro M.D.;  Location: [facility];  Service:    • HAMMERTOE CORRECTION Bilateral 12/22/2016    Procedure: HAMMERTOE CORRECTION/METATARSALPHALANGEAL JOINT RELEASE 2/3 RIGHT, 2-3 LEFT;  Surgeon: Haroldo Kang M.D.;  Location: [facility];  Service:    • BUNIONECTOMY Left 12/22/2016    Procedure: BUNIONECTOMY FOR DISTAL HALLUX VALGUS CORRECTION WITH BRANDY;  Surgeon: Haroldo Kang M.D.;  Location: [facility];  Service:    • ORTHOPEDIC OSTEOTOMY Left 12/22/2016    Procedure: ORTHOPEDIC OSTEOTOMY DISTAL METATARSAL 2-5;  Surgeon: Haroldo Kang M.D.;  Location: [facility];  Service:    • HIP ARTH ANTERIOR TOTAL Left 8/18/2016    Procedure: HIP ARTHROPLASTY ANTERIOR TOTAL;  Surgeon: Emiliano Vang M.D.;  Location: [facility];  Service:    • OTHER ORTHOPEDIC SURGERY  6/2014    right shoulder  arthroplasty   • OTHER ORTHOPEDIC SURGERY  11/1/2012    left knee/left ankle/left shoulder   • OTHER ORTHOPEDIC SURGERY  2004    elbow  "surgery   • OTHER  2000    dislocation left foot surgery at [facility]   • OTHER      \"septoplasty 20 years ago\"       Allergies:   Allergies   Allergen Reactions   • Demerol Vomiting and Nausea     Rxn = years ago    • Sulfa Drugs Hives     .         Medications:  Current Outpatient Prescriptions on File Prior to Visit   Medication Sig Dispense Refill   • levothyroxine (SYNTHROID) 125 MCG Tab Take 1 Tab by mouth Every morning on an empty stomach. 90 Tab 3   • amphetamine-dextroamphetamine (ADDERALL, 20MG,) 20 MG Tab Take 15 mg by mouth every day.     • hydroxyzine pamoate (VISTARIL) 100 MG Cap Take 200 mg by mouth 2 Times a Day.     • tamsulosin (FLOMAX) 0.4 MG capsule Take 0.4 mg by mouth ONE-HALF HOUR AFTER BREAKFAST.     • liraglutide (VICTOZA) 18 MG/3ML Solution Pen-injector injection Inject 1.8 mg as instructed every morning.     • temazepam (RESTORIL) 30 MG capsule Take 30 mg by mouth at bedtime as needed for Sleep.     • venlafaxine XR (EFFEXOR XR) 75 MG CAPSULE SR 24 HR Take 75 mg by mouth every morning.     • losartan (COZAAR) 100 MG Tab Take 100 mg by mouth every morning.     • doxycycline (VIBRAMYCIN) 100 MG Tab Take 100 mg by mouth 2 times a day.     • alprazolam (XANAX) 0.25 MG Tab Take 0.25-0.5 mg by mouth 4 times a day as needed for Anxiety.     • oxycodone-acetaminophen (PERCOCET) 5-325 MG TABS Take 1-2 Tabs by mouth every four hours as needed.       No current facility-administered medications on file prior to visit.          ROS  As documented above in my HPI       Objective:     PE:  /70   Pulse (!) 109   Temp (!) 35.7 °C (96.3 °F)   Ht 1.778 m (5' 10\")   Wt 121.1 kg (267 lb)   SpO2 93%   BMI 38.31 kg/m²      Vital signs reviewed  Constitutional: patient is oriented to person, place, and time. Appears well-developed and well-nourished. No distress  Eyes: Conjunctivae normal and EOM are normal. Pupils are equal, round, and reactive to light.   Mouth/Throat: Lips without lesions, good " dentition, oropharynx is clear and moist.  Neck: Trachea midline. Normal range of motion. Neck supple. No masses  Cardiovascular: Normal rate, regular rhythm, normal heart sounds and intact distal pulses. No murmur, gallop, or friction rub. No edema.  Pulmonary/Chest: No respiratory distress. Unlabored respiratory effort, lungs clear to auscultation. No wheezes or rales.   Abdominal: Soft, non tender. BS + x 4. No masses or hepatosplenomegaly.   Musculoskeletal: Normal range of motion. No tenderness, swelling, erythema, deformity noted.  Right upper extremity PICC line dressing clean, dry and intact, nontender and no surrounding erythema.  Left great toe distal aspect is dressed proximal aspect with surgical incision that is clean and without any drainage.  Steri-Strips are in place  Neurological: alert and oriented to person, place, and time. No cranial nerve deficit. Coordination normal. Moves all extremities  Skin: Skin is warm and dry. Good turgor. No rashes visable.  Psychiatric: Normal mood and affect. Behavior is normal.     LABS:  WBC   Date/Time Value Ref Range Status   07/10/2018 01:20 PM 4.9 4.8 - 10.8 K/uL Final     RBC   Date/Time Value Ref Range Status   07/10/2018 01:20 PM 4.70 4.70 - 6.10 M/uL Final     Hemoglobin   Date/Time Value Ref Range Status   07/10/2018 01:20 PM 12.7 (L) 14.0 - 18.0 g/dL Final     Hematocrit   Date/Time Value Ref Range Status   07/10/2018 01:20 PM 39.7 (L) 42.0 - 52.0 % Final     MCV   Date/Time Value Ref Range Status   07/10/2018 01:20 PM 84.5 81.4 - 97.8 fL Final     MCH   Date/Time Value Ref Range Status   07/10/2018 01:20 PM 27.0 27.0 - 33.0 pg Final     MCHC   Date/Time Value Ref Range Status   07/10/2018 01:20 PM 32.0 (L) 33.7 - 35.3 g/dL Final     MPV   Date/Time Value Ref Range Status   07/10/2018 01:20 PM 8.5 (L) 9.0 - 12.9 fL Final        Sodium   Date/Time Value Ref Range Status   07/10/2018 01:20  135 - 145 mmol/L Final     Potassium   Date/Time Value Ref  Range Status   07/10/2018 01:20 PM 4.0 3.6 - 5.5 mmol/L Final     Chloride   Date/Time Value Ref Range Status   07/10/2018 01:20  96 - 112 mmol/L Final     Co2   Date/Time Value Ref Range Status   07/10/2018 01:20 PM 25 20 - 33 mmol/L Final     Glucose   Date/Time Value Ref Range Status   07/10/2018 01:20  (H) 65 - 99 mg/dL Final     Bun   Date/Time Value Ref Range Status   07/10/2018 01:20 PM 16 8 - 22 mg/dL Final     Creatinine   Date/Time Value Ref Range Status   07/10/2018 01:20 PM 0.90 0.50 - 1.40 mg/dL Final     Alkaline Phosphatase   Date/Time Value Ref Range Status   07/10/2018 01:20 PM 95 30 - 99 U/L Final     AST(SGOT)   Date/Time Value Ref Range Status   07/10/2018 01:20 PM 24 12 - 45 U/L Final     ALT(SGPT)   Date/Time Value Ref Range Status   07/10/2018 01:20 PM 23 2 - 50 U/L Final     Total Bilirubin   Date/Time Value Ref Range Status   07/10/2018 01:20 PM 0.5 0.1 - 1.5 mg/dL Final        CPK Total   Date/Time Value Ref Range Status   07/09/2018 01:45 PM 91 0 - 154 U/L Final        MICRO:   6/14/18 left great toe wound culture - MRSA (vancomycin ROBBY of 2), diphtheroids  5/11/18 left great toe wound culture - MSSA    IMAGING STUDIES:  none    Assessment/Plan:     Problem List Items Addressed This Visit     Diabetes (HCC), type II.  Recent hemoglobin A1c 6.5 in May 2018.  Maintain blood sugars less than 150 to control infection and promote wound healing.      Other Visit Diagnoses     Osteomyelitis of left foot, unspecified type ([facility])    -  Primary.  Cultures positive for MRSA.  Patient developed adverse effects and muscle cramps on daptomycin.  DC daptomycin and transition to ceftaroline to complete his course of antibiotics through 08/16/18. New orders faxed to [facility] today.  Patient to notify our office if he develops any adverse effects to ceftaroline.          Follow up: 3 weeks, RTC sooner if needed. FU with PCP for ongoing chronic medical conditions.     Diana Mak  M.D.

## 2018-07-17 LAB
ALBUMIN SERPL BCP-MCNC: 4.3 G/DL (ref 3.2–4.9)
ALBUMIN/GLOB SERPL: 1.5 G/DL
ALP SERPL-CCNC: 105 U/L (ref 30–99)
ALT SERPL-CCNC: 26 U/L (ref 2–50)
ANION GAP SERPL CALC-SCNC: 10 MMOL/L (ref 0–11.9)
AST SERPL-CCNC: 22 U/L (ref 12–45)
BASOPHILS # BLD AUTO: 0.4 % (ref 0–1.8)
BASOPHILS # BLD: 0.02 K/UL (ref 0–0.12)
BILIRUB SERPL-MCNC: 0.3 MG/DL (ref 0.1–1.5)
BUN SERPL-MCNC: 21 MG/DL (ref 8–22)
CALCIUM SERPL-MCNC: 9.3 MG/DL (ref 8.5–10.5)
CHLORIDE SERPL-SCNC: 104 MMOL/L (ref 96–112)
CO2 SERPL-SCNC: 24 MMOL/L (ref 20–33)
CREAT SERPL-MCNC: 1.31 MG/DL (ref 0.5–1.4)
EOSINOPHIL # BLD AUTO: 0.14 K/UL (ref 0–0.51)
EOSINOPHIL NFR BLD: 2.9 % (ref 0–6.9)
ERYTHROCYTE [DISTWIDTH] IN BLOOD BY AUTOMATED COUNT: 47.1 FL (ref 35.9–50)
GLOBULIN SER CALC-MCNC: 2.8 G/DL (ref 1.9–3.5)
GLUCOSE SERPL-MCNC: 144 MG/DL (ref 65–99)
HCT VFR BLD AUTO: 39.9 % (ref 42–52)
HGB BLD-MCNC: 12.4 G/DL (ref 14–18)
IMM GRANULOCYTES # BLD AUTO: 0.04 K/UL (ref 0–0.11)
IMM GRANULOCYTES NFR BLD AUTO: 0.8 % (ref 0–0.9)
LYMPHOCYTES # BLD AUTO: 1.29 K/UL (ref 1–4.8)
LYMPHOCYTES NFR BLD: 27.2 % (ref 22–41)
MCH RBC QN AUTO: 27.1 PG (ref 27–33)
MCHC RBC AUTO-ENTMCNC: 31.1 G/DL (ref 33.7–35.3)
MCV RBC AUTO: 87.1 FL (ref 81.4–97.8)
MONOCYTES # BLD AUTO: 0.42 K/UL (ref 0–0.85)
MONOCYTES NFR BLD AUTO: 8.8 % (ref 0–13.4)
NEUTROPHILS # BLD AUTO: 2.84 K/UL (ref 1.82–7.42)
NEUTROPHILS NFR BLD: 59.9 % (ref 44–72)
NRBC # BLD AUTO: 0 K/UL
NRBC BLD-RTO: 0 /100 WBC
PLATELET # BLD AUTO: 220 K/UL (ref 164–446)
PMV BLD AUTO: 9.6 FL (ref 9–12.9)
POTASSIUM SERPL-SCNC: 3.9 MMOL/L (ref 3.6–5.5)
PROT SERPL-MCNC: 7.1 G/DL (ref 6–8.2)
RBC # BLD AUTO: 4.58 M/UL (ref 4.7–6.1)
SODIUM SERPL-SCNC: 138 MMOL/L (ref 135–145)
WBC # BLD AUTO: 4.8 K/UL (ref 4.8–10.8)

## 2018-07-24 ENCOUNTER — HOSPITAL ENCOUNTER (OUTPATIENT)
Facility: MEDICAL CENTER | Age: 58
End: 2018-07-24
Attending: INTERNAL MEDICINE
Payer: COMMERCIAL

## 2018-07-24 LAB — AMBIGUOUS DTTM AMBI4: NORMAL

## 2018-07-24 PROCEDURE — 80053 COMPREHEN METABOLIC PANEL: CPT

## 2018-07-24 PROCEDURE — 85025 COMPLETE CBC W/AUTO DIFF WBC: CPT

## 2018-07-25 LAB
ALBUMIN SERPL BCP-MCNC: 4.3 G/DL (ref 3.2–4.9)
ALBUMIN/GLOB SERPL: 1.8 G/DL
ALP SERPL-CCNC: 84 U/L (ref 30–99)
ALT SERPL-CCNC: 21 U/L (ref 2–50)
ANION GAP SERPL CALC-SCNC: 8 MMOL/L (ref 0–11.9)
AST SERPL-CCNC: 25 U/L (ref 12–45)
BASOPHILS # BLD AUTO: 0.8 % (ref 0–1.8)
BASOPHILS # BLD: 0.04 K/UL (ref 0–0.12)
BILIRUB SERPL-MCNC: 0.3 MG/DL (ref 0.1–1.5)
BUN SERPL-MCNC: 16 MG/DL (ref 8–22)
CALCIUM SERPL-MCNC: 8.3 MG/DL (ref 8.5–10.5)
CHLORIDE SERPL-SCNC: 102 MMOL/L (ref 96–112)
CO2 SERPL-SCNC: 26 MMOL/L (ref 20–33)
CREAT SERPL-MCNC: 1.13 MG/DL (ref 0.5–1.4)
EOSINOPHIL # BLD AUTO: 0.23 K/UL (ref 0–0.51)
EOSINOPHIL NFR BLD: 4.7 % (ref 0–6.9)
ERYTHROCYTE [DISTWIDTH] IN BLOOD BY AUTOMATED COUNT: 49.1 FL (ref 35.9–50)
GLOBULIN SER CALC-MCNC: 2.4 G/DL (ref 1.9–3.5)
GLUCOSE SERPL-MCNC: 131 MG/DL (ref 65–99)
HCT VFR BLD AUTO: 36.4 % (ref 42–52)
HGB BLD-MCNC: 11.5 G/DL (ref 14–18)
IMM GRANULOCYTES # BLD AUTO: 0.06 K/UL (ref 0–0.11)
IMM GRANULOCYTES NFR BLD AUTO: 1.2 % (ref 0–0.9)
LYMPHOCYTES # BLD AUTO: 1.24 K/UL (ref 1–4.8)
LYMPHOCYTES NFR BLD: 25.5 % (ref 22–41)
MCH RBC QN AUTO: 27.9 PG (ref 27–33)
MCHC RBC AUTO-ENTMCNC: 31.6 G/DL (ref 33.7–35.3)
MCV RBC AUTO: 88.3 FL (ref 81.4–97.8)
MONOCYTES # BLD AUTO: 0.56 K/UL (ref 0–0.85)
MONOCYTES NFR BLD AUTO: 11.5 % (ref 0–13.4)
NEUTROPHILS # BLD AUTO: 2.74 K/UL (ref 1.82–7.42)
NEUTROPHILS NFR BLD: 56.3 % (ref 44–72)
NRBC # BLD AUTO: 0 K/UL
NRBC BLD-RTO: 0 /100 WBC
PLATELET # BLD AUTO: 107 K/UL (ref 164–446)
PMV BLD AUTO: 10.5 FL (ref 9–12.9)
POTASSIUM SERPL-SCNC: 4.7 MMOL/L (ref 3.6–5.5)
PROT SERPL-MCNC: 6.7 G/DL (ref 6–8.2)
RBC # BLD AUTO: 4.12 M/UL (ref 4.7–6.1)
SODIUM SERPL-SCNC: 136 MMOL/L (ref 135–145)
WBC # BLD AUTO: 4.9 K/UL (ref 4.8–10.8)

## 2018-07-31 ENCOUNTER — HOSPITAL ENCOUNTER (OUTPATIENT)
Dept: LAB | Facility: MEDICAL CENTER | Age: 58
End: 2018-07-31
Attending: INTERNAL MEDICINE
Payer: COMMERCIAL

## 2018-07-31 LAB — AMBIGUOUS DTTM AMBI4: NORMAL

## 2018-07-31 PROCEDURE — 85025 COMPLETE CBC W/AUTO DIFF WBC: CPT

## 2018-07-31 PROCEDURE — 80053 COMPREHEN METABOLIC PANEL: CPT

## 2018-08-01 ENCOUNTER — APPOINTMENT (OUTPATIENT)
Dept: ENDOCRINOLOGY | Facility: MEDICAL CENTER | Age: 58
End: 2018-08-01
Payer: COMMERCIAL

## 2018-08-01 LAB
ALBUMIN SERPL BCP-MCNC: 4.5 G/DL (ref 3.2–4.9)
ALBUMIN/GLOB SERPL: 1.7 G/DL
ALP SERPL-CCNC: 93 U/L (ref 30–99)
ALT SERPL-CCNC: 22 U/L (ref 2–50)
ANION GAP SERPL CALC-SCNC: 8 MMOL/L (ref 0–11.9)
AST SERPL-CCNC: 26 U/L (ref 12–45)
BASOPHILS # BLD AUTO: 0.6 % (ref 0–1.8)
BASOPHILS # BLD: 0.02 K/UL (ref 0–0.12)
BILIRUB SERPL-MCNC: 0.4 MG/DL (ref 0.1–1.5)
BUN SERPL-MCNC: 17 MG/DL (ref 8–22)
CALCIUM SERPL-MCNC: 9.2 MG/DL (ref 8.5–10.5)
CHLORIDE SERPL-SCNC: 105 MMOL/L (ref 96–112)
CO2 SERPL-SCNC: 26 MMOL/L (ref 20–33)
CREAT SERPL-MCNC: 1.14 MG/DL (ref 0.5–1.4)
EOSINOPHIL # BLD AUTO: 0.2 K/UL (ref 0–0.51)
EOSINOPHIL NFR BLD: 5.7 % (ref 0–6.9)
ERYTHROCYTE [DISTWIDTH] IN BLOOD BY AUTOMATED COUNT: 50.4 FL (ref 35.9–50)
GLOBULIN SER CALC-MCNC: 2.7 G/DL (ref 1.9–3.5)
GLUCOSE SERPL-MCNC: 205 MG/DL (ref 65–99)
HCT VFR BLD AUTO: 36.7 % (ref 42–52)
HGB BLD-MCNC: 11.5 G/DL (ref 14–18)
IMM GRANULOCYTES # BLD AUTO: 0.04 K/UL (ref 0–0.11)
IMM GRANULOCYTES NFR BLD AUTO: 1.1 % (ref 0–0.9)
LYMPHOCYTES # BLD AUTO: 1.02 K/UL (ref 1–4.8)
LYMPHOCYTES NFR BLD: 29.1 % (ref 22–41)
MCH RBC QN AUTO: 27.8 PG (ref 27–33)
MCHC RBC AUTO-ENTMCNC: 31.3 G/DL (ref 33.7–35.3)
MCV RBC AUTO: 88.9 FL (ref 81.4–97.8)
MONOCYTES # BLD AUTO: 0.3 K/UL (ref 0–0.85)
MONOCYTES NFR BLD AUTO: 8.6 % (ref 0–13.4)
NEUTROPHILS # BLD AUTO: 1.92 K/UL (ref 1.82–7.42)
NEUTROPHILS NFR BLD: 54.9 % (ref 44–72)
NRBC # BLD AUTO: 0 K/UL
NRBC BLD-RTO: 0 /100 WBC
PLATELET # BLD AUTO: 80 K/UL (ref 164–446)
PMV BLD AUTO: 10.3 FL (ref 9–12.9)
POTASSIUM SERPL-SCNC: 3.9 MMOL/L (ref 3.6–5.5)
PROT SERPL-MCNC: 7.2 G/DL (ref 6–8.2)
RBC # BLD AUTO: 4.13 M/UL (ref 4.7–6.1)
SODIUM SERPL-SCNC: 139 MMOL/L (ref 135–145)
WBC # BLD AUTO: 3.5 K/UL (ref 4.8–10.8)

## 2018-08-06 ENCOUNTER — HOSPITAL ENCOUNTER (OUTPATIENT)
Dept: LAB | Facility: MEDICAL CENTER | Age: 58
End: 2018-08-06
Payer: COMMERCIAL

## 2018-08-06 PROCEDURE — 85025 COMPLETE CBC W/AUTO DIFF WBC: CPT

## 2018-08-06 PROCEDURE — 80053 COMPREHEN METABOLIC PANEL: CPT

## 2018-08-07 ENCOUNTER — HOSPITAL ENCOUNTER (OUTPATIENT)
Dept: LAB | Facility: MEDICAL CENTER | Age: 58
End: 2018-08-07
Attending: INTERNAL MEDICINE
Payer: COMMERCIAL

## 2018-08-07 LAB
ALBUMIN SERPL BCP-MCNC: 4.5 G/DL (ref 3.2–4.9)
ALBUMIN SERPL BCP-MCNC: 4.7 G/DL (ref 3.2–4.9)
ALBUMIN/GLOB SERPL: 1.7 G/DL
ALBUMIN/GLOB SERPL: 2 G/DL
ALP SERPL-CCNC: 70 U/L (ref 30–99)
ALP SERPL-CCNC: 98 U/L (ref 30–99)
ALT SERPL-CCNC: 25 U/L (ref 2–50)
ALT SERPL-CCNC: 30 U/L (ref 2–50)
ANION GAP SERPL CALC-SCNC: 12 MMOL/L (ref 0–11.9)
ANION GAP SERPL CALC-SCNC: 9 MMOL/L (ref 0–11.9)
AST SERPL-CCNC: 26 U/L (ref 12–45)
AST SERPL-CCNC: 29 U/L (ref 12–45)
BASOPHILS # BLD AUTO: 0.8 % (ref 0–1.8)
BASOPHILS # BLD AUTO: 0.9 % (ref 0–1.8)
BASOPHILS # BLD: 0.03 K/UL (ref 0–0.12)
BASOPHILS # BLD: 0.08 K/UL (ref 0–0.12)
BILIRUB SERPL-MCNC: 0.4 MG/DL (ref 0.1–1.5)
BILIRUB SERPL-MCNC: 0.8 MG/DL (ref 0.1–1.5)
BUN SERPL-MCNC: 15 MG/DL (ref 8–22)
BUN SERPL-MCNC: 17 MG/DL (ref 8–22)
CALCIUM SERPL-MCNC: 10 MG/DL (ref 8.5–10.5)
CALCIUM SERPL-MCNC: 9.4 MG/DL (ref 8.5–10.5)
CHLORIDE SERPL-SCNC: 100 MMOL/L (ref 96–112)
CHLORIDE SERPL-SCNC: 104 MMOL/L (ref 96–112)
CO2 SERPL-SCNC: 23 MMOL/L (ref 20–33)
CO2 SERPL-SCNC: 24 MMOL/L (ref 20–33)
CREAT SERPL-MCNC: 0.75 MG/DL (ref 0.5–1.4)
CREAT SERPL-MCNC: 1.1 MG/DL (ref 0.5–1.4)
EOSINOPHIL # BLD AUTO: 0.18 K/UL (ref 0–0.51)
EOSINOPHIL # BLD AUTO: 0.34 K/UL (ref 0–0.51)
EOSINOPHIL NFR BLD: 3.7 % (ref 0–6.9)
EOSINOPHIL NFR BLD: 4.6 % (ref 0–6.9)
ERYTHROCYTE [DISTWIDTH] IN BLOOD BY AUTOMATED COUNT: 44.8 FL (ref 35.9–50)
ERYTHROCYTE [DISTWIDTH] IN BLOOD BY AUTOMATED COUNT: 52.3 FL (ref 35.9–50)
GLOBULIN SER CALC-MCNC: 2.2 G/DL (ref 1.9–3.5)
GLOBULIN SER CALC-MCNC: 2.7 G/DL (ref 1.9–3.5)
GLUCOSE SERPL-MCNC: 230 MG/DL (ref 65–99)
GLUCOSE SERPL-MCNC: 86 MG/DL (ref 65–99)
HCT VFR BLD AUTO: 38.2 % (ref 42–52)
HCT VFR BLD AUTO: 52 % (ref 42–52)
HGB BLD-MCNC: 12.1 G/DL (ref 14–18)
HGB BLD-MCNC: 16.8 G/DL (ref 14–18)
IMM GRANULOCYTES # BLD AUTO: 0.03 K/UL (ref 0–0.11)
IMM GRANULOCYTES # BLD AUTO: 0.05 K/UL (ref 0–0.11)
IMM GRANULOCYTES NFR BLD AUTO: 0.5 % (ref 0–0.9)
IMM GRANULOCYTES NFR BLD AUTO: 0.8 % (ref 0–0.9)
LYMPHOCYTES # BLD AUTO: 1.09 K/UL (ref 1–4.8)
LYMPHOCYTES # BLD AUTO: 1.92 K/UL (ref 1–4.8)
LYMPHOCYTES NFR BLD: 21.1 % (ref 22–41)
LYMPHOCYTES NFR BLD: 27.8 % (ref 22–41)
MCH RBC QN AUTO: 28 PG (ref 27–33)
MCH RBC QN AUTO: 28.9 PG (ref 27–33)
MCHC RBC AUTO-ENTMCNC: 31.7 G/DL (ref 33.7–35.3)
MCHC RBC AUTO-ENTMCNC: 32.3 G/DL (ref 33.7–35.3)
MCV RBC AUTO: 88.4 FL (ref 81.4–97.8)
MCV RBC AUTO: 89.3 FL (ref 81.4–97.8)
MONOCYTES # BLD AUTO: 0.46 K/UL (ref 0–0.85)
MONOCYTES # BLD AUTO: 0.72 K/UL (ref 0–0.85)
MONOCYTES NFR BLD AUTO: 11.7 % (ref 0–13.4)
MONOCYTES NFR BLD AUTO: 7.9 % (ref 0–13.4)
NEUTROPHILS # BLD AUTO: 2.13 K/UL (ref 1.82–7.42)
NEUTROPHILS # BLD AUTO: 6 K/UL (ref 1.82–7.42)
NEUTROPHILS NFR BLD: 54.3 % (ref 44–72)
NEUTROPHILS NFR BLD: 65.9 % (ref 44–72)
NRBC # BLD AUTO: 0 K/UL
NRBC # BLD AUTO: 0 K/UL
NRBC BLD-RTO: 0 /100 WBC
NRBC BLD-RTO: 0 /100 WBC
PLATELET # BLD AUTO: 203 K/UL (ref 164–446)
PLATELET # BLD AUTO: 339 K/UL (ref 164–446)
PMV BLD AUTO: 11.7 FL (ref 9–12.9)
PMV BLD AUTO: 9.7 FL (ref 9–12.9)
POTASSIUM SERPL-SCNC: 3.8 MMOL/L (ref 3.6–5.5)
POTASSIUM SERPL-SCNC: 4 MMOL/L (ref 3.6–5.5)
PROT SERPL-MCNC: 6.7 G/DL (ref 6–8.2)
PROT SERPL-MCNC: 7.4 G/DL (ref 6–8.2)
RBC # BLD AUTO: 4.32 M/UL (ref 4.7–6.1)
RBC # BLD AUTO: 5.82 M/UL (ref 4.7–6.1)
SODIUM SERPL-SCNC: 135 MMOL/L (ref 135–145)
SODIUM SERPL-SCNC: 137 MMOL/L (ref 135–145)
WBC # BLD AUTO: 3.9 K/UL (ref 4.8–10.8)
WBC # BLD AUTO: 9.1 K/UL (ref 4.8–10.8)

## 2018-08-07 PROCEDURE — 80053 COMPREHEN METABOLIC PANEL: CPT

## 2018-08-07 PROCEDURE — 85025 COMPLETE CBC W/AUTO DIFF WBC: CPT

## 2018-08-08 ENCOUNTER — OFFICE VISIT (OUTPATIENT)
Dept: INFECTIOUS DISEASES | Facility: MEDICAL CENTER | Age: 58
End: 2018-08-08
Payer: COMMERCIAL

## 2018-08-08 VITALS
HEART RATE: 89 BPM | TEMPERATURE: 97.8 F | DIASTOLIC BLOOD PRESSURE: 78 MMHG | SYSTOLIC BLOOD PRESSURE: 122 MMHG | OXYGEN SATURATION: 93 % | BODY MASS INDEX: 38.94 KG/M2 | HEIGHT: 70 IN | WEIGHT: 272 LBS

## 2018-08-08 DIAGNOSIS — M19.90 OSTEOARTHRITIS, UNSPECIFIED OSTEOARTHRITIS TYPE, UNSPECIFIED SITE: ICD-10-CM

## 2018-08-08 DIAGNOSIS — E11.9 TYPE 2 DIABETES MELLITUS WITHOUT COMPLICATION, WITHOUT LONG-TERM CURRENT USE OF INSULIN (HCC): ICD-10-CM

## 2018-08-08 DIAGNOSIS — M86.9 OSTEOMYELITIS, UNSPECIFIED SITE, UNSPECIFIED TYPE (HCC): ICD-10-CM

## 2018-08-08 PROCEDURE — 99214 OFFICE O/P EST MOD 30 MIN: CPT | Performed by: NURSE PRACTITIONER

## 2018-08-08 NOTE — PROGRESS NOTES
Subjective:     Chief Complaint   Patient presents with   • Follow-Up     OM Left Foot     Infectious Disease clinic follow up  Cristino Keys 58 y.o.male in clinic today for evaluation and management of left foot osteomyelitis. Primary care provider: Kevin Chavez M.D.. This is my first time meeting Mr. Keys. history of well-controlled diabetes, hypertension, and history of left foot bunionectomy as well as hardware placement in the left great toe    Interval History: Patient established with ID, Dr Mak 7/2/18. In April 2018 he stubbed his left great toe and developed an open wound.  He was following up in the wound care clinic.  Wound cultures on May 11 positive for MSSA.  Despite oral antibiotics he continued to develop an open wound and bone was noted to be exposed.Repeat wound cultures on June 14 grew MRSA with a vancomycin ROBBY of 2.  He subsequently underwent left great toe removal of internal fixation, left amputation great toe with an interphalangeal disarticulation with irrigation and debridement on June 25, 2018 with Dr. Kang.  Distal great toe cultures are now growing MRSA and diphtheroids.  Patient started on IV daptomycin for 6 weeks with planned stop date of 08/16/18.    Patient developed adverse effects and muscle cramps on daptomycin.  DC daptomycin and transition to ceftaroline to complete his course of antibiotics through 08/16/18. To note patient is receiving antibiotics through [facility] care.    Pt reporting that symptoms began to improve immediately after stopping Daptomycin.    Hospital records reviewed    Today 8/8/18: Patient reports feeling well. Pt stating that the amputation site wound is healing well.  Denies drainage, pungent odor, redness, pain. Denies feeling generally ill, fevers/chills, headache, n/v/d. Patient stating that overall he is tolerating the IV Cefotan apparently without adverse effect. He does report some fatigue associated. At this time he has no questions or  "concerns regarding his PICC line or IV antibiotics      Past Medical History:   Diagnosis Date   • Anesthesia 11/03/2017    PONV with shoulder surgery approx 20 years ago.   • Anxiety and depression 11/03/2017   • Arthritis 11/03/2017    Osteoarthritis, \" all over\"   • Bunion    • Cancer (HCC) 2017    skin lesion cut out, on left shoulder   • Chronic autoimmune thyroiditis      + US / + TPO = 57   • Dental disorder 11/03/2017    \"Upper Plate\"   • Diabetes mellitus (HCC) 11/03/2017    \"Controlled with diet & Victoza\"   • Gout    • Hammertoe    • High cholesterol 11/03/2017    HX OF, not currently on medication for.   • Hypertension    • Hypothyroidism     chronic thyroiditis   • Open toe wound 03/2018    Left big toe, open wound, started 2/2018, receiving wound care therapy two times a week   • Pain 11/03/2017    \"Pain all over except right hip & ankle\"   • Sleep apnea 11/03/2017    CPAP USE   • Snoring 11/03/2017    DX JULIAN   • Tonsillitis        Allergies: Demerol and Sulfa drugs    Current medications and problem list reviewed with patient and updated in EPIC.    ROS  As documented above in my HPI       Objective:     PE:  /78   Pulse 89   Temp 36.6 °C (97.8 °F)   Ht 1.778 m (5' 10\")   Wt 123.4 kg (272 lb)   SpO2 93%   BMI 39.03 kg/m²      Vital signs reviewed  Constitutional: patient is oriented to person, place, and time. Appears well-developed and well-nourished. No distress  Eyes: Conjunctivae normal and EOM are normal. Pupils are equal, round, and reactive to light.   Mouth/Throat: Lips without lesions, good dentition, oropharynx is clear and moist.  Neck: Trachea midline. Normal range of motion. Neck supple. No masses  Cardiovascular: Normal rate, regular rhythm, normal heart sounds and intact distal pulses. No murmur, gallop, or friction rub. No edema.  Pulmonary/Chest: No respiratory distress. Unlabored respiratory effort, lungs clear to auscultation. No wheezes or rales.   Abdominal: Soft, non " tender. BS + x 4. No masses or hepatosplenomegaly.   Musculoskeletal: Normal range of motion. No tenderness, swelling, erythema, deformity noted.  Neurological: He is alert and oriented to person, place, and time. No cranial nerve deficit. Coordination normal.   Skin: Skin is warm and dry. Good turgor. No rashes visable.  Left foot: s/p distal tip great toe amputation. Site appears to be healing well  Right foot- healing incisions. Slight erythema to right 2nd toe  NIKITA PICC in place- CDI. No erythema. Non tender.   Psychiatric: Normal mood and affect. Behavior is normal.     Labs:  WBC   Date/Time Value Ref Range Status   08/07/2018 08:05 PM 3.9 (L) 4.8 - 10.8 K/uL Final     RBC   Date/Time Value Ref Range Status   08/07/2018 08:05 PM 4.32 (L) 4.70 - 6.10 M/uL Final     Hemoglobin   Date/Time Value Ref Range Status   08/07/2018 08:05 PM 12.1 (L) 14.0 - 18.0 g/dL Final     Comment:     Results confirmed by repeat testing.     Hematocrit   Date/Time Value Ref Range Status   08/07/2018 08:05 PM 38.2 (L) 42.0 - 52.0 % Final     MCV   Date/Time Value Ref Range Status   08/07/2018 08:05 PM 88.4 81.4 - 97.8 fL Final     MCH   Date/Time Value Ref Range Status   08/07/2018 08:05 PM 28.0 27.0 - 33.0 pg Final     MCHC   Date/Time Value Ref Range Status   08/07/2018 08:05 PM 31.7 (L) 33.7 - 35.3 g/dL Final     MPV   Date/Time Value Ref Range Status   08/07/2018 08:05 PM 9.7 9.0 - 12.9 fL Final        Sodium   Date/Time Value Ref Range Status   08/07/2018 08:05  135 - 145 mmol/L Final     Potassium   Date/Time Value Ref Range Status   08/07/2018 08:05 PM 3.8 3.6 - 5.5 mmol/L Final     Chloride   Date/Time Value Ref Range Status   08/07/2018 08:05  96 - 112 mmol/L Final     Co2   Date/Time Value Ref Range Status   08/07/2018 08:05 PM 24 20 - 33 mmol/L Final     Glucose   Date/Time Value Ref Range Status   08/07/2018 08:05 PM 86 65 - 99 mg/dL Final     Bun   Date/Time Value Ref Range Status   08/07/2018 08:05 PM 17 8 -  22 mg/dL Final     Creatinine   Date/Time Value Ref Range Status   08/07/2018 08:05 PM 1.10 0.50 - 1.40 mg/dL Final       Alkaline Phosphatase   Date/Time Value Ref Range Status   08/07/2018 08:05 PM 98 30 - 99 U/L Final     AST(SGOT)   Date/Time Value Ref Range Status   08/07/2018 08:05 PM 29 12 - 45 U/L Final     ALT(SGPT)   Date/Time Value Ref Range Status   08/07/2018 08:05 PM 25 2 - 50 U/L Final     Total Bilirubin   Date/Time Value Ref Range Status   08/07/2018 08:05 PM 0.4 0.1 - 1.5 mg/dL Final        CPK Total   Date/Time Value Ref Range Status   07/09/2018 01:45 PM 91 0 - 154 U/L Final          Assessment and Plan:   The following treatment plan was discussed with patient at length  1. Osteomyelitis  WESTERGREN SED RATE    CBC WITH DIFFERENTIAL   2. Type 2 diabetes mellitus without complication, without long-term current use of insulin (Former[facility])     3. Osteoarthritis, unspecified osteoarthritis type, unspecified site       Continue IV Ceftaroline as prescribed. End date 8/16/18. Continue weekly labs and PICC care while on IV antibiotics. I have ordered an additional CBC to be done mid week due to slight decrease in his white blood cell count following change from daptomycin to Ceftaroline. Will continue to monitor closely. Will also repeat sed rate  Follow-up with Dr. Dheeraj Merlos as scheduled  Follow up: 2 weeks, RTC sooner if needed. FU with PCP for ongoing chronic medical conditions.            Please note that this dictation was created using voice recognition software. I have  worked with technical experts from [employer]  [facility] to optimize the interface.  I have made every reasonable attempt to correct obvious errors, but there may be errors of grammar and possibly content that I did not discover before finalizing the note.

## 2018-08-13 ENCOUNTER — APPOINTMENT (OUTPATIENT)
Dept: ENDOCRINOLOGY | Facility: MEDICAL CENTER | Age: 58
End: 2018-08-13
Payer: COMMERCIAL

## 2018-08-18 ENCOUNTER — HOSPITAL ENCOUNTER (OUTPATIENT)
Dept: LAB | Facility: MEDICAL CENTER | Age: 58
End: 2018-08-18
Attending: INTERNAL MEDICINE
Payer: COMMERCIAL

## 2018-08-18 ENCOUNTER — HOSPITAL ENCOUNTER (OUTPATIENT)
Dept: LAB | Facility: MEDICAL CENTER | Age: 58
End: 2018-08-18
Attending: NURSE PRACTITIONER
Payer: COMMERCIAL

## 2018-08-18 DIAGNOSIS — M86.9 OSTEOMYELITIS, UNSPECIFIED SITE, UNSPECIFIED TYPE (HCC): ICD-10-CM

## 2018-08-18 LAB
BASOPHILS # BLD AUTO: 0.7 % (ref 0–1.8)
BASOPHILS # BLD: 0.03 K/UL (ref 0–0.12)
EOSINOPHIL # BLD AUTO: 0.23 K/UL (ref 0–0.51)
EOSINOPHIL NFR BLD: 5.7 % (ref 0–6.9)
ERYTHROCYTE [DISTWIDTH] IN BLOOD BY AUTOMATED COUNT: 50.5 FL (ref 35.9–50)
ERYTHROCYTE [SEDIMENTATION RATE] IN BLOOD BY WESTERGREN METHOD: 21 MM/HOUR (ref 0–20)
EST. AVERAGE GLUCOSE BLD GHB EST-MCNC: 137 MG/DL
HBA1C MFR BLD: 6.4 % (ref 0–5.6)
HCT VFR BLD AUTO: 36.5 % (ref 42–52)
HGB BLD-MCNC: 11.8 G/DL (ref 14–18)
IMM GRANULOCYTES # BLD AUTO: 0.04 K/UL (ref 0–0.11)
IMM GRANULOCYTES NFR BLD AUTO: 1 % (ref 0–0.9)
LYMPHOCYTES # BLD AUTO: 1.17 K/UL (ref 1–4.8)
LYMPHOCYTES NFR BLD: 28.7 % (ref 22–41)
MCH RBC QN AUTO: 28.2 PG (ref 27–33)
MCHC RBC AUTO-ENTMCNC: 32.3 G/DL (ref 33.7–35.3)
MCV RBC AUTO: 87.1 FL (ref 81.4–97.8)
MONOCYTES # BLD AUTO: 0.52 K/UL (ref 0–0.85)
MONOCYTES NFR BLD AUTO: 12.8 % (ref 0–13.4)
NEUTROPHILS # BLD AUTO: 2.08 K/UL (ref 1.82–7.42)
NEUTROPHILS NFR BLD: 51.1 % (ref 44–72)
NRBC # BLD AUTO: 0 K/UL
NRBC BLD-RTO: 0 /100 WBC
PLATELET # BLD AUTO: 158 K/UL (ref 164–446)
PMV BLD AUTO: 9.2 FL (ref 9–12.9)
RBC # BLD AUTO: 4.19 M/UL (ref 4.7–6.1)
WBC # BLD AUTO: 4.1 K/UL (ref 4.8–10.8)

## 2018-08-18 PROCEDURE — 85025 COMPLETE CBC W/AUTO DIFF WBC: CPT

## 2018-08-18 PROCEDURE — 83036 HEMOGLOBIN GLYCOSYLATED A1C: CPT | Mod: GA

## 2018-08-18 PROCEDURE — 85652 RBC SED RATE AUTOMATED: CPT

## 2018-08-18 PROCEDURE — 36415 COLL VENOUS BLD VENIPUNCTURE: CPT

## 2018-08-28 ENCOUNTER — OFFICE VISIT (OUTPATIENT)
Dept: INFECTIOUS DISEASES | Facility: MEDICAL CENTER | Age: 58
End: 2018-08-28
Payer: COMMERCIAL

## 2018-08-28 VITALS
HEART RATE: 100 BPM | HEIGHT: 70 IN | DIASTOLIC BLOOD PRESSURE: 80 MMHG | SYSTOLIC BLOOD PRESSURE: 124 MMHG | OXYGEN SATURATION: 93 % | TEMPERATURE: 97.9 F | WEIGHT: 273 LBS | BODY MASS INDEX: 39.08 KG/M2

## 2018-08-28 DIAGNOSIS — M86.9 OSTEOMYELITIS OF ANKLE AND FOOT (HCC): ICD-10-CM

## 2018-08-28 DIAGNOSIS — E11.621 DIABETIC ULCER OF TOE OF LEFT FOOT ASSOCIATED WITH TYPE 2 DIABETES MELLITUS, WITH FAT LAYER EXPOSED (HCC): ICD-10-CM

## 2018-08-28 DIAGNOSIS — L97.522 DIABETIC ULCER OF TOE OF LEFT FOOT ASSOCIATED WITH TYPE 2 DIABETES MELLITUS, WITH FAT LAYER EXPOSED (HCC): ICD-10-CM

## 2018-08-28 PROCEDURE — 99213 OFFICE O/P EST LOW 20 MIN: CPT | Performed by: NURSE PRACTITIONER

## 2018-08-28 NOTE — PROGRESS NOTES
Subjective:     Chief Complaint   Patient presents with   • Follow-Up     Osteomyelitis     Infectious Disease clinic follow up  Cristino Keys 58 y.o.male in clinic today for evaluation and management of left foot osteomyelitis. Primary care provider: Kevin Chavez M.D. History of well-controlled diabetes, hypertension, and history of left foot bunionectomy as well as hardware placement in the left great toe.    Interval History: Patient established with ID, Dr Mak 7/2/18. In April 2018 he stubbed his left great toe and developed an open wound.  He was following up in the wound care clinic.  Wound cultures on May 11 positive for MSSA.  Despite oral antibiotics he continued to develop an open wound and bone was noted to be exposed.Repeat wound cultures on June 14 grew MRSA with a vancomycin ROBBY of 2.  He subsequently underwent left great toe removal of internal fixation, left amputation great toe with an interphalangeal disarticulation with irrigation and debridement on June 25, 2018 with Dr. Kang.  Distal great toe cultures are now growing MRSA and diphtheroids.  Patient started on IV daptomycin for 6 weeks with planned stop date of 08/16/18.    Patient developed adverse effects and muscle cramps on daptomycin. DC daptomycin and transition to ceftaroline to complete his course of antibiotics through 08/16/18. Patient followed by [facility].  Pt reporting that symptoms began to improve immediately after stopping Daptomycin.  Completed Ceftaroline as scheduled 8/16/18. PICC removed without issue following last dose.    Records reviewed    Today 8/28/18: Patient reports feeling well. Pt stating that the amputation site wound is healing well.  Denies drainage, pungent odor, redness, pain. Denies feeling generally ill, fevers/chills, headache, n/v/d.     Past Medical History:   Diagnosis Date   • Anesthesia 11/03/2017    PONV with shoulder surgery approx 20 years ago.   • Anxiety and depression 11/03/2017   •  "Arthritis 11/03/2017    Osteoarthritis, \" all over\"   • Bunion    • Cancer (HCC) 2017    skin lesion cut out, on left shoulder   • Chronic autoimmune thyroiditis      + US / + TPO = 57   • Dental disorder 11/03/2017    \"Upper Plate\"   • Diabetes mellitus (HCC) 11/03/2017    \"Controlled with diet & Victoza\"   • Gout    • Hammertoe    • High cholesterol 11/03/2017    HX OF, not currently on medication for.   • Hypertension    • Hypothyroidism     chronic thyroiditis   • Open toe wound 03/2018    Left big toe, open wound, started 2/2018, receiving wound care therapy two times a week   • Pain 11/03/2017    \"Pain all over except right hip & ankle\"   • Sleep apnea 11/03/2017    CPAP USE   • Snoring 11/03/2017    DX JULIAN   • Tonsillitis        Allergies: Demerol and Sulfa drugs    Current medications and problem list reviewed with patient and updated in EPIC.    ROS  As documented above in my HPI       Objective:     PE:  /80   Pulse 100   Temp 36.6 °C (97.9 °F)   Ht 1.778 m (5' 10\")   Wt 123.8 kg (273 lb)   SpO2 93%   BMI 39.17 kg/m²      Vital signs reviewed  Constitutional: patient is oriented to person, place, and time. Appears well-developed and well-nourished. No distress  Eyes: Conjunctivae normal and EOM are normal. Pupils are equal, round, and reactive to light.   Mouth/Throat: Lips without lesions, good dentition, oropharynx is clear and moist.  Neck: Trachea midline. Normal range of motion. Neck supple. No masses  Cardiovascular: Normal rate, regular rhythm, normal heart sounds and intact distal pulses. No murmur, gallop, or friction rub. No edema.  Pulmonary/Chest: No respiratory distress. Unlabored respiratory effort, lungs clear to auscultation. No wheezes or rales.   Abdominal: Soft, non tender. BS + x 4. No masses or hepatosplenomegaly.   Musculoskeletal: Normal range of motion. No tenderness, swelling, erythema, deformity noted.  Neurological: He is alert and oriented to person, place, and time. " No cranial nerve deficit. Coordination normal.   Skin: Skin is warm and dry. Good turgor. No rashes visable.  Left foot: s/p distal tip great toe amputation. Site appears to be healing well  Right foot- healed scars. Slight erythema to right 2nd toe, unchanged  NIKITA PICC site well healed  Psychiatric: Normal mood and affect. Behavior is normal.       Assessment and Plan:   The following treatment plan was discussed with patient at length  1. Osteomyelitis of ankle and foot (HCC)     2. Diabetic ulcer of toe of left foot associated with type 2 diabetes mellitus, with fat layer exposed (HCC)       Patient completed IV Ceftaroline as prescribed  Left foot distal tip great toe amputation site healing well  Follow-up with Dr. Kang as scheduled  Follow up: PRN. FU with PCP for ongoing chronic medical conditions.            Please note that this dictation was created using voice recognition software. I have  worked with technical experts from [employer] [facility]  [employer] to optimize the interface.  I have made every reasonable attempt to correct obvious errors, but there may be errors of grammar and possibly content that I did not discover before finalizing the note.

## 2018-08-29 ENCOUNTER — TELEPHONE (OUTPATIENT)
Dept: ENDOCRINOLOGY | Facility: MEDICAL CENTER | Age: 58
End: 2018-08-29

## 2018-08-29 NOTE — TELEPHONE ENCOUNTER
1. Caller Name: Cristino Keys                                             Call Back Number: [phone] (home)     Patient approves a detailed voicemail message: N\A    Patient called and lvm needing to schedule an appt with Dr Weiss.    I called pt back and lvm to call back to make that appt.

## 2018-08-31 ENCOUNTER — TELEPHONE (OUTPATIENT)
Dept: SLEEP MEDICINE | Facility: MEDICAL CENTER | Age: 58
End: 2018-08-31

## 2018-08-31 DIAGNOSIS — G47.33 OSA (OBSTRUCTIVE SLEEP APNEA): Primary | ICD-10-CM

## 2018-08-31 NOTE — TELEPHONE ENCOUNTER
Called pt to let him know that his order for mask and supplies has been signed and faxed to DME:  [facility]  / [employer] [phone] / fax [phone]

## 2018-08-31 NOTE — TELEPHONE ENCOUNTER
Patient needs new mask and supply order sent to DME:  [facility]  / [employer] [phone] / fax [phone]    Last OV:02/01/18  Next OV: 09/11/18    Order pended, please sign

## 2018-09-04 ENCOUNTER — TELEPHONE (OUTPATIENT)
Dept: ENDOCRINOLOGY | Facility: MEDICAL CENTER | Age: 58
End: 2018-09-04

## 2018-09-04 DIAGNOSIS — E06.3 HYPOTHYROIDISM, ACQUIRED, AUTOIMMUNE: ICD-10-CM

## 2018-09-04 NOTE — TELEPHONE ENCOUNTER
1. Caller Name: Cristino Keys      Call Back Number: [phone] (home)         Patient approves a detailed voicemail message: N\A    Patient called and lvm stating he needs lab orders to check his thyroid. Once he gets the labs done then he will make an appt to see you.

## 2018-09-10 ENCOUNTER — HOSPITAL ENCOUNTER (OUTPATIENT)
Dept: LAB | Facility: MEDICAL CENTER | Age: 58
End: 2018-09-10
Attending: INTERNAL MEDICINE
Payer: COMMERCIAL

## 2018-09-10 LAB
T4 FREE SERPL-MCNC: 0.59 NG/DL (ref 0.53–1.43)
TSH SERPL DL<=0.005 MIU/L-ACNC: 1.66 UIU/ML (ref 0.38–5.33)

## 2018-09-10 PROCEDURE — 84443 ASSAY THYROID STIM HORMONE: CPT

## 2018-09-10 PROCEDURE — 84439 ASSAY OF FREE THYROXINE: CPT

## 2018-09-10 PROCEDURE — 36415 COLL VENOUS BLD VENIPUNCTURE: CPT

## 2018-09-10 NOTE — PROGRESS NOTES
CC: Follow up for obstructive sleep apnea    HPI:  Mr. Cristino Keys is a 59 y/o male who was last seen 2/1/2018 by Torrie NICOLE.  The PSG performed 6/2017 showed an AHI of 6.1 with a patel oxygen saturation of 85 percent.  The patient's REM apnea hypopnea index was 16.6.  Is currently on auto CPAP set at 10-15 cmH2O. The last 30 day compliance revealed usage for 97% of the time, an average of 7 hours and 50 minutes, with a residual average AHI of 12.7.  Consequently, the pressure was empirically increased to 10-15 cm H2O.    He is 30 day compliance shows 97% use for 7 hours and 56 minutes with a residual apnea hypopnea index of 5.5.  His median pressure has been 13.3.  His median leak is 0.8 L/min.    He  is not having any significant problems with mask fit, mask leak, pressure tolerance, excessive airway dryness, aerophagia, or nasal stuffiness.  He continues to derive significant benefit from the use of his AutoPap.            Patient Active Problem List    Diagnosis Date Noted   • Wound infection 05/14/2018   • Diabetic ulcer of toe of left foot associated with type 2 diabetes mellitus, with fat layer exposed ([facility]) 05/14/2018   • Contracture of elbow joint, right 04/03/2018   • BMI 38.0-38.9,adult 11/01/2017   • Chronic pain 08/28/2017   • Chronic narcotic use 08/28/2017   • Arthritis of left ankle 08/21/2017   • Degeneration of lumbar intervertebral disc 04/14/2017   • Lower back pain 04/14/2017   • JULIAN (obstructive sleep apnea) 04/03/2017   • Snoring 04/03/2017   • Acquired hallux valgus of left foot 12/22/2016   • Type 2 diabetes mellitus without complication ([facility]) 12/13/2016   • Chronic autoimmune thyroiditis 10/03/2016   • Hypothyroidism, acquired, autoimmune 10/03/2016   • Primary osteoarthritis of left hip 08/18/2016   • Diabetes ([facility]) 09/05/2014   • Lumbar disc disease 09/05/2014   • Gout 09/05/2014   • Hypertension 09/05/2014   • H/O arthroscopy of knee 09/05/2014   • Hx of elbow surgery  "09/05/2014   • Severe obesity (BMI 35.0-39.9) ([facility]) 09/05/2014   • Osteoarthritis 09/05/2014   • H/O shoulder replacement 09/05/2014   • Rotator cuff arthropathy 09/05/2014   • Bilateral bunions 09/05/2014       Past Medical History:   Diagnosis Date   • Anesthesia 11/03/2017    PONV with shoulder surgery approx 20 years ago.   • Anxiety and depression 11/03/2017   • Arthritis 11/03/2017    Osteoarthritis, \" all over\"   • Bunion    • Cancer ([facility]) 2017    skin lesion cut out, on left shoulder   • Chronic autoimmune thyroiditis      + US / + TPO = 57   • Dental disorder 11/03/2017    \"Upper Plate\"   • Diabetes mellitus ([facility]) 11/03/2017    \"Controlled with diet & Victoza\"   • Gout    • Hammertoe    • High cholesterol 11/03/2017    HX OF, not currently on medication for.   • Hypertension    • Hypothyroidism     chronic thyroiditis   • Open toe wound 03/2018    Left big toe, open wound, started 2/2018, receiving wound care therapy two times a week   • Pain 11/03/2017    \"Pain all over except right hip & ankle\"   • Sleep apnea 11/03/2017    CPAP USE   • Snoring 11/03/2017    DX JULIAN   • Tonsillitis         Past Surgical History:   Procedure Laterality Date   • TOE AMPUTATION Left 6/25/2018    Procedure: TOE AMPUTATION-FOR :PARTIAL 1ST DISTAL PHALANX EXCISION, GREAT TOE AMPUTATION;  Surgeon: Haroldo Kang M.D.;  Location: [facility];  Service: Orthopedics   • INTERNAL FIXATION REMOVAL Left 6/25/2018    Procedure: INTERNAL FIXATION REMOVAL;  Surgeon: Haroldo Kang M.D.;  Location: [facility];  Service: Orthopedics   • NERVE ULNAR TRANSFER Right 4/3/2018    Procedure: NERVE ULNAR TRANSFER - TRANSPOSITION;  Surgeon: Ayad Luna M.D.;  Location: [facility];  Service: Orthopedics   • CARPAL TUNNEL RELEASE Right 4/3/2018    Procedure: CARPAL TUNNEL RELEASE;  Surgeon: Ayad Luna M.D.;  Location: [facility];  Service: Orthopedics   • ELBOW ARTHROTOMY Right " 4/3/2018    Procedure: ELBOW ARTHROTOMY - FOR CONTRACTURE RELEASE AT THE ELBOW, RADIAL HEAD EXCISION;  Surgeon: Ayad Luna M.D.;  Location: [facility];  Service: Orthopedics   • TOE FUSION Right 11/13/2017    Procedure: TOE FUSION - 1ST METATARSALPHALANGEAL;  Surgeon: Haroldo Kang M.D.;  Location: [facility];  Service: Orthopedics   • METATARSAL HEAD RESECTION Right 11/13/2017    Procedure: METATARSAL HEAD RESECTION - EXCISION;  Surgeon: Haroldo Kang M.D.;  Location: [facility];  Service: Orthopedics   • HAMMERTOE CORRECTION Right 11/13/2017    Procedure: HAMMERTOE CORRECTION 2-5;  Surgeon: Haroldo Kang M.D.;  Location: [facility];  Service: Orthopedics   • METATARSAL HEAD RESECTION Left 8/21/2017    Procedure: METATARSAL HEAD RESECTION - 2-5;  Surgeon: Haroldo Kang M.D.;  Location: [facility];  Service:    • TOE FUSION Left 8/21/2017    Procedure: TOE FUSION - 1ST MTP;  Surgeon: Haroldo Kang M.D.;  Location: [facility];  Service:    • HARDWARE REMOVAL ORTHO Left 8/21/2017    Procedure: HARDWARE REMOVAL ORTHO;  Surgeon: Haroldo Kang M.D.;  Location: [facility];  Service:    • LUMBAR FUSION POSTERIOR  4/14/2017    Procedure: LUMBAR FUSION POSTERIOR - MINIMALLY INVASIVE TLIF L4-5;  Surgeon: Bossman Caro M.D.;  Location: [facility];  Service:    • HAMMERTOE CORRECTION Bilateral 12/22/2016    Procedure: HAMMERTOE CORRECTION/METATARSALPHALANGEAL JOINT RELEASE 2/3 RIGHT, 2-3 LEFT;  Surgeon: Haroldo Kang M.D.;  Location: [facility];  Service:    • BUNIONECTOMY Left 12/22/2016    Procedure: BUNIONECTOMY FOR DISTAL HALLUX VALGUS CORRECTION WITH BRANDY;  Surgeon: Haroldo Kang M.D.;  Location: [facility];  Service:    • ORTHOPEDIC OSTEOTOMY Left 12/22/2016    Procedure: ORTHOPEDIC OSTEOTOMY DISTAL METATARSAL 2-5;  Surgeon: Haroldo Kang M.D.;  Location:  "[facility]ERY [facility];  Service:    • HIP ARTH ANTERIOR TOTAL Left 8/18/2016    Procedure: HIP ARTHROPLASTY ANTERIOR TOTAL;  Surgeon: Emiliano Vang M.D.;  Location: SURGERY [facility];  Service:    • OTHER ORTHOPEDIC SURGERY  6/2014    right shoulder  arthroplasty   • OTHER ORTHOPEDIC SURGERY  11/1/2012    left knee/left ankle/left shoulder   • OTHER ORTHOPEDIC SURGERY  2004    elbow surgery   • OTHER  2000    dislocation left foot surgery at [facility]   • OTHER      \"septoplasty 20 years ago\"       Family History   Problem Relation Age of Onset   • Heart Disease Mother    • Depression Mother    • Cancer Father    • Sleep Apnea Neg Hx        Social History     Social History   • Marital status:      Spouse name: N/A   • Number of children: N/A   • Years of education: N/A     Occupational History   • Not on file.     Social History Main Topics   • Smoking status: Former Smoker     Packs/day: 1.00     Years: 20.00     Types: Cigarettes     Quit date: 1/1/2014   • Smokeless tobacco: Former User     Types: Chew     Quit date: 1/1/1997   • Alcohol use No   • Drug use: No   • Sexual activity: Not on file     Other Topics Concern   • Not on file     Social History Narrative   • No narrative on file       Current Outpatient Prescriptions   Medication Sig Dispense Refill   • CEFTAROLINE FOSAMIL IV by Intravenous route.     • doxycycline (VIBRAMYCIN) 100 MG Tab Take 100 mg by mouth 2 times a day.     • levothyroxine (SYNTHROID) 125 MCG Tab Take 1 Tab by mouth Every morning on an empty stomach. 90 Tab 3   • amphetamine-dextroamphetamine (ADDERALL, 20MG,) 20 MG Tab Take 10 mg by mouth every day.     • hydroxyzine pamoate (VISTARIL) 100 MG Cap Take 200 mg by mouth 2 Times a Day.     • tamsulosin (FLOMAX) 0.4 MG capsule Take 0.4 mg by mouth ONE-HALF HOUR AFTER BREAKFAST.     • liraglutide (VICTOZA) 18 MG/3ML Solution Pen-injector injection Inject 1.8 mg as instructed every morning.     • temazepam (RESTORIL) 30 MG " "capsule Take 30 mg by mouth at bedtime as needed for Sleep.     • venlafaxine XR (EFFEXOR XR) 75 MG CAPSULE SR 24 HR Take 75 mg by mouth every morning.     • alprazolam (XANAX) 0.25 MG Tab Take 0.25-0.5 mg by mouth 4 times a day as needed for Anxiety.     • losartan (COZAAR) 100 MG Tab Take 100 mg by mouth every morning.     • oxycodone-acetaminophen (PERCOCET) 5-325 MG TABS Take 1-2 Tabs by mouth every four hours as needed.       No current facility-administered medications for this visit.     \"CURRENT RX\"    ALLERGIES: Demerol and Sulfa drugs    ROS    Constitutional: Denies fever, chills, sweats,  weight loss, fatigue  Cardiovascular: Denies chest pain, tightness, palpitations, swelling in legs/feet, fainting, difficulty breathing when lying down but gets better when sitting up.   Respiratory: Denies shortness of breath, cough, sputum, wheezing, painful breathing, coughing up blood.   Sleep: per HPI  Gastrointestinal: Denies  difficulty swallowing, nausea, abdominal pain, diarrhea, constipation, heartburn..   Musculoskeletal: Multiple orthopedic issues       PHYSICAL EXAM  MSEW    /74   Pulse 91   Resp 14   Ht 1.778 m (5' 10\")   Wt 122 kg (269 lb)   SpO2 93%   BMI 38.60 kg/m²   Appearance: Well-nourished, well-developed, no acute distress  Eyes:  PERRLA, EOMI  ENMT: without lesions, deformities;hearing grossly intact; tongue normal, posterior pharynx without erythema or exudate; Mallampati classification:   Neck: Supple, trachea midline, no masses  Respiratory effort:  No intercostal retractions or use of accessory muscles  Lung auscultation:  No wheezes rhonchi rubs or rales  Cardiac: No murmurs, rubs, or gallops; regular rhythm, normal rate; no edema  Abdomen:  No tenderness, no organomegaly.  Obese  Musculoskeletal:  Elbow scar from recent surgery  Skin:  No rashes, petechiae, cyanosis  Neurologic: without focal signs; oriented to person, time, place, and purpose; judgement intact  Psychiatric:  No " depression, anxiety, agitation        PROBLEMS:  1. JULIAN (obstructive sleep apnea)      2. Type 2 diabetes mellitus without complication, without long-term current use of insulin (HCC)      3. History of tobacco abuse      4. Class 2 obesity with body mass index (BMI) of 38.0 to 38.9 in adult, unspecified obesity type, unspecified whether serious comorbidity present    - OBESITY COUNSELING (No Charge): Patient identified as having weight management issue.  Appropriate orders and counseling given.  '      PLAN:     Return in about 6 months (around 3/11/2019).    Has been advised to continue the current CPAP 10-15 cmH2O, clean equipment frequently, and get new mask and supplies as allowed by insurance and DME. Advised about potential problems including nasal obstruction/stuffiness, mask leak or discomfort , frequent awakenings, chill or dryness of the upper airway, noise, inconvenience, and lack of benefit. Recommend an earlier appointment, if significant treatment barriers develop.    Advised to take his cpap unit to any further surgeries.    Will change the ramp time to 25 minutes from 45 minutes - wirelessly in the office today.

## 2018-09-11 ENCOUNTER — SLEEP CENTER VISIT (OUTPATIENT)
Dept: SLEEP MEDICINE | Facility: MEDICAL CENTER | Age: 58
End: 2018-09-11
Payer: COMMERCIAL

## 2018-09-11 ENCOUNTER — OFFICE VISIT (OUTPATIENT)
Dept: ENDOCRINOLOGY | Facility: MEDICAL CENTER | Age: 58
End: 2018-09-11
Payer: COMMERCIAL

## 2018-09-11 VITALS
HEIGHT: 70 IN | HEART RATE: 91 BPM | BODY MASS INDEX: 38.51 KG/M2 | OXYGEN SATURATION: 93 % | RESPIRATION RATE: 14 BRPM | SYSTOLIC BLOOD PRESSURE: 110 MMHG | WEIGHT: 269 LBS | DIASTOLIC BLOOD PRESSURE: 74 MMHG

## 2018-09-11 VITALS
HEART RATE: 106 BPM | HEIGHT: 70 IN | SYSTOLIC BLOOD PRESSURE: 128 MMHG | OXYGEN SATURATION: 89 % | BODY MASS INDEX: 38.65 KG/M2 | DIASTOLIC BLOOD PRESSURE: 82 MMHG | WEIGHT: 270 LBS

## 2018-09-11 DIAGNOSIS — G47.33 OSA (OBSTRUCTIVE SLEEP APNEA): ICD-10-CM

## 2018-09-11 DIAGNOSIS — E06.3 HYPOTHYROIDISM, ACQUIRED, AUTOIMMUNE: Primary | ICD-10-CM

## 2018-09-11 DIAGNOSIS — E11.9 TYPE 2 DIABETES MELLITUS WITHOUT COMPLICATION, WITHOUT LONG-TERM CURRENT USE OF INSULIN (HCC): ICD-10-CM

## 2018-09-11 DIAGNOSIS — Z87.891 HISTORY OF TOBACCO ABUSE: ICD-10-CM

## 2018-09-11 DIAGNOSIS — E06.3 CHRONIC AUTOIMMUNE THYROIDITIS: ICD-10-CM

## 2018-09-11 DIAGNOSIS — E66.9 CLASS 2 OBESITY WITH BODY MASS INDEX (BMI) OF 38.0 TO 38.9 IN ADULT, UNSPECIFIED OBESITY TYPE, UNSPECIFIED WHETHER SERIOUS COMORBIDITY PRESENT: ICD-10-CM

## 2018-09-11 PROCEDURE — 99213 OFFICE O/P EST LOW 20 MIN: CPT | Performed by: INTERNAL MEDICINE

## 2018-09-11 PROCEDURE — 99214 OFFICE O/P EST MOD 30 MIN: CPT | Performed by: INTERNAL MEDICINE

## 2018-09-11 NOTE — PROGRESS NOTES
Chief Complaint   Patient presents with   • Hypothyroidism     chronic autoimmune thyroiditis        HPI:     See assessment and recommendations below    ROS:   Still battling multiple orthopedic problems which are a result of working as a  for heavy equipment over the years.   He tells me his diabetes management is been very good with the help of Dr. Myrick      Allergies:   Allergies   Allergen Reactions   • Demerol Vomiting and Nausea     Rxn = years ago    • Cubicin [Daptomycin] Vomiting   • Sulfa Drugs Hives     .       Current medicines including changes today:  Current Outpatient Prescriptions   Medication Sig Dispense Refill   • levothyroxine (SYNTHROID) 125 MCG Tab Take 1 Tab by mouth Every morning on an empty stomach. 90 Tab 3   • hydroxyzine pamoate (VISTARIL) 100 MG Cap Take 200 mg by mouth 2 Times a Day.     • tamsulosin (FLOMAX) 0.4 MG capsule Take 0.4 mg by mouth ONE-HALF HOUR AFTER BREAKFAST.     • liraglutide (VICTOZA) 18 MG/3ML Solution Pen-injector injection Inject 1.8 mg as instructed every morning.     • temazepam (RESTORIL) 30 MG capsule Take 30 mg by mouth at bedtime as needed for Sleep.     • venlafaxine XR (EFFEXOR XR) 75 MG CAPSULE SR 24 HR Take 75 mg by mouth every morning.     • losartan (COZAAR) 100 MG Tab Take 100 mg by mouth every morning.     • CEFTAROLINE FOSAMIL IV by Intravenous route.     • doxycycline (VIBRAMYCIN) 100 MG Tab Take 100 mg by mouth 2 times a day.     • amphetamine-dextroamphetamine (ADDERALL, 20MG,) 20 MG Tab Take 10 mg by mouth every day.     • alprazolam (XANAX) 0.25 MG Tab Take 0.25-0.5 mg by mouth 4 times a day as needed for Anxiety.     • oxycodone-acetaminophen (PERCOCET) 5-325 MG TABS Take 1-2 Tabs by mouth every four hours as needed.       No current facility-administered medications for this visit.         Past Medical History:   Diagnosis Date   • Anesthesia 11/03/2017    PONV with shoulder surgery approx 20 years ago.   • Anxiety and depression  "11/03/2017   • Arthritis 11/03/2017    Osteoarthritis, \" all over\"   • Bunion    • Cancer (HCC) 2017    skin lesion cut out, on left shoulder   • Chronic autoimmune thyroiditis      + US / + TPO = 57   • Dental disorder 11/03/2017    \"Upper Plate\"   • Diabetes mellitus (HCC) 11/03/2017    \"Controlled with diet & Victoza\"   • Gout    • Hammertoe    • High cholesterol 11/03/2017    HX OF, not currently on medication for.   • Hypertension    • Hypothyroidism     chronic thyroiditis   • Open toe wound 03/2018    Left big toe, open wound, started 2/2018, receiving wound care therapy two times a week   • Pain 11/03/2017    \"Pain all over except right hip & ankle\"   • Sleep apnea 11/03/2017    CPAP USE   • Snoring 11/03/2017    DX JULIAN   • Tonsillitis        PHYSICAL EXAM:    /82   Pulse (!) 106   Ht 1.778 m (5' 10\")   Wt 122.5 kg (270 lb)   SpO2 89%   BMI 38.74 kg/m²            Pulse taken by me during my examination is 85 and regular    Gen.  Obese but otherwise appears healthy     Skin   appropriate for sex and age    HEENT  unremarkable    Neck   thyroid gland is difficult to palpate. It is relatively small. No palpable nodules    Heart  regular    Extremities  no edema    Neuro  gait and station normal    Psych  appropriate      ASSESSMENT AND RECOMMENDATIONS    1. Hypothyroidism, acquired, autoimmune                Doses been increased from 112 µg per day of levothyroxine up to 125 µg per day. He is feeling well from a thyroid standpoint.                Current TSH has come down from 3.0 down to 1.6. Free T4 remains low normal at 0.5 and I'm not sure why the level did not come up with the dose adjustment. I tend to believe the validity of the TSH and not the free T4. We will continue levothyroxine 125 µg per day.  - FREE THYROXINE; Future  - TSH; Future    2. Chronic autoimmune thyroiditis              Thyroid gland is asymptomatic. Patient is not aware of any change in the gland. No discomfort. No " difficulty swallowing, breathing, or voice change.      DISPOSITION:   Reassess status in 6 months       Florentin Weiss M.D.    Copies to: Kevin Chavez M.D. [phone]

## 2018-09-18 ENCOUNTER — TELEPHONE (OUTPATIENT)
Dept: INFECTIOUS DISEASES | Facility: MEDICAL CENTER | Age: 58
End: 2018-09-18

## 2018-09-18 NOTE — TELEPHONE ENCOUNTER
Pt called wanting to know if he needs abx before a dental surgery on 10/15/18. Please advise.  -AMP

## 2018-09-18 NOTE — TELEPHONE ENCOUNTER
Pt is having hardware form feet removed that was related to MRSA infection. Appointment scheduled.  -AMP

## 2018-09-18 NOTE — TELEPHONE ENCOUNTER
Does patient has a hx of cardiac problems, valve disease, congenital heart disease, cardiac surgery, prosthetic joints?

## 2018-10-09 ENCOUNTER — OFFICE VISIT (OUTPATIENT)
Dept: INFECTIOUS DISEASES | Facility: MEDICAL CENTER | Age: 58
End: 2018-10-09
Payer: COMMERCIAL

## 2018-10-09 VITALS
SYSTOLIC BLOOD PRESSURE: 138 MMHG | TEMPERATURE: 98.2 F | WEIGHT: 271 LBS | HEART RATE: 96 BPM | BODY MASS INDEX: 38.8 KG/M2 | OXYGEN SATURATION: 95 % | DIASTOLIC BLOOD PRESSURE: 80 MMHG | HEIGHT: 70 IN

## 2018-10-09 DIAGNOSIS — Z87.39 H/O OSTEOMYELITIS: ICD-10-CM

## 2018-10-09 DIAGNOSIS — E11.9 TYPE 2 DIABETES MELLITUS WITHOUT COMPLICATION, WITHOUT LONG-TERM CURRENT USE OF INSULIN (HCC): ICD-10-CM

## 2018-10-09 PROCEDURE — 99214 OFFICE O/P EST MOD 30 MIN: CPT | Performed by: NURSE PRACTITIONER

## 2018-10-09 RX ORDER — LEVOFLOXACIN 500 MG/1
500 TABLET, FILM COATED ORAL DAILY
Status: ON HOLD | COMMUNITY
End: 2018-10-15

## 2018-10-09 NOTE — PROGRESS NOTES
Subjective:     Chief Complaint   Patient presents with   • Follow-Up     OM right ankle and foot, Pre surg Abx     Infectious Disease clinic follow up  Cristino Keys 58 y.o.male in clinic today for evaluation and management of left foot osteomyelitis. Primary care provider: Kevin Chavez M.D.  History of well-controlled diabetes, hypertension, and history of left foot bunionectomy as well as hardware placement in the left great toe.    Interval History: Patient established with ID, Dr Mak 7/2/18. In April 2018 he stubbed his left great toe and developed an open wound.  He was following up in the wound care clinic.  Wound cultures on May 11 positive for MSSA.  Despite oral antibiotics he continued to develop an open wound and bone was noted to be exposed.Repeat wound cultures on June 14 grew MRSA with a vancomycin ROBBY of 2.  He subsequently underwent left great toe removal of internal fixation, left amputation great toe with an interphalangeal disarticulation with irrigation and debridement on June 25, 2018 with Dr. Kang.  Distal great toe cultures are now growing MRSA and diphtheroids.  Patient started on IV daptomycin for 6 weeks with planned stop date of 08/16/18.    Patient developed adverse effects and muscle cramps on daptomycin. DC daptomycin and transition to ceftaroline to complete his course of antibiotics through 08/16/18. Patient followed by [facility].  Pt reporting that symptoms began to improve immediately after stopping Daptomycin.  Completed Ceftaroline as scheduled 8/16/18. PICC removed without issue following last dose.    Records reviewed    Today 8/28/18: Patient reports feeling well. Pt stating that the amputation site wound is well healed. Denies drainage, pungent odor, redness, pain. Denies feeling generally ill, fevers/chills, headache, n/v/d. Pt stating that approximately a week ago he did come down with fevers, cough after traveling to [place]. Patient stating he was seen by  "his primary care provider and diagnosed with an upper respiratory infection. He was started on levofloxacin, stating he has taken approximately 5 days of a 10 day course.  He does report marked improvement. He reports continued cough although improved. Denies chest pain or shortness of breath. He does also report some upcoming dental work stating that the dentist has already prescribed him amoxicillin 2 g to be taken prior to the dental work due to total hip.  Patient is in clinic today wanting to discuss antibiotic needs in light of an upcoming orthopedic surgery.  Patient stating that he believes Dr. Kang plans to prescribe him a few days of oral antibiotics following surgery. Patient stating he is scheduled for outpatient surgery on Monday with Dr. Kang to remove the plate and 9 screws from his right foot. Patient did have prior issues with infection, osteomyelitis in the left foot but states he has had no issues with infection in the right foot.    Past Medical History:   Diagnosis Date   • Anesthesia 11/03/2017    PONV with shoulder surgery approx 20 years ago.   • Anxiety and depression 11/03/2017   • Arthritis 11/03/2017    Osteoarthritis, \" all over\"   • Bunion    • Cancer ([facility]) 2017    skin lesion cut out, on left shoulder   • Chronic autoimmune thyroiditis      + US / + TPO = 57   • Dental disorder 11/03/2017    \"Upper Plate\"   • Diabetes mellitus (HCC) 11/03/2017    \"Controlled with diet & Victoza\"   • Gout    • Hammertoe    • High cholesterol 11/03/2017    HX OF, not currently on medication for.   • Hypertension    • Hypothyroidism     chronic thyroiditis   • Open toe wound 03/2018    Left big toe, open wound, started 2/2018, receiving wound care therapy two times a week   • Pain 11/03/2017    \"Pain all over except right hip & ankle\"   • Sleep apnea 11/03/2017    CPAP USE   • Snoring 11/03/2017    DX JULIAN   • Tonsillitis        Allergies: Demerol and Sulfa drugs    Current medications and problem " "list reviewed with patient and updated in Psychiatric.    ROS  As documented above in my HPI       Objective:     PE:  /80   Pulse 96   Temp 36.8 °C (98.2 °F) (Temporal)   Ht 1.778 m (5' 10\")   Wt 122.9 kg (271 lb)   SpO2 95%   BMI 38.88 kg/m²      Vital signs reviewed  Constitutional: patient is oriented to person, place, and time. Appears well-developed and well-nourished. No distress  Eyes: Conjunctivae normal and EOM are normal. Pupils are equal, round, and reactive to light.   Mouth/Throat: Lips without lesions, good dentition, oropharynx is clear and moist.  Neck: Trachea midline. Normal range of motion. Neck supple. No masses  Cardiovascular: Normal rate, regular rhythm, normal heart sounds and intact distal pulses. No murmur, gallop, or friction rub. No edema.  Pulmonary/Chest: No respiratory distress. Unlabored respiratory effort, lungs clear to auscultation. No wheezes or rales.   Abdominal: Soft, non tender. BS + x 4. No masses or hepatosplenomegaly.   Musculoskeletal: Normal range of motion. No tenderness, swelling, erythema, deformity noted.  Neurological: He is alert and oriented to person, place, and time. No cranial nerve deficit. Coordination normal.   Skin: Skin is warm and dry. Good turgor. No rashes visable.  Left foot: s/p distal tip great toe amputation. Site appears to be healing well  Right foot- healed scars. Slight erythema to right 2nd toe, unchanged  NIKITA PICC site well healed  Psychiatric: Normal mood and affect. Behavior is normal.       Assessment and Plan:   The following treatment plan was discussed with patient at length  1. H/O osteomyelitis     2. Type 2 diabetes mellitus without complication, without long-term current use of insulin (HCC)       Patient completed IV Ceftaroline as prescribed 8/16/18  He is currently taking antibiotics for an upper respiratory infection and states he is feeling better. I have discussed with him concern of having elective orthopedic surgery in " light of current upper respiratory infection. Patient stating that he will discuss this with the surgeon  As patient has had no issues with infection or osteomyelitis in his right foot I do not feel aggressive antibiotics postop are necessary. I called and spoke to Dr. Kang surgery scheduler regarding plan for postoperative antibiotics. She stated the current plan was for 3 days of oral Keflex. Discussed that due to previous cultures +MRSA, resistant to clindamycin and allergies to sulfa I would recommend 7 days of doxycycline 100 mg by mouth twice a day.  Follow-up with Dr. Kang as scheduled  Follow up: PRN. FU with PCP for ongoing chronic medical conditions.     25 min spent face to face with patient, >50% of time spent counseling, coordinating care, reviewing records, discussing POC, educating patient

## 2018-10-11 DIAGNOSIS — Z01.812 PRE-OPERATIVE LABORATORY EXAMINATION: ICD-10-CM

## 2018-10-11 LAB
ANION GAP SERPL CALC-SCNC: 11 MMOL/L (ref 0–11.9)
BUN SERPL-MCNC: 18 MG/DL (ref 8–22)
CALCIUM SERPL-MCNC: 9.6 MG/DL (ref 8.5–10.5)
CHLORIDE SERPL-SCNC: 102 MMOL/L (ref 96–112)
CO2 SERPL-SCNC: 24 MMOL/L (ref 20–33)
CREAT SERPL-MCNC: 1.08 MG/DL (ref 0.5–1.4)
GLUCOSE SERPL-MCNC: 95 MG/DL (ref 65–99)
POTASSIUM SERPL-SCNC: 4.2 MMOL/L (ref 3.6–5.5)
SODIUM SERPL-SCNC: 137 MMOL/L (ref 135–145)

## 2018-10-11 PROCEDURE — 80048 BASIC METABOLIC PNL TOTAL CA: CPT

## 2018-10-11 PROCEDURE — 36415 COLL VENOUS BLD VENIPUNCTURE: CPT

## 2018-10-11 NOTE — DISCHARGE PLANNING
DISCHARGE PLANNING NOTE      Procedure: Procedure(s):  ORTHOPEDIC OSTEOTOMY-  FOR: 3RD METATARSAL PARTIAL EXCISION, AND LEFT GASTROC SOLEUS RECESSION  HARDWARE REMOVAL ORTHO-  FOOT METATARSALPHALANGEAL JOINT PLATE  Procedure Date: 10/15/2018  Insurance:  Payor: [employer] / Plan: [facility]   Equipment currently available at home? bedside commode, cane, crutches, front-wheel walker and will obtain a kneeling scooter  Steps into the home? 10  Steps within the home? 0  Toilet height? Standard  Type of shower? walk-in shower  Who will be with you during your recovery? spouse     Plan: Patient will be NWB for at least 6 weeks. He will  a kneeling scooter from [employer]. Recommended he request a shower chair from [employer] as well. He will use the commode frame over the toilet. Anticipate discharge home.

## 2018-10-15 ENCOUNTER — APPOINTMENT (OUTPATIENT)
Dept: RADIOLOGY | Facility: MEDICAL CENTER | Age: 58
End: 2018-10-15
Attending: ORTHOPAEDIC SURGERY
Payer: COMMERCIAL

## 2018-10-15 ENCOUNTER — HOSPITAL ENCOUNTER (OUTPATIENT)
Facility: MEDICAL CENTER | Age: 58
End: 2018-10-15
Attending: ORTHOPAEDIC SURGERY | Admitting: ORTHOPAEDIC SURGERY
Payer: COMMERCIAL

## 2018-10-15 VITALS
OXYGEN SATURATION: 95 % | HEART RATE: 86 BPM | HEIGHT: 70 IN | TEMPERATURE: 98.1 F | BODY MASS INDEX: 38.66 KG/M2 | SYSTOLIC BLOOD PRESSURE: 126 MMHG | WEIGHT: 270.06 LBS | DIASTOLIC BLOOD PRESSURE: 77 MMHG | RESPIRATION RATE: 16 BRPM

## 2018-10-15 LAB — GLUCOSE BLD-MCNC: 116 MG/DL (ref 65–99)

## 2018-10-15 PROCEDURE — 160035 HCHG PACU - 1ST 60 MINS PHASE I: Performed by: ORTHOPAEDIC SURGERY

## 2018-10-15 PROCEDURE — 160025 RECOVERY II MINUTES (STATS): Performed by: ORTHOPAEDIC SURGERY

## 2018-10-15 PROCEDURE — 700101 HCHG RX REV CODE 250

## 2018-10-15 PROCEDURE — A9270 NON-COVERED ITEM OR SERVICE: HCPCS | Performed by: ANESTHESIOLOGY

## 2018-10-15 PROCEDURE — 500881 HCHG PACK, EXTREMITY: Performed by: ORTHOPAEDIC SURGERY

## 2018-10-15 PROCEDURE — 160048 HCHG OR STATISTICAL LEVEL 1-5: Performed by: ORTHOPAEDIC SURGERY

## 2018-10-15 PROCEDURE — 700111 HCHG RX REV CODE 636 W/ 250 OVERRIDE (IP)

## 2018-10-15 PROCEDURE — 500423 HCHG DRESSING, ABD COMBINE: Performed by: ORTHOPAEDIC SURGERY

## 2018-10-15 PROCEDURE — 700111 HCHG RX REV CODE 636 W/ 250 OVERRIDE (IP): Performed by: ANESTHESIOLOGY

## 2018-10-15 PROCEDURE — 160009 HCHG ANES TIME/MIN: Performed by: ORTHOPAEDIC SURGERY

## 2018-10-15 PROCEDURE — A6223 GAUZE >16<=48 NO W/SAL W/O B: HCPCS | Performed by: ORTHOPAEDIC SURGERY

## 2018-10-15 PROCEDURE — 160046 HCHG PACU - 1ST 60 MINS PHASE II: Performed by: ORTHOPAEDIC SURGERY

## 2018-10-15 PROCEDURE — 160029 HCHG SURGERY MINUTES - 1ST 30 MINS LEVEL 4: Performed by: ORTHOPAEDIC SURGERY

## 2018-10-15 PROCEDURE — 82962 GLUCOSE BLOOD TEST: CPT

## 2018-10-15 PROCEDURE — 501838 HCHG SUTURE GENERAL: Performed by: ORTHOPAEDIC SURGERY

## 2018-10-15 PROCEDURE — 160002 HCHG RECOVERY MINUTES (STAT): Performed by: ORTHOPAEDIC SURGERY

## 2018-10-15 PROCEDURE — 160022 HCHG BLOCK: Performed by: ORTHOPAEDIC SURGERY

## 2018-10-15 PROCEDURE — 160036 HCHG PACU - EA ADDL 30 MINS PHASE I: Performed by: ORTHOPAEDIC SURGERY

## 2018-10-15 PROCEDURE — 160041 HCHG SURGERY MINUTES - EA ADDL 1 MIN LEVEL 4: Performed by: ORTHOPAEDIC SURGERY

## 2018-10-15 PROCEDURE — 700102 HCHG RX REV CODE 250 W/ 637 OVERRIDE(OP): Performed by: ANESTHESIOLOGY

## 2018-10-15 RX ORDER — DIPHENHYDRAMINE HYDROCHLORIDE 50 MG/ML
12.5 INJECTION INTRAMUSCULAR; INTRAVENOUS
Status: DISCONTINUED | OUTPATIENT
Start: 2018-10-15 | End: 2018-10-15 | Stop reason: HOSPADM

## 2018-10-15 RX ORDER — HALOPERIDOL 5 MG/ML
1 INJECTION INTRAMUSCULAR
Status: DISCONTINUED | OUTPATIENT
Start: 2018-10-15 | End: 2018-10-15 | Stop reason: HOSPADM

## 2018-10-15 RX ORDER — HYDROMORPHONE HYDROCHLORIDE 2 MG/ML
0.4 INJECTION, SOLUTION INTRAMUSCULAR; INTRAVENOUS; SUBCUTANEOUS
Status: DISCONTINUED | OUTPATIENT
Start: 2018-10-15 | End: 2018-10-15 | Stop reason: HOSPADM

## 2018-10-15 RX ORDER — LIDOCAINE HYDROCHLORIDE 10 MG/ML
INJECTION, SOLUTION INFILTRATION; PERINEURAL
Status: DISCONTINUED | OUTPATIENT
Start: 2018-10-15 | End: 2018-10-15 | Stop reason: HOSPADM

## 2018-10-15 RX ORDER — ACETAMINOPHEN 500 MG
1000 TABLET ORAL ONCE
Status: COMPLETED | OUTPATIENT
Start: 2018-10-15 | End: 2018-10-15

## 2018-10-15 RX ORDER — OXYCODONE HCL 5 MG/5 ML
5 SOLUTION, ORAL ORAL
Status: COMPLETED | OUTPATIENT
Start: 2018-10-15 | End: 2018-10-15

## 2018-10-15 RX ORDER — HYDROMORPHONE HYDROCHLORIDE 2 MG/ML
0.1 INJECTION, SOLUTION INTRAMUSCULAR; INTRAVENOUS; SUBCUTANEOUS
Status: DISCONTINUED | OUTPATIENT
Start: 2018-10-15 | End: 2018-10-15 | Stop reason: HOSPADM

## 2018-10-15 RX ORDER — METOPROLOL TARTRATE 1 MG/ML
1 INJECTION, SOLUTION INTRAVENOUS
Status: DISCONTINUED | OUTPATIENT
Start: 2018-10-15 | End: 2018-10-15 | Stop reason: HOSPADM

## 2018-10-15 RX ORDER — MIDAZOLAM HYDROCHLORIDE 1 MG/ML
1 INJECTION INTRAMUSCULAR; INTRAVENOUS
Status: DISCONTINUED | OUTPATIENT
Start: 2018-10-15 | End: 2018-10-15 | Stop reason: HOSPADM

## 2018-10-15 RX ORDER — BUPIVACAINE HYDROCHLORIDE 5 MG/ML
INJECTION, SOLUTION EPIDURAL; INTRACAUDAL
Status: DISCONTINUED | OUTPATIENT
Start: 2018-10-15 | End: 2018-10-15 | Stop reason: HOSPADM

## 2018-10-15 RX ORDER — LABETALOL HYDROCHLORIDE 5 MG/ML
5 INJECTION, SOLUTION INTRAVENOUS
Status: DISCONTINUED | OUTPATIENT
Start: 2018-10-15 | End: 2018-10-15 | Stop reason: HOSPADM

## 2018-10-15 RX ORDER — MAGNESIUM HYDROXIDE 1200 MG/15ML
LIQUID ORAL
Status: COMPLETED | OUTPATIENT
Start: 2018-10-15 | End: 2018-10-15

## 2018-10-15 RX ORDER — DEXTROAMPHETAMINE SACCHARATE, AMPHETAMINE ASPARTATE, DEXTROAMPHETAMINE SULFATE AND AMPHETAMINE SULFATE 1.25; 1.25; 1.25; 1.25 MG/1; MG/1; MG/1; MG/1
5 TABLET ORAL DAILY
COMMUNITY
End: 2021-08-16 | Stop reason: CLARIF

## 2018-10-15 RX ORDER — HYDROMORPHONE HYDROCHLORIDE 2 MG/ML
0.2 INJECTION, SOLUTION INTRAMUSCULAR; INTRAVENOUS; SUBCUTANEOUS
Status: DISCONTINUED | OUTPATIENT
Start: 2018-10-15 | End: 2018-10-15 | Stop reason: HOSPADM

## 2018-10-15 RX ORDER — SODIUM CHLORIDE 9 MG/ML
INJECTION, SOLUTION INTRAVENOUS ONCE
Status: COMPLETED | OUTPATIENT
Start: 2018-10-15 | End: 2018-10-15

## 2018-10-15 RX ORDER — HYDRALAZINE HYDROCHLORIDE 20 MG/ML
5 INJECTION INTRAMUSCULAR; INTRAVENOUS
Status: DISCONTINUED | OUTPATIENT
Start: 2018-10-15 | End: 2018-10-15 | Stop reason: HOSPADM

## 2018-10-15 RX ORDER — ONDANSETRON 2 MG/ML
4 INJECTION INTRAMUSCULAR; INTRAVENOUS
Status: DISCONTINUED | OUTPATIENT
Start: 2018-10-15 | End: 2018-10-15 | Stop reason: HOSPADM

## 2018-10-15 RX ORDER — GABAPENTIN 300 MG/1
300 CAPSULE ORAL ONCE
Status: COMPLETED | OUTPATIENT
Start: 2018-10-15 | End: 2018-10-15

## 2018-10-15 RX ORDER — SODIUM CHLORIDE, SODIUM LACTATE, POTASSIUM CHLORIDE, CALCIUM CHLORIDE 600; 310; 30; 20 MG/100ML; MG/100ML; MG/100ML; MG/100ML
INJECTION, SOLUTION INTRAVENOUS CONTINUOUS
Status: DISCONTINUED | OUTPATIENT
Start: 2018-10-15 | End: 2018-10-15 | Stop reason: HOSPADM

## 2018-10-15 RX ORDER — OXYCODONE HCL 5 MG/5 ML
10 SOLUTION, ORAL ORAL
Status: COMPLETED | OUTPATIENT
Start: 2018-10-15 | End: 2018-10-15

## 2018-10-15 RX ADMIN — GABAPENTIN 300 MG: 300 CAPSULE ORAL at 07:47

## 2018-10-15 RX ADMIN — HYDROMORPHONE HYDROCHLORIDE 0.4 MG: 2 INJECTION INTRAMUSCULAR; INTRAVENOUS; SUBCUTANEOUS at 09:26

## 2018-10-15 RX ADMIN — SODIUM CHLORIDE: 9 INJECTION, SOLUTION INTRAVENOUS at 07:10

## 2018-10-15 RX ADMIN — OXYCODONE HYDROCHLORIDE 10 MG: 5 SOLUTION ORAL at 08:46

## 2018-10-15 RX ADMIN — ACETAMINOPHEN 1000 MG: 500 TABLET, FILM COATED ORAL at 07:47

## 2018-10-15 RX ADMIN — HYDROMORPHONE HYDROCHLORIDE 0.4 MG: 2 INJECTION INTRAMUSCULAR; INTRAVENOUS; SUBCUTANEOUS at 09:13

## 2018-10-15 ASSESSMENT — PAIN SCALES - GENERAL
PAINLEVEL_OUTOF10: 5
PAINLEVEL_OUTOF10: 5
PAINLEVEL_OUTOF10: 4
PAINLEVEL_OUTOF10: 4
PAINLEVEL_OUTOF10: 7
PAINLEVEL_OUTOF10: 6

## 2018-10-15 NOTE — OP REPORT
DATE OF SERVICE:  10/15/2018    PREOPERATIVE DIAGNOSES:  1.  Left gastroc soleus contracture.  2.  Left overload of third metatarsal head.  3.  Right painful internal fixation, foot.    POSTOPERATIVE DIAGNOSES:  1.  Left gastroc soleus contracture.  2.  Left overload of third metatarsal head.  3.  Right painful internal fixation, foot.    PROCEDURES PERFORMED:  1.  Left gastroc soleus recession.  2.  Left partial excision third metatarsal.  3.  Right open removal internal fixation.    SURGEON:  Haroldo Kang MD    FIRST ASSISTANT:  Shorty Portillo MD.    SECOND ASSISTANT:  Matilda Mattson.    ANESTHESIA:  General endotracheal with popliteal block per my request for   postoperative pain management.    ESTIMATED BLOOD LOSS:  None.    COMPLICATIONS:  None.    POSTOPERATIVE PLAN:  1.  Nonweightbearing on the left, weightbearing as tolerated on the right.  2.  Preoperative antibiotics  3.  Follow up in 2 weeks.    INDICATIONS:  The patient is a pleasant 58-year-old male who has had problems   with his bilateral feet.  The above diagnoses made and options were discussed   with him including operative and nonoperative and he elected to undergo   operative intervention.  The above procedure was discussed and all questions   were answered.  Risks of surgery explained which include but not limited to   wound problems, infection, nerve injury, vascular injury, need for further   surgery.  He understands and accepts these risks and agrees to proceed.  His   site was marked by myself prior to receiving psychotropic medicines.    PROCEDURE IN DETAIL:  The patient was brought into the operating room and   underwent general endotracheal anesthesia without complications.  His   bilateral lower extremities were prepped and draped in standard fashion.    Positive site verification confirmed his bilateral lower extremities as well   as procedure and confirmation that he received preoperative antibiotics.    Esmarch was used to  exsanguinate his foot and ankle and leg tourniquet was   inflated to 250 mmHg.  On left side, a posteromedial incision was made over   the musculotendinous junction of the gastroc.  It was dissected down.  The   crural fascia exposing the gastroc tendon.  Under direct visualization from   lateral to medial, the gastroc tendon was released.  This gave significant   improvement in dorsiflexion of the foot with the knee extended.  The wound was   then irrigated with copious irrigation, was closed in layered fashion using   3-0 Vicryl and 3-0 nylon.  Next, a dorsal second webspace incision was made,   dissected down to the extensor tendon of the third toe where it was split.    The distal end of the metatarsal was identified and was cleared of all soft   tissue and Martinez elevator was done.  He did have some heterotopic ossification   which was performed on the plantar aspect of his third toe or third metatarsal   and a microsagittal saw was used to make this flush to the third metatarsal.    This heterotopic ossification was removed.  The wound was then irrigated with   copious irrigation.  The split tendon was repaired with 3-0 Vicryl and skin   was closed with interrupted nylon suture.    On his right foot, a dorsal incision was made, it was dissected down to the   level of the plate was cleared of all soft tissue.  All screws and the plate   were removed.  Rongeur used to smooth the area down.  The wound was irrigated   out and was closed with interrupted nylon suture.  Sterile dressings were   applied.  Tourniquet was released.  All toes were pink.  He was placed into   posterior sugar tong splint on the left.  He was transferred to the recovery   room in good condition.    Utilization of Dr. Portillo was necessary patient positioning, holding,   retracting, wound closure, and dressing placement.  He was present throughout   the entire procedure.       ____________________________________     ALFONSO SOSA  MD HAYS / EMILI    DD:  10/15/2018 09:09:10  DT:  10/15/2018 10:47:10    D#:  [id number]  Job#:  [id number]    cc: [facility]

## 2018-10-15 NOTE — OR NURSING
0955- Patient arrived in PACU II alert and oriented. Vital signs are within normal limits for the patient. Patient reports pain as 4/10 on left lower extremity but it is tolerable. Dressings are clean, dry, and intact. Discussed discharge plan of care and patient expressed understanding. All needs met at this time.    1010- Provided patient is with discharge education with wife, Natalie, at bedside. All questions answered.    1015- Patient feels ready to be discharged and mets discharge criteria set by Dr. Kang. Patient is going to get dressed.    1030- PIV removed, patient voided at restroom and patient discharged home via wheelchair.

## 2018-10-15 NOTE — DISCHARGE INSTRUCTIONS
ACTIVITY: Rest and take it easy for the first 24 hours.  A responsible adult is recommended to remain with you during that time.  It is normal to feel sleepy.  We encourage you to not do anything that requires balance, judgment or coordination.    MILD FLU-LIKE SYMPTOMS ARE NORMAL. YOU MAY EXPERIENCE GENERALIZED MUSCLE ACHES, THROAT IRRITATION, HEADACHE AND/OR SOME NAUSEA.    FOR 24 HOURS DO NOT:  Drive, operate machinery or run household appliances.  Drink beer or alcoholic beverages.   Make important decisions or sign legal documents.    SPECIAL INSTRUCTIONS:   Weight bearing as tolerated right lower extremity in hard sole post op shoe, Touch down weight bearing left lower extremity in splint  Elevate both extremities above heart to decrease pain and swelling  Keep splint and dressing clean, dry, and intact until follow-up  Resume regular home diet  Follow up in 2 to 3 days with Dr. Kang for removal of splint, dressing change, and boot application to left lower exremity    DIET: To avoid nausea, slowly advance diet as tolerated, avoiding spicy or greasy foods for the first day.  Add more substantial food to your diet according to your physician's instructions.  INCREASE FLUIDS AND FIBER TO AVOID CONSTIPATION.    You should CALL YOUR PHYSICIAN if you develop:  Fever greater than 101 degrees F.  Pain not relieved by medication, or persistent nausea or vomiting.  Excessive bleeding (blood soaking through dressing) or unexpected drainage from the wound.  Extreme redness or swelling around the incision site, drainage of pus or foul smelling drainage.  Inability to urinate or empty your bladder within 8 hours.  Problems with breathing or chest pain.    You should call 911 if you develop problems with breathing or chest pain.  If you are unable to contact your doctor or surgical center, you should go to the nearest emergency room or urgent care center.  Physician's telephone #: [phone]    If any questions  arise, call your doctor.  If your doctor is not available, please feel free to call the Surgical Center at [phone].  The Center is open Monday through Friday from 7AM to 7PM.  You can also call the HEALTH HOTLINE open 24 hours/day, 7 days/week and speak to a nurse at [phone], or toll free at [phone].    A registered nurse may call you a few days after your surgery to see how you are doing after your procedure.    MEDICATIONS: Resume taking daily medication.  Take prescribed pain medication with food.  If no medication is prescribed, you may take non-aspirin pain medication if needed.  PAIN MEDICATION CAN BE VERY CONSTIPATING.  Take a stool softener or laxative such as senokot, pericolace, or milk of magnesia if needed.    Prescription provided in TANIKA folder.  Last pain medication given at 8:46 am.    If your physician has prescribed pain medication that includes Acetaminophen (Tylenol), do not take additional Acetaminophen (Tylenol) while taking the prescribed medication.    Depression / Suicide Risk    As you are discharged from this [facility] facility, it is important to learn how to keep safe from harming yourself.    Recognize the warning signs:  · Abrupt changes in personality, positive or negative- including increase in energy   · Giving away possessions  · Change in eating patterns- significant weight changes-  positive or negative  · Change in sleeping patterns- unable to sleep or sleeping all the time   · Unwillingness or inability to communicate  · Depression  · Unusual sadness, discouragement and loneliness  · Talk of wanting to die  · Neglect of personal appearance   · Rebelliousness- reckless behavior  · Withdrawal from people/activities they love  · Confusion- inability to concentrate     If you or a loved one observes any of these behaviors or has concerns about self-harm, here's what you can do:  · Talk about it- your feelings and reasons for harming yourself  · Remove any  means that you might use to hurt yourself (examples: pills, rope, extension cords, firearm)  · Get professional help from the community (Mental Health, Substance Abuse, psychological counseling)  · Do not be alone:Call your Safe Contact- someone whom you trust who will be there for you.  · Call your local CRISIS HOTLINE 490-1032 or 150-699-3067  · Call your local Children's Mobile Crisis Response Team [place] (411) 022-8842 or www.[employer]  · Call the toll free National Suicide Prevention Hotlines   · National Suicide Prevention Lifeline 647-760-TQEG (8895)  · National Hope Line Network 800-SUICIDE (856-0665)

## 2018-10-25 ENCOUNTER — IMMUNIZATION (OUTPATIENT)
Dept: SOCIAL WORK | Facility: CLINIC | Age: 58
End: 2018-10-25
Payer: COMMERCIAL

## 2018-10-25 DIAGNOSIS — Z23 NEED FOR VACCINATION: ICD-10-CM

## 2018-10-25 PROCEDURE — 90686 IIV4 VACC NO PRSV 0.5 ML IM: CPT | Performed by: REGISTERED NURSE

## 2018-10-25 PROCEDURE — 90471 IMMUNIZATION ADMIN: CPT | Performed by: REGISTERED NURSE

## 2019-02-27 ENCOUNTER — HOSPITAL ENCOUNTER (OUTPATIENT)
Dept: LAB | Facility: MEDICAL CENTER | Age: 59
End: 2019-02-27
Attending: INTERNAL MEDICINE
Payer: COMMERCIAL

## 2019-02-27 ENCOUNTER — HOSPITAL ENCOUNTER (OUTPATIENT)
Dept: LAB | Facility: MEDICAL CENTER | Age: 59
End: 2019-02-27
Attending: FAMILY MEDICINE
Payer: COMMERCIAL

## 2019-02-27 LAB
EST. AVERAGE GLUCOSE BLD GHB EST-MCNC: 131 MG/DL
HBA1C MFR BLD: 6.2 % (ref 0–5.6)

## 2019-02-27 PROCEDURE — 36415 COLL VENOUS BLD VENIPUNCTURE: CPT | Mod: GA

## 2019-02-27 PROCEDURE — 84270 ASSAY OF SEX HORMONE GLOBUL: CPT

## 2019-02-27 PROCEDURE — 83036 HEMOGLOBIN GLYCOSYLATED A1C: CPT | Mod: GA

## 2019-02-27 PROCEDURE — 84403 ASSAY OF TOTAL TESTOSTERONE: CPT

## 2019-03-01 LAB
SHBG SERPL-SCNC: 30 NMOL/L (ref 11–80)
TESTOST FREE MFR SERPL: 1.8 % (ref 1.6–2.9)
TESTOST FREE SERPL-MCNC: 21 PG/ML (ref 47–244)
TESTOST SERPL-MCNC: 117 NG/DL (ref 300–720)

## 2019-03-06 ENCOUNTER — PATIENT MESSAGE (OUTPATIENT)
Dept: ENDOCRINOLOGY | Facility: MEDICAL CENTER | Age: 59
End: 2019-03-06

## 2019-03-06 DIAGNOSIS — E06.3 HYPOTHYROIDISM, ACQUIRED, AUTOIMMUNE: ICD-10-CM

## 2019-03-06 NOTE — TELEPHONE ENCOUNTER
From: Cristino Keys  To: Florentin Weiss M.D.  Sent: 3/6/2019 1:41 PM PST  Subject: Test Result Question    Last week I did labs for three doctors including Dr Weiss and I haven't seen any results yet. Can you please let me know if the lab forgot to do them? And can I please get another standing order for my next tests. I have a appointment on the 11th with Dr Weiss so can you also send me an email with the orders so I have my own copies, the lab has had issues in the past and unable to find them. And I would like Dr Weiss to look at my testosterone test and consult with him on a plan to correct it. I saw Dr Myrick yesterday and he recommended I see Dr Weiss for this problem with my thyroid issues as well.     Thank you  Cristino Keys   [phone]  [email or web address]

## 2019-03-06 NOTE — PATIENT COMMUNICATION
02/27/19 Testosterone F&T male adult ordered by Dr. Chavez (Dr. Myrick suggested Dr. Weiss review and treat)     Pt was under impression that labs for Dr. Weiss where being done that same day, none were done. Pt would like a new standing order for thyroid panel.    Pt will be coming in to see Dr. Weiss on 03/11/19 ans would like to have the Thyroid panel done before that appointment.

## 2019-03-08 ENCOUNTER — TELEPHONE (OUTPATIENT)
Dept: ENDOCRINOLOGY | Facility: MEDICAL CENTER | Age: 59
End: 2019-03-08

## 2019-03-08 DIAGNOSIS — R79.89 LOW TESTOSTERONE IN MALE: ICD-10-CM

## 2019-03-09 NOTE — TELEPHONE ENCOUNTER
Telephone conversation with patient.    Patient tells me he has had low testosterone for some time.  He tried a sample of testosterone a few years ago which did not seem to help anything.  He wants to address it again and I see he has a free testosterone that is low.    I put in additional pituitary related tests to further discuss when he comes in for next appointment along with thyroid levels.    Florentin Weiss M.D.

## 2019-03-09 NOTE — PROGRESS NOTES
CC: Follow up for JULIAN on auto titrating CPAP 10-15 cm H2O    HPI:  Mr. Cristino Keys is a 57 y/o retired male  who was last seen 9/11/2018 for mild JULIAN with an AHI of 6.1 and a patel saturation of 85%.  His 30-day compliance was excellent and he has a residual apnea hypopnea index of 5.5.    His 30-day compliance is excellent with a normalized residual apnea hypopnea index.    The patient reports improved symptoms using positive airway pressure.  Has experienced no significant problems with mask fit, mask leak, pressure tolerance, excessive airway dryness, nasal congestion, aerophagia, or chest pain.    Does have claustrophbia with his mask - has FFM    Significant comorbidities and modifying factors include obesity, hypertension, hypothyroidism, former smoker, up-to-date with influenza vaccination, diabetes, and multiple orthopedic issues.    Patient Active Problem List    Diagnosis Date Noted   • Former smoker 03/11/2019   • Low testosterone in male 03/08/2019   • Wound infection 05/14/2018   • Diabetic ulcer of toe of left foot associated with type 2 diabetes mellitus, with fat layer exposed ([facility]) 05/14/2018   • Contracture of elbow joint, right 04/03/2018   • BMI 38.0-38.9,adult 11/01/2017   • Chronic pain 08/28/2017   • Chronic narcotic use 08/28/2017   • Arthritis of left ankle 08/21/2017   • Degeneration of lumbar intervertebral disc 04/14/2017   • Lower back pain 04/14/2017   • JULIAN (obstructive sleep apnea) 04/03/2017   • Snoring 04/03/2017   • Acquired hallux valgus of left foot 12/22/2016   • Type 2 diabetes mellitus without complication ([facility]) 12/13/2016   • Chronic autoimmune thyroiditis 10/03/2016   • Hypothyroidism, acquired, autoimmune 10/03/2016   • Primary osteoarthritis of left hip 08/18/2016   • Diabetes ([facility]) 09/05/2014   • Lumbar disc disease 09/05/2014   • Gout 09/05/2014   • Hypertension 09/05/2014   • H/O arthroscopy of knee 09/05/2014   • Hx of elbow surgery 09/05/2014  "  • Severe obesity with body mass index (BMI) of 35.0 to 39.9 with serious comorbidity ([facility]) 09/05/2014   • Osteoarthritis 09/05/2014   • H/O shoulder replacement 09/05/2014   • Rotator cuff arthropathy 09/05/2014   • Bilateral bunions 09/05/2014       Past Medical History:   Diagnosis Date   • Anesthesia 11/03/2017    PONV with shoulder surgery approx 20 years ago.   • Anxiety and depression 11/03/2017   • Arthritis 11/03/2017    Osteoarthritis, \" all over\"   • Bunion    • Cancer ([facility]) 2017    skin lesion cut out, on left shoulder   • Chronic autoimmune thyroiditis      + US / + TPO = 57   • Dental disorder 11/03/2017    \"Upper Plate\"   • Diabetes mellitus ([facility]) 11/03/2017    \"Controlled with diet & Victoza\"   • Gout    • Hammertoe    • High cholesterol 11/03/2017    HX OF, not currently on medication for.   • Hypertension    • Hypothyroidism     chronic thyroiditis   • Open toe wound 03/2018    Left big toe, open wound, started 2/2018, receiving wound care therapy two times a week   • Pain 11/03/2017    \"Pain all over except right hip & ankle\"   • Sleep apnea 11/03/2017    CPAP USE   • Snoring 11/03/2017    DX JULIAN   • Tonsillitis         Past Surgical History:   Procedure Laterality Date   • ORTHOPEDIC OSTEOTOMY Left 10/15/2018    Procedure: ORTHOPEDIC OSTEOTOMY-  FOR: 3RD METATARSAL PARTIAL EXCISION, AND LEFT GASTROC SOLEUS RECESSION;  Surgeon: Haroldo Kang M.D.;  Location: [facility];  Service: Orthopedics   • HARDWARE REMOVAL ORTHO Right 10/15/2018    Procedure: HARDWARE REMOVAL ORTHO-  FOOT METATARSALPHALANGEAL JOINT PLATE;  Surgeon: Haroldo Kang M.D.;  Location: [facility];  Service: Orthopedics   • TOE AMPUTATION Left 6/25/2018    Procedure: TOE AMPUTATION-FOR :PARTIAL 1ST DISTAL PHALANX EXCISION, GREAT TOE AMPUTATION;  Surgeon: Haroldo Kang M.D.;  Location: [facility];  Service: Orthopedics   • INTERNAL FIXATION REMOVAL Left 6/25/2018    Procedure: INTERNAL " FIXATION REMOVAL;  Surgeon: Haroldo Kang M.D.;  Location: [facility];  Service: Orthopedics   • NERVE ULNAR TRANSFER Right 4/3/2018    Procedure: NERVE ULNAR TRANSFER - TRANSPOSITION;  Surgeon: Ayad Luna M.D.;  Location: [facility];  Service: Orthopedics   • CARPAL TUNNEL RELEASE Right 4/3/2018    Procedure: CARPAL TUNNEL RELEASE;  Surgeon: Ayad Luna M.D.;  Location: [facility];  Service: Orthopedics   • ELBOW ARTHROTOMY Right 4/3/2018    Procedure: ELBOW ARTHROTOMY - FOR CONTRACTURE RELEASE AT THE ELBOW, RADIAL HEAD EXCISION;  Surgeon: Ayad Luna M.D.;  Location: [facility];  Service: Orthopedics   • SHOULDER SURGERY Right 12/02/2017    reversal   • METATARSAL HEAD RESECTION Right 11/13/2017    Procedure: METATARSAL HEAD RESECTION - EXCISION;  Surgeon: Haroldo Kang M.D.;  Location: [facility];  Service: Orthopedics   • HAMMERTOE CORRECTION Right 11/13/2017    Procedure: HAMMERTOE CORRECTION 2-5;  Surgeon: Haroldo Kang M.D.;  Location: [facility];  Service: Orthopedics   • TOE FUSION Right 11/13/2017    Procedure: TOE FUSION - 1ST METATARSALPHALANGEAL;  Surgeon: Haroldo Kang M.D.;  Location: [facility];  Service: Orthopedics   • METATARSAL HEAD RESECTION Left 8/21/2017    Procedure: METATARSAL HEAD RESECTION - 2-5;  Surgeon: Haroldo Kang M.D.;  Location: [facility];  Service:    • TOE FUSION Left 8/21/2017    Procedure: TOE FUSION - 1ST MTP;  Surgeon: Haroldo Kang M.D.;  Location: [facility];  Service:    • HARDWARE REMOVAL ORTHO Left 8/21/2017    Procedure: HARDWARE REMOVAL ORTHO;  Surgeon: Haroldo Kang M.D.;  Location: [facility];  Service:    • LUMBAR FUSION POSTERIOR  4/14/2017    Procedure: LUMBAR FUSION POSTERIOR - MINIMALLY INVASIVE TLIF L4-5;  Surgeon: Bossman Caro M.D.;  Location: [facility];  Service:    •  "HAMMERTOE CORRECTION Bilateral 12/22/2016    Procedure: HAMMERTOE CORRECTION/METATARSALPHALANGEAL JOINT RELEASE 2/3 RIGHT, 2-3 LEFT;  Surgeon: Haroldo Kang M.D.;  Location: SURGERY [facility];  Service:    • BUNIONECTOMY Left 12/22/2016    Procedure: BUNIONECTOMY FOR DISTAL HALLUX VALGUS CORRECTION WITH BRANDY;  Surgeon: Haroldo Kang M.D.;  Location: SURGERY [facility];  Service:    • ORTHOPEDIC OSTEOTOMY Left 12/22/2016    Procedure: ORTHOPEDIC OSTEOTOMY DISTAL METATARSAL 2-5;  Surgeon: Haroldo Kang M.D.;  Location: SURGERY [facility];  Service:    • HIP ARTH ANTERIOR TOTAL Left 8/18/2016    Procedure: HIP ARTHROPLASTY ANTERIOR TOTAL;  Surgeon: Emiliano Vang M.D.;  Location: SURGERY [facility];  Service:    • OTHER ORTHOPEDIC SURGERY  6/2014    right shoulder  arthroplasty   • OTHER ORTHOPEDIC SURGERY  11/1/2012    left knee/left ankle/left shoulder   • OTHER ORTHOPEDIC SURGERY  2004    elbow surgery   • OTHER  2000    dislocation left foot surgery at [facility]   • OTHER      \"septoplasty 20 years ago\"       Family History   Problem Relation Age of Onset   • Heart Disease Mother    • Depression Mother    • Cancer Father    • Sleep Apnea Neg Hx        Social History     Social History   • Marital status:      Spouse name: N/A   • Number of children: N/A   • Years of education: N/A     Occupational History   • Not on file.     Social History Main Topics   • Smoking status: Former Smoker     Packs/day: 1.00     Years: 20.00     Types: Cigarettes     Quit date: 1/1/2014   • Smokeless tobacco: Former User     Types: Chew     Quit date: 1/1/1997   • Alcohol use No   • Drug use: No   • Sexual activity: Not on file     Other Topics Concern   • Not on file     Social History Narrative   • No narrative on file       Current Outpatient Prescriptions   Medication Sig Dispense Refill   • amphetamine-dextroamphetamine (ADDERALL) 10 MG Tab Take 5 mg by mouth every day.     • levothyroxine " "(SYNTHROID) 125 MCG Tab Take 1 Tab by mouth Every morning on an empty stomach. 90 Tab 3   • hydroxyzine pamoate (VISTARIL) 100 MG Cap Take 200 mg by mouth 2 Times a Day.     • tamsulosin (FLOMAX) 0.4 MG capsule Take 0.4 mg by mouth ONE-HALF HOUR AFTER BREAKFAST.     • liraglutide (VICTOZA) 18 MG/3ML Solution Pen-injector injection Inject 1.8 mg as instructed every morning.     • temazepam (RESTORIL) 30 MG capsule Take 30 mg by mouth at bedtime as needed for Sleep.     • venlafaxine XR (EFFEXOR XR) 75 MG CAPSULE SR 24 HR Take 75 mg by mouth every morning.     • alprazolam (XANAX) 0.25 MG Tab Take 0.25-0.5 mg by mouth 4 times a day as needed for Anxiety.     • losartan (COZAAR) 100 MG Tab Take 100 mg by mouth every morning.     • oxycodone-acetaminophen (PERCOCET) 5-325 MG TABS Take 1-2 Tabs by mouth every four hours as needed.     • cyclobenzaprine (FLEXERIL) 10 MG Tab Take 10 mg by mouth 3 times a day.  5     No current facility-administered medications for this visit.     \"CURRENT RX\"    ALLERGIES: Demerol; Cubicin [daptomycin]; and Sulfa drugs    ROS  Eyes: Wears glasses  Constitutional: Denies fever, chills, sweats,  weight loss, fatigue  Cardiovascular: Denies chest pain, tightness, palpitations, swelling in legs/feet, fainting, difficulty breathing when lying down but gets better when sitting up.   Respiratory: Denies shortness of breath, cough, sputum, wheezing, painful breathing, coughing up blood.   Sleep: per HPI  Gastrointestinal: Denies  difficulty swallowing, nausea, abdominal pain, diarrhea, constipation, heartburn.  Musculoskeletal: Complains of painful joints, sore muscles, back pain.  Right shoulder pain with bone growth around prosthesis      PHYSICAL EXAM  Obese    /70 (BP Location: Right arm, Patient Position: Sitting, BP Cuff Size: Large adult)   Pulse (!) 107   Resp 16   Ht 1.778 m (5' 10\")   Wt 123.8 kg (273 lb)   SpO2 95%   BMI 39.17 kg/m²   Appearance: Well-nourished, " well-developed, no acute distress  Eyes:  PERRLA, EOMI  ENMT: without lesions, deformities;hearing grossly intact; tongue normal, posterior pharynx without erythema or exudate; Mallampati classification:   Neck: Supple, trachea midline, no masses  Respiratory effort:  No intercostal retractions or use of accessory muscles  Lung auscultation:  No wheezes rhonchi rubs or rales  Cardiac: No murmurs, rubs, or gallops; regular rhythm, normal rate; right ankle edema  Abdomen:  No tenderness, no organomegaly  Musculoskeletal:  Grossly normal; gait and station normal; digits and nails normal  Skin:  No rashes, petechiae, cyanosis  Neurologic: without focal signs; oriented to person, time, place, and purpose; judgement intact  Psychiatric:  No depression, anxiety, agitation        PROBLEMS:  1. JULIAN (obstructive sleep apnea)      2. Severe obesity with body mass index (BMI) of 35.0 to 39.9 with serious comorbidity (HCC)      3. Type 2 diabetes mellitus without complication, without long-term current use of insulin (HCC)      4. Essential hypertension      5. Former smoker      6. Chronic narcotic use      7. Hypothyroidism, acquired, autoimmune        PLAN:     Return in about 6 months (around 9/11/2019).    Has been advised to continue the current auto titrating CPAP 10-15 cm H2O, clean equipment frequently, and get new mask and supplies as allowed by insurance and DME. Advised about potential problems including nasal obstruction/stuffiness, mask leak or discomfort , frequent awakenings, chill or dryness of the upper airway, noise, inconvenience, and lack of benefit. Recommend an earlier appointment, if significant treatment barriers develop.    Have advised the patient to follow up with the appropriate healthcare practitioners for all other medical problems and issues.    Will do a mask fit today.

## 2019-03-11 ENCOUNTER — SLEEP CENTER VISIT (OUTPATIENT)
Dept: SLEEP MEDICINE | Facility: MEDICAL CENTER | Age: 59
End: 2019-03-11
Payer: COMMERCIAL

## 2019-03-11 VITALS
WEIGHT: 273 LBS | BODY MASS INDEX: 39.08 KG/M2 | OXYGEN SATURATION: 95 % | HEIGHT: 70 IN | DIASTOLIC BLOOD PRESSURE: 70 MMHG | HEART RATE: 107 BPM | RESPIRATION RATE: 16 BRPM | SYSTOLIC BLOOD PRESSURE: 122 MMHG

## 2019-03-11 DIAGNOSIS — E06.3 HYPOTHYROIDISM, ACQUIRED, AUTOIMMUNE: ICD-10-CM

## 2019-03-11 DIAGNOSIS — F11.90 CHRONIC NARCOTIC USE: ICD-10-CM

## 2019-03-11 DIAGNOSIS — I10 ESSENTIAL HYPERTENSION: ICD-10-CM

## 2019-03-11 DIAGNOSIS — G47.33 OSA (OBSTRUCTIVE SLEEP APNEA): ICD-10-CM

## 2019-03-11 DIAGNOSIS — E11.9 TYPE 2 DIABETES MELLITUS WITHOUT COMPLICATION, WITHOUT LONG-TERM CURRENT USE OF INSULIN (HCC): ICD-10-CM

## 2019-03-11 DIAGNOSIS — Z87.891 FORMER SMOKER: ICD-10-CM

## 2019-03-11 DIAGNOSIS — E66.01 SEVERE OBESITY WITH BODY MASS INDEX (BMI) OF 35.0 TO 39.9 WITH SERIOUS COMORBIDITY (HCC): ICD-10-CM

## 2019-03-11 PROCEDURE — 99214 OFFICE O/P EST MOD 30 MIN: CPT | Performed by: INTERNAL MEDICINE

## 2019-03-11 RX ORDER — CYCLOBENZAPRINE HCL 10 MG
10 TABLET ORAL 3 TIMES DAILY
Refills: 5 | COMMUNITY
Start: 2019-03-03 | End: 2022-06-13

## 2019-03-13 ENCOUNTER — HOSPITAL ENCOUNTER (OUTPATIENT)
Dept: LAB | Facility: MEDICAL CENTER | Age: 59
End: 2019-03-13
Attending: FAMILY MEDICINE
Payer: COMMERCIAL

## 2019-03-13 ENCOUNTER — HOSPITAL ENCOUNTER (OUTPATIENT)
Dept: LAB | Facility: MEDICAL CENTER | Age: 59
End: 2019-03-13
Attending: INTERNAL MEDICINE
Payer: COMMERCIAL

## 2019-03-13 DIAGNOSIS — E06.3 HYPOTHYROIDISM, ACQUIRED, AUTOIMMUNE: ICD-10-CM

## 2019-03-13 DIAGNOSIS — R79.89 LOW TESTOSTERONE IN MALE: ICD-10-CM

## 2019-03-13 LAB
CORTIS SERPL-MCNC: 10.3 UG/DL (ref 0–23)
ESTRADIOL SERPL-MCNC: <20 PG/ML
LH SERPL-ACNC: 7 IU/L (ref 1.7–8.6)
PROLACTIN SERPL-MCNC: 5.13 NG/ML (ref 2.1–17.7)
T4 FREE SERPL-MCNC: 0.66 NG/DL (ref 0.53–1.43)
TSH SERPL DL<=0.005 MIU/L-ACNC: 3.19 UIU/ML (ref 0.38–5.33)

## 2019-03-13 PROCEDURE — 84270 ASSAY OF SEX HORMONE GLOBUL: CPT

## 2019-03-13 PROCEDURE — 82024 ASSAY OF ACTH: CPT

## 2019-03-13 PROCEDURE — 82533 TOTAL CORTISOL: CPT

## 2019-03-13 PROCEDURE — 84439 ASSAY OF FREE THYROXINE: CPT

## 2019-03-13 PROCEDURE — 84443 ASSAY THYROID STIM HORMONE: CPT

## 2019-03-13 PROCEDURE — 36415 COLL VENOUS BLD VENIPUNCTURE: CPT

## 2019-03-13 PROCEDURE — 84305 ASSAY OF SOMATOMEDIN: CPT

## 2019-03-13 PROCEDURE — 83002 ASSAY OF GONADOTROPIN (LH): CPT

## 2019-03-13 PROCEDURE — 84403 ASSAY OF TOTAL TESTOSTERONE: CPT

## 2019-03-13 PROCEDURE — 82670 ASSAY OF TOTAL ESTRADIOL: CPT

## 2019-03-13 PROCEDURE — 84146 ASSAY OF PROLACTIN: CPT

## 2019-03-15 LAB
ACTH PLAS-MCNC: 27 PG/ML (ref 7–69)
SHBG SERPL-SCNC: 25 NMOL/L (ref 11–80)
TESTOST FREE MFR SERPL: 1.9 % (ref 1.6–2.9)
TESTOST FREE SERPL-MCNC: 9 PG/ML (ref 47–244)
TESTOST SERPL-MCNC: 49 NG/DL (ref 300–720)

## 2019-03-16 LAB
IGF-I SERPL-MCNC: 125 NG/ML (ref 51–209)
IGF-I Z-SCORE SERPL: 0.2

## 2019-03-18 ENCOUNTER — OFFICE VISIT (OUTPATIENT)
Dept: ENDOCRINOLOGY | Facility: MEDICAL CENTER | Age: 59
End: 2019-03-18
Payer: COMMERCIAL

## 2019-03-18 VITALS
OXYGEN SATURATION: 93 % | SYSTOLIC BLOOD PRESSURE: 122 MMHG | HEART RATE: 96 BPM | HEIGHT: 70 IN | DIASTOLIC BLOOD PRESSURE: 72 MMHG | BODY MASS INDEX: 39.22 KG/M2 | WEIGHT: 274 LBS

## 2019-03-18 DIAGNOSIS — E06.3 HYPOTHYROIDISM, ACQUIRED, AUTOIMMUNE: ICD-10-CM

## 2019-03-18 DIAGNOSIS — R79.89 LOW TESTOSTERONE IN MALE: ICD-10-CM

## 2019-03-18 DIAGNOSIS — E66.01 SEVERE OBESITY WITH BODY MASS INDEX (BMI) OF 35.0 TO 39.9 WITH SERIOUS COMORBIDITY (HCC): ICD-10-CM

## 2019-03-18 PROCEDURE — 99214 OFFICE O/P EST MOD 30 MIN: CPT | Mod: 25 | Performed by: INTERNAL MEDICINE

## 2019-03-18 PROCEDURE — 96372 THER/PROPH/DIAG INJ SC/IM: CPT | Performed by: INTERNAL MEDICINE

## 2019-03-18 RX ORDER — TESTOSTERONE CYPIONATE 200 MG/ML
100 INJECTION, SOLUTION INTRAMUSCULAR ONCE
Status: COMPLETED | OUTPATIENT
Start: 2019-03-18 | End: 2019-03-18

## 2019-03-18 RX ADMIN — TESTOSTERONE CYPIONATE 100 MG: 200 INJECTION, SOLUTION INTRAMUSCULAR at 15:47

## 2019-03-19 NOTE — PROGRESS NOTES
Chief Complaint   Patient presents with   • Hypogonadism   • Diabetes Mellitus     Type II   • Hypothyroidism        HPI:        1. Decreased testosterone, hypogonadotropic hypogonadism.     The patient’s free testosterone is quite low.  In February, 21 () and this month it is 9.  LH is 7 and other pituitary related function seems to be intact including his adrenal status with a cortisol of 10.3 and ACTH 27.  IgF-1 is normal at 125.  Thyroid is being replaced.  His total testosterone is also quite low at 49.      This ties in with his obesity which may be the cause of his hypogonadism and inappropriately low LH.  Difficult to understand the mechanism there but the association is well known.  Also it is known that replacing testosterone can help lose fat and regain lean muscle mass.  He does experience fatigue with his weight gain.  Libido is markedly diminished.  He doesn’t think his testicles have gotten smaller but on my examination, they are not large and they are somewhat soft.      I discussed testosterone replacement with him in some detail.  He has tried it briefly in the past, using what sounds like Axerone but did not get good blood levels and it was discontinued I think within a month.  I made him aware that testosterone therapy can enhance clotting and lead to thromboses, heart attack and stroke.  Also may cause elevated red blood levels abnormally especially if he has sleep apnea.  He does have snoring but not sleep apnea diagnosed. I noticed he is mildly anemic at 11.8 and 12.1 last fall, unclear etiology.  Basically normochromic normocytic.  Using testosterone does not cause prostate cancer but if he were to develop prostate cancer, it might accelerate its growth.    I think he is informed and wants to proceed with the testosterone trial.  I would prefer intramuscular on the trial so we are assured that he is getting more reasonable level.  My plan is to give him 100 mg IM now and in two weeks  repeat 100 mg IM and a week later do a testosterone and estrogen level.  I will see him in the office a week or two after that to review how he would like to proceed.      He is diabetic and the weight loss obviously is going to benefit his diabetes and possibly the testosterone may benefit the diabetes as well.  He is taking Victoza, however his A1c was already quite good at 6.2 as of last check.    3. Hypothyroidism.  Currently taking 125 mcg per day.  TSH is 3.1 and free thyroxin 0.6.  That is different than the previous one in September but I am not going to change his thyroid dosing right at the moment while we are going through the testosterone trial.    I will see him again in about one month or whatever my schedule will allow.     ROS:  Multiple joint pains and difficulties with chronic pain.  He may be going for another shoulder replacement in the near future but not imminent and not yet schedule      Allergies:   Allergies   Allergen Reactions   • Demerol Vomiting and Nausea     Rxn = years ago    • Cubicin [Daptomycin] Vomiting   • Sulfa Drugs Hives     .       Current medicines including changes today:  Current Outpatient Prescriptions   Medication Sig Dispense Refill   • cyclobenzaprine (FLEXERIL) 10 MG Tab Take 10 mg by mouth 3 times a day.  5   • levothyroxine (SYNTHROID) 125 MCG Tab Take 1 Tab by mouth Every morning on an empty stomach. 90 Tab 3   • hydroxyzine pamoate (VISTARIL) 100 MG Cap Take 200 mg by mouth 2 Times a Day.     • tamsulosin (FLOMAX) 0.4 MG capsule Take 0.4 mg by mouth ONE-HALF HOUR AFTER BREAKFAST.     • liraglutide (VICTOZA) 18 MG/3ML Solution Pen-injector injection Inject 1.8 mg as instructed every morning.     • temazepam (RESTORIL) 30 MG capsule Take 30 mg by mouth at bedtime as needed for Sleep.     • venlafaxine XR (EFFEXOR XR) 75 MG CAPSULE SR 24 HR Take 75 mg by mouth every morning.     • losartan (COZAAR) 100 MG Tab Take 100 mg by mouth every morning.     •  "oxycodone-acetaminophen (PERCOCET) 5-325 MG TABS Take 1-2 Tabs by mouth every four hours as needed.     • amphetamine-dextroamphetamine (ADDERALL) 10 MG Tab Take 5 mg by mouth every day.     • alprazolam (XANAX) 0.25 MG Tab Take 0.25-0.5 mg by mouth 4 times a day as needed for Anxiety.       No current facility-administered medications for this visit.         Past Medical History:   Diagnosis Date   • Anesthesia 11/03/2017    PONV with shoulder surgery approx 20 years ago.   • Anxiety and depression 11/03/2017   • Arthritis 11/03/2017    Osteoarthritis, \" all over\"   • Bunion    • Cancer ([facility]) 2017    skin lesion cut out, on left shoulder   • Chronic autoimmune thyroiditis      + US / + TPO = 57   • Dental disorder 11/03/2017    \"Upper Plate\"   • Diabetes mellitus (HCC) 11/03/2017    \"Controlled with diet & Victoza\"   • Gout    • Hammertoe    • High cholesterol 11/03/2017    HX OF, not currently on medication for.   • Hypertension    • Hypothyroidism     chronic thyroiditis   • Open toe wound 03/2018    Left big toe, open wound, started 2/2018, receiving wound care therapy two times a week   • Pain 11/03/2017    \"Pain all over except right hip & ankle\"   • Sleep apnea 11/03/2017    CPAP USE   • Snoring 11/03/2017    DX JULIAN   • Tonsillitis        PHYSICAL EXAM:    /72 (BP Location: Left arm, Patient Position: Sitting)   Pulse 96   Ht 1.778 m (5' 10\")   Wt 124.3 kg (274 lb)   SpO2 93%   BMI 39.31 kg/m²     Gen.   obese, no cushingoid features    Skin   appropriate for sex and age    HEENT  unremarkable    Neck   thyroid gland is difficult to palpate.  It might be partly substernal    Heart  regular    Genitalia            Testicles are somewhat small about 3 cm and soft    Rectal exam     prostate is difficult to reach.  I can get about MCFP over it.  It is mildly enlarged and smooth.  What I feel is benign    Extremities  no edema    Neuro  gait and station normal    Psych  appropriate      ASSESSMENT " AND RECOMMENDATIONS    1. Low testosterone in male            Begin testosterone trial 100 mg IM now and repeat in 2 weeks            Do lab 1 week after second injection  - testosterone cypionate (DEPO-TESTOSTERONE) injection 100 mg; 0.5 mL by Intramuscular route Once.  - TESTOSTERONE,FREE ADULT MALE; Future  - ESTRADIOL; Future    2. Severe obesity with body mass index (BMI) of 35.0 to 39.9 with serious comorbidity (HCC)                  Contributing to low testosterone and vice versa    3. Hypothyroidism, acquired, autoimmune            Probably euthyroid taking levothyroxine 125 mcg/day      DISPOSITION: About 4-6 weeks      Florentin Weiss M.D.    Copies to: Kevin Chavez M.D. [phone]

## 2019-03-26 ENCOUNTER — NON-PROVIDER VISIT (OUTPATIENT)
Dept: WOUND CARE | Facility: MEDICAL CENTER | Age: 59
End: 2019-03-26
Attending: ORTHOPAEDIC SURGERY
Payer: COMMERCIAL

## 2019-03-26 PROCEDURE — 11721 DEBRIDE NAIL 6 OR MORE: CPT

## 2019-03-26 NOTE — CERTIFICATION
"Certification  Encounter Date: 4/23/2018  Samantha Villa R.N.   Cosigned by: Sybil Ochoa at 4/26/2018  2:50 PM   Attestation signed by Sybil Ochoa at 4/26/2018 2:50 PM   Signed copy scanned into patients chart      Expand All Collapse All    []Hide copied text  [facility]    [address]    [address]    [place] [address]    [phone] Fax: [phone]    Foot and Nail Recertification  3/26/2019 - 6/26/2019           Referring Physician: LEATHA Lima  Primary Physician: Kevin Chavez MD  Consulting Physicians:   Start of Care: 7/14/2016        Subjective:      Solitario 6.18.18  JAYLON Blanco   Family Medicine      []Hide copied text  []Hover for attribution information  Asked to assess pt by Aravind VIDALES     Wound cx positive for MRSA and diphtheroids to L great toe distal wound. Currently on Augmentin from [facility]. Pt to discontinue medication. Allergy to sulfa in bactrim DS, not bactrim DS, rxn hives. Instructed pt if develops hives to discontinue doxycyline.   Erythema to great toe extending to MTPJ, per clinician this has slightly improved from last visit. Edema remains same. Pt complains of increased pain with light touch to foot. On gabapentin and percocet.       glucose this am. No significant fluctuations.   Denies fevers, chills, nausea, vomiting     Has appt with Dr. Kang tomorrow to discuss planned surgery to remove hardware to L great toe. Recommend xray at [facility] tomorrow.       DC 6/21/2018 Surgery?    HPI:  Pt is a 57 year old gentleman with DM who has attended [facility] in the past, had Surgery with Dr. Kang on Left foot on August 21, 2017 for bunionectomy revision and as below.     59 year old male living with wife and children. HX of dislocated toes 3-4 years ago while walking, had toes \"fixed,\" but \"it didn't work.\"      12/22/2016 PROCEDURES with Dr Kang:     1.  Left modified Arndt bunionectomy.  2.  Left distal metatarsal osteotomy.  3.  Left " distal metatarsal osteotomy of the hallux.  4.  Left distal metatarsal osteotomy, 2, 3, 4, and 5.  5.  Left flexor digitorum longus transfer with a flexor to extensor transfer    of 2 and 3.  6.  Left soft tissue reconstruction, metatarsophalangeal joints 2, 3, 4, and    5.  7.  Left hammertoe correction with proximal interphalangeal arthroplasty, 2    and 3.  8.  Right metatarsophalangeal joint capsulotomy of soft tissue release, 2 and    3.  9.  Right hammertoe correction with proximal interphalangeal arthroplasty, 2    and 3.   6/25/2018 Dr. Kang  PROCEDURES PERFORMED:  1.  Left removal internal fixation.  2.  Left amputation great toe with an interphalangeal disarticulation.  3.  Left foot irrigation and sharp debridement.  10/15/2018 Dr. Kang  PROCEDURES PERFORMED:  1.  Left gastroc soleus recession.  2.  Left partial excision third metatarsal.  3.  Right open removal internal fixation.  Past surgical Hx:   Past Surgical History         Past Surgical History:   Procedure Laterality Date   • NERVE ULNAR TRANSFER Right 4/3/2018     Procedure: NERVE ULNAR TRANSFER - TRANSPOSITION;  Surgeon: Ayad Luna M.D.;  Location: [facility];  Service: Orthopedics   • CARPAL TUNNEL RELEASE Right 4/3/2018     Procedure: CARPAL TUNNEL RELEASE;  Surgeon: Ayad Luna M.D.;  Location: [facility];  Service: Orthopedics   • ELBOW ARTHROTOMY Right 4/3/2018     Procedure: ELBOW ARTHROTOMY - FOR CONTRACTURE RELEASE AT THE ELBOW, RADIAL HEAD EXCISION;  Surgeon: Ayad Luna M.D.;  Location: [facility];  Service: Orthopedics   • TOE FUSION Right 11/13/2017     Procedure: TOE FUSION - 1ST METATARSALPHALANGEAL;  Surgeon: Haroldo Kang M.D.;  Location: [facility];  Service: Orthopedics   • METATARSAL HEAD RESECTION Right 11/13/2017     Procedure: METATARSAL HEAD RESECTION - EXCISION;  Surgeon: Haroldo Kang M.D.;  Location: [facility];  Service:  "Orthopedics   • HAMMERTOE CORRECTION Right 11/13/2017     Procedure: HAMMERTOE CORRECTION 2-5;  Surgeon: Haroldo Kang M.D.;  [facility];  Service: Orthopedics   • METATARSAL HEAD RESECTION Left 8/21/2017     Procedure: METATARSAL HEAD RESECTION - 2-5;  Surgeon: Haroldo Kang M.D.;  Location: [facility];  Service:    • TOE FUSION Left 8/21/2017     Procedure: TOE FUSION - 1ST MTP;  Surgeon: Haroldo Kang M.D.;  Location: [facility];  Service:    • HARDWARE REMOVAL ORTHO Left 8/21/2017     Procedure: HARDWARE REMOVAL ORTHO;  Surgeon: Haroldo Kang M.D.;  Location: [facility];  Service:    • LUMBAR FUSION POSTERIOR   4/14/2017     Procedure: LUMBAR FUSION POSTERIOR - MINIMALLY INVASIVE TLIF L4-5;  Surgeon: Bossman Caro M.D.;  Location: [facility];  Service:    • HAMMERTOE CORRECTION Bilateral 12/22/2016     Procedure: HAMMERTOE CORRECTION/METATARSALPHALANGEAL JOINT RELEASE 2/3 RIGHT, 2-3 LEFT;  Surgeon: Haroldo Kang M.D.;  Location: [facility];  Service:    • BUNIONECTOMY Left 12/22/2016     Procedure: BUNIONECTOMY FOR DISTAL HALLUX VALGUS CORRECTION WITH BRANDY;  Surgeon: Haroldo Kang M.D.;  Location: [facility];  Service:    • ORTHOPEDIC OSTEOTOMY Left 12/22/2016     Procedure: ORTHOPEDIC OSTEOTOMY DISTAL METATARSAL 2-5;  Surgeon: Haroldo Kang M.D.;  Location: [facility];  Service:    • HIP ARTH ANTERIOR TOTAL Left 8/18/2016     Procedure: HIP ARTHROPLASTY ANTERIOR TOTAL;  Surgeon: Emiliano Vang M.D.;  Location: [facility];  Service:    • OTHER ORTHOPEDIC SURGERY   6/2014     right shoulder  arthroplasty   • OTHER ORTHOPEDIC SURGERY   11/1/2012     left knee/left ankle/left shoulder   • OTHER ORTHOPEDIC SURGERY   2004     elbow surgery   • OTHER   2000     dislocation left foot surgery at [facility]   • OTHER         \"septoplasty 20 years ago\"         History of foot " "ulceration(s): Yes__x__ No____ Location:  L distal hallux   History of foot surgery: Yes__x__ No____Describe:______see above;   ______________________________________________       Employed: Y ___ N __x_ Job description: ____disability_____________       Current Residence: ___lives with wife and kids______  home     Pain: pt denies foot pain presently.      Past Medical History:   Past Medical History        Past Medical History:   Diagnosis Date   • Anesthesia 11/03/2017     PONV with shoulder surgery approx 20 years ago.   • Anxiety and depression 11/03/2017   • Arthritis 11/03/2017     Osteoarthritis, \" all over\"   • Bunion     • Cancer (CMS-HCC) 2017     skin lesion cut out, on left shoulder   • Chronic autoimmune thyroiditis        + US / + TPO = 57   • Dental disorder 11/03/2017     \"Upper Plate\"   • Diabetes mellitus (CMS-HCC) 11/03/2017     \"Controlled with diet & Victoza\"   • Gout     • Hammertoe     • High cholesterol 11/03/2017     HX OF, not currently on medication for.   • Hypertension     • Hypothyroidism       chronic thyroiditis   • Open toe wound 03/2018     Left big toe, open wound, started 2/2018, receiving wound care therapy two times a week   • Pain 11/03/2017     \"Pain all over except right hip & ankle\"   • Sleep apnea 11/03/2017     CPAP USE   • Snoring 11/03/2017     DX JULIAN   • Tonsillitis                    Current Medications:     Current Outpatient Prescriptions:   •  cyclobenzaprine (FLEXERIL) 10 MG Tab, Take 10 mg by mouth 3 times a day., Disp: , Rfl: 5  •  amphetamine-dextroamphetamine (ADDERALL) 10 MG Tab, Take 5 mg by mouth every day., Disp: , Rfl:   •  levothyroxine (SYNTHROID) 125 MCG Tab, Take 1 Tab by mouth Every morning on an empty stomach., Disp: 90 Tab, Rfl: 3  •  hydroxyzine pamoate (VISTARIL) 100 MG Cap, Take 200 mg by mouth 2 Times a Day., Disp: , Rfl:   •  tamsulosin (FLOMAX) 0.4 MG capsule, Take 0.4 mg by mouth ONE-HALF HOUR AFTER BREAKFAST., Disp: , Rfl:   •  liraglutide " "(VICTOZA) 18 MG/3ML Solution Pen-injector injection, Inject 1.8 mg as instructed every morning., Disp: , Rfl:   •  temazepam (RESTORIL) 30 MG capsule, Take 30 mg by mouth at bedtime as needed for Sleep., Disp: , Rfl:   •  venlafaxine XR (EFFEXOR XR) 75 MG CAPSULE SR 24 HR, Take 75 mg by mouth every morning., Disp: , Rfl:   •  alprazolam (XANAX) 0.25 MG Tab, Take 0.25-0.5 mg by mouth 4 times a day as needed for Anxiety., Disp: , Rfl:   •  losartan (COZAAR) 100 MG Tab, Take 100 mg by mouth every morning., Disp: , Rfl:   •  oxycodone-acetaminophen (PERCOCET) 5-325 MG TABS, Take 1-2 Tabs by mouth every four hours as needed., Disp: , Rfl:         Allergies: Demerol and Septra, Daptomycin   Social History:   Social History   Social History            Social History   • Marital status:        Spouse name: N/A   • Number of children: N/A   • Years of education: N/A          Occupational History   • Not on file.             Social History Main Topics   • Smoking status: Former Smoker       Packs/day: 1.00       Years: 20.00       Types: Cigarettes       Quit date: 1/1/2014   • Smokeless tobacco: Former User       Types: Chew       Quit date: 1/1/1997   • Alcohol use Yes         Comment: \"Few per month\"   • Drug use: No   • Sexual activity: Not on file           Other Topics Concern   • Not on file          Social History Narrative   • No narrative on file            Objective:     Tests and Measures:  Pt states he is moving, has not been measuring blood sugars.   2/8/2018 ANT per Florentin 1.0  HgbA1C: 6.2 2/27/2019 09/01/2017 6.1  11/09/2018 - 6.1        Vascular      R   L       palp palp DP pulse     palp palp PT pulse     yes yes Capillary refill < 3 sec         doppler pulses multiphasic AIDAN DP/PT  Deformities    R   L        4 and 5   Hammer/claw toe      x  x Hallux Valgus      great toe, medial; 5th MTH plantar   Prominent Metatarsal Heads         Charcot Foot         Drop Foot         Rocker Bottom         Prior " amputation:  see above       Other:  nails missing from lt  1st,  , Rt 3-5       dislocation            Clinical appearance/skin    Dryness___moderate_________ Swelling_____none_________ Redness______none_______Temperature__warm, hair growth present__ ______    Callus_____ __BIL 3rd/4th plantar;  _________________________________    Ulceration_ -none        Clinical appearance/toenails    Onychomycosis__6/10__ _(nails that had been removed to 1st, 2nd toes LT foot growing back_________    Ingrown toenails_______    Deformities_______________________________    Other___HX: 1st and 2nd nails brad feet were ablated by podiatrist due to problematic deformities per pt___________________________________          Orthotic, protective, supportive devices: post op shoes     Procedures: Pt's feet were washed with soapy water, then dried. In between toes cleaned with gauze to remove debris. All 7 onychomycotic nails were ground down with dremel, then clipped as needed, smoothed again with Dremel and deshawn board. Calluses, as above, debrided almost to skin level, smoothed with Dremel.  Reddened pressure area to Lt 3rd toe at free border. No open skin but cleansed with Betadine. No nicks or cuts noted.    Patient Education:Instructed pt to shake out shoes before donning; to wear protective footwear at all times, including when at home; to examine feet daily for any new redness/wounds and report to PCP; to moisturize feet daily except between toes with water-based moisturizer; to keep tight control over BS for optimum health; to return to [facility] care every 2-3 months. Pt verbalized understanding of all instruction.       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

## 2019-03-29 ENCOUNTER — HOSPITAL ENCOUNTER (OUTPATIENT)
Dept: LAB | Facility: MEDICAL CENTER | Age: 59
End: 2019-03-29
Attending: INTERNAL MEDICINE
Payer: COMMERCIAL

## 2019-03-29 DIAGNOSIS — R79.89 LOW TESTOSTERONE IN MALE: ICD-10-CM

## 2019-03-29 LAB — ESTRADIOL SERPL-MCNC: <20 PG/ML

## 2019-03-29 PROCEDURE — 84402 ASSAY OF FREE TESTOSTERONE: CPT

## 2019-03-29 PROCEDURE — 36415 COLL VENOUS BLD VENIPUNCTURE: CPT

## 2019-03-29 PROCEDURE — 82670 ASSAY OF TOTAL ESTRADIOL: CPT

## 2019-04-01 ENCOUNTER — NON-PROVIDER VISIT (OUTPATIENT)
Dept: ENDOCRINOLOGY | Facility: MEDICAL CENTER | Age: 59
End: 2019-04-01
Payer: COMMERCIAL

## 2019-04-01 DIAGNOSIS — E29.1 HYPOGONADISM IN MALE: ICD-10-CM

## 2019-04-01 LAB — TESTOST FREE SERPL-MCNC: 50 PG/ML (ref 47–244)

## 2019-04-01 PROCEDURE — 96372 THER/PROPH/DIAG INJ SC/IM: CPT | Performed by: PHYSICIAN ASSISTANT

## 2019-04-01 RX ORDER — TESTOSTERONE CYPIONATE 200 MG/ML
100 INJECTION, SOLUTION INTRAMUSCULAR ONCE
Status: COMPLETED | OUTPATIENT
Start: 2019-04-01 | End: 2019-04-01

## 2019-04-01 RX ADMIN — TESTOSTERONE CYPIONATE 100 MG: 200 INJECTION, SOLUTION INTRAMUSCULAR at 11:38

## 2019-04-15 ENCOUNTER — NON-PROVIDER VISIT (OUTPATIENT)
Dept: ENDOCRINOLOGY | Facility: MEDICAL CENTER | Age: 59
End: 2019-04-15
Payer: COMMERCIAL

## 2019-04-15 DIAGNOSIS — R79.89 LOW TESTOSTERONE IN MALE: ICD-10-CM

## 2019-04-15 DIAGNOSIS — E29.1 HYPOGONADISM IN MALE: ICD-10-CM

## 2019-04-15 PROCEDURE — 96372 THER/PROPH/DIAG INJ SC/IM: CPT | Performed by: INTERNAL MEDICINE

## 2019-04-15 RX ORDER — TESTOSTERONE CYPIONATE 200 MG/ML
100 INJECTION, SOLUTION INTRAMUSCULAR ONCE
Status: COMPLETED | OUTPATIENT
Start: 2019-04-15 | End: 2019-04-15

## 2019-04-15 RX ORDER — TESTOSTERONE CYPIONATE 200 MG/ML
100 INJECTION, SOLUTION INTRAMUSCULAR ONCE
Qty: 0.5 ML | Refills: 0 | Status: SHIPPED | OUTPATIENT
Start: 2019-04-15 | End: 2019-04-15

## 2019-04-15 RX ADMIN — TESTOSTERONE CYPIONATE 100 MG: 200 INJECTION, SOLUTION INTRAMUSCULAR at 12:57

## 2019-04-25 ENCOUNTER — HOSPITAL ENCOUNTER (OUTPATIENT)
Dept: LAB | Facility: MEDICAL CENTER | Age: 59
End: 2019-04-25
Attending: INTERNAL MEDICINE
Payer: COMMERCIAL

## 2019-04-25 DIAGNOSIS — R79.89 LOW TESTOSTERONE IN MALE: ICD-10-CM

## 2019-04-25 LAB — ESTRADIOL SERPL-MCNC: 22 PG/ML

## 2019-04-25 PROCEDURE — 82670 ASSAY OF TOTAL ESTRADIOL: CPT

## 2019-04-25 PROCEDURE — 36415 COLL VENOUS BLD VENIPUNCTURE: CPT

## 2019-04-25 PROCEDURE — 84402 ASSAY OF FREE TESTOSTERONE: CPT

## 2019-04-26 ENCOUNTER — HOSPITAL ENCOUNTER (OUTPATIENT)
Facility: MEDICAL CENTER | Age: 59
End: 2019-04-26
Attending: ORTHOPAEDIC SURGERY | Admitting: ORTHOPAEDIC SURGERY
Payer: COMMERCIAL

## 2019-04-27 LAB — TESTOST FREE SERPL-MCNC: 29 PG/ML (ref 47–244)

## 2019-04-29 ENCOUNTER — OFFICE VISIT (OUTPATIENT)
Dept: ENDOCRINOLOGY | Facility: MEDICAL CENTER | Age: 59
End: 2019-04-29
Payer: COMMERCIAL

## 2019-04-29 VITALS
DIASTOLIC BLOOD PRESSURE: 82 MMHG | HEIGHT: 70 IN | HEART RATE: 90 BPM | SYSTOLIC BLOOD PRESSURE: 126 MMHG | BODY MASS INDEX: 38.25 KG/M2 | WEIGHT: 267.2 LBS | OXYGEN SATURATION: 96 %

## 2019-04-29 DIAGNOSIS — R79.89 LOW TESTOSTERONE IN MALE: ICD-10-CM

## 2019-04-29 DIAGNOSIS — E06.3 HYPOTHYROIDISM, ACQUIRED, AUTOIMMUNE: ICD-10-CM

## 2019-04-29 PROCEDURE — 96372 THER/PROPH/DIAG INJ SC/IM: CPT | Performed by: INTERNAL MEDICINE

## 2019-04-29 PROCEDURE — 99213 OFFICE O/P EST LOW 20 MIN: CPT | Mod: 25 | Performed by: INTERNAL MEDICINE

## 2019-04-29 RX ORDER — LEVOTHYROXINE SODIUM 137 UG/1
137 CAPSULE ORAL DAILY
Qty: 30 CAP | Refills: 5 | Status: SHIPPED | OUTPATIENT
Start: 2019-04-29 | End: 2019-05-23

## 2019-04-29 RX ORDER — TESTOSTERONE CYPIONATE 200 MG/ML
150 INJECTION, SOLUTION INTRAMUSCULAR ONCE
Status: COMPLETED | OUTPATIENT
Start: 2019-04-29 | End: 2019-04-29

## 2019-04-29 RX ADMIN — TESTOSTERONE CYPIONATE 150 MG: 200 INJECTION, SOLUTION INTRAMUSCULAR at 12:05

## 2019-04-29 NOTE — PROGRESS NOTES
Chief Complaint   Patient presents with   • Hypogonadism   • Hypothyroidism        HPI:    Low testosterone         His situation is difficult to interpret because he has so many aches and pains that are  getting him down.  However he did get a boost from this last testosterone injection which lasted about a week and now gone.  He wants to persist with the testosterone because he feels it is going to be beneficial in the long run.  He wants to continue the injections for the moment.           His testosterone level 10 days after his last shot was all the way down to 29 although estradiol measured at 22.  He did have benefit for a week and wore off.  We are going to try a bigger dose now i.e. 150 mg.  He had no adverse effects from the previous injection.            He will do a testosterone level  1 week after this injection.            He has lost 7 pounds in the last month which is from activity and watching calories    Hypothyroidism           Is taking levothyroxine 125 mcg/day.  His TSH was 3.1 last month with free thyroxine 0.6 low normal.  I am going to increase his dose to 137 mcg daily.  He takes it correctly.      ROS:  All other systems reported as negative or unchanged since last exam      Allergies:   Allergies   Allergen Reactions   • Demerol Vomiting and Nausea     Rxn = years ago    • Cubicin [Daptomycin] Vomiting   • Sulfa Drugs Hives     .       Current medicines including changes today:  Current Outpatient Prescriptions   Medication Sig Dispense Refill   • Diclofenac Sodium 1.5 % Solution PENNSAID 1.5 % TRANSDERMAL SOLUTION     • Levothyroxine Sodium 137 MCG Cap Take 137 mcg by mouth every day. 30 Cap 5   • amphetamine-dextroamphetamine (ADDERALL) 10 MG Tab Take 5 mg by mouth every day.     • hydroxyzine pamoate (VISTARIL) 100 MG Cap Take 200 mg by mouth 2 Times a Day.     • tamsulosin (FLOMAX) 0.4 MG capsule Take 0.4 mg by mouth ONE-HALF HOUR AFTER BREAKFAST.     • liraglutide (VICTOZA) 18 MG/3ML  "Solution Pen-injector injection Inject 1.8 mg as instructed every morning.     • temazepam (RESTORIL) 30 MG capsule Take 30 mg by mouth at bedtime as needed for Sleep.     • venlafaxine XR (EFFEXOR XR) 75 MG CAPSULE SR 24 HR Take 75 mg by mouth every morning.     • alprazolam (XANAX) 0.25 MG Tab Take 0.25-0.5 mg by mouth 4 times a day as needed for Anxiety.     • losartan (COZAAR) 100 MG Tab Take 100 mg by mouth every morning.     • oxycodone-acetaminophen (PERCOCET) 5-325 MG TABS Take 1-2 Tabs by mouth every four hours as needed.     • cyclobenzaprine (FLEXERIL) 10 MG Tab Take 10 mg by mouth 3 times a day.  5     Current Facility-Administered Medications   Medication Dose Route Frequency Provider Last Rate Last Dose   • testosterone cypionate (DEPO-TESTOSTERONE) injection 150 mg  150 mg Intramuscular Once Florentin Weiss M.D.            Past Medical History:   Diagnosis Date   • Anesthesia 11/03/2017    PONV with shoulder surgery approx 20 years ago.   • Anxiety and depression 11/03/2017   • Arthritis 11/03/2017    Osteoarthritis, \" all over\"   • Bunion    • Cancer (HCC) 2017    skin lesion cut out, on left shoulder   • Chronic autoimmune thyroiditis      + US / + TPO = 57   • Dental disorder 11/03/2017    \"Upper Plate\"   • Diabetes mellitus (HCC) 11/03/2017    \"Controlled with diet & Victoza\"   • Gout    • Hammertoe    • High cholesterol 11/03/2017    HX OF, not currently on medication for.   • Hypertension    • Hypothyroidism     chronic thyroiditis   • Open toe wound 03/2018    Left big toe, open wound, started 2/2018, receiving wound care therapy two times a week   • Pain 11/03/2017    \"Pain all over except right hip & ankle\"   • Sleep apnea 11/03/2017    CPAP USE   • Snoring 11/03/2017    DX JULIAN   • Tonsillitis        PHYSICAL EXAM:    /82 (BP Location: Right arm, Patient Position: Sitting, BP Cuff Size: Adult)   Pulse 90   Ht 1.778 m (5' 10\")   Wt 121.2 kg (267 lb 3.2 oz)   SpO2 96%   BMI 38.34 " kg/m²     Gen. obese but otherwise appears healthy      skin   appropriate for sex and age    HEENT  unremarkable    Neck   no adenopathy    Heart  regular    Extremities  no edema    Neuro  gait and station normal    Psych  appropriate, good spirits    ASSESSMENT AND RECOMMENDATIONS    1. Low testosterone in male            Continuing a trial of IM testosterone  - testosterone cypionate (DEPO-TESTOSTERONE) injection 150 mg;   - TESTOSTERONE,FREE ADULT MALE; May 6  - ESTRADIOL; May 6    2. Hypothyroidism, acquired, autoimmune            Chronic fatigue.  Increase levothyroxine dose  - Levothyroxine Sodium 137 MCG Cap; Take 137 mcg       DISPOSITION: Repeat testosterone injection in 2 weeks and return to the office in 1 month      Florentin Weiss M.D.    Copies to: Kevin Chavez M.D. [phone]

## 2019-05-06 ENCOUNTER — HOSPITAL ENCOUNTER (OUTPATIENT)
Dept: LAB | Facility: MEDICAL CENTER | Age: 59
End: 2019-05-06
Attending: FAMILY MEDICINE
Payer: COMMERCIAL

## 2019-05-06 ENCOUNTER — HOSPITAL ENCOUNTER (OUTPATIENT)
Dept: LAB | Facility: MEDICAL CENTER | Age: 59
End: 2019-05-06
Attending: INTERNAL MEDICINE
Payer: COMMERCIAL

## 2019-05-06 DIAGNOSIS — R79.89 LOW TESTOSTERONE IN MALE: ICD-10-CM

## 2019-05-06 LAB
ALBUMIN SERPL BCP-MCNC: 4.2 G/DL (ref 3.2–4.9)
ALBUMIN/GLOB SERPL: 1.5 G/DL
ALP SERPL-CCNC: 99 U/L (ref 30–99)
ALT SERPL-CCNC: 24 U/L (ref 2–50)
ANION GAP SERPL CALC-SCNC: 7 MMOL/L (ref 0–11.9)
APPEARANCE UR: CLEAR
AST SERPL-CCNC: 23 U/L (ref 12–45)
BACTERIA #/AREA URNS HPF: NEGATIVE /HPF
BASOPHILS # BLD AUTO: 0.5 % (ref 0–1.8)
BASOPHILS # BLD: 0.03 K/UL (ref 0–0.12)
BILIRUB SERPL-MCNC: 0.6 MG/DL (ref 0.1–1.5)
BILIRUB UR QL STRIP.AUTO: NEGATIVE
BUN SERPL-MCNC: 19 MG/DL (ref 8–22)
CALCIUM SERPL-MCNC: 9.3 MG/DL (ref 8.5–10.5)
CHLORIDE SERPL-SCNC: 102 MMOL/L (ref 96–112)
CHOLEST SERPL-MCNC: 182 MG/DL (ref 100–199)
CO2 SERPL-SCNC: 26 MMOL/L (ref 20–33)
COLOR UR: YELLOW
CREAT SERPL-MCNC: 1.04 MG/DL (ref 0.5–1.4)
EOSINOPHIL # BLD AUTO: 0.18 K/UL (ref 0–0.51)
EOSINOPHIL NFR BLD: 3 % (ref 0–6.9)
EPI CELLS #/AREA URNS HPF: NEGATIVE /HPF
ERYTHROCYTE [DISTWIDTH] IN BLOOD BY AUTOMATED COUNT: 45.5 FL (ref 35.9–50)
ESTRADIOL SERPL-MCNC: 41 PG/ML
GLOBULIN SER CALC-MCNC: 2.8 G/DL (ref 1.9–3.5)
GLUCOSE SERPL-MCNC: 130 MG/DL (ref 65–99)
GLUCOSE UR STRIP.AUTO-MCNC: NEGATIVE MG/DL
HCT VFR BLD AUTO: 45.5 % (ref 42–52)
HDLC SERPL-MCNC: 33 MG/DL
HGB BLD-MCNC: 13.9 G/DL (ref 14–18)
HYALINE CASTS #/AREA URNS LPF: ABNORMAL /LPF
IMM GRANULOCYTES # BLD AUTO: 0.04 K/UL (ref 0–0.11)
IMM GRANULOCYTES NFR BLD AUTO: 0.7 % (ref 0–0.9)
KETONES UR STRIP.AUTO-MCNC: NEGATIVE MG/DL
LDLC SERPL CALC-MCNC: ABNORMAL MG/DL
LEUKOCYTE ESTERASE UR QL STRIP.AUTO: NEGATIVE
LYMPHOCYTES # BLD AUTO: 1.29 K/UL (ref 1–4.8)
LYMPHOCYTES NFR BLD: 21.5 % (ref 22–41)
MCH RBC QN AUTO: 27.2 PG (ref 27–33)
MCHC RBC AUTO-ENTMCNC: 30.5 G/DL (ref 33.7–35.3)
MCV RBC AUTO: 89 FL (ref 81.4–97.8)
MICRO URNS: ABNORMAL
MONOCYTES # BLD AUTO: 0.54 K/UL (ref 0–0.85)
MONOCYTES NFR BLD AUTO: 9 % (ref 0–13.4)
NEUTROPHILS # BLD AUTO: 3.91 K/UL (ref 1.82–7.42)
NEUTROPHILS NFR BLD: 65.3 % (ref 44–72)
NITRITE UR QL STRIP.AUTO: NEGATIVE
NRBC # BLD AUTO: 0 K/UL
NRBC BLD-RTO: 0 /100 WBC
PH UR STRIP.AUTO: 5.5 [PH]
PLATELET # BLD AUTO: 251 K/UL (ref 164–446)
PMV BLD AUTO: 9.3 FL (ref 9–12.9)
POTASSIUM SERPL-SCNC: 4.4 MMOL/L (ref 3.6–5.5)
PROT SERPL-MCNC: 7 G/DL (ref 6–8.2)
PROT UR QL STRIP: 30 MG/DL
RBC # BLD AUTO: 5.11 M/UL (ref 4.7–6.1)
RBC # URNS HPF: ABNORMAL /HPF
RBC UR QL AUTO: NEGATIVE
RHEUMATOID FACT SER IA-ACNC: <10 IU/ML (ref 0–14)
SODIUM SERPL-SCNC: 135 MMOL/L (ref 135–145)
SP GR UR STRIP.AUTO: 1.02
T4 SERPL-MCNC: 7.5 UG/DL (ref 4–12)
TRIGL SERPL-MCNC: 474 MG/DL (ref 0–149)
TSH SERPL DL<=0.005 MIU/L-ACNC: 1.67 UIU/ML (ref 0.38–5.33)
URATE SERPL-MCNC: 7.6 MG/DL (ref 2.5–8.3)
UROBILINOGEN UR STRIP.AUTO-MCNC: 0.2 MG/DL
WBC # BLD AUTO: 6 K/UL (ref 4.8–10.8)
WBC #/AREA URNS HPF: ABNORMAL /HPF

## 2019-05-06 PROCEDURE — 80061 LIPID PANEL: CPT

## 2019-05-06 PROCEDURE — 80053 COMPREHEN METABOLIC PANEL: CPT

## 2019-05-06 PROCEDURE — 84270 ASSAY OF SEX HORMONE GLOBUL: CPT

## 2019-05-06 PROCEDURE — 84402 ASSAY OF FREE TESTOSTERONE: CPT

## 2019-05-06 PROCEDURE — 86038 ANTINUCLEAR ANTIBODIES: CPT

## 2019-05-06 PROCEDURE — 84550 ASSAY OF BLOOD/URIC ACID: CPT

## 2019-05-06 PROCEDURE — 84436 ASSAY OF TOTAL THYROXINE: CPT

## 2019-05-06 PROCEDURE — 84403 ASSAY OF TOTAL TESTOSTERONE: CPT

## 2019-05-06 PROCEDURE — 83036 HEMOGLOBIN GLYCOSYLATED A1C: CPT

## 2019-05-06 PROCEDURE — 36415 COLL VENOUS BLD VENIPUNCTURE: CPT

## 2019-05-06 PROCEDURE — 81001 URINALYSIS AUTO W/SCOPE: CPT

## 2019-05-06 PROCEDURE — 86431 RHEUMATOID FACTOR QUANT: CPT

## 2019-05-06 PROCEDURE — 86812 HLA TYPING A B OR C: CPT

## 2019-05-06 PROCEDURE — 84443 ASSAY THYROID STIM HORMONE: CPT

## 2019-05-06 PROCEDURE — 82670 ASSAY OF TOTAL ESTRADIOL: CPT

## 2019-05-06 PROCEDURE — 83036 HEMOGLOBIN GLYCOSYLATED A1C: CPT | Mod: 91

## 2019-05-06 PROCEDURE — 83516 IMMUNOASSAY NONANTIBODY: CPT

## 2019-05-06 PROCEDURE — 85025 COMPLETE CBC W/AUTO DIFF WBC: CPT

## 2019-05-07 RX ORDER — LEVOTHYROXINE SODIUM 0.12 MG/1
TABLET ORAL
Qty: 90 TAB | Refills: 1 | Status: SHIPPED | OUTPATIENT
Start: 2019-05-07 | End: 2020-02-05

## 2019-05-07 NOTE — TELEPHONE ENCOUNTER
Was the patient seen in the last year in this department? Yes    Does patient have an active prescription for medications requested? No     Received Request Via: Pharmacy     levothyroxine (SYNTHROID) 125 MCG Tab  TAKE 1 TAB BY MOUTH EVERY MORNING ON AN EMPTY STOMACH.

## 2019-05-08 LAB
HLA-B27 QL FC: NEGATIVE
MITOCHONDRIA M2 IGG SER-ACNC: 2.8 UNITS (ref 0–20)
NUCLEAR IGG SER QL IA: NORMAL
SHBG SERPL-SCNC: 22 NMOL/L (ref 11–80)
TESTOST FREE MFR SERPL: 2.3 % (ref 1.6–2.9)
TESTOST FREE SERPL-MCNC: 100 PG/ML (ref 47–244)
TESTOST FREE SERPL-MCNC: 95 PG/ML (ref 47–244)
TESTOST SERPL-MCNC: 424 NG/DL (ref 300–720)

## 2019-05-09 LAB
EST. AVERAGE GLUCOSE BLD GHB EST-MCNC: 131 MG/DL
EST. AVERAGE GLUCOSE BLD GHB EST-MCNC: 134 MG/DL
HBA1C MFR BLD: 6.2 % (ref 0–5.6)
HBA1C MFR BLD: 6.3 % (ref 0–5.6)

## 2019-05-13 ENCOUNTER — TELEPHONE (OUTPATIENT)
Dept: ENDOCRINOLOGY | Facility: MEDICAL CENTER | Age: 59
End: 2019-05-13

## 2019-05-13 ENCOUNTER — NON-PROVIDER VISIT (OUTPATIENT)
Dept: ENDOCRINOLOGY | Facility: MEDICAL CENTER | Age: 59
End: 2019-05-13
Payer: COMMERCIAL

## 2019-05-13 DIAGNOSIS — E29.1 HYPOGONADISM IN MALE: ICD-10-CM

## 2019-05-13 DIAGNOSIS — R79.89 LOW TESTOSTERONE IN MALE: ICD-10-CM

## 2019-05-13 PROCEDURE — 96372 THER/PROPH/DIAG INJ SC/IM: CPT | Performed by: INTERNAL MEDICINE

## 2019-05-13 RX ORDER — TESTOSTERONE CYPIONATE 200 MG/ML
150 INJECTION, SOLUTION INTRAMUSCULAR ONCE
Status: COMPLETED | OUTPATIENT
Start: 2019-05-13 | End: 2019-05-13

## 2019-05-13 RX ADMIN — TESTOSTERONE CYPIONATE 150 MG: 200 INJECTION, SOLUTION INTRAMUSCULAR at 17:46

## 2019-05-13 NOTE — TELEPHONE ENCOUNTER
1. Caller Name: Franc                                         Call Back Number: [phone] (home)         Patient approves a detailed voicemail message: no    patient came in today to get a testosterone injection. He is Scheduled for surgery on June 10th and June 25th. He is wondering if he needs to postpone taking the testosterone injections? Next apt with you is May 28th.   Please advise.    Results for OUMOU BADILLO (MRN [id number]) as of 5/13/2019 11:17   Ref. Range 3/13/2019 14:24 3/29/2019 12:03 4/25/2019 14:10 5/6/2019 12:37 5/6/2019 12:40   TSH Latest Ref Range: 0.380 - 5.330 uIU/mL 3.190   1.670    Thyroxine -T4 Latest Ref Range: 4.0 - 12.0 ug/dL    7.5    Free T-4 Latest Ref Range: 0.53 - 1.43 ng/dL 0.66       Rheumatoid Factor -Neph- Latest Ref Range: 0 - 14 IU/mL    <10    Acth Latest Ref Range: 7 - 69 pg/mL 27       Estradiol-E2 Latest Units: pg/mL <20.0 <20.0 22.0  41.0   Free Testosterone Latest Ref Range: 47 - 244 pg/mL  50 29 (L) 95 100   IGF1 Latest Ref Range: 51 - 209 ng/mL 125       IGF-1 Z Score Calculation Unknown 0.2       Luteinizing Hormone Latest Ref Range: 1.7 - 8.6 IU/L 7.0       Prolactin Latest Ref Range: 2.10 - 17.70 ng/mL 5.13       Sex Hormone Bind Globulin Latest Ref Range: 11 - 80 nmol/L    22    Testosterone % Free Latest Ref Range: 1.6 - 2.9 %    2.3    Testosterone,Total Latest Ref Range: 300 - 720 ng/dL    424

## 2019-05-14 NOTE — TELEPHONE ENCOUNTER
Phone Number Called: [phone] (home)       Message: Spoke to patient to let him know to continue taking testosterone injections    Left Message for patient to call back: no

## 2019-05-23 DIAGNOSIS — Z01.812 PRE-OPERATIVE LABORATORY EXAMINATION: ICD-10-CM

## 2019-05-23 DIAGNOSIS — Z01.810 PRE-OPERATIVE CARDIOVASCULAR EXAMINATION: ICD-10-CM

## 2019-05-23 LAB
ANION GAP SERPL CALC-SCNC: 8 MMOL/L (ref 0–11.9)
BUN SERPL-MCNC: 16 MG/DL (ref 8–22)
CALCIUM SERPL-MCNC: 9.5 MG/DL (ref 8.5–10.5)
CHLORIDE SERPL-SCNC: 104 MMOL/L (ref 96–112)
CO2 SERPL-SCNC: 24 MMOL/L (ref 20–33)
CREAT SERPL-MCNC: 1.07 MG/DL (ref 0.5–1.4)
EKG IMPRESSION: NORMAL
ERYTHROCYTE [DISTWIDTH] IN BLOOD BY AUTOMATED COUNT: 48.1 FL (ref 35.9–50)
GLUCOSE SERPL-MCNC: 130 MG/DL (ref 65–99)
HCT VFR BLD AUTO: 47.9 % (ref 42–52)
HGB BLD-MCNC: 14.4 G/DL (ref 14–18)
MCH RBC QN AUTO: 27.7 PG (ref 27–33)
MCHC RBC AUTO-ENTMCNC: 30.1 G/DL (ref 33.7–35.3)
MCV RBC AUTO: 92.3 FL (ref 81.4–97.8)
PLATELET # BLD AUTO: 207 K/UL (ref 164–446)
PMV BLD AUTO: 9 FL (ref 9–12.9)
POTASSIUM SERPL-SCNC: 4.4 MMOL/L (ref 3.6–5.5)
RBC # BLD AUTO: 5.19 M/UL (ref 4.7–6.1)
SODIUM SERPL-SCNC: 136 MMOL/L (ref 135–145)
WBC # BLD AUTO: 5.7 K/UL (ref 4.8–10.8)

## 2019-05-23 PROCEDURE — 93010 ELECTROCARDIOGRAM REPORT: CPT | Performed by: INTERNAL MEDICINE

## 2019-05-23 PROCEDURE — 80048 BASIC METABOLIC PNL TOTAL CA: CPT

## 2019-05-23 PROCEDURE — 85027 COMPLETE CBC AUTOMATED: CPT

## 2019-05-23 PROCEDURE — 93005 ELECTROCARDIOGRAM TRACING: CPT

## 2019-05-23 PROCEDURE — 36415 COLL VENOUS BLD VENIPUNCTURE: CPT

## 2019-05-23 RX ORDER — LAMOTRIGINE 100 MG/1
100 TABLET ORAL DAILY
COMMUNITY
End: 2024-01-23

## 2019-05-23 RX ORDER — NAPROXEN 500 MG/1
500 TABLET ORAL DAILY
COMMUNITY
End: 2020-06-18

## 2019-05-28 ENCOUNTER — OFFICE VISIT (OUTPATIENT)
Dept: ENDOCRINOLOGY | Facility: MEDICAL CENTER | Age: 59
End: 2019-05-28
Payer: COMMERCIAL

## 2019-05-28 VITALS
SYSTOLIC BLOOD PRESSURE: 134 MMHG | BODY MASS INDEX: 38.65 KG/M2 | HEART RATE: 92 BPM | WEIGHT: 270 LBS | HEIGHT: 70 IN | DIASTOLIC BLOOD PRESSURE: 76 MMHG

## 2019-05-28 DIAGNOSIS — E06.3 HYPOTHYROIDISM, ACQUIRED, AUTOIMMUNE: ICD-10-CM

## 2019-05-28 DIAGNOSIS — R79.89 LOW TESTOSTERONE IN MALE: ICD-10-CM

## 2019-05-28 DIAGNOSIS — Z12.5 SPECIAL SCREENING FOR MALIGNANT NEOPLASM OF PROSTATE: Primary | ICD-10-CM

## 2019-05-28 PROCEDURE — 96372 THER/PROPH/DIAG INJ SC/IM: CPT | Performed by: INTERNAL MEDICINE

## 2019-05-28 PROCEDURE — 99213 OFFICE O/P EST LOW 20 MIN: CPT | Mod: 25 | Performed by: INTERNAL MEDICINE

## 2019-05-28 RX ORDER — TESTOSTERONE CYPIONATE 200 MG/ML
150 INJECTION, SOLUTION INTRAMUSCULAR ONCE
Status: COMPLETED | OUTPATIENT
Start: 2019-05-28 | End: 2019-05-28

## 2019-05-28 RX ADMIN — TESTOSTERONE CYPIONATE 150 MG: 200 INJECTION, SOLUTION INTRAMUSCULAR at 14:19

## 2019-05-29 NOTE — PROGRESS NOTES
Chief Complaint   Patient presents with   • Hypogonadism   • Hypothyroidism        HPI:    The patient definitely feels that he has benefitted from taking testosterone.  He has more energy and strength and is more active.  One thing I was hoping  that it would help him lose weight.  So far since the last visit, he has not lost weight but insofar as his arthritis will allow he is becoming more active.  His last dose 150 mg IM one week later, testosterone in good range at  ().  Estradiol upper normal for a male of 39.  No breast swelling or tenderness.  No polycythemia.  prostate exam Hemoglobin 14.4 and March 2019 and PSA in February 2018 at 0.4.    ROS:  All other systems reported as negative or unchanged since last exam      Allergies:   Allergies   Allergen Reactions   • Demerol Vomiting and Nausea     Rxn = years ago    • Amoxicillin      2004-02-25;NA   • Cubicin [Daptomycin] Vomiting   • Meperidine      2004-02-25;NA   • Sulfa Drugs Hives     .   • Sulfamethoxazole W-Trimethoprim      2004-02-25;NA   • Tetracycline Hives       Current medicines including changes today:  Current Outpatient Prescriptions   Medication Sig Dispense Refill   • naproxen (NAPROSYN) 500 MG Tab Take 500 mg by mouth every day.     • vitamin D (CHOLECALCIFEROL) 1000 UNIT Tab Take 1,000 Units by mouth every 48 hours.     • lamoTRIgine (LAMICTAL) 100 MG Tab Take 100 mg by mouth every day.     • levothyroxine (SYNTHROID) 125 MCG Tab TAKE 1 TAB BY MOUTH EVERY MORNING ON AN EMPTY STOMACH. 90 Tab 1   • Diclofenac Sodium 1.5 % Solution PENNSAID 1.5 % TRANSDERMAL SOLUTION     • cyclobenzaprine (FLEXERIL) 10 MG Tab Take 10 mg by mouth 3 times a day.  5   • amphetamine-dextroamphetamine (ADDERALL) 10 MG Tab Take 5 mg by mouth every day.     • hydroxyzine pamoate (VISTARIL) 100 MG Cap Take 200 mg by mouth 2 Times a Day.     • tamsulosin (FLOMAX) 0.4 MG capsule Take 0.4 mg by mouth ONE-HALF HOUR AFTER BREAKFAST.     • liraglutide  "(VICTOZA) 18 MG/3ML Solution Pen-injector injection Inject 1.8 mg as instructed every morning.     • temazepam (RESTORIL) 30 MG capsule Take 30 mg by mouth at bedtime as needed for Sleep.     • venlafaxine XR (EFFEXOR XR) 75 MG CAPSULE SR 24 HR Take 75 mg by mouth every morning.     • alprazolam (XANAX) 0.25 MG Tab Take 0.25-0.5 mg by mouth 4 times a day as needed for Anxiety.     • losartan (COZAAR) 100 MG Tab Take 100 mg by mouth every morning.     • oxycodone-acetaminophen (PERCOCET) 5-325 MG TABS Take 1-2 Tabs by mouth every four hours as needed.       No current facility-administered medications for this visit.         Past Medical History:   Diagnosis Date   • Anesthesia 11/03/2017    PONV with shoulder surgery approx 20 years ago.   • Anxiety and depression 11/03/2017   • Arthritis 11/03/2017    Osteoarthritis, \" all over\"   • Bunion    • Cancer (HCC) 2017    skin lesion cut out, on left shoulder   • Chronic autoimmune thyroiditis      + US / + TPO = 57   • Dental disorder 11/03/2017    \"Upper Plate\"   • Diabetes mellitus (HCC) 11/03/2017    \"Controlled with diet & Victoza\"   • Gout    • Hammertoe    • High cholesterol 11/03/2017    HX OF, not currently on medication for.   • Hypertension    • Hypothyroidism     chronic thyroiditis   • MRSA infection    • Open toe wound 03/2018    Left big toe, open wound, started 2/2018, receiving wound care therapy two times a week   • Pain 11/03/2017    \"Pain all over except right hip & ankle\"   • Psychiatric problem     depression/anxiety   • Sleep apnea 11/03/2017    CPAP USE   • Snoring 11/03/2017    DX JULIAN   • Tonsillitis        PHYSICAL EXAM:    /76 (BP Location: Right arm, Patient Position: Sitting, BP Cuff Size: Adult)   Pulse 92   Ht 1.778 m (5' 10\")   Wt 122.5 kg (270 lb)   BMI 38.74 kg/m²     Gen.   obese but otherwise appears healthy     Skin   appropriate for sex and age    HEENT  unremarkable    Neck   no palpable thyroid.  It may be substernal or " atrophic    Heart  regular    Extremities  no edema    Neuro  gait and station normal    Psych  appropriate    ASSESSMENT AND RECOMMENDATIONS    1. Low testosterone in male              Patient feels he is benefiting from testosterone supplementation and wishes to continue              He is aware of the potential adverse effects  - testosterone cypionate (DEPO-TESTOSTERONE) injection 150 mg; 0.75 mL by Intramuscular route Once.  - TESTOSTERONE,FREE ADULT MALE; Future  - ESTRADIOL; Future  - CBC WITHOUT DIFFERENTIAL; Future    2. Hypothyroidism, acquired, autoimmune              Continue levothyroxine 150 mcg daily.               He is clinically euthyroid    3. BMI 38.0-38.9,adult             We are hoping testosterone replacement will help him lose adipose    4. Special screening for malignant neoplasm of prostate           Last digital rectal examination of prostate March 2019 and last PSA February 2018  - PROSTATE SPECIFIC AG SCREENING; Future      DISPOSITION: Follow-up in 2 months      Florentin Weiss M.D.    Copies to: Kevin Chavez M.D. [phone]

## 2019-06-07 ENCOUNTER — NON-PROVIDER VISIT (OUTPATIENT)
Dept: WOUND CARE | Facility: MEDICAL CENTER | Age: 59
End: 2019-06-07
Attending: ORTHOPAEDIC SURGERY
Payer: COMMERCIAL

## 2019-06-07 PROCEDURE — 11056 PARNG/CUTG B9 HYPRKR LES 2-4: CPT

## 2019-06-07 PROCEDURE — 11721 DEBRIDE NAIL 6 OR MORE: CPT

## 2019-06-07 NOTE — WOUND TEAM
"[facility]    [address]    [address]    [place] [address]    [phone] Fax: [phone]    Foot and Nail Encounter 6/7/19  3/26/2019 - 6/26/2019           Referring Physician: LEATHA Lima  Primary Physician: Kevin Chavez MD  Consulting Physicians:   Start of Care: 7/14/2016        Subjective:       HPI:  Pt is a 59 year old male with DM who has attended [facility] in the past, had Surgery with Dr. Kang on Left foot on August 21, 2017 for bunionectomy revision and as below. Lives with wife and children. HX of dislocated toes 3-4 years ago while walking, had toes \"fixed,\" but \"it didn't work.\" Patient plans to have right hallux surgery 6/10/19/     12/22/2016 PROCEDURES with Dr Kang:     1.  Left modified Arndt bunionectomy.  2.  Left distal metatarsal osteotomy.  3.  Left distal metatarsal osteotomy of the hallux.  4.  Left distal metatarsal osteotomy, 2, 3, 4, and 5.  5.  Left flexor digitorum longus transfer with a flexor to extensor transfer    of 2 and 3.  6.  Left soft tissue reconstruction, metatarsophalangeal joints 2, 3, 4, and    5.  7.  Left hammertoe correction with proximal interphalangeal arthroplasty, 2    and 3.  8.  Right metatarsophalangeal joint capsulotomy of soft tissue release, 2 and    3.  9.  Right hammertoe correction with proximal interphalangeal arthroplasty, 2    and 3.   6/25/2018 Dr. Kang  PROCEDURES PERFORMED:  1.  Left removal internal fixation.  2.  Left amputation great toe with an interphalangeal disarticulation.  3.  Left foot irrigation and sharp debridement.  10/15/2018 Dr. Kang  PROCEDURES PERFORMED:  1.  Left gastroc soleus recession.  2.  Left partial excision third metatarsal.  3.  Right open removal internal fixation.  Past surgical Hx:   Past Surgical History             Past Surgical History:   Procedure Laterality Date   • NERVE ULNAR TRANSFER Right 4/3/2018     Procedure: NERVE ULNAR TRANSFER - TRANSPOSITION;  Surgeon: Ayad Luna, " M.D.;  Location: [facility];  Service: Orthopedics   • CARPAL TUNNEL RELEASE Right 4/3/2018     Procedure: CARPAL TUNNEL RELEASE;  Surgeon: Ayad Luna M.D.;  Location: [facility];  Service: Orthopedics   • ELBOW ARTHROTOMY Right 4/3/2018     Procedure: ELBOW ARTHROTOMY - FOR CONTRACTURE RELEASE AT THE ELBOW, RADIAL HEAD EXCISION;  Surgeon: Ayad Luna M.D.;  Location: [facility];  Service: Orthopedics   • TOE FUSION Right 11/13/2017     Procedure: TOE FUSION - 1ST METATARSALPHALANGEAL;  Surgeon: Haroldo Kang M.D.;  Location: [facility];  Service: Orthopedics   • METATARSAL HEAD RESECTION Right 11/13/2017     Procedure: METATARSAL HEAD RESECTION - EXCISION;  Surgeon: Haroldo Kang M.D.;  Location: [facility];  Service: Orthopedics   • HAMMERTOE CORRECTION Right 11/13/2017     Procedure: HAMMERTOE CORRECTION 2-5;  Surgeon: Haroldo Kang M.D.;  Location: [facility];  Service: Orthopedics   • METATARSAL HEAD RESECTION Left 8/21/2017     Procedure: METATARSAL HEAD RESECTION - 2-5;  Surgeon: Haroldo Kang M.D.;  Location: [facility];  Service:    • TOE FUSION Left 8/21/2017     Procedure: TOE FUSION - 1ST MTP;  Surgeon: Haroldo Kang M.D.;  Location: [facility];  Service:    • HARDWARE REMOVAL ORTHO Left 8/21/2017     Procedure: HARDWARE REMOVAL ORTHO;  Surgeon: Haroldo Kang M.D.;  Location: [facility];  Service:    • LUMBAR FUSION POSTERIOR   4/14/2017     Procedure: LUMBAR FUSION POSTERIOR - MINIMALLY INVASIVE TLIF L4-5;  Surgeon: Bossman Caro M.D.;  Location: [facility];  Service:    • HAMMERTOE CORRECTION Bilateral 12/22/2016     Procedure: HAMMERTOE CORRECTION/METATARSALPHALANGEAL JOINT RELEASE 2/3 RIGHT, 2-3 LEFT;  Surgeon: Haroldo Kang M.D.;  Location: [facility];  Service:    • BUNIONECTOMY Left 12/22/2016     Procedure:  "BUNIONECTOMY FOR DISTAL HALLUX VALGUS CORRECTION WITH BRANDY;  Surgeon: Haroldo Kang M.D.;  Location: [facility];  Service:    • ORTHOPEDIC OSTEOTOMY Left 12/22/2016     Procedure: ORTHOPEDIC OSTEOTOMY DISTAL METATARSAL 2-5;  Surgeon: Haroldo Kang M.D.;  Location: SUR[facility]ERY [facility];  Service:    • HIP ARTH ANTERIOR TOTAL Left 8/18/2016     Procedure: HIP ARTHROPLASTY ANTERIOR TOTAL;  Surgeon: Emiliano Vang M.D.;  Location: SURGERY [facility];  Service:    • OTHER ORTHOPEDIC SURGERY   6/2014     right shoulder  arthroplasty   • OTHER ORTHOPEDIC SURGERY   11/1/2012     left knee/left ankle/left shoulder   • OTHER ORTHOPEDIC SURGERY   2004     elbow surgery   • OTHER   2000     dislocation left foot surgery at [facility]   • OTHER         \"septoplasty 20 years ago\"         History of foot ulceration(s): Yes__x__ No____ Location:  L distal hallux   History of foot surgery: Yes__x__ No____Describe:______see above;   ______________________________________________       Employed: Y ___ N __x_ Job description: ____disability_____________       Current Residence: ___lives with wife and kids______  home     Pain: pt denies foot pain presently.      Past Medical History:   Past Medical History           Past Medical History:   Diagnosis Date   • Anesthesia 11/03/2017     PONV with shoulder surgery approx 20 years ago.   • Anxiety and depression 11/03/2017   • Arthritis 11/03/2017     Osteoarthritis, \" all over\"   • Bunion     • Cancer (CMS-HCC) 2017     skin lesion cut out, on left shoulder   • Chronic autoimmune thyroiditis        + US / + TPO = 57   • Dental disorder 11/03/2017     \"Upper Plate\"   • Diabetes mellitus (CMS-HCC) 11/03/2017     \"Controlled with diet & Victoza\"   • Gout     • Hammertoe     • High cholesterol 11/03/2017     HX OF, not currently on medication for.   • Hypertension     • Hypothyroidism       chronic thyroiditis   • Open toe wound 03/2018     Left big toe, open wound, " "started 2/2018, receiving wound care therapy two times a week   • Pain 11/03/2017     \"Pain all over except right hip & ankle\"   • Sleep apnea 11/03/2017     CPAP USE   • Snoring 11/03/2017     DX JULIAN   • Tonsillitis                    Current Medications:      Current Outpatient Prescriptions:   •  cyclobenzaprine (FLEXERIL) 10 MG Tab, Take 10 mg by mouth 3 times a day., Disp: , Rfl: 5  •  amphetamine-dextroamphetamine (ADDERALL) 10 MG Tab, Take 5 mg by mouth every day., Disp: , Rfl:   •  levothyroxine (SYNTHROID) 125 MCG Tab, Take 1 Tab by mouth Every morning on an empty stomach., Disp: 90 Tab, Rfl: 3  •  hydroxyzine pamoate (VISTARIL) 100 MG Cap, Take 200 mg by mouth 2 Times a Day., Disp: , Rfl:   •  tamsulosin (FLOMAX) 0.4 MG capsule, Take 0.4 mg by mouth ONE-HALF HOUR AFTER BREAKFAST., Disp: , Rfl:   •  liraglutide (VICTOZA) 18 MG/3ML Solution Pen-injector injection, Inject 1.8 mg as instructed every morning., Disp: , Rfl:   •  temazepam (RESTORIL) 30 MG capsule, Take 30 mg by mouth at bedtime as needed for Sleep., Disp: , Rfl:   •  venlafaxine XR (EFFEXOR XR) 75 MG CAPSULE SR 24 HR, Take 75 mg by mouth every morning., Disp: , Rfl:   •  alprazolam (XANAX) 0.25 MG Tab, Take 0.25-0.5 mg by mouth 4 times a day as needed for Anxiety., Disp: , Rfl:   •  losartan (COZAAR) 100 MG Tab, Take 100 mg by mouth every morning., Disp: , Rfl:   •  oxycodone-acetaminophen (PERCOCET) 5-325 MG TABS, Take 1-2 Tabs by mouth every four hours as needed., Disp: , Rfl:         Allergies: Demerol and Septra, Daptomycin   Social History:   Social History   Social History                Social History   • Marital status:        Spouse name: N/A   • Number of children: N/A   • Years of education: N/A            Occupational History   • Not on file.                  Social History Main Topics   • Smoking status: Former Smoker       Packs/day: 1.00       Years: 20.00       Types: Cigarettes       Quit date: 1/1/2014   • Smokeless " "tobacco: Former User       Types: Chew       Quit date: 1/1/1997   • Alcohol use Yes         Comment: \"Few per month\"   • Drug use: No   • Sexual activity: Not on file              Other Topics Concern   • Not on file            Social History Narrative   • No narrative on file            Objective:     Tests and Measures:    2/8/2018 ANT per Florentin 1.0  HgbA1C: 6.2 2/27/2019 09/01/2017 6.1  11/09/2018 - 6.1        Vascular      R   L       2+ 2+ DP pulse     2+ 2+ PT pulse     yes yes Capillary refill < 3 sec         doppler pulses multiphasic AIDAN DP/PT  Deformities    R   L        4 and 5  2nd Hammer/claw toe      x  x Hallux Valgus      great toe, medial; 5th MTH plantar   Prominent Metatarsal Heads         Charcot Foot         Drop Foot         Rocker Bottom         Prior amputation:  see above       Other:  nails missing from lt  1st,  , Rt 3-5       dislocation            Clinical appearance/skin    Dryness___moderate_________ Swelling_____none_________ Redness______none_______Temperature__warm_ ______    Callus__left 4th MTH plantar, right planter 3-5th MTH  _________________________________    Ulceration_ -none        Clinical appearance/toenails    Onychomycosis__6/10__ _(nails that had been removed to 1st, 2nd toes LT foot growing back_________    Ingrown toenails_______    Deformities_______________________________    Other___HX: 1st and 2nd nails aidan feet were ablated by podiatrist due to problematic deformities per pt___________________________________          Orthotic, protective, supportive devices: custom orthotics in new balance shoes     Procedures: Pt's feet were washed with soapy water, then dried. In between toes cleaned with gauze to remove debris. All 7 onychomycotic nails were ground down with dremel, then clipped as needed, smoothed again with Dremel and deshawn board. Calluses, as above, debrided to skin level with scissors & forceps. No nicks or cuts noted.      Patient Education: " Instructed pt on s/s infection - chills, fever, malaise, NV, increased redness/swelling/pain/exudate - and to go to ER/Urgent Care; on rationale for debridement of calluses; to shake out shoes before donning; to wear protective footwear at all times, including when at home; to examine feet daily for any new redness/wounds and report to PCP; to moisturize feet daily except between toes with water-based moisturizer; to keep tight control over BS for optimum health; on different treatments for nail fungus (oral meds, topical meds and Tea Tree oil); to return to [facility] for foot and nail care every 2-3 months. Pt verbalized understanding of all instruction.        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

## 2019-06-10 ENCOUNTER — ANESTHESIA EVENT (OUTPATIENT)
Dept: SURGERY | Facility: MEDICAL CENTER | Age: 59
End: 2019-06-10
Payer: COMMERCIAL

## 2019-06-10 ENCOUNTER — APPOINTMENT (OUTPATIENT)
Dept: RADIOLOGY | Facility: MEDICAL CENTER | Age: 59
End: 2019-06-10
Attending: ORTHOPAEDIC SURGERY
Payer: COMMERCIAL

## 2019-06-10 ENCOUNTER — ANESTHESIA (OUTPATIENT)
Dept: SURGERY | Facility: MEDICAL CENTER | Age: 59
End: 2019-06-10
Payer: COMMERCIAL

## 2019-06-10 ENCOUNTER — HOSPITAL ENCOUNTER (OUTPATIENT)
Facility: MEDICAL CENTER | Age: 59
End: 2019-06-10
Attending: ORTHOPAEDIC SURGERY | Admitting: ORTHOPAEDIC SURGERY
Payer: COMMERCIAL

## 2019-06-10 VITALS
BODY MASS INDEX: 39.17 KG/M2 | TEMPERATURE: 98.4 F | HEIGHT: 70 IN | HEART RATE: 85 BPM | WEIGHT: 273.59 LBS | OXYGEN SATURATION: 92 % | RESPIRATION RATE: 14 BRPM

## 2019-06-10 LAB
GLUCOSE BLD-MCNC: 108 MG/DL (ref 65–99)
GLUCOSE BLD-MCNC: 167 MG/DL (ref 65–99)
GRAM STN SPEC: NORMAL
SIGNIFICANT IND 70042: NORMAL
SITE SITE: NORMAL
SOURCE SOURCE: NORMAL

## 2019-06-10 PROCEDURE — 160046 HCHG PACU - 1ST 60 MINS PHASE II: Performed by: ORTHOPAEDIC SURGERY

## 2019-06-10 PROCEDURE — 73620 X-RAY EXAM OF FOOT: CPT | Mod: LT

## 2019-06-10 PROCEDURE — 700102 HCHG RX REV CODE 250 W/ 637 OVERRIDE(OP): Performed by: ANESTHESIOLOGY

## 2019-06-10 PROCEDURE — 87075 CULTR BACTERIA EXCEPT BLOOD: CPT

## 2019-06-10 PROCEDURE — 160035 HCHG PACU - 1ST 60 MINS PHASE I: Performed by: ORTHOPAEDIC SURGERY

## 2019-06-10 PROCEDURE — 87070 CULTURE OTHR SPECIMN AEROBIC: CPT

## 2019-06-10 PROCEDURE — 700111 HCHG RX REV CODE 636 W/ 250 OVERRIDE (IP): Performed by: ANESTHESIOLOGY

## 2019-06-10 PROCEDURE — A9270 NON-COVERED ITEM OR SERVICE: HCPCS | Performed by: ANESTHESIOLOGY

## 2019-06-10 PROCEDURE — 160002 HCHG RECOVERY MINUTES (STAT): Performed by: ORTHOPAEDIC SURGERY

## 2019-06-10 PROCEDURE — 501838 HCHG SUTURE GENERAL: Performed by: ORTHOPAEDIC SURGERY

## 2019-06-10 PROCEDURE — 160029 HCHG SURGERY MINUTES - 1ST 30 MINS LEVEL 4: Performed by: ORTHOPAEDIC SURGERY

## 2019-06-10 PROCEDURE — 82962 GLUCOSE BLOOD TEST: CPT

## 2019-06-10 PROCEDURE — 700105 HCHG RX REV CODE 258: Performed by: ORTHOPAEDIC SURGERY

## 2019-06-10 PROCEDURE — 87205 SMEAR GRAM STAIN: CPT

## 2019-06-10 PROCEDURE — 160025 RECOVERY II MINUTES (STATS): Performed by: ORTHOPAEDIC SURGERY

## 2019-06-10 PROCEDURE — 160009 HCHG ANES TIME/MIN: Performed by: ORTHOPAEDIC SURGERY

## 2019-06-10 PROCEDURE — 160048 HCHG OR STATISTICAL LEVEL 1-5: Performed by: ORTHOPAEDIC SURGERY

## 2019-06-10 PROCEDURE — 700101 HCHG RX REV CODE 250: Performed by: ANESTHESIOLOGY

## 2019-06-10 PROCEDURE — 160041 HCHG SURGERY MINUTES - EA ADDL 1 MIN LEVEL 4: Performed by: ORTHOPAEDIC SURGERY

## 2019-06-10 PROCEDURE — 500881 HCHG PACK, EXTREMITY: Performed by: ORTHOPAEDIC SURGERY

## 2019-06-10 RX ORDER — LIDOCAINE HYDROCHLORIDE 10 MG/ML
INJECTION, SOLUTION INFILTRATION; PERINEURAL
Status: DISCONTINUED | OUTPATIENT
Start: 2019-06-10 | End: 2019-06-10 | Stop reason: HOSPADM

## 2019-06-10 RX ORDER — CEFAZOLIN SODIUM 1 G/3ML
INJECTION, POWDER, FOR SOLUTION INTRAMUSCULAR; INTRAVENOUS PRN
Status: DISCONTINUED | OUTPATIENT
Start: 2019-06-10 | End: 2019-06-10 | Stop reason: SURG

## 2019-06-10 RX ORDER — DIPHENHYDRAMINE HYDROCHLORIDE 50 MG/ML
12.5 INJECTION INTRAMUSCULAR; INTRAVENOUS
Status: DISCONTINUED | OUTPATIENT
Start: 2019-06-10 | End: 2019-06-10 | Stop reason: HOSPADM

## 2019-06-10 RX ORDER — ONDANSETRON 2 MG/ML
INJECTION INTRAMUSCULAR; INTRAVENOUS PRN
Status: DISCONTINUED | OUTPATIENT
Start: 2019-06-10 | End: 2019-06-10 | Stop reason: SURG

## 2019-06-10 RX ORDER — SODIUM CHLORIDE, SODIUM LACTATE, POTASSIUM CHLORIDE, CALCIUM CHLORIDE 600; 310; 30; 20 MG/100ML; MG/100ML; MG/100ML; MG/100ML
INJECTION, SOLUTION INTRAVENOUS CONTINUOUS
Status: DISCONTINUED | OUTPATIENT
Start: 2019-06-10 | End: 2019-06-10 | Stop reason: HOSPADM

## 2019-06-10 RX ORDER — OXYCODONE HCL 5 MG/5 ML
5 SOLUTION, ORAL ORAL
Status: DISCONTINUED | OUTPATIENT
Start: 2019-06-10 | End: 2019-06-10 | Stop reason: HOSPADM

## 2019-06-10 RX ORDER — DEXAMETHASONE SODIUM PHOSPHATE 4 MG/ML
INJECTION, SOLUTION INTRA-ARTICULAR; INTRALESIONAL; INTRAMUSCULAR; INTRAVENOUS; SOFT TISSUE PRN
Status: DISCONTINUED | OUTPATIENT
Start: 2019-06-10 | End: 2019-06-10 | Stop reason: SURG

## 2019-06-10 RX ORDER — TRIAMCINOLONE ACETONIDE 40 MG/ML
INJECTION, SUSPENSION INTRA-ARTICULAR; INTRAMUSCULAR
Status: DISCONTINUED | OUTPATIENT
Start: 2019-06-10 | End: 2019-06-10 | Stop reason: HOSPADM

## 2019-06-10 RX ORDER — ONDANSETRON 2 MG/ML
4 INJECTION INTRAMUSCULAR; INTRAVENOUS
Status: DISCONTINUED | OUTPATIENT
Start: 2019-06-10 | End: 2019-06-10 | Stop reason: HOSPADM

## 2019-06-10 RX ORDER — PHENYLEPHRINE HYDROCHLORIDE 10 MG/ML
INJECTION, SOLUTION INTRAMUSCULAR; INTRAVENOUS; SUBCUTANEOUS PRN
Status: DISCONTINUED | OUTPATIENT
Start: 2019-06-10 | End: 2019-06-10 | Stop reason: SURG

## 2019-06-10 RX ORDER — GABAPENTIN 300 MG/1
300 CAPSULE ORAL ONCE
Status: COMPLETED | OUTPATIENT
Start: 2019-06-10 | End: 2019-06-10

## 2019-06-10 RX ORDER — HALOPERIDOL 5 MG/ML
1 INJECTION INTRAMUSCULAR
Status: DISCONTINUED | OUTPATIENT
Start: 2019-06-10 | End: 2019-06-10 | Stop reason: HOSPADM

## 2019-06-10 RX ORDER — OXYCODONE HCL 5 MG/5 ML
10 SOLUTION, ORAL ORAL
Status: DISCONTINUED | OUTPATIENT
Start: 2019-06-10 | End: 2019-06-10 | Stop reason: HOSPADM

## 2019-06-10 RX ORDER — ACETAMINOPHEN 500 MG
1000 TABLET ORAL ONCE
Status: COMPLETED | OUTPATIENT
Start: 2019-06-10 | End: 2019-06-10

## 2019-06-10 RX ORDER — BUPIVACAINE HYDROCHLORIDE 5 MG/ML
INJECTION, SOLUTION EPIDURAL; INTRACAUDAL
Status: DISCONTINUED | OUTPATIENT
Start: 2019-06-10 | End: 2019-06-10 | Stop reason: HOSPADM

## 2019-06-10 RX ADMIN — PHENYLEPHRINE HYDROCHLORIDE 100 MCG: 10 INJECTION INTRAVENOUS at 11:12

## 2019-06-10 RX ADMIN — MIDAZOLAM HYDROCHLORIDE 2 MG: 1 INJECTION, SOLUTION INTRAMUSCULAR; INTRAVENOUS at 10:51

## 2019-06-10 RX ADMIN — SODIUM CHLORIDE, POTASSIUM CHLORIDE, SODIUM LACTATE AND CALCIUM CHLORIDE: 600; 310; 30; 20 INJECTION, SOLUTION INTRAVENOUS at 10:50

## 2019-06-10 RX ADMIN — EPHEDRINE SULFATE 10 MG: 50 INJECTION INTRAMUSCULAR; INTRAVENOUS; SUBCUTANEOUS at 11:10

## 2019-06-10 RX ADMIN — ACETAMINOPHEN 1000 MG: 500 TABLET ORAL at 10:48

## 2019-06-10 RX ADMIN — PHENYLEPHRINE HYDROCHLORIDE 100 MCG: 10 INJECTION INTRAVENOUS at 11:15

## 2019-06-10 RX ADMIN — CEFAZOLIN 2 G: 330 INJECTION, POWDER, FOR SOLUTION INTRAMUSCULAR; INTRAVENOUS at 10:50

## 2019-06-10 RX ADMIN — EPHEDRINE SULFATE 10 MG: 50 INJECTION INTRAMUSCULAR; INTRAVENOUS; SUBCUTANEOUS at 11:15

## 2019-06-10 RX ADMIN — FENTANYL CITRATE 50 MCG: 50 INJECTION, SOLUTION INTRAMUSCULAR; INTRAVENOUS at 10:55

## 2019-06-10 RX ADMIN — SODIUM CHLORIDE, POTASSIUM CHLORIDE, SODIUM LACTATE AND CALCIUM CHLORIDE: 600; 310; 30; 20 INJECTION, SOLUTION INTRAVENOUS at 10:48

## 2019-06-10 RX ADMIN — GABAPENTIN 300 MG: 300 CAPSULE ORAL at 10:48

## 2019-06-10 RX ADMIN — DEXAMETHASONE SODIUM PHOSPHATE 4 MG: 4 INJECTION, SOLUTION INTRA-ARTICULAR; INTRALESIONAL; INTRAMUSCULAR; INTRAVENOUS; SOFT TISSUE at 11:10

## 2019-06-10 RX ADMIN — PROPOFOL 200 MG: 10 INJECTION, EMULSION INTRAVENOUS at 10:55

## 2019-06-10 RX ADMIN — FENTANYL CITRATE 50 MCG: 50 INJECTION, SOLUTION INTRAMUSCULAR; INTRAVENOUS at 11:02

## 2019-06-10 RX ADMIN — ONDANSETRON 4 MG: 2 INJECTION INTRAMUSCULAR; INTRAVENOUS at 11:10

## 2019-06-10 ASSESSMENT — PAIN SCALES - GENERAL: PAIN_LEVEL: 0

## 2019-06-10 NOTE — OP REPORT
DATE OF SERVICE:  06/10/2019    PREOPERATIVE DIAGNOSES:  1.  Right interphalangeal joint dislocation.  2.  Left painful prominence from fourth metatarsal.  3.  Left midfoot arthritis.    POSTOPERATIVE DIAGNOSES:  1.  Right interphalangeal joint dislocation.  2.  Left painful prominence from fourth metatarsal.  3.  Left midfoot arthritis.    PROCEDURE PERFORMED:  1.  Right IP joint disarticulation of the first toe.  2.  Left fourth partial excision metatarsal.  3.  Left fluoroscopic-guided injection into the midfoot.    SURGEON:  Haroldo Kang MD    FIRST ASSISTANT:  Jori Vargas MD    SECOND ASSISTANT:  Matilda Mattson.    ANESTHESIA:  General endotracheal.    ESTIMATED BLOOD LOSS:  None.    COMPLICATIONS:  None.    POSTOPERATIVE PLAN:  1.  Weightbearing as tolerated in a postop shoe bilaterally.  2.  Preoperative antibiotics.  3.  Follow up in 2 weeks.    INDICATIONS:  The patient is a pleasant 59-year-old male who has had problems   with his bilateral feet.  The above diagnoses made and options were discussed   with him including operative and nonoperative and he elected to undergo   operative intervention.  The above procedure was discussed and all questions   were answered.  Risks of surgery explained to include, but not limited to   wound problems, infection, neurologic injury, vascular injury, need for   surgery.  He understands he could have persistent risk of his pain,   recurrence, and need for further surgery.  He understands and accepts these   risks and agrees to proceed.  His sites were marked by myself prior to   receiving psychotropic medicines.    PROCEDURE IN DETAIL:  The patient was brought to the operating room and   underwent general endotracheal anesthetic without complications.  His   bilateral lower extremity was prepped and draped in usual fashion in the   supine position with all appropriate padding.  Positive site verification   confirmed his bilateral lower extremities as above  procedure and confirmation   that he received preoperative antibiotics.  Esmarch was used to exsanguinate   his right foot and tourniquet was inflated.  An elliptical incision was made   at the distal aspect of his toe.  It was brought through full thickness down.    Disarticulation was performed.  The skin flaps were then cut to allow for a   nice loose closure.  This was then closed with 3-0 Vicryl and 3-0 nylon.    Prior to closure, tourniquet was released.  Hemostasis was obtained.  Sterile   dressings were applied.  Next, on his left foot, using his previous fourth   webspace incision, this was dissected down to the fourth metatarsal.  The   distal aspect was delineated with a Martinez elevator.  Microsagittal saw was used   to remove the distal approximately 2 mm of bone and also the plantar aspect.    There is no further prominence on direct palpation underneath the skin.  Bone   wax was placed over the ____.  The wound was closed with interrupted nylon   suture.  Next, the C-arm was brought in and a 22-gauge needle was then   injected into the second and third tarsometatarsal joints.  He was injected   with 1 mL of lidocaine, Marcaine, and Celestone.  A Band-Aid was placed.  He   tolerated the procedure well.  Sterile dressings were applied.  He was   transferred to recovery room in good condition.    Utilization of Dr. Vargas was necessary for the patient positioning,   holding, retraction, wound closure, and dressing placement.  He was present   throughout the entire procedure.       ____________________________________     MD SAÚL MOREL / EMILI    DD:  06/10/2019 12:39:09  DT:  06/10/2019 13:21:51    D#:  [id number]  Job#:  [id number]    cc: [facility]

## 2019-06-10 NOTE — OR NURSING
Ortho tech here, fitted pt with post op shoe for left foot, pt has post op shoe for right foot, d/c instructions reviewed with pt and wife, they verbalized understanding of instructions, pt states he has crutches in his car, discharged via wheelchair to home with wife.

## 2019-06-10 NOTE — OR NURSING
Pt arrived in Phase 2 at 1209. Awake, alert, denies nausea, SOB, complains of bIlat shoulder aching pain 1-2 of 10, pt states this is chronic pain, denies foot pain, Bilat foot gauze/elastic wrap DSG CDI, bilat toes, pink, warm, brisk cap refill < 3 seconds, VS stable, FSBS 168, pt drining juice, eating saltine crackers, personal belongings and wife at bedside, pt states he has RX for percocet at home.

## 2019-06-10 NOTE — ANESTHESIA QCDR
2019 [facility] Clinical Data Registry (for Quality Improvement)     Postoperative nausea/vomiting risk protocol (Adult = 18 yrs and Pediatric 3-17 yrs)- (430 and 463)  General inhalation anesthetic (NOT TIVA) with PONV risk factors: Yes  Provision of anti-emetic therapy with at least 2 different classes of agents: Yes   Patient DID NOT receive anti-emetic therapy and reason is documented in Medical Record:  N/A    Multimodal Pain Management- (AQI59)  Patient undergoing Elective Surgery (i.e. Outpatient, or ASC, or Prescheduled Surgery prior to Hospital Admission): Yes  Use of Multimodal Pain Management, two or more drugs and/or interventions, NOT including systemic opioids: Yes   Exception: Documented allergy to multiple classes of analgesics:  N/A    PACU assessment of acute postoperative pain prior to Anesthesia Care End- Applies to Patients Age = 18- (ABG7)  Initial PACU pain score is which of the following: < 7/10  Patient unable to report pain score: N/A    Post-anesthetic transfer of care checklist/protocol to PACU/ICU- (426 and 427)  Upon conclusion of case, patient transferred to which of the following locations: PACU/Non-ICU  Use of transfer checklist/protocol: Yes  Exclusion: Service Performed in Patient Hospital Room (and thus did not require transfer): N/A    PACU Reintubation- (AQI31)  General anesthesia requiring endotracheal intubation (ETT) along with subsequent extubation in OR or PACU: No  Required reintubation in the PACU: N/A  Extubation was a planned trial documented in the medical record prior to removal of the original airway device: N/A    Unplanned admission to ICU related to anesthesia service up through end of PACU care- (MD51)  Unplanned admission to ICU (not initially anticipated at anesthesia start time): No

## 2019-06-10 NOTE — ANESTHESIA TIME REPORT
Anesthesia Start and Stop Event Times     Date Time Event    6/10/2019 1050 Anesthesia Start     1130 Anesthesia Stop        Responsible Staff  06/10/19    Name Role Begin End    Varun Mckay M.D. Anesth 1050 1130        Preop Diagnosis (Free Text):  Pre-op Diagnosis     PRIMARY OA OF RIGHT ANKLE AND/OR FOOT         Preop Diagnosis (Codes):  Diagnosis Information     Diagnosis Code(s):         Post op Diagnosis  Dislocated toe      Premium Reason  Non-Premium    Comments:

## 2019-06-10 NOTE — ANESTHESIA PROCEDURE NOTES
Airway  Date/Time: 6/10/2019 10:55 AM  Performed by: ESTEBAN AGUIRRE  Authorized by: ESTEBAN AGUIRRE     Location:  OR  Urgency:  Elective  Indications for Airway Management:  Anesthesia  Spontaneous Ventilation: absent    Sedation Level:  Deep  Preoxygenated: Yes    Final Airway Type:  Supraglottic airway  Final Supraglottic Airway:  Standard LMA  SGA Size:  4  Number of Attempts at Approach:  1

## 2019-06-10 NOTE — PROGRESS NOTES
Patient is AO x 4, RASS 0, and denies pain, discomfort, n/v. In PACU.  Surgical sites CDI with intact CMS of BLE.  Patient states that his PCP Dr. Chavez manages chronic pain medications and that he has Percocet at home to help with post-op pain.  Discharge orders with instructions entered by MD.  POC reviewed, PIV verified, and safety protocols in place.  VSS.  spO2 91-93% on RA.  Patient tolerating PO fluids / juice.    1156 - Wife Natalie in waiting area and updated on POC.    1204 - Report to Kirstin.  Dentures on chart.

## 2019-06-10 NOTE — ANESTHESIA POSTPROCEDURE EVALUATION
Patient: Cristino Keys    Procedure Summary     Date:  06/10/19 Room / Location:  [place] [address] / SURGERY [facility]    Anesthesia Start:  1050 Anesthesia Stop:  1130    Procedures:       AMPUTATION, TOE- FIRST TOE (Right Toe)      METATARSAL HEAD RESECTION- FOR PARTIAL EXCISION (Left Toe)      INJECTION, JOINT, DIAGNOSTIC- MIDFOOT (Foot) Diagnosis:  (PRIMARY OA OF RIGHT ANKLE AND/OR FOOT )    Surgeon:  Haroldo Kang M.D. Responsible Provider:  Varun Mckay M.D.    Anesthesia Type:  general ASA Status:  2          Final Anesthesia Type: general  Last vitals  BP   NIBP: 131/83    Temp   37.1 °C (98.8 °F)    Pulse   Heart Rate (Monitored): 94   Resp   16    SpO2   95 %      Anesthesia Post Evaluation    Patient location during evaluation: PACU  Patient participation: complete - patient participated  Level of consciousness: awake and alert  Pain score: 0    Airway patency: patent  Anesthetic complications: no  Cardiovascular status: hemodynamically stable  Respiratory status: acceptable  Hydration status: euvolemic    PONV: none           Nurse Pain Score: 7 (NPRS)

## 2019-06-10 NOTE — PROGRESS NOTES
Post op shoes delivered and fitted to patient [facility].  If any further assistance needed, please call extension [phone] or place order for Ortho Technician assistance as a communication order in [employer].

## 2019-06-10 NOTE — DISCHARGE INSTRUCTIONS
ACTIVITY: Rest and take it easy for the first 24 hours.  A responsible adult is recommended to remain with you during that time.  It is normal to feel sleepy.  We encourage you to not do anything that requires balance, judgment or coordination.    MILD FLU-LIKE SYMPTOMS ARE NORMAL. YOU MAY EXPERIENCE GENERALIZED MUSCLE ACHES, THROAT IRRITATION, HEADACHE AND/OR SOME NAUSEA.    FOR 24 HOURS DO NOT:  Drive, operate machinery or run household appliances.  Drink beer or alcoholic beverages.   Make important decisions or sign legal documents.    SPECIAL INSTRUCTIONS:   Weight Bearing As Toerlated in post op shoes  Elevate  Crutches as needed  Keep splints clean, dry, intact      DIET: To avoid nausea, slowly advance diet as tolerated, avoiding spicy or greasy foods for the first day.  Add more substantial food to your diet according to your physician's instructions.  INCREASE FLUIDS AND FIBER TO AVOID CONSTIPATION.    SURGICAL DRESSING/BATHING: Kepp clean, dry, intact    FOLLOW-UP APPOINTMENT:  A follow-up appointment should be arranged with your doctor in 1-2 weeks; call to schedule.    You should CALL YOUR PHYSICIAN if you develop:  Fever greater than 101 degrees F.  Pain not relieved by medication, or persistent nausea or vomiting.  Excessive bleeding (blood soaking through dressing) or unexpected drainage from the wound.  Extreme redness or swelling around the incision site, drainage of pus or foul smelling drainage.  Inability to urinate or empty your bladder within 8 hours.  Problems with breathing or chest pain.    You should call 911 if you develop problems with breathing or chest pain.  If you are unable to contact your doctor or surgical center, you should go to the nearest emergency room or urgent care center.    Physician's telephone #: [phone]    If any questions arise, call your doctor.  If your doctor is not available, please feel free to call the Surgical Center at [phone].  The Center is  open Monday through Friday from 7AM to 7PM.  You can also call the HEALTH HOTLINE open 24 hours/day, 7 days/week and speak to a nurse at [phone], or toll free at [phone].    A registered nurse may call you a few days after your surgery to see how you are doing after your procedure.    MEDICATIONS: Resume taking daily medication.  Take prescribed pain medication with food.  If no medication is prescribed, you may take non-aspirin pain medication if needed.  PAIN MEDICATION CAN BE VERY CONSTIPATING.  Take a stool softener or laxative such as senokot, pericolace, or milk of magnesia if needed.    Prescription given for none, use pain meds you have at home.  Last pain medication given at none.    If your physician has prescribed pain medication that includes Acetaminophen (Tylenol), do not take additional Acetaminophen (Tylenol) while taking the prescribed medication.    Depression / Suicide Risk    As you are discharged from this [facility], it is important to learn how to keep safe from harming yourself.    Recognize the warning signs:  · Abrupt changes in personality, positive or negative- including increase in energy   · Giving away possessions  · Change in eating patterns- significant weight changes-  positive or negative  · Change in sleeping patterns- unable to sleep or sleeping all the time   · Unwillingness or inability to communicate  · Depression  · Unusual sadness, discouragement and loneliness  · Talk of wanting to die  · Neglect of personal appearance   · Rebelliousness- reckless behavior  · Withdrawal from people/activities they love  · Confusion- inability to concentrate     If you or a loved one observes any of these behaviors or has concerns about self-harm, here's what you can do:  · Talk about it- your feelings and reasons for harming yourself  · Remove any means that you might use to hurt yourself (examples: pills, rope, extension cords, firearm)  · Get professional help  from the community (Mental Health, Substance Abuse, psychological counseling)  · Do not be alone:Call your Safe Contact- someone whom you trust who will be there for you.  · Call your local CRISIS HOTLINE 411-0677 or 770-052-9757  · Call your local Children's Mobile Crisis Response Team [place] (632) 356-0761 or www.[facility]  · Call the toll free National Suicide Prevention Hotlines   · National Suicide Prevention Lifeline 352-471-EJKM (4641)  · National Hope Line Network 800-SUICIDE (555-5930)

## 2019-06-10 NOTE — ANESTHESIA PREPROCEDURE EVALUATION
Relevant Problems   (+) Arthritis of left ankle   (+) Hypertension   (+) JULIAN (obstructive sleep apnea)   (+) Type 2 diabetes mellitus without complication (HCC)       Physical Exam    Airway   Mallampati: III  TM distance: >3 FB  Neck ROM: full       Cardiovascular - normal exam  Rhythm: regular  Rate: normal  (-) murmur     Dental - normal exam         Pulmonary - normal exam  Breath sounds clear to auscultation     Abdominal    Neurological - normal exam                 Anesthesia Plan    ASA 2       Plan - general       Airway plan will be LMA        Induction: intravenous    Postoperative Plan: Postoperative administration of opioids is intended.    Pertinent diagnostic labs and testing reviewed    Informed Consent:    Anesthetic plan and risks discussed with patient.    Use of blood products discussed with: patient whom consented to blood products.

## 2019-06-12 ENCOUNTER — NON-PROVIDER VISIT (OUTPATIENT)
Dept: ENDOCRINOLOGY | Facility: MEDICAL CENTER | Age: 59
End: 2019-06-12
Payer: COMMERCIAL

## 2019-06-12 DIAGNOSIS — E29.1 HYPOGONADISM MALE: ICD-10-CM

## 2019-06-12 DIAGNOSIS — E29.1 HYPOGONADISM IN MALE: ICD-10-CM

## 2019-06-12 PROCEDURE — 96372 THER/PROPH/DIAG INJ SC/IM: CPT | Performed by: INTERNAL MEDICINE

## 2019-06-12 RX ORDER — TESTOSTERONE CYPIONATE 200 MG/ML
150 INJECTION, SOLUTION INTRAMUSCULAR
Status: DISCONTINUED | OUTPATIENT
Start: 2019-06-12 | End: 2021-08-16

## 2019-06-12 RX ADMIN — TESTOSTERONE CYPIONATE 150 MG: 200 INJECTION, SOLUTION INTRAMUSCULAR at 13:31

## 2019-07-03 ENCOUNTER — NON-PROVIDER VISIT (OUTPATIENT)
Dept: ENDOCRINOLOGY | Facility: MEDICAL CENTER | Age: 59
End: 2019-07-03
Payer: COMMERCIAL

## 2019-07-03 DIAGNOSIS — R79.89 LOW TESTOSTERONE IN MALE: ICD-10-CM

## 2019-07-03 PROCEDURE — 96372 THER/PROPH/DIAG INJ SC/IM: CPT | Performed by: PHYSICIAN ASSISTANT

## 2019-07-03 RX ADMIN — TESTOSTERONE CYPIONATE 150 MG: 200 INJECTION, SOLUTION INTRAMUSCULAR at 10:46

## 2019-07-17 ENCOUNTER — NON-PROVIDER VISIT (OUTPATIENT)
Dept: ENDOCRINOLOGY | Facility: MEDICAL CENTER | Age: 59
End: 2019-07-17
Payer: COMMERCIAL

## 2019-07-17 DIAGNOSIS — R79.89 LOW TESTOSTERONE IN MALE: ICD-10-CM

## 2019-07-17 PROCEDURE — 96372 THER/PROPH/DIAG INJ SC/IM: CPT | Performed by: PHYSICIAN ASSISTANT

## 2019-07-17 RX ADMIN — TESTOSTERONE CYPIONATE 150 MG: 200 INJECTION, SOLUTION INTRAMUSCULAR at 11:11

## 2019-07-29 ENCOUNTER — OFFICE VISIT (OUTPATIENT)
Dept: ENDOCRINOLOGY | Facility: MEDICAL CENTER | Age: 59
End: 2019-07-29
Payer: COMMERCIAL

## 2019-07-29 VITALS
RESPIRATION RATE: 16 BRPM | BODY MASS INDEX: 40.14 KG/M2 | DIASTOLIC BLOOD PRESSURE: 82 MMHG | HEART RATE: 105 BPM | HEIGHT: 70 IN | OXYGEN SATURATION: 95 % | WEIGHT: 280.4 LBS | SYSTOLIC BLOOD PRESSURE: 122 MMHG

## 2019-07-29 DIAGNOSIS — R79.89 LOW TESTOSTERONE IN MALE: ICD-10-CM

## 2019-07-29 DIAGNOSIS — E06.3 CHRONIC AUTOIMMUNE THYROIDITIS: ICD-10-CM

## 2019-07-29 DIAGNOSIS — E06.3 HYPOTHYROIDISM, ACQUIRED, AUTOIMMUNE: ICD-10-CM

## 2019-07-29 PROCEDURE — 99214 OFFICE O/P EST MOD 30 MIN: CPT | Mod: 25 | Performed by: INTERNAL MEDICINE

## 2019-07-29 PROCEDURE — 96372 THER/PROPH/DIAG INJ SC/IM: CPT | Performed by: INTERNAL MEDICINE

## 2019-07-29 RX ORDER — TESTOSTERONE CYPIONATE 200 MG/ML
150 INJECTION, SOLUTION INTRAMUSCULAR ONCE
Status: COMPLETED | OUTPATIENT
Start: 2019-07-29 | End: 2019-07-29

## 2019-07-29 RX ADMIN — TESTOSTERONE CYPIONATE 150 MG: 200 INJECTION, SOLUTION INTRAMUSCULAR at 11:30

## 2019-07-29 NOTE — PROGRESS NOTES
Chief Complaint   Patient presents with   • Hypogonadism   • Hypothyroidism        HPI:    See assessment and recommendations below    ROS:  Multiple orthopedic procedures.  2 procedures on the right shoulder recently is working out very well but more as planned for his knee and toes      Allergies:   Allergies   Allergen Reactions   • Demerol Vomiting and Nausea     Rxn = years ago    • Amoxicillin      2004-02-25;NA   • Cubicin [Daptomycin] Vomiting   • Meperidine      2004-02-25;NA   • Sulfa Drugs Hives     .   • Sulfamethoxazole W-Trimethoprim      2004-02-25;NA   • Tetracycline Hives       Current medicines including changes today:  Current Outpatient Prescriptions   Medication Sig Dispense Refill   • naproxen (NAPROSYN) 500 MG Tab Take 500 mg by mouth every day.     • vitamin D (CHOLECALCIFEROL) 1000 UNIT Tab Take 1,000 Units by mouth every 48 hours.     • lamoTRIgine (LAMICTAL) 100 MG Tab Take 100 mg by mouth every day.     • levothyroxine (SYNTHROID) 125 MCG Tab TAKE 1 TAB BY MOUTH EVERY MORNING ON AN EMPTY STOMACH. 90 Tab 1   • Diclofenac Sodium 1.5 % Solution PENNSAID 1.5 % TRANSDERMAL SOLUTION     • cyclobenzaprine (FLEXERIL) 10 MG Tab Take 10 mg by mouth 3 times a day.  5   • amphetamine-dextroamphetamine (ADDERALL) 5 MG Tab Take 5 mg by mouth every day.     • hydroxyzine pamoate (VISTARIL) 100 MG Cap Take 200 mg by mouth 2 Times a Day.     • tamsulosin (FLOMAX) 0.4 MG capsule Take 0.4 mg by mouth ONE-HALF HOUR AFTER BREAKFAST.     • liraglutide (VICTOZA) 18 MG/3ML Solution Pen-injector injection Inject 1.8 mg as instructed every morning.     • temazepam (RESTORIL) 30 MG capsule Take 30 mg by mouth at bedtime as needed for Sleep.     • venlafaxine XR (EFFEXOR XR) 75 MG CAPSULE SR 24 HR Take 75 mg by mouth every morning.     • alprazolam (XANAX) 0.25 MG Tab Take 0.25-0.5 mg by mouth 4 times a day as needed for Anxiety.     • losartan (COZAAR) 100 MG Tab Take 100 mg by mouth every morning.     •  "oxycodone-acetaminophen (PERCOCET) 5-325 MG TABS Take 1-2 Tabs by mouth every four hours as needed.       Current Facility-Administered Medications   Medication Dose Route Frequency Provider Last Rate Last Dose   • testosterone cypionate (DEPO-TESTOSTERONE) injection 150 mg  150 mg Intramuscular Q14 DAYS Reji Love M.D.   150 mg at 07/17/19 1111        Past Medical History:   Diagnosis Date   • Anesthesia 11/03/2017    PONV with shoulder surgery approx 20 years ago.   • Anxiety and depression 11/03/2017   • Arthritis 11/03/2017    Osteoarthritis, \" all over\"   • Bunion    • Cancer (HCC) 2017    skin lesion cut out, on left shoulder   • Chronic autoimmune thyroiditis      + US / + TPO = 57   • Dental disorder 11/03/2017    \"Upper Plate\"   • Diabetes mellitus (HCC) 11/03/2017    \"Controlled with diet & Victoza\"   • Gout    • Hammertoe    • High cholesterol 11/03/2017    HX OF, not currently on medication for.   • Hypertension    • Hypothyroidism     chronic thyroiditis   • MRSA infection    • Open toe wound 03/2018    Left big toe, open wound, started 2/2018, receiving wound care therapy two times a week   • Pain 11/03/2017    \"Pain all over except right hip & ankle\"   • Psychiatric problem     depression/anxiety   • Sleep apnea 11/03/2017    CPAP USE   • Snoring 11/03/2017    DX JULIAN   • Tonsillitis        PHYSICAL EXAM:    /82 (BP Location: Left arm, Patient Position: Sitting)   Pulse (!) 105   Resp 16   Ht 1.778 m (5' 10\")   Wt (!) 127.2 kg (280 lb 6.4 oz)   SpO2 95%   BMI 40.23 kg/m²   Heart rate during my examination    Gen.   obese but otherwise appears healthy    Skin   appropriate for sex and age    HEENT  unremarkable    Neck   thyroid gland is difficult to palpate.  I think it is mostly substernal    Breasts   normal male fatty tissue.  No gynecomastia    Heart  regular    Extremities  no edema    Neuro  gait and station normal    Psych  appropriate      ASSESSMENT AND " RECOMMENDATIONS    1. Low testosterone in male             Patient is very satisfied with his response to testosterone and wishes to continue             150 mg IM every 2 weeks              His PCP is planning to do a BOUBACAR prostate exam and PSA in the near future  - TESTOSTERONE,FREE ADULT MALE; Future  - ESTRADIOL; Future  - CBC WITHOUT DIFFERENTIAL; Future    2. Hypothyroidism, acquired, autoimmune             Clinically euthyroid taking levothyroxine 125 mg daily  - FREE THYROXINE; Future  - TSH; Future    3. Chronic autoimmune thyroiditis              Thyroid gland is asymptomatic. Patient is not aware of any change in her gland. No discomfort. No difficulty swallowing, breathing, or voice change.      DISPOSITION: Continue in office testosterone 150 mg IM every 2 weeks                            Return to clinic 4 months      Florentin Weiss M.D.    Copies to: Kevin Chavez M.D. [phone]

## 2019-08-07 ENCOUNTER — TELEPHONE (OUTPATIENT)
Dept: CARDIOLOGY | Facility: MEDICAL CENTER | Age: 59
End: 2019-08-07

## 2019-08-07 DIAGNOSIS — Z00.6 RESEARCH STUDY PATIENT: ICD-10-CM

## 2019-08-07 NOTE — TELEPHONE ENCOUNTER
[facility] [place] Cardiac Calcium CT Trial:  Mr. Keys contacted via telephone by Tom Burgos, study team member for enrollment in the above stated trial. Informed consent form reviewed, questions answered.  Consent signed via [employer] by Mr. Keys on 6/24/2019 and witnessed by Tom Burgos on 6/24/2019.  Order for CT scan placed in [facility].

## 2019-08-09 ENCOUNTER — NON-PROVIDER VISIT (OUTPATIENT)
Dept: WOUND CARE | Facility: MEDICAL CENTER | Age: 59
End: 2019-08-09
Attending: ORTHOPAEDIC SURGERY
Payer: COMMERCIAL

## 2019-08-09 PROCEDURE — 11055 PARING/CUTG B9 HYPRKER LES 1: CPT

## 2019-08-09 PROCEDURE — 11721 DEBRIDE NAIL 6 OR MORE: CPT

## 2019-08-09 NOTE — CERTIFICATION
"[facility]    [address]    [address]    [place] [address]    [phone] Fax: [phone]    Foot and Nail Encounter  6/26/2019 - 9/26/2019           Referring Physician: LEATHA Lima  Primary Physician: Kevin Chavez MD  Consulting Physicians:   Start of Care: 7/14/2016        Subjective:       HPI:  Pt is a 59 year old male with DM who has attended [facility] in the past, had Surgery with Dr. Kang on Left foot on August 21, 2017 for bunionectomy revision and as below. Lives with wife and children. HX of dislocated toes 3-4 years ago while walking, had toes \"fixed,\" but \"it didn't work.\" Patient plans to see Dr. Kang again at the end of August 2019 regarding his left 2nd toe as it is \"just flopping around.\"     12/22/2016 PROCEDURES with Dr Kang:     1.  Left modified Arndt bunionectomy.  2.  Left distal metatarsal osteotomy.  3.  Left distal metatarsal osteotomy of the hallux.  4.  Left distal metatarsal osteotomy, 2, 3, 4, and 5.  5.  Left flexor digitorum longus transfer with a flexor to extensor transfer    of 2 and 3.  6.  Left soft tissue reconstruction, metatarsophalangeal joints 2, 3, 4, and    5.  7.  Left hammertoe correction with proximal interphalangeal arthroplasty, 2    and 3.  8.  Right metatarsophalangeal joint capsulotomy of soft tissue release, 2 and    3.  9.  Right hammertoe correction with proximal interphalangeal arthroplasty, 2    and 3.   6/25/2018 Dr. Kang  PROCEDURES PERFORMED:  1.  Left removal internal fixation.  2.  Left amputation great toe with an interphalangeal disarticulation.  3.  Left foot irrigation and sharp debridement.  10/15/2018 Dr. Kang  PROCEDURES PERFORMED:  1.  Left gastroc soleus recession.  2.  Left partial excision third metatarsal.  3.  Right open removal internal fixation.  Past surgical Hx:   Past Surgical History             Past Surgical History:   Procedure Laterality Date   • NERVE ULNAR TRANSFER Right 4/3/2018     " Procedure: NERVE ULNAR TRANSFER - TRANSPOSITION;  Surgeon: Ayad Luna M.D.;  Location: [facility];  Service: Orthopedics   • CARPAL TUNNEL RELEASE Right 4/3/2018     Procedure: CARPAL TUNNEL RELEASE;  Surgeon: Ayad Luna M.D.;  Location: [facility];  Service: Orthopedics   • ELBOW ARTHROTOMY Right 4/3/2018     Procedure: ELBOW ARTHROTOMY - FOR CONTRACTURE RELEASE AT THE ELBOW, RADIAL HEAD EXCISION;  Surgeon: Ayad Luna M.D.;  Location: [facility];  Service: Orthopedics   • TOE FUSION Right 11/13/2017     Procedure: TOE FUSION - 1ST METATARSALPHALANGEAL;  Surgeon: Haroldo Kang M.D.;  Location: [facility];  Service: Orthopedics   • METATARSAL HEAD RESECTION Right 11/13/2017     Procedure: METATARSAL HEAD RESECTION - EXCISION;  Surgeon: Haroldo Kang M.D.;  Location: [facility];  Service: Orthopedics   • HAMMERTOE CORRECTION Right 11/13/2017     Procedure: HAMMERTOE CORRECTION 2-5;  Surgeon: Haroldo Kang M.D.;  Location: [facility];  Service: Orthopedics   • METATARSAL HEAD RESECTION Left 8/21/2017     Procedure: METATARSAL HEAD RESECTION - 2-5;  Surgeon: Haroldo Kang M.D.;  Location: [facility];  Service:    • TOE FUSION Left 8/21/2017     Procedure: TOE FUSION - 1ST MTP;  Surgeon: Haroldo Kang M.D.;  Location: [facility];  Service:    • HARDWARE REMOVAL ORTHO Left 8/21/2017     Procedure: HARDWARE REMOVAL ORTHO;  Surgeon: Haroldo Kang M.D.;  Location: [facility];  Service:    • LUMBAR FUSION POSTERIOR   4/14/2017     Procedure: LUMBAR FUSION POSTERIOR - MINIMALLY INVASIVE TLIF L4-5;  Surgeon: Bossman Caro M.D.;  Location: [facility];  Service:    • HAMMERTOE CORRECTION Bilateral 12/22/2016     Procedure: HAMMERTOE CORRECTION/METATARSALPHALANGEAL JOINT RELEASE 2/3 RIGHT, 2-3 LEFT;  Surgeon: Haroldo Kang M.D.;  [facility]: [facility];  Service:    • BUNIONECTOMY Left 12/22/2016     Procedure: BUNIONECTOMY FOR DISTAL HALLUX VALGUS CORRECTION WITH BRANDY;  Surgeon: Hraoldo Kang M.D.;  Location: SUR[facility]ERY [facility];  Service:    • ORTHOPEDIC OSTEOTOMY Left 12/22/2016     Procedure: ORTHOPEDIC OSTEOTOMY DISTAL METATARSAL 2-5;  Surgeon: Haroldo Kang M.D.;  Location: SUR[facility]ERY [facility];  Service:    • HIP ARTH ANTERIOR TOTAL Left 8/18/2016     Procedure: HIP ARTHROPLASTY ANTERIOR TOTAL;  Surgeon: Emiliano Vang M.D.;  Location: SURGERY [facility];  Service:    • OTHER ORTHOPEDIC SURGERY   6/2014     right shoulder  arthroplasty   • OTHER ORTHOPEDIC SURGERY   11/1/2012     left knee/left ankle/left shoulder   • OTHER ORTHOPEDIC SURGERY   2004     elbow surgery   • OTHER   2000     dislocation left foot surgery at [facility]   • OTHER         \"septoplasty 20 years ago\"         History of foot ulceration(s): Yes__x__ No____ Location:  L distal hallux   History of foot surgery: Yes__x__ No____Describe:______see above;   ______________________________________________       Employed: Y ___ N __x_ Job description: ____disability_____________       Current Residence: ___lives with wife and kids______  home     Pain: pt denies foot pain presently.      Past Medical History:   Past Medical History           Past Medical History:   Diagnosis Date   • Anesthesia 11/03/2017     PONV with shoulder surgery approx 20 years ago.   • Anxiety and depression 11/03/2017   • Arthritis 11/03/2017     Osteoarthritis, \" all over\"   • Bunion     • Cancer (CMS-HCC) 2017     skin lesion cut out, on left shoulder   • Chronic autoimmune thyroiditis        + US / + TPO = 57   • Dental disorder 11/03/2017     \"Upper Plate\"   • Diabetes mellitus (CMS-HCC) 11/03/2017     \"Controlled with diet & Victoza\"   • Gout     • Hammertoe     • High cholesterol 11/03/2017     HX OF, not currently on medication for.   • Hypertension     • Hypothyroidism       chronic " "thyroiditis   • Open toe wound 03/2018     Left big toe, open wound, started 2/2018, receiving wound care therapy two times a week   • Pain 11/03/2017     \"Pain all over except right hip & ankle\"   • Sleep apnea 11/03/2017     CPAP USE   • Snoring 11/03/2017     DX JULIAN   • Tonsillitis                    Current Medications:      Current Outpatient Prescriptions:   •  cyclobenzaprine (FLEXERIL) 10 MG Tab, Take 10 mg by mouth 3 times a day., Disp: , Rfl: 5  •  amphetamine-dextroamphetamine (ADDERALL) 10 MG Tab, Take 5 mg by mouth every day., Disp: , Rfl:   •  levothyroxine (SYNTHROID) 125 MCG Tab, Take 1 Tab by mouth Every morning on an empty stomach., Disp: 90 Tab, Rfl: 3  •  hydroxyzine pamoate (VISTARIL) 100 MG Cap, Take 200 mg by mouth 2 Times a Day., Disp: , Rfl:   •  tamsulosin (FLOMAX) 0.4 MG capsule, Take 0.4 mg by mouth ONE-HALF HOUR AFTER BREAKFAST., Disp: , Rfl:   •  liraglutide (VICTOZA) 18 MG/3ML Solution Pen-injector injection, Inject 1.8 mg as instructed every morning., Disp: , Rfl:   •  temazepam (RESTORIL) 30 MG capsule, Take 30 mg by mouth at bedtime as needed for Sleep., Disp: , Rfl:   •  venlafaxine XR (EFFEXOR XR) 75 MG CAPSULE SR 24 HR, Take 75 mg by mouth every morning., Disp: , Rfl:   •  alprazolam (XANAX) 0.25 MG Tab, Take 0.25-0.5 mg by mouth 4 times a day as needed for Anxiety., Disp: , Rfl:   •  losartan (COZAAR) 100 MG Tab, Take 100 mg by mouth every morning., Disp: , Rfl:   •  oxycodone-acetaminophen (PERCOCET) 5-325 MG TABS, Take 1-2 Tabs by mouth every four hours as needed., Disp: , Rfl:         Allergies: Demerol and Septra, Daptomycin   Social History:   Social History   Social History                Social History   • Marital status:        Spouse name: N/A   • Number of children: N/A   • Years of education: N/A            Occupational History   • Not on file.                  Social History Main Topics   • Smoking status: Former Smoker       Packs/day: 1.00       Years: 20.00 " "      Types: Cigarettes       Quit date: 1/1/2014   • Smokeless tobacco: Former User       Types: Chew       Quit date: 1/1/1997   • Alcohol use Yes         Comment: \"Few per month\"   • Drug use: No   • Sexual activity: Not on file              Other Topics Concern   • Not on file            Social History Narrative   • No narrative on file            Objective:     Tests and Measures:    2/8/2018 ANT per Florentin 1.0  HgbA1C: 6.2 2/27/2019 09/01/2017 6.1  11/09/2018 - 6.1        Vascular      R   L       2+ 2+ DP pulse     2+ 2+ PT pulse     yes yes Capillary refill < 3 sec         doppler pulses multiphasic AIDAN DP/PT  Deformities    R   L        4 and 5  2nd Hammer/claw toe      x  x Hallux Valgus      great toe, medial; 5th MTH plantar   Prominent Metatarsal Heads         Charcot Foot         Drop Foot         Rocker Bottom         Prior amputation:  see above       Other:  nails missing from lt  1st,  , Rt 3-5       dislocation            Clinical appearance/skin    Dryness___mild to bilateral plantar_________ Swelling_____none_________ Redness______none_______Temperature__warm_ ______    Callus__left 4th MTH plantar _________________________________    Ulceration_ none        Clinical appearance/toenails    Onychomycosis__7_(left 2nd toenail is growing back as well)________    Ingrown toenails_______    Deformities_______________________________    Other___HX: 1st and 2nd nails aidan feet were ablated by podiatrist due to problematic deformities per pt___________________________________          Orthotic, protective, supportive devices: custom orthotics in new balance shoes     Procedures: Pt's feet were washed with soapy water, then dried. In between toes cleaned with gauze to remove debris. All 7 onychomycotic nails were ground down with dremel, then clipped as needed, smoothed again with Dremel. Calluses, as above, debrided to skin level with scalpel. No nicks or cuts noted.      Patient Education: Instructed pt " on s/s infection - chills, fever, malaise, NV, increased redness/swelling/pain/exudate - and to go to ER/Urgent Care; on rationale for debridement of calluses; to shake out shoes before donning; to wear protective footwear at all times, including when at home; to examine feet daily for any new redness/wounds and report to PCP; to moisturize feet daily except between toes with water-based moisturizer; to keep tight control over BS for optimum health; on different treatments for nail fungus (oral meds, topical meds and Tea Tree oil); to return to [facility] for foot and nail care every 2-3 months. Pt verbalized understanding of all instruction.        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

## 2019-08-09 NOTE — PATIENT INSTRUCTIONS
Instructed pt on s/s infection - chills, fever, malaise, NV, increased redness/swelling/pain/exudate - and to go to ER/Urgent Care; on rationale for debridement of calluses; to shake out shoes before donning; to wear protective footwear at all times, including when at home; to examine feet daily for any new redness/wounds and report to PCP; to moisturize feet daily except between toes with water-based moisturizer; to keep tight control over BS for optimum health; on different treatments for nail fungus (oral meds, topical meds and Tea Tree oil); to return to [facility] for foot and nail care every 2-3 months. Pt verbalized understanding of all instruction.

## 2019-08-12 ENCOUNTER — APPOINTMENT (RX ONLY)
Dept: URBAN - METROPOLITAN AREA CLINIC 22 | Facility: CLINIC | Age: 59
Setting detail: DERMATOLOGY
End: 2019-08-12

## 2019-08-12 DIAGNOSIS — L82.1 OTHER SEBORRHEIC KERATOSIS: ICD-10-CM

## 2019-08-12 DIAGNOSIS — D18.0 HEMANGIOMA: ICD-10-CM

## 2019-08-12 DIAGNOSIS — L81.4 OTHER MELANIN HYPERPIGMENTATION: ICD-10-CM

## 2019-08-12 DIAGNOSIS — Z85.828 PERSONAL HISTORY OF OTHER MALIGNANT NEOPLASM OF SKIN: ICD-10-CM

## 2019-08-12 DIAGNOSIS — Z71.89 OTHER SPECIFIED COUNSELING: ICD-10-CM

## 2019-08-12 DIAGNOSIS — L57.0 ACTINIC KERATOSIS: ICD-10-CM

## 2019-08-12 DIAGNOSIS — D22 MELANOCYTIC NEVI: ICD-10-CM

## 2019-08-12 PROBLEM — D22.5 MELANOCYTIC NEVI OF TRUNK: Status: ACTIVE | Noted: 2019-08-12

## 2019-08-12 PROBLEM — D18.01 HEMANGIOMA OF SKIN AND SUBCUTANEOUS TISSUE: Status: ACTIVE | Noted: 2019-08-12

## 2019-08-12 PROCEDURE — ? COUNSELING

## 2019-08-12 PROCEDURE — ? LIQUID NITROGEN

## 2019-08-12 PROCEDURE — 17004 DESTROY PREMAL LESIONS 15/>: CPT

## 2019-08-12 PROCEDURE — 99203 OFFICE O/P NEW LOW 30 MIN: CPT | Mod: 25

## 2019-08-12 ASSESSMENT — LOCATION DETAILED DESCRIPTION DERM
LOCATION DETAILED: LEFT INFERIOR MEDIAL FOREHEAD
LOCATION DETAILED: LEFT ANTERIOR PROXIMAL THIGH
LOCATION DETAILED: LEFT DISTAL PRETIBIAL REGION
LOCATION DETAILED: RIGHT INFERIOR POSTAURICULAR SKIN
LOCATION DETAILED: RIGHT CENTRAL POSTAURICULAR SKIN
LOCATION DETAILED: RIGHT SUPERIOR FOREHEAD
LOCATION DETAILED: RIGHT INFERIOR FOREHEAD
LOCATION DETAILED: RIGHT ANTERIOR PROXIMAL THIGH
LOCATION DETAILED: LEFT LATERAL SHOULDER
LOCATION DETAILED: LEFT SUPERIOR MEDIAL UPPER BACK
LOCATION DETAILED: RIGHT DISTAL DORSAL FOREARM
LOCATION DETAILED: LEFT DISTAL DORSAL FOREARM
LOCATION DETAILED: RIGHT LATERAL SUPERIOR CHEST
LOCATION DETAILED: EPIGASTRIC SKIN
LOCATION DETAILED: RIGHT PROXIMAL DORSAL FOREARM
LOCATION DETAILED: RIGHT LATERAL INFERIOR CHEST
LOCATION DETAILED: RIGHT INFERIOR LATERAL FOREHEAD
LOCATION DETAILED: RIGHT LATERAL FOREHEAD
LOCATION DETAILED: LEFT PROXIMAL DORSAL FOREARM
LOCATION DETAILED: RIGHT MEDIAL SUPERIOR CHEST
LOCATION DETAILED: RIGHT SUPERIOR LATERAL NECK
LOCATION DETAILED: RIGHT INFERIOR PREAURICULAR CHEEK
LOCATION DETAILED: RIGHT CENTRAL TEMPLE
LOCATION DETAILED: RIGHT SUPERIOR LATERAL FOREHEAD
LOCATION DETAILED: RIGHT SUPERIOR MEDIAL MIDBACK
LOCATION DETAILED: RIGHT MEDIAL INFERIOR CHEST

## 2019-08-12 ASSESSMENT — LOCATION SIMPLE DESCRIPTION DERM
LOCATION SIMPLE: RIGHT LOWER BACK
LOCATION SIMPLE: RIGHT THIGH
LOCATION SIMPLE: LEFT UPPER BACK
LOCATION SIMPLE: RIGHT FOREARM
LOCATION SIMPLE: CHEST
LOCATION SIMPLE: LEFT FOREARM
LOCATION SIMPLE: ABDOMEN
LOCATION SIMPLE: LEFT FOREHEAD
LOCATION SIMPLE: RIGHT FOREHEAD
LOCATION SIMPLE: NECK
LOCATION SIMPLE: LEFT SHOULDER
LOCATION SIMPLE: SCALP
LOCATION SIMPLE: RIGHT POSTAURICULAR SKIN
LOCATION SIMPLE: RIGHT TEMPLE
LOCATION SIMPLE: LEFT THIGH
LOCATION SIMPLE: LEFT PRETIBIAL REGION
LOCATION SIMPLE: RIGHT CHEEK

## 2019-08-12 ASSESSMENT — LOCATION ZONE DERM
LOCATION ZONE: SCALP
LOCATION ZONE: ARM
LOCATION ZONE: NECK
LOCATION ZONE: LEG
LOCATION ZONE: TRUNK
LOCATION ZONE: FACE

## 2019-08-14 ENCOUNTER — NON-PROVIDER VISIT (OUTPATIENT)
Dept: ENDOCRINOLOGY | Facility: MEDICAL CENTER | Age: 59
End: 2019-08-14
Payer: COMMERCIAL

## 2019-08-14 DIAGNOSIS — R79.89 LOW TESTOSTERONE IN MALE: ICD-10-CM

## 2019-08-14 PROCEDURE — 96372 THER/PROPH/DIAG INJ SC/IM: CPT | Performed by: INTERNAL MEDICINE

## 2019-08-14 RX ADMIN — TESTOSTERONE CYPIONATE 150 MG: 200 INJECTION, SOLUTION INTRAMUSCULAR at 11:48

## 2019-08-15 ENCOUNTER — HOSPITAL ENCOUNTER (OUTPATIENT)
Dept: LAB | Facility: MEDICAL CENTER | Age: 59
End: 2019-08-15
Attending: INTERNAL MEDICINE
Payer: COMMERCIAL

## 2019-08-15 DIAGNOSIS — Z12.5 SPECIAL SCREENING FOR MALIGNANT NEOPLASM OF PROSTATE: ICD-10-CM

## 2019-08-15 DIAGNOSIS — E06.3 HYPOTHYROIDISM, ACQUIRED, AUTOIMMUNE: ICD-10-CM

## 2019-08-15 DIAGNOSIS — R79.89 LOW TESTOSTERONE IN MALE: ICD-10-CM

## 2019-08-15 LAB
ERYTHROCYTE [DISTWIDTH] IN BLOOD BY AUTOMATED COUNT: 53 FL (ref 35.9–50)
ESTRADIOL SERPL-MCNC: 22 PG/ML
HCT VFR BLD AUTO: 49.8 % (ref 42–52)
HGB BLD-MCNC: 14.6 G/DL (ref 14–18)
MCH RBC QN AUTO: 25.5 PG (ref 27–33)
MCHC RBC AUTO-ENTMCNC: 29.3 G/DL (ref 33.7–35.3)
MCV RBC AUTO: 86.9 FL (ref 81.4–97.8)
PLATELET # BLD AUTO: 316 K/UL (ref 164–446)
PMV BLD AUTO: 9.1 FL (ref 9–12.9)
PSA SERPL-MCNC: 0.8 NG/ML (ref 0–4)
RBC # BLD AUTO: 5.73 M/UL (ref 4.7–6.1)
T4 FREE SERPL-MCNC: 0.9 NG/DL (ref 0.53–1.43)
TSH SERPL DL<=0.005 MIU/L-ACNC: 1.22 UIU/ML (ref 0.38–5.33)
WBC # BLD AUTO: 7.3 K/UL (ref 4.8–10.8)

## 2019-08-15 PROCEDURE — 84443 ASSAY THYROID STIM HORMONE: CPT

## 2019-08-15 PROCEDURE — 83036 HEMOGLOBIN GLYCOSYLATED A1C: CPT

## 2019-08-15 PROCEDURE — 82670 ASSAY OF TOTAL ESTRADIOL: CPT

## 2019-08-15 PROCEDURE — 36415 COLL VENOUS BLD VENIPUNCTURE: CPT

## 2019-08-15 PROCEDURE — 84153 ASSAY OF PSA TOTAL: CPT

## 2019-08-15 PROCEDURE — 84403 ASSAY OF TOTAL TESTOSTERONE: CPT

## 2019-08-15 PROCEDURE — 84439 ASSAY OF FREE THYROXINE: CPT

## 2019-08-15 PROCEDURE — 85027 COMPLETE CBC AUTOMATED: CPT

## 2019-08-16 LAB
EST. AVERAGE GLUCOSE BLD GHB EST-MCNC: 137 MG/DL
HBA1C MFR BLD: 6.4 % (ref 0–5.6)

## 2019-08-17 LAB — TESTOST FREE SERPL-MCNC: 69 PG/ML (ref 47–244)

## 2019-08-24 ENCOUNTER — HOSPITAL ENCOUNTER (OUTPATIENT)
Dept: RADIOLOGY | Facility: MEDICAL CENTER | Age: 59
End: 2019-08-24
Attending: INTERNAL MEDICINE
Payer: COMMERCIAL

## 2019-08-24 DIAGNOSIS — Z00.6 RESEARCH STUDY PATIENT: ICD-10-CM

## 2019-08-24 PROCEDURE — 4410556 CT-CARDIAC SCORING

## 2019-08-28 ENCOUNTER — APPOINTMENT (OUTPATIENT)
Dept: ENDOCRINOLOGY | Facility: MEDICAL CENTER | Age: 59
End: 2019-08-28
Payer: COMMERCIAL

## 2019-08-29 ENCOUNTER — TELEPHONE (OUTPATIENT)
Dept: CARDIOLOGY | Facility: MEDICAL CENTER | Age: 59
End: 2019-08-29

## 2019-08-29 ENCOUNTER — NON-PROVIDER VISIT (OUTPATIENT)
Dept: ENDOCRINOLOGY | Facility: MEDICAL CENTER | Age: 59
End: 2019-08-29
Payer: COMMERCIAL

## 2019-08-29 DIAGNOSIS — E06.3 HYPOTHYROIDISM, ACQUIRED, AUTOIMMUNE: ICD-10-CM

## 2019-08-29 DIAGNOSIS — E29.1 HYPOGONADISM MALE: ICD-10-CM

## 2019-08-29 PROCEDURE — 96372 THER/PROPH/DIAG INJ SC/IM: CPT | Performed by: PHYSICIAN ASSISTANT

## 2019-08-29 RX ORDER — LEVOTHYROXINE SODIUM 137 UG/1
TABLET ORAL
Qty: 90 TAB | Refills: 1 | Status: SHIPPED | OUTPATIENT
Start: 2019-08-29 | End: 2020-02-26 | Stop reason: SDUPTHER

## 2019-08-29 RX ADMIN — TESTOSTERONE CYPIONATE 150 MG: 200 INJECTION, SOLUTION INTRAMUSCULAR at 10:23

## 2019-08-29 NOTE — TELEPHONE ENCOUNTER
[facility] [place] Cardiac Calcium Scoring CT study:  Spoke with Mr. Keys today, 8-29-19 to discuss results of CT Cardiac Calcium study as reviewed by Dr. Ames.    Findings:   Calcium score: 0   Please see complete report in Imaging.      IMPRESSION:  No coronary plaque identified.  Consider repeat testing in 5 years. No indication for medical therapy.    Encouraged to follow up with Primary Care Provider.     Encouraged to contact study team with any questions.

## 2019-08-29 NOTE — TELEPHONE ENCOUNTER
Was the patient seen in the last year in this department? Yes 07/29/19    Does patient have an active prescription for medications requested? No     Received Request Via: Pharmacy     levothyroxine (SYNTHROID) 137 MCG Tab    Sig: TAKE 1 TAB BY MOUTH EVERY DAY.

## 2019-09-11 ENCOUNTER — NON-PROVIDER VISIT (OUTPATIENT)
Dept: ENDOCRINOLOGY | Facility: MEDICAL CENTER | Age: 59
End: 2019-09-11
Payer: COMMERCIAL

## 2019-09-11 DIAGNOSIS — R79.89 LOW TESTOSTERONE IN MALE: ICD-10-CM

## 2019-09-11 PROCEDURE — 96372 THER/PROPH/DIAG INJ SC/IM: CPT | Performed by: INTERNAL MEDICINE

## 2019-09-11 RX ADMIN — TESTOSTERONE CYPIONATE 150 MG: 200 INJECTION, SOLUTION INTRAMUSCULAR at 14:31

## 2019-09-27 PROBLEM — Z00.6 RESEARCH STUDY PATIENT: Status: RESOLVED | Noted: 2019-08-07 | Resolved: 2019-09-27

## 2019-09-29 NOTE — PROGRESS NOTES
CC: Follow up for JULIAN on auto titrating CPAP 10-15 cm H2O     HPI:  Mr. Cristino Keys is a 58 y/o retired male  who was last seen  3/11/2019 for mild JULIAN with an AHI of 6.1 and a patel saturation of 85%.  His 30-day compliance was excellent and he has a residual apnea hypopnea index of 5.5.     Today, 9/30/2019, his 30-day compliance is 97% for 8 hours and 37 minutes.  He is on auto titrating CPAP 10 to 15 cm H2O and has a residual average apnea hypopnea index of 6.2.    His median leak is 15.0 L/min with a maximum leak of 50.1 L/minThe patient reports improved symptoms using positive airway pressure.  Has experienced no significant problems with mask fit, mask leak, pressure tolerance, excessive airway dryness, nasal congestion, aerophagia, or chest pain.     Does have claustrophobia with his mask - has FFM. Tried nasal pillows but didn't stay on so back to the FFM but not sure if it fits right.    [place] he was 14 years old sustaining severe knee trauma.  He has had 35 operations on 1 of his knees he recently had a shoulder replacement as well as a knee replacement and is scheduled for another knee replacement in the near future.     Significant comorbidities and modifying factors include obesity, hypertension, hypothyroidism, former smoker, diabetes, and multiple orthopedic issues.    Patient Active Problem List    Diagnosis Date Noted   • Former smoker 03/11/2019   • Low testosterone in male 03/08/2019   • Wound infection 05/14/2018   • Diabetic ulcer of toe of left foot associated with type 2 diabetes mellitus, with fat layer exposed (HCC) 05/14/2018   • Contracture of elbow joint, right 04/03/2018   • BMI 38.0-38.9,adult 11/01/2017   • Chronic pain 08/28/2017   • Chronic narcotic use 08/28/2017   • Arthritis of left ankle 08/21/2017   • Degeneration of lumbar intervertebral disc 04/14/2017   • Lower back pain 04/14/2017   • JULIAN (obstructive  "sleep apnea) 04/03/2017   • Snoring 04/03/2017   • Acquired hallux valgus of left foot 12/22/2016   • Type 2 diabetes mellitus without complication ([facility]) 12/13/2016   • Chronic autoimmune thyroiditis 10/03/2016   • Hypothyroidism, acquired, autoimmune 10/03/2016   • Primary osteoarthritis of left hip 08/18/2016   • Diabetes ([facility]) 09/05/2014   • Lumbar disc disease 09/05/2014   • Gout 09/05/2014   • Hypertension 09/05/2014   • H/O arthroscopy of knee 09/05/2014   • Hx of elbow surgery 09/05/2014   • Severe obesity with body mass index (BMI) of 35.0 to 39.9 with serious comorbidity ([facility]) 09/05/2014   • Osteoarthritis 09/05/2014   • H/O shoulder replacement 09/05/2014   • Rotator cuff arthropathy 09/05/2014   • Bilateral bunions 09/05/2014       Past Medical History:   Diagnosis Date   • Anesthesia 11/03/2017    PONV with shoulder surgery approx 20 years ago.   • Anxiety and depression 11/03/2017   • Arthritis 11/03/2017    Osteoarthritis, \" all over\"   • Bunion    • Cancer ([facility]) 2017    skin lesion cut out, on left shoulder   • Chronic autoimmune thyroiditis      + US / + TPO = 57   • Dental disorder 11/03/2017    \"Upper Plate\"   • Diabetes mellitus ([facility]) 11/03/2017    \"Controlled with diet & Victoza\"   • Gout    • Hammertoe    • High cholesterol 11/03/2017    HX OF, not currently on medication for.   • Hypertension    • Hypothyroidism     chronic thyroiditis   • MRSA infection    • Open toe wound 03/2018    Left big toe, open wound, started 2/2018, receiving wound care therapy two times a week   • Pain 11/03/2017    \"Pain all over except right hip & ankle\"   • Psychiatric problem     depression/anxiety   • Sleep apnea 11/03/2017    CPAP USE   • Snoring 11/03/2017    DX JULIAN   • Tonsillitis         Past Surgical History:   Procedure Laterality Date   • TOE AMPUTATION Right 6/10/2019    Procedure: AMPUTATION, TOE- FIRST TOE;  Surgeon: Haroldo Kang M.D.;  Location: SURGERY [facility];  Service: Orthopedics   • " METATARSAL HEAD RESECTION Left 6/10/2019    Procedure: METATARSAL HEAD RESECTION- FOR PARTIAL EXCISION;  Surgeon: Haroldo Kang M.D.;  Location: [facility];  Service: Orthopedics   • JOINT INJECTION DIAGNOSTIC  6/10/2019    Procedure: INJECTION, JOINT, DIAGNOSTIC- MIDFOOT;  Surgeon: Haroldo Kang M.D.;  Location: [facility];  Service: Orthopedics   • ORTHOPEDIC OSTEOTOMY Left 10/15/2018    Procedure: ORTHOPEDIC OSTEOTOMY-  FOR: 3RD METATARSAL PARTIAL EXCISION, AND LEFT GASTROC SOLEUS RECESSION;  Surgeon: Haroldo Kang M.D.;  Location: [facility];  Service: Orthopedics   • HARDWARE REMOVAL ORTHO Right 10/15/2018    Procedure: HARDWARE REMOVAL ORTHO-  FOOT METATARSALPHALANGEAL JOINT PLATE;  Surgeon: Haroldo Kang M.D.;  Location: [facility];  Service: Orthopedics   • TOE AMPUTATION Left 6/25/2018    Procedure: TOE AMPUTATION-FOR :PARTIAL 1ST DISTAL PHALANX EXCISION, GREAT TOE AMPUTATION;  Surgeon: Haroldo Kang M.D.;  Location: [facility];  Service: Orthopedics   • INTERNAL FIXATION REMOVAL Left 6/25/2018    Procedure: INTERNAL FIXATION REMOVAL;  Surgeon: Haroldo Kang M.D.;  Location: [facility];  Service: Orthopedics   • NERVE ULNAR TRANSFER Right 4/3/2018    Procedure: NERVE ULNAR TRANSFER - TRANSPOSITION;  Surgeon: Ayad Luna M.D.;  Location: [facility];  Service: Orthopedics   • CARPAL TUNNEL RELEASE Right 4/3/2018    Procedure: CARPAL TUNNEL RELEASE;  Surgeon: Ayad Luna M.D.;  Location: [facility];  Service: Orthopedics   • ELBOW ARTHROTOMY Right 4/3/2018    Procedure: ELBOW ARTHROTOMY - FOR CONTRACTURE RELEASE AT THE ELBOW, RADIAL HEAD EXCISION;  Surgeon: Ayad Luna M.D.;  Location: [facility];  Service: Orthopedics   • SHOULDER SURGERY Right 12/02/2017    reversal   • METATARSAL HEAD RESECTION Right 11/13/2017    Procedure: METATARSAL HEAD RESECTION -  EXCISION;  Surgeon: Haroldo Kang M.D.;  Location: [facility];  Service: Orthopedics   • HAMMERTOE CORRECTION Right 11/13/2017    Procedure: HAMMERTOE CORRECTION 2-5;  Surgeon: Haroldo Kang M.D.;  Location: SURGERY [facility];  Service: Orthopedics   • TOE FUSION Right 11/13/2017    Procedure: TOE FUSION - 1ST METATARSALPHALANGEAL;  Surgeon: Haroldo Kang M.D.;  Location: SURGERY [facility];  Service: Orthopedics   • METATARSAL HEAD RESECTION Left 8/21/2017    Procedure: METATARSAL HEAD RESECTION - 2-5;  Surgeon: Haroldo Kang M.D.;  Location: SURGERY [facility];  Service:    • TOE FUSION Left 8/21/2017    Procedure: TOE FUSION - 1ST MTP;  Surgeon: Haroldo Kang M.D.;  Location: [facility];  Service:    • HARDWARE REMOVAL ORTHO Left 8/21/2017    Procedure: HARDWARE REMOVAL ORTHO;  Surgeon: Haroldo Kang M.D.;  Location: [facility];  Service:    • LUMBAR FUSION POSTERIOR  4/14/2017    Procedure: LUMBAR FUSION POSTERIOR - MINIMALLY INVASIVE TLIF L4-5;  Surgeon: Bossman Caro M.D.;  Location: [facility];  Service:    • HAMMERTOE CORRECTION Bilateral 12/22/2016    Procedure: HAMMERTOE CORRECTION/METATARSALPHALANGEAL JOINT RELEASE 2/3 RIGHT, 2-3 LEFT;  Surgeon: Haroldo Kang M.D.;  Location: [facility];  Service:    • BUNIONECTOMY Left 12/22/2016    Procedure: BUNIONECTOMY FOR DISTAL HALLUX VALGUS CORRECTION WITH BRANDY;  Surgeon: Haroldo Kang M.D.;  Location: [facility];  Service:    • ORTHOPEDIC OSTEOTOMY Left 12/22/2016    Procedure: ORTHOPEDIC OSTEOTOMY DISTAL METATARSAL 2-5;  Surgeon: Haroldo Kang M.D.;  Location: SURGERY [facility];  Service:    • HIP ARTH ANTERIOR TOTAL Left 8/18/2016    Procedure: HIP ARTHROPLASTY ANTERIOR TOTAL;  Surgeon: Emiliano Vang M.D.;  Location: [facility];  Service:    • OTHER ORTHOPEDIC SURGERY  6/2014    right shoulder  arthroplasty   •  "OTHER ORTHOPEDIC SURGERY  2012    left knee/left ankle/left shoulder   • OTHER ORTHOPEDIC SURGERY  2004    elbow surgery   • OTHER  2000    dislocation left foot surgery at [facility]   • OTHER      \"septoplasty 20 years ago\"       Family History   Problem Relation Age of Onset   • Heart Disease Mother    • Depression Mother    • Cancer Father    • Sleep Apnea Neg Hx        Social History     Socioeconomic History   • Marital status:      Spouse name: Not on file   • Number of children: Not on file   • Years of education: Not on file   • Highest education level: Not on file   Occupational History   • Not on file   Social Needs   • Financial resource strain: Not on file   • Food insecurity:     Worry: Not on file     Inability: Not on file   • Transportation needs:     Medical: Not on file     Non-medical: Not on file   Tobacco Use   • Smoking status: Former Smoker     Packs/day: 1.00     Years: 20.00     Pack years: 20.00     Types: Cigarettes     Last attempt to quit: 2014     Years since quittin.7   • Smokeless tobacco: Former User     Types: Chew     Quit date: 1997   Substance and Sexual Activity   • Alcohol use: Yes     Comment: occasional- approx once a week   • Drug use: No   • Sexual activity: Not on file   Lifestyle   • Physical activity:     Days per week: Not on file     Minutes per session: Not on file   • Stress: Not on file   Relationships   • Social connections:     Talks on phone: Not on file     Gets together: Not on file     Attends Sikhism service: Not on file     Active member of club or organization: Not on file     Attends meetings of clubs or organizations: Not on file     Relationship status: Not on file   • Intimate partner violence:     Fear of current or ex partner: Not on file     Emotionally abused: Not on file     Physically abused: Not on file     Forced sexual activity: Not on file   Other Topics Concern   • Not on file   Social History Narrative   • Not on file " "      Current Outpatient Medications   Medication Sig Dispense Refill   • levothyroxine (SYNTHROID) 137 MCG Tab TAKE 1 TAB BY MOUTH EVERY DAY. 90 Tab 1   • naproxen (NAPROSYN) 500 MG Tab Take 500 mg by mouth every day.     • vitamin D (CHOLECALCIFEROL) 1000 UNIT Tab Take 1,000 Units by mouth every 48 hours.     • lamoTRIgine (LAMICTAL) 100 MG Tab Take 100 mg by mouth every day.     • cyclobenzaprine (FLEXERIL) 10 MG Tab Take 10 mg by mouth 3 times a day.  5   • amphetamine-dextroamphetamine (ADDERALL) 5 MG Tab Take 5 mg by mouth every day.     • hydroxyzine pamoate (VISTARIL) 100 MG Cap Take 200 mg by mouth 2 Times a Day.     • tamsulosin (FLOMAX) 0.4 MG capsule Take 0.4 mg by mouth ONE-HALF HOUR AFTER BREAKFAST.     • liraglutide (VICTOZA) 18 MG/3ML Solution Pen-injector injection Inject 1.8 mg as instructed every morning.     • temazepam (RESTORIL) 30 MG capsule Take 30 mg by mouth at bedtime as needed for Sleep.     • venlafaxine XR (EFFEXOR XR) 75 MG CAPSULE SR 24 HR Take 75 mg by mouth every morning.     • alprazolam (XANAX) 0.25 MG Tab Take 0.25-0.5 mg by mouth 4 times a day as needed for Anxiety.     • losartan (COZAAR) 100 MG Tab Take 100 mg by mouth every morning.     • oxycodone-acetaminophen (PERCOCET) 5-325 MG TABS Take 1-2 Tabs by mouth every four hours as needed.     • levothyroxine (SYNTHROID) 125 MCG Tab TAKE 1 TAB BY MOUTH EVERY MORNING ON AN EMPTY STOMACH. (Patient not taking: Reported on 9/30/2019) 90 Tab 1   • Diclofenac Sodium 1.5 % Solution PENNSAID 1.5 % TRANSDERMAL SOLUTION       Current Facility-Administered Medications   Medication Dose Route Frequency Provider Last Rate Last Dose   • testosterone cypionate (DEPO-TESTOSTERONE) injection 150 mg  150 mg Intramuscular Q14 DAYS Reji Love M.D.   150 mg at 09/11/19 1431    \"CURRENT RX\"    ALLERGIES: Demerol; Amoxicillin; Cubicin [daptomycin]; Meperidine; Sulfa drugs; Sulfamethoxazole w-trimethoprim; and " "Tetracycline    ROS    Constitutional: Denies fever, chills, sweats,  weight loss, fatigue  Cardiovascular: Denies chest pain, tightness, palpitations, swelling in legs/feet, fainting, difficulty breathing when lying down but gets better when sitting up.   Respiratory: Denies shortness of breath, cough, sputum, wheezing, painful breathing, coughing up blood.   Sleep: per HPI  Gastrointestinal: Denies  difficulty swallowing, nausea, abdominal pain, diarrhea, constipation, heartburn.  Musculoskeletal: s/p right shoulder replacement and left knee pain.        PHYSICAL EXAM  obese    /70 (BP Location: Right arm, Patient Position: Sitting, BP Cuff Size: Large adult)   Pulse (!) 104   Resp 16   Ht 1.778 m (5' 10\")   Wt (!) 125.2 kg (276 lb)   SpO2 92%   BMI 39.60 kg/m²   Appearance: Well-nourished, well-developed, no acute distress  Eyes:  PERRLA, EOMI  ENMT: without lesions, deformities;hearing grossly intact; tongue normal, posterior pharynx without erythema or exudate; Mallampati classification: 4  Neck: Supple, trachea midline, no masses  Respiratory effort:  No intercostal retractions or use of accessory muscles  Lung auscultation:  No wheezes rhonchi rubs or rales  Cardiac: No murmurs, rubs, or gallops; regular rhythm, normal rate; no edema  Abdomen:  No tenderness, no organomegaly. Obese.  Musculoskeletal:  Grossly normal; gait and station normal; digits and nails normal  Skin:  No rashes, petechiae, cyanosis  Neurologic: without focal signs; oriented to person, time, place, and purpose; judgement intact  Psychiatric:  No depression, anxiety, agitation        PROBLEMS:  1. JULIAN (obstructive sleep apnea)      2. Essential hypertension      3. Severe obesity with body mass index (BMI) of 35.0 to 39.9 with serious comorbidity (HCC)      4. Former smoker      5. Need for influenza vaccination - will be getting soon    PLAN:   Has been advised to continue the current auto-pap 10-15 cm water, clean equipment " frequently, and get new mask and supplies as allowed by insurance and DME. Advised about potential problems including nasal obstruction/stuffiness, mask leak or discomfort , frequent awakenings, chill or dryness of the upper airway, noise, inconvenience, and lack of benefit. Recommend an earlier appointment, if significant treatment barriers develop.    Have advised the patient to follow up with the appropriate healthcare practitioners for all other medical problems and issues.      Return in about 6 months (around 3/30/2020).

## 2019-09-30 ENCOUNTER — SLEEP CENTER VISIT (OUTPATIENT)
Dept: SLEEP MEDICINE | Facility: MEDICAL CENTER | Age: 59
End: 2019-09-30
Payer: COMMERCIAL

## 2019-09-30 VITALS
RESPIRATION RATE: 16 BRPM | DIASTOLIC BLOOD PRESSURE: 70 MMHG | OXYGEN SATURATION: 92 % | BODY MASS INDEX: 39.51 KG/M2 | WEIGHT: 276 LBS | HEIGHT: 70 IN | HEART RATE: 104 BPM | SYSTOLIC BLOOD PRESSURE: 134 MMHG

## 2019-09-30 DIAGNOSIS — E66.01 SEVERE OBESITY WITH BODY MASS INDEX (BMI) OF 35.0 TO 39.9 WITH SERIOUS COMORBIDITY (HCC): ICD-10-CM

## 2019-09-30 DIAGNOSIS — Z87.891 FORMER SMOKER: ICD-10-CM

## 2019-09-30 DIAGNOSIS — I10 ESSENTIAL HYPERTENSION: ICD-10-CM

## 2019-09-30 DIAGNOSIS — G47.33 OSA (OBSTRUCTIVE SLEEP APNEA): ICD-10-CM

## 2019-09-30 PROCEDURE — 99214 OFFICE O/P EST MOD 30 MIN: CPT | Performed by: INTERNAL MEDICINE

## 2019-10-01 ENCOUNTER — NON-PROVIDER VISIT (OUTPATIENT)
Dept: ENDOCRINOLOGY | Facility: MEDICAL CENTER | Age: 59
End: 2019-10-01
Payer: COMMERCIAL

## 2019-10-01 PROCEDURE — 96372 THER/PROPH/DIAG INJ SC/IM: CPT | Performed by: INTERNAL MEDICINE

## 2019-10-01 RX ADMIN — TESTOSTERONE CYPIONATE 150 MG: 200 INJECTION, SOLUTION INTRAMUSCULAR at 10:48

## 2019-10-01 NOTE — NON-PROVIDER
Cristino Keys is a 59 y.o. male here for a non-provider visit for testosterone injection.    Reason for injection: low testosterone  Order in MAR?: Yes  Patient supplied?:No  Minimum interval has been met for this injection (per MAR order): Yes    Order and dose verified by: MV  Patient tolerated injection and no adverse effects were observed or reported: Yes    # of Administrations remaining in MAR: 1

## 2019-10-15 ENCOUNTER — NON-PROVIDER VISIT (OUTPATIENT)
Dept: ENDOCRINOLOGY | Facility: MEDICAL CENTER | Age: 59
End: 2019-10-15
Payer: COMMERCIAL

## 2019-10-15 DIAGNOSIS — R79.89 LOW TESTOSTERONE IN MALE: ICD-10-CM

## 2019-10-15 PROCEDURE — 96372 THER/PROPH/DIAG INJ SC/IM: CPT | Performed by: INTERNAL MEDICINE

## 2019-10-15 RX ADMIN — TESTOSTERONE CYPIONATE 150 MG: 200 INJECTION, SOLUTION INTRAMUSCULAR at 11:03

## 2019-10-15 NOTE — NON-PROVIDER
Cristino Keys is a 59 y.o. male here for a non-provider visit for Testosterone injection.    Reason for injection: hypogonadism/lowtestosterone  Order in MAR?: Yes  Patient supplied?:No  Minimum interval has been met for this injection (per MAR order): Yes    Order and dose verified by: AT  Patient tolerated injection and no adverse effects were observed or reported:Yes    # of Administrations remaining in MAR: 1

## 2019-10-17 ENCOUNTER — TELEPHONE (OUTPATIENT)
Dept: SLEEP MEDICINE | Facility: MEDICAL CENTER | Age: 59
End: 2019-10-17

## 2019-10-17 DIAGNOSIS — G47.33 OSA (OBSTRUCTIVE SLEEP APNEA): ICD-10-CM

## 2019-10-25 ENCOUNTER — APPOINTMENT (OUTPATIENT)
Dept: WOUND CARE | Facility: MEDICAL CENTER | Age: 59
End: 2019-10-25
Attending: ORTHOPAEDIC SURGERY
Payer: COMMERCIAL

## 2019-10-28 ENCOUNTER — APPOINTMENT (RX ONLY)
Dept: URBAN - METROPOLITAN AREA CLINIC 22 | Facility: CLINIC | Age: 59
Setting detail: DERMATOLOGY
End: 2019-10-28

## 2019-10-28 DIAGNOSIS — L57.0 ACTINIC KERATOSIS: ICD-10-CM

## 2019-10-28 DIAGNOSIS — L82.1 OTHER SEBORRHEIC KERATOSIS: ICD-10-CM

## 2019-10-28 DIAGNOSIS — L11.1 TRANSIENT ACANTHOLYTIC DERMATOSIS [GROVER]: ICD-10-CM

## 2019-10-28 DIAGNOSIS — Z71.89 OTHER SPECIFIED COUNSELING: ICD-10-CM

## 2019-10-28 DIAGNOSIS — B07.8 OTHER VIRAL WARTS: ICD-10-CM

## 2019-10-28 PROCEDURE — 17000 DESTRUCT PREMALG LESION: CPT | Mod: 59

## 2019-10-28 PROCEDURE — ? TREATMENT REGIMEN

## 2019-10-28 PROCEDURE — ? LIQUID NITROGEN

## 2019-10-28 PROCEDURE — 17110 DESTRUCTION B9 LES UP TO 14: CPT

## 2019-10-28 PROCEDURE — 99214 OFFICE O/P EST MOD 30 MIN: CPT | Mod: 25

## 2019-10-28 PROCEDURE — ? COUNSELING

## 2019-10-28 PROCEDURE — ? PRESCRIPTION

## 2019-10-28 PROCEDURE — 17003 DESTRUCT PREMALG LES 2-14: CPT | Mod: 59

## 2019-10-28 RX ORDER — TRIAMCINOLONE ACETONIDE 1 MG/G
1 CREAM TOPICAL BID
Qty: 1 | Refills: 1 | Status: ERX | COMMUNITY
Start: 2019-10-28

## 2019-10-28 RX ADMIN — TRIAMCINOLONE ACETONIDE 1: 1 CREAM TOPICAL at 00:00

## 2019-10-28 RX ADMIN — TESTOSTERONE CYPIONATE 150 MG: 200 INJECTION, SOLUTION INTRAMUSCULAR at 11:42

## 2019-10-28 ASSESSMENT — LOCATION SIMPLE DESCRIPTION DERM
LOCATION SIMPLE: CHEST
LOCATION SIMPLE: RIGHT SCALP
LOCATION SIMPLE: LEFT NOSE
LOCATION SIMPLE: ABDOMEN
LOCATION SIMPLE: LEFT FOREHEAD
LOCATION SIMPLE: NOSE

## 2019-10-28 ASSESSMENT — LOCATION DETAILED DESCRIPTION DERM
LOCATION DETAILED: XIPHOID
LOCATION DETAILED: LEFT MEDIAL FOREHEAD
LOCATION DETAILED: RIGHT MEDIAL FRONTAL SCALP
LOCATION DETAILED: STERNUM
LOCATION DETAILED: LEFT SUPERIOR FOREHEAD
LOCATION DETAILED: NASAL DORSUM
LOCATION DETAILED: LEFT NASAL SIDEWALL

## 2019-10-28 ASSESSMENT — LOCATION ZONE DERM
LOCATION ZONE: FACE
LOCATION ZONE: SCALP
LOCATION ZONE: NOSE
LOCATION ZONE: TRUNK

## 2019-10-28 NOTE — NON-PROVIDER
Cristino Keys is a 59 y.o. male here for a non-provider visit for Testosterone injection.    Reason for injection: E29.1 Hypogonadism  Order in MAR?: Yes  Patient supplied?:No  Minimum interval has been met for this injection (per MAR order): Yes    Order and dose verified by: AT  Patient tolerated injection and no adverse effects were observed or reported: Yes    # of Administrations remaining in MAR: 1

## 2019-10-28 NOTE — PROCEDURE: LIQUID NITROGEN
Render Post-Care Instructions In Note?: no
Number Of Freeze-Thaw Cycles: 2 freeze-thaw cycles
Duration Of Freeze Thaw-Cycle (Seconds): 3
Post-Care Instructions: I reviewed with the patient in detail post-care instructions. Patient is to wear sunprotection, and avoid picking at any of the treated lesions. Pt may apply Vaseline to crusted or scabbing areas.
Detail Level: Detailed
Consent: The patient's consent was obtained including but not limited to risks of crusting, scabbing, blistering, scarring, darker or lighter pigmentary change, recurrence, incomplete removal and infection.
Number Of Freeze-Thaw Cycles: 3 freeze-thaw cycles
Medical Necessity Information: It is in your best interest to select a reason for this procedure from the list below. All of these items fulfill various CMS LCD requirements except the new and changing color options.
Duration Of Freeze Thaw-Cycle (Seconds): 2
Medical Necessity Clause: This procedure was medically necessary because the lesions that were treated were:

## 2019-10-29 ENCOUNTER — NON-PROVIDER VISIT (OUTPATIENT)
Dept: ENDOCRINOLOGY | Facility: MEDICAL CENTER | Age: 59
End: 2019-10-29
Payer: COMMERCIAL

## 2019-10-29 DIAGNOSIS — E29.1 HYPOGONADISM MALE: ICD-10-CM

## 2019-10-29 PROCEDURE — 96372 THER/PROPH/DIAG INJ SC/IM: CPT | Performed by: INTERNAL MEDICINE

## 2019-11-08 ENCOUNTER — HOSPITAL ENCOUNTER (OUTPATIENT)
Dept: LAB | Facility: MEDICAL CENTER | Age: 59
End: 2019-11-08
Attending: INTERNAL MEDICINE
Payer: COMMERCIAL

## 2019-11-08 DIAGNOSIS — R79.89 LOW TESTOSTERONE IN MALE: ICD-10-CM

## 2019-11-08 LAB — ESTRADIOL SERPL-MCNC: 28 PG/ML

## 2019-11-08 PROCEDURE — 36415 COLL VENOUS BLD VENIPUNCTURE: CPT

## 2019-11-08 PROCEDURE — 82670 ASSAY OF TOTAL ESTRADIOL: CPT

## 2019-11-08 PROCEDURE — 84403 ASSAY OF TOTAL TESTOSTERONE: CPT

## 2019-11-09 ENCOUNTER — PATIENT MESSAGE (OUTPATIENT)
Dept: ENDOCRINOLOGY | Facility: MEDICAL CENTER | Age: 59
End: 2019-11-09

## 2019-11-10 LAB — TESTOST FREE SERPL-MCNC: 88 PG/ML (ref 47–244)

## 2019-11-11 RX ADMIN — TESTOSTERONE CYPIONATE 150 MG: 200 INJECTION, SOLUTION INTRAMUSCULAR at 12:22

## 2019-11-11 NOTE — NON-PROVIDER
Cristino Keys is a 59 y.o. male here for a non-provider visit for Testosterone injection.    Reason for injection: Hypogonadism  Order in MAR?: Yes  Patient supplied?:No  Minimum interval has been met for this injection (per MAR order): Yes    Order and dose verified by: ANGIE  Patient tolerated injection and no adverse effects were observed or reported: Yes    # of Administrations remaining in MAR: 2

## 2019-11-12 ENCOUNTER — NON-PROVIDER VISIT (OUTPATIENT)
Dept: ENDOCRINOLOGY | Facility: MEDICAL CENTER | Age: 59
End: 2019-11-12
Payer: COMMERCIAL

## 2019-11-12 DIAGNOSIS — E06.3 HYPOTHYROIDISM, ACQUIRED, AUTOIMMUNE: ICD-10-CM

## 2019-11-12 PROCEDURE — 96372 THER/PROPH/DIAG INJ SC/IM: CPT | Performed by: INTERNAL MEDICINE

## 2019-11-12 NOTE — TELEPHONE ENCOUNTER
From: Cristino Keys  To: Florentin Weiss M.D.  Sent: 11/9/2019 11:00 AM PST  Subject: Non-Urgent Medical Question    I made a mistake on my appointment for Tuesday, I’m having an operation that day so can I please come in Monday morning? I’m sorry for the late requests.    Thank you  Cristino Keys

## 2019-11-25 ENCOUNTER — OFFICE VISIT (OUTPATIENT)
Dept: ENDOCRINOLOGY | Facility: MEDICAL CENTER | Age: 59
End: 2019-11-25
Payer: COMMERCIAL

## 2019-11-25 ENCOUNTER — NON-PROVIDER VISIT (OUTPATIENT)
Dept: WOUND CARE | Facility: MEDICAL CENTER | Age: 59
End: 2019-11-25
Attending: NURSE PRACTITIONER
Payer: COMMERCIAL

## 2019-11-25 VITALS
HEART RATE: 107 BPM | SYSTOLIC BLOOD PRESSURE: 146 MMHG | OXYGEN SATURATION: 95 % | WEIGHT: 278.6 LBS | DIASTOLIC BLOOD PRESSURE: 86 MMHG | BODY MASS INDEX: 39.88 KG/M2 | HEIGHT: 70 IN

## 2019-11-25 DIAGNOSIS — E06.3 HYPOTHYROIDISM, ACQUIRED, AUTOIMMUNE: ICD-10-CM

## 2019-11-25 DIAGNOSIS — D64.9 ANEMIA, UNSPECIFIED TYPE: ICD-10-CM

## 2019-11-25 DIAGNOSIS — R79.89 LOW TESTOSTERONE IN MALE: ICD-10-CM

## 2019-11-25 PROCEDURE — 11721 DEBRIDE NAIL 6 OR MORE: CPT

## 2019-11-25 PROCEDURE — 11055 PARING/CUTG B9 HYPRKER LES 1: CPT

## 2019-11-25 PROCEDURE — 99214 OFFICE O/P EST MOD 30 MIN: CPT | Mod: 25 | Performed by: INTERNAL MEDICINE

## 2019-11-25 PROCEDURE — 96372 THER/PROPH/DIAG INJ SC/IM: CPT | Performed by: INTERNAL MEDICINE

## 2019-11-25 RX ORDER — TESTOSTERONE CYPIONATE 200 MG/ML
150 INJECTION, SOLUTION INTRAMUSCULAR ONCE
Status: COMPLETED | OUTPATIENT
Start: 2019-11-25 | End: 2019-11-25

## 2019-11-25 RX ADMIN — TESTOSTERONE CYPIONATE 150 MG: 200 INJECTION, SOLUTION INTRAMUSCULAR at 11:20

## 2019-11-25 NOTE — CERTIFICATION
"[place] [facility]    [address]    [address]    [place] [address]    [phone] Fax: [phone]    Foot and Nail Recertification   11/25/2019 - 2/25/2019           Referring Physician: LEATHA Lima  Primary Physician: Kevin Chavez MD  Consulting Physicians:   Start of Care: 7/14/2016        Subjective:       HPI:  Pt is a 59 year old male with DM who has attended [facility] in the past, had Surgery with Dr. Kang on Left foot on August 21, 2017 for bunionectomy revision and as below. Lives with wife and children. HX of dislocated toes 3-4 years ago while walking, had toes \"fixed,\" but \"it didn't work.\" Patient plans to see Dr. Kang again at the end of August 2019 regarding his left 2nd toe as it is \"just flopping around.\"  Update 11/25/2010: Since August last visit, had to cancel toe surgery because had to have Rt shoulder replaced, then had both knees replaced. Had surgery to Rt 1st toe because it dislocated, amputation to first joint. Will see Dr. Kang in January to schedule Lt 2nd toe surgery. States this has affected his balance but refuses to use cane or other supportive device. Pt states he is going to [facility] today to get new orthotics.      12/22/2016 PROCEDURES with Dr Kang:     1.  Left modified Arndt bunionectomy.  2.  Left distal metatarsal osteotomy.  3.  Left distal metatarsal osteotomy of the hallux.  4.  Left distal metatarsal osteotomy, 2, 3, 4, and 5.  5.  Left flexor digitorum longus transfer with a flexor to extensor transfer    of 2 and 3.  6.  Left soft tissue reconstruction, metatarsophalangeal joints 2, 3, 4, and    5.  7.  Left hammertoe correction with proximal interphalangeal arthroplasty, 2    and 3.  8.  Right metatarsophalangeal joint capsulotomy of soft tissue release, 2 and    3.  9.  Right hammertoe correction with proximal interphalangeal arthroplasty, 2    and 3.   6/25/2018 Dr. Kang  PROCEDURES PERFORMED:  1.  Left removal internal fixation.  2. "  Left amputation great toe with an interphalangeal disarticulation.  3.  Left foot irrigation and sharp debridement.  10/15/2018 Dr. Kang  PROCEDURES PERFORMED:  1.  Left gastroc soleus recession.  2.  Left partial excision third metatarsal.  3.  Right open removal internal fixation.  Past surgical Hx:   Past Surgical History             Past Surgical History:   Procedure Laterality Date   • NERVE ULNAR TRANSFER Right 4/3/2018     Procedure: NERVE ULNAR TRANSFER - TRANSPOSITION;  Surgeon: Ayad Luna M.D.;  Location: [facility];  Service: Orthopedics   • CARPAL TUNNEL RELEASE Right 4/3/2018     Procedure: CARPAL TUNNEL RELEASE;  Surgeon: Ayad Luna M.D.;  Location: [facility];  Service: Orthopedics   • ELBOW ARTHROTOMY Right 4/3/2018     Procedure: ELBOW ARTHROTOMY - FOR CONTRACTURE RELEASE AT THE ELBOW, RADIAL HEAD EXCISION;  Surgeon: Ayad Luna M.D.;  Location: [facility];  Service: Orthopedics   • TOE FUSION Right 11/13/2017     Procedure: TOE FUSION - 1ST METATARSALPHALANGEAL;  Surgeon: Haroldo Kang M.D.;  Location: [facility];  Service: Orthopedics   • METATARSAL HEAD RESECTION Right 11/13/2017     Procedure: METATARSAL HEAD RESECTION - EXCISION;  Surgeon: Haroldo Kang M.D.;  Location: [facility];  Service: Orthopedics   • HAMMERTOE CORRECTION Right 11/13/2017     Procedure: HAMMERTOE CORRECTION 2-5;  Surgeon: Haroldo Kang M.D.;  Location: [facility];  Service: Orthopedics   • METATARSAL HEAD RESECTION Left 8/21/2017     Procedure: METATARSAL HEAD RESECTION - 2-5;  Surgeon: Haroldo Kang M.D.;  Location: [facility];  Service:    • TOE FUSION Left 8/21/2017     Procedure: TOE FUSION - 1ST MTP;  Surgeon: Haroldo Kang M.D.;  Location: [facility];  Service:    • HARDWARE REMOVAL ORTHO Left 8/21/2017     Procedure: HARDWARE REMOVAL ORTHO;  Surgeon: Haroldo Kang M.D.;  " Location: [facility];  Service:    • LUMBAR FUSION POSTERIOR   4/14/2017     Procedure: LUMBAR FUSION POSTERIOR - MINIMALLY INVASIVE TLIF L4-5;  Surgeon: Bossman Caro M.D.;  Location: [facility];  Service:    • HAMMERTOE CORRECTION Bilateral 12/22/2016     Procedure: HAMMERTOE CORRECTION/METATARSALPHALANGEAL JOINT RELEASE 2/3 RIGHT, 2-3 LEFT;  Surgeon: Haroldo Kang M.D.;  Location: [facility];  Service:    • BUNIONECTOMY Left 12/22/2016     Procedure: BUNIONECTOMY FOR DISTAL HALLUX VALGUS CORRECTION WITH BRANDY;  Surgeon: Haroldo Kang M.D.;  Location: [facility];  Service:    • ORTHOPEDIC OSTEOTOMY Left 12/22/2016     Procedure: ORTHOPEDIC OSTEOTOMY DISTAL METATARSAL 2-5;  Surgeon: Haroldo Kang M.D.;  Location: [facility];  Service:    • HIP ARTH ANTERIOR TOTAL Left 8/18/2016     Procedure: HIP ARTHROPLASTY ANTERIOR TOTAL;  Surgeon: Emiliano Vang M.D.;  Location: [facility];  Service:    • OTHER ORTHOPEDIC SURGERY   6/2014     right shoulder  arthroplasty   • OTHER ORTHOPEDIC SURGERY   11/1/2012     left knee/left ankle/left shoulder   • OTHER ORTHOPEDIC SURGERY   2004     elbow surgery   • OTHER   2000     dislocation left foot surgery at [facility]   • OTHER         \"septoplasty 20 years ago\"         History of foot ulceration(s): Yes__x__ No____ Location:  L distal hallux   History of foot surgery: Yes__x__ No____Describe:______see above;   ______________________________________________       Employed: Y ___ N __x_ Job description: ____disability_____________       Current Residence: ___lives with wife and kids______  home     Pain: pt denies foot pain presently.      Past Medical History:   Past Medical History     Past Medical History:   Diagnosis Date   • Anesthesia 11/03/2017    PONV with shoulder surgery approx 20 years ago.   • Anxiety and depression 11/03/2017   • Arthritis 11/03/2017    Osteoarthritis, \" all over\" " "  • Bunion    • Cancer (HCC) 2017    skin lesion cut out, on left shoulder   • Chronic autoimmune thyroiditis      + US / + TPO = 57   • Dental disorder 11/03/2017    \"Upper Plate\"   • Diabetes mellitus (HCC) 11/03/2017    \"Controlled with diet & Victoza\"   • Gout    • Hammertoe    • High cholesterol 11/03/2017    HX OF, not currently on medication for.   • Hypertension    • Hypothyroidism     chronic thyroiditis   • MRSA infection    • Open toe wound 03/2018    Left big toe, open wound, started 2/2018, receiving wound care therapy two times a week   • Pain 11/03/2017    \"Pain all over except right hip & ankle\"   • Psychiatric problem     depression/anxiety   • Sleep apnea 11/03/2017    CPAP USE   • Snoring 11/03/2017    DX JULIAN   • Tonsillitis               Current Medications:   Current Outpatient Medications:   •  levothyroxine (SYNTHROID) 137 MCG Tab, TAKE 1 TAB BY MOUTH EVERY DAY., Disp: 90 Tab, Rfl: 1  •  naproxen (NAPROSYN) 500 MG Tab, Take 500 mg by mouth every day., Disp: , Rfl:   •  vitamin D (CHOLECALCIFEROL) 1000 UNIT Tab, Take 1,000 Units by mouth every 48 hours., Disp: , Rfl:   •  lamoTRIgine (LAMICTAL) 100 MG Tab, Take 100 mg by mouth every day., Disp: , Rfl:   •  levothyroxine (SYNTHROID) 125 MCG Tab, TAKE 1 TAB BY MOUTH EVERY MORNING ON AN EMPTY STOMACH. (Patient not taking: Reported on 9/30/2019), Disp: 90 Tab, Rfl: 1  •  Diclofenac Sodium 1.5 % Solution, PENNSAID 1.5 % TRANSDERMAL SOLUTION, Disp: , Rfl:   •  cyclobenzaprine (FLEXERIL) 10 MG Tab, Take 10 mg by mouth 3 times a day., Disp: , Rfl: 5  •  amphetamine-dextroamphetamine (ADDERALL) 5 MG Tab, Take 5 mg by mouth every day., Disp: , Rfl:   •  hydroxyzine pamoate (VISTARIL) 100 MG Cap, Take 200 mg by mouth 2 Times a Day., Disp: , Rfl:   •  tamsulosin (FLOMAX) 0.4 MG capsule, Take 0.4 mg by mouth ONE-HALF HOUR AFTER BREAKFAST., Disp: , Rfl:   •  liraglutide (VICTOZA) 18 MG/3ML Solution Pen-injector injection, Inject 1.8 mg as instructed every " "morning., Disp: , Rfl:   •  temazepam (RESTORIL) 30 MG capsule, Take 30 mg by mouth at bedtime as needed for Sleep., Disp: , Rfl:   •  venlafaxine XR (EFFEXOR XR) 75 MG CAPSULE SR 24 HR, Take 75 mg by mouth every morning., Disp: , Rfl:   •  alprazolam (XANAX) 0.25 MG Tab, Take 0.25-0.5 mg by mouth 4 times a day as needed for Anxiety., Disp: , Rfl:   •  losartan (COZAAR) 100 MG Tab, Take 100 mg by mouth every morning., Disp: , Rfl:   •  oxycodone-acetaminophen (PERCOCET) 5-325 MG TABS, Take 1-2 Tabs by mouth every four hours as needed., Disp: , Rfl:     Current Facility-Administered Medications:   •  testosterone cypionate (DEPO-TESTOSTERONE) injection 150 mg, 150 mg, Intramuscular, Q14 DAYS, Reji Love M.D., 150 mg at 11/11/19 1222          Allergies: Demerol; Amoxicillin; Cubicin [daptomycin]; Meperidine; Sulfa drugs; Sulfamethoxazole w-trimethoprim; and Tetracycline  Social History:   Social History   Social History                Social History   • Marital status:        Spouse name: N/A   • Number of children: N/A   • Years of education: N/A            Occupational History   • Not on file.                  Social History Main Topics   • Smoking status: Former Smoker       Packs/day: 1.00       Years: 20.00       Types: Cigarettes       Quit date: 1/1/2014   • Smokeless tobacco: Former User       Types: Chew       Quit date: 1/1/1997   • Alcohol use Yes         Comment: \"Few per month\"   • Drug use: No   • Sexual activity: Not on file              Other Topics Concern   • Not on file            Social History Narrative   • No narrative on file            Objective:     Tests and Measures:  FBS today 128. Good resistance strength AIDAN feet dorsiflextion and plantar flexion against clinician's hands. Good ROM of ankles. Denies pain in calves when walking.   2/8/2018 ANT per Florentin 1.0  HgbA1C: 8/15/19 6.4         Vascular      R   L       2+ 2+ DP pulse     2+ 2+ PT pulse     yes yes Capillary refill " < 3 sec         doppler pulses multiphasic AIDAN DP/PT  Deformities    R   L        4 and 5  2nd Hammer/claw toe      x  x Hallux Valgus      great toe, medial; 5th MTH plantar   Prominent Metatarsal Heads         Charcot Foot         Drop Foot         Rocker Bottom         Prior amputation:  see above       Other:  nails missing from lt  1st, Rt 2nd                   Clinical appearance/skin    Dryness___moderate_________ Swelling_____left ankle due to surgery_______ Redness______none_______Temperature__warm_ ______    Callus__left 4th MTH plantar _________________________________    Ulceration_ none        Clinical appearance/toenails    Onychomycosis_8_______    Ingrown toenails_______    Deformities_______________________________    Other___ __________________________________          Orthotic, protective, supportive devices: custom orthotics in new balance shoes, getting new ones today.      Procedures: Pt's feet were washed with soapy water, then dried. In between toes cleaned with gauze to remove debris. All 8 onychomycotic nails were ground down with dremel, then clipped as needed, smoothed again with Dremel. Callus, as above, debrided to skin level with scalpel. No nicks or cuts noted.      Patient Education: Instructed pt on s/s infection - chills, fever, malaise, NV, increased redness/swelling/pain/exudate - and to go to ER/Urgent Care; on rationale for debridement of calluses; to shake out shoes before donning; to wear protective footwear at all times, including when at home; to examine feet daily for any new redness/wounds and report to PCP; to moisturize feet daily except between toes with water-based moisturizer; to keep tight control over BS for optimum health; to return to [facility] for foot and nail care every 2-3 months. Pt verbalized understanding of all instruction.        Professional Collaboration: Recertification sent to referring provider via Epic.        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

## 2019-11-25 NOTE — PROGRESS NOTES
Chief Complaint   Patient presents with   • Hypothyroidism     Autoimmune thyroiditis   • Hypogonadism     Hypogonadotrophic hypogonadism possibly related to obesity        HPI:        1. Hypothyroidism.    The patient has gone through several orthopedic procedures since I have seen him last so it is a little difficult for him to know how he is doing.  He has had both knees replaced and his right shoulder redone.  He is feeling really very well right now so he is anticipating being able to go to the gym and do some real activity that he has been unable to before.     In terms of his thyroid replacement, it seems adequate with levothyroxine 137 mcg per day.  Current TSH is 1.2 with free T4 0.9.  No dose change indicated.     Low testosterone  He is supplemented now with 150 mg IM every two weeks.  He has not been feeling a boost over the last 3-4 injections but he has had his orthopedic problems.  Now that he is feeling better, we will see if he gets a better response to his testosterone.  His most recent free testosterone is 88 ().  Estradiol is satisfactory at 28.  No breast swelling or tenderness.      I tried to check his prostate last March but I could just barely feel it.  His PSA in August this year is satisfactory at 0.8, having only gone up from 0.4 the year before.  We are not going to increase his dose of testosterone.    Anemia  He brought in some lab from a health exam and he is mildly anemic.  A little bit hypochromic microcytic.  He does have bleeding hemorrhoids from time to time.  Colonoscopy done I think two years ago was unremarkable.  He will go over this anemia and his other lab with Dr. Chavez coming up.  The anemia is not serious but definite with a hemoglobin of 11.2 and hematocrit 37.  I indicated the possibility of occult GI blood loss.  A little bit hypochromic microcytic.  I emphasized that Dr. Chavez should review that with him and make recommendations.    I will see him again in March  and I am hopeful that he will have lost some weight at that point.    ROS:  Very pleased with the outcome of his various orthopedic procedures      Allergies:   Allergies   Allergen Reactions   • Demerol Vomiting and Nausea     Rxn = years ago    • Amoxicillin      2004-02-25;NA   • Cubicin [Daptomycin] Vomiting   • Meperidine      2004-02-25;NA   • Sulfa Drugs Hives     .   • Sulfamethoxazole W-Trimethoprim      2004-02-25;NA   • Tetracycline Hives       Current medicines including changes today:  Current Outpatient Medications   Medication Sig Dispense Refill   • levothyroxine (SYNTHROID) 137 MCG Tab TAKE 1 TAB BY MOUTH EVERY DAY. 90 Tab 1   • naproxen (NAPROSYN) 500 MG Tab Take 500 mg by mouth every day.     • vitamin D (CHOLECALCIFEROL) 1000 UNIT Tab Take 1,000 Units by mouth every 48 hours.     • lamoTRIgine (LAMICTAL) 100 MG Tab Take 100 mg by mouth every day.     • Diclofenac Sodium 1.5 % Solution PENNSAID 1.5 % TRANSDERMAL SOLUTION     • cyclobenzaprine (FLEXERIL) 10 MG Tab Take 10 mg by mouth 3 times a day.  5   • amphetamine-dextroamphetamine (ADDERALL) 5 MG Tab Take 5 mg by mouth every day.     • hydroxyzine pamoate (VISTARIL) 100 MG Cap Take 200 mg by mouth 2 Times a Day.     • tamsulosin (FLOMAX) 0.4 MG capsule Take 0.4 mg by mouth ONE-HALF HOUR AFTER BREAKFAST.     • liraglutide (VICTOZA) 18 MG/3ML Solution Pen-injector injection Inject 1.8 mg as instructed every morning.     • temazepam (RESTORIL) 30 MG capsule Take 30 mg by mouth at bedtime as needed for Sleep.     • venlafaxine XR (EFFEXOR XR) 75 MG CAPSULE SR 24 HR Take 75 mg by mouth every morning.     • alprazolam (XANAX) 0.25 MG Tab Take 0.25-0.5 mg by mouth 4 times a day as needed for Anxiety.     • losartan (COZAAR) 100 MG Tab Take 100 mg by mouth every morning.     • oxycodone-acetaminophen (PERCOCET) 5-325 MG TABS Take 1-2 Tabs by mouth every four hours as needed.     • levothyroxine (SYNTHROID) 125 MCG Tab TAKE 1 TAB BY MOUTH EVERY MORNING  "ON AN EMPTY STOMACH. (Patient not taking: Reported on 9/30/2019) 90 Tab 1     Current Facility-Administered Medications   Medication Dose Route Frequency Provider Last Rate Last Dose   • testosterone cypionate (DEPO-TESTOSTERONE) injection 150 mg  150 mg Intramuscular Once Florentin Weiss M.D.       • testosterone cypionate (DEPO-TESTOSTERONE) injection 150 mg  150 mg Intramuscular Q14 DAYS Reji Love M.D.   150 mg at 11/11/19 1222        Past Medical History:   Diagnosis Date   • Anesthesia 11/03/2017    PONV with shoulder surgery approx 20 years ago.   • Anxiety and depression 11/03/2017   • Arthritis 11/03/2017    Osteoarthritis, \" all over\"   • Bunion    • Cancer (HCC) 2017    skin lesion cut out, on left shoulder   • Chronic autoimmune thyroiditis      + US / + TPO = 57   • Dental disorder 11/03/2017    \"Upper Plate\"   • Diabetes mellitus (HCC) 11/03/2017    \"Controlled with diet & Victoza\"   • Gout    • Hammertoe    • High cholesterol 11/03/2017    HX OF, not currently on medication for.   • Hypertension    • Hypothyroidism     chronic thyroiditis   • MRSA infection    • Open toe wound 03/2018    Left big toe, open wound, started 2/2018, receiving wound care therapy two times a week   • Pain 11/03/2017    \"Pain all over except right hip & ankle\"   • Psychiatric problem     depression/anxiety   • Sleep apnea 11/03/2017    CPAP USE   • Snoring 11/03/2017    DX JULIAN   • Tonsillitis        PHYSICAL EXAM:    /86 (BP Location: Left arm, Patient Position: Sitting, BP Cuff Size: Adult)   Pulse (!) 107   Ht 1.778 m (5' 10\")   Wt (!) 126.4 kg (278 lb 9.6 oz)   SpO2 95%   BMI 39.97 kg/m²     Gen. obese but otherwise appears healthy     Skin   appropriate for sex and age    HEENT  unremarkable    Neck   thyroid gland is not palpable.  It may be substernal    Breasts     fatty tissue no gynecomastia    Heart  regular    Extremities  no edema    Neuro  gait and station normal    Psych  " appropriate    ASSESSMENT AND RECOMMENDATIONS    1. Low testosterone in male              See HPI  - testosterone cypionate (DEPO-TESTOSTERONE) injection 150 mg  - TESTOSTERONE,FREE ADULT MALE; Future  - ESTRADIOL; Future    2. Hypothyroidism, acquired, autoimmune            Adequately replaced with levothyroxine 137 mcg daily  - FREE THYROXINE; Future  - TSH; Future    3. Anemia, unspecified type               Possibly from hemorrhoidal bleeding.                He will discuss this further with PCP Dr. Chavez  - CBC WITHOUT DIFFERENTIAL; Future      DISPOSITION: Return in March 2020      Florentin Weiss M.D.    Copies to: Kevin Chavez M.D. [phone]

## 2019-11-26 ENCOUNTER — APPOINTMENT (OUTPATIENT)
Dept: ENDOCRINOLOGY | Facility: MEDICAL CENTER | Age: 59
End: 2019-11-26
Payer: COMMERCIAL

## 2019-12-03 DIAGNOSIS — Z23 NEED FOR DTAP VACCINATION: ICD-10-CM

## 2019-12-10 ENCOUNTER — TELEPHONE (OUTPATIENT)
Dept: ENDOCRINOLOGY | Facility: MEDICAL CENTER | Age: 59
End: 2019-12-10

## 2019-12-12 ENCOUNTER — NON-PROVIDER VISIT (OUTPATIENT)
Dept: ENDOCRINOLOGY | Facility: MEDICAL CENTER | Age: 59
End: 2019-12-12
Payer: COMMERCIAL

## 2019-12-12 DIAGNOSIS — R79.89 LOW TESTOSTERONE IN MALE: ICD-10-CM

## 2019-12-12 PROCEDURE — 96372 THER/PROPH/DIAG INJ SC/IM: CPT | Performed by: INTERNAL MEDICINE

## 2019-12-12 RX ADMIN — TESTOSTERONE CYPIONATE 150 MG: 200 INJECTION, SOLUTION INTRAMUSCULAR at 11:54

## 2019-12-12 NOTE — NON-PROVIDER
Cristino Keys is a 59 y.o. male here for a non-provider visit for Testosterone injection.    Reason for injection: Low testosterone  Order in MAR?: Yes  Patient supplied?:No  Minimum interval has been met for this injection (per MAR order): Yes    Order and dose verified by: AT  Patient tolerated injection and no adverse effects were observed or reported: Yes    # of Administrations remaining in MAR: 2

## 2019-12-26 ENCOUNTER — NON-PROVIDER VISIT (OUTPATIENT)
Dept: ENDOCRINOLOGY | Facility: MEDICAL CENTER | Age: 59
End: 2019-12-26
Payer: COMMERCIAL

## 2019-12-26 PROCEDURE — 96372 THER/PROPH/DIAG INJ SC/IM: CPT | Performed by: INTERNAL MEDICINE

## 2019-12-26 RX ADMIN — TESTOSTERONE CYPIONATE 150 MG: 200 INJECTION, SOLUTION INTRAMUSCULAR at 10:42

## 2019-12-26 NOTE — NON-PROVIDER
Cristino Keys is a 59 y.o. male here for a non-provider visit for testosterone injection.    Reason for injection: low testosterone  Order in MAR?: Yes  Patient supplied?:No  Minimum interval has been met for this injection (per MAR order): Yes    Order and dose verified by: mv  Patient tolerated injection and no adverse effects were observed or reported: Yes    # of Administrations remaining in MAR: 2

## 2020-01-07 ENCOUNTER — HOSPITAL ENCOUNTER (OUTPATIENT)
Dept: LAB | Facility: MEDICAL CENTER | Age: 60
End: 2020-01-07
Attending: INTERNAL MEDICINE
Payer: COMMERCIAL

## 2020-01-07 DIAGNOSIS — D64.9 ANEMIA, UNSPECIFIED TYPE: ICD-10-CM

## 2020-01-07 DIAGNOSIS — R79.89 LOW TESTOSTERONE IN MALE: ICD-10-CM

## 2020-01-07 DIAGNOSIS — E06.3 HYPOTHYROIDISM, ACQUIRED, AUTOIMMUNE: ICD-10-CM

## 2020-01-07 LAB
ERYTHROCYTE [DISTWIDTH] IN BLOOD BY AUTOMATED COUNT: 52 FL (ref 35.9–50)
HCT VFR BLD AUTO: 41.8 % (ref 42–52)
HGB BLD-MCNC: 12.2 G/DL (ref 14–18)
MCH RBC QN AUTO: 22.9 PG (ref 27–33)
MCHC RBC AUTO-ENTMCNC: 29.2 G/DL (ref 33.7–35.3)
MCV RBC AUTO: 78.6 FL (ref 81.4–97.8)
PLATELET # BLD AUTO: 292 K/UL (ref 164–446)
PMV BLD AUTO: 9.3 FL (ref 9–12.9)
RBC # BLD AUTO: 5.32 M/UL (ref 4.7–6.1)
WBC # BLD AUTO: 7.1 K/UL (ref 4.8–10.8)

## 2020-01-07 PROCEDURE — 84443 ASSAY THYROID STIM HORMONE: CPT

## 2020-01-07 PROCEDURE — 82670 ASSAY OF TOTAL ESTRADIOL: CPT

## 2020-01-07 PROCEDURE — 36415 COLL VENOUS BLD VENIPUNCTURE: CPT

## 2020-01-07 PROCEDURE — 84439 ASSAY OF FREE THYROXINE: CPT

## 2020-01-07 PROCEDURE — 85027 COMPLETE CBC AUTOMATED: CPT

## 2020-01-07 PROCEDURE — 84402 ASSAY OF FREE TESTOSTERONE: CPT

## 2020-01-08 ENCOUNTER — NON-PROVIDER VISIT (OUTPATIENT)
Dept: ENDOCRINOLOGY | Facility: MEDICAL CENTER | Age: 60
End: 2020-01-08
Payer: COMMERCIAL

## 2020-01-08 DIAGNOSIS — R79.89 LOW TESTOSTERONE IN MALE: ICD-10-CM

## 2020-01-08 LAB
ESTRADIOL SERPL-MCNC: 31 PG/ML
T4 FREE SERPL-MCNC: 0.91 NG/DL (ref 0.53–1.43)
TSH SERPL DL<=0.005 MIU/L-ACNC: 1.19 UIU/ML (ref 0.38–5.33)

## 2020-01-08 PROCEDURE — 96372 THER/PROPH/DIAG INJ SC/IM: CPT | Performed by: INTERNAL MEDICINE

## 2020-01-08 RX ADMIN — TESTOSTERONE CYPIONATE 150 MG: 200 INJECTION, SOLUTION INTRAMUSCULAR at 12:05

## 2020-01-09 ENCOUNTER — APPOINTMENT (OUTPATIENT)
Dept: ENDOCRINOLOGY | Facility: MEDICAL CENTER | Age: 60
End: 2020-01-09
Payer: COMMERCIAL

## 2020-01-09 ENCOUNTER — PATIENT MESSAGE (OUTPATIENT)
Dept: ENDOCRINOLOGY | Facility: MEDICAL CENTER | Age: 60
End: 2020-01-09

## 2020-01-09 LAB — TESTOST FREE SERPL-MCNC: 57 PG/ML (ref 47–244)

## 2020-01-10 NOTE — PATIENT COMMUNICATION
Telephone conversation with patient    We discussed his CBC with a very definite hypochromic microcytic anemia.  He has not yet covered this with Dr. Chavez as we previously discussed in November.  I know he is got other health issues but I described this again as a potential serious problem with occult GI blood loss possibly from a neoplasm or cancer.  He needs to follow-up and he intends to contact Dr. Chavez first chance he has.    Florentin Weiss M.D.    Copies to Dr. Kevin Chavez

## 2020-01-10 NOTE — TELEPHONE ENCOUNTER
From: Cristino Keys  To: Florentin Weiss M.D.  Sent: 1/9/2020 4:34 PM PST  Subject: Test Result Question    I have a question about CBC without Differential resulted on 1/7/20, 10:52 PM.    Dr. Weiss can you please review my blood tests and let me know what's going on with me. These numbers keep on falling.    Thank you   Cristino Keys   [phone]

## 2020-01-16 ENCOUNTER — HOSPITAL ENCOUNTER (OUTPATIENT)
Dept: LAB | Facility: MEDICAL CENTER | Age: 60
End: 2020-01-16
Attending: FAMILY MEDICINE
Payer: COMMERCIAL

## 2020-01-16 ENCOUNTER — HOSPITAL ENCOUNTER (OUTPATIENT)
Dept: LAB | Facility: MEDICAL CENTER | Age: 60
End: 2020-01-16
Attending: INTERNAL MEDICINE
Payer: COMMERCIAL

## 2020-01-16 LAB
ALBUMIN SERPL BCP-MCNC: 4.2 G/DL (ref 3.2–4.9)
ALBUMIN/GLOB SERPL: 1.3 G/DL
ALP SERPL-CCNC: 97 U/L (ref 30–99)
ALT SERPL-CCNC: 14 U/L (ref 2–50)
ANION GAP SERPL CALC-SCNC: 8 MMOL/L (ref 0–11.9)
AST SERPL-CCNC: 16 U/L (ref 12–45)
BILIRUB SERPL-MCNC: 0.8 MG/DL (ref 0.1–1.5)
BUN SERPL-MCNC: 16 MG/DL (ref 8–22)
CALCIUM SERPL-MCNC: 9.2 MG/DL (ref 8.5–10.5)
CHLORIDE SERPL-SCNC: 100 MMOL/L (ref 96–112)
CO2 SERPL-SCNC: 27 MMOL/L (ref 20–33)
CREAT SERPL-MCNC: 1.06 MG/DL (ref 0.5–1.4)
EST. AVERAGE GLUCOSE BLD GHB EST-MCNC: 143 MG/DL
GLOBULIN SER CALC-MCNC: 3.2 G/DL (ref 1.9–3.5)
GLUCOSE SERPL-MCNC: 121 MG/DL (ref 65–99)
HBA1C MFR BLD: 6.6 % (ref 0–5.6)
POTASSIUM SERPL-SCNC: 4.3 MMOL/L (ref 3.6–5.5)
PROT SERPL-MCNC: 7.4 G/DL (ref 6–8.2)
SODIUM SERPL-SCNC: 135 MMOL/L (ref 135–145)

## 2020-01-16 PROCEDURE — 36415 COLL VENOUS BLD VENIPUNCTURE: CPT

## 2020-01-16 PROCEDURE — 83036 HEMOGLOBIN GLYCOSYLATED A1C: CPT

## 2020-01-16 PROCEDURE — 80053 COMPREHEN METABOLIC PANEL: CPT

## 2020-01-16 PROCEDURE — 82043 UR ALBUMIN QUANTITATIVE: CPT

## 2020-01-16 PROCEDURE — 82570 ASSAY OF URINE CREATININE: CPT

## 2020-01-17 LAB
CREAT UR-MCNC: 50.9 MG/DL
MICROALBUMIN UR-MCNC: 93.6 MG/DL
MICROALBUMIN/CREAT UR: 1839 MG/G (ref 0–30)

## 2020-01-21 ENCOUNTER — HOSPITAL ENCOUNTER (OUTPATIENT)
Dept: LAB | Facility: MEDICAL CENTER | Age: 60
End: 2020-01-21
Attending: FAMILY MEDICINE
Payer: COMMERCIAL

## 2020-01-21 LAB
APTT PPP: 31.4 SEC (ref 24.7–36)
BASOPHILS # BLD AUTO: 0.3 % (ref 0–1.8)
BASOPHILS # BLD: 0.02 K/UL (ref 0–0.12)
EOSINOPHIL # BLD AUTO: 0.16 K/UL (ref 0–0.51)
EOSINOPHIL NFR BLD: 2.4 % (ref 0–6.9)
ERYTHROCYTE [DISTWIDTH] IN BLOOD BY AUTOMATED COUNT: 57.1 FL (ref 35.9–50)
FOLATE SERPL-MCNC: >24 NG/ML
HCT VFR BLD AUTO: 45.5 % (ref 42–52)
HGB BLD-MCNC: 13.7 G/DL (ref 14–18)
IMM GRANULOCYTES # BLD AUTO: 0.05 K/UL (ref 0–0.11)
IMM GRANULOCYTES NFR BLD AUTO: 0.7 % (ref 0–0.9)
INR PPP: 1.04 (ref 0.87–1.13)
LYMPHOCYTES # BLD AUTO: 1.5 K/UL (ref 1–4.8)
LYMPHOCYTES NFR BLD: 22.2 % (ref 22–41)
MCH RBC QN AUTO: 23.5 PG (ref 27–33)
MCHC RBC AUTO-ENTMCNC: 30.1 G/DL (ref 33.7–35.3)
MCV RBC AUTO: 77.9 FL (ref 81.4–97.8)
MONOCYTES # BLD AUTO: 0.57 K/UL (ref 0–0.85)
MONOCYTES NFR BLD AUTO: 8.4 % (ref 0–13.4)
NEUTROPHILS # BLD AUTO: 4.46 K/UL (ref 1.82–7.42)
NEUTROPHILS NFR BLD: 66 % (ref 44–72)
NRBC # BLD AUTO: 0 K/UL
NRBC BLD-RTO: 0 /100 WBC
PLATELET # BLD AUTO: 289 K/UL (ref 164–446)
PMV BLD AUTO: 8.9 FL (ref 9–12.9)
PROTHROMBIN TIME: 13.8 SEC (ref 12–14.6)
RBC # BLD AUTO: 5.84 M/UL (ref 4.7–6.1)
VIT B12 SERPL-MCNC: 312 PG/ML (ref 211–911)
WBC # BLD AUTO: 6.8 K/UL (ref 4.8–10.8)

## 2020-01-21 PROCEDURE — 83550 IRON BINDING TEST: CPT

## 2020-01-21 PROCEDURE — 83540 ASSAY OF IRON: CPT

## 2020-01-21 PROCEDURE — 85025 COMPLETE CBC W/AUTO DIFF WBC: CPT

## 2020-01-21 PROCEDURE — 82746 ASSAY OF FOLIC ACID SERUM: CPT

## 2020-01-21 PROCEDURE — 36415 COLL VENOUS BLD VENIPUNCTURE: CPT

## 2020-01-21 PROCEDURE — 85730 THROMBOPLASTIN TIME PARTIAL: CPT

## 2020-01-21 PROCEDURE — 80053 COMPREHEN METABOLIC PANEL: CPT

## 2020-01-21 PROCEDURE — 82607 VITAMIN B-12: CPT

## 2020-01-21 PROCEDURE — 85610 PROTHROMBIN TIME: CPT

## 2020-01-22 ENCOUNTER — HOSPITAL ENCOUNTER (OUTPATIENT)
Dept: LAB | Facility: MEDICAL CENTER | Age: 60
End: 2020-01-22
Attending: INTERNAL MEDICINE
Payer: COMMERCIAL

## 2020-01-22 ENCOUNTER — NON-PROVIDER VISIT (OUTPATIENT)
Dept: ENDOCRINOLOGY | Facility: MEDICAL CENTER | Age: 60
End: 2020-01-22
Payer: COMMERCIAL

## 2020-01-22 DIAGNOSIS — R79.89 LOW TESTOSTERONE IN MALE: ICD-10-CM

## 2020-01-22 LAB
ALBUMIN SERPL BCP-MCNC: 4.6 G/DL (ref 3.2–4.9)
ALBUMIN/GLOB SERPL: 1.3 G/DL
ALP SERPL-CCNC: 100 U/L (ref 30–99)
ALT SERPL-CCNC: 15 U/L (ref 2–50)
ANION GAP SERPL CALC-SCNC: 10 MMOL/L (ref 0–11.9)
AST SERPL-CCNC: 16 U/L (ref 12–45)
BILIRUB SERPL-MCNC: 0.7 MG/DL (ref 0.1–1.5)
BUN SERPL-MCNC: 17 MG/DL (ref 8–22)
CALCIUM SERPL-MCNC: 9.3 MG/DL (ref 8.5–10.5)
CHLORIDE SERPL-SCNC: 101 MMOL/L (ref 96–112)
CO2 SERPL-SCNC: 25 MMOL/L (ref 20–33)
CREAT SERPL-MCNC: 1.15 MG/DL (ref 0.5–1.4)
GLOBULIN SER CALC-MCNC: 3.5 G/DL (ref 1.9–3.5)
GLUCOSE SERPL-MCNC: 223 MG/DL (ref 65–99)
IRON SATN MFR SERPL: 68 % (ref 15–55)
IRON SERPL-MCNC: 343 UG/DL (ref 50–180)
POTASSIUM SERPL-SCNC: 4.1 MMOL/L (ref 3.6–5.5)
PROT SERPL-MCNC: 8.1 G/DL (ref 6–8.2)
SODIUM SERPL-SCNC: 136 MMOL/L (ref 135–145)
TIBC SERPL-MCNC: 501 UG/DL (ref 250–450)

## 2020-01-22 PROCEDURE — 96372 THER/PROPH/DIAG INJ SC/IM: CPT | Performed by: INTERNAL MEDICINE

## 2020-01-22 PROCEDURE — 36415 COLL VENOUS BLD VENIPUNCTURE: CPT

## 2020-01-22 PROCEDURE — 82784 ASSAY IGA/IGD/IGG/IGM EACH: CPT

## 2020-01-22 PROCEDURE — 83516 IMMUNOASSAY NONANTIBODY: CPT

## 2020-01-22 RX ADMIN — TESTOSTERONE CYPIONATE 150 MG: 200 INJECTION, SOLUTION INTRAMUSCULAR at 09:48

## 2020-01-24 LAB
IGA SERPL-MCNC: 296 MG/DL (ref 68–408)
TTG IGA SER IA-ACNC: 1 U/ML (ref 0–3)

## 2020-01-31 ENCOUNTER — HOSPITAL ENCOUNTER (OUTPATIENT)
Dept: LAB | Facility: MEDICAL CENTER | Age: 60
End: 2020-01-31
Attending: INTERNAL MEDICINE
Payer: COMMERCIAL

## 2020-01-31 LAB — CORTIS SERPL-MCNC: 8.7 UG/DL (ref 0–23)

## 2020-01-31 PROCEDURE — 82533 TOTAL CORTISOL: CPT

## 2020-01-31 PROCEDURE — 36415 COLL VENOUS BLD VENIPUNCTURE: CPT

## 2020-02-05 ENCOUNTER — OFFICE VISIT (OUTPATIENT)
Dept: ENDOCRINOLOGY | Facility: MEDICAL CENTER | Age: 60
End: 2020-02-05
Payer: COMMERCIAL

## 2020-02-05 VITALS
WEIGHT: 274.6 LBS | HEART RATE: 110 BPM | BODY MASS INDEX: 39.31 KG/M2 | SYSTOLIC BLOOD PRESSURE: 138 MMHG | DIASTOLIC BLOOD PRESSURE: 72 MMHG | HEIGHT: 70 IN

## 2020-02-05 DIAGNOSIS — E06.3 CHRONIC AUTOIMMUNE THYROIDITIS: ICD-10-CM

## 2020-02-05 DIAGNOSIS — R79.89 LOW TESTOSTERONE IN MALE: ICD-10-CM

## 2020-02-05 DIAGNOSIS — E06.3 HYPOTHYROIDISM, ACQUIRED, AUTOIMMUNE: ICD-10-CM

## 2020-02-05 PROCEDURE — 99213 OFFICE O/P EST LOW 20 MIN: CPT | Mod: 25 | Performed by: INTERNAL MEDICINE

## 2020-02-05 RX ORDER — TESTOSTERONE CYPIONATE 200 MG/ML
150 INJECTION, SOLUTION INTRAMUSCULAR ONCE
Status: COMPLETED | OUTPATIENT
Start: 2020-02-05 | End: 2020-02-05

## 2020-02-05 RX ADMIN — TESTOSTERONE CYPIONATE 150 MG: 200 INJECTION, SOLUTION INTRAMUSCULAR at 10:56

## 2020-02-05 NOTE — PROGRESS NOTES
Chief Complaint   Patient presents with   • Hypogonadism      Low testosterone ?  Etiology / ?  Obesity   • Hypothyroidism     Autoimmune thyroiditis        HPI:      1. Low testosterone.    I don’t know the etiology of his low testosterone.  I did not do his original evaluation.  He seems to have benefitted from testosterone supplementation.  He remains obese and I am thinking that his low testosterone may have something to do with his obesity.  However since he has been on testosterone, he has not been able to lose significant weight.  However, he has felt considerably better taking testosterone.  Energy and stamina have improved.  But something has changed over the past month or so.  He just doesn’t feel strong and just out of sorts.  He almost feels flu like although he has no fever or symptoms of any infectious process.  He has gone through three different orthopedic procedures over a short period of time and I wonder if that has taken its toll.  His general chemistries are good.  His most recent testosterone level is satisfactory done just about a month ago.  His free testosterone 57 () and estradiol reasonable at 31.  He has been getting testosterone injections 150 mg every two weeks.  Also he does take thyroid and that level is satisfactory with a TSH of 1.1 and free thyroxin 0.9 taking levothyroxine 137 mcg daily.   He has a borderline anemia with hemoglobin 13.7, hematocrit 45 and slightly hypochromic microcytic indices and yet his iron level is actually a bit high with 68% saturated.  I suggested he consult his PCP and maybe a hematologist.  I am not sure if this needs anything but those are somewhat disparate lab results.  His white blood cell count and differential seems to be unremarkable and platelets are normal as well.  He has been referred to a gastroenterologist for evaluation which has not yet taken place.  We did give him his shot today and I will try to keep tabs on his work up by others  as time goes by.     His PSA done most recently in August last year is low and stable at 0.8.  I did his last prostate exam in March 2019 and I could not reach over the entirety of his prostate so it was an incomplete exam.    We will continue his testosterone injections which he feels has been a real benefit and maybe in the long run will help him with his weight.  There is more work up planned.  I would like to see him again in about six months or less depending on status.     ROS:  All other systems reported as negative or unchanged since last exam      Allergies:   Allergies   Allergen Reactions   • Demerol Vomiting and Nausea     Rxn = years ago    • Amoxicillin      2004-02-25;NA   • Cubicin [Daptomycin] Vomiting   • Meperidine      2004-02-25;NA   • Sulfa Drugs Hives     .   • Sulfamethoxazole W-Trimethoprim      2004-02-25;NA   • Tetracycline Hives       Current medicines including changes today:  Current Outpatient Medications   Medication Sig Dispense Refill   • levothyroxine (SYNTHROID) 137 MCG Tab TAKE 1 TAB BY MOUTH EVERY DAY. 90 Tab 1   • naproxen (NAPROSYN) 500 MG Tab Take 500 mg by mouth every day.     • vitamin D (CHOLECALCIFEROL) 1000 UNIT Tab Take 1,000 Units by mouth every 48 hours.     • lamoTRIgine (LAMICTAL) 100 MG Tab Take 100 mg by mouth every day.     • Diclofenac Sodium 1.5 % Solution PENNSAID 1.5 % TRANSDERMAL SOLUTION     • cyclobenzaprine (FLEXERIL) 10 MG Tab Take 10 mg by mouth 3 times a day.  5   • amphetamine-dextroamphetamine (ADDERALL) 5 MG Tab Take 5 mg by mouth every day.     • hydroxyzine pamoate (VISTARIL) 100 MG Cap Take 200 mg by mouth 2 Times a Day.     • tamsulosin (FLOMAX) 0.4 MG capsule Take 0.4 mg by mouth ONE-HALF HOUR AFTER BREAKFAST.     • liraglutide (VICTOZA) 18 MG/3ML Solution Pen-injector injection Inject 1.8 mg as instructed every morning.     • temazepam (RESTORIL) 30 MG capsule Take 30 mg by mouth at bedtime as needed for Sleep.     • venlafaxine XR (EFFEXOR  "XR) 75 MG CAPSULE SR 24 HR Take 75 mg by mouth every morning.     • alprazolam (XANAX) 0.25 MG Tab Take 0.25-0.5 mg by mouth 4 times a day as needed for Anxiety.     • losartan (COZAAR) 100 MG Tab Take 100 mg by mouth every morning.     • oxycodone-acetaminophen (PERCOCET) 5-325 MG TABS Take 1-2 Tabs by mouth every four hours as needed.       Current Facility-Administered Medications   Medication Dose Route Frequency Provider Last Rate Last Dose   • testosterone cypionate (DEPO-TESTOSTERONE) injection 150 mg  150 mg Intramuscular Q14 DAYS Reji Love M.D.   150 mg at 01/22/20 0948        Past Medical History:   Diagnosis Date   • Anesthesia 11/03/2017    PONV with shoulder surgery approx 20 years ago.   • Anxiety and depression 11/03/2017   • Arthritis 11/03/2017    Osteoarthritis, \" all over\"   • Bunion    • Cancer (HCC) 2017    skin lesion cut out, on left shoulder   • Chronic autoimmune thyroiditis      + US / + TPO = 57   • Dental disorder 11/03/2017    \"Upper Plate\"   • Diabetes mellitus (HCC) 11/03/2017    \"Controlled with diet & Victoza\"   • Gout    • Hammertoe    • High cholesterol 11/03/2017    HX OF, not currently on medication for.   • Hypertension    • Hypothyroidism     chronic thyroiditis   • MRSA infection    • Open toe wound 03/2018    Left big toe, open wound, started 2/2018, receiving wound care therapy two times a week   • Pain 11/03/2017    \"Pain all over except right hip & ankle\"   • Psychiatric problem     depression/anxiety   • Sleep apnea 11/03/2017    CPAP USE   • Snoring 11/03/2017    DX JULIAN   • Tonsillitis        PHYSICAL EXAM:    /72 (BP Location: Left arm, Patient Position: Sitting, BP Cuff Size: Adult)   Pulse (!) 110   Ht 1.778 m (5' 10\")   Wt 124.6 kg (274 lb 9.6 oz)   BMI 39.40 kg/m²     Gen.   obese but otherwise appears healthy     Skin   appropriate for sex and age    HEENT  unremarkable    Neck   no palpable thyroid.  It may be substernal    Heart  " regular    Extremities  no edema    Neuro  gait and station normal    Psych  appropriate      ASSESSMENT AND RECOMMENDATIONS    1. Low testosterone in male            Unclear etiology.  Possibly related to obesity            Definitely benefiting from testosterone injections in terms of energy, strength, stamina            Continue testosterone 150 mg IM every 2 weeks    2.  Hypothyroidism, autoimmune thyroiditis              Clinically euthyroid taking levothyroxine 137 mcg/day               Current TSH is 1.1 and free thyroxine 0.9.  No change indicated      DISPOSITION: See HPI update status in about 3 months      Florentin Weiss M.D.    Copies to: Kevin Chavez M.D. [phone]

## 2020-02-12 ENCOUNTER — APPOINTMENT (RX ONLY)
Dept: URBAN - METROPOLITAN AREA CLINIC 22 | Facility: CLINIC | Age: 60
Setting detail: DERMATOLOGY
End: 2020-02-12

## 2020-02-12 DIAGNOSIS — L57.0 ACTINIC KERATOSIS: ICD-10-CM

## 2020-02-12 DIAGNOSIS — Z71.89 OTHER SPECIFIED COUNSELING: ICD-10-CM

## 2020-02-12 DIAGNOSIS — L73.8 OTHER SPECIFIED FOLLICULAR DISORDERS: ICD-10-CM

## 2020-02-12 DIAGNOSIS — L72.8 OTHER FOLLICULAR CYSTS OF THE SKIN AND SUBCUTANEOUS TISSUE: ICD-10-CM

## 2020-02-12 DIAGNOSIS — L82.0 INFLAMED SEBORRHEIC KERATOSIS: ICD-10-CM

## 2020-02-12 PROCEDURE — 17000 DESTRUCT PREMALG LESION: CPT | Mod: 59

## 2020-02-12 PROCEDURE — ? INCISION AND DRAINAGE

## 2020-02-12 PROCEDURE — ? COUNSELING

## 2020-02-12 PROCEDURE — 10060 I&D ABSCESS SIMPLE/SINGLE: CPT

## 2020-02-12 PROCEDURE — 99213 OFFICE O/P EST LOW 20 MIN: CPT | Mod: 25

## 2020-02-12 PROCEDURE — 17003 DESTRUCT PREMALG LES 2-14: CPT | Mod: 59

## 2020-02-12 PROCEDURE — ? LIQUID NITROGEN

## 2020-02-12 ASSESSMENT — LOCATION DETAILED DESCRIPTION DERM
LOCATION DETAILED: LEFT SUPERIOR MEDIAL MALAR CHEEK
LOCATION DETAILED: LEFT FOREHEAD
LOCATION DETAILED: RIGHT PROXIMAL DORSAL FOREARM
LOCATION DETAILED: LEFT PROXIMAL DORSAL FOREARM
LOCATION DETAILED: RIGHT FOREHEAD
LOCATION DETAILED: LEFT SUPERIOR CENTRAL MALAR CHEEK
LOCATION DETAILED: RIGHT MEDIAL FOREHEAD
LOCATION DETAILED: LEFT CENTRAL MALAR CHEEK
LOCATION DETAILED: LEFT INFERIOR CENTRAL MALAR CHEEK

## 2020-02-12 ASSESSMENT — LOCATION SIMPLE DESCRIPTION DERM
LOCATION SIMPLE: LEFT CHEEK
LOCATION SIMPLE: RIGHT FOREHEAD
LOCATION SIMPLE: LEFT FOREHEAD
LOCATION SIMPLE: RIGHT FOREARM
LOCATION SIMPLE: LEFT FOREARM

## 2020-02-12 ASSESSMENT — LOCATION ZONE DERM
LOCATION ZONE: ARM
LOCATION ZONE: FACE

## 2020-02-12 NOTE — PROCEDURE: LIQUID NITROGEN
Render Post-Care Instructions In Note?: no
Detail Level: Detailed
Number Of Freeze-Thaw Cycles: 2 freeze-thaw cycles
Post-Care Instructions: I reviewed with the patient in detail post-care instructions. Patient is to wear sunprotection, and avoid picking at any of the treated lesions. Pt may apply Vaseline to crusted or scabbing areas.
Consent: The patient's consent was obtained including but not limited to risks of crusting, scabbing, blistering, scarring, darker or lighter pigmentary change, recurrence, incomplete removal and infection.
Duration Of Freeze Thaw-Cycle (Seconds): 3
Medical Necessity Clause: This procedure was medically necessary because the lesions that were treated were:
Medical Necessity Information: It is in your best interest to select a reason for this procedure from the list below. All of these items fulfill various CMS LCD requirements except the new and changing color options.
Number Of Freeze-Thaw Cycles: 3 freeze-thaw cycles

## 2020-02-12 NOTE — PROCEDURE: INCISION AND DRAINAGE
Detail Level: Simple
Lesion Type: Cyst
Method: 11 blade
Curette: No
Size Of Lesion In Cm (Optional But May Be Required For Some Insurances): 0
Drainage Amount?: minimal
Drainage Type?: cyst-like
Wound Care: Vaseline
Dressing: no dressing
Epidermal Sutures: 4-0 Ethilon
Epidermal Closure: simple interrupted
Suture Text: The incision was partially closed with
Preparation Text: The area was prepped in the usual clean fashion.
Curette Text (Optional): After the contents were expressed a curette was used to partially remove the cyst wall.
Consent was obtained and risks were reviewed including but not limited to delayed wound healing, infection, need for multiple I and D's, and pain.
Post-Care Instructions: I reviewed with the patient in detail post-care instructions. Patient should keep wound covered and call the office should any redness, pain, swelling or worsening occur.

## 2020-02-13 ENCOUNTER — HOSPITAL ENCOUNTER (OUTPATIENT)
Facility: MEDICAL CENTER | Age: 60
End: 2020-02-13
Attending: INTERNAL MEDICINE
Payer: COMMERCIAL

## 2020-02-13 PROCEDURE — 87329 GIARDIA AG IA: CPT

## 2020-02-13 PROCEDURE — 87493 C DIFF AMPLIFIED PROBE: CPT

## 2020-02-13 PROCEDURE — 87328 CRYPTOSPORIDIUM AG IA: CPT

## 2020-02-14 LAB
C DIFF DNA SPEC QL NAA+PROBE: NEGATIVE
C DIFF TOX GENS STL QL NAA+PROBE: NEGATIVE
G LAMBLIA+C PARVUM AG STL QL RAPID: NORMAL
SIGNIFICANT IND 70042: NORMAL
SITE SITE: NORMAL
SOURCE SOURCE: NORMAL

## 2020-02-19 ENCOUNTER — NON-PROVIDER VISIT (OUTPATIENT)
Dept: ENDOCRINOLOGY | Facility: MEDICAL CENTER | Age: 60
End: 2020-02-19
Payer: COMMERCIAL

## 2020-02-19 DIAGNOSIS — R79.89 LOW TESTOSTERONE IN MALE: ICD-10-CM

## 2020-02-19 PROCEDURE — 96372 THER/PROPH/DIAG INJ SC/IM: CPT | Performed by: INTERNAL MEDICINE

## 2020-02-19 RX ADMIN — TESTOSTERONE CYPIONATE 150 MG: 200 INJECTION, SOLUTION INTRAMUSCULAR at 10:54

## 2020-02-19 NOTE — NON-PROVIDER
Cristino Keys is a 59 y.o. male here for a non-provider visit for testostseroene injection.    Reason for injection: hypogonadism  Order in MAR?: Yes  Patient supplied?:No  Minimum interval has been met for this injection (per MAR order): yes    Order and dose verified by: Ramu  Patient tolerated injection and no adverse effects were observed or reported: Yes    # of Administrations remaining in MAR: 2

## 2020-02-26 ENCOUNTER — PATIENT MESSAGE (OUTPATIENT)
Dept: ENDOCRINOLOGY | Facility: MEDICAL CENTER | Age: 60
End: 2020-02-26

## 2020-02-26 DIAGNOSIS — E06.3 HYPOTHYROIDISM, ACQUIRED, AUTOIMMUNE: ICD-10-CM

## 2020-02-26 RX ORDER — LEVOTHYROXINE SODIUM 137 UG/1
137 TABLET ORAL
Qty: 90 TAB | Refills: 1 | Status: SHIPPED | OUTPATIENT
Start: 2020-02-26 | End: 2020-02-28

## 2020-02-26 NOTE — TELEPHONE ENCOUNTER
From: Cristino Keys  To: Florentin Weiss M.D.  Sent: 2/26/2020 11:44 AM PST  Subject: Non-Urgent Medical Question    Doctor Isela when you have time can you please send a referral to Doctor Matty Sandoval to the Hematology & Oncology department at [facility] as we discussed a couple of weeks ago.    And I have a new Rx mail in company for all of my 90 day prescriptions so can you please send it to me so I can submit it or if you would like to the company's name is [employer] and customer service number is [phone].    Thank you  Cristino Keys  [phone]  [email or web address]

## 2020-02-28 ENCOUNTER — HOSPITAL ENCOUNTER (OUTPATIENT)
Dept: LAB | Facility: MEDICAL CENTER | Age: 60
End: 2020-02-28
Attending: INTERNAL MEDICINE
Payer: COMMERCIAL

## 2020-02-28 DIAGNOSIS — E06.3 HYPOTHYROIDISM, ACQUIRED, AUTOIMMUNE: ICD-10-CM

## 2020-02-28 PROCEDURE — 83036 HEMOGLOBIN GLYCOSYLATED A1C: CPT

## 2020-02-28 PROCEDURE — 84439 ASSAY OF FREE THYROXINE: CPT

## 2020-02-28 PROCEDURE — 84443 ASSAY THYROID STIM HORMONE: CPT

## 2020-02-28 PROCEDURE — 36415 COLL VENOUS BLD VENIPUNCTURE: CPT

## 2020-02-28 PROCEDURE — 84480 ASSAY TRIIODOTHYRONINE (T3): CPT

## 2020-02-28 RX ORDER — LEVOTHYROXINE SODIUM 137 UG/1
TABLET ORAL
Qty: 90 TAB | Refills: 1 | Status: SHIPPED | OUTPATIENT
Start: 2020-02-28 | End: 2020-05-11

## 2020-02-29 LAB
EST. AVERAGE GLUCOSE BLD GHB EST-MCNC: 134 MG/DL
HBA1C MFR BLD: 6.3 % (ref 0–5.6)
T3 SERPL-MCNC: 138.4 NG/DL (ref 60–181)
TSH SERPL DL<=0.005 MIU/L-ACNC: 1.75 UIU/ML (ref 0.38–5.33)

## 2020-03-03 LAB — T4 FREE SERPL DIALY-MCNC: 1.4 NG/DL (ref 1.1–2.4)

## 2020-03-04 ENCOUNTER — NON-PROVIDER VISIT (OUTPATIENT)
Dept: ENDOCRINOLOGY | Facility: MEDICAL CENTER | Age: 60
End: 2020-03-04
Payer: COMMERCIAL

## 2020-03-04 ENCOUNTER — TELEPHONE (OUTPATIENT)
Dept: ENDOCRINOLOGY | Facility: MEDICAL CENTER | Age: 60
End: 2020-03-04

## 2020-03-04 DIAGNOSIS — R79.89 LOW TESTOSTERONE IN MALE: ICD-10-CM

## 2020-03-04 PROCEDURE — 96372 THER/PROPH/DIAG INJ SC/IM: CPT | Performed by: INTERNAL MEDICINE

## 2020-03-04 RX ADMIN — TESTOSTERONE CYPIONATE 150 MG: 200 INJECTION, SOLUTION INTRAMUSCULAR at 10:59

## 2020-03-04 NOTE — NON-PROVIDER
Cristino Keys is a 59 y.o. male here for a non-provider visit for Testosterone injection.    Reason for injection: Hypogonadism  Order in MAR?: Yes  Patient supplied?:No  Minimum interval has been met for this injection (per MAR order): Yes    Order and dose verified by: FROYLAN  Patient tolerated injection and no adverse effects were observed or reported: Yes    # of Administrations remaining in MAR: 2

## 2020-03-06 DIAGNOSIS — E06.3 HYPOTHYROIDISM, ACQUIRED, AUTOIMMUNE: ICD-10-CM

## 2020-03-06 DIAGNOSIS — R79.89 LOW TESTOSTERONE IN MALE: ICD-10-CM

## 2020-03-06 DIAGNOSIS — D75.1 POLYCYTHEMIA: ICD-10-CM

## 2020-03-16 ENCOUNTER — HOSPITAL ENCOUNTER (OUTPATIENT)
Dept: LAB | Facility: MEDICAL CENTER | Age: 60
End: 2020-03-16
Attending: INTERNAL MEDICINE
Payer: COMMERCIAL

## 2020-03-16 DIAGNOSIS — E06.3 HYPOTHYROIDISM, ACQUIRED, AUTOIMMUNE: ICD-10-CM

## 2020-03-16 DIAGNOSIS — R79.89 LOW TESTOSTERONE IN MALE: ICD-10-CM

## 2020-03-16 DIAGNOSIS — D75.1 POLYCYTHEMIA: ICD-10-CM

## 2020-03-16 LAB
ERYTHROCYTE [DISTWIDTH] IN BLOOD BY AUTOMATED COUNT: 63.6 FL (ref 35.9–50)
ESTRADIOL SERPL-MCNC: 39 PG/ML
HCT VFR BLD AUTO: 52 % (ref 42–52)
HGB BLD-MCNC: 16.1 G/DL (ref 14–18)
MCH RBC QN AUTO: 25.8 PG (ref 27–33)
MCHC RBC AUTO-ENTMCNC: 31 G/DL (ref 33.7–35.3)
MCV RBC AUTO: 83.5 FL (ref 81.4–97.8)
PLATELET # BLD AUTO: 209 K/UL (ref 164–446)
PMV BLD AUTO: 8.8 FL (ref 9–12.9)
RBC # BLD AUTO: 6.23 M/UL (ref 4.7–6.1)
T4 FREE SERPL-MCNC: 0.77 NG/DL (ref 0.53–1.43)
TSH SERPL DL<=0.005 MIU/L-ACNC: 1.61 UIU/ML (ref 0.38–5.33)
WBC # BLD AUTO: 6.1 K/UL (ref 4.8–10.8)

## 2020-03-16 PROCEDURE — 84443 ASSAY THYROID STIM HORMONE: CPT

## 2020-03-16 PROCEDURE — 82670 ASSAY OF TOTAL ESTRADIOL: CPT

## 2020-03-16 PROCEDURE — 84402 ASSAY OF FREE TESTOSTERONE: CPT

## 2020-03-16 PROCEDURE — 36415 COLL VENOUS BLD VENIPUNCTURE: CPT

## 2020-03-16 PROCEDURE — 85027 COMPLETE CBC AUTOMATED: CPT

## 2020-03-16 PROCEDURE — 84439 ASSAY OF FREE THYROXINE: CPT

## 2020-03-18 ENCOUNTER — OFFICE VISIT (OUTPATIENT)
Dept: ENDOCRINOLOGY | Facility: MEDICAL CENTER | Age: 60
End: 2020-03-18
Payer: COMMERCIAL

## 2020-03-18 VITALS
OXYGEN SATURATION: 94 % | BODY MASS INDEX: 39.74 KG/M2 | SYSTOLIC BLOOD PRESSURE: 120 MMHG | HEART RATE: 106 BPM | DIASTOLIC BLOOD PRESSURE: 86 MMHG | HEIGHT: 70 IN | WEIGHT: 277.6 LBS | RESPIRATION RATE: 16 BRPM

## 2020-03-18 DIAGNOSIS — E06.3 CHRONIC AUTOIMMUNE THYROIDITIS: ICD-10-CM

## 2020-03-18 DIAGNOSIS — E06.3 HYPOTHYROIDISM, ACQUIRED, AUTOIMMUNE: ICD-10-CM

## 2020-03-18 DIAGNOSIS — E29.1 HYPOGONADISM IN MALE: ICD-10-CM

## 2020-03-18 LAB — TESTOST FREE SERPL-MCNC: 70 PG/ML (ref 47–244)

## 2020-03-18 PROCEDURE — 96372 THER/PROPH/DIAG INJ SC/IM: CPT | Performed by: INTERNAL MEDICINE

## 2020-03-18 PROCEDURE — 99214 OFFICE O/P EST MOD 30 MIN: CPT | Mod: 25 | Performed by: INTERNAL MEDICINE

## 2020-03-18 RX ORDER — TESTOSTERONE CYPIONATE 200 MG/ML
150 INJECTION, SOLUTION INTRAMUSCULAR ONCE
Status: COMPLETED | OUTPATIENT
Start: 2020-03-18 | End: 2020-03-18

## 2020-03-18 RX ADMIN — TESTOSTERONE CYPIONATE 150 MG: 200 INJECTION, SOLUTION INTRAMUSCULAR at 11:26

## 2020-03-18 ASSESSMENT — FIBROSIS 4 INDEX: FIB4 SCORE: 1.21

## 2020-03-19 NOTE — PROGRESS NOTES
Chief Complaint   Patient presents with   • Hypothyroidism     Chronic autoimmune thyroiditis   • Follow-Up     Low testosterone   • Anemia     Iron deficiency        HPI:    See assessment and recommendations below    ROS:  Sleep apnea is apparently well treated with CPAP.  He has a watch that monitors his breathing during the night which gives him good reports most of the time.  He does not use supplemental oxygen.      Allergies:   Allergies   Allergen Reactions   • Demerol Vomiting and Nausea     Rxn = years ago    • Amoxicillin      2004-02-25;NA   • Cubicin [Daptomycin] Vomiting   • Meperidine      2004-02-25;NA   • Sulfa Drugs Hives     .   • Sulfamethoxazole W-Trimethoprim      2004-02-25;NA   • Tetracycline Hives       Current medicines including changes today:  Current Outpatient Medications   Medication Sig Dispense Refill   • levothyroxine (SYNTHROID) 137 MCG Tab TAKE 1 TABLET BY MOUTH EVERY DAY 90 Tab 1   • naproxen (NAPROSYN) 500 MG Tab Take 500 mg by mouth every day.     • vitamin D (CHOLECALCIFEROL) 1000 UNIT Tab Take 1,000 Units by mouth every 48 hours.     • lamoTRIgine (LAMICTAL) 100 MG Tab Take 100 mg by mouth every day.     • Diclofenac Sodium 1.5 % Solution PENNSAID 1.5 % TRANSDERMAL SOLUTION     • cyclobenzaprine (FLEXERIL) 10 MG Tab Take 10 mg by mouth 3 times a day.  5   • amphetamine-dextroamphetamine (ADDERALL) 5 MG Tab Take 5 mg by mouth every day.     • hydroxyzine pamoate (VISTARIL) 100 MG Cap Take 200 mg by mouth 2 Times a Day.     • tamsulosin (FLOMAX) 0.4 MG capsule Take 0.4 mg by mouth ONE-HALF HOUR AFTER BREAKFAST.     • liraglutide (VICTOZA) 18 MG/3ML Solution Pen-injector injection Inject 1.8 mg as instructed every morning.     • temazepam (RESTORIL) 30 MG capsule Take 30 mg by mouth at bedtime as needed for Sleep.     • venlafaxine XR (EFFEXOR XR) 75 MG CAPSULE SR 24 HR Take 75 mg by mouth every morning.     • alprazolam (XANAX) 0.25 MG Tab Take 0.25-0.5 mg by mouth 4 times a  "day as needed for Anxiety.     • losartan (COZAAR) 100 MG Tab Take 100 mg by mouth every morning.     • oxycodone-acetaminophen (PERCOCET) 5-325 MG TABS Take 1-2 Tabs by mouth every four hours as needed.       Current Facility-Administered Medications   Medication Dose Route Frequency Provider Last Rate Last Dose   • testosterone cypionate (DEPO-TESTOSTERONE) injection 150 mg  150 mg Intramuscular Q14 DAYS Reji Love M.D.   150 mg at 03/04/20 1059        Past Medical History:   Diagnosis Date   • Anesthesia 11/03/2017    PONV with shoulder surgery approx 20 years ago.   • Anxiety and depression 11/03/2017   • Arthritis 11/03/2017    Osteoarthritis, \" all over\"   • Bunion    • Cancer (HCC) 2017    skin lesion cut out, on left shoulder   • Chronic autoimmune thyroiditis      + US / + TPO = 57   • Dental disorder 11/03/2017    \"Upper Plate\"   • Diabetes mellitus (HCC) 11/03/2017    \"Controlled with diet & Victoza\"   • Gout    • Hammertoe    • High cholesterol 11/03/2017    HX OF, not currently on medication for.   • Hypertension    • Hypothyroidism     chronic thyroiditis   • MRSA infection    • Open toe wound 03/2018    Left big toe, open wound, started 2/2018, receiving wound care therapy two times a week   • Pain 11/03/2017    \"Pain all over except right hip & ankle\"   • Psychiatric problem     depression/anxiety   • Sleep apnea 11/03/2017    CPAP USE   • Snoring 11/03/2017    DX JULIAN   • Tonsillitis        PHYSICAL EXAM:    /86   Pulse (!) 106   Resp 16   Ht 1.778 m (5' 10\")   Wt (!) 125.9 kg (277 lb 9.6 oz)   SpO2 94%   BMI 39.83 kg/m²     Gen.   appears comfortable             No detailed examination today due to the virus pandemic.    Skin   appropriate for sex and age    HEENT  unremarkable by inspection    Heart  regular    Extremities  no edema    Neuro  gait and station normal    Psych  appropriate      ASSESSMENT AND RECOMMENDATIONS    1. Hypothyroidism, acquired, autoimmune              " He does not have a noticeable enlargement of his thyroid or if he does it substernal.  He seems to be clinically euthyroid taking levothyroxine 137 mcg/day            Current lab is satisfactory with TSH 1.6 and free thyroxine 0.7 (0.5-1.4).    2. Chronic autoimmune thyroiditis            Thyroid gland is asymptomatic. Patient is not aware of any change in her gland. No discomfort. No difficulty swallowing, breathing, or voice change.    3. Hypogonadism in male            The etiology of this is unclear.  He feels he has benefited from taking testosterone supplement             Currently using testosterone intramuscular 150 mg every 2 weeks administered in the office.              A free testosterone level done 12 days after his last shot was 70 ().  I would like to get a testosterone level done right after an injection to determine a peak level.  He will do that soon.  He will have an injection today  - TESTOSTERONE,FREE ADULT MALE; Future  - ESTRADIOL; Future  - testosterone cypionate (DEPO-TESTOSTERONE) injection 150 mg    4.  Anemia             He has had a mild anemia hypochromic microcytic for which he has started taking an iron supplement and it corrected rather quickly.  Over the past 2 months his hemoglobin has gone from 13.7 up to 16.1 and his hematocrit is gone up from 45 up to 52.  It will be interesting to watch to see if it continues to climb to polycythemia levels related to his testosterone plus sleep apnea            GI is looking into the etiology of his anemia and endoscopy upper and lower is scheduled      DISPOSITION: We will discuss his lab later                             Return to office in  3 months      Florentin Weiss M.D.    Copies to: Kevin Chavez M.D. [phone]

## 2020-04-02 ENCOUNTER — NON-PROVIDER VISIT (OUTPATIENT)
Dept: ENDOCRINOLOGY | Facility: MEDICAL CENTER | Age: 60
End: 2020-04-02
Payer: COMMERCIAL

## 2020-04-02 PROCEDURE — 96372 THER/PROPH/DIAG INJ SC/IM: CPT | Performed by: INTERNAL MEDICINE

## 2020-04-02 RX ADMIN — TESTOSTERONE CYPIONATE 150 MG: 200 INJECTION, SOLUTION INTRAMUSCULAR at 11:29

## 2020-04-02 NOTE — NON-PROVIDER
Cristino Keys is a 60 y.o. male here for a non-provider visit for Testosterone injection.    Reason for injection: Low Testosterone  Order in MAR?: Yes  Patient supplied?:No  Minimum interval has been met for this injection (per MAR order): Yes    Order and dose verified by: ANGIE  Patient tolerated injection and no adverse effects were observed or reported: Yes    # of Administrations remaining in MAR: 2

## 2020-04-06 DIAGNOSIS — G47.33 OSA (OBSTRUCTIVE SLEEP APNEA): ICD-10-CM

## 2020-04-09 ENCOUNTER — HOME STUDY (OUTPATIENT)
Dept: SLEEP MEDICINE | Facility: MEDICAL CENTER | Age: 60
End: 2020-04-09
Attending: NURSE PRACTITIONER
Payer: COMMERCIAL

## 2020-04-09 DIAGNOSIS — G47.33 OSA (OBSTRUCTIVE SLEEP APNEA): ICD-10-CM

## 2020-04-09 PROCEDURE — 94762 N-INVAS EAR/PLS OXIMTRY CONT: CPT | Performed by: FAMILY MEDICINE

## 2020-04-13 ENCOUNTER — NON-PROVIDER VISIT (OUTPATIENT)
Dept: WOUND CARE | Facility: MEDICAL CENTER | Age: 60
End: 2020-04-13
Attending: NURSE PRACTITIONER
Payer: COMMERCIAL

## 2020-04-13 PROCEDURE — 11721 DEBRIDE NAIL 6 OR MORE: CPT

## 2020-04-13 NOTE — CERTIFICATION
"[facility]    [address]    [address]    [place] [address]    [phone] Fax: [phone]    Foot and Nail Recertification   4/13/2020           Referring Physician: LEATHA Lima  Primary Physician: Kevin Chavez MD  Consulting Physicians:   Start of Care: 7/14/2016        Subjective:       HPI:  Pt is a 59 year old male with DM who has attended [facility] in the past, had Surgery with Dr. Kang on Left foot on August 21, 2017 for bunionectomy revision and as below. Lives with wife and children. HX of dislocated toes 3-4 years ago while walking, had toes \"fixed,\" but \"it didn't work.\" Patient plans to see Dr. Kang again at the end of August 2019 regarding his left 2nd toe as it is \"just flopping around.\"  Update 11/25/2010: Since August last visit, had to cancel toe surgery because had to have Rt shoulder replaced, then had both knees replaced. Had surgery to Rt 1st toe because it dislocated, amputation to first joint. Will see Dr. Kang in January to schedule Lt 2nd toe surgery. States this has affected his balance but refuses to use cane or other supportive device. Pt states he is going to [facility] today to get new orthotics.   Medical History: .pmh     12/22/2016 PROCEDURES with Dr Kang:     1.  Left modified Arndt bunionectomy.  2.  Left distal metatarsal osteotomy.  3.  Left distal metatarsal osteotomy of the hallux.  4.  Left distal metatarsal osteotomy, 2, 3, 4, and 5.  5.  Left flexor digitorum longus transfer with a flexor to extensor transfer    of 2 and 3.  6.  Left soft tissue reconstruction, metatarsophalangeal joints 2, 3, 4, and    5.  7.  Left hammertoe correction with proximal interphalangeal arthroplasty, 2    and 3.  8.  Right metatarsophalangeal joint capsulotomy of soft tissue release, 2 and    3.  9.  Right hammertoe correction with proximal interphalangeal arthroplasty, 2    and 3.   6/25/2018 Dr. Kang  PROCEDURES PERFORMED:  1.  Left removal internal " fixation.  2.  Left amputation great toe with an interphalangeal disarticulation.  3.  Left foot irrigation and sharp debridement.  10/15/2018 Dr. Kang  PROCEDURES PERFORMED:  1.  Left gastroc soleus recession.  2.  Left partial excision third metatarsal.  3.  Right open removal internal fixation.  Past surgical Hx:   Past Surgical History             Past Surgical History:   Procedure Laterality Date   • NERVE ULNAR TRANSFER Right 4/3/2018     Procedure: NERVE ULNAR TRANSFER - TRANSPOSITION;  Surgeon: Ayad Luna M.D.;  Location: [facility];  Service: Orthopedics   • CARPAL TUNNEL RELEASE Right 4/3/2018     Procedure: CARPAL TUNNEL RELEASE;  Surgeon: Ayad Luna M.D.;  Location: [facility];  Service: Orthopedics   • ELBOW ARTHROTOMY Right 4/3/2018     Procedure: ELBOW ARTHROTOMY - FOR CONTRACTURE RELEASE AT THE ELBOW, RADIAL HEAD EXCISION;  Surgeon: Ayad Luna M.D.;  Location: [facility];  Service: Orthopedics   • TOE FUSION Right 11/13/2017     Procedure: TOE FUSION - 1ST METATARSALPHALANGEAL;  Surgeon: Haroldo Kang M.D.;  Location: [facility];  Service: Orthopedics   • METATARSAL HEAD RESECTION Right 11/13/2017     Procedure: METATARSAL HEAD RESECTION - EXCISION;  Surgeon: Haroldo Kang M.D.;  Location: [facility];  Service: Orthopedics   • HAMMERTOE CORRECTION Right 11/13/2017     Procedure: HAMMERTOE CORRECTION 2-5;  Surgeon: Haroldo Kang M.D.;  Location: [facility];  Service: Orthopedics   • METATARSAL HEAD RESECTION Left 8/21/2017     Procedure: METATARSAL HEAD RESECTION - 2-5;  Surgeon: Haroldo Kang M.D.;  Location: [facility];  Service:    • TOE FUSION Left 8/21/2017     Procedure: TOE FUSION - 1ST MTP;  Surgeon: Haroldo Kang M.D.;  Location: [facility];  Service:    • HARDWARE REMOVAL ORTHO Left 8/21/2017     Procedure: HARDWARE REMOVAL ORTHO;  Surgeon: Haroldo  "ROBERTO Kang;  Location: [facility];  Service:    • LUMBAR FUSION POSTERIOR   4/14/2017     Procedure: LUMBAR FUSION POSTERIOR - MINIMALLY INVASIVE TLIF L4-5;  Surgeon: Bossman Caro M.D.;  Location: [facility];  Service:    • HAMMERTOE CORRECTION Bilateral 12/22/2016     Procedure: HAMMERTOE CORRECTION/METATARSALPHALANGEAL JOINT RELEASE 2/3 RIGHT, 2-3 LEFT;  Surgeon: Haroldo Kang M.D.;  Location: [facility];  Service:    • BUNIONECTOMY Left 12/22/2016     Procedure: BUNIONECTOMY FOR DISTAL HALLUX VALGUS CORRECTION WITH BRANDY;  Surgeon: Haroldo Kang M.D.;  Location: [facility];  Service:    • ORTHOPEDIC OSTEOTOMY Left 12/22/2016     Procedure: ORTHOPEDIC OSTEOTOMY DISTAL METATARSAL 2-5;  Surgeon: Haroldo Kang M.D.;  Location: [facility];  Service:    • HIP ARTH ANTERIOR TOTAL Left 8/18/2016     Procedure: HIP ARTHROPLASTY ANTERIOR TOTAL;  Surgeon: Emiliano Vang M.D.;  Location: [facility];  Service:    • OTHER ORTHOPEDIC SURGERY   6/2014     right shoulder  arthroplasty   • OTHER ORTHOPEDIC SURGERY   11/1/2012     left knee/left ankle/left shoulder   • OTHER ORTHOPEDIC SURGERY   2004     elbow surgery   • OTHER   2000     dislocation left foot surgery at [facility]   • OTHER         \"septoplasty 20 years ago\"         History of foot ulceration(s): Yes__x__ No____ Location:  L distal hallux   History of foot surgery: Yes__x__ No____Describe:______see above;   ______________________________________________       Employed: Y ___ N __x_ Job description: ____disability_____________       Current Residence: ___lives with wife and kids______  home     Pain: pt denies foot pain presently.      Past Medical History:   Past Medical History     Past Medical History:   Diagnosis Date   • Anesthesia 11/03/2017    PONV with shoulder surgery approx 20 years ago.   • Anxiety and depression 11/03/2017   • Arthritis 11/03/2017    " "Osteoarthritis, \" all over\"   • Bunion    • Cancer (HCC) 2017    skin lesion cut out, on left shoulder   • Chronic autoimmune thyroiditis      + US / + TPO = 57   • Dental disorder 11/03/2017    \"Upper Plate\"   • Diabetes mellitus (HCC) 11/03/2017    \"Controlled with diet & Victoza\"   • Gout    • Hammertoe    • High cholesterol 11/03/2017    HX OF, not currently on medication for.   • Hypertension    • Hypothyroidism     chronic thyroiditis   • MRSA infection    • Open toe wound 03/2018    Left big toe, open wound, started 2/2018, receiving wound care therapy two times a week   • Pain 11/03/2017    \"Pain all over except right hip & ankle\"   • Psychiatric problem     depression/anxiety   • Sleep apnea 11/03/2017    CPAP USE   • Snoring 11/03/2017    DX JULIAN   • Tonsillitis               Current Medications:   Current Outpatient Medications:   •  levothyroxine (SYNTHROID) 137 MCG Tab, TAKE 1 TABLET BY MOUTH EVERY DAY, Disp: 90 Tab, Rfl: 1  •  naproxen (NAPROSYN) 500 MG Tab, Take 500 mg by mouth every day., Disp: , Rfl:   •  vitamin D (CHOLECALCIFEROL) 1000 UNIT Tab, Take 1,000 Units by mouth every 48 hours., Disp: , Rfl:   •  lamoTRIgine (LAMICTAL) 100 MG Tab, Take 100 mg by mouth every day., Disp: , Rfl:   •  Diclofenac Sodium 1.5 % Solution, PENNSAID 1.5 % TRANSDERMAL SOLUTION, Disp: , Rfl:   •  cyclobenzaprine (FLEXERIL) 10 MG Tab, Take 10 mg by mouth 3 times a day., Disp: , Rfl: 5  •  amphetamine-dextroamphetamine (ADDERALL) 5 MG Tab, Take 5 mg by mouth every day., Disp: , Rfl:   •  hydroxyzine pamoate (VISTARIL) 100 MG Cap, Take 200 mg by mouth 2 Times a Day., Disp: , Rfl:   •  tamsulosin (FLOMAX) 0.4 MG capsule, Take 0.4 mg by mouth ONE-HALF HOUR AFTER BREAKFAST., Disp: , Rfl:   •  liraglutide (VICTOZA) 18 MG/3ML Solution Pen-injector injection, Inject 1.8 mg as instructed every morning., Disp: , Rfl:   •  temazepam (RESTORIL) 30 MG capsule, Take 30 mg by mouth at bedtime as needed for Sleep., Disp: , Rfl:   •  " "venlafaxine XR (EFFEXOR XR) 75 MG CAPSULE SR 24 HR, Take 75 mg by mouth every morning., Disp: , Rfl:   •  alprazolam (XANAX) 0.25 MG Tab, Take 0.25-0.5 mg by mouth 4 times a day as needed for Anxiety., Disp: , Rfl:   •  losartan (COZAAR) 100 MG Tab, Take 100 mg by mouth every morning., Disp: , Rfl:   •  oxycodone-acetaminophen (PERCOCET) 5-325 MG TABS, Take 1-2 Tabs by mouth every four hours as needed., Disp: , Rfl:     Current Facility-Administered Medications:   •  testosterone cypionate (DEPO-TESTOSTERONE) injection 150 mg, 150 mg, Intramuscular, Q14 DAYS, Reji Love M.D., 150 mg at 04/02/20 1129          Allergies: Demerol; Amoxicillin; Cubicin [daptomycin]; Meperidine; Sulfa drugs; Sulfamethoxazole w-trimethoprim; and Tetracycline  Social History:   Social History   Social History                Social History   • Marital status:        Spouse name: N/A   • Number of children: N/A   • Years of education: N/A            Occupational History   • Not on file.                  Social History Main Topics   • Smoking status: Former Smoker       Packs/day: 1.00       Years: 20.00       Types: Cigarettes       Quit date: 1/1/2014   • Smokeless tobacco: Former User       Types: Chew       Quit date: 1/1/1997   • Alcohol use Yes         Comment: \"Few per month\"   • Drug use: No   • Sexual activity: Not on file              Other Topics Concern   • Not on file            Social History Narrative   • No narrative on file            Objective:     Tests and Measures:  FBS today 128. Good resistance strength AIDAN feet dorsiflextion and plantar flexion against clinician's hands. Good ROM of ankles. Denies pain in calves when walking.   2/8/2018 ANT per Florentin 1.0  HgbA1C: 8/15/19 6.4         Vascular      R   L       2+ 2+ DP pulse     2+ 2+ PT pulse     yes yes Capillary refill < 3 sec         doppler pulses multiphasic AIDAN DP/PT  Deformities    R   L        4 and 5  2nd Hammer/claw toe      x  x Hallux Valgus   "   AIDAN 1st plantar   Prominent Metatarsal Heads         Charcot Foot         Drop Foot         Rocker Bottom         Prior amputation:  see above       Other:  nails missing from lt  1st, Rt 2nd                   Clinical appearance/skin    Dryness___moderate_________ Swelling___none______ Redness______none_______Temperature__warm_ ______    Callus_none_________________________________    Ulceration_ none        Clinical appearance/toenails    Onychomycosis_8_______    Ingrown toenails_______    Deformities_______________________________    Other___ __________________________________          Orthotic, protective, supportive devices: custom orthotics in new balance shoes, getting new ones today.      Procedures: Pt's feet were washed with soapy water, then dried. In between toes cleaned with gauze to remove debris. All 8 onychomycotic nails were ground down with dremel, then clipped as needed, smoothed again with Dremel.No nicks or cuts noted.     Patient Education: Instructed pt on s/s infection - chills, fever, malaise, NV, increased redness/swelling/pain/exudate - and to go to ER/Urgent Care; on rationale for debridement of calluses; to shake out shoes before donning; to wear protective footwear at all times, including when at home; to examine feet daily for any new redness/wounds and report to PCP; to moisturize feet daily except between toes with water-based moisturizer; to keep tight control over BS for optimum health; to return to [facility] for foot and nail care every 2-3 months. Pt verbalized understanding of all instruction.        Professional Collaboration: Recertification sent to referring provider via Epic.        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

## 2020-04-14 NOTE — PROCEDURES
Over Night Pulse Oximetry     Indication:To assess the efficacy of the current pressure .       Impression:   The study was done on CPAP 10-15 cm. The total analyzed time was 8 hrs 0 min. O2 Sat. patel was 84% and mean O2 sat was 86 % and baseline O2 at 87%. O2 sat was below 88% for 240 min of the flow evaluation time. Oxygen Desaturation (>=3%) Index was elevated at 6/hr.      Recommendation:  Consider increasing base pressure due to elevated BELTRAN. However, if residual AHI is normal on the compliance download, consider supplemental oxygen. Clinical correlation recommended.

## 2020-04-16 ENCOUNTER — NON-PROVIDER VISIT (OUTPATIENT)
Dept: ENDOCRINOLOGY | Facility: MEDICAL CENTER | Age: 60
End: 2020-04-16
Payer: COMMERCIAL

## 2020-04-16 PROCEDURE — 96372 THER/PROPH/DIAG INJ SC/IM: CPT | Performed by: INTERNAL MEDICINE

## 2020-04-16 RX ADMIN — TESTOSTERONE CYPIONATE 150 MG: 200 INJECTION, SOLUTION INTRAMUSCULAR at 14:01

## 2020-04-16 NOTE — NON-PROVIDER
Cristino Keys is a 60 y.o. male here for a non-provider visit for testosterone injection.    Reason for injection: low testosterone  Order in MAR?: Yes  Patient supplied?:No  Minimum interval has been met for this injection (per MAR order): Yes    Order and dose verified by: LS  Patient tolerated injection and no adverse effects were observed or reported: Yes    # of Administrations remaining in MAR: 2

## 2020-04-28 ENCOUNTER — HOSPITAL ENCOUNTER (OUTPATIENT)
Dept: LAB | Facility: MEDICAL CENTER | Age: 60
End: 2020-04-28
Attending: INTERNAL MEDICINE
Payer: COMMERCIAL

## 2020-04-28 LAB
BASOPHILS # BLD AUTO: 0.5 % (ref 0–1.8)
BASOPHILS # BLD: 0.03 K/UL (ref 0–0.12)
EOSINOPHIL # BLD AUTO: 0.09 K/UL (ref 0–0.51)
EOSINOPHIL NFR BLD: 1.5 % (ref 0–6.9)
ERYTHROCYTE [DISTWIDTH] IN BLOOD BY AUTOMATED COUNT: 52.2 FL (ref 35.9–50)
HCT VFR BLD AUTO: 55.2 % (ref 42–52)
HGB BLD-MCNC: 18.2 G/DL (ref 14–18)
IMM GRANULOCYTES # BLD AUTO: 0.05 K/UL (ref 0–0.11)
IMM GRANULOCYTES NFR BLD AUTO: 0.8 % (ref 0–0.9)
IRON SATN MFR SERPL: 66 % (ref 15–55)
IRON SERPL-MCNC: 255 UG/DL (ref 50–180)
LYMPHOCYTES # BLD AUTO: 1.22 K/UL (ref 1–4.8)
LYMPHOCYTES NFR BLD: 20.4 % (ref 22–41)
MCH RBC QN AUTO: 28 PG (ref 27–33)
MCHC RBC AUTO-ENTMCNC: 33 G/DL (ref 33.7–35.3)
MCV RBC AUTO: 84.9 FL (ref 81.4–97.8)
MONOCYTES # BLD AUTO: 0.53 K/UL (ref 0–0.85)
MONOCYTES NFR BLD AUTO: 8.8 % (ref 0–13.4)
NEUTROPHILS # BLD AUTO: 4.07 K/UL (ref 1.82–7.42)
NEUTROPHILS NFR BLD: 68 % (ref 44–72)
NRBC # BLD AUTO: 0 K/UL
NRBC BLD-RTO: 0 /100 WBC
PLATELET # BLD AUTO: 207 K/UL (ref 164–446)
PMV BLD AUTO: 8.7 FL (ref 9–12.9)
RBC # BLD AUTO: 6.5 M/UL (ref 4.7–6.1)
TIBC SERPL-MCNC: 389 UG/DL (ref 250–450)
UIBC SERPL-MCNC: 134 UG/DL (ref 110–370)
WBC # BLD AUTO: 6 K/UL (ref 4.8–10.8)

## 2020-04-28 PROCEDURE — 83550 IRON BINDING TEST: CPT

## 2020-04-28 PROCEDURE — 85025 COMPLETE CBC W/AUTO DIFF WBC: CPT

## 2020-04-28 PROCEDURE — 36415 COLL VENOUS BLD VENIPUNCTURE: CPT

## 2020-04-28 PROCEDURE — 83540 ASSAY OF IRON: CPT

## 2020-04-29 PROBLEM — G47.34 NOCTURNAL HYPOXEMIA: Status: ACTIVE | Noted: 2020-04-29

## 2020-04-29 NOTE — PROGRESS NOTES
Telemedicine Visit: Established Patient     This encounter was conducted via Zoom .   Verbal consent was obtained. Patient's identity was verified.    Subjective:   CC: Follow-up for JULIAN on auto titrating CPAP 10 to 15 cm water    HPI:  Cristino Keys is a 60 y.o. male first evaluated in 2017 for  possible obstructive sleep apnea syndrome.  His sleep study showed mild JULIAN with an AHI of 6.1/patel saturation 85%.  He is a retired .  He was last seen 9/30/2019 when his average residual apnea hypopnea index was 6.2.    Today, 4/30/2020, he is on CPAP 10 to 15 cm H2O with a average residual estimated apnea hypopnea index of 1.8.  His compliance is 100% for 9 hours and 1 minute.  His median pressure is 13.3 cm and his maximum pressure is 15.0 cm water.    He had an overnight oximetry performed on 4/9/2020 while on CPAP 10 to 15 cm water.  His patel saturation was 84%, his mean saturation was 86%, and his saturations less than 80% for 240 minutes of the flow evaluation time.    Had [place] he was 14 years old sustaining severe knee trauma.  He has had 35 operations on 1 of his knees he recently had a shoulder replacement as well as a knee replacement    Significant comorbidities and modifying factors include obesity, hypertension, hypothyroidism, former smoker, diabetes, and multiple orthopedic issues.    ROS   Denies any recent fevers or chills. No nausea or vomiting. No chest pains or shortness of breath.     Recent foot surgery. Many aches and pains.    Allergies   Allergen Reactions   • Demerol Vomiting and Nausea     Rxn = years ago    • Amoxicillin      2004-02-25;NA   • Cubicin [Daptomycin] Vomiting   • Meperidine      2004-02-25;NA   • Sulfa Drugs Hives     .   • Sulfamethoxazole W-Trimethoprim      2004-02-25;NA   • Tetracycline Hives       Current medicines (including changes today)  Current Outpatient Medications   Medication Sig Dispense Refill   •  levothyroxine (SYNTHROID) 137 MCG Tab TAKE 1 TABLET BY MOUTH EVERY DAY 90 Tab 1   • naproxen (NAPROSYN) 500 MG Tab Take 500 mg by mouth every day.     • vitamin D (CHOLECALCIFEROL) 1000 UNIT Tab Take 1,000 Units by mouth every 48 hours.     • lamoTRIgine (LAMICTAL) 100 MG Tab Take 100 mg by mouth every day.     • Diclofenac Sodium 1.5 % Solution PENNSAID 1.5 % TRANSDERMAL SOLUTION     • cyclobenzaprine (FLEXERIL) 10 MG Tab Take 10 mg by mouth 3 times a day.  5   • amphetamine-dextroamphetamine (ADDERALL) 5 MG Tab Take 5 mg by mouth every day.     • hydroxyzine pamoate (VISTARIL) 100 MG Cap Take 200 mg by mouth 2 Times a Day.     • tamsulosin (FLOMAX) 0.4 MG capsule Take 0.4 mg by mouth ONE-HALF HOUR AFTER BREAKFAST.     • liraglutide (VICTOZA) 18 MG/3ML Solution Pen-injector injection Inject 1.8 mg as instructed every morning.     • temazepam (RESTORIL) 30 MG capsule Take 30 mg by mouth at bedtime as needed for Sleep.     • venlafaxine XR (EFFEXOR XR) 75 MG CAPSULE SR 24 HR Take 75 mg by mouth every morning.     • alprazolam (XANAX) 0.25 MG Tab Take 0.25-0.5 mg by mouth 4 times a day as needed for Anxiety.     • losartan (COZAAR) 100 MG Tab Take 100 mg by mouth every morning.     • oxycodone-acetaminophen (PERCOCET) 5-325 MG TABS Take 1-2 Tabs by mouth every four hours as needed.       Current Facility-Administered Medications   Medication Dose Route Frequency Provider Last Rate Last Dose   • testosterone cypionate (DEPO-TESTOSTERONE) injection 150 mg  150 mg Intramuscular Q14 DAYS Reji Love M.D.   150 mg at 04/16/20 1401       Patient Active Problem List    Diagnosis Date Noted   • Hypogonadism in male 03/18/2020   • Former smoker 03/11/2019   • Low testosterone in male 03/08/2019   • Wound infection 05/14/2018   • Diabetic ulcer of toe of left foot associated with type 2 diabetes mellitus, with fat layer exposed (HCC) 05/14/2018   • Contracture of elbow joint, right 04/03/2018   • BMI 38.0-38.9,adult  11/01/2017   • Chronic pain 08/28/2017   • Chronic narcotic use 08/28/2017   • Arthritis of left ankle 08/21/2017   • Degeneration of lumbar intervertebral disc 04/14/2017   • Lower back pain 04/14/2017   • JULIAN (obstructive sleep apnea) 04/03/2017   • Snoring 04/03/2017   • Acquired hallux valgus of left foot 12/22/2016   • Type 2 diabetes mellitus without complication ([facility]) 12/13/2016   • Chronic autoimmune thyroiditis 10/03/2016   • Hypothyroidism, acquired, autoimmune 10/03/2016   • Primary osteoarthritis of left hip 08/18/2016   • Diabetes (HCC) 09/05/2014   • Lumbar disc disease 09/05/2014   • Gout 09/05/2014   • Hypertension 09/05/2014   • H/O arthroscopy of knee 09/05/2014   • Hx of elbow surgery 09/05/2014   • Severe obesity with body mass index (BMI) of 35.0 to 39.9 with serious comorbidity ([facility]) 09/05/2014   • Osteoarthritis 09/05/2014   • H/O shoulder replacement 09/05/2014   • Rotator cuff arthropathy 09/05/2014   • Bilateral bunions 09/05/2014       Family History   Problem Relation Age of Onset   • Heart Disease Mother    • Depression Mother    • Cancer Father    • Sleep Apnea Neg Hx        He  has a past medical history of Anesthesia (11/03/2017), Anxiety and depression (11/03/2017), Arthritis (11/03/2017), Bunion, Cancer ([facility]) (2017), Chronic autoimmune thyroiditis, Dental disorder (11/03/2017), Diabetes mellitus ([facility]) (11/03/2017), Gout, Hammertoe, High cholesterol (11/03/2017), Hypertension, Hypothyroidism, MRSA infection, Open toe wound (03/2018), Pain (11/03/2017), Psychiatric problem, Sleep apnea (11/03/2017), Snoring (11/03/2017), and Tonsillitis.  He  has a past surgical history that includes hip arth anterior total (Left, 8/18/2016); hammertoe correction (Bilateral, 12/22/2016); bunionectomy (Left, 12/22/2016); lumbar fusion posterior (4/14/2017); metatarsal head resection (Left, 8/21/2017); other; other (2000); metatarsal head resection (Right, 11/13/2017); hammertoe correction (Right,  11/13/2017); nerve ulnar transfer (Right, 4/3/2018); other orthopedic surgery (6/2014); other orthopedic surgery (11/1/2012); other orthopedic surgery (2004); orthopedic osteotomy (Left, 12/22/2016); toe fusion (Left, 8/21/2017); hardware removal ortho (Left, 8/21/2017); toe fusion (Right, 11/13/2017); carpal tunnel release (Right, 4/3/2018); elbow arthrotomy (Right, 4/3/2018); toe amputation (Left, 6/25/2018); internal fixation removal (Left, 6/25/2018); shoulder surgery (Right, 12/02/2017); orthopedic osteotomy (Left, 10/15/2018); hardware removal ortho (Right, 10/15/2018); toe amputation (Right, 6/10/2019); metatarsal head resection (Left, 6/10/2019); and joint injection diagnostic (6/10/2019).       Objective:   Vitals obtained by patient:  Weight 125.6  BMI 39.75 kg/m²  Respiratory rate 13  Last blood pressure 120/86      Physical Exam:  Constitutional: Alert, no distress, well-groomed.  Skin: No rashes in visible areas.  Eye: Round. Conjunctiva clear, lids normal. No icterus.   ENMT: Lips pink without lesions, good dentition, moist mucous membranes. Phonation normal.  Neck: No masses, no thyromegaly. Moves freely without pain.  CV: Pulse as reported by patient  Respiratory: Unlabored respiratory effort, no cough or audible wheeze  Psych: Alert and oriented x3, normal affect and mood.       Assessment and Plan:   The following treatment plan was discussed:   1. JULIAN (obstructive sleep apnea)      2. Essential hypertension      3. Former smoker      4. Nocturnal hypoxemia      There are no diagnoses linked to this encounter.  Today, 4/30/2020, he is on CPAP 10 to 15 cm H2O with a average residual estimated apnea hypopnea index of 1.8.  His compliance is 100% for 9 hours and 1 minute.  His median pressure is 13.3 cm and his maximum pressure is 15.0 cm water.    He had an overnight oximetry performed on 4/9/2020 while on CPAP 10 to 15 cm water.  His patel saturation was 84%, his mean saturation was 86%, and his  saturations less than 80% for 240 minutes of the flow evaluation time.    We will empirically increase his auto titrating CPAP to 10 to 20 cm water with a follow-up overnight oximetry on Pap in about a month.  If his oximetry has not normalized that he will need to have an attended in lab re-titration with possible bleed-in O2.        Follow-up: No follow-ups on file.

## 2020-04-30 ENCOUNTER — NON-PROVIDER VISIT (OUTPATIENT)
Dept: ENDOCRINOLOGY | Facility: MEDICAL CENTER | Age: 60
End: 2020-04-30
Payer: COMMERCIAL

## 2020-04-30 ENCOUNTER — TELEMEDICINE (OUTPATIENT)
Dept: SLEEP MEDICINE | Facility: MEDICAL CENTER | Age: 60
End: 2020-04-30
Payer: COMMERCIAL

## 2020-04-30 VITALS — WEIGHT: 277 LBS | HEIGHT: 70 IN | BODY MASS INDEX: 39.65 KG/M2

## 2020-04-30 DIAGNOSIS — Z87.891 FORMER SMOKER: ICD-10-CM

## 2020-04-30 DIAGNOSIS — G47.33 OSA (OBSTRUCTIVE SLEEP APNEA): ICD-10-CM

## 2020-04-30 DIAGNOSIS — I10 ESSENTIAL HYPERTENSION: ICD-10-CM

## 2020-04-30 DIAGNOSIS — G47.34 NOCTURNAL HYPOXEMIA: ICD-10-CM

## 2020-04-30 PROCEDURE — 96372 THER/PROPH/DIAG INJ SC/IM: CPT | Performed by: INTERNAL MEDICINE

## 2020-04-30 PROCEDURE — 99214 OFFICE O/P EST MOD 30 MIN: CPT | Mod: 95,CR | Performed by: INTERNAL MEDICINE

## 2020-04-30 RX ADMIN — TESTOSTERONE CYPIONATE 150 MG: 200 INJECTION, SOLUTION INTRAMUSCULAR at 09:30

## 2020-04-30 ASSESSMENT — FIBROSIS 4 INDEX: FIB4 SCORE: 1.24

## 2020-05-06 ENCOUNTER — APPOINTMENT (RX ONLY)
Dept: URBAN - METROPOLITAN AREA CLINIC 22 | Facility: CLINIC | Age: 60
Setting detail: DERMATOLOGY
End: 2020-05-06

## 2020-05-06 DIAGNOSIS — L81.4 OTHER MELANIN HYPERPIGMENTATION: ICD-10-CM

## 2020-05-06 DIAGNOSIS — L82.1 OTHER SEBORRHEIC KERATOSIS: ICD-10-CM

## 2020-05-06 DIAGNOSIS — D18.0 HEMANGIOMA: ICD-10-CM

## 2020-05-06 DIAGNOSIS — Z85.828 PERSONAL HISTORY OF OTHER MALIGNANT NEOPLASM OF SKIN: ICD-10-CM

## 2020-05-06 DIAGNOSIS — L82.0 INFLAMED SEBORRHEIC KERATOSIS: ICD-10-CM

## 2020-05-06 DIAGNOSIS — Z71.89 OTHER SPECIFIED COUNSELING: ICD-10-CM

## 2020-05-06 DIAGNOSIS — D22 MELANOCYTIC NEVI: ICD-10-CM

## 2020-05-06 PROBLEM — D48.5 NEOPLASM OF UNCERTAIN BEHAVIOR OF SKIN: Status: ACTIVE | Noted: 2020-05-06

## 2020-05-06 PROBLEM — D18.01 HEMANGIOMA OF SKIN AND SUBCUTANEOUS TISSUE: Status: ACTIVE | Noted: 2020-05-06

## 2020-05-06 PROBLEM — D22.5 MELANOCYTIC NEVI OF TRUNK: Status: ACTIVE | Noted: 2020-05-06

## 2020-05-06 PROBLEM — D22.61 MELANOCYTIC NEVI OF RIGHT UPPER LIMB, INCLUDING SHOULDER: Status: ACTIVE | Noted: 2020-05-06

## 2020-05-06 PROBLEM — D22.62 MELANOCYTIC NEVI OF LEFT UPPER LIMB, INCLUDING SHOULDER: Status: ACTIVE | Noted: 2020-05-06

## 2020-05-06 PROCEDURE — 99214 OFFICE O/P EST MOD 30 MIN: CPT | Mod: 25

## 2020-05-06 PROCEDURE — ? LIQUID NITROGEN

## 2020-05-06 PROCEDURE — ? COUNSELING

## 2020-05-06 PROCEDURE — 17110 DESTRUCTION B9 LES UP TO 14: CPT

## 2020-05-06 PROCEDURE — 11102 TANGNTL BX SKIN SINGLE LES: CPT | Mod: 59

## 2020-05-06 PROCEDURE — ? BIOPSY BY SHAVE METHOD

## 2020-05-06 ASSESSMENT — LOCATION SIMPLE DESCRIPTION DERM
LOCATION SIMPLE: ABDOMEN
LOCATION SIMPLE: LEFT SHOULDER
LOCATION SIMPLE: LEFT FOREARM
LOCATION SIMPLE: LEFT UPPER BACK
LOCATION SIMPLE: LEFT LOWER BACK
LOCATION SIMPLE: RIGHT LOWER BACK
LOCATION SIMPLE: RIGHT UPPER BACK
LOCATION SIMPLE: RIGHT FOREARM

## 2020-05-06 ASSESSMENT — LOCATION DETAILED DESCRIPTION DERM
LOCATION DETAILED: RIGHT SUPERIOR MEDIAL MIDBACK
LOCATION DETAILED: EPIGASTRIC SKIN
LOCATION DETAILED: RIGHT PROXIMAL DORSAL FOREARM
LOCATION DETAILED: LEFT SUPERIOR MEDIAL MIDBACK
LOCATION DETAILED: LEFT RIB CAGE
LOCATION DETAILED: RIGHT SUPERIOR UPPER BACK
LOCATION DETAILED: RIGHT MID-UPPER BACK
LOCATION DETAILED: LEFT POSTERIOR SHOULDER
LOCATION DETAILED: RIGHT MEDIAL UPPER BACK
LOCATION DETAILED: LEFT INFERIOR LATERAL MIDBACK
LOCATION DETAILED: LEFT LATERAL SHOULDER
LOCATION DETAILED: LEFT INFERIOR UPPER BACK
LOCATION DETAILED: LEFT INFERIOR LATERAL UPPER BACK
LOCATION DETAILED: LEFT LATERAL UPPER BACK
LOCATION DETAILED: LEFT PROXIMAL DORSAL FOREARM
LOCATION DETAILED: LEFT MEDIAL UPPER BACK
LOCATION DETAILED: RIGHT INFERIOR LATERAL UPPER BACK
LOCATION DETAILED: LEFT MID-UPPER BACK
LOCATION DETAILED: RIGHT SUPERIOR LATERAL MIDBACK
LOCATION DETAILED: LEFT DISTAL DORSAL FOREARM
LOCATION DETAILED: RIGHT LATERAL UPPER BACK

## 2020-05-06 ASSESSMENT — LOCATION ZONE DERM
LOCATION ZONE: ARM
LOCATION ZONE: TRUNK

## 2020-05-06 NOTE — PROCEDURE: LIQUID NITROGEN
Add 52 Modifier (Optional): no
Detail Level: Detailed
Consent: The patient's consent was obtained including but not limited to risks of crusting, scabbing, blistering, scarring, darker or lighter pigmentary change, recurrence, incomplete removal and infection.
Duration Of Freeze Thaw-Cycle (Seconds): 3
Post-Care Instructions: I reviewed with the patient in detail post-care instructions. Patient is to wear sunprotection, and avoid picking at any of the treated lesions. Pt may apply Vaseline to crusted or scabbing areas.
Medical Necessity Information: It is in your best interest to select a reason for this procedure from the list below. All of these items fulfill various CMS LCD requirements except the new and changing color options.
Medical Necessity Clause: This procedure was medically necessary because the lesions that were treated were:
Number Of Freeze-Thaw Cycles: 3 freeze-thaw cycles

## 2020-05-06 NOTE — HPI: SKIN LESION
Is This A New Presentation, Or A Follow-Up?: Skin Lesions
How Severe Is Your Skin Lesion?: mild
Has Your Skin Lesion Been Treated?: not been treated
Is This A New Presentation, Or A Follow-Up?: Mole

## 2020-05-08 ENCOUNTER — OFFICE VISIT (OUTPATIENT)
Dept: URGENT CARE | Facility: PHYSICIAN GROUP | Age: 60
End: 2020-05-08
Payer: COMMERCIAL

## 2020-05-08 VITALS
HEART RATE: 96 BPM | SYSTOLIC BLOOD PRESSURE: 140 MMHG | WEIGHT: 270.4 LBS | DIASTOLIC BLOOD PRESSURE: 84 MMHG | BODY MASS INDEX: 38.71 KG/M2 | TEMPERATURE: 98.3 F | OXYGEN SATURATION: 98 % | HEIGHT: 70 IN | RESPIRATION RATE: 18 BRPM

## 2020-05-08 DIAGNOSIS — J30.2 SEASONAL ALLERGIES: ICD-10-CM

## 2020-05-08 PROCEDURE — 99204 OFFICE O/P NEW MOD 45 MIN: CPT | Performed by: NURSE PRACTITIONER

## 2020-05-08 RX ORDER — TRIAMCINOLONE ACETONIDE 40 MG/ML
40 INJECTION, SUSPENSION INTRA-ARTICULAR; INTRAMUSCULAR ONCE
Status: COMPLETED | OUTPATIENT
Start: 2020-05-08 | End: 2020-05-08

## 2020-05-08 RX ADMIN — TRIAMCINOLONE ACETONIDE 40 MG: 40 INJECTION, SUSPENSION INTRA-ARTICULAR; INTRAMUSCULAR at 14:35

## 2020-05-08 ASSESSMENT — ENCOUNTER SYMPTOMS
SPUTUM PRODUCTION: 0
WHEEZING: 0
FEVER: 0
EYE REDNESS: 0
SINUS PAIN: 0
ABDOMINAL PAIN: 0
DIZZINESS: 0
EYE DISCHARGE: 0
SORE THROAT: 0
PALPITATIONS: 0
COUGH: 0
MYALGIAS: 0
HEADACHES: 0
SHORTNESS OF BREATH: 0
CHILLS: 0
VOMITING: 0
WEAKNESS: 0
CONSTIPATION: 0
NECK PAIN: 0
ORTHOPNEA: 0
DIARRHEA: 0
NAUSEA: 0

## 2020-05-08 ASSESSMENT — FIBROSIS 4 INDEX: FIB4 SCORE: 1.24

## 2020-05-08 NOTE — PROGRESS NOTES
Subjective:      Cristino Keys is a 60 y.o. male who presents with Seasonal Allergies (pt requesting Kenalog inj.)            HPI  Requesting Kenalog injection for seasonal allergies. States runny nose x 3 weeks. Denies sinus facial pressure, headaches, ear pain, PND, sore throat or cough. No GI symptoms, body aches or malaise. States his PCP only sees him through telemedicine and was told to come to  for kenalog injection. Sudafed not helping. Last given kenalog 6/2019.    PMH:  has a past medical history of Anesthesia (11/03/2017), Anxiety and depression (11/03/2017), Arthritis (11/03/2017), Bunion, Cancer (HCC) (2017), Chronic autoimmune thyroiditis, Dental disorder (11/03/2017), Diabetes mellitus (HCC) (11/03/2017), Gout, Hammertoe, High cholesterol (11/03/2017), Hypertension, Hypothyroidism, MRSA infection, Open toe wound (03/2018), Pain (11/03/2017), Psychiatric problem, Sleep apnea (11/03/2017), Snoring (11/03/2017), and Tonsillitis.  MEDS:   Current Outpatient Medications:   •  levothyroxine (SYNTHROID) 137 MCG Tab, TAKE 1 TABLET BY MOUTH EVERY DAY, Disp: 90 Tab, Rfl: 1  •  naproxen (NAPROSYN) 500 MG Tab, Take 500 mg by mouth every day., Disp: , Rfl:   •  vitamin D (CHOLECALCIFEROL) 1000 UNIT Tab, Take 1,000 Units by mouth every 48 hours., Disp: , Rfl:   •  lamoTRIgine (LAMICTAL) 100 MG Tab, Take 100 mg by mouth every day., Disp: , Rfl:   •  Diclofenac Sodium 1.5 % Solution, PENNSAID 1.5 % TRANSDERMAL SOLUTION, Disp: , Rfl:   •  cyclobenzaprine (FLEXERIL) 10 MG Tab, Take 10 mg by mouth 3 times a day., Disp: , Rfl: 5  •  amphetamine-dextroamphetamine (ADDERALL) 5 MG Tab, Take 5 mg by mouth every day., Disp: , Rfl:   •  hydroxyzine pamoate (VISTARIL) 100 MG Cap, Take 200 mg by mouth 2 Times a Day., Disp: , Rfl:   •  tamsulosin (FLOMAX) 0.4 MG capsule, Take 0.4 mg by mouth ONE-HALF HOUR AFTER BREAKFAST., Disp: , Rfl:   •  liraglutide (VICTOZA) 18 MG/3ML Solution Pen-injector injection, Inject 1.8 mg as  instructed every morning., Disp: , Rfl:   •  temazepam (RESTORIL) 30 MG capsule, Take 30 mg by mouth at bedtime as needed for Sleep., Disp: , Rfl:   •  venlafaxine XR (EFFEXOR XR) 75 MG CAPSULE SR 24 HR, Take 75 mg by mouth every morning., Disp: , Rfl:   •  alprazolam (XANAX) 0.25 MG Tab, Take 0.25-0.5 mg by mouth 4 times a day as needed for Anxiety., Disp: , Rfl:   •  losartan (COZAAR) 100 MG Tab, Take 100 mg by mouth every morning., Disp: , Rfl:   •  oxycodone-acetaminophen (PERCOCET) 5-325 MG TABS, Take 1-2 Tabs by mouth every four hours as needed., Disp: , Rfl:     Current Facility-Administered Medications:   •  triamcinolone acetonide (KENALOG-40) injection 40 mg, 40 mg, Intramuscular, Once, DON ChancePVeronicaRVeronicaN.  •  testosterone cypionate (DEPO-TESTOSTERONE) injection 150 mg, 150 mg, Intramuscular, Q14 DAYS, Reji Love M.D., 150 mg at 04/30/20 0930  ALLERGIES:   Allergies   Allergen Reactions   • Demerol Vomiting and Nausea     Rxn = years ago    • Amoxicillin      2004-02-25;NA   • Cubicin [Daptomycin] Vomiting   • Meperidine      2004-02-25;NA   • Sulfa Drugs Hives     .   • Sulfamethoxazole W-Trimethoprim      2004-02-25;NA   • Tetracycline Hives     SURGHX:   Past Surgical History:   Procedure Laterality Date   • TOE AMPUTATION Right 6/10/2019    Procedure: AMPUTATION, TOE- FIRST TOE;  Surgeon: Haroldo Kang M.D.;  Location: [facility];  Service: Orthopedics   • METATARSAL HEAD RESECTION Left 6/10/2019    Procedure: METATARSAL HEAD RESECTION- FOR PARTIAL EXCISION;  Surgeon: Haroldo Kang M.D.;  Location: [facility];  Service: Orthopedics   • JOINT INJECTION DIAGNOSTIC  6/10/2019    Procedure: INJECTION, JOINT, DIAGNOSTIC- MIDFOOT;  Surgeon: Haroldo Kang M.D.;  Location: [facility];  Service: Orthopedics   • ORTHOPEDIC OSTEOTOMY Left 10/15/2018    Procedure: ORTHOPEDIC OSTEOTOMY-  FOR: 3RD METATARSAL PARTIAL EXCISION, AND LEFT GASTROC SOLEUS  RECESSION;  Surgeon: Haroldo Kang M.D.;  Location: [facility];  Service: Orthopedics   • HARDWARE REMOVAL ORTHO Right 10/15/2018    Procedure: HARDWARE REMOVAL ORTHO-  FOOT METATARSALPHALANGEAL JOINT PLATE;  Surgeon: Haroldo Kang M.D.;  Location: [facility];  Service: Orthopedics   • TOE AMPUTATION Left 6/25/2018    Procedure: TOE AMPUTATION-FOR :PARTIAL 1ST DISTAL PHALANX EXCISION, GREAT TOE AMPUTATION;  Surgeon: Haroldo Kang M.D.;  Location: [facility];  Service: Orthopedics   • INTERNAL FIXATION REMOVAL Left 6/25/2018    Procedure: INTERNAL FIXATION REMOVAL;  Surgeon: Haroldo Kang M.D.;  Location: [facility];  Service: Orthopedics   • NERVE ULNAR TRANSFER Right 4/3/2018    Procedure: NERVE ULNAR TRANSFER - TRANSPOSITION;  Surgeon: Ayad Luna M.D.;  Location: [facility];  Service: Orthopedics   • CARPAL TUNNEL RELEASE Right 4/3/2018    Procedure: CARPAL TUNNEL RELEASE;  Surgeon: Ayad Luna M.D.;  Location: [facility];  Service: Orthopedics   • ELBOW ARTHROTOMY Right 4/3/2018    Procedure: ELBOW ARTHROTOMY - FOR CONTRACTURE RELEASE AT THE ELBOW, RADIAL HEAD EXCISION;  Surgeon: Ayad Luna M.D.;  Location: [facility];  Service: Orthopedics   • SHOULDER SURGERY Right 12/02/2017    reversal   • METATARSAL HEAD RESECTION Right 11/13/2017    Procedure: METATARSAL HEAD RESECTION - EXCISION;  Surgeon: Haroldo Kang M.D.;  Location: [facility];  Service: Orthopedics   • HAMMERTOE CORRECTION Right 11/13/2017    Procedure: HAMMERTOE CORRECTION 2-5;  Surgeon: Haroldo Kang M.D.;  Location: [facility];  Service: Orthopedics   • TOE FUSION Right 11/13/2017    Procedure: TOE FUSION - 1ST METATARSALPHALANGEAL;  Surgeon: Haroldo Kang M.D.;  Location: [facility];  Service: Orthopedics   • METATARSAL HEAD RESECTION Left 8/21/2017    Procedure: METATARSAL HEAD  "RESECTION - 2-5;  Surgeon: Haroldo Kang M.D.;  Location[facility];  Service:    • TOE FUSION Left 8/21/2017    Procedure: TOE FUSION - 1ST MTP;  Surgeon: Haroldo Kang M.D.;  Location: [facility];  Service:    • HARDWARE REMOVAL ORTHO Left 8/21/2017    Procedure: HARDWARE REMOVAL ORTHO;  Surgeon: Haroldo Kang M.D.;  Location: [facility];  Service:    • LUMBAR FUSION POSTERIOR  4/14/2017    Procedure: LUMBAR FUSION POSTERIOR - MINIMALLY INVASIVE TLIF L4-5;  Surgeon: Bossman Caro M.D.;  Location: [facility];  Service:    • HAMMERTOE CORRECTION Bilateral 12/22/2016    Procedure: HAMMERTOE CORRECTION/METATARSALPHALANGEAL JOINT RELEASE 2/3 RIGHT, 2-3 LEFT;  Surgeon: Haroldo Kang M.D.;  Location: [facility];  Service:    • BUNIONECTOMY Left 12/22/2016    Procedure: BUNIONECTOMY FOR DISTAL HALLUX VALGUS CORRECTION WITH BRANDY;  Surgeon: Haroldo Kang M.D.;  Location: [facility];  Service:    • ORTHOPEDIC OSTEOTOMY Left 12/22/2016    Procedure: ORTHOPEDIC OSTEOTOMY DISTAL METATARSAL 2-5;  Surgeon: Haroldo Kang M.D.;  Location: [facility];  Service:    • HIP ARTH ANTERIOR TOTAL Left 8/18/2016    Procedure: HIP ARTHROPLASTY ANTERIOR TOTAL;  Surgeon: Emiliano Vang M.D.;  Location: [facility];  Service:    • OTHER ORTHOPEDIC SURGERY  6/2014    right shoulder  arthroplasty   • OTHER ORTHOPEDIC SURGERY  11/1/2012    left knee/left ankle/left shoulder   • OTHER ORTHOPEDIC SURGERY  2004    elbow surgery   • OTHER  2000    dislocation left foot surgery at [facility]   • OTHER      \"septoplasty 20 years ago\"     SOCHX:  reports that he quit smoking about 6 years ago. His smoking use included cigarettes. He has a 20.00 pack-year smoking history. He quit smokeless tobacco use about 23 years ago.  His smokeless tobacco use included chew. He reports current alcohol use. He reports that he does not use " "drugs.  FH: Family history was reviewed, no pertinent findings to report    Review of Systems   Constitutional: Negative for chills, fever and malaise/fatigue.   HENT: Positive for congestion. Negative for ear discharge, ear pain, sinus pain and sore throat.    Eyes: Negative for discharge and redness.   Respiratory: Negative for cough, sputum production, shortness of breath and wheezing.    Cardiovascular: Negative for chest pain, palpitations and orthopnea.   Gastrointestinal: Negative for abdominal pain, constipation, diarrhea, nausea and vomiting.   Musculoskeletal: Negative for myalgias and neck pain.   Skin: Negative for itching and rash.   Neurological: Negative for dizziness, weakness and headaches.   Endo/Heme/Allergies: Positive for environmental allergies.   All other systems reviewed and are negative.         Objective:     /84 (BP Location: Left arm, Patient Position: Sitting, BP Cuff Size: Adult)   Pulse 96   Temp 36.8 °C (98.3 °F) (Temporal)   Resp 18   Ht 1.778 m (5' 10\")   Wt 122.7 kg (270 lb 6.4 oz)   SpO2 98%   BMI 38.80 kg/m²      Physical Exam  Vitals signs reviewed.   Constitutional:       General: He is awake. He is not in acute distress.     Appearance: Normal appearance. He is well-developed. He is not ill-appearing, toxic-appearing or diaphoretic.   HENT:      Head: Normocephalic.      Right Ear: Ear canal and external ear normal. Tympanic membrane is injected.      Left Ear: Ear canal and external ear normal. Tympanic membrane is injected.      Nose: Rhinorrhea present. No nasal tenderness, mucosal edema or congestion.   Eyes:      Conjunctiva/sclera: Conjunctivae normal.      Pupils: Pupils are equal, round, and reactive to light.   Neck:      Musculoskeletal: Normal range of motion and neck supple.   Cardiovascular:      Rate and Rhythm: Normal rate and regular rhythm.   Pulmonary:      Effort: Pulmonary effort is normal. No accessory muscle usage or respiratory distress. "   Musculoskeletal: Normal range of motion.   Lymphadenopathy:      Cervical: No cervical adenopathy.   Skin:     General: Skin is warm and dry.   Neurological:      Mental Status: He is alert and oriented to person, place, and time.      GCS: GCS eye subscore is 4. GCS verbal subscore is 5. GCS motor subscore is 6.      Cranial Nerves: Cranial nerves are intact.      Sensory: Sensation is intact.      Motor: Motor function is intact.      Coordination: Coordination is intact.      Gait: Gait is intact.   Psychiatric:         Attention and Perception: Attention and perception normal.         Mood and Affect: Mood and affect normal.         Speech: Speech normal.         Behavior: Behavior normal. Behavior is cooperative.         Thought Content: Thought content normal.         Cognition and Memory: Cognition and memory normal.         Judgment: Judgment normal.                 Assessment/Plan:       1. Seasonal allergies    - triamcinolone acetonide (KENALOG-40) injection 40 mg    -Patient states hr understands risk factors for kenalog injection including immunospuppression, still wants this medication administered  -Monitor for worsening allergy like upper respiratory symptoms- must be rechecked

## 2020-05-11 DIAGNOSIS — E06.3 HYPOTHYROIDISM, ACQUIRED, AUTOIMMUNE: ICD-10-CM

## 2020-05-11 RX ORDER — LEVOTHYROXINE SODIUM 137 MCG
TABLET ORAL
Qty: 90 TAB | Refills: 1 | Status: SHIPPED | OUTPATIENT
Start: 2020-05-11 | End: 2021-02-08

## 2020-05-11 NOTE — TELEPHONE ENCOUNTER
Received request via: Pharmacy    Was the patient seen in the last year in this department? Yes    Does the patient have an active prescription (recently filled or refills available) for medication(s) requested? No     SYNTHROID 137 MCG Tab        Sig: TAKE 1 TABLET DAILY

## 2020-05-13 ENCOUNTER — APPOINTMENT (OUTPATIENT)
Dept: SLEEP MEDICINE | Facility: MEDICAL CENTER | Age: 60
End: 2020-05-13
Attending: INTERNAL MEDICINE
Payer: COMMERCIAL

## 2020-05-13 DIAGNOSIS — G47.33 OSA (OBSTRUCTIVE SLEEP APNEA): ICD-10-CM

## 2020-05-14 ENCOUNTER — NON-PROVIDER VISIT (OUTPATIENT)
Dept: ENDOCRINOLOGY | Facility: MEDICAL CENTER | Age: 60
End: 2020-05-14
Payer: COMMERCIAL

## 2020-05-14 PROCEDURE — 96372 THER/PROPH/DIAG INJ SC/IM: CPT | Performed by: INTERNAL MEDICINE

## 2020-05-14 RX ADMIN — TESTOSTERONE CYPIONATE 150 MG: 200 INJECTION, SOLUTION INTRAMUSCULAR at 09:43

## 2020-05-14 NOTE — NON-PROVIDER
Cristino Keys is a 60 y.o. male here for a non-provider visit for testosterone injection.    Reason for injection: low testosterone  Order in MAR?: Yes  Patient supplied?:No  Minimum interval has been met for this injection (per MAR order): Yes    Order and dose verified by: AN  Patient tolerated injection and no adverse effects were observed or reported: Yes    # of Administrations remaining in MAR: 2

## 2020-05-20 ENCOUNTER — HOME STUDY (OUTPATIENT)
Dept: SLEEP MEDICINE | Facility: MEDICAL CENTER | Age: 60
End: 2020-05-20
Attending: INTERNAL MEDICINE
Payer: COMMERCIAL

## 2020-05-20 PROCEDURE — 94762 N-INVAS EAR/PLS OXIMTRY CONT: CPT | Performed by: INTERNAL MEDICINE

## 2020-05-22 NOTE — PROCEDURES
Overnight oximetry performed on AutoPap: 10-20 cm H20.    The basal SPO2 is 89.8%.  The patient spent 46% of the night below SPO2 of 90%, to a patel of 76%.    Interpretation:  Persistent desaturations on AutoPap: 10-20 cm H20.    Recommendations:  In laboratory CPAP titration polysomnogram advised for accurate calibration of airway pressures vs oxygen bleed in.

## 2020-05-28 ENCOUNTER — NON-PROVIDER VISIT (OUTPATIENT)
Dept: ENDOCRINOLOGY | Facility: MEDICAL CENTER | Age: 60
End: 2020-05-28
Payer: COMMERCIAL

## 2020-05-28 DIAGNOSIS — R79.89 LOW TESTOSTERONE IN MALE: ICD-10-CM

## 2020-05-28 PROCEDURE — 96372 THER/PROPH/DIAG INJ SC/IM: CPT | Performed by: INTERNAL MEDICINE

## 2020-05-28 RX ADMIN — TESTOSTERONE CYPIONATE 150 MG: 200 INJECTION, SOLUTION INTRAMUSCULAR at 09:46

## 2020-05-28 NOTE — NON-PROVIDER
Cristino Keys is a 60 y.o. male here for a non-provider visit for testosterone injection.    Reason for injection: Hypogonadism  Order in MAR?: Yes  Patient supplied?:No  Minimum interval has been met for this injection (per MAR order): Yes    Order and dose verified by: ANGIE  Patient tolerated injection and no adverse effects were observed or reported: Yes    # of Administrations remaining in MAR: 2

## 2020-06-10 ENCOUNTER — HOSPITAL ENCOUNTER (OUTPATIENT)
Facility: MEDICAL CENTER | Age: 60
End: 2020-06-10
Attending: NURSE PRACTITIONER
Payer: COMMERCIAL

## 2020-06-10 ENCOUNTER — OFFICE VISIT (OUTPATIENT)
Dept: URGENT CARE | Facility: PHYSICIAN GROUP | Age: 60
End: 2020-06-10
Payer: COMMERCIAL

## 2020-06-10 VITALS
SYSTOLIC BLOOD PRESSURE: 130 MMHG | HEIGHT: 70 IN | RESPIRATION RATE: 16 BRPM | HEART RATE: 76 BPM | TEMPERATURE: 97.5 F | BODY MASS INDEX: 38.8 KG/M2 | OXYGEN SATURATION: 95 % | DIASTOLIC BLOOD PRESSURE: 80 MMHG

## 2020-06-10 DIAGNOSIS — L03.116 CELLULITIS OF LEFT LOWER EXTREMITY: ICD-10-CM

## 2020-06-10 DIAGNOSIS — S81.802A OPEN WOUND OF LEFT LOWER LEG, INITIAL ENCOUNTER: ICD-10-CM

## 2020-06-10 DIAGNOSIS — Z86.14 HISTORY OF MRSA INFECTION: ICD-10-CM

## 2020-06-10 PROCEDURE — 87205 SMEAR GRAM STAIN: CPT

## 2020-06-10 PROCEDURE — 87070 CULTURE OTHR SPECIMN AEROBIC: CPT

## 2020-06-10 PROCEDURE — 87186 SC STD MICRODIL/AGAR DIL: CPT

## 2020-06-10 PROCEDURE — 99214 OFFICE O/P EST MOD 30 MIN: CPT | Performed by: NURSE PRACTITIONER

## 2020-06-10 RX ORDER — TETRACYCLINE HYDROCHLORIDE 500 MG/1
500 CAPSULE ORAL 4 TIMES DAILY
Qty: 56 CAP | Refills: 0 | Status: SHIPPED | OUTPATIENT
Start: 2020-06-10 | End: 2020-06-12

## 2020-06-10 NOTE — PROGRESS NOTES
"Subjective:   Cristino Keys is a 60 y.o. male who presents for Abscess (left inner thigh ingrown hair, x 2-3 weeks)       HPI  Pt presents for evaluation of a new problem, reports left proximal medial thigh \"boil\" over several weeks.  Has tried to express fluid/drainage over that time, however has noticed increased size underneath skin, tenderness and erythema.  His wife has been putting Neosporin on it, without relief.  History of MRSA in the past, requiring hospital admission and IV antibiotics.  Denies fever, chills, malaise, or  other leg pain (only localized pain at source of infection).    Review of Systems   Skin: Positive for rash.        Left medial thigh \"boil\"   All other systems reviewed and are negative.      MEDS:   Current Outpatient Medications:   •  tetracycline (SUMYCIN) 500 MG Cap, Take 1 Cap by mouth 4 times a day for 14 days., Disp: 56 Cap, Rfl: 0  •  zolpidem (AMBIEN) 5 MG Tab, Take 1 Tab by mouth at bedtime as needed for Sleep (1 to 2 po qhs prn insomnia/sleep study. Bring to sleep study.) for up to 2 doses., Disp: 2 Tab, Rfl: 0  •  SYNTHROID 137 MCG Tab, TAKE 1 TABLET DAILY, Disp: 90 Tab, Rfl: 1  •  naproxen (NAPROSYN) 500 MG Tab, Take 500 mg by mouth every day., Disp: , Rfl:   •  vitamin D (CHOLECALCIFEROL) 1000 UNIT Tab, Take 1,000 Units by mouth every 48 hours., Disp: , Rfl:   •  lamoTRIgine (LAMICTAL) 100 MG Tab, Take 100 mg by mouth every day., Disp: , Rfl:   •  Diclofenac Sodium 1.5 % Solution, PENNSAID 1.5 % TRANSDERMAL SOLUTION, Disp: , Rfl:   •  cyclobenzaprine (FLEXERIL) 10 MG Tab, Take 10 mg by mouth 3 times a day., Disp: , Rfl: 5  •  amphetamine-dextroamphetamine (ADDERALL) 5 MG Tab, Take 5 mg by mouth every day., Disp: , Rfl:   •  hydroxyzine pamoate (VISTARIL) 100 MG Cap, Take 200 mg by mouth 2 Times a Day., Disp: , Rfl:   •  tamsulosin (FLOMAX) 0.4 MG capsule, Take 0.4 mg by mouth ONE-HALF HOUR AFTER BREAKFAST., Disp: , Rfl:   •  liraglutide (VICTOZA) 18 MG/3ML Solution " "Pen-injector injection, Inject 1.8 mg as instructed every morning., Disp: , Rfl:   •  temazepam (RESTORIL) 30 MG capsule, Take 30 mg by mouth at bedtime as needed for Sleep., Disp: , Rfl:   •  venlafaxine XR (EFFEXOR XR) 75 MG CAPSULE SR 24 HR, Take 75 mg by mouth every morning., Disp: , Rfl:   •  alprazolam (XANAX) 0.25 MG Tab, Take 0.25-0.5 mg by mouth 4 times a day as needed for Anxiety., Disp: , Rfl:   •  losartan (COZAAR) 100 MG Tab, Take 100 mg by mouth every morning., Disp: , Rfl:   •  oxycodone-acetaminophen (PERCOCET) 5-325 MG TABS, Take 1-2 Tabs by mouth every four hours as needed., Disp: , Rfl:     Current Facility-Administered Medications:   •  testosterone cypionate (DEPO-TESTOSTERONE) injection 150 mg, 150 mg, Intramuscular, Q14 DAYS, Reji Love M.D., 150 mg at 05/28/20 0946  ALLERGIES:   Allergies   Allergen Reactions   • Demerol Vomiting and Nausea     Rxn = years ago    • Cubicin [Daptomycin] Vomiting   • Meperidine      2004-02-25;NA   • Sulfa Drugs Hives     .   • Sulfamethoxazole W-Trimethoprim      2004-02-25;NA       Patient's PMH, SocHx, SurgHx, FamHx, Drug allergies and medications were reviewed.     Objective:   /80 (BP Location: Left arm, Patient Position: Sitting, BP Cuff Size: Large adult)   Pulse 76   Temp 36.4 °C (97.5 °F) (Temporal)   Resp 16   Ht 1.778 m (5' 10\")   SpO2 95%   BMI 38.80 kg/m²     Physical Exam  Constitutional: Vital signs reviewed. Alert and oriented to person, place, and time. Appears well-developed and well-nourished, in no acute distress.   Head: Normocephalic, nontraumatic.  ENT: External ears normal. Hearing normal.  Eyes: Sclera white, conjunctivae clear.  Pulmonary/Chest: Respirations non-labored, normal phonation.  Musculoskeletal: Normal gait. No muscular atrophy or weakness.  Neurological: Muscle tone normal. Coordination normal. Light touch and sensation normal.   Skin: Left medial proximal thigh has area of erythema measuring approx.3cm " X 2cm, with eschar present; dark red/green thick drainage is easily expressed, and placed in culture tube.  There is a palpable subcutaneous mass of suspected loculation measuring 4cm x 3cm, that is quite tender to palpation and expression.  Psychiatric: Normal mood and affect. Behavior is normal. Judgment and thought content normal.       Assessment/Plan:   Assessment    1. Cellulitis of left lower extremity  - tetracycline (SUMYCIN) 500 MG Cap; Take 1 Cap by mouth 4 times a day for 14 days.  Dispense: 56 Cap; Refill: 0  - REFERRAL TO WOUND CLINIC    2. Open wound of left lower leg, initial encounter  - tetracycline (SUMYCIN) 500 MG Cap; Take 1 Cap by mouth 4 times a day for 14 days.  Dispense: 56 Cap; Refill: 0  - REFERRAL TO WOUND CLINIC    3. History of MRSA infection    Begin abx as directed, warm compresses, discontinue use of Neosporin on wound, use only bandaid to collect drainage, wash with soap and water only.  Referral to wound care for continued care and possible surgical referral.  RTC with any increase in pain, drainage, fever/chills, limb pain or other systemic changes.

## 2020-06-11 LAB
GRAM STN SPEC: NORMAL
SIGNIFICANT IND 70042: NORMAL
SITE SITE: NORMAL
SOURCE SOURCE: NORMAL

## 2020-06-12 ENCOUNTER — HOSPITAL ENCOUNTER (OUTPATIENT)
Facility: MEDICAL CENTER | Age: 60
End: 2020-06-12
Attending: NURSE PRACTITIONER
Payer: COMMERCIAL

## 2020-06-12 ENCOUNTER — NON-PROVIDER VISIT (OUTPATIENT)
Dept: WOUND CARE | Facility: MEDICAL CENTER | Age: 60
End: 2020-06-12
Attending: NURSE PRACTITIONER
Payer: COMMERCIAL

## 2020-06-12 DIAGNOSIS — L08.9 WOUND INFECTION: ICD-10-CM

## 2020-06-12 DIAGNOSIS — T14.8XXA WOUND INFECTION: ICD-10-CM

## 2020-06-12 DIAGNOSIS — E11.622 TYPE 2 DIABETES MELLITUS WITH OTHER SKIN ULCER, WITHOUT LONG-TERM CURRENT USE OF INSULIN (HCC): ICD-10-CM

## 2020-06-12 DIAGNOSIS — L02.91 ABSCESS: Primary | ICD-10-CM

## 2020-06-12 LAB
AMBIGUOUS DTTM AMBI4: NORMAL
GRAM STN SPEC: NORMAL
SIGNIFICANT IND 70042: NORMAL
SIGNIFICANT IND 70042: NORMAL
SITE SITE: NORMAL
SITE SITE: NORMAL
SOURCE SOURCE: NORMAL
SOURCE SOURCE: NORMAL

## 2020-06-12 PROCEDURE — 87070 CULTURE OTHR SPECIMN AEROBIC: CPT

## 2020-06-12 PROCEDURE — 87205 SMEAR GRAM STAIN: CPT

## 2020-06-12 PROCEDURE — 10060 I&D ABSCESS SIMPLE/SINGLE: CPT

## 2020-06-12 PROCEDURE — 99213 OFFICE O/P EST LOW 20 MIN: CPT

## 2020-06-12 PROCEDURE — 99211 OFF/OP EST MAY X REQ PHY/QHP: CPT

## 2020-06-12 PROCEDURE — 10060 I&D ABSCESS SIMPLE/SINGLE: CPT | Performed by: NURSE PRACTITIONER

## 2020-06-12 PROCEDURE — 99214 OFFICE O/P EST MOD 30 MIN: CPT | Mod: 25 | Performed by: NURSE PRACTITIONER

## 2020-06-12 RX ORDER — DOXYCYCLINE HYCLATE 100 MG
100 TABLET ORAL 2 TIMES DAILY
Qty: 20 TAB | Refills: 0 | Status: SHIPPED | OUTPATIENT
Start: 2020-06-12 | End: 2020-06-22

## 2020-06-12 ASSESSMENT — ENCOUNTER SYMPTOMS
CHILLS: 0
VOMITING: 0
DIARRHEA: 0
NAUSEA: 0
CONSTIPATION: 0
FEVER: 0
COUGH: 0
SHORTNESS OF BREATH: 0

## 2020-06-12 NOTE — PATIENT INSTRUCTIONS
Should you experience any significant changes in your wound(s) such as infection (redness, swelling, localized heat, increased pain, fever >101 F, chills) or have any questions regarding your home care instructions, please contact the wound center [phone]. If after hours, contact your primary care physician or go the hospital emergency room.  Keep dressing clean and dry and cover while bathing. Change dressing 1 x week at home, or as needed  if over saturated, soiled or its falling off.       Dressing change procedure -   Remove old dressing.  Cleanse the wound with saline and gauze.  Dry surrounding skin with gauze, then apply skin prep wipe, allow to dry.  Cut a small piece of Aquacel silver dressing and pack loosely into wound to fill the dead space. Lightly moisten this with saline.  Cut a larger piece of Aquacel silver and place this on top.   Cover with foam dressing and secure with the hypafix tape.

## 2020-06-12 NOTE — PROGRESS NOTES
Provider Encounter- Full Thickness wound    HISTORY OF PRESENT ILLNESS  Wound History:    START OF CARE IN CLINIC: 6/12/2020    REFERRING PROVIDER: JAYLON Moran     WOUND- Full Thickness Wound, abscess   LOCATION: Left medial thigh   HISTORY: Patient started developing a painful red raised bump to his left medial thigh mid May of this year.  He denies picking at it himself, however he did ask his wife to squeeze it, which she refused to do.  Continue to worsen, so he presented to an urgent care on 6/10, drainage was expressed from the wound and cultured, patient was prescribed tetracycline, and referred to [facility].  Per patient, his pharmacy had difficulty obtaining tetracycline, was only able to give him a few days worth.    Pertinent Medical History: MRSA infection requiring hospitalization, diabetes mellitus type 2, obesity, hypertension, ultimate foot surgeries, former smoker    TOBACCO USE: Former smoker, quit in 2014, smoked 1 pack/day for 20 years.  Also used chewing tobacco for several years, quit in 1997    Patient's problem list, allergies, and current medications reviewed and updated in Epic    Interval History:  6/12/2020: Initial clinic visit with BONNIE Leyva.  I was asked to see patient to assess need for I&D of abscess.  He does have a raised red area with some fluctuance to his left medial thigh.  He states he is feeling well, denies fevers, chills, nausea, vomiting.  He reports that he still having quite a bit of pain.  He is on tetracycline as ordered by urgent care APRN, however states he was only given a few days worth as pharmacy did not have full 10 day amount of he has a history of MRSA.  Wound culture taken at urgent care appears to be incomplete.  He is diabetic, he states his blood sugar today was 137, which is about his average.        REVIEW OF SYSTEMS:   Review of Systems   Constitutional: Negative for chills and fever.   Respiratory: Negative for cough and shortness  of breath.    Cardiovascular: Negative for chest pain.   Gastrointestinal: Negative for constipation, diarrhea, nausea and vomiting.   Genitourinary: Negative for dysuria.   Skin:        Painful raised red bump on left inner thigh since mid May       PHYSICAL EXAMINATION:     Physical Exam   Constitutional: He is oriented to person, place, and time and well-developed, well-nourished, and in no distress.   Obese   HENT:   Head: Normocephalic.   Eyes: Pupils are equal, round, and reactive to light.   Pulmonary/Chest: Effort normal.   Musculoskeletal: Normal range of motion.   Neurological: He is alert and oriented to person, place, and time.   Skin: Skin is warm.   Raised red area to left medial thigh, fluctuance near center, periwound erythemic and indurated  Refer to wound photos and flowsheet-post I&D       WOUND ASSESSMENT   Wound 06/12/20 Incision Thigh Medial;Posterior;Proximal Left L posteromedial thigh proximally, I+D abscess (Active)   Wound Image    06/12/20 0800   Site Assessment Red 06/12/20 0800   Periwound Assessment Intact;Induration;Non-blanchable erythema;Edema 06/12/20 0800   Margins Attached edges;Defined edges 06/12/20 0800   Closure Secondary intention 06/12/20 0800   Drainage Amount Moderate 06/12/20 0800   Drainage Description Sanguineous;Tan 06/12/20 0800   Treatments Cleansed;Injectible Lidocaine;Other (Comment) 06/12/20 0800   Wound Cleansing Normal Saline Irrigation 06/12/20 0800   Periwound Protectant Barrier Paste;Skin Protectant Wipes to Periwound 06/12/20 0800   Dressing Cleansing/Solutions Normal Saline 06/12/20 0800   Dressing Options Hydrofiber Silver;Hypafix Tape;Silicone Adhesive Foam 06/12/20 0800   Dressing Changed New 06/12/20 0800   Dressing Status Clean;Dry;Intact 06/12/20 0800   Dressing Change/Treatment Frequency Every 72 hrs, and As Needed 06/12/20 0800   Post-Procedure Length (cm) 0.5 cm 06/12/20 0800   Post-Procedure Width (cm) 1.5 cm 06/12/20 0800   Post-Procedure Depth  "(cm) 1 cm 06/12/20 0800   Post-Procedure Surface Area (cm^2) 0.75 cm^2 06/12/20 0800   Post-Procedure Volume (cm^3) 0.75 cm^3 06/12/20 0800   Wound Bed Granulation (%) - Post-Procedure 100 % 06/12/20 0800   Tunneling (cm) 0 cm 06/12/20 0800   Undermining (cm) 0 cm 06/12/20 0800   Wound Odor None 06/12/20 0800   Exposed Structures None 06/12/20 0800             I and D    Date/Time: 6/12/2020 10:25 AM  Performed by: DON JonesPAMINATA.  Authorized by: DON JonesPAMINATA.   Time out: Immediately prior to procedure a \"time out\" was called to verify the correct patient, procedure, equipment, support staff and site/side marked as required.  Type: abscess  Body area: lower extremity  Location details: left leg  Anesthesia: local infiltration    Anesthesia:  Local Anesthetic: lidocaine 2% with epinephrine  Anesthetic total: 12 mL    Sedation:  Patient sedated: no    Scalpel size: 15  Incision type: single straight  Incision depth: subcutaneous  Complexity: simple  Drainage: bloody and  purulent  Drainage amount: moderate  Wound treatment: wound left open  Packing material: silver hydrofiber.            Pertinent Labs and Diagnostics:    Labs:     A1c:   Lab Results   Component Value Date/Time    HBA1C 6.3 (H) 02/28/2020 11:54 AM          IMAGING: None found in epic    VASCULAR STUDIES: N/A    LAST  WOUND CULTURE:  DATE : 6/12/2020-collected in clinic           ASSESSMENT AND PLAN:   1. Abscess  Comments: Abscess starting in May, gradually worsened.    6/12/2020: Initial clinic visit.  Patient complaining of discomfort due to pressure from abscess  -I&D of abscess in clinic to relieve pressure  -Patient to return to clinic weekly for assessment and debridement    Wound care: Silver Hydrofiber to manage exudate and bioburden, foam cover dressing, Hypafix tape    2. Wound infection    6/12/2020:.  Erythemic, purulent drainage  -Culture collected today after I&D.  Culture collected by urgent care appears to be " incomplete  -Switched patient from tetracycline to doxycycline  -Follow culture results  -Pt advised to go to ER for any increased redness, swelling, drainage or odor, or if he develops fever, chills, nausea or vomiting.    3. Type 2 diabetes mellitus with other skin ulcer, without long-term current use of insulin (HCC)  Comments: Most recent A1c 6.3      6/12/2020: Patient reports his blood sugar today was 137  -Encourage patient to keep blood sugars below 140 in order to optimize wound healing        PATIENT EDUCATION  - Importance of adequate nutrition for wound healing  -Advised to go to ER for any increased redness, swelling, drainage, or odor, or if patient develops fever, chills, nausea or vomiting.     30min spent face to face with patient, >50% of time spent counseling, coordinating care, reviewing records, discussing POC, educating patient regarding wound healing and progression.  This time was spent in excess to procedure time.       Please note that this note may have been created using voice recognition software. I have worked with technical experts from [facility] [employer] to optimize the interface.  I have made every reasonable attempt to correct obvious errors, but there may be errors of grammar and possibly content that I did not discover before finalizing the note.    N

## 2020-06-14 LAB
BACTERIA WND AEROBE CULT: ABNORMAL
BACTERIA WND AEROBE CULT: ABNORMAL
GRAM STN SPEC: ABNORMAL
SIGNIFICANT IND 70042: ABNORMAL
SITE SITE: ABNORMAL
SOURCE SOURCE: ABNORMAL

## 2020-06-17 ENCOUNTER — NON-PROVIDER VISIT (OUTPATIENT)
Dept: ENDOCRINOLOGY | Facility: MEDICAL CENTER | Age: 60
End: 2020-06-17
Payer: COMMERCIAL

## 2020-06-17 DIAGNOSIS — R79.89 LOW TESTOSTERONE IN MALE: ICD-10-CM

## 2020-06-17 PROCEDURE — 96372 THER/PROPH/DIAG INJ SC/IM: CPT | Performed by: INTERNAL MEDICINE

## 2020-06-17 RX ADMIN — TESTOSTERONE CYPIONATE 150 MG: 200 INJECTION, SOLUTION INTRAMUSCULAR at 11:08

## 2020-06-17 NOTE — NON-PROVIDER
Cristino Keys is a 60 y.o. male here for a non-provider visit for testosterone  injection.    Reason for injection: Low Testosterone   Order in MAR?: Yes  Patient supplied?:No  Minimum interval has been met for this injection (per MAR order): Yes    Order and dose verified by: SS  Patient tolerated injection and no adverse effects were observed or reported: Yes    # of Administrations remaining in MAR: 2

## 2020-06-18 ENCOUNTER — OFFICE VISIT (OUTPATIENT)
Dept: WOUND CARE | Facility: MEDICAL CENTER | Age: 60
End: 2020-06-18
Attending: NURSE PRACTITIONER
Payer: COMMERCIAL

## 2020-06-18 VITALS
HEART RATE: 98 BPM | TEMPERATURE: 98.9 F | RESPIRATION RATE: 20 BRPM | OXYGEN SATURATION: 95 % | SYSTOLIC BLOOD PRESSURE: 159 MMHG | DIASTOLIC BLOOD PRESSURE: 98 MMHG

## 2020-06-18 DIAGNOSIS — E11.42 DIABETIC POLYNEUROPATHY ASSOCIATED WITH TYPE 2 DIABETES MELLITUS (HCC): ICD-10-CM

## 2020-06-18 DIAGNOSIS — E11.622 TYPE 2 DIABETES MELLITUS WITH OTHER SKIN ULCER, WITHOUT LONG-TERM CURRENT USE OF INSULIN (HCC): ICD-10-CM

## 2020-06-18 DIAGNOSIS — T14.8XXA WOUND INFECTION: ICD-10-CM

## 2020-06-18 DIAGNOSIS — L02.91 ABSCESS: Primary | ICD-10-CM

## 2020-06-18 DIAGNOSIS — L08.9 WOUND INFECTION: ICD-10-CM

## 2020-06-18 PROCEDURE — 11042 DBRDMT SUBQ TIS 1ST 20SQCM/<: CPT | Performed by: NURSE PRACTITIONER

## 2020-06-18 PROCEDURE — 11042 DBRDMT SUBQ TIS 1ST 20SQCM/<: CPT

## 2020-06-18 RX ORDER — LIDOCAINE 4 G/G
1 PATCH TOPICAL EVERY 24 HOURS
COMMUNITY
End: 2021-08-16 | Stop reason: CLARIF

## 2020-06-18 RX ORDER — GABAPENTIN 300 MG/1
900 CAPSULE ORAL
COMMUNITY
Start: 2020-05-06 | End: 2021-10-15

## 2020-06-18 RX ORDER — DOXYCYCLINE 100 MG/1
100 TABLET ORAL 2 TIMES DAILY
COMMUNITY
End: 2020-06-18

## 2020-06-18 RX ORDER — IPRATROPIUM BROMIDE 21 UG/1
1 SPRAY, METERED NASAL 3 TIMES DAILY
COMMUNITY
Start: 2020-05-14 | End: 2021-10-11

## 2020-06-18 RX ORDER — HYDROXYZINE PAMOATE 50 MG/1
50 CAPSULE ORAL 2 TIMES DAILY
COMMUNITY
End: 2024-01-23

## 2020-06-18 RX ORDER — OXYCODONE AND ACETAMINOPHEN 10; 325 MG/1; MG/1
1-2 TABLET ORAL EVERY 6 HOURS PRN
COMMUNITY
End: 2020-06-18

## 2020-06-18 RX ORDER — AZELASTINE 1 MG/ML
1 SPRAY, METERED NASAL 2 TIMES DAILY
COMMUNITY
Start: 2020-06-09 | End: 2023-01-03

## 2020-06-18 ASSESSMENT — ENCOUNTER SYMPTOMS
VOMITING: 0
NAUSEA: 0
CONSTIPATION: 0
FEVER: 0
COUGH: 0
CHILLS: 0
SHORTNESS OF BREATH: 0
DIARRHEA: 0

## 2020-06-18 NOTE — PROGRESS NOTES
Provider Encounter- Full Thickness wound    HISTORY OF PRESENT ILLNESS  Wound History:    START OF CARE IN CLINIC: 6/12/2020    REFERRING PROVIDER: JAYLON Moran     WOUND- Full Thickness Wound, abscess   LOCATION: Left medial thigh   HISTORY: Patient started developing a painful red raised bump to his left medial thigh mid May of this year.  He denies picking at it himself, however he did ask his wife to squeeze it, which she refused to do.  Continue to worsen, so he presented to an urgent care on 6/10, drainage was expressed from the wound and cultured, patient was prescribed tetracycline, and referred to [facility].  Per patient, his pharmacy had difficulty obtaining tetracycline, was only able to give him a few days worth.    Pertinent Medical History: MRSA infection requiring hospitalization, diabetes mellitus type 2, obesity, hypertension, ultimate foot surgeries, former smoker    TOBACCO USE: Former smoker, quit in 2014, smoked 1 pack/day for 20 years.  Also used chewing tobacco for several years, quit in 1997    Patient's problem list, allergies, and current medications reviewed and updated in Epic    Interval History:  6/12/2020: Initial clinic visit with BONNIE Leyva.  I was asked to see patient to assess need for I&D of abscess.  He does have a raised red area with some fluctuance to his left medial thigh.  He states he is feeling well, denies fevers, chills, nausea, vomiting.  He reports that he still having quite a bit of pain.  He is on tetracycline as ordered by urgent care APRN, however states he was only given a few days worth as pharmacy did not have full 10 day amount of he has a history of MRSA.  Wound culture taken at urgent care appears to be incomplete.  He is diabetic, he states his blood sugar today was 137, which is about his average.    6/18/2020 : Clinic visit with BONNIE Leyva.  Elver  states he is feeling well today, wound feeling better.  He denies fevers, chills,  nausea, vomiting, cough or shortness of breath.  Reports his blood sugar was 137 today.        REVIEW OF SYSTEMS:   Review of Systems   Constitutional: Negative for chills and fever.   Respiratory: Negative for cough and shortness of breath.    Cardiovascular: Negative for chest pain.   Gastrointestinal: Negative for constipation, diarrhea, nausea and vomiting.   Genitourinary: Negative for dysuria.   Skin:        Painful raised red bump on left inner thigh since mid May       PHYSICAL EXAMINATION:     Physical Exam   Constitutional: He is oriented to person, place, and time and well-developed, well-nourished, and in no distress.   Obese   HENT:   Head: Normocephalic.   Eyes: Pupils are equal, round, and reactive to light.   Pulmonary/Chest: Effort normal.   Musculoskeletal: Normal range of motion.      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   6/18/2028-monofilament testing in clinic  Patient sensed 1/10 bilaterally   Skin: Skin is warm.   Full-thickness wound to left medial thigh  Refer to wound photos and flowsheet     Monofilament testing with a 10 gram force: sensation intact: decreased bilaterally  Visual Inspection: Feet without maceration, ulcers, fissures.  Pedal pulses: intact bilaterally    WOUND ASSESSMENT    Wound 06/12/20 Incision Thigh Medial;Posterior;Proximal Left L posteromedial thigh proximally, I+D abscess (Active)   Wound Image    06/18/20 0915   Site Assessment Red 06/18/20 0915   Periwound Assessment Induration 06/18/20 0915   Margins Attached edges 06/18/20 0915   Closure Secondary intention 06/12/20 0800   Drainage Amount Moderate 06/18/20 0915   Drainage Description Serosanguineous 06/18/20 0915   Treatments Cleansed;Topical Lidocaine;Provider debridement 06/18/20 0915   Wound Cleansing Normal Saline Irrigation 06/18/20 0915   Periwound Protectant  Skin Protectant Wipes to Periwound 06/18/20 0915   Dressing Cleansing/Solutions Normal Saline 06/12/20 0800   Dressing Options Hydrofiber Silver Strip;Silicone Adhesive Foam 06/18/20 0915   Dressing Changed Changed 06/18/20 0915   Dressing Status Clean;Dry;Intact 06/12/20 0800   Dressing Change/Treatment Frequency Every 72 hrs, and As Needed 06/12/20 0800   Wound Length (cm) 0.3 cm 06/18/20 0915   Wound Width (cm) 1.5 cm 06/18/20 0915   Wound Depth (cm) 0.5 cm 06/18/20 0915   Wound Surface Area (cm^2) 0.45 cm^2 06/18/20 0915   Wound Volume (cm^3) 0.22 cm^3 06/18/20 0915   Post-Procedure Length (cm) 0.4 cm 06/18/20 0915   Post-Procedure Width (cm) 1.5 cm 06/18/20 0915   Post-Procedure Depth (cm) 0.6 cm 06/18/20 0915   Post-Procedure Surface Area (cm^2) 0.6 cm^2 06/18/20 0915   Post-Procedure Volume (cm^3) 0.36 cm^3 06/18/20 0915   Wound Bed Epithelium (%) 0 % 06/18/20 0915   Wound Bed Slough (%) 0 % 06/18/20 0915   Wound Bed Eschar (%) 0 % 06/18/20 0915   Wound Bed Granulation (%) - Post-Procedure 100 % 06/18/20 0915   Wound Bed Epithelium % - (Post-Procedure) 0 % 06/18/20 0915   Wound Bed Slough % - (Post-Procedure) 0 % 06/18/20 0915   Wound Bed Eschar % - (Post-Procedure) 0 % 06/18/20 0915   Tunneling (cm) 0 cm 06/18/20 0915   Undermining (cm) 0 cm 06/18/20 0915   Wound Odor None 06/18/20 0915   Right Foot Monofilament 10-point exam (Sensate) 1/10 06/18/20 0915   Left Foot Monofilament 10-point exam (Sensate) 1/10 06/18/20 0915   Exposed Structures None 06/18/20 0915        PROCEDURE: Excisional debridement of left medial thigh wound  -2% viscous lidocaine applied topically to wound bed for approximately 5 minutes prior to debridement  -Curette used to debride wound bed.  Excisional debridement was performed to remove devitalized tissue until healthy, bleeding tissue was visualized.   Entire surface of wound, 0.6 cm2 debrided.  Tissue debrided into the subcutaneous layer.    -Bleeding controlled with manual  pressure.    -Wound care completed by wound RN, refer to flowsheet  -Patient tolerated the procedure well, without c/o pain or discomfort.        Pertinent Labs and Diagnostics:    Labs:     A1c:   Lab Results   Component Value Date/Time    HBA1C 6.3 (H) 02/28/2020 11:54 AM          IMAGING: None found in epic    VASCULAR STUDIES: N/A    LAST  WOUND CULTURE:  DATE : 6/12/2020-collected in clinic       Culture Result  Abnormal     Coagulase-negative Staphylococcus species   Light growth          ASSESSMENT AND PLAN:   1. Abscess  Comments: Abscess starting in May, gradually worsened.  I&D in clinic on 6/12/2020.    6/18/2020: Wound area has decreased since last assessment.  Patient reports less painful.  -Excisional debridement of wound in clinic today, medically necessary to promote wound healing.  -Patient to return to clinic weekly for assessment and debridement  -Patient to change dressing 1-2 times per week in between clinic visits    Wound care: Silver Hydrofiber to manage exudate and bioburden, foam cover dressing, Hypafix tape    2. Wound infection    6/18/2020:.  Culture taken last clinic visit positive for light growth coag negative staph.  Patient is already on doxycycline.  Periwound erythema significantly improved, minimal drainage.  -Patient to complete antibiotic course  -Monitor for signs and symptoms of infection at each clinic visit  -Pt advised to go to ER for any increased redness, swelling, drainage or odor, or if he develops fever, chills, nausea or vomiting.      3. Type 2 diabetes mellitus with other skin ulcer, without long-term current use of insulin (HCC)  Comments: Most recent A1c 6.3      6/18/2020: Patient reports his blood sugar today was 137  -Encourage patient to keep blood sugars below 140 in order to optimize wound healing      4.  Diabetic polyneuropathy associated with type 2 diabetes mellitus  Comments: Monofilament testing in clinic indicates significant LOPS    6/18/2020:   Implications of loss of protective sensation (LOPS) discussed with patient- including increased risk for amputation.  Advised to check feet at least daily, moisturize feet, and to always wear protective foot wear    PATIENT EDUCATION  - Importance of adequate nutrition for wound healing  -Advised to go to ER for any increased redness, swelling, drainage, or odor, or if patient develops fever, chills, nausea or vomiting.            Please note that this note may have been created using voice recognition software. I have worked with technical experts from [facility] to optimize the interface.  I have made every reasonable attempt to correct obvious errors, but there may be errors of grammar and possibly content that I did not discover before finalizing the note.    N

## 2020-06-18 NOTE — PATIENT INSTRUCTIONS
-Keep dressings clean, dry and covered while bathing. Change dressings if they become over saturated, soiled or fall off.     -Avoid prolonged standing or sitting without elevating your legs.    -Never walk around the house barefoot. Always wear a rubber soled slipper when walking around the house.    -Should you experience any significant changes in your wound(s), such as infection (redness, swelling, localized heat, increased pain, fever > 101 F, chills) or have any questions regarding your home care instructions, please contact the wound center at [phone]. If after hours, contact your primary care physician or go to the hospital emergency room.

## 2020-06-19 ENCOUNTER — HOSPITAL ENCOUNTER (OUTPATIENT)
Dept: LAB | Facility: MEDICAL CENTER | Age: 60
End: 2020-06-19
Attending: FAMILY MEDICINE
Payer: COMMERCIAL

## 2020-06-19 ENCOUNTER — HOSPITAL ENCOUNTER (OUTPATIENT)
Dept: LAB | Facility: MEDICAL CENTER | Age: 60
End: 2020-06-19
Attending: INTERNAL MEDICINE
Payer: COMMERCIAL

## 2020-06-19 LAB
BASOPHILS # BLD AUTO: 0.5 % (ref 0–1.8)
BASOPHILS # BLD AUTO: 0.5 % (ref 0–1.8)
BASOPHILS # BLD: 0.03 K/UL (ref 0–0.12)
BASOPHILS # BLD: 0.03 K/UL (ref 0–0.12)
EOSINOPHIL # BLD AUTO: 0.06 K/UL (ref 0–0.51)
EOSINOPHIL # BLD AUTO: 0.06 K/UL (ref 0–0.51)
EOSINOPHIL NFR BLD: 1 % (ref 0–6.9)
EOSINOPHIL NFR BLD: 1 % (ref 0–6.9)
ERYTHROCYTE [DISTWIDTH] IN BLOOD BY AUTOMATED COUNT: 51.4 FL (ref 35.9–50)
ERYTHROCYTE [DISTWIDTH] IN BLOOD BY AUTOMATED COUNT: 52.5 FL (ref 35.9–50)
FERRITIN SERPL-MCNC: 61.6 NG/ML (ref 22–322)
HCT VFR BLD AUTO: 55.8 % (ref 42–52)
HCT VFR BLD AUTO: 56.1 % (ref 42–52)
HGB BLD-MCNC: 18.3 G/DL (ref 14–18)
HGB BLD-MCNC: 18.5 G/DL (ref 14–18)
IMM GRANULOCYTES # BLD AUTO: 0.04 K/UL (ref 0–0.11)
IMM GRANULOCYTES # BLD AUTO: 0.04 K/UL (ref 0–0.11)
IMM GRANULOCYTES NFR BLD AUTO: 0.6 % (ref 0–0.9)
IMM GRANULOCYTES NFR BLD AUTO: 0.7 % (ref 0–0.9)
IRON SATN MFR SERPL: 41 % (ref 15–55)
IRON SATN MFR SERPL: 42 % (ref 15–55)
IRON SERPL-MCNC: 158 UG/DL (ref 50–180)
IRON SERPL-MCNC: 160 UG/DL (ref 50–180)
LYMPHOCYTES # BLD AUTO: 1.22 K/UL (ref 1–4.8)
LYMPHOCYTES # BLD AUTO: 1.25 K/UL (ref 1–4.8)
LYMPHOCYTES NFR BLD: 19.8 % (ref 22–41)
LYMPHOCYTES NFR BLD: 20.3 % (ref 22–41)
MCH RBC QN AUTO: 29 PG (ref 27–33)
MCH RBC QN AUTO: 29.6 PG (ref 27–33)
MCHC RBC AUTO-ENTMCNC: 32.6 G/DL (ref 33.7–35.3)
MCHC RBC AUTO-ENTMCNC: 33.2 G/DL (ref 33.7–35.3)
MCV RBC AUTO: 89 FL (ref 81.4–97.8)
MCV RBC AUTO: 89.3 FL (ref 81.4–97.8)
MONOCYTES # BLD AUTO: 0.61 K/UL (ref 0–0.85)
MONOCYTES # BLD AUTO: 0.64 K/UL (ref 0–0.85)
MONOCYTES NFR BLD AUTO: 10.4 % (ref 0–13.4)
MONOCYTES NFR BLD AUTO: 9.9 % (ref 0–13.4)
NEUTROPHILS # BLD AUTO: 4.16 K/UL (ref 1.82–7.42)
NEUTROPHILS # BLD AUTO: 4.17 K/UL (ref 1.82–7.42)
NEUTROPHILS NFR BLD: 67.6 % (ref 44–72)
NEUTROPHILS NFR BLD: 67.7 % (ref 44–72)
NRBC # BLD AUTO: 0 K/UL
NRBC # BLD AUTO: 0 K/UL
NRBC BLD-RTO: 0 /100 WBC
NRBC BLD-RTO: 0 /100 WBC
PLATELET # BLD AUTO: 214 K/UL (ref 164–446)
PLATELET # BLD AUTO: 224 K/UL (ref 164–446)
PMV BLD AUTO: 8.9 FL (ref 9–12.9)
PMV BLD AUTO: 9.2 FL (ref 9–12.9)
RBC # BLD AUTO: 6.25 M/UL (ref 4.7–6.1)
RBC # BLD AUTO: 6.3 M/UL (ref 4.7–6.1)
TIBC SERPL-MCNC: 373 UG/DL (ref 250–450)
TIBC SERPL-MCNC: 390 UG/DL (ref 250–450)
UIBC SERPL-MCNC: 215 UG/DL (ref 110–370)
UIBC SERPL-MCNC: 230 UG/DL (ref 110–370)
WBC # BLD AUTO: 6.2 K/UL (ref 4.8–10.8)
WBC # BLD AUTO: 6.2 K/UL (ref 4.8–10.8)

## 2020-06-19 PROCEDURE — 85025 COMPLETE CBC W/AUTO DIFF WBC: CPT

## 2020-06-19 PROCEDURE — 83550 IRON BINDING TEST: CPT

## 2020-06-19 PROCEDURE — 36415 COLL VENOUS BLD VENIPUNCTURE: CPT

## 2020-06-19 PROCEDURE — 85025 COMPLETE CBC W/AUTO DIFF WBC: CPT | Mod: 91

## 2020-06-19 PROCEDURE — 83550 IRON BINDING TEST: CPT | Mod: 91

## 2020-06-19 PROCEDURE — 82728 ASSAY OF FERRITIN: CPT

## 2020-06-19 PROCEDURE — 83540 ASSAY OF IRON: CPT

## 2020-06-19 PROCEDURE — 81256 HFE GENE: CPT

## 2020-06-19 PROCEDURE — 83540 ASSAY OF IRON: CPT | Mod: 91

## 2020-06-25 ENCOUNTER — OFFICE VISIT (OUTPATIENT)
Dept: WOUND CARE | Facility: MEDICAL CENTER | Age: 60
End: 2020-06-25
Attending: NURSE PRACTITIONER
Payer: COMMERCIAL

## 2020-06-25 VITALS
DIASTOLIC BLOOD PRESSURE: 91 MMHG | RESPIRATION RATE: 16 BRPM | TEMPERATURE: 97.8 F | SYSTOLIC BLOOD PRESSURE: 155 MMHG | HEART RATE: 95 BPM | OXYGEN SATURATION: 95 %

## 2020-06-25 DIAGNOSIS — E11.622 TYPE 2 DIABETES MELLITUS WITH OTHER SKIN ULCER, WITHOUT LONG-TERM CURRENT USE OF INSULIN (HCC): ICD-10-CM

## 2020-06-25 DIAGNOSIS — E11.42 DIABETIC POLYNEUROPATHY ASSOCIATED WITH TYPE 2 DIABETES MELLITUS (HCC): ICD-10-CM

## 2020-06-25 DIAGNOSIS — T14.8XXA WOUND INFECTION: ICD-10-CM

## 2020-06-25 DIAGNOSIS — L08.9 WOUND INFECTION: ICD-10-CM

## 2020-06-25 DIAGNOSIS — E11.622 TYPE 2 DIABETES MELLITUS WITH OTHER SKIN ULCER, UNSPECIFIED WHETHER LONG TERM INSULIN USE (HCC): Primary | ICD-10-CM

## 2020-06-25 DIAGNOSIS — L02.91 ABSCESS: ICD-10-CM

## 2020-06-25 DIAGNOSIS — L98.499 TYPE 2 DIABETES MELLITUS WITH OTHER SKIN ULCER, UNSPECIFIED WHETHER LONG TERM INSULIN USE (HCC): Primary | ICD-10-CM

## 2020-06-25 PROCEDURE — 99213 OFFICE O/P EST LOW 20 MIN: CPT | Performed by: NURSE PRACTITIONER

## 2020-06-25 PROCEDURE — 99213 OFFICE O/P EST LOW 20 MIN: CPT

## 2020-06-25 ASSESSMENT — ENCOUNTER SYMPTOMS
NAUSEA: 0
VOMITING: 0
CHILLS: 0
SHORTNESS OF BREATH: 0
CONSTIPATION: 0
COUGH: 0
FEVER: 0
DIARRHEA: 0

## 2020-06-25 NOTE — PROGRESS NOTES
Provider Encounter- Full Thickness wound    HISTORY OF PRESENT ILLNESS  Wound History:    START OF CARE IN CLINIC: 6/12/2020    REFERRING PROVIDER: JAYLON Moran     WOUND- Full Thickness Wound, abscess   LOCATION: Left medial thigh   HISTORY: Patient started developing a painful red raised bump to his left medial thigh mid May of this year.  He denies picking at it himself, however he did ask his wife to squeeze it, which she refused to do.  Continue to worsen, so he presented to an urgent care on 6/10, drainage was expressed from the wound and cultured, patient was prescribed tetracycline, and referred to [facility].  Per patient, his pharmacy had difficulty obtaining tetracycline, was only able to give him a few days worth.    Pertinent Medical History: MRSA infection requiring hospitalization, diabetes mellitus type 2, obesity, hypertension, ultimate foot surgeries, former smoker    TOBACCO USE: Former smoker, quit in 2014, smoked 1 pack/day for 20 years.  Also used chewing tobacco for several years, quit in 1997    Patient's problem list, allergies, and current medications reviewed and updated in Epic    Interval History:  6/12/2020: Initial clinic visit with BONNIE Leyva.  I was asked to see patient to assess need for I&D of abscess.  He does have a raised red area with some fluctuance to his left medial thigh.  He states he is feeling well, denies fevers, chills, nausea, vomiting.  He reports that he still having quite a bit of pain.  He is on tetracycline as ordered by urgent care APRN, however states he was only given a few days worth as pharmacy did not have full 10 day amount of he has a history of MRSA.  Wound culture taken at urgent care appears to be incomplete.  He is diabetic, he states his blood sugar today was 137, which is about his average.    6/18/2020 : Clinic visit with BONNIE Leyva.  Elver  states he is feeling well today, wound feeling better.  He denies fevers, chills,  nausea, vomiting, cough or shortness of breath.  Reports his blood sugar was 137 today.    6/25/2020 : Clinic visit with BONNIE Leyva.   States he is feeling well today.  His wife is been changing his dressing, and reports very little drainage.  Blood sugar today was 118.        REVIEW OF SYSTEMS:   Review of Systems   Constitutional: Negative for chills and fever.   Respiratory: Negative for cough and shortness of breath.    Cardiovascular: Negative for chest pain.   Gastrointestinal: Negative for constipation, diarrhea, nausea and vomiting.   Genitourinary: Negative for dysuria.   Skin:        Painful raised red bump on left inner thigh since mid May       PHYSICAL EXAMINATION:     Physical Exam   Constitutional: He is oriented to person, place, and time and well-developed, well-nourished, and in no distress.   Obese   HENT:   Head: Normocephalic.   Eyes: Pupils are equal, round, and reactive to light.   Pulmonary/Chest: Effort normal.   Musculoskeletal: Normal range of motion.      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   6/18/2028-monofilament testing in clinic  Patient sensed 1/10 bilaterally   Skin: Skin is warm.   Full-thickness wound to left medial thigh  Refer to wound photos and flowsheet     Monofilament testing with a 10 gram force: sensation intact: decreased bilaterally  Visual Inspection: Feet without maceration, ulcers, fissures.  Pedal pulses: intact bilaterally    WOUND ASSESSMENT    Wound 06/12/20 Incision Thigh Medial;Posterior;Proximal Left L posteromedial thigh proximally, I+D abscess (Active)   Wound Image   06/25/20 1056   Site Assessment Epithelialization 06/25/20 1056   Periwound Assessment Scar tissue 06/25/20 1056   Margins Attached edges 06/18/20 0915   Closure Secondary intention 06/12/20 0800   Drainage Amount None 06/25/20 1056    Drainage Description Serosanguineous 06/18/20 0915   Treatments Cleansed;Site care 06/25/20 1056   Wound Cleansing Normal Saline Irrigation 06/25/20 1056   Periwound Protectant Skin Protectant Wipes to Periwound 06/25/20 1056   Dressing Cleansing/Solutions Normal Saline 06/12/20 0800   Dressing Options Silicone Adhesive Foam 06/25/20 1056   Dressing Changed Changed 06/18/20 0915   Dressing Status Clean;Dry;Intact 06/12/20 0800   Dressing Change/Treatment Frequency Every 72 hrs, and As Needed 06/12/20 0800   Wound Length (cm) 0.3 cm 06/18/20 0915   Wound Width (cm) 1.5 cm 06/18/20 0915   Wound Depth (cm) 0.5 cm 06/18/20 0915   Wound Surface Area (cm^2) 0.45 cm^2 06/18/20 0915   Wound Volume (cm^3) 0.22 cm^3 06/18/20 0915   Post-Procedure Length (cm) 0.4 cm 06/18/20 0915   Post-Procedure Width (cm) 1.5 cm 06/18/20 0915   Post-Procedure Depth (cm) 0.6 cm 06/18/20 0915   Post-Procedure Surface Area (cm^2) 0.6 cm^2 06/18/20 0915   Post-Procedure Volume (cm^3) 0.36 cm^3 06/18/20 0915   Wound Bed Epithelium (%) 100 % 06/25/20 1056   Wound Bed Slough (%) 0 % 06/18/20 0915   Wound Bed Eschar (%) 0 % 06/18/20 0915   Wound Bed Granulation (%) - Post-Procedure 100 % 06/18/20 0915   Wound Bed Epithelium % - (Post-Procedure) 0 % 06/18/20 0915   Wound Bed Slough % - (Post-Procedure) 0 % 06/18/20 0915   Wound Bed Eschar % - (Post-Procedure) 0 % 06/18/20 0915   Tunneling (cm) 0 cm 06/18/20 0915   Undermining (cm) 0 cm 06/18/20 0915   Wound Odor None 06/25/20 1056   Right Foot Monofilament 10-point exam (Sensate) 1/10 06/18/20 0915   Left Foot Monofilament 10-point exam (Sensate) 1/10 06/18/20 0915   Exposed Structures None 06/25/20 1056           PROCEDURE:   - no debridement necessary today  -Wound care completed by wound RN, refer to flowsheet  -Patient tolerated the procedure well, without c/o pain or discomfort.        Pertinent Labs and Diagnostics:    Labs:     A1c:   Lab Results   Component Value Date/Time    HBA1C 6.3 (H)  02/28/2020 11:54 AM          IMAGING: None found in epic    VASCULAR STUDIES: N/A    LAST  WOUND CULTURE:  DATE : 6/12/2020-collected in clinic       Culture Result  Abnormal     Coagulase-negative Staphylococcus species   Light growth          ASSESSMENT AND PLAN:   1. Abscess  Comments: Abscess starting in May, gradually worsened.  I&D in clinic on 6/12/2020.    6/25/2020: Wound nearly resolved.  Patient states he feels he and his wife can manage from here on.  He is agreeable to discharge from [facility].  -Discharge from [facility]  -Patient to return to clinic ASAP if wound recurs, or if he/she develops new wounds    Wound care: Silicone foam cover dressing to manage exudate and to protect wound    2. Wound infection    6/25/2020:.  No signs or symptoms of infection today    -Pt advised to go to ER for any increased redness, swelling, drainage or odor, or if he develops fever, chills, nausea or vomiting.      3. Type 2 diabetes mellitus with other skin ulcer, without long-term current use of insulin (HCC)  Comments: Most recent A1c 6.3      6/25/2020: Patient reports his blood sugar today was 118  -Encourage patient to keep blood sugars below 140 in order to optimize wound healing      4.  Diabetic polyneuropathy associated with type 2 diabetes mellitus  Comments: Monofilament testing in clinic indicates significant LOPS    6/25/2020:  Implications of loss of protective sensation (LOPS) previously discussed with patient- including increased risk for amputation.  Advised to check feet at least daily, moisturize feet, and to always wear protective foot wear    PATIENT EDUCATION  - Importance of adequate nutrition for wound healing  -Advised to go to ER for any increased redness, swelling, drainage, or odor, or if patient develops fever, chills, nausea or vomiting.        Patient was seen for 30 minutes face to face of which > 50% of appointment time was spent on counseling and coordination of care regarding the  above.    Please note that this note may have been created using voice recognition software. I have worked with technical experts from [facility] to optimize the interface.  I have made every reasonable attempt to correct obvious errors, but there may be errors of grammar and possibly content that I did not discover before finalizing the note.    N

## 2020-06-25 NOTE — PROGRESS NOTES
[facility]   [facility]   [address]   [address]   [place] [address]   [phone] Fax: [phone]    Discharge Note        Wound location: Left posteromedial thigh  Date of Discharge: 06/25/2020    Assessment:  Discharge patient at this time secondary to wound resolution. Referral to podiatry placed this visit for maintenance of foot care moving forward. Patient given printout of referral to podiatry. Pt instructed discharde today, keep area clean, it will be fragile for a few days, bathe and dry area gently, only ever regains a maximum of 80% of the tensile strength of the surrounding skin, remodeling of scar can continue for 6mo - a year. Contact PCP for a referral back her if any problems with area opening and draining again. Pt verballizes understanding of all.

## 2020-06-26 LAB
HFE GENE MUT ANL BLD/T: NORMAL
HFE P.C282Y BLD/T QL: NEGATIVE
HFE P.H63D BLD/T QL: NEGATIVE
HFE P.S65C BLD/T QL: NEGATIVE

## 2020-06-30 ENCOUNTER — HOSPITAL ENCOUNTER (OUTPATIENT)
Dept: LAB | Facility: MEDICAL CENTER | Age: 60
End: 2020-06-30
Attending: INTERNAL MEDICINE
Payer: COMMERCIAL

## 2020-06-30 ENCOUNTER — TELEPHONE (OUTPATIENT)
Dept: ENDOCRINOLOGY | Facility: MEDICAL CENTER | Age: 60
End: 2020-06-30

## 2020-06-30 ENCOUNTER — SLEEP STUDY (OUTPATIENT)
Dept: SLEEP MEDICINE | Facility: MEDICAL CENTER | Age: 60
End: 2020-06-30
Attending: INTERNAL MEDICINE
Payer: COMMERCIAL

## 2020-06-30 DIAGNOSIS — G47.33 OSA (OBSTRUCTIVE SLEEP APNEA): ICD-10-CM

## 2020-06-30 DIAGNOSIS — G47.34 NOCTURNAL HYPOXEMIA: ICD-10-CM

## 2020-06-30 DIAGNOSIS — E29.1 HYPOGONADISM IN MALE: ICD-10-CM

## 2020-06-30 LAB — ESTRADIOL SERPL-MCNC: 30.7 PG/ML

## 2020-06-30 PROCEDURE — 36415 COLL VENOUS BLD VENIPUNCTURE: CPT

## 2020-06-30 PROCEDURE — 82670 ASSAY OF TOTAL ESTRADIOL: CPT

## 2020-06-30 PROCEDURE — 84402 ASSAY OF FREE TESTOSTERONE: CPT

## 2020-06-30 NOTE — TELEPHONE ENCOUNTER
Spoke to patient there are still pending labs in his chart to get drawn. Probably will not have the results by tomorrow.

## 2020-06-30 NOTE — TELEPHONE ENCOUNTER
Patient has acheduled his f.v appt with Dr. Weiss for tomorrow and is requesting a lab order for testosterone  [phone]

## 2020-07-01 ENCOUNTER — OFFICE VISIT (OUTPATIENT)
Dept: ENDOCRINOLOGY | Facility: MEDICAL CENTER | Age: 60
End: 2020-07-01
Attending: FAMILY MEDICINE
Payer: COMMERCIAL

## 2020-07-01 VITALS
SYSTOLIC BLOOD PRESSURE: 124 MMHG | WEIGHT: 265.2 LBS | BODY MASS INDEX: 37.97 KG/M2 | HEART RATE: 92 BPM | HEIGHT: 70 IN | DIASTOLIC BLOOD PRESSURE: 78 MMHG

## 2020-07-01 DIAGNOSIS — D75.1 POLYCYTHEMIA: ICD-10-CM

## 2020-07-01 DIAGNOSIS — E06.3 CHRONIC AUTOIMMUNE THYROIDITIS: ICD-10-CM

## 2020-07-01 DIAGNOSIS — E06.3 HYPOTHYROIDISM, ACQUIRED, AUTOIMMUNE: ICD-10-CM

## 2020-07-01 DIAGNOSIS — G47.33 OSA (OBSTRUCTIVE SLEEP APNEA): ICD-10-CM

## 2020-07-01 DIAGNOSIS — E29.1 HYPOGONADISM IN MALE: ICD-10-CM

## 2020-07-01 PROCEDURE — 99214 OFFICE O/P EST MOD 30 MIN: CPT | Performed by: INTERNAL MEDICINE

## 2020-07-01 PROCEDURE — 95811 POLYSOM 6/>YRS CPAP 4/> PARM: CPT | Performed by: INTERNAL MEDICINE

## 2020-07-01 PROCEDURE — 99211 OFF/OP EST MAY X REQ PHY/QHP: CPT | Performed by: INTERNAL MEDICINE

## 2020-07-01 ASSESSMENT — FIBROSIS 4 INDEX: FIB4 SCORE: 1.2

## 2020-07-01 NOTE — PROCEDURES
Comments:  The patient underwent a diagnostic polysomnogram using the standard montage for measurement of parameters of sleep, respiratory events, movement abnormalities, and heart rate and rhythm.   A microphone was used to monitor snoring.  Interpretation:  Study start time was 10:44:01 PM. Diagnostic recording time was 6h 50.5m with a total sleep time of 5h 13.5m resulting in a sleep efficiency of 76.37%%.   Sleep latency from the start of the study was 22 minutes and the latency from sleep to REM was 00 minutes.  In total,86 arousals were scored for an arousal index of 16.5.  Respiratory:  There were a total of 0 apneas consisting of 0 obstructive apneas, 0 mixed apneas, and 0 central apneas. A total of 15 hypopneas were scored.  The apnea index was 0.00 per hour and the hypopnea index was 2.87 per hour resulting in an overall AHI of 2.87.  AHI during rem was 0.0 and AHI while supine was 3.87.  Oximetry:  There was a mean oxygen saturation of 91.0% with a minimum oxygen saturation of 83.0%. Time spent with oxygen saturations below 89% was 61.7 minutes.  Cardiac:  The highest heart rate seen while awake was 100 BPM while the highest heart rate during sleep was 97 BPM with an average sleeping heart rate of 82 BPM.  Limb Movements:  There were a total of 417 PLMs during sleep, of which 33 were PLMS arousals. This resulted in a PLMS index of 79.8 and a PLMS arousal index of 6.3.    The sleep efficiency was 76%.  Sleep architecture showed lack of stage III and REM sleep.  The sleep latency was 22 minutes.  Elevated periodic limb movement index of 64-hour.  EKG showed sinus rhythm.    CPAP was started at 10 cm of water and titrated to 14 with resultant AHI of 2.7 and mean oximetry 93%.    Interpretation:  Successful CPAP titration at 14 cm of water.  Elevated periodic limb movements.    Recommendations:  CPAP: 14 cm of water using a medium Marilu view fullface mask with heated humidification.  Clinical correlation  regarding periodic limb movements which may be secondary to JULIAN or represent a secondary sleep disorder.

## 2020-07-02 ENCOUNTER — APPOINTMENT (OUTPATIENT)
Dept: WOUND CARE | Facility: MEDICAL CENTER | Age: 60
End: 2020-07-02
Attending: NURSE PRACTITIONER
Payer: COMMERCIAL

## 2020-07-02 NOTE — PROGRESS NOTES
Chief Complaint   Patient presents with   • Hypothyroidism     Autoimmune thyroiditis   • Hypogonadism     Polycythemia        HPI:         The main issue right now is appropriate treatment for his hypogonadism.  He has been getting testosterone 150 mg IM every 2 weeks in the office.  I discussed doing this at home and he thinks that he had his wife can learn to do their own shots at home.  Certainly more flexible.        However the complication now is that he has become polycythemic.  I explained this is a combination of his inadequately treated sleep apnea with nocturnal hypoxia and the testosterone.       Currently his hemoglobin is 18.5 and hematocrit 55.8 he will require phlebotomy.  I told him no more testosterone until this is better settled.  He is in agreement.  Apparently he is doing the best he can with his sleep apnea.    ROS:      Seen a gastroenterologist for his previous anemia which is now corrected but I do not think was diagnosed at the time.  I think endoscopy is planned.      Allergies:   Allergies   Allergen Reactions   • Demerol Vomiting and Nausea     Rxn = years ago    • Cubicin [Daptomycin] Vomiting   • Meperidine      2004-02-25;NA   • Sulfa Drugs Hives     .   • Sulfamethoxazole W-Trimethoprim Rash     2004-02-25;NA       Current medicines including changes today:  Current Outpatient Medications   Medication Sig Dispense Refill   • liraglutide (VICTOZA) 18 MG/3ML Solution Pen-injector Inject 1.8 mg as instructed every day.     • hydrOXYzine pamoate (VISTARIL) 50 MG Cap Take 50 mg by mouth 2 Times a Day.     • ipratropium (ATROVENT) 0.03 % Solution Spray 1 Spray in nose 3 times a day.     • gabapentin (NEURONTIN) 300 MG Cap Take 900 mg by mouth 1 time daily as needed.     • azelastine (ASTELIN) 137 MCG/SPRAY nasal spray [place] 1 Spray in nose 2 Times a Day.     • Diclofenac Sodium 1 % Gel Apply 1 Application to affected area(s) 2 times a day as needed.     • NOVOFINE PLUS Inject 1  "Application as instructed 4 Times a Day,Before Meals and at Bedtime. Use before meals and at bedtime to check blood sugar.     • Lidocaine (LIDOCAINE PAIN RELIEF) 4 % Patch 1 Patch by Apply externally route every 24 hours.     • SYNTHROID 137 MCG Tab TAKE 1 TABLET DAILY 90 Tab 1   • lamoTRIgine (LAMICTAL) 100 MG Tab Take 100 mg by mouth every day.     • cyclobenzaprine (FLEXERIL) 10 MG Tab Take 10 mg by mouth 3 times a day.  5   • amphetamine-dextroamphetamine (ADDERALL) 5 MG Tab Take 5 mg by mouth every day.     • tamsulosin (FLOMAX) 0.4 MG capsule Take 0.4 mg by mouth ONE-HALF HOUR AFTER BREAKFAST.     • temazepam (RESTORIL) 30 MG capsule Take 30 mg by mouth at bedtime as needed for Sleep.     • venlafaxine XR (EFFEXOR XR) 75 MG CAPSULE SR 24 HR Take 75 mg by mouth every morning.     • alprazolam (XANAX) 0.25 MG Tab Take 0.25-0.5 mg by mouth 4 times a day as needed for Anxiety.     • losartan (COZAAR) 100 MG Tab Take 100 mg by mouth every morning.     • oxycodone-acetaminophen (PERCOCET) 5-325 MG TABS Take 1-2 Tabs by mouth every four hours as needed.       Current Facility-Administered Medications   Medication Dose Route Frequency Provider Last Rate Last Dose   • testosterone cypionate (DEPO-TESTOSTERONE) injection 150 mg  150 mg Intramuscular Q14 DAYS Reji Love M.D.   150 mg at 06/17/20 1108        Past Medical History:   Diagnosis Date   • Anesthesia 11/03/2017    PONV with shoulder surgery approx 20 years ago.   • Anxiety and depression 11/03/2017   • Arthritis 11/03/2017    Osteoarthritis, \" all over\"   • Bunion    • Cancer (HCC) 2017    skin lesion cut out, on left shoulder   • Chronic autoimmune thyroiditis      + US / + TPO = 57   • Dental disorder 11/03/2017    \"Upper Plate\"   • Diabetes mellitus (HCC) 11/03/2017    \"Controlled with diet & Victoza\"   • Gout    • Hammertoe    • High cholesterol 11/03/2017    HX OF, not currently on medication for.   • Hypertension    • Hypothyroidism     chronic " "thyroiditis   • MRSA infection    • Open toe wound 03/2018    Left big toe, open wound, started 2/2018, receiving wound care therapy two times a week   • Pain 11/03/2017    \"Pain all over except right hip & ankle\"   • Psychiatric problem     depression/anxiety   • Sleep apnea 11/03/2017    CPAP USE   • Snoring 11/03/2017    DX JULIAN   • Tonsillitis        PHYSICAL EXAM:    /78 (BP Location: Left arm, Patient Position: Sitting, BP Cuff Size: Adult)   Pulse 92   Ht 1.778 m (5' 10\")   Wt 120.3 kg (265 lb 3.2 oz)   BMI 38.05 kg/m²     Gen.   appears healthy.  Somewhat jaquan complected    Skin   appropriate for sex and age    HEENT  unremarkable    Neck      thyroid gland is difficult to palpate.  I think it is substernal and may be atrophic       Heart  regular    Extremities  no edema    Neuro  gait and station normal    Psych  appropriate    ASSESSMENT AND RECOMMENDATIONS    1. Chronic autoimmune thyroiditis              Thyroid gland is asymptomatic. Patient is not aware of any change in her gland. No discomfort. No difficulty swallowing, breathing, or voice change.    2. Hypogonadism in male             Patient and wife will be instructed on administering IM testosterone at home.             For now testosterone therapy is suspended pending phlebotomy    3. Hypothyroidism, acquired, autoimmune             Currently euthyroid taking levothyroxine 137 mcg/day.             Recent TSH is 1.6  free thyroxine 0.7.              No dose change indicated                 4. Polycythemia              We will arrange 1 unit whole blood phlebotomy    5. JULIAN (obstructive sleep apnea)         DISPOSITION: Follow-up in 3 or 4 weeks with patient and wife      Florentin Weiss M.D.    Copies to: Kevin Chavez M.D. [phone]  "

## 2020-07-03 LAB — TESTOST FREE SERPL-MCNC: 48 PG/ML (ref 47–244)

## 2020-07-09 ENCOUNTER — APPOINTMENT (OUTPATIENT)
Dept: WOUND CARE | Facility: MEDICAL CENTER | Age: 60
End: 2020-07-09
Attending: NURSE PRACTITIONER
Payer: COMMERCIAL

## 2020-07-16 DIAGNOSIS — Z01.812 PRE-OPERATIVE LABORATORY EXAMINATION: ICD-10-CM

## 2020-07-16 DIAGNOSIS — Z01.810 PRE-OPERATIVE CARDIOVASCULAR EXAMINATION: ICD-10-CM

## 2020-07-16 LAB
ALBUMIN SERPL BCP-MCNC: 4.5 G/DL (ref 3.2–4.9)
ALBUMIN/GLOB SERPL: 1.7 G/DL
ALP SERPL-CCNC: 113 U/L (ref 30–99)
ALT SERPL-CCNC: 30 U/L (ref 2–50)
ANION GAP SERPL CALC-SCNC: 12 MMOL/L (ref 7–16)
AST SERPL-CCNC: 21 U/L (ref 12–45)
BILIRUB SERPL-MCNC: 0.7 MG/DL (ref 0.1–1.5)
BUN SERPL-MCNC: 17 MG/DL (ref 8–22)
CALCIUM SERPL-MCNC: 9.2 MG/DL (ref 8.5–10.5)
CHLORIDE SERPL-SCNC: 101 MMOL/L (ref 96–112)
CO2 SERPL-SCNC: 25 MMOL/L (ref 20–33)
COVID ORDER STATUS COVID19: NORMAL
CREAT SERPL-MCNC: 1.05 MG/DL (ref 0.5–1.4)
EKG IMPRESSION: NORMAL
ERYTHROCYTE [DISTWIDTH] IN BLOOD BY AUTOMATED COUNT: 51.5 FL (ref 35.9–50)
GLOBULIN SER CALC-MCNC: 2.7 G/DL (ref 1.9–3.5)
GLUCOSE SERPL-MCNC: 193 MG/DL (ref 65–99)
HCT VFR BLD AUTO: 51.8 % (ref 42–52)
HGB BLD-MCNC: 17 G/DL (ref 14–18)
MCH RBC QN AUTO: 30.1 PG (ref 27–33)
MCHC RBC AUTO-ENTMCNC: 32.8 G/DL (ref 33.7–35.3)
MCV RBC AUTO: 91.7 FL (ref 81.4–97.8)
PLATELET # BLD AUTO: 163 K/UL (ref 164–446)
PMV BLD AUTO: 8.6 FL (ref 9–12.9)
POTASSIUM SERPL-SCNC: 4.6 MMOL/L (ref 3.6–5.5)
PROT SERPL-MCNC: 7.2 G/DL (ref 6–8.2)
RBC # BLD AUTO: 5.65 M/UL (ref 4.7–6.1)
SARS-COV-2 RDRP RESP QL NAA+PROBE: NOTDETECTED
SODIUM SERPL-SCNC: 138 MMOL/L (ref 135–145)
SPECIMEN SOURCE: NORMAL
WBC # BLD AUTO: 5.2 K/UL (ref 4.8–10.8)

## 2020-07-16 PROCEDURE — 36415 COLL VENOUS BLD VENIPUNCTURE: CPT

## 2020-07-16 PROCEDURE — 93005 ELECTROCARDIOGRAM TRACING: CPT

## 2020-07-16 PROCEDURE — 85027 COMPLETE CBC AUTOMATED: CPT

## 2020-07-16 PROCEDURE — 93010 ELECTROCARDIOGRAM REPORT: CPT | Performed by: INTERNAL MEDICINE

## 2020-07-16 PROCEDURE — 80053 COMPREHEN METABOLIC PANEL: CPT

## 2020-07-16 PROCEDURE — U0004 COV-19 TEST NON-CDC HGH THRU: HCPCS

## 2020-07-16 ASSESSMENT — FIBROSIS 4 INDEX: FIB4 SCORE: 1.2

## 2020-07-17 ENCOUNTER — APPOINTMENT (OUTPATIENT)
Dept: ENDOCRINOLOGY | Facility: MEDICAL CENTER | Age: 60
End: 2020-07-17
Attending: INTERNAL MEDICINE
Payer: COMMERCIAL

## 2020-07-17 NOTE — TELEPHONE ENCOUNTER
Received request via: Patient    Was the patient seen in the last year in this department? Yes    Does the patient have an active prescription (recently filled or refills available) for medication(s) requested? No     levothyroxine (SYNTHROID) 137 MCG Tab        Sig: TAKE 1 TABLET BY MOUTH EVERY DAY       SW met with patient at bedside regarding home health and home O2 w/portable tanks.  Patient signed patient choice disclosure choosing [facility] home health.  OSKAR sent patient information to [facility] for authorization and [facility] for acceptance via [facility] system of home health services and home O2.       07/17/20 0930   Post-Acute Status   Post-Acute Authorization Home Health;HME   HME Status Referrals Sent   Home Health Status Referrals Sent   Patient choice form signed by patient/caregiver List with quality metrics by geographic area provided

## 2020-07-19 NOTE — OR NURSING
COVID-19 Pre-surgery screenin. Do you have an undiagnosed respiratory illness or symptoms such as coughing or sneezing? *no** (Yes/No)  a. Onset of Sx *no**  b. Acute vs. chronic respiratory illness *no**    2. Do you have an unexplained fever greater than 100.4 degrees Fahrenheit or 38 degrees Celsius?     **no* (Yes/No)    3. Have you had direct exposure to a patient who tested positive for Covid-19?    *no** (Yes/No)    4. Have you traveled outside [facility] in the last 14 days? *no**    5. Have you had any loss of your sense of taste or smell? Have you had N/V or sore throat? **no*    Patient has been informed of visitor policy and asked to wear a mask upon entering the hospital   *no** (Yes/No)

## 2020-07-20 ENCOUNTER — ANESTHESIA EVENT (OUTPATIENT)
Dept: SURGERY | Facility: MEDICAL CENTER | Age: 60
End: 2020-07-20
Payer: COMMERCIAL

## 2020-07-20 ENCOUNTER — TELEPHONE (OUTPATIENT)
Dept: SLEEP MEDICINE | Facility: MEDICAL CENTER | Age: 60
End: 2020-07-20

## 2020-07-20 ENCOUNTER — APPOINTMENT (OUTPATIENT)
Dept: RADIOLOGY | Facility: MEDICAL CENTER | Age: 60
End: 2020-07-20
Attending: ORTHOPAEDIC SURGERY
Payer: COMMERCIAL

## 2020-07-20 ENCOUNTER — HOSPITAL ENCOUNTER (OUTPATIENT)
Facility: MEDICAL CENTER | Age: 60
End: 2020-07-20
Attending: ORTHOPAEDIC SURGERY | Admitting: ORTHOPAEDIC SURGERY
Payer: COMMERCIAL

## 2020-07-20 ENCOUNTER — ANESTHESIA (OUTPATIENT)
Dept: SURGERY | Facility: MEDICAL CENTER | Age: 60
End: 2020-07-20
Payer: COMMERCIAL

## 2020-07-20 VITALS
RESPIRATION RATE: 16 BRPM | OXYGEN SATURATION: 100 % | TEMPERATURE: 98.7 F | WEIGHT: 261.69 LBS | DIASTOLIC BLOOD PRESSURE: 71 MMHG | HEART RATE: 78 BPM | HEIGHT: 70 IN | SYSTOLIC BLOOD PRESSURE: 132 MMHG | BODY MASS INDEX: 37.46 KG/M2

## 2020-07-20 DIAGNOSIS — G47.33 OSA (OBSTRUCTIVE SLEEP APNEA): ICD-10-CM

## 2020-07-20 LAB
GLUCOSE BLD-MCNC: 110 MG/DL (ref 65–99)
GLUCOSE BLD-MCNC: 132 MG/DL (ref 65–99)

## 2020-07-20 PROCEDURE — 160046 HCHG PACU - 1ST 60 MINS PHASE II: Performed by: ORTHOPAEDIC SURGERY

## 2020-07-20 PROCEDURE — 160048 HCHG OR STATISTICAL LEVEL 1-5: Performed by: ORTHOPAEDIC SURGERY

## 2020-07-20 PROCEDURE — 160041 HCHG SURGERY MINUTES - EA ADDL 1 MIN LEVEL 4: Performed by: ORTHOPAEDIC SURGERY

## 2020-07-20 PROCEDURE — 500881 HCHG PACK, EXTREMITY: Performed by: ORTHOPAEDIC SURGERY

## 2020-07-20 PROCEDURE — 700101 HCHG RX REV CODE 250: Performed by: ANESTHESIOLOGY

## 2020-07-20 PROCEDURE — 82962 GLUCOSE BLOOD TEST: CPT

## 2020-07-20 PROCEDURE — C1713 ANCHOR/SCREW BN/BN,TIS/BN: HCPCS | Performed by: ORTHOPAEDIC SURGERY

## 2020-07-20 PROCEDURE — 160009 HCHG ANES TIME/MIN: Performed by: ORTHOPAEDIC SURGERY

## 2020-07-20 PROCEDURE — 700102 HCHG RX REV CODE 250 W/ 637 OVERRIDE(OP)

## 2020-07-20 PROCEDURE — 700111 HCHG RX REV CODE 636 W/ 250 OVERRIDE (IP): Performed by: ANESTHESIOLOGY

## 2020-07-20 PROCEDURE — 501838 HCHG SUTURE GENERAL: Performed by: ORTHOPAEDIC SURGERY

## 2020-07-20 PROCEDURE — 160002 HCHG RECOVERY MINUTES (STAT): Performed by: ORTHOPAEDIC SURGERY

## 2020-07-20 PROCEDURE — 160036 HCHG PACU - EA ADDL 30 MINS PHASE I: Performed by: ORTHOPAEDIC SURGERY

## 2020-07-20 PROCEDURE — 700105 HCHG RX REV CODE 258: Performed by: ORTHOPAEDIC SURGERY

## 2020-07-20 PROCEDURE — 700105 HCHG RX REV CODE 258: Performed by: ANESTHESIOLOGY

## 2020-07-20 PROCEDURE — A9270 NON-COVERED ITEM OR SERVICE: HCPCS

## 2020-07-20 PROCEDURE — 160029 HCHG SURGERY MINUTES - 1ST 30 MINS LEVEL 4: Performed by: ORTHOPAEDIC SURGERY

## 2020-07-20 PROCEDURE — 160035 HCHG PACU - 1ST 60 MINS PHASE I: Performed by: ORTHOPAEDIC SURGERY

## 2020-07-20 PROCEDURE — 700111 HCHG RX REV CODE 636 W/ 250 OVERRIDE (IP)

## 2020-07-20 PROCEDURE — 700101 HCHG RX REV CODE 250: Performed by: ORTHOPAEDIC SURGERY

## 2020-07-20 PROCEDURE — A6223 GAUZE >16<=48 NO W/SAL W/O B: HCPCS | Performed by: ORTHOPAEDIC SURGERY

## 2020-07-20 PROCEDURE — 160025 RECOVERY II MINUTES (STATS): Performed by: ORTHOPAEDIC SURGERY

## 2020-07-20 PROCEDURE — 500112 HCHG BONE WAX: Performed by: ORTHOPAEDIC SURGERY

## 2020-07-20 DEVICE — WIRE K- SMOOTH .062 - (3TX6=18): Type: IMPLANTABLE DEVICE | Status: FUNCTIONAL

## 2020-07-20 RX ORDER — ONDANSETRON 2 MG/ML
INJECTION INTRAMUSCULAR; INTRAVENOUS PRN
Status: DISCONTINUED | OUTPATIENT
Start: 2020-07-20 | End: 2020-07-20 | Stop reason: SURG

## 2020-07-20 RX ORDER — HALOPERIDOL 5 MG/ML
1 INJECTION INTRAMUSCULAR
Status: DISCONTINUED | OUTPATIENT
Start: 2020-07-20 | End: 2020-07-20 | Stop reason: HOSPADM

## 2020-07-20 RX ORDER — ROCURONIUM BROMIDE 10 MG/ML
INJECTION, SOLUTION INTRAVENOUS PRN
Status: DISCONTINUED | OUTPATIENT
Start: 2020-07-20 | End: 2020-07-20 | Stop reason: SURG

## 2020-07-20 RX ORDER — OXYCODONE HCL 5 MG/5 ML
5 SOLUTION, ORAL ORAL
Status: DISCONTINUED | OUTPATIENT
Start: 2020-07-20 | End: 2020-07-20 | Stop reason: HOSPADM

## 2020-07-20 RX ORDER — METOPROLOL TARTRATE 1 MG/ML
1 INJECTION, SOLUTION INTRAVENOUS
Status: DISCONTINUED | OUTPATIENT
Start: 2020-07-20 | End: 2020-07-20 | Stop reason: HOSPADM

## 2020-07-20 RX ORDER — ONDANSETRON 2 MG/ML
4 INJECTION INTRAMUSCULAR; INTRAVENOUS
Status: DISCONTINUED | OUTPATIENT
Start: 2020-07-20 | End: 2020-07-20 | Stop reason: HOSPADM

## 2020-07-20 RX ORDER — MAGNESIUM SULFATE HEPTAHYDRATE 500 MG/ML
INJECTION, SOLUTION INTRAMUSCULAR; INTRAVENOUS PRN
Status: DISCONTINUED | OUTPATIENT
Start: 2020-07-20 | End: 2020-07-20 | Stop reason: SURG

## 2020-07-20 RX ORDER — DEXTROSE MONOHYDRATE 25 G/50ML
50 INJECTION, SOLUTION INTRAVENOUS
Status: DISCONTINUED | OUTPATIENT
Start: 2020-07-20 | End: 2020-07-20 | Stop reason: HOSPADM

## 2020-07-20 RX ORDER — SODIUM CHLORIDE, SODIUM LACTATE, POTASSIUM CHLORIDE, CALCIUM CHLORIDE 600; 310; 30; 20 MG/100ML; MG/100ML; MG/100ML; MG/100ML
INJECTION, SOLUTION INTRAVENOUS CONTINUOUS
Status: DISCONTINUED | OUTPATIENT
Start: 2020-07-20 | End: 2020-07-20 | Stop reason: HOSPADM

## 2020-07-20 RX ORDER — LABETALOL HYDROCHLORIDE 5 MG/ML
5 INJECTION, SOLUTION INTRAVENOUS
Status: DISCONTINUED | OUTPATIENT
Start: 2020-07-20 | End: 2020-07-20 | Stop reason: HOSPADM

## 2020-07-20 RX ORDER — DEXAMETHASONE SODIUM PHOSPHATE 4 MG/ML
INJECTION, SOLUTION INTRA-ARTICULAR; INTRALESIONAL; INTRAMUSCULAR; INTRAVENOUS; SOFT TISSUE PRN
Status: DISCONTINUED | OUTPATIENT
Start: 2020-07-20 | End: 2020-07-20 | Stop reason: SURG

## 2020-07-20 RX ORDER — METOCLOPRAMIDE HYDROCHLORIDE 5 MG/ML
INJECTION INTRAMUSCULAR; INTRAVENOUS PRN
Status: DISCONTINUED | OUTPATIENT
Start: 2020-07-20 | End: 2020-07-20 | Stop reason: SURG

## 2020-07-20 RX ORDER — OXYCODONE HCL 5 MG/5 ML
10 SOLUTION, ORAL ORAL
Status: DISCONTINUED | OUTPATIENT
Start: 2020-07-20 | End: 2020-07-20 | Stop reason: HOSPADM

## 2020-07-20 RX ORDER — HYDRALAZINE HYDROCHLORIDE 20 MG/ML
5 INJECTION INTRAMUSCULAR; INTRAVENOUS
Status: DISCONTINUED | OUTPATIENT
Start: 2020-07-20 | End: 2020-07-20 | Stop reason: HOSPADM

## 2020-07-20 RX ORDER — BUPIVACAINE HYDROCHLORIDE 5 MG/ML
INJECTION, SOLUTION EPIDURAL; INTRACAUDAL
Status: DISCONTINUED | OUTPATIENT
Start: 2020-07-20 | End: 2020-07-20 | Stop reason: HOSPADM

## 2020-07-20 RX ORDER — LIDOCAINE HYDROCHLORIDE 20 MG/ML
INJECTION, SOLUTION EPIDURAL; INFILTRATION; INTRACAUDAL; PERINEURAL PRN
Status: DISCONTINUED | OUTPATIENT
Start: 2020-07-20 | End: 2020-07-20 | Stop reason: SURG

## 2020-07-20 RX ORDER — KETOROLAC TROMETHAMINE 30 MG/ML
INJECTION, SOLUTION INTRAMUSCULAR; INTRAVENOUS PRN
Status: DISCONTINUED | OUTPATIENT
Start: 2020-07-20 | End: 2020-07-20 | Stop reason: SURG

## 2020-07-20 RX ORDER — CEFAZOLIN SODIUM 1 G/3ML
INJECTION, POWDER, FOR SOLUTION INTRAMUSCULAR; INTRAVENOUS PRN
Status: DISCONTINUED | OUTPATIENT
Start: 2020-07-20 | End: 2020-07-20 | Stop reason: SURG

## 2020-07-20 RX ORDER — HYDROMORPHONE HYDROCHLORIDE 1 MG/ML
0.4 INJECTION, SOLUTION INTRAMUSCULAR; INTRAVENOUS; SUBCUTANEOUS
Status: DISCONTINUED | OUTPATIENT
Start: 2020-07-20 | End: 2020-07-20 | Stop reason: HOSPADM

## 2020-07-20 RX ORDER — HYDROMORPHONE HYDROCHLORIDE 1 MG/ML
0.2 INJECTION, SOLUTION INTRAMUSCULAR; INTRAVENOUS; SUBCUTANEOUS
Status: DISCONTINUED | OUTPATIENT
Start: 2020-07-20 | End: 2020-07-20 | Stop reason: HOSPADM

## 2020-07-20 RX ORDER — LIDOCAINE HYDROCHLORIDE 10 MG/ML
INJECTION, SOLUTION EPIDURAL; INFILTRATION; INTRACAUDAL; PERINEURAL
Status: COMPLETED
Start: 2020-07-20 | End: 2020-07-20

## 2020-07-20 RX ORDER — SODIUM CHLORIDE, SODIUM LACTATE, POTASSIUM CHLORIDE, CALCIUM CHLORIDE 600; 310; 30; 20 MG/100ML; MG/100ML; MG/100ML; MG/100ML
INJECTION, SOLUTION INTRAVENOUS
Status: DISCONTINUED | OUTPATIENT
Start: 2020-07-20 | End: 2020-07-20 | Stop reason: SURG

## 2020-07-20 RX ORDER — PHENYLEPHRINE HCL IN 0.9% NACL 0.5 MG/5ML
SYRINGE (ML) INTRAVENOUS PRN
Status: DISCONTINUED | OUTPATIENT
Start: 2020-07-20 | End: 2020-07-20 | Stop reason: SURG

## 2020-07-20 RX ORDER — HYDROMORPHONE HYDROCHLORIDE 1 MG/ML
0.1 INJECTION, SOLUTION INTRAMUSCULAR; INTRAVENOUS; SUBCUTANEOUS
Status: DISCONTINUED | OUTPATIENT
Start: 2020-07-20 | End: 2020-07-20 | Stop reason: HOSPADM

## 2020-07-20 RX ADMIN — SUGAMMADEX 200 MG: 100 INJECTION, SOLUTION INTRAVENOUS at 08:10

## 2020-07-20 RX ADMIN — POVIDONE-IODINE 15 ML: 10 SOLUTION TOPICAL at 06:29

## 2020-07-20 RX ADMIN — CEFAZOLIN 3 G: 330 INJECTION, POWDER, FOR SOLUTION INTRAMUSCULAR; INTRAVENOUS at 07:19

## 2020-07-20 RX ADMIN — MAGNESIUM SULFATE HEPTAHYDRATE 2 G: 500 INJECTION, SOLUTION INTRAMUSCULAR; INTRAVENOUS at 07:19

## 2020-07-20 RX ADMIN — SODIUM CHLORIDE, POTASSIUM CHLORIDE, SODIUM LACTATE AND CALCIUM CHLORIDE: 600; 310; 30; 20 INJECTION, SOLUTION INTRAVENOUS at 06:29

## 2020-07-20 RX ADMIN — SODIUM CHLORIDE, POTASSIUM CHLORIDE, SODIUM LACTATE AND CALCIUM CHLORIDE: 600; 310; 30; 20 INJECTION, SOLUTION INTRAVENOUS at 07:13

## 2020-07-20 RX ADMIN — FENTANYL CITRATE 25 MCG: 50 INJECTION INTRAMUSCULAR; INTRAVENOUS at 07:53

## 2020-07-20 RX ADMIN — Medication 0.5 ML: at 06:29

## 2020-07-20 RX ADMIN — Medication 100 MCG: at 07:35

## 2020-07-20 RX ADMIN — FENTANYL CITRATE 50 MCG: 50 INJECTION INTRAMUSCULAR; INTRAVENOUS at 08:12

## 2020-07-20 RX ADMIN — METOCLOPRAMIDE 10 MG: 5 INJECTION, SOLUTION INTRAMUSCULAR; INTRAVENOUS at 07:56

## 2020-07-20 RX ADMIN — KETOROLAC TROMETHAMINE 30 MG: 30 INJECTION, SOLUTION INTRAMUSCULAR at 07:56

## 2020-07-20 RX ADMIN — ONDANSETRON 4 MG: 2 INJECTION INTRAMUSCULAR; INTRAVENOUS at 07:56

## 2020-07-20 RX ADMIN — LIDOCAINE HYDROCHLORIDE 60 MG: 20 INJECTION, SOLUTION EPIDURAL; INFILTRATION; INTRACAUDAL at 07:19

## 2020-07-20 RX ADMIN — PROPOFOL 200 MG: 10 INJECTION, EMULSION INTRAVENOUS at 07:19

## 2020-07-20 RX ADMIN — LIDOCAINE HYDROCHLORIDE 0.5 ML: 10 INJECTION, SOLUTION EPIDURAL; INFILTRATION; INTRACAUDAL at 06:29

## 2020-07-20 RX ADMIN — Medication 100 MCG: at 07:42

## 2020-07-20 RX ADMIN — ROCURONIUM BROMIDE 100 MG: 10 INJECTION, SOLUTION INTRAVENOUS at 07:19

## 2020-07-20 RX ADMIN — DEXAMETHASONE SODIUM PHOSPHATE 4 MG: 4 INJECTION, SOLUTION INTRA-ARTICULAR; INTRALESIONAL; INTRAMUSCULAR; INTRAVENOUS; SOFT TISSUE at 07:19

## 2020-07-20 RX ADMIN — FENTANYL CITRATE 25 MCG: 50 INJECTION INTRAMUSCULAR; INTRAVENOUS at 07:56

## 2020-07-20 ASSESSMENT — FIBROSIS 4 INDEX: FIB4 SCORE: 1.46

## 2020-07-20 NOTE — OR NURSING
Assumed care of patient at approx 0920.  Patient alert and oriented x 4. See flowsheets for VS.  Pain is rated 0/10.     Call light and personal belongings within reach. Gurney in lowest position. Monitor alarms set appropriately.    Dressing is CDI . See flowsheets for detailed wound documentation.

## 2020-07-20 NOTE — ANESTHESIA PROCEDURE NOTES
Airway    Date/Time: 7/20/2020 7:20 AM  Performed by: Bill Sage M.D.  Authorized by: Bill Sage M.D.     Location:  OR  Urgency:  Elective  Difficult Airway: No    Indications for Airway Management:  Anesthesia      Spontaneous Ventilation: absent    Sedation Level:  Deep  Preoxygenated: Yes    Patient Position:  Sniffing  Final Airway Type:  Endotracheal airway  Final Endotracheal Airway:  ETT  Cuffed: Yes    Technique Used for Successful ETT Placement:  Direct laryngoscopy  Devices/Methods Used in Placement:  Intubating stylet    Insertion Site:  Oral  Blade Type:  Patricia  Laryngoscope Blade/Videolaryngoscope Blade Size:  4  ETT Size (mm):  7.5  Measured from:  Gums  ETT to Gums (cm):  23  Placement Verified by: auscultation and capnometry    Cormack-Lehane Classification:  Grade I - full view of glottis  Number of Attempts at Approach:  1

## 2020-07-20 NOTE — ANESTHESIA PREPROCEDURE EVALUATION
Foot pain    Relevant Problems   ANESTHESIA   (+) JULIAN (obstructive sleep apnea)      CARDIAC   (+) Hypertension      ENDO   (+) Hypothyroidism, acquired, autoimmune   (+) Type 2 diabetes mellitus without complication (HCC)      Other   (+) Chronic pain   (+) Gout   (+) Hypogonadism in male   (+) Osteoarthritis   (+) Severe obesity with body mass index (BMI) of 35.0 to 39.9 with serious comorbidity (HCC)       Physical Exam    Airway   Mallampati: II  TM distance: <3 FB  Neck ROM: full       Cardiovascular - normal exam  Rhythm: regular  Rate: normal  (-) murmur     Dental   (+) upper dentures           Pulmonary - normal exam  Breath sounds clear to auscultation     Abdominal    Neurological - normal exam                 Anesthesia Plan    ASA 3   ASA physical status 3 criteria: morbid obesity - BMI greater than or equal to 40    Plan - general       Airway plan will be ETT        Induction: intravenous      Pertinent diagnostic labs and testing reviewed    Informed Consent:    Anesthetic plan and risks discussed with patient.      Pt declined PNB  Pt advised to use CPAP while resting s/p anesthesia.  Pt expressed understanding.

## 2020-07-20 NOTE — OR NURSING
Discharge information reviewed with patient and responsible adult. No questions or concerns at this time. Pt in post op shoe, has crutches at home.    IV discontinued.     See vital sign flowsheets  for discharge details.

## 2020-07-20 NOTE — TELEPHONE ENCOUNTER
FV 7/21/2020 not needed (flori is out of the office anyways)    LVM advising him no o2 is needed just a pressure change and then we will see him 2-3 out. Advised pt to call me back.

## 2020-07-20 NOTE — OP REPORT
DATE OF SERVICE:  07/20/2020    PREOPERATIVE DIAGNOSES:  1.  Left osteophyte plantar metatarsals 2 and 3.  2.  Left hammertoe 4 and 5.  3.  Left onychomycosis, toenails 3, 4 and 5.    POSTOPERATIVE DIAGNOSES:  1.  Left osteophyte plantar metatarsals 2 and 3.  2.  Left hammertoe 4 and 5.  3.  Left onychomycosis, toenails 3, 4 and 5.    PROCEDURES PERFORMED:  1.  Left partial excision metatarsals 2 and 3.  2.  Left hammertoe correction with PIP arthroplasty, 4 and 5.  3.  Left toenail removal, 3, 4 and 5.    SURGEON:  Haroldo Kang MD    FIRST ASSISTANT:  Justin Elmore MD    SECOND ASSISTANT:  Matilda Mattson.    ANESTHESIA:  General endotracheal with ankle block.    ESTIMATED BLOOD LOSS:  None.    COMPLICATIONS:  None.    POSTOPERATIVE PLAN:  1.  Weightbearing as tolerated in a postop shoe.  2.  Perioperative antibiotics.  3.  Follow up in 2 weeks.    INDICATIONS:  The patient is a pleasant 60-year-old male who has had problems   with his left foot for some time.  The above diagnoses made and options were   discussed with him including operative and nonoperative.  He elected to   undergo operative intervention.  The above was discussed and all questions   were answered.  Risks of surgery were explained, which include but not limited   to wound problems, infection, nerve injury, vascular injury and need for   further surgery.  He understands he could have persistent risk of his pain and   need for further surgery.  He understands and accepts these risks and wished   to proceed.  Site was marked by myself prior to receiving psychotropic   medicines.    PROCEDURE IN DETAIL:  The patient was brought to the operating room and   underwent general endotracheal anesthesia without complications.  Left lower   extremity was prepped and draped in standard fashion in supine position with   all appropriate padding.  Positive site verification confirmed his left lower   extremity as well as above procedure and confirmation  that he received   preoperative antibiotics.  Esmarch was used to exsanguinate his foot and ankle   and leg tourniquet was inflated to 250 mmHg.  Dorsal incision was made over   second webspace, was dissected down to the second metatarsal.  A microsagittal   saw was used to remove the distal aspect of the metatarsals as well as   plantarly.  The exact procedure was performed on the third toe.  The wound was   irrigated out.  Bone wax was placed over the ends to prevent regrowth.    Elliptical incision was made over the fourth PIP joint.  It was brought down   to and releasing the collateral ligaments.  Microsagittal saw was used to   remove the distal aspect of the proximal phalanx.  A 0.062 K-wire was then ran   retrograde out through the tip of the toe and back into the proximal phalanx.    Exact procedure was performed on the fifth toe without deviations or   complications.    An apical incision was made at the third toenail.  It was brought down.  The   toenail was removed.  A #15 blade was used to sharply excise the germinal   matrix.  A curette was used to curette out the remainder of it.  The sites   were then repaired with 3-0 nylon.  The wound was then irrigated with copious   irrigation and the wound was closed in a layered fashion using 3-0 Vicryl and   3-0 nylon.  Sterile dressings were applied.  Tourniquet released.  All toes   were pink.  He was transferred to recovery room in good condition.    Utilization of Dr. Elmore was necessary for patient positioning, holding,   retracting, wound closure and dressing placement.  He was present throughout   the entire procedure.       ____________________________________     MD SAÚL MOREL / EMILI    DD:  07/20/2020 08:14:51  DT:  07/20/2020 09:25:46    D#:  [id number]  Job#:  [id number]

## 2020-07-20 NOTE — DISCHARGE INSTRUCTIONS
ACTIVITY: Rest and take it easy for the first 24 hours.  A responsible adult is recommended to remain with you during that time.  It is normal to feel sleepy.  We encourage you to not do anything that requires balance, judgment or coordination.    MILD FLU-LIKE SYMPTOMS ARE NORMAL. YOU MAY EXPERIENCE GENERALIZED MUSCLE ACHES, THROAT IRRITATION, HEADACHE AND/OR SOME NAUSEA.    FOR 24 HOURS DO NOT:  Drive, operate machinery or run household appliances.  Drink beer or alcoholic beverages.   Make important decisions or sign legal documents.    SPECIAL INSTRUCTIONS:   Weight Bearing As Tolerated  Elevate/ice as needed  Crutches  In Post op shoe at all times    DIET: To avoid nausea, slowly advance diet as tolerated, avoiding spicy or greasy foods for the first day.  Add more substantial food to your diet according to your physician's instructions.  Babies can be fed formula or breast milk as soon as they are hungry.  INCREASE FLUIDS AND FIBER TO AVOID CONSTIPATION.    SURGICAL DRESSING/BATHING: Keep dressing clean and dry.    FOLLOW-UP APPOINTMENT:  A follow-up appointment should be arranged with your doctor in 1-2 weeks; call to schedule.    You should CALL YOUR PHYSICIAN if you develop:  Fever greater than 101 degrees F.  Pain not relieved by medication, or persistent nausea or vomiting.  Excessive bleeding (blood soaking through dressing) or unexpected drainage from the wound.  Extreme redness or swelling around the incision site, drainage of pus or foul smelling drainage.  Inability to urinate or empty your bladder within 8 hours.  Problems with breathing or chest pain.    You should call 911 if you develop problems with breathing or chest pain.  If you are unable to contact your doctor or surgical center, you should go to the nearest emergency room or urgent care center.  Physician's telephone #: [phone] Dr Kang    If any questions arise, call your doctor.  If your doctor is not available, please feel free  to call the Surgical Center at [phone].  The Center is open Monday through Friday from 7AM to 7PM.  You can also call the HEALTH HOTLINE open 24 hours/day, 7 days/week and speak to a nurse at [phone], or toll free at [phone].    A registered nurse may call you a few days after your surgery to see how you are doing after your procedure.    MEDICATIONS: Resume taking daily medication.  Take prescribed pain medication with food.  If no medication is prescribed, you may take non-aspirin pain medication if needed.  PAIN MEDICATION CAN BE VERY CONSTIPATING.  Take a stool softener or laxative such as senokot, pericolace, or milk of magnesia if needed.    Pain medication at home. Pain medication may be taken any time.    If your physician has prescribed pain medication that includes Acetaminophen (Tylenol), do not take additional Acetaminophen (Tylenol) while taking the prescribed medication.    Depression / Suicide Risk    As you are discharged from this [facility] facility, it is important to learn how to keep safe from harming yourself.    Recognize the warning signs:  · Abrupt changes in personality, positive or negative- including increase in energy   · Giving away possessions  · Change in eating patterns- significant weight changes-  positive or negative  · Change in sleeping patterns- unable to sleep or sleeping all the time   · Unwillingness or inability to communicate  · Depression  · Unusual sadness, discouragement and loneliness  · Talk of wanting to die  · Neglect of personal appearance   · Rebelliousness- reckless behavior  · Withdrawal from people/activities they love  · Confusion- inability to concentrate     If you or a loved one observes any of these behaviors or has concerns about self-harm, here's what you can do:  · Talk about it- your feelings and reasons for harming yourself  · Remove any means that you might use to hurt yourself (examples: pills, rope, extension cords,  firearm)  · Get professional help from the community (Mental Health, Substance Abuse, psychological counseling)  · Do not be alone:Call your Safe Contact- someone whom you trust who will be there for you.  · Call your local CRISIS HOTLINE 365-6622 or 829-076-9986  · Call your local Children's Mobile Crisis Response Team [place] (262) 178-0310 or www.[employer]  · Call the toll free National Suicide Prevention Hotlines   · National Suicide Prevention Lifeline 547-156-OJEY (4063)  · National Hope Line Network 800-SUICIDE (149-3846)

## 2020-07-20 NOTE — ANESTHESIA POSTPROCEDURE EVALUATION
Patient: Cristino Keys    Procedure Summary     Date:  07/20/20 Room / Location:  [place] [address] / SURGERY [facility]    Anesthesia Start:  0713 Anesthesia Stop:  0816    Procedures:       EXCISION, METATARSAL BONE, HEAD- PARTIAL DISTAL 2/3 METATARSAL (Left Foot)      CORRECTION, HAMMER TOE- 4/5 (Left Foot)      REMOVAL, TOENAIL 3-5 (Left Foot) Diagnosis:  (FOOT PAIN, OTHER HAMMER TOE LEFT FOOT)    Surgeon:  Haroldo Kang M.D. Responsible Provider:  Bill Sage M.D.    Anesthesia Type:  general ASA Status:  3          Final Anesthesia Type: general  Last vitals  BP   Blood Pressure: 117/74    Temp   37.1 °C (98.7 °F)    Pulse   Pulse: 75   Resp   12    SpO2   98 %      Anesthesia Post Evaluation    Patient location during evaluation: PACU  Patient participation: complete - patient participated  Level of consciousness: awake and alert    Airway patency: patent  Anesthetic complications: no  Cardiovascular status: hemodynamically stable  Respiratory status: acceptable  Hydration status: euvolemic    PONV: none           Nurse Pain Score: 0 (NPRS)

## 2020-07-20 NOTE — ANESTHESIA TIME REPORT
Anesthesia Start and Stop Event Times     Date Time Event    7/20/2020 0627 Ready for Procedure     0713 Anesthesia Start     0816 Anesthesia Stop        Responsible Staff  07/20/20    Name Role Begin End    Bill Sage M.D. Anesth 0713 0816        Preop Diagnosis (Free Text):  Pre-op Diagnosis     FOOT PAIN, OTHER HAMMER TOE LEFT FOOT        Preop Diagnosis (Codes):    Post op Diagnosis  Hammer toe      Premium Reason  Non-Premium    Comments:

## 2020-07-21 ENCOUNTER — APPOINTMENT (OUTPATIENT)
Dept: SLEEP MEDICINE | Facility: MEDICAL CENTER | Age: 60
End: 2020-07-21
Payer: COMMERCIAL

## 2020-07-24 NOTE — TELEPHONE ENCOUNTER
Pt would like pressure set to 14cm per PSG. FV will be made 2-3 months out.    Please sign order for pressure change.

## 2020-07-24 NOTE — TELEPHONE ENCOUNTER
Dr. Anderson, Pt was curious is the PLMs being in the 400's is something to be concerned about, also he is wondering if the fact that he didn't have a deep sleep that night would make it seem like his o2 levels were normal since they were abnormal on the OPO.    Please advise.

## 2020-07-27 ENCOUNTER — OFFICE VISIT (OUTPATIENT)
Dept: ENDOCRINOLOGY | Facility: MEDICAL CENTER | Age: 60
End: 2020-07-27
Attending: INTERNAL MEDICINE
Payer: COMMERCIAL

## 2020-07-27 VITALS
DIASTOLIC BLOOD PRESSURE: 76 MMHG | SYSTOLIC BLOOD PRESSURE: 132 MMHG | HEART RATE: 100 BPM | BODY MASS INDEX: 37.82 KG/M2 | WEIGHT: 264.2 LBS | HEIGHT: 70 IN

## 2020-07-27 DIAGNOSIS — E29.1 HYPOGONADISM IN MALE: ICD-10-CM

## 2020-07-27 DIAGNOSIS — D75.1 POLYCYTHEMIA: ICD-10-CM

## 2020-07-27 PROCEDURE — 99213 OFFICE O/P EST LOW 20 MIN: CPT | Performed by: INTERNAL MEDICINE

## 2020-07-27 PROCEDURE — 96372 THER/PROPH/DIAG INJ SC/IM: CPT | Performed by: INTERNAL MEDICINE

## 2020-07-27 RX ORDER — TESTOSTERONE CYPIONATE 200 MG/ML
100 INJECTION, SOLUTION INTRAMUSCULAR ONCE
Status: COMPLETED | OUTPATIENT
Start: 2020-07-27 | End: 2020-07-27

## 2020-07-27 RX ADMIN — TESTOSTERONE CYPIONATE 100 MG: 200 INJECTION, SOLUTION INTRAMUSCULAR at 10:51

## 2020-07-27 ASSESSMENT — FIBROSIS 4 INDEX: FIB4 SCORE: 1.46

## 2020-07-27 NOTE — PROGRESS NOTES
Chief Complaint   Patient presents with   • Hypogonadism        HPI:    Hypogonadism.          Wife is in to learn how to give IM injections of testosterone.  We are going to lower the dose from 150 down to 100 mg every 2 weeks.  The main problem being that he is developing polycythemia regularly.  I think this is a combination of testosterone and inadequate treatment of sleep apnea.  He is still looking into that with the question of using nocturnal oxygen.  If it is appropriate I think it will help.         He will get a shot of 100 mg testosterone today and repeat this and hemogram in 2 weeks    ROS:  Left foot orthopedic surgery.  Quite a bit of swelling and pain and he will stay in touch with his surgeon about that.  No fever.  The foot does not look exceptionally erythematous nor is it draining      Allergies:   Allergies   Allergen Reactions   • Demerol Vomiting and Nausea     Rxn = years ago    • Cubicin [Daptomycin] Vomiting   • Meperidine      2004-02-25;NA   • Sulfa Drugs Hives     .   • Sulfamethoxazole W-Trimethoprim Rash     2004-02-25;NA       Current medicines including changes today:  Current Outpatient Medications   Medication Sig Dispense Refill   • liraglutide (VICTOZA) 18 MG/3ML Solution Pen-injector Inject 1.8 mg as instructed every day.     • hydrOXYzine pamoate (VISTARIL) 50 MG Cap Take 50 mg by mouth 2 Times a Day.     • ipratropium (ATROVENT) 0.03 % Solution Spray 1 Spray in nose 3 times a day.     • gabapentin (NEURONTIN) 300 MG Cap Take 900 mg by mouth 1 time daily as needed.     • azelastine (ASTELIN) 137 MCG/SPRAY nasal spray [place] 1 Spray in nose 2 Times a Day.     • Diclofenac Sodium 1 % Gel Apply 1 Application to affected area(s) 2 times a day as needed.     • NOVOFINE PLUS Inject 1 Application as instructed 4 Times a Day,Before Meals and at Bedtime. Use before meals and at bedtime to check blood sugar.     • Lidocaine (LIDOCAINE PAIN RELIEF) 4 % Patch 1 Patch by Apply externally  "route every 24 hours.     • SYNTHROID 137 MCG Tab TAKE 1 TABLET DAILY 90 Tab 1   • lamoTRIgine (LAMICTAL) 100 MG Tab Take 100 mg by mouth every day.     • cyclobenzaprine (FLEXERIL) 10 MG Tab Take 10 mg by mouth 3 times a day.  5   • amphetamine-dextroamphetamine (ADDERALL) 5 MG Tab Take 5 mg by mouth every day.     • tamsulosin (FLOMAX) 0.4 MG capsule Take 0.4 mg by mouth ONE-HALF HOUR AFTER BREAKFAST.     • temazepam (RESTORIL) 30 MG capsule Take 30 mg by mouth at bedtime as needed for Sleep.     • venlafaxine XR (EFFEXOR XR) 75 MG CAPSULE SR 24 HR Take 75 mg by mouth every morning.     • alprazolam (XANAX) 0.25 MG Tab Take 0.25-0.5 mg by mouth 4 times a day as needed for Anxiety.     • losartan (COZAAR) 100 MG Tab Take 100 mg by mouth every morning.     • oxycodone-acetaminophen (PERCOCET) 5-325 MG TABS Take 1-2 Tabs by mouth every four hours as needed.       Current Facility-Administered Medications   Medication Dose Route Frequency Provider Last Rate Last Dose   • testosterone cypionate (DEPO-TESTOSTERONE) injection 150 mg  150 mg Intramuscular Q14 DAYS Reji Love M.D.   150 mg at 06/17/20 1108        Past Medical History:   Diagnosis Date   • Anesthesia 11/03/2017    PONV with shoulder surgery approx 20 years ago.   • Anxiety and depression 11/03/2017   • Arthritis 11/03/2017    Osteoarthritis, \" all over\"   • Bunion    • Cancer (HCC) 2017    skin lesion cut out, on left shoulder   • Chronic autoimmune thyroiditis      + US / + TPO = 57   • Dental disorder 11/03/2017    \"Upper Plate\"   • Diabetes mellitus (HCC) 11/03/2017    \"Controlled with diet & Victoza\"   • Gout    • Hammertoe    • High cholesterol 11/03/2017    HX OF, not currently on medication for.   • Hypertension    • Hypothyroidism     chronic thyroiditis   • MRSA infection    • Open toe wound 03/2018    Left big toe, open wound, started 2/2018, receiving wound care therapy two times a week   • Pain 11/03/2017    \"Pain all over except right " "hip & ankle\"   • Psychiatric problem     depression/anxiety   • Sleep apnea 11/03/2017    CPAP USE   • Snoring 11/03/2017    DX JULIAN   • Tonsillitis        PHYSICAL EXAM:    /76 (BP Location: Left arm, Patient Position: Sitting, BP Cuff Size: Adult)   Pulse 100   Ht 1.778 m (5' 10\")   Wt 119.8 kg (264 lb 3.2 oz)   BMI 37.91 kg/m²     Gen. obese but otherwise appears healthy     Skin   appropriate for sex and age    HEENT  unremarkablle    Heart  regular    Extremities  no edema    Neuro    left foot in a boot and heavily taped.    Psych  appropriate    ASSESSMENT AND RECOMMENDATIONS    1. Hypogonadism in male              See HPI.               100 mg testosterone IM today.  Update lab in 2 weeks  - Testosterone Cypionate    2. Polycythemia            Update lab in 2 weeks       DISPOSITION: Follow-up in 1 month      Florentin Weiss M.D.    Copies to: Kevin Chavez M.D. [phone]  "

## 2020-07-27 NOTE — PROGRESS NOTES
Wife educated on how to store, draw up, site rotation, inject, and dispose of sharps.  She was able to draw up and give the injection without difficulty.  He would like his prescription sent to [facility] home delivery.

## 2020-07-30 ENCOUNTER — TELEPHONE (OUTPATIENT)
Dept: ONCOLOGY | Facility: MEDICAL CENTER | Age: 60
End: 2020-07-30

## 2020-07-31 ENCOUNTER — OUTPATIENT INFUSION SERVICES (OUTPATIENT)
Dept: ONCOLOGY | Facility: MEDICAL CENTER | Age: 60
End: 2020-07-31
Attending: INTERNAL MEDICINE
Payer: COMMERCIAL

## 2020-07-31 VITALS
HEART RATE: 106 BPM | TEMPERATURE: 98.5 F | RESPIRATION RATE: 17 BRPM | DIASTOLIC BLOOD PRESSURE: 74 MMHG | HEIGHT: 70 IN | BODY MASS INDEX: 38.41 KG/M2 | SYSTOLIC BLOOD PRESSURE: 149 MMHG | OXYGEN SATURATION: 95 % | WEIGHT: 268.3 LBS

## 2020-07-31 LAB
HCT VFR BLD CALC: 48 % (ref 42–52)
HGB BLD-MCNC: 16.3 G/DL (ref 14–18)

## 2020-07-31 PROCEDURE — 36415 COLL VENOUS BLD VENIPUNCTURE: CPT

## 2020-07-31 PROCEDURE — 85014 HEMATOCRIT: CPT

## 2020-07-31 ASSESSMENT — FIBROSIS 4 INDEX: FIB4 SCORE: 1.46

## 2020-07-31 NOTE — PROGRESS NOTES
Pt ambulatory to [facility] for possible Therapeutic Phlebotomy.  POC discussed with patient, pt verbalizes agreement.  PIV placed, Istat H and H collected and reviewed.  Hgb 16.3.  Pt does NOT meet parameters to receive TP.  PIV flushed and removed, gauze and coban to site.  Pt discharged to self care in [facility], next appointment confirmed.      Staff message sent to Dr Weiss

## 2020-08-03 ENCOUNTER — HOSPITAL ENCOUNTER (OUTPATIENT)
Facility: MEDICAL CENTER | Age: 60
End: 2020-08-03
Attending: PHYSICIAN ASSISTANT
Payer: COMMERCIAL

## 2020-08-03 LAB
APPEARANCE FLD: NORMAL
BODY FLD TYPE: NORMAL
COLOR FLD: NORMAL
CSF COMMENTS 1658: NORMAL
EOSINOPHIL NFR FLD: 7 %
GRAM STN SPEC: NORMAL
LYMPHOCYTES NFR FLD: 9 %
NEUTROPHILS NFR FLD: 84 %
RBC # FLD: NORMAL CELLS/UL
SIGNIFICANT IND 70042: NORMAL
SITE SITE: NORMAL
SOURCE SOURCE: NORMAL
WBC # FLD: 156 CELLS/UL

## 2020-08-03 PROCEDURE — 89051 BODY FLUID CELL COUNT: CPT

## 2020-08-03 PROCEDURE — 87070 CULTURE OTHR SPECIMN AEROBIC: CPT

## 2020-08-03 PROCEDURE — 87205 SMEAR GRAM STAIN: CPT

## 2020-08-05 NOTE — TELEPHONE ENCOUNTER
Periodic limb movements in sleep are repetitive movements, most typically in the lower limbs. Most people [place] not feel PLMS on their own, it is observed by the bed partners. If you have PLMS, or sleep with someone who has PLMS (also referred to as PLMD, periodic limb movement disorder), you may recognize these movements as brief muscle twitches, jerking movements or an upward flexing of the feet.     The exact cause of PLMS is still unknown. The underlying mechanisms probably involve factors in the nervous system, although studies have not revealed any consistent abnormalites. PLMS are not considered medically serious. However it can be seen in patient who have restless legs, sleep apnea  and other medical conditions like kidney disease, diabetes or anemia. They can be contributing factor in chronic insomnia and/or daytime fatigue because they may cause awakenings during the night.    We usually do not treat PLMS until and unless PLMS are accompanied by restless legs (RLS), insomnia or daytime fatigue.

## 2020-08-05 NOTE — TELEPHONE ENCOUNTER
Dr. Gonzalez can you give some insight about the PLMs.    Pt was curious is the PLMs being in the 400's is something to be concerned about, also he is wondering if the fact that he didn't have a deep sleep that night would make it seem like his o2 levels were normal since they were abnormal on the OPO.

## 2020-08-06 DIAGNOSIS — E29.1 HYPOGONADISM IN MALE: ICD-10-CM

## 2020-08-06 RX ORDER — TESTOSTERONE CYPIONATE 200 MG/ML
100 INJECTION, SOLUTION INTRAMUSCULAR ONCE
Status: SHIPPED | OUTPATIENT
Start: 2020-08-07 | End: 2020-08-08

## 2020-08-06 RX ORDER — TESTOSTERONE CYPIONATE 200 MG/ML
100 VIAL (ML) INTRAMUSCULAR
Qty: 12 ML | Refills: 0 | Status: SHIPPED
Start: 2020-08-06 | End: 2020-10-23

## 2020-08-07 DIAGNOSIS — E29.1 HYPOGONADISM IN MALE: ICD-10-CM

## 2020-08-09 LAB
BACTERIA FLD AEROBE CULT: NORMAL
GRAM STN SPEC: NORMAL
SIGNIFICANT IND 70042: NORMAL
SITE SITE: NORMAL
SOURCE SOURCE: NORMAL

## 2020-08-11 ENCOUNTER — NON-PROVIDER VISIT (OUTPATIENT)
Dept: ENDOCRINOLOGY | Facility: MEDICAL CENTER | Age: 60
End: 2020-08-11
Attending: INTERNAL MEDICINE
Payer: COMMERCIAL

## 2020-08-11 DIAGNOSIS — E29.1 HYPOGONADISM IN MALE: ICD-10-CM

## 2020-08-11 PROCEDURE — 96372 THER/PROPH/DIAG INJ SC/IM: CPT | Performed by: INTERNAL MEDICINE

## 2020-08-11 RX ADMIN — TESTOSTERONE CYPIONATE 150 MG: 200 INJECTION, SOLUTION INTRAMUSCULAR at 10:38

## 2020-08-11 NOTE — NON-PROVIDER
Cristino Keys is a 60 y.o. male here for a non-provider visit for Testosterone injection.    Reason for injection: hypogonadism  Order in MAR?: Yes  Patient supplied?:No  Minimum interval has been met for this injection (per MAR order): Yes    Order and dose verified by: FROYLAN  Patient tolerated injection and no adverse effects were observed or reported: Yes    # of Administrations remaining in MAR: 3

## 2020-08-24 ENCOUNTER — OFFICE VISIT (OUTPATIENT)
Dept: ENDOCRINOLOGY | Facility: MEDICAL CENTER | Age: 60
End: 2020-08-24
Attending: INTERNAL MEDICINE
Payer: COMMERCIAL

## 2020-08-24 VITALS
OXYGEN SATURATION: 98 % | HEIGHT: 70 IN | WEIGHT: 261 LBS | SYSTOLIC BLOOD PRESSURE: 142 MMHG | HEART RATE: 66 BPM | BODY MASS INDEX: 37.37 KG/M2 | DIASTOLIC BLOOD PRESSURE: 88 MMHG

## 2020-08-24 DIAGNOSIS — E29.1 HYPOGONADISM IN MALE: ICD-10-CM

## 2020-08-24 DIAGNOSIS — D75.1 POLYCYTHEMIA: ICD-10-CM

## 2020-08-24 PROCEDURE — 99213 OFFICE O/P EST LOW 20 MIN: CPT | Performed by: INTERNAL MEDICINE

## 2020-08-24 PROCEDURE — 99211 OFF/OP EST MAY X REQ PHY/QHP: CPT | Performed by: INTERNAL MEDICINE

## 2020-08-24 ASSESSMENT — FIBROSIS 4 INDEX: FIB4 SCORE: 1.46

## 2020-08-24 NOTE — PROGRESS NOTES
"Chief Complaint   Patient presents with   • Hypogonadism        HPI:           New plan is to have his wife administer testosterone 100 mg IM every 2 weeks.  He has just now acquired the testosterone and can start today.  We will update lab including a testosterone level and hemogram in 1 week and further discuss.  The problem has been polycythemia presumably related to a combination of  testosterone and sleep apnea.    ROS:  Primary problems are orthopedic with multiple surgeries.  Most recently left foot surgery is healing satisfactorily.      Allergies:   Allergies   Allergen Reactions   • Demerol Vomiting and Nausea     Rxn = years ago    • Cubicin [Daptomycin] Vomiting   • Meperidine      2004-02-25;NA   • Sulfa Drugs Hives     .   • Sulfamethoxazole W-Trimethoprim Rash     2004-02-25;NA       Current medicines including changes today:  Current Outpatient Medications   Medication Sig Dispense Refill   • SYRINGE-NEEDLE, DISP, 3 ML (LUER LOCK SAFETY SYRINGES) 22G X 1-1/2\" 3 ML Misc 1 Each by Does not apply route every 7 days. 12 Each 3   • Testosterone Cypionate 200 MG/ML Solution 100 mg by Intramuscular route every 7 days for 12 doses. This is a single dose vial.  Discard remainder after each use 12 mL 0   • liraglutide (VICTOZA) 18 MG/3ML Solution Pen-injector Inject 1.8 mg as instructed every day.     • hydrOXYzine pamoate (VISTARIL) 50 MG Cap Take 50 mg by mouth 2 Times a Day.     • ipratropium (ATROVENT) 0.03 % Solution Spray 1 Spray in nose 3 times a day.     • gabapentin (NEURONTIN) 300 MG Cap Take 900 mg by mouth 1 time daily as needed.     • azelastine (ASTELIN) 137 MCG/SPRAY nasal spray [place] 1 Spray in nose 2 Times a Day.     • Diclofenac Sodium 1 % Gel Apply 1 Application to affected area(s) 2 times a day as needed.     • NOVOFINE PLUS Inject 1 Application as instructed 4 Times a Day,Before Meals and at Bedtime. Use before meals and at bedtime to check blood sugar.     • Lidocaine (LIDOCAINE PAIN " "RELIEF) 4 % Patch 1 Patch by Apply externally route every 24 hours.     • SYNTHROID 137 MCG Tab TAKE 1 TABLET DAILY 90 Tab 1   • lamoTRIgine (LAMICTAL) 100 MG Tab Take 100 mg by mouth every day.     • cyclobenzaprine (FLEXERIL) 10 MG Tab Take 10 mg by mouth 3 times a day.  5   • amphetamine-dextroamphetamine (ADDERALL) 5 MG Tab Take 5 mg by mouth every day.     • tamsulosin (FLOMAX) 0.4 MG capsule Take 0.4 mg by mouth ONE-HALF HOUR AFTER BREAKFAST.     • temazepam (RESTORIL) 30 MG capsule Take 30 mg by mouth at bedtime as needed for Sleep.     • venlafaxine XR (EFFEXOR XR) 75 MG CAPSULE SR 24 HR Take 75 mg by mouth every morning.     • alprazolam (XANAX) 0.25 MG Tab Take 0.25-0.5 mg by mouth 4 times a day as needed for Anxiety.     • losartan (COZAAR) 100 MG Tab Take 100 mg by mouth every morning.     • oxycodone-acetaminophen (PERCOCET) 5-325 MG TABS Take 1-2 Tabs by mouth every four hours as needed.       Current Facility-Administered Medications   Medication Dose Route Frequency Provider Last Rate Last Dose   • testosterone cypionate (DEPO-TESTOSTERONE) injection 150 mg  150 mg Intramuscular Q14 DAYS Reji Love M.D.   150 mg at 08/11/20 1038        Past Medical History:   Diagnosis Date   • Anesthesia 11/03/2017    PONV with shoulder surgery approx 20 years ago.   • Anxiety and depression 11/03/2017   • Arthritis 11/03/2017    Osteoarthritis, \" all over\"   • Bunion    • Cancer (HCC) 2017    skin lesion cut out, on left shoulder   • Chronic autoimmune thyroiditis      + US / + TPO = 57   • Dental disorder 11/03/2017    \"Upper Plate\"   • Diabetes mellitus (HCC) 11/03/2017    \"Controlled with diet & Victoza\"   • Gout    • Hammertoe    • High cholesterol 11/03/2017    HX OF, not currently on medication for.   • Hypertension    • Hypothyroidism     chronic thyroiditis   • MRSA infection    • Open toe wound 03/2018    Left big toe, open wound, started 2/2018, receiving wound care therapy two times a week   • " "Pain 11/03/2017    \"Pain all over except right hip & ankle\"   • Psychiatric problem     depression/anxiety   • Sleep apnea 11/03/2017    CPAP USE   • Snoring 11/03/2017    DX JULIAN   • Tonsillitis        PHYSICAL EXAM:    /88 (BP Location: Left arm, Patient Position: Sitting, BP Cuff Size: Adult)   Pulse 66   Ht 1.778 m (5' 10\")   Wt 118.4 kg (261 lb)   SpO2 98%   BMI 37.45 kg/m²     Gen. obese but otherwise appears healthy in no distress    Skin   appropriate for sex and age    HEENT  unremarkable    Heart  regular    Extremities  no edema    Neuro    ambulates comfortably with left foot in a boot    Psych good spirits      ASSESSMENT AND RECOMMENDATIONS    1. Polycythemia                Current hemoglobin 16.3 and hematocrit 48.  He has not had a testosterone injection in 2 weeks.      2. Hypogonadism in male          Changing regimen to wife administering 100 mg IM every 2 weeks           In 1 week he will update lab    DISPOSITION: Follow-up blood test results in 1 week by telephone in my chart                             Return to office in 2 months if stable       Florentin Weiss M.D.    Copies to: Kevin Chavez M.D. [phone]  "

## 2020-09-01 ENCOUNTER — HOSPITAL ENCOUNTER (OUTPATIENT)
Dept: LAB | Facility: MEDICAL CENTER | Age: 60
End: 2020-09-01
Attending: INTERNAL MEDICINE
Payer: COMMERCIAL

## 2020-09-01 DIAGNOSIS — E29.1 HYPOGONADISM IN MALE: ICD-10-CM

## 2020-09-01 DIAGNOSIS — D75.1 POLYCYTHEMIA: ICD-10-CM

## 2020-09-01 LAB
ERYTHROCYTE [DISTWIDTH] IN BLOOD BY AUTOMATED COUNT: 52.7 FL (ref 35.9–50)
ESTRADIOL SERPL-MCNC: 39 PG/ML
HCT VFR BLD AUTO: 52.6 % (ref 42–52)
HGB BLD-MCNC: 17.1 G/DL (ref 14–18)
MCH RBC QN AUTO: 31 PG (ref 27–33)
MCHC RBC AUTO-ENTMCNC: 32.5 G/DL (ref 33.7–35.3)
MCV RBC AUTO: 95.5 FL (ref 81.4–97.8)
PLATELET # BLD AUTO: 226 K/UL (ref 164–446)
PMV BLD AUTO: 9.3 FL (ref 9–12.9)
RBC # BLD AUTO: 5.51 M/UL (ref 4.7–6.1)
WBC # BLD AUTO: 7.3 K/UL (ref 4.8–10.8)

## 2020-09-01 PROCEDURE — 84402 ASSAY OF FREE TESTOSTERONE: CPT

## 2020-09-01 PROCEDURE — 36415 COLL VENOUS BLD VENIPUNCTURE: CPT

## 2020-09-01 PROCEDURE — 85027 COMPLETE CBC AUTOMATED: CPT

## 2020-09-01 PROCEDURE — 82670 ASSAY OF TOTAL ESTRADIOL: CPT

## 2020-09-03 LAB — TESTOST FREE SERPL-MCNC: 104 PG/ML (ref 47–244)

## 2020-09-11 ENCOUNTER — OUTPATIENT INFUSION SERVICES (OUTPATIENT)
Dept: ONCOLOGY | Facility: MEDICAL CENTER | Age: 60
End: 2020-09-11
Attending: INTERNAL MEDICINE
Payer: COMMERCIAL

## 2020-09-11 VITALS
SYSTOLIC BLOOD PRESSURE: 132 MMHG | BODY MASS INDEX: 37.91 KG/M2 | DIASTOLIC BLOOD PRESSURE: 78 MMHG | HEART RATE: 102 BPM | TEMPERATURE: 97.8 F | RESPIRATION RATE: 18 BRPM | HEIGHT: 70 IN | OXYGEN SATURATION: 94 % | WEIGHT: 264.77 LBS

## 2020-09-11 DIAGNOSIS — D75.1 POLYCYTHEMIA: ICD-10-CM

## 2020-09-11 LAB
HCT VFR BLD CALC: 52 % (ref 42–52)
HGB BLD-MCNC: 17.7 G/DL (ref 14–18)

## 2020-09-11 PROCEDURE — 85014 HEMATOCRIT: CPT

## 2020-09-11 PROCEDURE — 36415 COLL VENOUS BLD VENIPUNCTURE: CPT

## 2020-09-11 ASSESSMENT — FIBROSIS 4 INDEX: FIB4 SCORE: 1.05

## 2020-09-11 ASSESSMENT — PAIN DESCRIPTION - PAIN TYPE: TYPE: CHRONIC PAIN

## 2020-09-11 NOTE — PROGRESS NOTES
Pt presented to [facility] for labs and possible TP.  PIV placed left AC. Flushed easily, lisa briskly. Istat H&H drawn. Hgb 17.7, Hct 52 .  Pt did not meet parameters for TP. PIV flushed with NS, then d/c'd. Catheter tip remained intact. Gauze and coban to site.  Pt left [facility] in NAD. Next appointment time confirmed prior to departure.

## 2020-09-22 ENCOUNTER — APPOINTMENT (RX ONLY)
Dept: URBAN - METROPOLITAN AREA CLINIC 22 | Facility: CLINIC | Age: 60
Setting detail: DERMATOLOGY
End: 2020-09-22

## 2020-09-22 DIAGNOSIS — Z71.89 OTHER SPECIFIED COUNSELING: ICD-10-CM

## 2020-09-22 DIAGNOSIS — B35.3 TINEA PEDIS: ICD-10-CM

## 2020-09-22 DIAGNOSIS — L57.0 ACTINIC KERATOSIS: ICD-10-CM

## 2020-09-22 DIAGNOSIS — D18.0 HEMANGIOMA: ICD-10-CM

## 2020-09-22 DIAGNOSIS — L82.1 OTHER SEBORRHEIC KERATOSIS: ICD-10-CM

## 2020-09-22 DIAGNOSIS — Z85.828 PERSONAL HISTORY OF OTHER MALIGNANT NEOPLASM OF SKIN: ICD-10-CM

## 2020-09-22 DIAGNOSIS — D22 MELANOCYTIC NEVI: ICD-10-CM

## 2020-09-22 DIAGNOSIS — L81.4 OTHER MELANIN HYPERPIGMENTATION: ICD-10-CM

## 2020-09-22 PROBLEM — D18.01 HEMANGIOMA OF SKIN AND SUBCUTANEOUS TISSUE: Status: ACTIVE | Noted: 2020-09-22

## 2020-09-22 PROBLEM — D22.5 MELANOCYTIC NEVI OF TRUNK: Status: ACTIVE | Noted: 2020-09-22

## 2020-09-22 PROBLEM — D48.5 NEOPLASM OF UNCERTAIN BEHAVIOR OF SKIN: Status: ACTIVE | Noted: 2020-09-22

## 2020-09-22 PROCEDURE — ? LIQUID NITROGEN

## 2020-09-22 PROCEDURE — ? PHOTO-DOCUMENTATION

## 2020-09-22 PROCEDURE — 17003 DESTRUCT PREMALG LES 2-14: CPT

## 2020-09-22 PROCEDURE — 99214 OFFICE O/P EST MOD 30 MIN: CPT | Mod: 25

## 2020-09-22 PROCEDURE — ? BIOPSY BY SHAVE METHOD

## 2020-09-22 PROCEDURE — 11102 TANGNTL BX SKIN SINGLE LES: CPT

## 2020-09-22 PROCEDURE — ? COUNSELING

## 2020-09-22 PROCEDURE — ? PRESCRIPTION

## 2020-09-22 PROCEDURE — 17000 DESTRUCT PREMALG LESION: CPT | Mod: 59

## 2020-09-22 RX ORDER — KETOCONAZOLE 20 MG/G
1 CREAM TOPICAL BID
Qty: 1 | Refills: 2 | Status: ERX | COMMUNITY
Start: 2020-09-22

## 2020-09-22 RX ADMIN — KETOCONAZOLE 1: 20 CREAM TOPICAL at 00:00

## 2020-09-22 ASSESSMENT — LOCATION DETAILED DESCRIPTION DERM
LOCATION DETAILED: RIGHT SUPERIOR MEDIAL MIDBACK
LOCATION DETAILED: RIGHT PROXIMAL POSTERIOR UPPER ARM
LOCATION DETAILED: LEFT LATERAL SHOULDER
LOCATION DETAILED: RIGHT PROXIMAL RADIAL DORSAL FOREARM
LOCATION DETAILED: EPIGASTRIC SKIN
LOCATION DETAILED: LEFT INSTEP
LOCATION DETAILED: RIGHT INSTEP
LOCATION DETAILED: RIGHT DISTAL DORSAL FOREARM
LOCATION DETAILED: LEFT SUPERIOR MEDIAL UPPER BACK

## 2020-09-22 ASSESSMENT — LOCATION ZONE DERM
LOCATION ZONE: TRUNK
LOCATION ZONE: ARM
LOCATION ZONE: FEET

## 2020-09-22 ASSESSMENT — LOCATION SIMPLE DESCRIPTION DERM
LOCATION SIMPLE: RIGHT LOWER BACK
LOCATION SIMPLE: LEFT UPPER BACK
LOCATION SIMPLE: RIGHT UPPER ARM
LOCATION SIMPLE: LEFT PLANTAR SURFACE
LOCATION SIMPLE: ABDOMEN
LOCATION SIMPLE: LEFT SHOULDER
LOCATION SIMPLE: RIGHT FOREARM
LOCATION SIMPLE: RIGHT PLANTAR SURFACE

## 2020-09-22 NOTE — PROCEDURE: BIOPSY BY SHAVE METHOD
Detail Level: Detailed
Depth Of Biopsy: dermis
Was A Bandage Applied: Yes
Size Of Lesion In Cm: 0.3
X Size Of Lesion In Cm: 0
Biopsy Type: H and E
Biopsy Method: Personna blade
Anesthesia Type: 1% lidocaine with 1:100,000 epinephrine and a 1:3 solution of 8.4% sodium bicarbonate
Anesthesia Volume In Cc: 1
Hemostasis: Drysol
Wound Care: Vaseline
Dressing: bandage
Destruction After The Procedure: No
Type Of Destruction Used: Curettage
Curettage Text: The wound bed was treated with curettage after the biopsy was performed.
Cryotherapy Text: The wound bed was treated with cryotherapy after the biopsy was performed.
Electrodesiccation Text: The wound bed was treated with electrodesiccation after the biopsy was performed.
Electrodesiccation And Curettage Text: The wound bed was treated with electrodesiccation and curettage after the biopsy was performed.
Silver Nitrate Text: The wound bed was treated with silver nitrate after the biopsy was performed.
Lab: [id number]
Lab Facility: 
Consent: Written consent was obtained and risks were reviewed including but not limited to scarring, infection, bleeding, scabbing, incomplete removal, nerve damage and allergy to anesthesia.
Post-Care Instructions: I reviewed with the patient in detail post-care instructions. Patient is to keep the biopsy site dry overnight, and then apply bacitracin twice daily until healed. Patient may apply hydrogen peroxide soaks to remove any crusting.
Notification Instructions: Patient will be notified of biopsy results. However, patient instructed to call the office if not contacted within 2 weeks.
Billing Type: Third-Party Bill
Information: Selecting Yes will display possible errors in your note based on the variables you have selected. This validation is only offered as a suggestion for you. PLEASE NOTE THAT THE VALIDATION TEXT WILL BE REMOVED WHEN YOU FINALIZE YOUR NOTE. IF YOU WANT TO FAX A PRELIMINARY NOTE YOU WILL NEED TO TOGGLE THIS TO 'NO' IF YOU DO NOT WANT IT IN YOUR FAXED NOTE.

## 2020-09-22 NOTE — PROCEDURE: PHOTO-DOCUMENTATION
Details (Free Text): Consent obtained today.
Detail Level: Detailed
Photo Preface (Leave Blank If You Do Not Want): Photographs were obtained today

## 2020-09-24 ENCOUNTER — OFFICE VISIT (OUTPATIENT)
Dept: SLEEP MEDICINE | Facility: MEDICAL CENTER | Age: 60
End: 2020-09-24
Payer: COMMERCIAL

## 2020-09-24 VITALS
WEIGHT: 265 LBS | OXYGEN SATURATION: 96 % | HEART RATE: 103 BPM | SYSTOLIC BLOOD PRESSURE: 128 MMHG | DIASTOLIC BLOOD PRESSURE: 78 MMHG | HEIGHT: 70 IN | BODY MASS INDEX: 37.94 KG/M2

## 2020-09-24 DIAGNOSIS — G47.33 OSA (OBSTRUCTIVE SLEEP APNEA): ICD-10-CM

## 2020-09-24 DIAGNOSIS — I10 HYPERTENSION, UNSPECIFIED TYPE: ICD-10-CM

## 2020-09-24 PROCEDURE — 99213 OFFICE O/P EST LOW 20 MIN: CPT | Performed by: NURSE PRACTITIONER

## 2020-09-24 ASSESSMENT — FIBROSIS 4 INDEX: FIB4 SCORE: 1.05

## 2020-09-24 NOTE — PROGRESS NOTES
Chief Complaint   Patient presents with   • Follow-Up     JULIAN-Last seen 04/30/2020       HPI:      Mr. Keys is a 59 y/o male patient who is in today for JULIAN f/u.    Overnight oximetry performed on 4/9/2020 while on ACPAP 10 to 15 cm water indicated patel saturation was 84%, his mean saturation was 86%, and his saturations less than 80% for 240 minutes of the flow evaluation time.    OPO on autoCPAP 10-20 cmH2O from 5/20/20 indicated the patient spent 46% of the night below SPO2 of 90%, to a patel of 76%. In laboratory CPAP titration polysomnogram advised for accurate calibration of airway pressures vs oxygen bleed in.    PSG titration from 6/30/20 indicated successful CPAP titration at 14 cm of water. Elevated periodic limb movements. CPAP was started at 10 cm of water and titrated to 14 with resultant AHI of 2.7 and mean oximetry 93%. There was a mean oxygen saturation of 91.0% with a minimum oxygen saturation of 83.0%. Time spent with oxygen saturations below 89% was 61.7 minutes.    First compliance report, with new pressure change, from 8/25/20-9/23/20 was downloaded and reviewed with the patient which showed CPAP 14 cmH2O, 100% compliance, 8 hrs 55 min use, AHI of 2.1. He is tolerating the pressure and mask well. He wakes up refreshed and is less tired throughout the day and does not nap.   He recently had foot surgery and was not very active for about 3 months.  He does stay active by walking daily 1 to 3 miles a day and also does yard work.  He follows up with his PCP for blood pressure medication however his PCP has been out of the office for some time.  He is going to obtain a blood pressure machine so he can monitor his blood pressure at home.  He reports his heart rate is elevated today due to traffic and rushing to come to his appointment on time.  He denies any new health problems or medications.      ROS:    Constitutional: Denies fevers, Denies weight changes  Eyes: Denies changes in vision, no eye  "pain  Ears/Nose/Throat/Mouth: Denies nasal congestion or sore throat   Cardiovascular: Denies chest pain or palpitations   Respiratory: Denies shortness of breath , Denies cough  Gastrointestinal/Hepatic: Denies abdominal pain, nausea, vomiting,   Skin/Breast: Denies rash,   Neurological: Denies headache, confusion,   Psychiatric: denies mood disorder   Sleep: denies snoring       Past Medical History:   Diagnosis Date   • Anesthesia 11/03/2017    PONV with shoulder surgery approx 20 years ago.   • Anxiety and depression 11/03/2017   • Arthritis 11/03/2017    Osteoarthritis, \" all over\"   • Bunion    • Cancer (HCC) 2017    skin lesion cut out, on left shoulder   • Chronic autoimmune thyroiditis      + US / + TPO = 57   • Dental disorder 11/03/2017    \"Upper Plate\"   • Diabetes mellitus (HCC) 11/03/2017    \"Controlled with diet & Victoza\"   • Gout    • Hammertoe    • High cholesterol 11/03/2017    HX OF, not currently on medication for.   • Hypertension    • Hypothyroidism     chronic thyroiditis   • MRSA infection    • Open toe wound 03/2018    Left big toe, open wound, started 2/2018, receiving wound care therapy two times a week   • Pain 11/03/2017    \"Pain all over except right hip & ankle\"   • Psychiatric problem     depression/anxiety   • Sleep apnea 11/03/2017    CPAP USE   • Snoring 11/03/2017    DX JULIAN   • Tonsillitis        Past Surgical History:   Procedure Laterality Date   • METATARSAL HEAD RESECTION Left 7/20/2020    Procedure: EXCISION, METATARSAL BONE, HEAD- PARTIAL DISTAL 2/3 METATARSAL;  Surgeon: Haroldo Kang M.D.;  Location: [facility];  Service: Orthopedics   • HAMMERTOE CORRECTION Left 7/20/2020    Procedure: CORRECTION, HAMMER TOE- 4/5;  Surgeon: Haroldo Kang M.D.;  Location: [facility];  Service: Orthopedics   • TOENAIL REMOVAL Left 7/20/2020    Procedure: REMOVAL, TOENAIL 3-5;  Surgeon: Haroldo Kang M.D.;  Location: [facility];  Service: " Orthopedics   • METATARSAL HEAD RESECTION Left 6/10/2019    Procedure: METATARSAL HEAD RESECTION- FOR PARTIAL EXCISION;  Surgeon: Haroldo Kang M.D.;  Location: [facility];  Service: Orthopedics   • JOINT INJECTION DIAGNOSTIC  6/10/2019    Procedure: INJECTION, JOINT, DIAGNOSTIC- MIDFOOT;  Surgeon: Haroldo Kang M.D.;  Location: [facility];  Service: Orthopedics   • TOE AMPUTATION Right 6/10/2019    Procedure: AMPUTATION, TOE- FIRST TOE;  Surgeon: Haroldo Kang M.D.;  Location: [facility];  Service: Orthopedics   • ORTHOPEDIC OSTEOTOMY Left 10/15/2018    Procedure: ORTHOPEDIC OSTEOTOMY-  FOR: 3RD METATARSAL PARTIAL EXCISION, AND LEFT GASTROC SOLEUS RECESSION;  Surgeon: Haroldo Kang M.D.;  Location: [facility];  Service: Orthopedics   • HARDWARE REMOVAL ORTHO Right 10/15/2018    Procedure: HARDWARE REMOVAL ORTHO-  FOOT METATARSALPHALANGEAL JOINT PLATE;  Surgeon: Haroldo Kang M.D.;  Location: [facility];  Service: Orthopedics   • TOE AMPUTATION Left 6/25/2018    Procedure: TOE AMPUTATION-FOR :PARTIAL 1ST DISTAL PHALANX EXCISION, GREAT TOE AMPUTATION;  Surgeon: Haroldo Kang M.D.;  Location: [facility];  Service: Orthopedics   • INTERNAL FIXATION REMOVAL Left 6/25/2018    Procedure: INTERNAL FIXATION REMOVAL;  Surgeon: Haroldo Kang M.D.;  Location: [facility];  Service: Orthopedics   • NERVE ULNAR TRANSFER Right 4/3/2018    Procedure: NERVE ULNAR TRANSFER - TRANSPOSITION;  Surgeon: Ayad Luna M.D.;  Location: [facility];  Service: Orthopedics   • CARPAL TUNNEL RELEASE Right 4/3/2018    Procedure: CARPAL TUNNEL RELEASE;  Surgeon: Ayad Luna M.D.;  Location: [facility];  Service: Orthopedics   • ELBOW ARTHROTOMY Right 4/3/2018    Procedure: ELBOW ARTHROTOMY - FOR CONTRACTURE RELEASE AT THE ELBOW, RADIAL HEAD EXCISION;  Surgeon: Ayad Luna M.D.;  [facility];  Service: Orthopedics   • SHOULDER SURGERY Right 12/02/2017    reversal   • METATARSAL HEAD RESECTION Right 11/13/2017    Procedure: METATARSAL HEAD RESECTION - EXCISION;  Surgeon: Haroldo Kang M.D.;  Location: SUR[facility]ERY [facility];  Service: Orthopedics   • HAMMERTOE CORRECTION Right 11/13/2017    Procedure: HAMMERTOE CORRECTION 2-5;  Surgeon: Haroldo Kang M.D.;  Location: SUR[facility]ERY [facility];  Service: Orthopedics   • TOE FUSION Right 11/13/2017    Procedure: TOE FUSION - 1ST METATARSALPHALANGEAL;  Surgeon: Haroldo Kang M.D.;  Location: SUR[facility]ERY [facility];  Service: Orthopedics   • METATARSAL HEAD RESECTION Left 8/21/2017    Procedure: METATARSAL HEAD RESECTION - 2-5;  Surgeon: Haroldo Kang M.D.;  Location: [facility];  Service:    • TOE FUSION Left 8/21/2017    Procedure: TOE FUSION - 1ST MTP;  Surgeon: Haroldo Kang M.D.;  Location: [facility];  Service:    • HARDWARE REMOVAL ORTHO Left 8/21/2017    Procedure: HARDWARE REMOVAL ORTHO;  Surgeon: Haroldo Kang M.D.;  Location: SUR[facility]ERY [facility];  Service:    • LUMBAR FUSION POSTERIOR  4/14/2017    Procedure: LUMBAR FUSION POSTERIOR - MINIMALLY INVASIVE TLIF L4-5;  Surgeon: Bossman Caro M.D.;  Location: [facility];  Service:    • HAMMERTOE CORRECTION Bilateral 12/22/2016    Procedure: HAMMERTOE CORRECTION/METATARSALPHALANGEAL JOINT RELEASE 2/3 RIGHT, 2-3 LEFT;  Surgeon: Haroldo Kang M.D.;  Location: [facility];  Service:    • BUNIONECTOMY Left 12/22/2016    Procedure: BUNIONECTOMY FOR DISTAL HALLUX VALGUS CORRECTION WITH BRANDY;  Surgeon: Haroldo Kang M.D.;  Location: SUR[facility]ERY [facility];  Service:    • ORTHOPEDIC OSTEOTOMY Left 12/22/2016    Procedure: ORTHOPEDIC OSTEOTOMY DISTAL METATARSAL 2-5;  Surgeon: Haroldo Kang M.D.;  Location: [facility];  Service:    • HIP ARTH ANTERIOR TOTAL Left 8/18/2016    Procedure: HIP  "ARTHROPLASTY ANTERIOR TOTAL;  Surgeon: Emiliano Vang M.D.;  Location: SURGERY [facility];  Service:    • OTHER ORTHOPEDIC SURGERY  2014    right shoulder  arthroplasty   • OTHER ORTHOPEDIC SURGERY  2012    left knee/left ankle/left shoulder   • OTHER ORTHOPEDIC SURGERY  2004    elbow surgery   • OTHER      dislocation left foot surgery at [facility]   • OTHER      \"septoplasty 20 years ago\"   • OTHER ORTHOPEDIC SURGERY      brad knee replaced   • OTHER ORTHOPEDIC SURGERY      left total hip       Family History   Problem Relation Age of Onset   • Heart Disease Mother    • Depression Mother    • Cancer Father    • Sleep Apnea Neg Hx        Social History     Socioeconomic History   • Marital status:      Spouse name: Not on file   • Number of children: Not on file   • Years of education: Not on file   • Highest education level: Not on file   Occupational History   • Not on file   Social Needs   • Financial resource strain: Not on file   • Food insecurity     Worry: Not on file     Inability: Not on file   • Transportation needs     Medical: Not on file     Non-medical: Not on file   Tobacco Use   • Smoking status: Former Smoker     Packs/day: 1.00     Years: 20.00     Pack years: 20.00     Types: Cigarettes     Quit date: 2014     Years since quittin.7   • Smokeless tobacco: Former User     Types: Chew     Quit date: 1997   Substance and Sexual Activity   • Alcohol use: Yes     Comment: occasional- approx once a week   • Drug use: No   • Sexual activity: Not on file   Lifestyle   • Physical activity     Days per week: Not on file     Minutes per session: Not on file   • Stress: Not on file   Relationships   • Social connections     Talks on phone: Not on file     Gets together: Not on file     Attends Sabianist service: Not on file     Active member of club or organization: Not on file     Attends meetings of clubs or organizations: Not on file     Relationship status: Not on file   • " "Intimate partner violence     Fear of current or ex partner: Not on file     Emotionally abused: Not on file     Physically abused: Not on file     Forced sexual activity: Not on file   Other Topics Concern   • Not on file   Social History Narrative   • Not on file       Allergies as of 09/24/2020 - Reviewed 09/24/2020   Allergen Reaction Noted   • Demerol Vomiting and Nausea 09/05/2014   • Cubicin [daptomycin] Vomiting 09/11/2018   • Meperidine  02/25/2004   • Sulfa drugs Hives 03/31/2017   • Sulfamethoxazole w-trimethoprim Rash 02/25/2004        Vitals:  Vitals:    09/24/20 1016   BP: 128/78   Pulse: (!) 103   SpO2: 96%       Current medications as of today   Current Outpatient Medications   Medication Sig Dispense Refill   • SYRINGE-NEEDLE, DISP, 3 ML (LUER LOCK SAFETY SYRINGES) 22G X 1-1/2\" 3 ML Misc 1 Each by Does not apply route every 7 days. 12 Each 3   • Testosterone Cypionate 200 MG/ML Solution 100 mg by Intramuscular route every 7 days for 12 doses. This is a single dose vial.  Discard remainder after each use 12 mL 0   • liraglutide (VICTOZA) 18 MG/3ML Solution Pen-injector Inject 1.8 mg as instructed every day.     • hydrOXYzine pamoate (VISTARIL) 50 MG Cap Take 50 mg by mouth 2 Times a Day.     • ipratropium (ATROVENT) 0.03 % Solution Spray 1 Spray in nose 3 times a day.     • gabapentin (NEURONTIN) 300 MG Cap Take 900 mg by mouth 1 time daily as needed.     • azelastine (ASTELIN) 137 MCG/SPRAY nasal spray [place] 1 Spray in nose 2 Times a Day.     • Diclofenac Sodium 1 % Gel Apply 1 Application to affected area(s) 2 times a day as needed.     • NOVOFINE PLUS Inject 1 Application as instructed 4 Times a Day,Before Meals and at Bedtime. Use before meals and at bedtime to check blood sugar.     • Lidocaine (LIDOCAINE PAIN RELIEF) 4 % Patch 1 Patch by Apply externally route every 24 hours.     • SYNTHROID 137 MCG Tab TAKE 1 TABLET DAILY 90 Tab 1   • lamoTRIgine (LAMICTAL) 100 MG Tab Take 100 mg by mouth " every day.     • cyclobenzaprine (FLEXERIL) 10 MG Tab Take 10 mg by mouth 3 times a day.  5   • amphetamine-dextroamphetamine (ADDERALL) 5 MG Tab Take 5 mg by mouth every day.     • tamsulosin (FLOMAX) 0.4 MG capsule Take 0.4 mg by mouth ONE-HALF HOUR AFTER BREAKFAST.     • temazepam (RESTORIL) 30 MG capsule Take 30 mg by mouth at bedtime as needed for Sleep.     • venlafaxine XR (EFFEXOR XR) 75 MG CAPSULE SR 24 HR Take 75 mg by mouth every morning.     • alprazolam (XANAX) 0.25 MG Tab Take 0.25-0.5 mg by mouth 4 times a day as needed for Anxiety.     • losartan (COZAAR) 100 MG Tab Take 100 mg by mouth every morning.     • oxycodone-acetaminophen (PERCOCET) 5-325 MG TABS Take 1-2 Tabs by mouth every four hours as needed.       Current Facility-Administered Medications   Medication Dose Route Frequency Provider Last Rate Last Dose   • testosterone cypionate (DEPO-TESTOSTERONE) injection 150 mg  150 mg Intramuscular Q14 DAYS Reji Love M.D.   150 mg at 08/11/20 1038         Physical Exam:   Appearance: Well developed, well nourished, no acute distress  Eyes: PERRL, EOM intact, sclera white, conjunctiva moist  Ears: no lesions or deformities  Hearing: grossly intact  Nose: no lesions or deformities  Respiratory effort: no intercostal retractions or use of accessory muscles  Extremities: no cyanosis or edema  Abdomen: soft, large  Gait and Station: normal  Digits and nails: no clubbing, cyanosis, petechiae or nodes.  Cranial nerves: grossly intact  Skin: no visible rashes, lesions or ulcers noted  Orientation: Oriented to time, person and place  Mood and affect: mood and affect appropriate, normal interaction with examiner  Judgement: Intact    Assessment:  1. JULIAN (obstructive sleep apnea)     2. Hypertension, unspecified type     3. BMI 38.0-38.9,adult           Plan  Discussed the cardiovascular and neuropsychiatric risks of untreated JULIAN; including but not limited to: HTN, DM, MI, ASCVD, CVA, CHF, traffic  accidents.     1. First compliance report, with new pressure change, from 8/25/20-9/23/20 was downloaded and reviewed with the patient which showed CPAP 14 cmH2O, 100% compliance, 8 hrs 55 min use, AHI of 2.1. Continue CPAP. Patient is compliant and benefiting from CPAP therapy for management of JULIAN.   2. DME order ([facility]) for mask (medium Marilu view FFM or MOC) and supplies was provided today. Continue to clean mask and supplies weekly, and change supplies per insurance guidelines.   3. Order OPO on CPAP 14 cmH2O to to evaluate for adequate O2 saturation on therapy. Will message patient with results.   4. Continue to stay active by walking daily 1-3 miles.   5. Follow up with the appropriate healthcare practitioners for all other medical problems and issues.  6. Sleep hygiene discussed.   7. BP today is 128/78, .  Advised patient to monitor blood pressure at home.  Continue to take losartan 100 mg as directed  8. F/u in 1 year, sooner if needed.       ADDENDUM: 10/14/20  OPO on CPAP 14 cmH2O and RA from 10/13/20 indicated the basal SPO2 was 87%.  There were desaturations below 90% for 79% of the night, to a patel of 74%. Significant nighttime desaturations.  Recommendation:  Verify whether patient was using CPAP during test. A CPAP retitration or oxygen bleed in may be indicated.    It was verified with the patient who stated that he used the CPAP throughout the night and even an hour prior to falling asleep.  He recently underwent a titration and a pressure change was recommended.  He is open to starting O2 therapy at nighttime and the order has been placed for 2 L.  In order to repeat the overnight pulse oximetry study on CPAP 14 cm of water pressure with 2 L was also placed to be completed in about 2 months.  Will message patient with the results.  Continue with the above-stated plan.      KINA Partida.      This dictation was created using voice recognition software. The accuracy of the  dictation is limited to the abilities of the software. I expect there may be some errors of grammar and possibly content.

## 2020-10-01 ENCOUNTER — HOSPITAL ENCOUNTER (OUTPATIENT)
Dept: RADIOLOGY | Facility: MEDICAL CENTER | Age: 60
End: 2020-10-01
Attending: NEUROLOGICAL SURGERY
Payer: COMMERCIAL

## 2020-10-01 ENCOUNTER — HOSPITAL ENCOUNTER (OUTPATIENT)
Dept: LAB | Facility: MEDICAL CENTER | Age: 60
End: 2020-10-01
Attending: INTERNAL MEDICINE
Payer: COMMERCIAL

## 2020-10-01 ENCOUNTER — HOSPITAL ENCOUNTER (OUTPATIENT)
Dept: LAB | Facility: MEDICAL CENTER | Age: 60
End: 2020-10-01
Attending: NURSE PRACTITIONER
Payer: COMMERCIAL

## 2020-10-01 DIAGNOSIS — M54.50 LOW BACK PAIN, UNSPECIFIED BACK PAIN LATERALITY, UNSPECIFIED CHRONICITY, UNSPECIFIED WHETHER SCIATICA PRESENT: ICD-10-CM

## 2020-10-01 LAB
BASOPHILS # BLD AUTO: 0.3 % (ref 0–1.8)
BASOPHILS # BLD: 0.03 K/UL (ref 0–0.12)
EOSINOPHIL # BLD AUTO: 0 K/UL (ref 0–0.51)
EOSINOPHIL NFR BLD: 0 % (ref 0–6.9)
ERYTHROCYTE [DISTWIDTH] IN BLOOD BY AUTOMATED COUNT: 47.1 FL (ref 35.9–50)
FERRITIN SERPL-MCNC: 52.6 NG/ML (ref 22–322)
HCT VFR BLD AUTO: 54.4 % (ref 42–52)
HGB BLD-MCNC: 18.3 G/DL (ref 14–18)
IMM GRANULOCYTES # BLD AUTO: 0.09 K/UL (ref 0–0.11)
IMM GRANULOCYTES NFR BLD AUTO: 0.9 % (ref 0–0.9)
IRON SATN MFR SERPL: 17 % (ref 15–55)
IRON SERPL-MCNC: 68 UG/DL (ref 50–180)
LYMPHOCYTES # BLD AUTO: 0.84 K/UL (ref 1–4.8)
LYMPHOCYTES NFR BLD: 8.4 % (ref 22–41)
MCH RBC QN AUTO: 31 PG (ref 27–33)
MCHC RBC AUTO-ENTMCNC: 33.6 G/DL (ref 33.7–35.3)
MCV RBC AUTO: 92.2 FL (ref 81.4–97.8)
MONOCYTES # BLD AUTO: 0.31 K/UL (ref 0–0.85)
MONOCYTES NFR BLD AUTO: 3.1 % (ref 0–13.4)
NEUTROPHILS # BLD AUTO: 8.7 K/UL (ref 1.82–7.42)
NEUTROPHILS NFR BLD: 87.3 % (ref 44–72)
NRBC # BLD AUTO: 0 K/UL
NRBC BLD-RTO: 0 /100 WBC
PLATELET # BLD AUTO: 236 K/UL (ref 164–446)
PMV BLD AUTO: 9.2 FL (ref 9–12.9)
RBC # BLD AUTO: 5.9 M/UL (ref 4.7–6.1)
T3 SERPL-MCNC: 69.2 NG/DL (ref 60–181)
TIBC SERPL-MCNC: 399 UG/DL (ref 250–450)
TSH SERPL DL<=0.005 MIU/L-ACNC: 0.91 UIU/ML (ref 0.38–5.33)
UIBC SERPL-MCNC: 331 UG/DL (ref 110–370)
WBC # BLD AUTO: 10 K/UL (ref 4.8–10.8)

## 2020-10-01 PROCEDURE — 84443 ASSAY THYROID STIM HORMONE: CPT

## 2020-10-01 PROCEDURE — 72110 X-RAY EXAM L-2 SPINE 4/>VWS: CPT

## 2020-10-01 PROCEDURE — 83540 ASSAY OF IRON: CPT | Mod: GA

## 2020-10-01 PROCEDURE — 82728 ASSAY OF FERRITIN: CPT | Mod: GA

## 2020-10-01 PROCEDURE — 84480 ASSAY TRIIODOTHYRONINE (T3): CPT

## 2020-10-01 PROCEDURE — 36415 COLL VENOUS BLD VENIPUNCTURE: CPT

## 2020-10-01 PROCEDURE — 85025 COMPLETE CBC W/AUTO DIFF WBC: CPT

## 2020-10-01 PROCEDURE — 83550 IRON BINDING TEST: CPT | Mod: GA

## 2020-10-01 PROCEDURE — 84439 ASSAY OF FREE THYROXINE: CPT

## 2020-10-01 PROCEDURE — 83036 HEMOGLOBIN GLYCOSYLATED A1C: CPT

## 2020-10-02 LAB
EST. AVERAGE GLUCOSE BLD GHB EST-MCNC: 120 MG/DL
HBA1C MFR BLD: 5.8 % (ref 0–5.6)

## 2020-10-06 LAB — T4 FREE SERPL DIALY-MCNC: 1.6 NG/DL (ref 1.1–2.4)

## 2020-10-13 ENCOUNTER — HOME STUDY (OUTPATIENT)
Dept: SLEEP MEDICINE | Facility: MEDICAL CENTER | Age: 60
End: 2020-10-13
Attending: NURSE PRACTITIONER
Payer: COMMERCIAL

## 2020-10-13 DIAGNOSIS — G47.33 OSA (OBSTRUCTIVE SLEEP APNEA): ICD-10-CM

## 2020-10-14 PROCEDURE — 94762 N-INVAS EAR/PLS OXIMTRY CONT: CPT | Performed by: INTERNAL MEDICINE

## 2020-10-14 NOTE — PROCEDURES
Overnight oximetry performed on CPAP: 14 cm of water.      The basal SPO2 was 87%.  There were desaturations below 90% for 79% of the night, to a paetl of 74%.    Interpretation:  Significant nighttime desaturations.    Recommendations:  Verify whether patient was using CPAP during test.    A CPAP retitration or oxygen bleed in may be indicated.

## 2020-10-30 ENCOUNTER — OUTPATIENT INFUSION SERVICES (OUTPATIENT)
Dept: ONCOLOGY | Facility: MEDICAL CENTER | Age: 60
End: 2020-10-30
Attending: INTERNAL MEDICINE
Payer: COMMERCIAL

## 2020-10-30 VITALS
DIASTOLIC BLOOD PRESSURE: 77 MMHG | SYSTOLIC BLOOD PRESSURE: 156 MMHG | TEMPERATURE: 97.8 F | HEIGHT: 70 IN | HEART RATE: 103 BPM | RESPIRATION RATE: 18 BRPM | BODY MASS INDEX: 38.06 KG/M2 | OXYGEN SATURATION: 98 % | WEIGHT: 265.88 LBS

## 2020-10-30 DIAGNOSIS — D75.1 POLYCYTHEMIA: ICD-10-CM

## 2020-10-30 LAB
FERRITIN SERPL-MCNC: 50.7 NG/ML (ref 22–322)
HCT VFR BLD CALC: 49 % (ref 42–52)
HGB BLD-MCNC: 16.7 G/DL (ref 14–18)

## 2020-10-30 PROCEDURE — 82728 ASSAY OF FERRITIN: CPT

## 2020-10-30 PROCEDURE — 85014 HEMATOCRIT: CPT

## 2020-10-30 PROCEDURE — 36415 COLL VENOUS BLD VENIPUNCTURE: CPT | Performed by: CLINICAL NURSE SPECIALIST

## 2020-10-30 ASSESSMENT — FIBROSIS 4 INDEX: FIB4 SCORE: 1.01

## 2020-10-30 NOTE — PROGRESS NOTES
Cristino is in infusion for a TP.  No concerns.  PIV started to LFA x2 attempts.  Lab drawn and hgb 16.7, hct 49.  No TP needed today.  PIV removed and Cristino discharged ambualatory to home in stable condition.  He is aware of his next appointment.

## 2020-11-02 ENCOUNTER — OFFICE VISIT (OUTPATIENT)
Dept: ENDOCRINOLOGY | Facility: MEDICAL CENTER | Age: 60
End: 2020-11-02
Attending: INTERNAL MEDICINE
Payer: COMMERCIAL

## 2020-11-02 VITALS
HEIGHT: 70 IN | WEIGHT: 264.6 LBS | HEART RATE: 100 BPM | RESPIRATION RATE: 18 BRPM | BODY MASS INDEX: 37.88 KG/M2 | OXYGEN SATURATION: 100 % | DIASTOLIC BLOOD PRESSURE: 84 MMHG | SYSTOLIC BLOOD PRESSURE: 138 MMHG

## 2020-11-02 DIAGNOSIS — R79.89 LOW TESTOSTERONE IN MALE: ICD-10-CM

## 2020-11-02 DIAGNOSIS — D75.1 POLYCYTHEMIA: ICD-10-CM

## 2020-11-02 PROCEDURE — 96372 THER/PROPH/DIAG INJ SC/IM: CPT | Performed by: INTERNAL MEDICINE

## 2020-11-02 PROCEDURE — 99213 OFFICE O/P EST LOW 20 MIN: CPT | Performed by: INTERNAL MEDICINE

## 2020-11-02 PROCEDURE — 99211 OFF/OP EST MAY X REQ PHY/QHP: CPT | Performed by: INTERNAL MEDICINE

## 2020-11-02 RX ORDER — TESTOSTERONE CYPIONATE 200 MG/ML
100 INJECTION, SOLUTION INTRAMUSCULAR ONCE
Status: COMPLETED | OUTPATIENT
Start: 2020-11-02 | End: 2020-11-02

## 2020-11-02 RX ORDER — TESTOSTERONE ENANTHATE 50 MG/.5ML
50 INJECTION SUBCUTANEOUS
Qty: 4 EACH | Refills: 5 | Status: SHIPPED
Start: 2020-11-02 | End: 2020-11-24

## 2020-11-02 RX ADMIN — TESTOSTERONE CYPIONATE 100 MG: 200 INJECTION, SOLUTION INTRAMUSCULAR at 10:45

## 2020-11-02 ASSESSMENT — FIBROSIS 4 INDEX: FIB4 SCORE: 1.01

## 2020-11-02 NOTE — NON-PROVIDER
Cristino Keys is a 60 y.o. male here for a non-provider visit for testosterone injection.    Reason for injection: low testosterone  Order in MAR?: Yes  Patient supplied?:Yes  Minimum interval has been met for this injection (per MAR order): Yes    Order and dose verified by: AN  Patient tolerated injection and no adverse effects were observed or reported: Yes    # of Administrations remaining in MAR: 0

## 2020-11-03 NOTE — PROGRESS NOTES
Chief Complaint   Patient presents with   • Hypogonadism        HPI:    Home administration of the testosterone is proving difficult.  The patient cannot give his own intramuscular injection.  He is not dexterous enough.  We have also found that his wife is very nervous about doing this.  She is having difficulty drawing the testosterone out of the vial.  I am afraid the sterile procedure is going to be flawed.  Also she is very timid about giving the injection and is afraid of putting the needle in the wrong place despite instructions         His testosterone deficiency is well documented.  In March 2019 we had a free testosterone done twice with results of 9 and 21 ().  LH=7 and other pituitary functions seem to be intact.  I assume that his hypogonadotrophic hypogonadism is related to his obesity and that replacing his testosterone would help him lose weight.  So I think it is still worthwhile to replace his testosterone and so it is he.  Quite  awhile back someone found a low testosterone and tried a topical testosterone replacement which was not satisfactory or effective.          So I have recommended Xyosted as the most effective way for him to self administer testosterone at home.  This he is able to do and I think it will be reliable without intermittent lapses.         I will start with a low dose of 50 mg and be sure we are not promoting polycythemia like he has had previously    ROS:  All other systems reported as negative or unchanged since last exam      Allergies:   Allergies   Allergen Reactions   • Demerol Vomiting and Nausea     Rxn = years ago    • Cubicin [Daptomycin] Vomiting   • Meperidine      2004-02-25;NA   • Sulfa Drugs Hives     .   • Sulfamethoxazole W-Trimethoprim Rash     2004-02-25;NA       Current medicines including changes today:  Current Outpatient Medications   Medication Sig Dispense Refill   • XYOSTED 50 MG/0.5ML Solution Auto-injector Inject 50 mg as instructed every 7  "days for 4 doses. 4 Each 5   • SYRINGE-NEEDLE, DISP, 3 ML (LUER LOCK SAFETY SYRINGES) 22G X 1-1/2\" 3 ML Misc 1 Each by Does not apply route every 7 days. 12 Each 3   • liraglutide (VICTOZA) 18 MG/3ML Solution Pen-injector Inject 1.8 mg as instructed every day.     • hydrOXYzine pamoate (VISTARIL) 50 MG Cap Take 50 mg by mouth 2 Times a Day.     • ipratropium (ATROVENT) 0.03 % Solution Spray 1 Spray in nose 3 times a day.     • gabapentin (NEURONTIN) 300 MG Cap Take 900 mg by mouth 1 time daily as needed.     • azelastine (ASTELIN) 137 MCG/SPRAY nasal spray [place] 1 Spray in nose 2 Times a Day.     • Diclofenac Sodium 1 % Gel Apply 1 Application to affected area(s) 2 times a day as needed.     • NOVOFINE PLUS Inject 1 Application as instructed 4 Times a Day,Before Meals and at Bedtime. Use before meals and at bedtime to check blood sugar.     • Lidocaine (LIDOCAINE PAIN RELIEF) 4 % Patch 1 Patch by Apply externally route every 24 hours.     • SYNTHROID 137 MCG Tab TAKE 1 TABLET DAILY 90 Tab 1   • lamoTRIgine (LAMICTAL) 100 MG Tab Take 100 mg by mouth every day.     • cyclobenzaprine (FLEXERIL) 10 MG Tab Take 10 mg by mouth 3 times a day.  5   • amphetamine-dextroamphetamine (ADDERALL) 5 MG Tab Take 5 mg by mouth every day.     • tamsulosin (FLOMAX) 0.4 MG capsule Take 0.4 mg by mouth ONE-HALF HOUR AFTER BREAKFAST.     • temazepam (RESTORIL) 30 MG capsule Take 30 mg by mouth at bedtime as needed for Sleep.     • venlafaxine XR (EFFEXOR XR) 75 MG CAPSULE SR 24 HR Take 75 mg by mouth every morning.     • alprazolam (XANAX) 0.25 MG Tab Take 0.25-0.5 mg by mouth 4 times a day as needed for Anxiety.     • losartan (COZAAR) 100 MG Tab Take 100 mg by mouth every morning.     • oxycodone-acetaminophen (PERCOCET) 5-325 MG TABS Take 1-2 Tabs by mouth every four hours as needed.       Current Facility-Administered Medications   Medication Dose Route Frequency Provider Last Rate Last Admin   • testosterone cypionate " "(DEPO-TESTOSTERONE) injection 150 mg  150 mg Intramuscular Q14 DAYS Reji Love M.D.   150 mg at 08/11/20 1038        Past Medical History:   Diagnosis Date   • Anesthesia 11/03/2017    PONV with shoulder surgery approx 20 years ago.   • Anxiety and depression 11/03/2017   • Arthritis 11/03/2017    Osteoarthritis, \" all over\"   • Bunion    • Cancer (HCC) 2017    skin lesion cut out, on left shoulder   • Chronic autoimmune thyroiditis      + US / + TPO = 57   • Dental disorder 11/03/2017    \"Upper Plate\"   • Diabetes mellitus (HCC) 11/03/2017    \"Controlled with diet & Victoza\"   • Gout    • Hammertoe    • High cholesterol 11/03/2017    HX OF, not currently on medication for.   • Hypertension    • Hypothyroidism     chronic thyroiditis   • MRSA infection    • Open toe wound 03/2018    Left big toe, open wound, started 2/2018, receiving wound care therapy two times a week   • Pain 11/03/2017    \"Pain all over except right hip & ankle\"   • Psychiatric problem     depression/anxiety   • Sleep apnea 11/03/2017    CPAP USE   • Snoring 11/03/2017    DX JULIAN   • Tonsillitis        PHYSICAL EXAM:    /84 (BP Location: Left arm, Patient Position: Sitting, BP Cuff Size: Adult)   Pulse 100   Resp 18   Ht 1.778 m (5' 10\")   Wt 120 kg (264 lb 9.6 oz)   SpO2 100%   BMI 37.97 kg/m²   Examination is limited by COVID-19 restrictions    Gen.   appears healthy     Skin   appropriate for sex and age    HEENT  unremarkable    Heart  regular    Extremities  no edema    Neuro  gait and station normal    Psych  appropriate    ASSESSMENT AND RECOMMENDATIONS    1. Low testosterone in male              See HPI              Trial of Xyosted 50 mg subcu weekly       DISPOSITION: After 2 doses obtain a free testosterone level and hemogram      Florentin Weiss M.D.    Copies to: Kevin Chavez M.D. [phone]  "

## 2020-11-13 DIAGNOSIS — E29.1 HYPOGONADISM IN MALE: ICD-10-CM

## 2020-11-13 RX ORDER — TESTOSTERONE ENANTHATE 50 MG/.5ML
50 INJECTION SUBCUTANEOUS
Qty: 4 EACH | Refills: 3 | Status: SHIPPED | OUTPATIENT
Start: 2020-11-13 | End: 2020-12-05

## 2020-11-28 ENCOUNTER — HOSPITAL ENCOUNTER (OUTPATIENT)
Dept: RADIOLOGY | Facility: MEDICAL CENTER | Age: 60
End: 2020-11-28
Attending: PHYSICIAN ASSISTANT
Payer: COMMERCIAL

## 2020-11-28 DIAGNOSIS — M54.50 LOW BACK PAIN, UNSPECIFIED BACK PAIN LATERALITY, UNSPECIFIED CHRONICITY, UNSPECIFIED WHETHER SCIATICA PRESENT: ICD-10-CM

## 2020-11-28 DIAGNOSIS — M25.512 LEFT SHOULDER PAIN, UNSPECIFIED CHRONICITY: ICD-10-CM

## 2020-11-28 DIAGNOSIS — M75.122 COMPLETE TEAR OF LEFT ROTATOR CUFF, UNSPECIFIED WHETHER TRAUMATIC: ICD-10-CM

## 2020-11-28 PROCEDURE — 72148 MRI LUMBAR SPINE W/O DYE: CPT

## 2020-11-28 PROCEDURE — 73221 MRI JOINT UPR EXTREM W/O DYE: CPT | Mod: LT

## 2020-12-07 ENCOUNTER — HOSPITAL ENCOUNTER (OUTPATIENT)
Dept: LAB | Facility: MEDICAL CENTER | Age: 60
End: 2020-12-07
Attending: INTERNAL MEDICINE
Payer: COMMERCIAL

## 2020-12-07 DIAGNOSIS — D75.1 POLYCYTHEMIA: ICD-10-CM

## 2020-12-07 DIAGNOSIS — R79.89 LOW TESTOSTERONE IN MALE: ICD-10-CM

## 2020-12-07 LAB
ERYTHROCYTE [DISTWIDTH] IN BLOOD BY AUTOMATED COUNT: 46.5 FL (ref 35.9–50)
ESTRADIOL SERPL-MCNC: 20.4 PG/ML
HCT VFR BLD AUTO: 57 % (ref 42–52)
HGB BLD-MCNC: 18.4 G/DL (ref 14–18)
MCH RBC QN AUTO: 30.3 PG (ref 27–33)
MCHC RBC AUTO-ENTMCNC: 32.3 G/DL (ref 33.7–35.3)
MCV RBC AUTO: 93.9 FL (ref 81.4–97.8)
PLATELET # BLD AUTO: 241 K/UL (ref 164–446)
PMV BLD AUTO: 9.4 FL (ref 9–12.9)
RBC # BLD AUTO: 6.07 M/UL (ref 4.7–6.1)
WBC # BLD AUTO: 10.7 K/UL (ref 4.8–10.8)

## 2020-12-07 PROCEDURE — 82670 ASSAY OF TOTAL ESTRADIOL: CPT

## 2020-12-07 PROCEDURE — 36415 COLL VENOUS BLD VENIPUNCTURE: CPT

## 2020-12-07 PROCEDURE — 84402 ASSAY OF FREE TESTOSTERONE: CPT

## 2020-12-07 PROCEDURE — 85027 COMPLETE CBC AUTOMATED: CPT

## 2020-12-09 LAB — TESTOST FREE SERPL-MCNC: 48 PG/ML (ref 47–244)

## 2020-12-11 ENCOUNTER — TELEPHONE (OUTPATIENT)
Dept: ENDOCRINOLOGY | Facility: MEDICAL CENTER | Age: 60
End: 2020-12-11

## 2020-12-11 NOTE — TELEPHONE ENCOUNTER
Telephone conversation with patient    Polycythemia is back again.  He will contact the infusion center about getting a phlebotomy.  The orders are in.  This was a week after a testosterone injection of 100 mg.  His free testosterone is only 48 which is barely in the expected range.  So I think some of this comes from sleep apnea.    He still has not been approved for Oysted so this result is from a regular office injection.    Florentin Weiss M.D.

## 2020-12-14 ENCOUNTER — TELEMEDICINE (OUTPATIENT)
Dept: ENDOCRINOLOGY | Facility: MEDICAL CENTER | Age: 60
End: 2020-12-14
Attending: INTERNAL MEDICINE
Payer: COMMERCIAL

## 2020-12-14 DIAGNOSIS — D75.1 POLYCYTHEMIA: ICD-10-CM

## 2020-12-14 DIAGNOSIS — E29.1 HYPOGONADISM IN MALE: ICD-10-CM

## 2020-12-14 PROCEDURE — 99213 OFFICE O/P EST LOW 20 MIN: CPT | Performed by: INTERNAL MEDICINE

## 2020-12-15 ENCOUNTER — TELEPHONE (OUTPATIENT)
Dept: ONCOLOGY | Facility: MEDICAL CENTER | Age: 60
End: 2020-12-15

## 2020-12-15 NOTE — PROGRESS NOTES
Telemedicine Visit: Established Patient     This encounter was conducted via Zoom.   Verbal consent was obtained. Patient's identity was verified.    Subjective:   CC:   Cristino Keys is a 60 y.o. male presenting for evaluation and management of hypogonadism.    HPI       Patient has definitely low testosterone levels possibly related to obesity.  A trial of testosterone has proved to be very beneficial in terms of activity, strength, endurance and sense of wellbeing.  He was suffering from depression and testosterone was made quite a difference.        A previous trial of topical testosterone was unsatisfactory.  He did not obtain adequate blood levels or benefit.  We have found that intramuscular testosterone has been symptomatically successful.  He was coming to this office every 2 weeks for an injection which is impractical and more expensive than it needs to be.  He is not dexterous enough to administer his own testosterone and we have found that his wife even with instructions is not capable of administering the drug adequately and safely.         I have felt a trial of Xyosted would be successful and he is confident this is something he can administer.  However, the prescription was denied I believe based on the fact that he is recently polycythemic with hemoglobin 18.4 and hematocrit 57.  This will be remedied by a therapeutic phlebotomy.  It is possibly related to testosterone plus sleep apnea.  We will reduce the dosing of testosterone and monitor these levels carefully.    ROS   Denies any recent fevers or chills. No nausea or vomiting. No chest pains or shortness of breath.     Allergies   Allergen Reactions   • Demerol Vomiting and Nausea     Rxn = years ago    • Cubicin [Daptomycin] Vomiting   • Meperidine      2004-02-25;NA   • Sulfa Drugs Hives     .   • Sulfamethoxazole W-Trimethoprim Rash     2004-02-25;NA       Current medicines (including changes today)  Current Outpatient Medications  "  Medication Sig Dispense Refill   • SYRINGE-NEEDLE, DISP, 3 ML (LUER LOCK SAFETY SYRINGES) 22G X 1-1/2\" 3 ML Misc 1 Each by Does not apply route every 7 days. 12 Each 3   • liraglutide (VICTOZA) 18 MG/3ML Solution Pen-injector Inject 1.8 mg as instructed every day.     • hydrOXYzine pamoate (VISTARIL) 50 MG Cap Take 50 mg by mouth 2 Times a Day.     • ipratropium (ATROVENT) 0.03 % Solution Spray 1 Spray in nose 3 times a day.     • gabapentin (NEURONTIN) 300 MG Cap Take 900 mg by mouth 1 time daily as needed.     • azelastine (ASTELIN) 137 MCG/SPRAY nasal spray [place] 1 Spray in nose 2 Times a Day.     • Diclofenac Sodium 1 % Gel Apply 1 Application to affected area(s) 2 times a day as needed.     • NOVOFINE PLUS Inject 1 Application as instructed 4 Times a Day,Before Meals and at Bedtime. Use before meals and at bedtime to check blood sugar.     • Lidocaine (LIDOCAINE PAIN RELIEF) 4 % Patch 1 Patch by Apply externally route every 24 hours.     • SYNTHROID 137 MCG Tab TAKE 1 TABLET DAILY 90 Tab 1   • lamoTRIgine (LAMICTAL) 100 MG Tab Take 100 mg by mouth every day.     • cyclobenzaprine (FLEXERIL) 10 MG Tab Take 10 mg by mouth 3 times a day.  5   • amphetamine-dextroamphetamine (ADDERALL) 5 MG Tab Take 5 mg by mouth every day.     • tamsulosin (FLOMAX) 0.4 MG capsule Take 0.4 mg by mouth ONE-HALF HOUR AFTER BREAKFAST.     • temazepam (RESTORIL) 30 MG capsule Take 30 mg by mouth at bedtime as needed for Sleep.     • venlafaxine XR (EFFEXOR XR) 75 MG CAPSULE SR 24 HR Take 75 mg by mouth every morning.     • alprazolam (XANAX) 0.25 MG Tab Take 0.25-0.5 mg by mouth 4 times a day as needed for Anxiety.     • losartan (COZAAR) 100 MG Tab Take 100 mg by mouth every morning.     • oxycodone-acetaminophen (PERCOCET) 5-325 MG TABS Take 1-2 Tabs by mouth every four hours as needed.       Current Facility-Administered Medications   Medication Dose Route Frequency Provider Last Rate Last Admin   • testosterone cypionate " (DEPO-TESTOSTERONE) injection 150 mg  150 mg Intramuscular Q14 DAYS Reji Love M.D.   150 mg at 08/11/20 1038       Patient Active Problem List    Diagnosis Date Noted   • Polycythemia 07/01/2020   • History of MRSA infection 06/10/2020   • Nocturnal hypoxemia 04/29/2020   • Hypogonadism in male 03/18/2020   • Former smoker 03/11/2019   • Low testosterone in male 03/08/2019   • Wound infection 05/14/2018   • Diabetic ulcer of toe of left foot associated with type 2 diabetes mellitus, with fat layer exposed ([facility]) 05/14/2018   • Contracture of elbow joint, right 04/03/2018   • BMI 38.0-38.9,adult 11/01/2017   • Chronic pain 08/28/2017   • Chronic narcotic use 08/28/2017   • Arthritis of left ankle 08/21/2017   • Degeneration of lumbar intervertebral disc 04/14/2017   • Lower back pain 04/14/2017   • JULIAN (obstructive sleep apnea) 04/03/2017   • Snoring 04/03/2017   • Acquired hallux valgus of left foot 12/22/2016   • Type 2 diabetes mellitus without complication ([facility]) 12/13/2016   • Chronic autoimmune thyroiditis 10/03/2016   • Hypothyroidism, acquired, autoimmune 10/03/2016   • Primary osteoarthritis of left hip 08/18/2016   • Diabetes ([facility]) 09/05/2014   • Lumbar disc disease 09/05/2014   • Gout 09/05/2014   • Hypertension 09/05/2014   • H/O arthroscopy of knee 09/05/2014   • Hx of elbow surgery 09/05/2014   • Severe obesity with body mass index (BMI) of 35.0 to 39.9 with serious comorbidity ([facility]) 09/05/2014   • Osteoarthritis 09/05/2014   • H/O shoulder replacement 09/05/2014   • Rotator cuff arthropathy 09/05/2014   • Bilateral bunions 09/05/2014       Family History   Problem Relation Age of Onset   • Heart Disease Mother    • Depression Mother    • Cancer Father    • Sleep Apnea Neg Hx        He  has a past medical history of Anesthesia (11/03/2017), Anxiety and depression (11/03/2017), Arthritis (11/03/2017), Bunion, Cancer ([facility]) (2017), Chronic autoimmune thyroiditis, Dental disorder (11/03/2017),  Diabetes mellitus (HCC) (11/03/2017), Gout, Hammertoe, High cholesterol (11/03/2017), Hypertension, Hypothyroidism, MRSA infection, Open toe wound (03/2018), Pain (11/03/2017), Psychiatric problem, Sleep apnea (11/03/2017), Snoring (11/03/2017), and Tonsillitis.  He  has a past surgical history that includes hip arth anterior total (Left, 8/18/2016); hammertoe correction (Bilateral, 12/22/2016); bunionectomy (Left, 12/22/2016); lumbar fusion posterior (4/14/2017); metatarsal head resection (Left, 8/21/2017); metatarsal head resection (Right, 11/13/2017); hammertoe correction (Right, 11/13/2017); nerve ulnar transfer (Right, 4/3/2018); metatarsal head resection (Left, 6/10/2019); joint injection diagnostic (6/10/2019); other orthopedic surgery (6/2014); other orthopedic surgery (11/1/2012); other orthopedic surgery (2004); orthopedic osteotomy (Left, 12/22/2016); toe fusion (Left, 8/21/2017); hardware removal ortho (Left, 8/21/2017); toe fusion (Right, 11/13/2017); carpal tunnel release (Right, 4/3/2018); elbow arthrotomy (Right, 4/3/2018); toe amputation (Left, 6/25/2018); internal fixation removal (Left, 6/25/2018); shoulder surgery (Right, 12/02/2017); orthopedic osteotomy (Left, 10/15/2018); hardware removal ortho (Right, 10/15/2018); toe amputation (Right, 6/10/2019); other orthopedic surgery; other orthopedic surgery; other; other (2000); metatarsal head resection (Left, 7/20/2020); hammertoe correction (Left, 7/20/2020); and toenail removal (Left, 7/20/2020).       Objective:   Vitals obtained by patient:        No vitals taken by the patient    Physical Exam:  Constitutional: Alert, no distress, well-groomed.  Obese  Skin: No rashes in visible areas.  Eye: No icterus.   ENMT:  Phonation normal.  Neck:   No observable thyromegaly. Moves freely without pain.  CV: No cardiac arrhythmia reported by patient  Respiratory: Unlabored respiratory effort, no cough or audible wheeze  Psych: Alert and oriented x3, normal  affect and mood.       Assessment and Plan:   The following treatment plan was discussed:     1. Hypogonadism in male              See HPI    2. Polycythemia             Therapeutic phlebotomy scheduled.             Hold testosterone until this is done and then reduce dose to 100 mg IM every 2 weeks.             If we can acquire Oxysted his dose will be 50 mg subcu weekly with careful monitoring      Follow-up: Follow-up lab in 2 weeks following therapeutic phlebotomy

## 2020-12-16 NOTE — TELEPHONE ENCOUNTER
Mr. Keys called for COVID19 screening.  He answered no to all questions.  He was informed of the new check in procedure and given the phone number to the .

## 2020-12-17 ENCOUNTER — OUTPATIENT INFUSION SERVICES (OUTPATIENT)
Dept: ONCOLOGY | Facility: MEDICAL CENTER | Age: 60
End: 2020-12-17
Attending: INTERNAL MEDICINE
Payer: COMMERCIAL

## 2020-12-17 VITALS
HEIGHT: 70 IN | BODY MASS INDEX: 37.59 KG/M2 | TEMPERATURE: 97.5 F | DIASTOLIC BLOOD PRESSURE: 88 MMHG | SYSTOLIC BLOOD PRESSURE: 137 MMHG | RESPIRATION RATE: 18 BRPM | OXYGEN SATURATION: 97 % | WEIGHT: 262.57 LBS | HEART RATE: 109 BPM

## 2020-12-17 DIAGNOSIS — D75.1 POLYCYTHEMIA: ICD-10-CM

## 2020-12-17 LAB
HCT VFR BLD CALC: 54 % (ref 42–52)
HGB BLD-MCNC: 18.4 G/DL (ref 14–18)

## 2020-12-17 PROCEDURE — 36415 COLL VENOUS BLD VENIPUNCTURE: CPT

## 2020-12-17 PROCEDURE — 99195 PHLEBOTOMY: CPT

## 2020-12-17 PROCEDURE — 85014 HEMATOCRIT: CPT

## 2020-12-17 ASSESSMENT — FIBROSIS 4 INDEX: FIB4 SCORE: 0.99

## 2020-12-17 NOTE — PROGRESS NOTES
Pt arrived to [facility], ambulatory, for therapeutic phlebotomy. Pt voices no complaints. 20g PIV established in the L-AC, positive blood return noted. Labs drawn and reviewed, pt within parameters to phlebotomize today. 500 mL whole blood removed with no complications. Orthostatics taken; VSS. PIV flushed and removed. Pt left IS with no s/sx of distress. Follow up appointment confirmed.

## 2020-12-29 ENCOUNTER — TELEPHONE (OUTPATIENT)
Dept: ENDOCRINOLOGY | Facility: MEDICAL CENTER | Age: 60
End: 2020-12-29

## 2020-12-29 NOTE — TELEPHONE ENCOUNTER
Patient is requesting call in regards to testosterone medication change. States has not received update/response on status.    Thank you

## 2020-12-30 ENCOUNTER — TELEPHONE (OUTPATIENT)
Dept: ENDOCRINOLOGY | Facility: MEDICAL CENTER | Age: 60
End: 2020-12-30

## 2020-12-30 ENCOUNTER — HOSPITAL ENCOUNTER (OUTPATIENT)
Dept: LAB | Facility: MEDICAL CENTER | Age: 60
End: 2020-12-30
Attending: INTERNAL MEDICINE
Payer: COMMERCIAL

## 2020-12-30 ENCOUNTER — HOSPITAL ENCOUNTER (OUTPATIENT)
Dept: LAB | Facility: MEDICAL CENTER | Age: 60
End: 2020-12-30
Attending: FAMILY MEDICINE
Payer: COMMERCIAL

## 2020-12-30 DIAGNOSIS — D75.1 POLYCYTHEMIA: ICD-10-CM

## 2020-12-30 DIAGNOSIS — E29.1 HYPOGONADISM IN MALE: ICD-10-CM

## 2020-12-30 LAB
BASOPHILS # BLD AUTO: 0.4 % (ref 0–1.8)
BASOPHILS # BLD: 0.02 K/UL (ref 0–0.12)
EOSINOPHIL # BLD AUTO: 0.1 K/UL (ref 0–0.51)
EOSINOPHIL NFR BLD: 2 % (ref 0–6.9)
ERYTHROCYTE [DISTWIDTH] IN BLOOD BY AUTOMATED COUNT: 48.6 FL (ref 35.9–50)
ERYTHROCYTE [DISTWIDTH] IN BLOOD BY AUTOMATED COUNT: 49.7 FL (ref 35.9–50)
ERYTHROCYTE [SEDIMENTATION RATE] IN BLOOD BY WESTERGREN METHOD: 6 MM/HOUR (ref 0–20)
HCT VFR BLD AUTO: 46.9 % (ref 42–52)
HCT VFR BLD AUTO: 47.9 % (ref 42–52)
HGB BLD-MCNC: 15.3 G/DL (ref 14–18)
HGB BLD-MCNC: 15.4 G/DL (ref 14–18)
IMM GRANULOCYTES # BLD AUTO: 0.06 K/UL (ref 0–0.11)
IMM GRANULOCYTES NFR BLD AUTO: 1.2 % (ref 0–0.9)
LYMPHOCYTES # BLD AUTO: 1.18 K/UL (ref 1–4.8)
LYMPHOCYTES NFR BLD: 23.3 % (ref 22–41)
MCH RBC QN AUTO: 30.1 PG (ref 27–33)
MCH RBC QN AUTO: 31.4 PG (ref 27–33)
MCHC RBC AUTO-ENTMCNC: 31.9 G/DL (ref 33.7–35.3)
MCHC RBC AUTO-ENTMCNC: 32.8 G/DL (ref 33.7–35.3)
MCV RBC AUTO: 94.3 FL (ref 81.4–97.8)
MCV RBC AUTO: 95.5 FL (ref 81.4–97.8)
MONOCYTES # BLD AUTO: 0.51 K/UL (ref 0–0.85)
MONOCYTES NFR BLD AUTO: 10.1 % (ref 0–13.4)
NEUTROPHILS # BLD AUTO: 3.19 K/UL (ref 1.82–7.42)
NEUTROPHILS NFR BLD: 63 % (ref 44–72)
NRBC # BLD AUTO: 0 K/UL
NRBC BLD-RTO: 0 /100 WBC
PLATELET # BLD AUTO: 178 K/UL (ref 164–446)
PLATELET # BLD AUTO: 191 K/UL (ref 164–446)
PMV BLD AUTO: 8.6 FL (ref 9–12.9)
PMV BLD AUTO: 8.8 FL (ref 9–12.9)
RBC # BLD AUTO: 4.91 M/UL (ref 4.7–6.1)
RBC # BLD AUTO: 5.08 M/UL (ref 4.7–6.1)
WBC # BLD AUTO: 5.1 K/UL (ref 4.8–10.8)
WBC # BLD AUTO: 5.1 K/UL (ref 4.8–10.8)

## 2020-12-30 PROCEDURE — 84153 ASSAY OF PSA TOTAL: CPT

## 2020-12-30 PROCEDURE — 82306 VITAMIN D 25 HYDROXY: CPT

## 2020-12-30 PROCEDURE — 84443 ASSAY THYROID STIM HORMONE: CPT

## 2020-12-30 PROCEDURE — 80061 LIPID PANEL: CPT

## 2020-12-30 PROCEDURE — 83036 HEMOGLOBIN GLYCOSYLATED A1C: CPT | Mod: GA

## 2020-12-30 PROCEDURE — 36415 COLL VENOUS BLD VENIPUNCTURE: CPT

## 2020-12-30 PROCEDURE — 80053 COMPREHEN METABOLIC PANEL: CPT

## 2020-12-30 PROCEDURE — 85027 COMPLETE CBC AUTOMATED: CPT

## 2020-12-30 PROCEDURE — 84403 ASSAY OF TOTAL TESTOSTERONE: CPT

## 2020-12-30 PROCEDURE — 82670 ASSAY OF TOTAL ESTRADIOL: CPT

## 2020-12-30 PROCEDURE — 85652 RBC SED RATE AUTOMATED: CPT

## 2020-12-30 PROCEDURE — 85025 COMPLETE CBC W/AUTO DIFF WBC: CPT

## 2020-12-30 PROCEDURE — 84402 ASSAY OF FREE TESTOSTERONE: CPT

## 2020-12-30 PROCEDURE — 84402 ASSAY OF FREE TESTOSTERONE: CPT | Mod: 91

## 2020-12-30 PROCEDURE — 84270 ASSAY OF SEX HORMONE GLOBUL: CPT

## 2020-12-31 LAB
25(OH)D3 SERPL-MCNC: 30 NG/ML (ref 30–100)
ALBUMIN SERPL BCP-MCNC: 4.3 G/DL (ref 3.2–4.9)
ALBUMIN/GLOB SERPL: 1.5 G/DL
ALP SERPL-CCNC: 108 U/L (ref 30–99)
ALT SERPL-CCNC: 29 U/L (ref 2–50)
ANION GAP SERPL CALC-SCNC: 4 MMOL/L (ref 7–16)
AST SERPL-CCNC: 25 U/L (ref 12–45)
BILIRUB SERPL-MCNC: 0.7 MG/DL (ref 0.1–1.5)
BUN SERPL-MCNC: 17 MG/DL (ref 8–22)
CALCIUM SERPL-MCNC: 9.1 MG/DL (ref 8.5–10.5)
CHLORIDE SERPL-SCNC: 100 MMOL/L (ref 96–112)
CHOLEST SERPL-MCNC: 178 MG/DL (ref 100–199)
CO2 SERPL-SCNC: 28 MMOL/L (ref 20–33)
CREAT SERPL-MCNC: 1.14 MG/DL (ref 0.5–1.4)
EST. AVERAGE GLUCOSE BLD GHB EST-MCNC: 148 MG/DL
ESTRADIOL SERPL-MCNC: 15.6 PG/ML
FASTING STATUS PATIENT QL REPORTED: NORMAL
GLOBULIN SER CALC-MCNC: 2.8 G/DL (ref 1.9–3.5)
GLUCOSE SERPL-MCNC: 123 MG/DL (ref 65–99)
HBA1C MFR BLD: 6.8 % (ref 0–5.6)
HDLC SERPL-MCNC: 39 MG/DL
LDLC SERPL CALC-MCNC: 77 MG/DL
POTASSIUM SERPL-SCNC: 4.4 MMOL/L (ref 3.6–5.5)
PROT SERPL-MCNC: 7.1 G/DL (ref 6–8.2)
PSA SERPL-MCNC: 0.68 NG/ML (ref 0–4)
SODIUM SERPL-SCNC: 132 MMOL/L (ref 135–145)
TRIGL SERPL-MCNC: 312 MG/DL (ref 0–149)
TSH SERPL DL<=0.005 MIU/L-ACNC: 2.6 UIU/ML (ref 0.38–5.33)

## 2020-12-31 NOTE — TELEPHONE ENCOUNTER
Telephone conversation with patient    I filled out the questionnaire to get he is testosterone injections approved by insurance.  One of the question has to do with polycythemia which he now develops even though he is on CPAP. This is a combination of testosterone plus sleep apnea.  Now are doing routine blood checks and phlebotomies as necessary to keep him below the polycythemic limits.  I will also monitor his testosterone levels and adjust his dosing so as not to provoke polycythemia.    Originally when I first started this he was anemic and probably iron deficient and his anemia was corrected by replacing his iron.  He did have a GI work-up including upper endoscopy and colonoscopy with no lesions found.  They talked about using a camera but that was not done.  He will see the GI people again next month and raise the issue if he has a GI lesion that can account for blood loss.  I mentioned tumor and cancer specifically.    Florentin Weiss M.D.

## 2021-01-01 LAB
SHBG SERPL-SCNC: 22 NMOL/L (ref 11–80)
TESTOST FREE MFR SERPL: 2.2 % (ref 1.6–2.9)
TESTOST FREE SERPL-MCNC: 48 PG/ML (ref 47–244)
TESTOST FREE SERPL-MCNC: 52 PG/ML (ref 47–244)
TESTOST SERPL-MCNC: 243 NG/DL (ref 300–720)

## 2021-01-03 DIAGNOSIS — E29.1 HYPOGONADISM IN MALE: ICD-10-CM

## 2021-01-03 DIAGNOSIS — D75.1 POLYCYTHEMIA: ICD-10-CM

## 2021-01-04 ENCOUNTER — TELEMEDICINE (OUTPATIENT)
Dept: ENDOCRINOLOGY | Facility: MEDICAL CENTER | Age: 61
End: 2021-01-04
Attending: INTERNAL MEDICINE
Payer: MEDICARE

## 2021-01-04 DIAGNOSIS — E29.1 HYPOGONADISM IN MALE: ICD-10-CM

## 2021-01-04 DIAGNOSIS — D75.1 POLYCYTHEMIA: ICD-10-CM

## 2021-01-04 PROCEDURE — 99214 OFFICE O/P EST MOD 30 MIN: CPT | Mod: 95,CR | Performed by: INTERNAL MEDICINE

## 2021-01-04 NOTE — PROGRESS NOTES
"Telemedicine Visit: Established Patient     This encounter was conducted via ZOOM.   Verbal consent was obtained. Patient's identity was verified.    Subjective:   CC:   Cristino Keys is a 60 y.o. male presenting for evaluation and management of hypogonadism, hypothyroidism and polycythemia    HPI      We have been trying to obtain Xyosted for patient for a way for him to treat his low testosterone with injections done at home.  I think we finally have it approved but he has not yet received the first dose.  We are going to start low with 50 mg subcu once weekly.  After about 2 weeks he will obtain a midweek testosterone level.       He also has a tendency to polycythemia which is a result of his sleep apnea plus testosterone.  We will watch that carefully.  His current hemoglobin is satisfactory at 15.4 and hematocrit 46.          Hypothyroidism adequately replaced with current TSH 2.6 taking Synthroid 137 mcg/day.  No dose change indicated.    ROS   Denies any recent fevers or chills. No nausea or vomiting. No chest pains or shortness of breath.     Allergies   Allergen Reactions   • Demerol Vomiting and Nausea     Rxn = years ago    • Cubicin [Daptomycin] Vomiting   • Meperidine      2004-02-25;NA   • Sulfa Drugs Hives     .   • Sulfamethoxazole W-Trimethoprim Rash     2004-02-25;NA       Current medicines (including changes today)  Current Outpatient Medications   Medication Sig Dispense Refill   • SYRINGE-NEEDLE, DISP, 3 ML (LUER LOCK SAFETY SYRINGES) 22G X 1-1/2\" 3 ML Misc 1 Each by Does not apply route every 7 days. 12 Each 3   • liraglutide (VICTOZA) 18 MG/3ML Solution Pen-injector Inject 1.8 mg as instructed every day.     • hydrOXYzine pamoate (VISTARIL) 50 MG Cap Take 50 mg by mouth 2 Times a Day.     • ipratropium (ATROVENT) 0.03 % Solution Spray 1 Spray in nose 3 times a day.     • gabapentin (NEURONTIN) 300 MG Cap Take 900 mg by mouth 1 time daily as needed.     • azelastine (ASTELIN) 137 " MCG/SPRAY nasal spray Spray 1 Spray in nose 2 Times a Day.     • Diclofenac Sodium 1 % Gel Apply 1 Application to affected area(s) 2 times a day as needed.     • NOVOFINE PLUS Inject 1 Application as instructed 4 Times a Day,Before Meals and at Bedtime. Use before meals and at bedtime to check blood sugar.     • Lidocaine (LIDOCAINE PAIN RELIEF) 4 % Patch 1 Patch by Apply externally route every 24 hours.     • SYNTHROID 137 MCG Tab TAKE 1 TABLET DAILY 90 Tab 1   • lamoTRIgine (LAMICTAL) 100 MG Tab Take 100 mg by mouth every day.     • cyclobenzaprine (FLEXERIL) 10 MG Tab Take 10 mg by mouth 3 times a day.  5   • amphetamine-dextroamphetamine (ADDERALL) 5 MG Tab Take 5 mg by mouth every day.     • tamsulosin (FLOMAX) 0.4 MG capsule Take 0.4 mg by mouth ONE-HALF HOUR AFTER BREAKFAST.     • temazepam (RESTORIL) 30 MG capsule Take 30 mg by mouth at bedtime as needed for Sleep.     • venlafaxine XR (EFFEXOR XR) 75 MG CAPSULE SR 24 HR Take 75 mg by mouth every morning.     • alprazolam (XANAX) 0.25 MG Tab Take 0.25-0.5 mg by mouth 4 times a day as needed for Anxiety.     • losartan (COZAAR) 100 MG Tab Take 100 mg by mouth every morning.     • oxycodone-acetaminophen (PERCOCET) 5-325 MG TABS Take 1-2 Tabs by mouth every four hours as needed.       Current Facility-Administered Medications   Medication Dose Route Frequency Provider Last Rate Last Admin   • testosterone cypionate (DEPO-TESTOSTERONE) injection 150 mg  150 mg Intramuscular Q14 DAYS Reji Love M.D.   150 mg at 08/11/20 1038       Patient Active Problem List    Diagnosis Date Noted   • Polycythemia 07/01/2020   • History of MRSA infection 06/10/2020   • Nocturnal hypoxemia 04/29/2020   • Hypogonadism in male 03/18/2020   • Former smoker 03/11/2019   • Low testosterone in male 03/08/2019   • Wound infection 05/14/2018   • Diabetic ulcer of toe of left foot associated with type 2 diabetes mellitus, with fat layer exposed (HCC) 05/14/2018   • Contracture of  elbow joint, right 04/03/2018   • BMI 38.0-38.9,adult 11/01/2017   • Chronic pain 08/28/2017   • Chronic narcotic use 08/28/2017   • Arthritis of left ankle 08/21/2017   • Degeneration of lumbar intervertebral disc 04/14/2017   • Lower back pain 04/14/2017   • JULIAN (obstructive sleep apnea) 04/03/2017   • Snoring 04/03/2017   • Acquired hallux valgus of left foot 12/22/2016   • Type 2 diabetes mellitus without complication (HCC) 12/13/2016   • Chronic autoimmune thyroiditis 10/03/2016   • Hypothyroidism, acquired, autoimmune 10/03/2016   • Primary osteoarthritis of left hip 08/18/2016   • Diabetes (HCC) 09/05/2014   • Lumbar disc disease 09/05/2014   • Gout 09/05/2014   • Hypertension 09/05/2014   • H/O arthroscopy of knee 09/05/2014   • Hx of elbow surgery 09/05/2014   • Severe obesity with body mass index (BMI) of 35.0 to 39.9 with serious comorbidity ([facility]) 09/05/2014   • Osteoarthritis 09/05/2014   • H/O shoulder replacement 09/05/2014   • Rotator cuff arthropathy 09/05/2014   • Bilateral bunions 09/05/2014       Family History   Problem Relation Age of Onset   • Heart Disease Mother    • Depression Mother    • Cancer Father    • Sleep Apnea Neg Hx        He  has a past medical history of Anesthesia (11/03/2017), Anxiety and depression (11/03/2017), Arthritis (11/03/2017), Bunion, Cancer ([facility]) (2017), Chronic autoimmune thyroiditis, Dental disorder (11/03/2017), Diabetes mellitus ([facility]) (11/03/2017), Gout, Hammertoe, High cholesterol (11/03/2017), Hypertension, Hypothyroidism, MRSA infection, Open toe wound (03/2018), Pain (11/03/2017), Psychiatric problem, Sleep apnea (11/03/2017), Snoring (11/03/2017), and Tonsillitis.  He  has a past surgical history that includes hip arth anterior total (Left, 8/18/2016); hammertoe correction (Bilateral, 12/22/2016); bunionectomy (Left, 12/22/2016); lumbar fusion posterior (4/14/2017); metatarsal head resection (Left, 8/21/2017); metatarsal head resection (Right, 11/13/2017);  hammertoe correction (Right, 11/13/2017); nerve ulnar transfer (Right, 4/3/2018); metatarsal head resection (Left, 6/10/2019); joint injection diagnostic (6/10/2019); other orthopedic surgery (6/2014); other orthopedic surgery (11/1/2012); other orthopedic surgery (2004); orthopedic osteotomy (Left, 12/22/2016); toe fusion (Left, 8/21/2017); hardware removal ortho (Left, 8/21/2017); toe fusion (Right, 11/13/2017); carpal tunnel release (Right, 4/3/2018); elbow arthrotomy (Right, 4/3/2018); toe amputation (Left, 6/25/2018); internal fixation removal (Left, 6/25/2018); shoulder surgery (Right, 12/02/2017); orthopedic osteotomy (Left, 10/15/2018); hardware removal ortho (Right, 10/15/2018); toe amputation (Right, 6/10/2019); other orthopedic surgery; other orthopedic surgery; other; other (2000); metatarsal head resection (Left, 7/20/2020); hammertoe correction (Left, 7/20/2020); and toenail removal (Left, 7/20/2020).       Objective:   Vitals obtained by patient:  None taken today    Physical Exam:  Constitutional: Alert, no distress  Skin: No rashes in visible areas.  Eye: Round. No icterus.   ENT   Phonation normal.  Neck: No obvious  thyromegaly. Moves freely without pain.  CV: No sensation of cardiac irregularity or inappropriate tachycardia  Respiratory: Unlabored respiratory effort, no cough or audible wheeze  Psych: Alert and oriented x3, normal affect and mood.       Assessment and Plan:   The following treatment plan was discussed:     1. Hypogonadism in male            We will initiate Xyosted when he receives his first dose 50 mg subcutaneous once weekly.  Obtain testosterone levels after 2 to 3 weeks of use and further discuss.    2. Polycythemia                This results of the combination of testosterone supplementation plus sleep apnea.  Currently hemogram levels are normal but will be followed.    3.  Hypothyroidism, chronic autoimmune thyroiditis              Thyroid gland is asymptomatic. Patient  is not aware of any change in her gland. No discomfort. No difficulty swallowing, breathing, or voice change.             Currently clinically euthyroid taking brand Synthroid 137 mcg daily    Follow-up: Next follow-up will be by phone or MyChart after I see the results of his next lab data while taking Xyosted.

## 2021-01-22 DIAGNOSIS — E29.1 HYPOGONADISM IN MALE: ICD-10-CM

## 2021-01-22 RX ORDER — TESTOSTERONE ENANTHATE 50 MG/.5ML
50 INJECTION SUBCUTANEOUS
Qty: 4 EACH | Refills: 5 | Status: SHIPPED
Start: 2021-01-22 | End: 2021-02-13

## 2021-01-27 ENCOUNTER — APPOINTMENT (RX ONLY)
Dept: URBAN - METROPOLITAN AREA CLINIC 22 | Facility: CLINIC | Age: 61
Setting detail: DERMATOLOGY
End: 2021-01-27

## 2021-01-27 DIAGNOSIS — D18.0 HEMANGIOMA: ICD-10-CM

## 2021-01-27 DIAGNOSIS — L30.0 NUMMULAR DERMATITIS: ICD-10-CM | Status: INADEQUATELY CONTROLLED

## 2021-01-27 DIAGNOSIS — Z71.89 OTHER SPECIFIED COUNSELING: ICD-10-CM

## 2021-01-27 DIAGNOSIS — D22 MELANOCYTIC NEVI: ICD-10-CM

## 2021-01-27 DIAGNOSIS — L72.8 OTHER FOLLICULAR CYSTS OF THE SKIN AND SUBCUTANEOUS TISSUE: ICD-10-CM

## 2021-01-27 DIAGNOSIS — L57.0 ACTINIC KERATOSIS: ICD-10-CM

## 2021-01-27 DIAGNOSIS — L82.1 OTHER SEBORRHEIC KERATOSIS: ICD-10-CM

## 2021-01-27 DIAGNOSIS — Z85.828 PERSONAL HISTORY OF OTHER MALIGNANT NEOPLASM OF SKIN: ICD-10-CM

## 2021-01-27 DIAGNOSIS — L81.4 OTHER MELANIN HYPERPIGMENTATION: ICD-10-CM

## 2021-01-27 DIAGNOSIS — L73.8 OTHER SPECIFIED FOLLICULAR DISORDERS: ICD-10-CM

## 2021-01-27 PROBLEM — D22.5 MELANOCYTIC NEVI OF TRUNK: Status: ACTIVE | Noted: 2021-01-27

## 2021-01-27 PROBLEM — D18.01 HEMANGIOMA OF SKIN AND SUBCUTANEOUS TISSUE: Status: ACTIVE | Noted: 2021-01-27

## 2021-01-27 PROCEDURE — 17000 DESTRUCT PREMALG LESION: CPT

## 2021-01-27 PROCEDURE — ? PRESCRIPTION

## 2021-01-27 PROCEDURE — ? SUNSCREEN RECOMMENDATIONS

## 2021-01-27 PROCEDURE — 17003 DESTRUCT PREMALG LES 2-14: CPT

## 2021-01-27 PROCEDURE — 99214 OFFICE O/P EST MOD 30 MIN: CPT | Mod: 25

## 2021-01-27 PROCEDURE — ? LIQUID NITROGEN

## 2021-01-27 PROCEDURE — 10060 I&D ABSCESS SIMPLE/SINGLE: CPT

## 2021-01-27 PROCEDURE — ? COUNSELING

## 2021-01-27 PROCEDURE — ? INCISION AND DRAINAGE

## 2021-01-27 RX ORDER — TRIAMCINOLONE ACETONIDE 1 MG/G
1 CREAM TOPICAL
Qty: 1 | Refills: 0 | Status: ERX

## 2021-01-27 ASSESSMENT — LOCATION DETAILED DESCRIPTION DERM
LOCATION DETAILED: LEFT SUPERIOR MEDIAL UPPER BACK
LOCATION DETAILED: LEFT MID-UPPER BACK
LOCATION DETAILED: LEFT DISTAL CALF
LOCATION DETAILED: EPIGASTRIC SKIN
LOCATION DETAILED: LEFT LATERAL SHOULDER
LOCATION DETAILED: NASAL DORSUM
LOCATION DETAILED: INFERIOR MID FOREHEAD
LOCATION DETAILED: RIGHT SUPERIOR MEDIAL MIDBACK

## 2021-01-27 ASSESSMENT — LOCATION SIMPLE DESCRIPTION DERM
LOCATION SIMPLE: RIGHT LOWER BACK
LOCATION SIMPLE: NOSE
LOCATION SIMPLE: INFERIOR FOREHEAD
LOCATION SIMPLE: ABDOMEN
LOCATION SIMPLE: LEFT CALF
LOCATION SIMPLE: LEFT SHOULDER
LOCATION SIMPLE: LEFT UPPER BACK

## 2021-01-27 ASSESSMENT — LOCATION ZONE DERM
LOCATION ZONE: LEG
LOCATION ZONE: FACE
LOCATION ZONE: TRUNK
LOCATION ZONE: NOSE
LOCATION ZONE: ARM

## 2021-01-27 NOTE — PROCEDURE: INCISION AND DRAINAGE
Detail Level: Detailed
Lesion Type: Cyst
Method: 11 blade
Curette: No
Size Of Lesion In Cm (Optional But May Be Required For Some Insurances): 0.5
Drainage Amount?: minimal
Drainage Type?: bloody and cyst-like
Wound Care: Vaseline
Dressing: Band-Aid
Epidermal Sutures: 4-0 Ethilon
Epidermal Closure: simple interrupted
Suture Text: The incision was partially closed with
Preparation Text: The area was prepped in the usual clean fashion.
Curette Text (Optional): After the contents were expressed a curette was used to partially remove the cyst wall.
Consent was obtained and risks were reviewed including but not limited to delayed wound healing, infection, need for multiple I and D's, and pain.
Post-Care Instructions: I reviewed with the patient in detail post-care instructions. Patient should keep wound covered and call the office should any redness, pain, swelling or worsening occur.

## 2021-01-27 NOTE — HPI: EVALUATION OF SKIN LESION(S)
How Severe Are Your Spot(S)?: mild
Have Your Spot(S) Been Treated In The Past?: has not been treated
Hpi Title: Evaluation of Skin Lesions
Additional History: Patient has applied anti itch cream

## 2021-01-27 NOTE — PROCEDURE: LIQUID NITROGEN
Render Post-Care Instructions In Note?: no
Consent: The patient's consent was obtained including but not limited to risks of crusting, scabbing, blistering, scarring, darker or lighter pigmentary change, recurrence, incomplete removal and infection.
Detail Level: Zone
Number Of Freeze-Thaw Cycles: 2 freeze-thaw cycles
Post-Care Instructions: I reviewed with the patient in detail post-care instructions. Patient is to wear sunprotection, and avoid picking at any of the treated lesions. Pt may apply Vaseline to crusted or scabbing areas.
Duration Of Freeze Thaw-Cycle (Seconds): 0
Total Number Of Aks Treated: 4

## 2021-01-28 ENCOUNTER — OUTPATIENT INFUSION SERVICES (OUTPATIENT)
Dept: ONCOLOGY | Facility: MEDICAL CENTER | Age: 61
End: 2021-01-28
Attending: INTERNAL MEDICINE
Payer: COMMERCIAL

## 2021-01-28 VITALS
SYSTOLIC BLOOD PRESSURE: 146 MMHG | BODY MASS INDEX: 38.13 KG/M2 | TEMPERATURE: 97.8 F | WEIGHT: 266.32 LBS | OXYGEN SATURATION: 98 % | RESPIRATION RATE: 18 BRPM | HEIGHT: 70 IN | DIASTOLIC BLOOD PRESSURE: 69 MMHG | HEART RATE: 108 BPM

## 2021-01-28 DIAGNOSIS — D75.1 POLYCYTHEMIA: ICD-10-CM

## 2021-01-28 LAB
HCT VFR BLD CALC: 46 % (ref 42–52)
HGB BLD-MCNC: 15.6 G/DL (ref 14–18)

## 2021-01-28 PROCEDURE — 85014 HEMATOCRIT: CPT

## 2021-01-28 PROCEDURE — 36415 COLL VENOUS BLD VENIPUNCTURE: CPT | Performed by: CLINICAL NURSE SPECIALIST

## 2021-01-28 ASSESSMENT — FIBROSIS 4 INDEX: FIB4 SCORE: 1.62

## 2021-01-28 NOTE — PROGRESS NOTES
Cristino is in infusion for a TP.  No new concerns.  PIV started to LFA x2 attempts.  Lab drawn and hgb 15.6, hct 46.  No TP needed today.  PIV removed and Cristion discharged ambualatory to home in stable condition.  Next appointment scheduled in 8 weeks.

## 2021-02-07 DIAGNOSIS — E06.3 HYPOTHYROIDISM, ACQUIRED, AUTOIMMUNE: ICD-10-CM

## 2021-02-08 RX ORDER — LEVOTHYROXINE SODIUM 137 MCG
TABLET ORAL
Qty: 90 TAB | Refills: 1 | Status: SHIPPED | OUTPATIENT
Start: 2021-02-08 | End: 2021-08-16

## 2021-02-23 ENCOUNTER — HOSPITAL ENCOUNTER (OUTPATIENT)
Dept: LAB | Facility: MEDICAL CENTER | Age: 61
End: 2021-02-23
Attending: INTERNAL MEDICINE
Payer: COMMERCIAL

## 2021-02-23 DIAGNOSIS — D75.1 POLYCYTHEMIA: ICD-10-CM

## 2021-02-23 DIAGNOSIS — E29.1 HYPOGONADISM IN MALE: ICD-10-CM

## 2021-02-23 LAB
ERYTHROCYTE [DISTWIDTH] IN BLOOD BY AUTOMATED COUNT: 49.7 FL (ref 35.9–50)
ESTRADIOL SERPL-MCNC: 26.6 PG/ML
HCT VFR BLD AUTO: 49.8 % (ref 42–52)
HGB BLD-MCNC: 16.3 G/DL (ref 14–18)
MCH RBC QN AUTO: 31.5 PG (ref 27–33)
MCHC RBC AUTO-ENTMCNC: 32.7 G/DL (ref 33.7–35.3)
MCV RBC AUTO: 96.3 FL (ref 81.4–97.8)
PLATELET # BLD AUTO: 208 K/UL (ref 164–446)
PMV BLD AUTO: 9 FL (ref 9–12.9)
RBC # BLD AUTO: 5.17 M/UL (ref 4.7–6.1)
WBC # BLD AUTO: 6 K/UL (ref 4.8–10.8)

## 2021-02-23 PROCEDURE — 85027 COMPLETE CBC AUTOMATED: CPT

## 2021-02-23 PROCEDURE — 82670 ASSAY OF TOTAL ESTRADIOL: CPT

## 2021-02-23 PROCEDURE — 36415 COLL VENOUS BLD VENIPUNCTURE: CPT

## 2021-02-23 PROCEDURE — 84402 ASSAY OF FREE TESTOSTERONE: CPT

## 2021-02-25 LAB — TESTOST FREE SERPL-MCNC: 50 PG/ML (ref 47–244)

## 2021-02-27 ENCOUNTER — TELEPHONE (OUTPATIENT)
Dept: ENDOCRINOLOGY | Facility: MEDICAL CENTER | Age: 61
End: 2021-02-27

## 2021-02-27 DIAGNOSIS — D75.1 POLYCYTHEMIA: ICD-10-CM

## 2021-02-27 DIAGNOSIS — E29.1 HYPOGONADISM IN MALE: ICD-10-CM

## 2021-02-27 RX ORDER — TESTOSTERONE ENANTHATE 75 MG/.5ML
75 INJECTION SUBCUTANEOUS
Qty: 4 EACH | Refills: 5 | Status: SHIPPED
Start: 2021-02-27 | End: 2021-03-21

## 2021-02-27 NOTE — TELEPHONE ENCOUNTER
Telephone conversation with patient    4 weeks of Xyosted 50 mg subcu each week.  Then we free testosterone is 50 which is low normal.  Estradiol okay in 26 and hemoglobin 16.3 hematocrit 49.8.    He would like to try higher dose and I think that is reasonable.  We will now use 75 mg subcu weekly and repeat lab during the fourth week.   HPI:  73yo M with PMH of HTN, HLD, DMT2, prostatomegaly, h/o SVT s/p ablation (2011) presents  here with progressive weakness for one month a/w abdominal distension. Pt reports no known history of kidney disease and was asked by his PMD to see a urologist that he never did. He reports increased dyspnea on exertion, usually can climb more than one flight of stairs but get SOB with less than one flight now a/w anorexia, denies any F/C, chest pain, cough, abdominal pain, N/V. He has constipation and small amount of non-bloody diarrhea. He denies alcohol abuse, never had colonoscopy.     ED course: VSS. Labs with anemia Hb 9.1/27, thrombocytopenia (137). Hyponatremia, hypokalemia, anion gap metabolic acidosis with elevated beta-hydroxybutyrate, lactate 1.1, UA with pyuria, leukocyte esterase with bacteria. Pt was given insulin 4u once and 2l NS bolus. A CT abd/pelvis was performed. (11 Apr 2018 06:02)    FAMILY HISTORY:  No pertinent family history in first degree relatives    MEDICATIONS  (STANDING):  aspirin  chewable 81 milliGRAM(s) Oral daily  cefTRIAXone Injectable.      dextrose 5%. 1000 milliLiter(s) (50 mL/Hr) IV Continuous <Continuous>  dextrose 50% Injectable 12.5 Gram(s) IV Push once  dextrose 50% Injectable 25 Gram(s) IV Push once  dextrose 50% Injectable 25 Gram(s) IV Push once  ferrous    sulfate 325 milliGRAM(s) Oral three times a day  influenza   Vaccine 0.5 milliLiter(s) IntraMuscular once  insulin lispro (HumaLOG) corrective regimen sliding scale   SubCutaneous Before meals and at bedtime  potassium chloride  10 mEq/100 mL IVPB 10 milliEquivalent(s) IV Intermittent every 1 hour  simvastatin 10 milliGRAM(s) Oral at bedtime  sodium chloride 0.9%. 1000 milliLiter(s) (100 mL/Hr) IV Continuous <Continuous>  tamsulosin 0.4 milliGRAM(s) Oral at bedtime    MEDICATIONS  (PRN):  dextrose Gel 1 Dose(s) Oral once PRN Blood Glucose LESS THAN 70 milliGRAM(s)/deciliter  glucagon  Injectable 1 milliGRAM(s) IntraMuscular once PRN Glucose LESS THAN 70 milligrams/deciliter    Vital Signs Last 24 Hrs  T(C): 36.8 (11 Apr 2018 15:00), Max: 38 (11 Apr 2018 05:44)  T(F): 98.3 (11 Apr 2018 15:00), Max: 100.4 (11 Apr 2018 05:44)  HR: 80 (11 Apr 2018 15:00) (80 - 92)  BP: 136/70 (11 Apr 2018 15:00) (136/70 - 160/74)  BP(mean): 92 (11 Apr 2018 15:00) (92 - 92)  RR: 19 (11 Apr 2018 15:00) (18 - 19)  SpO2: 97% (11 Apr 2018 15:00) (95% - 99%)PHYSICAL EXAM:    Constitutional:    Neck:    Breasts:    Respiratory:    Cardiovascular:    Gastrointestinal:    Extremities:    Neurological:    Skin:    Lymph Nodes:      Labs:  CBC Full  -  ( 11 Apr 2018 20:11 )  WBC Count : 9.3 K/uL  Hemoglobin : 8.8 g/dL  Hematocrit : 25.5 %  Platelet Count - Automated : 123 K/uL  Mean Cell Volume : 83.3 fL  Mean Cell Hemoglobin : 28.8 pg  Mean Cell Hemoglobin Concentration : 34.5 g/dL  Auto Neutrophil # : x  Auto Lymphocyte # : x  Auto Monocyte # : x  Auto Eosinophil # : x  Auto Basophil # : x  Auto Neutrophil % : x  Auto Lymphocyte % : x  Auto Monocyte % : x  Auto Eosinophil % : x  Auto Basophil % : x    04-11    132<L>  |  96  |  57<H>  ----------------------------<  210<H>  3.7   |  21<L>  |  2.43<H>    Ca    8.8      11 Apr 2018 20:11  Phos  3.0     04-11  Mg     1.9     04-11    TPro  6.8  /  Alb  3.6  /  TBili  0.3  /  DBili  x   /  AST  22  /  ALT  35  /  AlkPhos  68  04-11      Radiology:  HEALTH ISSUES - R/O PROBLEM Dx:    Assessmant:  1) DM out of control  2HTN  3) positive troponins  4) Prostatomegaly  5) bilateral hydronephrosis  6) renal failure  7) waisting, anorexia , extreme weaknes patient bearly able to walk , who lives alone  8)  Plan:    Thank you  Olivia Roberts MD HPI:  71yo M with PMH of HTN, HLD, DMT2, prostatomegaly, h/o SVT s/p ablation (2011) presents  here with progressive weakness for one month a/w abdominal distension. Pt reports no known history of kidney disease and was asked by his PMD to see a urologist that he never did. He reports increased dyspnea on exertion, usually can climb more than one flight of stairs but get SOB with less than one flight now a/w anorexia, denies any F/C, chest pain, cough, abdominal pain, N/V. He has constipation and small amount of non-bloody diarrhea. He denies alcohol abuse, never had colonoscopy.     ED course: VSS. Labs with anemia Hb 9.1/27, thrombocytopenia (137). Hyponatremia, hypokalemia, anion gap metabolic acidosis with elevated beta-hydroxybutyrate, lactate 1.1, UA with pyuria, leukocyte esterase with bacteria. Pt was given insulin 4u once and 2l NS bolus. A CT abd/pelvis was performed. (11 Apr 2018 06:02)    FAMILY HISTORY:  No pertinent family history in first degree relatives    MEDICATIONS  (STANDING):  aspirin  chewable 81 milliGRAM(s) Oral daily  cefTRIAXone Injectable.      dextrose 5%. 1000 milliLiter(s) (50 mL/Hr) IV Continuous <Continuous>  dextrose 50% Injectable 12.5 Gram(s) IV Push once  dextrose 50% Injectable 25 Gram(s) IV Push once  dextrose 50% Injectable 25 Gram(s) IV Push once  ferrous    sulfate 325 milliGRAM(s) Oral three times a day  influenza   Vaccine 0.5 milliLiter(s) IntraMuscular once  insulin lispro (HumaLOG) corrective regimen sliding scale   SubCutaneous Before meals and at bedtime  potassium chloride  10 mEq/100 mL IVPB 10 milliEquivalent(s) IV Intermittent every 1 hour  simvastatin 10 milliGRAM(s) Oral at bedtime  sodium chloride 0.9%. 1000 milliLiter(s) (100 mL/Hr) IV Continuous <Continuous>  tamsulosin 0.4 milliGRAM(s) Oral at bedtime    MEDICATIONS  (PRN):  dextrose Gel 1 Dose(s) Oral once PRN Blood Glucose LESS THAN 70 milliGRAM(s)/deciliter  glucagon  Injectable 1 milliGRAM(s) IntraMuscular once PRN Glucose LESS THAN 70 milligrams/deciliter    Vital Signs Last 24 Hrs  T(C): 36.8 (11 Apr 2018 15:00), Max: 38 (11 Apr 2018 05:44)  T(F): 98.3 (11 Apr 2018 15:00), Max: 100.4 (11 Apr 2018 05:44)  HR: 80 (11 Apr 2018 15:00) (80 - 92)  BP: 136/70 (11 Apr 2018 15:00) (136/70 - 160/74)  BP(mean): 92 (11 Apr 2018 15:00) (92 - 92)  RR: 19 (11 Apr 2018 15:00) (18 - 19)  SpO2: 97% (11 Apr 2018 15:00) (95% - 99%)PHYSICAL EXAM:    Constitutional:    Neck: no adenopathy    Breasts: no masses    Respiratorytact     Cardiovascular:    Gastrointestinal: no masses  possible splenomegaly    Extremities: no edema    Neurological:     Skin:    Lymph Nodes:      Labs:  CBC Full  -  ( 11 Apr 2018 20:11 )  WBC Count : 9.3 K/uL  Hemoglobin : 8.8 g/dL  Hematocrit : 25.5 %  Platelet Count - Automated : 123 K/uL  Mean Cell Volume : 83.3 fL  Mean Cell Hemoglobin : 28.8 pg  Mean Cell Hemoglobin Concentration : 34.5 g/dL  Auto Neutrophil # : x  Auto Lymphocyte # : x  Auto Monocyte # : x  Auto Eosinophil # : x  Auto Basophil # : x  Auto Neutrophil % : x  Auto Lymphocyte % : x  Auto Monocyte % : x  Auto Eosinophil % : x  Auto Basophil % : x    04-11    132<L>  |  96  |  57<H>  ----------------------------<  210<H>  3.7   |  21<L>  |  2.43<H>    Ca    8.8      11 Apr 2018 20:11  Phos  3.0     04-11  Mg     1.9     04-11    TPro  6.8  /  Alb  3.6  /  TBili  0.3  /  DBili  x   /  AST  22  /  ALT  35  /  AlkPhos  68  04-11      Radiology:  HEALTH ISSUES - R/O PROBLEM Dx:    Assessmant:  1) DM out of control  2HTN  3) positive troponins  4) Prostatomegaly  5) bilateral hydronephrosis  6) renal failure  7) waisting, anorexia , extreme weaknes patient bearly able to walk , who lives alone  8) tachycardia  9) low grade fever  10) Anemia   11) thrombocytopenia , clumping reported  12) possible splenomegaly  Plan:  1)will call Cardiology, Nephrology. Urology, Neurology, Endocrinology  2) bood c& s, urine c&s  3)anemia w/u  Thank you  Olivia Roberts MD HPI:  71yo M with PMH of HTN, HLD, DMT2, prostatomegaly, h/o SVT s/p ablation (2011) presents  here with progressive weakness for one month a/w abdominal distension. Pt reports no known history of kidney disease and was asked by his PMD to see a urologist that he never did. He reports increased dyspnea on exertion, usually can climb more than one flight of stairs but get SOB with less than one flight now a/w anorexia, denies any F/C, chest pain, cough, abdominal pain, N/V. He has constipation and small amount of non-bloody diarrhea. He denies alcohol abuse, never had colonoscopy.     ED course: VSS. Labs with anemia Hb 9.1/27, thrombocytopenia (137). Hyponatremia, hypokalemia, anion gap metabolic acidosis with elevated beta-hydroxybutyrate, lactate 1.1, UA with pyuria, leukocyte esterase with bacteria. Pt was given insulin 4u once and 2l NS bolus. A CT abd/pelvis was performed. (11 Apr 2018 06:02)    FAMILY HISTORY:  No pertinent family history in first degree relatives    MEDICATIONS  (STANDING):  aspirin  chewable 81 milliGRAM(s) Oral daily  cefTRIAXone Injectable.      dextrose 5%. 1000 milliLiter(s) (50 mL/Hr) IV Continuous <Continuous>  dextrose 50% Injectable 12.5 Gram(s) IV Push once  dextrose 50% Injectable 25 Gram(s) IV Push once  dextrose 50% Injectable 25 Gram(s) IV Push once  ferrous    sulfate 325 milliGRAM(s) Oral three times a day  influenza   Vaccine 0.5 milliLiter(s) IntraMuscular once  insulin lispro (HumaLOG) corrective regimen sliding scale   SubCutaneous Before meals and at bedtime  potassium chloride  10 mEq/100 mL IVPB 10 milliEquivalent(s) IV Intermittent every 1 hour  simvastatin 10 milliGRAM(s) Oral at bedtime  sodium chloride 0.9%. 1000 milliLiter(s) (100 mL/Hr) IV Continuous <Continuous>  tamsulosin 0.4 milliGRAM(s) Oral at bedtime    MEDICATIONS  (PRN):  dextrose Gel 1 Dose(s) Oral once PRN Blood Glucose LESS THAN 70 milliGRAM(s)/deciliter  glucagon  Injectable 1 milliGRAM(s) IntraMuscular once PRN Glucose LESS THAN 70 milligrams/deciliter    Vital Signs Last 24 Hrs  T(C): 36.8 (11 Apr 2018 15:00), Max: 38 (11 Apr 2018 05:44)  T(F): 98.3 (11 Apr 2018 15:00), Max: 100.4 (11 Apr 2018 05:44)  HR: 80 (11 Apr 2018 15:00) (80 - 92)  BP: 136/70 (11 Apr 2018 15:00) (136/70 - 160/74)  BP(mean): 92 (11 Apr 2018 15:00) (92 - 92)  RR: 19 (11 Apr 2018 15:00) (18 - 19)  SpO2: 97% (11 Apr 2018 15:00) (95% - 99%)PHYSICAL EXAM:    Constitutional:    Neck: no adenopathy    Breasts: no masses        Cardiovascular: tachycardia    Gastrointestinal: no masses  possible splenomegaly    Extremities: no edema      Skin: intact    Lymph Nodes: nonpalpable      Labs:  CBC Full  -  ( 11 Apr 2018 20:11 )  WBC Count : 9.3 K/uL  Hemoglobin : 8.8 g/dL  Hematocrit : 25.5 %  Platelet Count - Automated : 123 K/uL  Mean Cell Volume : 83.3 fL  Mean Cell Hemoglobin : 28.8 pg  Mean Cell Hemoglobin Concentration : 34.5 g/dL  Auto Neutrophil # : x  Auto Lymphocyte # : x  Auto Monocyte # : x  Auto Eosinophil # : x  Auto Basophil # : x  Auto Neutrophil % : x  Auto Lymphocyte % : x  Auto Monocyte % : x  Auto Eosinophil % : x  Auto Basophil % : x    04-11    132<L>  |  96  |  57<H>  ----------------------------<  210<H>  3.7   |  21<L>  |  2.43<H>    Ca    8.8      11 Apr 2018 20:11  Phos  3.0     04-11  Mg     1.9     04-11    TPro  6.8  /  Alb  3.6  /  TBili  0.3  /  DBili  x   /  AST  22  /  ALT  35  /  AlkPhos  68  04-11      Radiology:  HEALTH ISSUES - R/O PROBLEM Dx:    Assessmant:  1) DM out of control  2HTN  3) positive troponins  4) Prostatomegaly  5) bilateral hydronephrosis  6) renal failure  7) waisting, anorexia , extreme weaknes patient bearly able to walk , who lives alone  8) tachycardia  9) low grade fever  10) Anemia   11) thrombocytopenia , clumping reported  12) possible splenomegaly  13) pyuria  14) hyponatremia  Plan:  1)will call Cardiology, Nephrology. Urology, Neurology, Endocrinology  2) bood c& s, urine c&s  3)anemia w/u  Thank you  Olivia Roberts MD

## 2021-03-15 DIAGNOSIS — Z23 NEED FOR VACCINATION: ICD-10-CM

## 2021-03-17 ENCOUNTER — HOSPITAL ENCOUNTER (OUTPATIENT)
Dept: LAB | Facility: MEDICAL CENTER | Age: 61
End: 2021-03-17
Attending: INTERNAL MEDICINE
Payer: COMMERCIAL

## 2021-03-17 DIAGNOSIS — E29.1 HYPOGONADISM IN MALE: ICD-10-CM

## 2021-03-17 DIAGNOSIS — D75.1 POLYCYTHEMIA: ICD-10-CM

## 2021-03-17 LAB
ERYTHROCYTE [DISTWIDTH] IN BLOOD BY AUTOMATED COUNT: 51.9 FL (ref 35.9–50)
ESTRADIOL SERPL-MCNC: 32.1 PG/ML
HCT VFR BLD AUTO: 48.1 % (ref 42–52)
HGB BLD-MCNC: 15.3 G/DL (ref 14–18)
MCH RBC QN AUTO: 31.4 PG (ref 27–33)
MCHC RBC AUTO-ENTMCNC: 31.8 G/DL (ref 33.7–35.3)
MCV RBC AUTO: 98.6 FL (ref 81.4–97.8)
PLATELET # BLD AUTO: 192 K/UL (ref 164–446)
PMV BLD AUTO: 9.5 FL (ref 9–12.9)
RBC # BLD AUTO: 4.88 M/UL (ref 4.7–6.1)
WBC # BLD AUTO: 5.5 K/UL (ref 4.8–10.8)

## 2021-03-17 PROCEDURE — 36415 COLL VENOUS BLD VENIPUNCTURE: CPT

## 2021-03-17 PROCEDURE — 82670 ASSAY OF TOTAL ESTRADIOL: CPT

## 2021-03-17 PROCEDURE — 85027 COMPLETE CBC AUTOMATED: CPT

## 2021-03-17 PROCEDURE — 84402 ASSAY OF FREE TESTOSTERONE: CPT

## 2021-03-18 DIAGNOSIS — E29.1 HYPOGONADISM IN MALE: ICD-10-CM

## 2021-03-18 RX ORDER — TESTOSTERONE ENANTHATE 75 MG/.5ML
75 INJECTION SUBCUTANEOUS
Qty: 12 EACH | Refills: 2 | Status: SHIPPED
Start: 2021-03-18 | End: 2021-06-04

## 2021-03-20 LAB — TESTOST FREE SERPL-MCNC: 87 PG/ML (ref 47–244)

## 2021-04-01 ENCOUNTER — TELEPHONE (OUTPATIENT)
Dept: ENDOCRINOLOGY | Facility: MEDICAL CENTER | Age: 61
End: 2021-04-01

## 2021-06-08 ENCOUNTER — HOSPITAL ENCOUNTER (OUTPATIENT)
Dept: LAB | Facility: MEDICAL CENTER | Age: 61
End: 2021-06-08
Attending: INTERNAL MEDICINE
Payer: COMMERCIAL

## 2021-06-08 DIAGNOSIS — D75.1 POLYCYTHEMIA: ICD-10-CM

## 2021-06-08 DIAGNOSIS — E29.1 HYPOGONADISM IN MALE: ICD-10-CM

## 2021-06-08 DIAGNOSIS — E06.3 HYPOTHYROIDISM, ACQUIRED, AUTOIMMUNE: ICD-10-CM

## 2021-06-08 PROCEDURE — 36415 COLL VENOUS BLD VENIPUNCTURE: CPT

## 2021-06-08 PROCEDURE — 84402 ASSAY OF FREE TESTOSTERONE: CPT

## 2021-06-10 LAB — TESTOST FREE SERPL-MCNC: 123 PG/ML (ref 47–244)

## 2021-07-02 DIAGNOSIS — Z00.6 RESEARCH STUDY PATIENT: ICD-10-CM

## 2021-07-08 ENCOUNTER — HOSPITAL ENCOUNTER (OUTPATIENT)
Facility: MEDICAL CENTER | Age: 61
End: 2021-07-08
Attending: PATHOLOGY
Payer: COMMERCIAL

## 2021-07-09 DIAGNOSIS — Z00.6 RESEARCH STUDY PATIENT: ICD-10-CM

## 2021-07-12 DIAGNOSIS — E29.1 HYPOGONADISM IN MALE: ICD-10-CM

## 2021-07-12 DIAGNOSIS — D75.1 POLYCYTHEMIA: ICD-10-CM

## 2021-07-12 DIAGNOSIS — E06.3 HYPOTHYROIDISM, ACQUIRED, AUTOIMMUNE: ICD-10-CM

## 2021-07-15 LAB
ELF SCORE: 11.1
RELATIVE RISK: ABNORMAL
RISK GROUP: ABNORMAL
RISK: 23.6 %

## 2021-07-27 ENCOUNTER — APPOINTMENT (RX ONLY)
Dept: URBAN - METROPOLITAN AREA CLINIC 22 | Facility: CLINIC | Age: 61
Setting detail: DERMATOLOGY
End: 2021-07-27

## 2021-07-27 DIAGNOSIS — L82.0 INFLAMED SEBORRHEIC KERATOSIS: ICD-10-CM

## 2021-07-27 DIAGNOSIS — L57.0 ACTINIC KERATOSIS: ICD-10-CM

## 2021-07-27 DIAGNOSIS — Z85.828 PERSONAL HISTORY OF OTHER MALIGNANT NEOPLASM OF SKIN: ICD-10-CM

## 2021-07-27 DIAGNOSIS — L81.4 OTHER MELANIN HYPERPIGMENTATION: ICD-10-CM

## 2021-07-27 DIAGNOSIS — L82.1 OTHER SEBORRHEIC KERATOSIS: ICD-10-CM

## 2021-07-27 DIAGNOSIS — Z71.89 OTHER SPECIFIED COUNSELING: ICD-10-CM

## 2021-07-27 DIAGNOSIS — D22 MELANOCYTIC NEVI: ICD-10-CM

## 2021-07-27 DIAGNOSIS — D18.0 HEMANGIOMA: ICD-10-CM

## 2021-07-27 DIAGNOSIS — L84 CORNS AND CALLOSITIES: ICD-10-CM

## 2021-07-27 PROBLEM — D22.5 MELANOCYTIC NEVI OF TRUNK: Status: ACTIVE | Noted: 2021-07-27

## 2021-07-27 PROBLEM — D18.01 HEMANGIOMA OF SKIN AND SUBCUTANEOUS TISSUE: Status: ACTIVE | Noted: 2021-07-27

## 2021-07-27 PROCEDURE — 17000 DESTRUCT PREMALG LESION: CPT | Mod: 59

## 2021-07-27 PROCEDURE — ? PARING HYPERKERATOTIC LESION

## 2021-07-27 PROCEDURE — ? COUNSELING

## 2021-07-27 PROCEDURE — 99213 OFFICE O/P EST LOW 20 MIN: CPT | Mod: 25

## 2021-07-27 PROCEDURE — ? LIQUID NITROGEN

## 2021-07-27 PROCEDURE — 17003 DESTRUCT PREMALG LES 2-14: CPT | Mod: 59

## 2021-07-27 PROCEDURE — ? SUNSCREEN RECOMMENDATIONS

## 2021-07-27 PROCEDURE — 17110 DESTRUCTION B9 LES UP TO 14: CPT | Mod: 59

## 2021-07-27 PROCEDURE — ? PRESCRIPTION SAMPLES PROVIDED

## 2021-07-27 ASSESSMENT — LOCATION DETAILED DESCRIPTION DERM
LOCATION DETAILED: RIGHT LATERAL ELBOW
LOCATION DETAILED: EPIGASTRIC SKIN
LOCATION DETAILED: RIGHT PLANTAR FOREFOOT OVERLYING 4TH METATARSAL
LOCATION DETAILED: LEFT INFERIOR LATERAL UPPER BACK
LOCATION DETAILED: RIGHT ULNAR DORSAL HAND
LOCATION DETAILED: RIGHT PROXIMAL RADIAL DORSAL FOREARM
LOCATION DETAILED: LEFT SUPERIOR MEDIAL UPPER BACK
LOCATION DETAILED: LEFT PROXIMAL ULNAR DORSAL FOREARM
LOCATION DETAILED: LEFT DISTAL POSTERIOR UPPER ARM
LOCATION DETAILED: RIGHT SUPERIOR MEDIAL MIDBACK
LOCATION DETAILED: LEFT LATERAL SHOULDER
LOCATION DETAILED: LEFT LATERAL UPPER BACK
LOCATION DETAILED: RIGHT INFERIOR LATERAL UPPER BACK
LOCATION DETAILED: RIGHT PROXIMAL DORSAL FOREARM
LOCATION DETAILED: LEFT INFERIOR UPPER BACK

## 2021-07-27 ASSESSMENT — LOCATION SIMPLE DESCRIPTION DERM
LOCATION SIMPLE: ABDOMEN
LOCATION SIMPLE: LEFT FOREARM
LOCATION SIMPLE: LEFT UPPER ARM
LOCATION SIMPLE: RIGHT HAND
LOCATION SIMPLE: RIGHT LOWER BACK
LOCATION SIMPLE: RIGHT FOREARM
LOCATION SIMPLE: RIGHT PLANTAR SURFACE
LOCATION SIMPLE: RIGHT ELBOW
LOCATION SIMPLE: RIGHT UPPER BACK
LOCATION SIMPLE: LEFT SHOULDER
LOCATION SIMPLE: LEFT UPPER BACK

## 2021-07-27 ASSESSMENT — LOCATION ZONE DERM
LOCATION ZONE: TRUNK
LOCATION ZONE: HAND
LOCATION ZONE: FEET
LOCATION ZONE: ARM

## 2021-07-27 NOTE — PROCEDURE: LIQUID NITROGEN
Detail Level: Detailed
Render Post-Care Instructions In Note?: no
Number Of Freeze-Thaw Cycles: 2 freeze-thaw cycles
Post-Care Instructions: I reviewed with the patient in detail post-care instructions. Patient is to wear sunprotection, and avoid picking at any of the treated lesions. Pt may apply Vaseline to crusted or scabbing areas.
Duration Of Freeze Thaw-Cycle (Seconds): 3
Show Aperture Variable?: Yes
Consent: The patient's consent was obtained including but not limited to risks of crusting, scabbing, blistering, scarring, darker or lighter pigmentary change, recurrence, incomplete removal and infection.
Medical Necessity Clause: This procedure was medically necessary because the lesions that were treated were:
Medical Necessity Information: It is in your best interest to select a reason for this procedure from the list below. All of these items fulfill various CMS LCD requirements except the new and changing color options.
Number Of Freeze-Thaw Cycles: 3 freeze-thaw cycles

## 2021-08-06 ENCOUNTER — HOSPITAL ENCOUNTER (OUTPATIENT)
Dept: LAB | Facility: MEDICAL CENTER | Age: 61
End: 2021-08-06
Attending: FAMILY MEDICINE
Payer: COMMERCIAL

## 2021-08-06 LAB
ALBUMIN SERPL BCP-MCNC: 4.7 G/DL (ref 3.2–4.9)
ALBUMIN/GLOB SERPL: 1.6 G/DL
ALP SERPL-CCNC: 83 U/L (ref 30–99)
ALT SERPL-CCNC: 23 U/L (ref 2–50)
ANION GAP SERPL CALC-SCNC: 13 MMOL/L (ref 7–16)
APPEARANCE UR: CLEAR
AST SERPL-CCNC: 24 U/L (ref 12–45)
BACTERIA #/AREA URNS HPF: NEGATIVE /HPF
BASOPHILS # BLD AUTO: 0.1 % (ref 0–1.8)
BASOPHILS # BLD: 0.02 K/UL (ref 0–0.12)
BILIRUB SERPL-MCNC: 0.5 MG/DL (ref 0.1–1.5)
BILIRUB UR QL STRIP.AUTO: NEGATIVE
BUN SERPL-MCNC: 29 MG/DL (ref 8–22)
CALCIUM SERPL-MCNC: 9.7 MG/DL (ref 8.5–10.5)
CHLORIDE SERPL-SCNC: 102 MMOL/L (ref 96–112)
CHOLEST SERPL-MCNC: 217 MG/DL (ref 100–199)
CO2 SERPL-SCNC: 23 MMOL/L (ref 20–33)
COLOR UR: YELLOW
CREAT SERPL-MCNC: 1.12 MG/DL (ref 0.5–1.4)
EOSINOPHIL # BLD AUTO: 0 K/UL (ref 0–0.51)
EOSINOPHIL NFR BLD: 0 % (ref 0–6.9)
EPI CELLS #/AREA URNS HPF: NEGATIVE /HPF
ERYTHROCYTE [DISTWIDTH] IN BLOOD BY AUTOMATED COUNT: 48.6 FL (ref 35.9–50)
ERYTHROCYTE [SEDIMENTATION RATE] IN BLOOD BY WESTERGREN METHOD: 3 MM/HOUR (ref 0–20)
FASTING STATUS PATIENT QL REPORTED: NORMAL
GLOBULIN SER CALC-MCNC: 2.9 G/DL (ref 1.9–3.5)
GLUCOSE SERPL-MCNC: 146 MG/DL (ref 65–99)
GLUCOSE UR STRIP.AUTO-MCNC: NEGATIVE MG/DL
HCT VFR BLD AUTO: 50.2 % (ref 42–52)
HDLC SERPL-MCNC: 43 MG/DL
HGB BLD-MCNC: 16.4 G/DL (ref 14–18)
HYALINE CASTS #/AREA URNS LPF: ABNORMAL /LPF
IMM GRANULOCYTES # BLD AUTO: 0.12 K/UL (ref 0–0.11)
IMM GRANULOCYTES NFR BLD AUTO: 0.8 % (ref 0–0.9)
KETONES UR STRIP.AUTO-MCNC: NEGATIVE MG/DL
LDLC SERPL CALC-MCNC: 113 MG/DL
LEUKOCYTE ESTERASE UR QL STRIP.AUTO: NEGATIVE
LYMPHOCYTES # BLD AUTO: 1.24 K/UL (ref 1–4.8)
LYMPHOCYTES NFR BLD: 7.8 % (ref 22–41)
MCH RBC QN AUTO: 30.4 PG (ref 27–33)
MCHC RBC AUTO-ENTMCNC: 32.7 G/DL (ref 33.7–35.3)
MCV RBC AUTO: 93.1 FL (ref 81.4–97.8)
MICRO URNS: ABNORMAL
MONOCYTES # BLD AUTO: 1.1 K/UL (ref 0–0.85)
MONOCYTES NFR BLD AUTO: 6.9 % (ref 0–13.4)
NEUTROPHILS # BLD AUTO: 13.36 K/UL (ref 1.82–7.42)
NEUTROPHILS NFR BLD: 84.4 % (ref 44–72)
NITRITE UR QL STRIP.AUTO: NEGATIVE
NRBC # BLD AUTO: 0 K/UL
NRBC BLD-RTO: 0 /100 WBC
PH UR STRIP.AUTO: 6 [PH] (ref 5–8)
PLATELET # BLD AUTO: 292 K/UL (ref 164–446)
PMV BLD AUTO: 9.3 FL (ref 9–12.9)
POTASSIUM SERPL-SCNC: 4.6 MMOL/L (ref 3.6–5.5)
PROT SERPL-MCNC: 7.6 G/DL (ref 6–8.2)
PROT UR QL STRIP: 100 MG/DL
PSA SERPL-MCNC: 0.48 NG/ML (ref 0–4)
RBC # BLD AUTO: 5.39 M/UL (ref 4.7–6.1)
RBC # URNS HPF: ABNORMAL /HPF
RBC UR QL AUTO: NEGATIVE
SODIUM SERPL-SCNC: 138 MMOL/L (ref 135–145)
SP GR UR STRIP.AUTO: 1.01
T4 FREE SERPL-MCNC: 1.1 NG/DL (ref 0.93–1.7)
TRIGL SERPL-MCNC: 303 MG/DL (ref 0–149)
TSH SERPL DL<=0.005 MIU/L-ACNC: 0.78 UIU/ML (ref 0.38–5.33)
URATE SERPL-MCNC: 7.4 MG/DL (ref 2.5–8.3)
UROBILINOGEN UR STRIP.AUTO-MCNC: 0.2 MG/DL
WBC # BLD AUTO: 15.8 K/UL (ref 4.8–10.8)
WBC #/AREA URNS HPF: ABNORMAL /HPF

## 2021-08-06 PROCEDURE — 81001 URINALYSIS AUTO W/SCOPE: CPT

## 2021-08-06 PROCEDURE — 85025 COMPLETE CBC W/AUTO DIFF WBC: CPT

## 2021-08-06 PROCEDURE — 80061 LIPID PANEL: CPT

## 2021-08-06 PROCEDURE — 83036 HEMOGLOBIN GLYCOSYLATED A1C: CPT | Mod: GA

## 2021-08-06 PROCEDURE — 85652 RBC SED RATE AUTOMATED: CPT

## 2021-08-06 PROCEDURE — 36415 COLL VENOUS BLD VENIPUNCTURE: CPT

## 2021-08-06 PROCEDURE — 84550 ASSAY OF BLOOD/URIC ACID: CPT

## 2021-08-06 PROCEDURE — 84439 ASSAY OF FREE THYROXINE: CPT

## 2021-08-06 PROCEDURE — 84443 ASSAY THYROID STIM HORMONE: CPT

## 2021-08-06 PROCEDURE — 80053 COMPREHEN METABOLIC PANEL: CPT

## 2021-08-06 PROCEDURE — 84153 ASSAY OF PSA TOTAL: CPT

## 2021-08-07 LAB
EST. AVERAGE GLUCOSE BLD GHB EST-MCNC: 137 MG/DL
HBA1C MFR BLD: 6.4 % (ref 4–5.6)

## 2021-08-09 ENCOUNTER — HOSPITAL ENCOUNTER (OUTPATIENT)
Dept: LAB | Facility: MEDICAL CENTER | Age: 61
End: 2021-08-09
Attending: INTERNAL MEDICINE
Payer: COMMERCIAL

## 2021-08-09 DIAGNOSIS — E29.1 HYPOGONADISM IN MALE: ICD-10-CM

## 2021-08-09 DIAGNOSIS — E06.3 HYPOTHYROIDISM, ACQUIRED, AUTOIMMUNE: ICD-10-CM

## 2021-08-09 LAB
T4 FREE SERPL-MCNC: 1.17 NG/DL (ref 0.93–1.7)
TSH SERPL DL<=0.005 MIU/L-ACNC: 2.79 UIU/ML (ref 0.38–5.33)

## 2021-08-09 PROCEDURE — 82670 ASSAY OF TOTAL ESTRADIOL: CPT

## 2021-08-09 PROCEDURE — 84402 ASSAY OF FREE TESTOSTERONE: CPT

## 2021-08-09 PROCEDURE — 84443 ASSAY THYROID STIM HORMONE: CPT

## 2021-08-09 PROCEDURE — 84439 ASSAY OF FREE THYROXINE: CPT

## 2021-08-09 PROCEDURE — 36415 COLL VENOUS BLD VENIPUNCTURE: CPT

## 2021-08-11 LAB
ESTRADIOL SERPL-MCNC: 24 PG/ML
TESTOST FREE SERPL-MCNC: 91 PG/ML (ref 47–244)

## 2021-08-15 DIAGNOSIS — E06.3 HYPOTHYROIDISM, ACQUIRED, AUTOIMMUNE: ICD-10-CM

## 2021-08-16 ENCOUNTER — OFFICE VISIT (OUTPATIENT)
Dept: ENDOCRINOLOGY | Facility: MEDICAL CENTER | Age: 61
End: 2021-08-16
Attending: INTERNAL MEDICINE
Payer: COMMERCIAL

## 2021-08-16 VITALS
OXYGEN SATURATION: 93 % | BODY MASS INDEX: 38.37 KG/M2 | HEART RATE: 115 BPM | RESPIRATION RATE: 15 BRPM | WEIGHT: 268 LBS | SYSTOLIC BLOOD PRESSURE: 142 MMHG | HEIGHT: 70 IN | DIASTOLIC BLOOD PRESSURE: 82 MMHG

## 2021-08-16 DIAGNOSIS — R79.89 LOW TESTOSTERONE IN MALE: ICD-10-CM

## 2021-08-16 DIAGNOSIS — E06.3 HYPOTHYROIDISM, ACQUIRED, AUTOIMMUNE: ICD-10-CM

## 2021-08-16 DIAGNOSIS — D75.1 POLYCYTHEMIA: ICD-10-CM

## 2021-08-16 DIAGNOSIS — E55.9 VITAMIN D DEFICIENCY: ICD-10-CM

## 2021-08-16 PROCEDURE — 99214 OFFICE O/P EST MOD 30 MIN: CPT | Performed by: INTERNAL MEDICINE

## 2021-08-16 PROCEDURE — 99211 OFF/OP EST MAY X REQ PHY/QHP: CPT | Performed by: INTERNAL MEDICINE

## 2021-08-16 RX ORDER — LEVOTHYROXINE SODIUM 137 UG/1
137 TABLET ORAL
Qty: 90 TABLET | Refills: 3 | Status: SHIPPED | OUTPATIENT
Start: 2021-08-16 | End: 2021-12-07 | Stop reason: SDUPTHER

## 2021-08-16 RX ORDER — LEVOTHYROXINE SODIUM 137 MCG
TABLET ORAL
Qty: 90 TABLET | Refills: 1 | Status: SHIPPED | OUTPATIENT
Start: 2021-08-16 | End: 2021-09-29

## 2021-08-16 RX ORDER — DICLOFENAC SODIUM 20 MG/G
SOLUTION TOPICAL PRN
COMMUNITY
Start: 2021-06-08 | End: 2023-01-03

## 2021-08-16 RX ORDER — TAMSULOSIN HYDROCHLORIDE 0.4 MG/1
0.4 CAPSULE ORAL DAILY
COMMUNITY
End: 2021-08-16 | Stop reason: CLARIF

## 2021-08-16 RX ORDER — OXYCODONE AND ACETAMINOPHEN 10; 325 MG/1; MG/1
1 TABLET ORAL
Status: ON HOLD | COMMUNITY
Start: 2021-07-12 | End: 2021-10-14

## 2021-08-16 RX ORDER — DEXTROAMPHETAMINE SACCHARATE, AMPHETAMINE ASPARTATE, DEXTROAMPHETAMINE SULFATE AND AMPHETAMINE SULFATE 3.75; 3.75; 3.75; 3.75 MG/1; MG/1; MG/1; MG/1
TABLET ORAL
COMMUNITY
Start: 2021-07-15 | End: 2022-06-13

## 2021-08-16 RX ORDER — TADALAFIL 20 MG/1
20 TABLET ORAL
COMMUNITY
Start: 2021-07-02

## 2021-08-16 RX ORDER — TESTOSTERONE ENANTHATE 75 MG/.5ML
INJECTION SUBCUTANEOUS
COMMUNITY
Start: 2021-06-27 | End: 2021-11-29

## 2021-08-16 RX ORDER — LIDOCAINE 50 MG/G
2 PATCH TOPICAL
COMMUNITY
Start: 2021-07-28 | End: 2023-01-03

## 2021-08-16 RX ORDER — VENLAFAXINE HYDROCHLORIDE 75 MG/1
1 TABLET, EXTENDED RELEASE ORAL DAILY
COMMUNITY
Start: 2021-07-14

## 2021-08-16 ASSESSMENT — FIBROSIS 4 INDEX: FIB4 SCORE: 1.08

## 2021-08-16 NOTE — PROGRESS NOTES
"Chief Complaint   Patient presents with   • Hypogonadism   • Hypothyroidism     Autoimmune thyroiditis        HPI:                His testosterone deficiency is well documented.  In March 2019 we had a free testosterone done twice with results of 9 and 21 ().  LH=7 and other pituitary functions seem to be intact.  I assume that his hypogonadotrophic hypogonadism is related to his obesity and that replacing his testosterone would help him lose weight.  So far that has not happened that he feels differently better, stronger and more active with the testosterone.    ROS:  All other systems reported as negative or unchanged since last exam.  His main problems revolve around orthopedic joint deterioration causing limitation of movement and chronic pain        Allergies:   Allergies   Allergen Reactions   • Demerol Vomiting and Nausea     Rxn = years ago    • Cubicin [Daptomycin] Vomiting   • Meperidine      2004-02-25;NA   • Sulfa Drugs Hives     .   • Sulfamethoxazole W-Trimethoprim Rash     2004-02-25;NA       Current medicines including changes today:  Current Outpatient Medications   Medication Sig Dispense Refill   • SYNTHROID 137 MCG Tab TAKE 1 TABLET DAILY 90 Tablet 1   • PENNSAID 2 % Solution      • lidocaine (LIDODERM) 5 % Patch      • tadalafil (CIALIS) 20 MG tablet      • XYOSTED 75 MG/0.5ML Solution Auto-injector      • amphetamine-dextroamphetamine (ADDERALL) 15 MG tablet TAKE 1 TABLET BY MOUTH IN THE MORNING FOR 28 DAYS     • oxyCODONE-acetaminophen (PERCOCET-10)  MG Tab Take 1 Tablet by mouth.     • Venlafaxine HCl 75 MG TABLET SR 24 HR      • levothyroxine (SYNTHROID) 137 MCG Tab Take 1 Tablet by mouth every morning on an empty stomach. 90 Tablet 3   • naproxen (NAPROSYN) 500 MG Tab      • SYRINGE-NEEDLE, DISP, 3 ML (LUER LOCK SAFETY SYRINGES) 22G X 1-1/2\" 3 ML Misc 1 Each by Does not apply route every 7 days. 12 Each 3   • liraglutide (VICTOZA) 18 MG/3ML Solution Pen-injector Inject 1.8 mg as " "instructed every day.     • hydrOXYzine pamoate (VISTARIL) 50 MG Cap Take 50 mg by mouth 2 Times a Day.     • ipratropium (ATROVENT) 0.03 % Solution Spray 1 Spray in nose 3 times a day.     • gabapentin (NEURONTIN) 300 MG Cap Take 900 mg by mouth 1 time daily as needed.     • azelastine (ASTELIN) 137 MCG/SPRAY nasal spray [place] 1 Spray in nose 2 Times a Day.     • Diclofenac Sodium 1 % Gel Apply 1 Application to affected area(s) 2 times a day as needed.     • NOVOFINE PLUS Inject 1 Application as instructed 4 Times a Day,Before Meals and at Bedtime. Use before meals and at bedtime to check blood sugar.     • lamoTRIgine (LAMICTAL) 100 MG Tab Take 100 mg by mouth every day.     • cyclobenzaprine (FLEXERIL) 10 MG Tab Take 10 mg by mouth 3 times a day.  5   • tamsulosin (FLOMAX) 0.4 MG capsule Take 0.4 mg by mouth ONE-HALF HOUR AFTER BREAKFAST.     • temazepam (RESTORIL) 30 MG capsule Take 30 mg by mouth at bedtime as needed for Sleep.     • alprazolam (XANAX) 0.25 MG Tab Take 0.25-0.5 mg by mouth 4 times a day as needed for Anxiety.     • losartan (COZAAR) 100 MG Tab Take 100 mg by mouth every morning.       No current facility-administered medications for this visit.        Past Medical History:   Diagnosis Date   • Anesthesia 11/03/2017    PONV with shoulder surgery approx 20 years ago.   • Anxiety and depression 11/03/2017   • Arthritis 11/03/2017    Osteoarthritis, \" all over\"   • Bunion    • Cancer (HCC) 2017    skin lesion cut out, on left shoulder   • Chronic autoimmune thyroiditis      + US / + TPO = 57   • Dental disorder 11/03/2017    \"Upper Plate\"   • Diabetes mellitus (HCC) 11/03/2017    \"Controlled with diet & Victoza\"   • Gout    • Hammertoe    • High cholesterol 11/03/2017    HX OF, not currently on medication for.   • Hypertension    • Hypothyroidism     chronic thyroiditis   • MRSA infection    • Open toe wound 03/2018    Left big toe, open wound, started 2/2018, receiving wound care therapy two times " "a week   • Pain 11/03/2017    \"Pain all over except right hip & ankle\"   • Psychiatric problem     depression/anxiety   • Sleep apnea 11/03/2017    CPAP USE   • Snoring 11/03/2017    DX JULIAN   • Tonsillitis        PHYSICAL EXAM:    /82 (BP Location: Left arm, Patient Position: Sitting, BP Cuff Size: Adult)   Pulse (!) 115   Resp 15   Ht 1.778 m (5' 10\")   Wt 122 kg (268 lb)   SpO2 93%   BMI 38.45 kg/m²     Gen.   obese but otherwise appears healthy     Skin   appropriate for sex and age    HEENT  unremarkable    Neck   no palpable thyroid.  No nodules    Breasts     no swelling or tenderness    Heart  regular    Rectal exam   digital exam of prostate.  Mildly enlarged but smooth and well-circumscribed.  No induration or nodules.  Benign    Extremities  no edema    Neuro  gait and station normal    Psych  appropriate, good spirits      ASSESSMENT AND RECOMMENDATIONS    1. Low testosterone in male              Patient is very pleased with his response to Xyosted 75 mg subcu weekly.  Also pleased with the convenience of self administering           Recent free testosterone level 123 ()           Prostate examination today is benign            Current PSA = 0.4    2. Hypothyroidism, acquired, autoimmune             Clinically euthyroid taking levothyroxine 137 mcg/day.            Current TSH is 2.7 and free T4 = 1.1 (0.9-1.7)    3. Polycythemia             Has been using nocturnal oxygen and polycythemia has not been a problem.            Current hemoglobin= 16.4                         Hematocrit= 50 (42-52)       DISPOSITION: Follow-up in 6 months      Florentin Weiss M.D.    Copies to: Kevin Chavez M.D. [phone]  "

## 2021-08-30 PROBLEM — Z96.651 PRESENCE OF RIGHT ARTIFICIAL KNEE JOINT: Status: ACTIVE | Noted: 2021-08-30

## 2021-09-29 ENCOUNTER — PRE-ADMISSION TESTING (OUTPATIENT)
Dept: ADMISSIONS | Facility: MEDICAL CENTER | Age: 61
End: 2021-09-29
Attending: ORTHOPAEDIC SURGERY
Payer: COMMERCIAL

## 2021-09-29 DIAGNOSIS — Z96.651 PRESENCE OF RIGHT ARTIFICIAL KNEE JOINT: ICD-10-CM

## 2021-09-29 DIAGNOSIS — Z01.810 PRE-OPERATIVE CARDIOVASCULAR EXAMINATION: ICD-10-CM

## 2021-09-29 LAB
ANION GAP SERPL CALC-SCNC: 15 MMOL/L (ref 7–16)
APTT PPP: 31.9 SEC (ref 24.7–36)
BASOPHILS # BLD AUTO: 0.5 % (ref 0–1.8)
BASOPHILS # BLD: 0.04 K/UL (ref 0–0.12)
BUN SERPL-MCNC: 17 MG/DL (ref 8–22)
CALCIUM SERPL-MCNC: 9.1 MG/DL (ref 8.4–10.2)
CHLORIDE SERPL-SCNC: 102 MMOL/L (ref 96–112)
CO2 SERPL-SCNC: 22 MMOL/L (ref 20–33)
CREAT SERPL-MCNC: 1.03 MG/DL (ref 0.5–1.4)
EKG IMPRESSION: NORMAL
EOSINOPHIL # BLD AUTO: 0.16 K/UL (ref 0–0.51)
EOSINOPHIL NFR BLD: 1.8 % (ref 0–6.9)
ERYTHROCYTE [DISTWIDTH] IN BLOOD BY AUTOMATED COUNT: 48.2 FL (ref 35.9–50)
GLUCOSE SERPL-MCNC: 139 MG/DL (ref 65–99)
HCT VFR BLD AUTO: 53.1 % (ref 42–52)
HGB BLD-MCNC: 17.6 G/DL (ref 14–18)
HIV 1+2 AB+HIV1 P24 AG SERPL QL IA: NORMAL
IMM GRANULOCYTES # BLD AUTO: 0.06 K/UL (ref 0–0.11)
IMM GRANULOCYTES NFR BLD AUTO: 0.7 % (ref 0–0.9)
LYMPHOCYTES # BLD AUTO: 1.62 K/UL (ref 1–4.8)
LYMPHOCYTES NFR BLD: 18.6 % (ref 22–41)
MCH RBC QN AUTO: 30.8 PG (ref 27–33)
MCHC RBC AUTO-ENTMCNC: 33.1 G/DL (ref 33.7–35.3)
MCV RBC AUTO: 92.8 FL (ref 81.4–97.8)
MONOCYTES # BLD AUTO: 0.85 K/UL (ref 0–0.85)
MONOCYTES NFR BLD AUTO: 9.8 % (ref 0–13.4)
NEUTROPHILS # BLD AUTO: 5.96 K/UL (ref 1.82–7.42)
NEUTROPHILS NFR BLD: 68.6 % (ref 44–72)
NRBC # BLD AUTO: 0 K/UL
NRBC BLD-RTO: 0 /100 WBC
PLATELET # BLD AUTO: 219 K/UL (ref 164–446)
PMV BLD AUTO: 8.9 FL (ref 9–12.9)
POTASSIUM SERPL-SCNC: 4.4 MMOL/L (ref 3.6–5.5)
RBC # BLD AUTO: 5.72 M/UL (ref 4.7–6.1)
SODIUM SERPL-SCNC: 139 MMOL/L (ref 135–145)
WBC # BLD AUTO: 8.7 K/UL (ref 4.8–10.8)

## 2021-09-29 PROCEDURE — 85025 COMPLETE CBC W/AUTO DIFF WBC: CPT

## 2021-09-29 PROCEDURE — 36415 COLL VENOUS BLD VENIPUNCTURE: CPT

## 2021-09-29 PROCEDURE — 93005 ELECTROCARDIOGRAM TRACING: CPT

## 2021-09-29 PROCEDURE — 80048 BASIC METABOLIC PNL TOTAL CA: CPT

## 2021-09-29 PROCEDURE — 87389 HIV-1 AG W/HIV-1&-2 AB AG IA: CPT

## 2021-09-29 PROCEDURE — 87641 MR-STAPH DNA AMP PROBE: CPT

## 2021-09-29 PROCEDURE — 87640 STAPH A DNA AMP PROBE: CPT

## 2021-09-29 PROCEDURE — 85730 THROMBOPLASTIN TIME PARTIAL: CPT

## 2021-09-29 PROCEDURE — 93010 ELECTROCARDIOGRAM REPORT: CPT | Performed by: INTERNAL MEDICINE

## 2021-09-29 ASSESSMENT — FIBROSIS 4 INDEX: FIB4 SCORE: 1.08

## 2021-09-29 NOTE — DISCHARGE PLANNING
DISCHARGE PLANNING NOTE - TOTAL JOINT    Procedure: Procedure(s):  REVISION, TOTAL ARTHROPLASTY, KNEE, ALL COMPONENTS  Procedure Date: 10/14/2021  Insurance: Payor: [employer] / Plan: [employer] / Product Type: *No Product type* /    Equipment currently available at home?  front-wheel walker and shower chair  Steps into the home? 1  Steps within the home? 0  Toilet height? ADA  Type of shower? walk-in shower  Who will be with you during your recovery? Spouse.  Is Outpatient Physical Therapy set up after surgery? Yes  Did you take the Total Joint Class and where? Yes received NAON book today.  Planning same day discharge?No     This writer met with pt during his preadmission appointment. Pt states he has all needed equipment.  Home safety checklist reviewed and copy given to pt. Pt educated to dc criteria. All questions answered and verbalizes understanding of all instructions. No dc needs identified at this time. Anticipate dc to home without barriers.

## 2021-09-29 NOTE — OR NURSING
"Preadmit appointment: \" Preparing for your Procedure information\" sheet given to patient with verbal and written instructions. Patient instructed to continue prescribed medications through the day before surgery, instructed to take the following medications the day of surgery per anesthesia protocol: XANAX, ADDERALL, ASTELIN,FLEXERIL, NEURONTIN, VISTARIL, ATROVENT, LAMICTAL SYNTHROID, PERCOCET, FLOMAX VENLAFAXINE, XYOSTED           Verbal and written, and pre-admit video website instructions provided on covid symptoms to watch for given to patient, pt advised to notify MD if any symptoms develop. Verbal instructions given to follow the [place] mask mandate and encouraged to avoid crowds. Also verbally instructed to wear a mask when with person known not to have had the Covid vaccine.    PT ASKED ABOUT HOW TO SCHEDULE HIS RADIATION THERAPY AND WHEN HE IS SUPPOSE TO HAVE IT DONE. I PROVIDED WRITTEN AND VERBAL INFORMATION RE: DR. WATSON NOTE ABOUT HAVING RADIATION THREAPY PRIOR TO OR AFTER SURGERY. I ADVISED THE PT TO CALL DR. WATSON' OFFICE RE: HOW TO SCHEDULE THIS. HE STATES HE HAS TRIED TO CONTACT HIM BUT HAS NOT BEEN ABLE TO TALK WITH ANYONE AND HAD LEFT A MESSAGE.    NAME ALERT STICKERS X2 ON CHART  "

## 2021-10-01 LAB
SCCMEC + MECA PNL NOSE NAA+PROBE: NEGATIVE
SCCMEC + MECA PNL NOSE NAA+PROBE: NEGATIVE

## 2021-10-11 ENCOUNTER — PRE-ADMISSION TESTING (OUTPATIENT)
Dept: ADMISSIONS | Facility: MEDICAL CENTER | Age: 61
End: 2021-10-11
Attending: ORTHOPAEDIC SURGERY
Payer: COMMERCIAL

## 2021-10-11 ENCOUNTER — HOSPITAL ENCOUNTER (OUTPATIENT)
Dept: RADIATION ONCOLOGY | Facility: MEDICAL CENTER | Age: 61
End: 2021-10-31
Attending: RADIOLOGY
Payer: COMMERCIAL

## 2021-10-11 VITALS
SYSTOLIC BLOOD PRESSURE: 162 MMHG | BODY MASS INDEX: 38.37 KG/M2 | DIASTOLIC BLOOD PRESSURE: 93 MMHG | WEIGHT: 268 LBS | OXYGEN SATURATION: 92 % | HEIGHT: 70 IN | HEART RATE: 113 BPM | TEMPERATURE: 98.6 F

## 2021-10-11 DIAGNOSIS — M89.8X9 HETEROTOPIC OSSIFICATION OF BONE: ICD-10-CM

## 2021-10-11 DIAGNOSIS — Z01.812 PRE-OPERATIVE LABORATORY EXAMINATION: ICD-10-CM

## 2021-10-11 LAB — COVID ORDER STATUS COVID19: NORMAL

## 2021-10-11 PROCEDURE — 99205 OFFICE O/P NEW HI 60 MIN: CPT | Performed by: RADIOLOGY

## 2021-10-11 PROCEDURE — U0003 INFECTIOUS AGENT DETECTION BY NUCLEIC ACID (DNA OR RNA); SEVERE ACUTE RESPIRATORY SYNDROME CORONAVIRUS 2 (SARS-COV-2) (CORONAVIRUS DISEASE [COVID-19]), AMPLIFIED PROBE TECHNIQUE, MAKING USE OF HIGH THROUGHPUT TECHNOLOGIES AS DESCRIBED BY CMS-2020-01-R: HCPCS

## 2021-10-11 PROCEDURE — 99214 OFFICE O/P EST MOD 30 MIN: CPT | Performed by: RADIOLOGY

## 2021-10-11 PROCEDURE — U0005 INFEC AGEN DETEC AMPLI PROBE: HCPCS

## 2021-10-11 RX ORDER — BLOOD SUGAR DIAGNOSTIC
STRIP MISCELLANEOUS
Status: ON HOLD | COMMUNITY
Start: 2021-08-21 | End: 2024-02-05

## 2021-10-11 RX ORDER — LANCETS
EACH MISCELLANEOUS
Status: ON HOLD | COMMUNITY
Start: 2021-08-21 | End: 2024-02-05

## 2021-10-11 RX ORDER — DIAZEPAM 5 MG/1
TABLET ORAL
COMMUNITY
Start: 2021-09-09 | End: 2022-06-13

## 2021-10-11 RX ORDER — IPRATROPIUM BROMIDE 42 UG/1
SPRAY, METERED NASAL
COMMUNITY
Start: 2021-09-13 | End: 2022-06-13

## 2021-10-11 ASSESSMENT — FIBROSIS 4 INDEX: FIB4 SCORE: 1.44

## 2021-10-11 ASSESSMENT — PAIN SCALES - GENERAL: PAINLEVEL: 5=MODERATE PAIN

## 2021-10-11 NOTE — NON-PROVIDER
"Patient was seen today in clinic with Dr. Molina for consultation. Vitals signs and weight were obtained and pain assessment was completed.  Allergies and medications were reviewed with the patient.     Vitals/Pain:  Vitals:    10/11/21 1118   BP: (!) 162/93   BP Location: Right arm   Patient Position: Sitting   BP Cuff Size: Adult   Pulse: (!) 113   Temp: 37 °C (98.6 °F)   TempSrc: Temporal   SpO2: 92%   Weight: 122 kg (268 lb)   Height: 1.778 m (5' 10\")     PAIN:  Pain Score: 5=Moderate Pain  Pain Scale: 0-10  Pain Assessement: Initial  Pain Location, Orientation and Scale: Back: Mid and Lower : Chronic  : 5, Knee: Right and Left : Chronic  : 5 and Shoulder: Right and Left : Chronic  : 5  What makes the pain better: Oxycodone  What makes the pain worse: Sitting for too long    Allergies:   Demerol, Cubicin [daptomycin], Meperidine, Sulfa drugs, and Sulfamethoxazole w-trimethoprim    Current Medications:  Current Outpatient Medications   Medication Sig Dispense Refill   • diazePAM (VALIUM) 5 MG Tab TAKE 1 TABLET BY MOUTH 30 MINUTES PRIOR TO CHECK IN APPOINTMENT     • ACCU-CHEK GUIDE strip      • ipratropium (ATROVENT) 0.06 % Solution      • Accu-Chek FastClix Lancets Misc      • PENNSAID 2 % Solution      • lidocaine (LIDODERM) 5 % Patch      • tadalafil (CIALIS) 20 MG tablet      • XYOSTED 75 MG/0.5ML Solution Auto-injector      • amphetamine-dextroamphetamine (ADDERALL) 15 MG tablet TAKE 1 TABLET BY MOUTH IN THE MORNING FOR 28 DAYS     • oxyCODONE-acetaminophen (PERCOCET-10)  MG Tab Take 1 Tablet by mouth.     • Venlafaxine HCl 75 MG TABLET SR 24 HR      • levothyroxine (SYNTHROID) 137 MCG Tab Take 1 Tablet by mouth every morning on an empty stomach. 90 Tablet 3   • naproxen (NAPROSYN) 500 MG Tab      • SYRINGE-NEEDLE, DISP, 3 ML (LUER LOCK SAFETY SYRINGES) 22G X 1-1/2\" 3 ML Misc 1 Each by Does not apply route every 7 days. 12 Each 3   • liraglutide (VICTOZA) 18 MG/3ML Solution Pen-injector Inject 1.8 mg as " instructed every day.     • hydrOXYzine pamoate (VISTARIL) 50 MG Cap Take 50 mg by mouth 2 Times a Day.     • gabapentin (NEURONTIN) 300 MG Cap Take 900 mg by mouth 1 time daily as needed.     • azelastine (ASTELIN) 137 MCG/SPRAY nasal spray [place] 1 Spray in nose 2 Times a Day.     • NOVOFINE PLUS Inject 1 Application as instructed 4 Times a Day,Before Meals and at Bedtime. Use before meals and at bedtime to check blood sugar.     • lamoTRIgine (LAMICTAL) 100 MG Tab Take 100 mg by mouth every day.     • cyclobenzaprine (FLEXERIL) 10 MG Tab Take 10 mg by mouth 3 times a day.  5   • tamsulosin (FLOMAX) 0.4 MG capsule Take 0.4 mg by mouth ONE-HALF HOUR AFTER BREAKFAST.     • temazepam (RESTORIL) 30 MG capsule Take 30 mg by mouth at bedtime as needed for Sleep.     • alprazolam (XANAX) 0.25 MG Tab Take 0.25-0.5 mg by mouth 4 times a day as needed for Anxiety.     • losartan (COZAAR) 100 MG Tab Take 100 mg by mouth every morning.       No current facility-administered medications for this encounter.         PCP:  Kathy Miles R.N.

## 2021-10-11 NOTE — CONSULTS
"   RADIATION ONCOLOGY CONSULT    DATE OF SERVICE: 10/11/2021    IDENTIFICATION: A 61 y.o. male with history of osteoarthritis multiple orthopedic surgeries now seen in consultation about radiation therapy because of heterotopic bone formation in the right knee..  He is here at the kind request of Dr. Emiliano Vang.      HISTORY OF PRESENT ILLNESS: Patient has a longstanding history of osteoarthritis with MRSA multiple orthopedic surgeries.  He had a work injury many years ago and has had 39 surgeries on his joints.  This will be the 40th.  He had a history of a right total knee.and has developed extreme  pain. Three-view x-ray 8/19/2021 shows significant heterotopic ossification around and under the patella on the right.  Implants appear well fixed without signs of loosening or failure.  He has been seen by Dr. Emiliano Vang and the surgeon 3 to remove the heterotopic ossification is to be done on Thursday.  He is wanting us to deliver a dose of radiation just prior to surgery.    PAST MEDICAL HISTORY:   Past Medical History:   Diagnosis Date   • Anesthesia 11/03/2017    PONV with shoulder surgery approx 20 years ago.   • Anxiety and depression 11/03/2017   • Arthritis 11/03/2017    Osteoarthritis, \" all over\"   • Bronchitis     childhood   • Bunion    • Cancer (HCC) 2017    skin lesion cut out, on left shoulder   • Chronic autoimmune thyroiditis      + US / + TPO = 57   • Dental disorder 11/03/2017    \"Upper Plate\"   • Diabetes mellitus (HCC) 11/03/2017    \"Controlled with diet & Victoza\"   • Gout    • Hammertoe    • High cholesterol 11/03/2017    HX OF, not currently on medication for.   • Hypertension    • Hypothyroidism     chronic thyroiditis   • MRSA infection    • Open toe wound 03/2018    Left big toe, open wound, started 2/2018, receiving wound care therapy two times a week   • Pain 11/03/2017    \"Pain all over except right hip & ankle\"   • Psychiatric problem     depression/anxiety   • Sleep apnea " 11/03/2017    CPAP USE   • Snoring 11/03/2017    DX JULIAN   • Tonsillitis        PAST SURGICAL HISTORY:  Past Surgical History:   Procedure Laterality Date   • METATARSAL HEAD RESECTION Left 7/20/2020    Procedure: EXCISION, METATARSAL BONE, HEAD- PARTIAL DISTAL 2/3 METATARSAL;  Surgeon: Haroldo Kang M.D.;  Location: [facility];  Service: Orthopedics   • HAMMERTOE CORRECTION Left 7/20/2020    Procedure: CORRECTION, HAMMER TOE- 4/5;  Surgeon: Haroldo Kang M.D.;  Location: [facility];  Service: Orthopedics   • TOENAIL REMOVAL Left 7/20/2020    Procedure: REMOVAL, TOENAIL 3-5;  Surgeon: Haroldo Kang M.D.;  Location: [facility];  Service: Orthopedics   • METATARSAL HEAD RESECTION Left 6/10/2019    Procedure: METATARSAL HEAD RESECTION- FOR PARTIAL EXCISION;  Surgeon: Haroldo Kang M.D.;  Location: [facility];  Service: Orthopedics   • JOINT INJECTION DIAGNOSTIC  6/10/2019    Procedure: INJECTION, JOINT, DIAGNOSTIC- MIDFOOT;  Surgeon: Haroldo Kang M.D.;  Location: [facility];  Service: Orthopedics   • TOE AMPUTATION Right 6/10/2019    Procedure: AMPUTATION, TOE- FIRST TOE;  Surgeon: Haroldo Kang M.D.;  Location: [facility];  Service: Orthopedics   • ORTHOPEDIC OSTEOTOMY Left 10/15/2018    Procedure: ORTHOPEDIC OSTEOTOMY-  FOR: 3RD METATARSAL PARTIAL EXCISION, AND LEFT GASTROC SOLEUS RECESSION;  Surgeon: Haroldo Kang M.D.;  Location: [facility];  Service: Orthopedics   • HARDWARE REMOVAL ORTHO Right 10/15/2018    Procedure: HARDWARE REMOVAL ORTHO-  FOOT METATARSALPHALANGEAL JOINT PLATE;  Surgeon: Haroldo Kang M.D.;  Location: [facility];  Service: Orthopedics   • TOE AMPUTATION Left 6/25/2018    Procedure: TOE AMPUTATION-FOR :PARTIAL 1ST DISTAL PHALANX EXCISION, GREAT TOE AMPUTATION;  Surgeon: Haroldo Kang M.D.;  Location: [facility];  Service: Orthopedics   • INTERNAL  FIXATION REMOVAL Left 6/25/2018    Procedure: INTERNAL FIXATION REMOVAL;  Surgeon: Haroldo Kang M.D.;  Location: [facility];  Service: Orthopedics   • NERVE ULNAR TRANSFER Right 4/3/2018    Procedure: NERVE ULNAR TRANSFER - TRANSPOSITION;  Surgeon: Ayad Luna M.D.;  Location: [facility];  Service: Orthopedics   • CARPAL TUNNEL RELEASE Right 4/3/2018    Procedure: CARPAL TUNNEL RELEASE;  Surgeon: Ayad Luna M.D.;  Location: [facility];  Service: Orthopedics   • ELBOW ARTHROTOMY Right 4/3/2018    Procedure: ELBOW ARTHROTOMY - FOR CONTRACTURE RELEASE AT THE ELBOW, RADIAL HEAD EXCISION;  Surgeon: Ayad Luna M.D.;  Location: [facility];  Service: Orthopedics   • SHOULDER SURGERY Right 12/02/2017    reversal   • METATARSAL HEAD RESECTION Right 11/13/2017    Procedure: METATARSAL HEAD RESECTION - EXCISION;  Surgeon: Haroldo Kang M.D.;  Location: [facility];  Service: Orthopedics   • HAMMERTOE CORRECTION Right 11/13/2017    Procedure: HAMMERTOE CORRECTION 2-5;  Surgeon: Haroldo Kang M.D.;  Location: [facility];  Service: Orthopedics   • TOE FUSION Right 11/13/2017    Procedure: TOE FUSION - 1ST METATARSALPHALANGEAL;  Surgeon: Haroldo Kang M.D.;  Location: [facility];  Service: Orthopedics   • METATARSAL HEAD RESECTION Left 8/21/2017    Procedure: METATARSAL HEAD RESECTION - 2-5;  Surgeon: Haroldo Kang M.D.;  Location: [facility];  Service:    • TOE FUSION Left 8/21/2017    Procedure: TOE FUSION - 1ST MTP;  Surgeon: Haroldo Kang M.D.;  Location: [facility];  Service:    • HARDWARE REMOVAL ORTHO Left 8/21/2017    Procedure: HARDWARE REMOVAL ORTHO;  Surgeon: Haroldo Kang M.D.;  Location: [facility];  Service:    • LUMBAR FUSION POSTERIOR  4/14/2017    Procedure: LUMBAR FUSION POSTERIOR - MINIMALLY INVASIVE TLIF L4-5;  Surgeon: Bossman Caro M.D.;   "Location: SUR[facility];  Service:    • HAMMERTOE CORRECTION Bilateral 12/22/2016    Procedure: HAMMERTOE CORRECTION/METATARSALPHALANGEAL JOINT RELEASE 2/3 RIGHT, 2-3 LEFT;  Surgeon: Haroldo Kang M.D.;  Location: SUR[facility]ERY [facility];  Service:    • BUNIONECTOMY Left 12/22/2016    Procedure: BUNIONECTOMY FOR DISTAL HALLUX VALGUS CORRECTION WITH BRANDY;  Surgeon: Haroldo Kang M.D.;  Location: SUR[facility]ERY [facility];  Service:    • ORTHOPEDIC OSTEOTOMY Left 12/22/2016    Procedure: ORTHOPEDIC OSTEOTOMY DISTAL METATARSAL 2-5;  Surgeon: Haroldo Kang M.D.;  Location: SUR[facility]ERY [facility];  Service:    • HIP ARTH ANTERIOR TOTAL Left 8/18/2016    Procedure: HIP ARTHROPLASTY ANTERIOR TOTAL;  Surgeon: Emiliano Vang M.D.;  Location: SURGERY [facility];  Service:    • OTHER ORTHOPEDIC SURGERY  6/2014    right shoulder  arthroplasty   • OTHER ORTHOPEDIC SURGERY  11/1/2012    left knee/left ankle/left shoulder   • OTHER ORTHOPEDIC SURGERY  2004    elbow surgery   • OTHER  2000    dislocation left foot surgery [facility]   • OTHER      \"septoplasty 20 years ago\"   • OTHER ORTHOPEDIC SURGERY      brad knee replaced   • OTHER ORTHOPEDIC SURGERY      left total hip       CURRENT MEDICATIONS:  Current Outpatient Medications   Medication Sig Dispense Refill   • diazePAM (VALIUM) 5 MG Tab TAKE 1 TABLET BY MOUTH 30 MINUTES PRIOR TO CHECK IN APPOINTMENT     • ACCU-CHEK GUIDE strip      • ipratropium (ATROVENT) 0.06 % Solution      • Accu-Chek FastClix Lancets Misc      • PENNSAID 2 % Solution      • lidocaine (LIDODERM) 5 % Patch      • tadalafil (CIALIS) 20 MG tablet      • XYOSTED 75 MG/0.5ML Solution Auto-injector      • amphetamine-dextroamphetamine (ADDERALL) 15 MG tablet TAKE 1 TABLET BY MOUTH IN THE MORNING FOR 28 DAYS     • oxyCODONE-acetaminophen (PERCOCET-10)  MG Tab Take 1 Tablet by mouth.     • Venlafaxine HCl 75 MG TABLET SR 24 HR      • levothyroxine (SYNTHROID) 137 MCG Tab Take 1 Tablet " "by mouth every morning on an empty stomach. 90 Tablet 3   • naproxen (NAPROSYN) 500 MG Tab      • SYRINGE-NEEDLE, DISP, 3 ML (LUER LOCK SAFETY SYRINGES) 22G X 1-1/2\" 3 ML Misc 1 Each by Does not apply route every 7 days. 12 Each 3   • liraglutide (VICTOZA) 18 MG/3ML Solution Pen-injector Inject 1.8 mg as instructed every day.     • hydrOXYzine pamoate (VISTARIL) 50 MG Cap Take 50 mg by mouth 2 Times a Day.     • gabapentin (NEURONTIN) 300 MG Cap Take 900 mg by mouth 1 time daily as needed.     • azelastine (ASTELIN) 137 MCG/SPRAY nasal spray [place] 1 Spray in nose 2 Times a Day.     • NOVOFINE PLUS Inject 1 Application as instructed 4 Times a Day,Before Meals and at Bedtime. Use before meals and at bedtime to check blood sugar.     • lamoTRIgine (LAMICTAL) 100 MG Tab Take 100 mg by mouth every day.     • cyclobenzaprine (FLEXERIL) 10 MG Tab Take 10 mg by mouth 3 times a day.  5   • tamsulosin (FLOMAX) 0.4 MG capsule Take 0.4 mg by mouth ONE-HALF HOUR AFTER BREAKFAST.     • temazepam (RESTORIL) 30 MG capsule Take 30 mg by mouth at bedtime as needed for Sleep.     • alprazolam (XANAX) 0.25 MG Tab Take 0.25-0.5 mg by mouth 4 times a day as needed for Anxiety.     • losartan (COZAAR) 100 MG Tab Take 100 mg by mouth every morning.       No current facility-administered medications for this encounter.       ALLERGIES:    Demerol, Cubicin [daptomycin], Meperidine, Sulfa drugs, and Sulfamethoxazole w-trimethoprim    FAMILY HISTORY:    Family History   Problem Relation Age of Onset   • Heart Disease Mother    • Depression Mother    • Cancer Father         Colon cancer    • Cancer Sister         Skin cancer   • Sleep Apnea Neg Hx    [unfilled]    SOCIAL HISTORY:     reports that he quit smoking about 7 years ago. His smoking use included cigarettes. He has a 20.00 pack-year smoking history. He quit smokeless tobacco use about 24 years ago.  His smokeless tobacco use included chew. He reports current alcohol use. He reports " "that he does not use drugs.  Patient is a 61 year old disabled male who resides in [place] with his spouse.     REVIEW OF SYSTEMS: Pertinent positives consist of  Arthritis muscle pain joint pain joint swelling he has an unsteady gait because he is missing both big toes.  He uses supplemental oxygen he does have depression and anxiety.  The rest of the review of systems is negative and has been reviewed.       PHYSICAL EXAM:    1= Restricted in physically strenuous activity, but ambulatory and able to carry out work of a light sedentary nature, e.g., light housework, office work.  Vitals:    10/11/21 1118   BP: (!) 162/93   BP Location: Right arm   Patient Position: Sitting   BP Cuff Size: Adult   Pulse: (!) 113   Temp: 37 °C (98.6 °F)   TempSrc: Temporal   SpO2: 92%   Weight: 122 kg (268 lb)   Height: 1.778 m (5' 10\")       PAIN:  Pain Score: 5=Moderate Pain  Pain Scale: 0-10  Pain Assessement: Initial  Pain Location, Orientation and Scale: Back: Mid and Lower : Chronic  : 5, Knee: Right and Left : Chronic  : 5 and Shoulder: Right and Left : Chronic  : 5  What makes the pain better: Oxycodone  What makes the pain worse: Sitting for too long    GENERAL: Well-appearing alert and oriented x3 in no apparent distress  Musculoskeletal.  Multiple scars from orthopedic surgeries.  He does have pain with bending of his right knee.  HEENT:  Pupils are equal, round, and reactive to light.  Extraocular muscles   are intact. Sclerae nonicteric.  Conjunctivae pink.  Oral cavity, tongue   protrudes midline.   NECK:   No peripheral adenopathy of the neck, supraclavicular fossa or axillae   bilaterally.  LUNGS:  Clear to ascultation   HEART:  Regular rate and rhythm.  No murmur appreciated  ABDOMEN:  Soft. No evidence of hepatosplenomegaly.    EXTREMITIES:  Without Edema.  NEUROLOGIC:  Cranial nerves II through XII were intact. Guarded stance and gait but otherwise motor and sensory grossly within normal limits.    IMPRESSION:    A " 61 y.o. with  Heterotopic ossification of the right patella.      RECOMMENDATIONS:   I am recommending 1 800 red fraction of radiation therapy to the right knee immediately preop.  I have spoken with the  and he is to check-in at [facility] at 1:00 so we will schedule him for his treatment at 8:00 in the morning the same day  I've described the details of radiation along with the side effects both acute and chronic, including but not exclusive to fatigue, skin reaction, local soreness,delayed healing, possible hair loss within the treatment field Ample time was allowed for questions, and patient understands.    Patient is scheduled for simulation tomorrow and the treatment will be delivered on Thursday in the morning.    Thank you for the opportunity to participate in his care.  If any questions or comments, please do not hesitate in calling.    Please note that this dictation was created using voice recognition software. I have made every reasonable attempt to correct obvious errors, but I expect that there are errors of grammar and possibly content that I did not discover before finalizing the note.

## 2021-10-11 NOTE — CT SIMULATION
PATIENT NAME Cristino Keys   PRIMARY PHYSICIAN Edy Chaveze S [id number]   REFERRING PHYSICIAN Emmie Peña P.A.-C. 1960     No matching staging information was found for the patient.       Treatment Planning CT Simulation      Order Questions     Question Answer Comment    Is this for a new course of treatment? Yes     Is this an Addendum? No     Simulation Status Initial     Treatment Technique 3D CRT     Other Technique(s) 3D     Treatment Pattern/Frequency Daily     Slice Thickness 3mm     Scan Extent Extremity R knee    Bowel Preparation No     Treatment Device(s) Vac Lavinia     Patient Position Supine     Treatment Machine No preference     Other Orders Weekly Physics Check

## 2021-10-12 ENCOUNTER — HOSPITAL ENCOUNTER (OUTPATIENT)
Dept: RADIATION ONCOLOGY | Facility: MEDICAL CENTER | Age: 61
End: 2021-10-12
Payer: COMMERCIAL

## 2021-10-12 ENCOUNTER — APPOINTMENT (OUTPATIENT)
Dept: ADMISSIONS | Facility: MEDICAL CENTER | Age: 61
End: 2021-10-12
Attending: ORTHOPAEDIC SURGERY
Payer: COMMERCIAL

## 2021-10-12 LAB
SARS-COV-2 RNA RESP QL NAA+PROBE: NOTDETECTED
SPECIMEN SOURCE: NORMAL

## 2021-10-12 PROCEDURE — 77263 THER RADIOLOGY TX PLNG CPLX: CPT | Performed by: RADIOLOGY

## 2021-10-12 PROCEDURE — 77290 THER RAD SIMULAJ FIELD CPLX: CPT | Mod: 26 | Performed by: RADIOLOGY

## 2021-10-12 PROCEDURE — 77334 RADIATION TREATMENT AID(S): CPT | Mod: 26 | Performed by: RADIOLOGY

## 2021-10-12 PROCEDURE — 77290 THER RAD SIMULAJ FIELD CPLX: CPT | Performed by: RADIOLOGY

## 2021-10-12 PROCEDURE — 77334 RADIATION TREATMENT AID(S): CPT | Performed by: RADIOLOGY

## 2021-10-13 ENCOUNTER — ANESTHESIA EVENT (OUTPATIENT)
Dept: SURGERY | Facility: MEDICAL CENTER | Age: 61
End: 2021-10-13
Payer: COMMERCIAL

## 2021-10-13 PROCEDURE — 77300 RADIATION THERAPY DOSE PLAN: CPT | Performed by: RADIOLOGY

## 2021-10-13 PROCEDURE — 77300 RADIATION THERAPY DOSE PLAN: CPT | Mod: 26 | Performed by: RADIOLOGY

## 2021-10-13 PROCEDURE — 77295 3-D RADIOTHERAPY PLAN: CPT | Performed by: RADIOLOGY

## 2021-10-13 PROCEDURE — 77334 RADIATION TREATMENT AID(S): CPT | Performed by: RADIOLOGY

## 2021-10-13 PROCEDURE — 77295 3-D RADIOTHERAPY PLAN: CPT | Mod: 26 | Performed by: RADIOLOGY

## 2021-10-13 PROCEDURE — 77334 RADIATION TREATMENT AID(S): CPT | Mod: 26 | Performed by: RADIOLOGY

## 2021-10-14 ENCOUNTER — HOSPITAL ENCOUNTER (OUTPATIENT)
Facility: MEDICAL CENTER | Age: 61
End: 2021-10-14
Attending: ORTHOPAEDIC SURGERY | Admitting: ORTHOPAEDIC SURGERY
Payer: COMMERCIAL

## 2021-10-14 ENCOUNTER — HOSPITAL ENCOUNTER (OUTPATIENT)
Dept: RADIATION ONCOLOGY | Facility: MEDICAL CENTER | Age: 61
End: 2021-10-14

## 2021-10-14 ENCOUNTER — APPOINTMENT (OUTPATIENT)
Dept: RADIOLOGY | Facility: MEDICAL CENTER | Age: 61
End: 2021-10-14
Attending: STUDENT IN AN ORGANIZED HEALTH CARE EDUCATION/TRAINING PROGRAM
Payer: COMMERCIAL

## 2021-10-14 ENCOUNTER — ANESTHESIA (OUTPATIENT)
Dept: SURGERY | Facility: MEDICAL CENTER | Age: 61
End: 2021-10-14
Payer: COMMERCIAL

## 2021-10-14 VITALS
SYSTOLIC BLOOD PRESSURE: 141 MMHG | RESPIRATION RATE: 16 BRPM | BODY MASS INDEX: 38.76 KG/M2 | HEIGHT: 70 IN | OXYGEN SATURATION: 91 % | WEIGHT: 270.73 LBS | HEART RATE: 90 BPM | TEMPERATURE: 98.1 F | DIASTOLIC BLOOD PRESSURE: 79 MMHG

## 2021-10-14 DIAGNOSIS — M89.8X9 HETEROTOPIC OSSIFICATION OF BONE: Primary | ICD-10-CM

## 2021-10-14 DIAGNOSIS — Z96.651 PRESENCE OF RIGHT ARTIFICIAL KNEE JOINT: ICD-10-CM

## 2021-10-14 LAB
CHEMOTHERAPY INFUSION START DATE: NORMAL
CHEMOTHERAPY RECORDS: 8
CHEMOTHERAPY RECORDS: 800
CHEMOTHERAPY RECORDS: NORMAL
CHEMOTHERAPY RX CANCER: NORMAL
DATE 1ST CHEMO CANCER: NORMAL
GLUCOSE BLD-MCNC: 110 MG/DL (ref 65–99)
RAD ONC ARIA COURSE LAST TREATMENT DATE: NORMAL
RAD ONC ARIA COURSE TREATMENT ELAPSED DAYS: NORMAL
RAD ONC ARIA REFERENCE POINT DOSAGE GIVEN TO DATE: 8
RAD ONC ARIA REFERENCE POINT DOSAGE GIVEN TO DATE: 8
RAD ONC ARIA REFERENCE POINT ID: NORMAL
RAD ONC ARIA REFERENCE POINT ID: NORMAL
RAD ONC ARIA REFERENCE POINT SESSION DOSAGE GIVEN: 8
RAD ONC ARIA REFERENCE POINT SESSION DOSAGE GIVEN: 8

## 2021-10-14 PROCEDURE — 77336 RADIATION PHYSICS CONSULT: CPT | Performed by: RADIOLOGY

## 2021-10-14 PROCEDURE — 700105 HCHG RX REV CODE 258: Performed by: ORTHOPAEDIC SURGERY

## 2021-10-14 PROCEDURE — 77280 THER RAD SIMULAJ FIELD SMPL: CPT | Mod: 26 | Performed by: RADIOLOGY

## 2021-10-14 PROCEDURE — 64447 NJX AA&/STRD FEMORAL NRV IMG: CPT | Performed by: ORTHOPAEDIC SURGERY

## 2021-10-14 PROCEDURE — 27355 REMOVE FEMUR LESION: CPT | Mod: RT | Performed by: ORTHOPAEDIC SURGERY

## 2021-10-14 PROCEDURE — 502579 HCHG PACK, TOTAL KNEE: Performed by: ORTHOPAEDIC SURGERY

## 2021-10-14 PROCEDURE — 501838 HCHG SUTURE GENERAL: Performed by: ORTHOPAEDIC SURGERY

## 2021-10-14 PROCEDURE — 700111 HCHG RX REV CODE 636 W/ 250 OVERRIDE (IP): Performed by: ORTHOPAEDIC SURGERY

## 2021-10-14 PROCEDURE — 160035 HCHG PACU - 1ST 60 MINS PHASE I: Performed by: ORTHOPAEDIC SURGERY

## 2021-10-14 PROCEDURE — 73560 X-RAY EXAM OF KNEE 1 OR 2: CPT | Mod: RT

## 2021-10-14 PROCEDURE — 160002 HCHG RECOVERY MINUTES (STAT): Performed by: ORTHOPAEDIC SURGERY

## 2021-10-14 PROCEDURE — 27355 REMOVE FEMUR LESION: CPT | Mod: 80ROC,RT | Performed by: STUDENT IN AN ORGANIZED HEALTH CARE EDUCATION/TRAINING PROGRAM

## 2021-10-14 PROCEDURE — 160038 HCHG SURGERY MINUTES - EA ADDL 1 MIN LEVEL 2: Performed by: ORTHOPAEDIC SURGERY

## 2021-10-14 PROCEDURE — 82962 GLUCOSE BLOOD TEST: CPT

## 2021-10-14 PROCEDURE — 700101 HCHG RX REV CODE 250: Performed by: ORTHOPAEDIC SURGERY

## 2021-10-14 PROCEDURE — 700101 HCHG RX REV CODE 250: Performed by: ANESTHESIOLOGY

## 2021-10-14 PROCEDURE — 160048 HCHG OR STATISTICAL LEVEL 1-5: Performed by: ORTHOPAEDIC SURGERY

## 2021-10-14 PROCEDURE — 77280 THER RAD SIMULAJ FIELD SMPL: CPT | Performed by: RADIOLOGY

## 2021-10-14 PROCEDURE — 160009 HCHG ANES TIME/MIN: Performed by: ORTHOPAEDIC SURGERY

## 2021-10-14 PROCEDURE — 160036 HCHG PACU - EA ADDL 30 MINS PHASE I: Performed by: ORTHOPAEDIC SURGERY

## 2021-10-14 PROCEDURE — 700102 HCHG RX REV CODE 250 W/ 637 OVERRIDE(OP): Performed by: STUDENT IN AN ORGANIZED HEALTH CARE EDUCATION/TRAINING PROGRAM

## 2021-10-14 PROCEDURE — 160046 HCHG PACU - 1ST 60 MINS PHASE II: Performed by: ORTHOPAEDIC SURGERY

## 2021-10-14 PROCEDURE — A9270 NON-COVERED ITEM OR SERVICE: HCPCS | Performed by: STUDENT IN AN ORGANIZED HEALTH CARE EDUCATION/TRAINING PROGRAM

## 2021-10-14 PROCEDURE — 160025 RECOVERY II MINUTES (STATS): Performed by: ORTHOPAEDIC SURGERY

## 2021-10-14 PROCEDURE — 160027 HCHG SURGERY MINUTES - 1ST 30 MINS LEVEL 2: Performed by: ORTHOPAEDIC SURGERY

## 2021-10-14 PROCEDURE — 77412 RADIATION TX DELIVERY LVL 3: CPT | Performed by: RADIOLOGY

## 2021-10-14 PROCEDURE — 700111 HCHG RX REV CODE 636 W/ 250 OVERRIDE (IP): Performed by: ANESTHESIOLOGY

## 2021-10-14 RX ORDER — TEMAZEPAM 15 MG/1
30 CAPSULE ORAL
Status: DISCONTINUED | OUTPATIENT
Start: 2021-10-14 | End: 2021-10-14 | Stop reason: HOSPADM

## 2021-10-14 RX ORDER — HYDROXYZINE PAMOATE 50 MG/1
50 CAPSULE ORAL 2 TIMES DAILY
Status: DISCONTINUED | OUTPATIENT
Start: 2021-10-14 | End: 2021-10-14 | Stop reason: HOSPADM

## 2021-10-14 RX ORDER — DOCUSATE SODIUM 100 MG/1
100 CAPSULE, LIQUID FILLED ORAL 2 TIMES DAILY
Status: DISCONTINUED | OUTPATIENT
Start: 2021-10-14 | End: 2021-10-14 | Stop reason: HOSPADM

## 2021-10-14 RX ORDER — AMOXICILLIN 250 MG
1 CAPSULE ORAL
Status: DISCONTINUED | OUTPATIENT
Start: 2021-10-14 | End: 2021-10-14 | Stop reason: HOSPADM

## 2021-10-14 RX ORDER — LEVOTHYROXINE SODIUM 137 UG/1
137 TABLET ORAL
Status: DISCONTINUED | OUTPATIENT
Start: 2021-10-14 | End: 2021-10-14 | Stop reason: HOSPADM

## 2021-10-14 RX ORDER — OXYCODONE HCL 5 MG/5 ML
10 SOLUTION, ORAL ORAL
Status: DISCONTINUED | OUTPATIENT
Start: 2021-10-14 | End: 2021-10-14 | Stop reason: HOSPADM

## 2021-10-14 RX ORDER — DEXAMETHASONE SODIUM PHOSPHATE 4 MG/ML
INJECTION, SOLUTION INTRA-ARTICULAR; INTRALESIONAL; INTRAMUSCULAR; INTRAVENOUS; SOFT TISSUE PRN
Status: DISCONTINUED | OUTPATIENT
Start: 2021-10-14 | End: 2021-10-14 | Stop reason: SURG

## 2021-10-14 RX ORDER — ACETAMINOPHEN 500 MG
1000 TABLET ORAL EVERY 6 HOURS
Status: DISCONTINUED | OUTPATIENT
Start: 2021-10-14 | End: 2021-10-14 | Stop reason: HOSPADM

## 2021-10-14 RX ORDER — HYDROMORPHONE HYDROCHLORIDE 1 MG/ML
0.2 INJECTION, SOLUTION INTRAMUSCULAR; INTRAVENOUS; SUBCUTANEOUS
Status: DISCONTINUED | OUTPATIENT
Start: 2021-10-14 | End: 2021-10-14 | Stop reason: HOSPADM

## 2021-10-14 RX ORDER — SCOLOPAMINE TRANSDERMAL SYSTEM 1 MG/1
1 PATCH, EXTENDED RELEASE TRANSDERMAL
Status: DISCONTINUED | OUTPATIENT
Start: 2021-10-14 | End: 2021-10-14 | Stop reason: HOSPADM

## 2021-10-14 RX ORDER — OXYCODONE HCL 5 MG/5 ML
5 SOLUTION, ORAL ORAL
Status: DISCONTINUED | OUTPATIENT
Start: 2021-10-14 | End: 2021-10-14 | Stop reason: HOSPADM

## 2021-10-14 RX ORDER — POLYETHYLENE GLYCOL 3350 17 G/17G
17 POWDER, FOR SOLUTION ORAL DAILY
Qty: 20 EACH | Refills: 3 | Status: SHIPPED | OUTPATIENT
Start: 2021-10-14 | End: 2022-06-13

## 2021-10-14 RX ORDER — TRAMADOL HYDROCHLORIDE 50 MG/1
50 TABLET ORAL EVERY 4 HOURS PRN
Status: DISCONTINUED | OUTPATIENT
Start: 2021-10-14 | End: 2021-10-14 | Stop reason: HOSPADM

## 2021-10-14 RX ORDER — CYCLOBENZAPRINE HCL 10 MG
10 TABLET ORAL 3 TIMES DAILY
Status: DISCONTINUED | OUTPATIENT
Start: 2021-10-14 | End: 2021-10-14 | Stop reason: HOSPADM

## 2021-10-14 RX ORDER — LABETALOL HYDROCHLORIDE 5 MG/ML
5 INJECTION, SOLUTION INTRAVENOUS
Status: DISCONTINUED | OUTPATIENT
Start: 2021-10-14 | End: 2021-10-14 | Stop reason: HOSPADM

## 2021-10-14 RX ORDER — BUPIVACAINE HYDROCHLORIDE AND EPINEPHRINE 2.5; 5 MG/ML; UG/ML
INJECTION, SOLUTION EPIDURAL; INFILTRATION; INTRACAUDAL; PERINEURAL
Status: DISCONTINUED | OUTPATIENT
Start: 2021-10-14 | End: 2021-10-14 | Stop reason: HOSPADM

## 2021-10-14 RX ORDER — ALPRAZOLAM 0.25 MG/1
.25-.5 TABLET ORAL 4 TIMES DAILY PRN
Status: DISCONTINUED | OUTPATIENT
Start: 2021-10-14 | End: 2021-10-14 | Stop reason: HOSPADM

## 2021-10-14 RX ORDER — AMOXICILLIN 250 MG
1 CAPSULE ORAL NIGHTLY
Status: DISCONTINUED | OUTPATIENT
Start: 2021-10-14 | End: 2021-10-14 | Stop reason: HOSPADM

## 2021-10-14 RX ORDER — ONDANSETRON 2 MG/ML
4 INJECTION INTRAMUSCULAR; INTRAVENOUS EVERY 4 HOURS PRN
Status: DISCONTINUED | OUTPATIENT
Start: 2021-10-14 | End: 2021-10-14 | Stop reason: HOSPADM

## 2021-10-14 RX ORDER — ACETAMINOPHEN 500 MG
500-1000 TABLET ORAL EVERY 6 HOURS PRN
Qty: 30 TABLET | Refills: 0 | Status: SHIPPED | OUTPATIENT
Start: 2021-10-14 | End: 2022-06-13

## 2021-10-14 RX ORDER — ACETAMINOPHEN 500 MG
1000 TABLET ORAL EVERY 6 HOURS PRN
Status: DISCONTINUED | OUTPATIENT
Start: 2021-10-19 | End: 2021-10-14 | Stop reason: HOSPADM

## 2021-10-14 RX ORDER — SODIUM CHLORIDE, SODIUM LACTATE, POTASSIUM CHLORIDE, CALCIUM CHLORIDE 600; 310; 30; 20 MG/100ML; MG/100ML; MG/100ML; MG/100ML
INJECTION, SOLUTION INTRAVENOUS CONTINUOUS
Status: DISCONTINUED | OUTPATIENT
Start: 2021-10-14 | End: 2021-10-14 | Stop reason: HOSPADM

## 2021-10-14 RX ORDER — CEFAZOLIN SODIUM 1 G/3ML
INJECTION, POWDER, FOR SOLUTION INTRAMUSCULAR; INTRAVENOUS PRN
Status: DISCONTINUED | OUTPATIENT
Start: 2021-10-14 | End: 2021-10-14 | Stop reason: SURG

## 2021-10-14 RX ORDER — ROCURONIUM BROMIDE 10 MG/ML
INJECTION, SOLUTION INTRAVENOUS PRN
Status: DISCONTINUED | OUTPATIENT
Start: 2021-10-14 | End: 2021-10-14 | Stop reason: SURG

## 2021-10-14 RX ORDER — BUPIVACAINE HYDROCHLORIDE 2.5 MG/ML
INJECTION, SOLUTION EPIDURAL; INFILTRATION; INTRACAUDAL PRN
Status: DISCONTINUED | OUTPATIENT
Start: 2021-10-14 | End: 2021-10-14 | Stop reason: SURG

## 2021-10-14 RX ORDER — HYDROMORPHONE HYDROCHLORIDE 1 MG/ML
0.1 INJECTION, SOLUTION INTRAMUSCULAR; INTRAVENOUS; SUBCUTANEOUS
Status: DISCONTINUED | OUTPATIENT
Start: 2021-10-14 | End: 2021-10-14 | Stop reason: HOSPADM

## 2021-10-14 RX ORDER — ONDANSETRON 2 MG/ML
INJECTION INTRAMUSCULAR; INTRAVENOUS PRN
Status: DISCONTINUED | OUTPATIENT
Start: 2021-10-14 | End: 2021-10-14 | Stop reason: SURG

## 2021-10-14 RX ORDER — HALOPERIDOL 5 MG/ML
1 INJECTION INTRAMUSCULAR
Status: DISCONTINUED | OUTPATIENT
Start: 2021-10-14 | End: 2021-10-14 | Stop reason: HOSPADM

## 2021-10-14 RX ORDER — VENLAFAXINE HYDROCHLORIDE 75 MG/1
75 CAPSULE, EXTENDED RELEASE ORAL
Status: DISCONTINUED | OUTPATIENT
Start: 2021-10-15 | End: 2021-10-14 | Stop reason: HOSPADM

## 2021-10-14 RX ORDER — OXYCODONE HYDROCHLORIDE 10 MG/1
10 TABLET ORAL
Status: DISCONTINUED | OUTPATIENT
Start: 2021-10-14 | End: 2021-10-14 | Stop reason: HOSPADM

## 2021-10-14 RX ORDER — CELECOXIB 200 MG/1
200 CAPSULE ORAL 2 TIMES DAILY PRN
Qty: 60 CAPSULE | Refills: 1 | Status: SHIPPED | OUTPATIENT
Start: 2021-10-14 | End: 2022-07-12

## 2021-10-14 RX ORDER — HYDROMORPHONE HYDROCHLORIDE 1 MG/ML
0.4 INJECTION, SOLUTION INTRAMUSCULAR; INTRAVENOUS; SUBCUTANEOUS
Status: DISCONTINUED | OUTPATIENT
Start: 2021-10-14 | End: 2021-10-14 | Stop reason: HOSPADM

## 2021-10-14 RX ORDER — KETOROLAC TROMETHAMINE 30 MG/ML
INJECTION, SOLUTION INTRAMUSCULAR; INTRAVENOUS
Status: DISCONTINUED | OUTPATIENT
Start: 2021-10-14 | End: 2021-10-14 | Stop reason: HOSPADM

## 2021-10-14 RX ORDER — DIPHENHYDRAMINE HYDROCHLORIDE 50 MG/ML
25 INJECTION INTRAMUSCULAR; INTRAVENOUS EVERY 6 HOURS PRN
Status: DISCONTINUED | OUTPATIENT
Start: 2021-10-14 | End: 2021-10-14 | Stop reason: HOSPADM

## 2021-10-14 RX ORDER — TRANEXAMIC ACID 100 MG/ML
INJECTION, SOLUTION INTRAVENOUS PRN
Status: DISCONTINUED | OUTPATIENT
Start: 2021-10-14 | End: 2021-10-14 | Stop reason: SURG

## 2021-10-14 RX ORDER — DIAZEPAM 5 MG/1
5 TABLET ORAL 2 TIMES DAILY PRN
Status: DISCONTINUED | OUTPATIENT
Start: 2021-10-14 | End: 2021-10-14 | Stop reason: HOSPADM

## 2021-10-14 RX ORDER — CEFAZOLIN SODIUM IN 0.9 % NACL 2 G/100 ML
2 PLASTIC BAG, INJECTION (ML) INTRAVENOUS ONCE
Status: DISCONTINUED | OUTPATIENT
Start: 2021-10-14 | End: 2021-10-14 | Stop reason: HOSPADM

## 2021-10-14 RX ORDER — LIDOCAINE HYDROCHLORIDE 20 MG/ML
INJECTION, SOLUTION EPIDURAL; INFILTRATION; INTRACAUDAL; PERINEURAL PRN
Status: DISCONTINUED | OUTPATIENT
Start: 2021-10-14 | End: 2021-10-14 | Stop reason: SURG

## 2021-10-14 RX ORDER — TAMSULOSIN HYDROCHLORIDE 0.4 MG/1
0.4 CAPSULE ORAL
Status: DISCONTINUED | OUTPATIENT
Start: 2021-10-14 | End: 2021-10-14 | Stop reason: HOSPADM

## 2021-10-14 RX ORDER — SODIUM CHLORIDE, SODIUM LACTATE, POTASSIUM CHLORIDE, CALCIUM CHLORIDE 600; 310; 30; 20 MG/100ML; MG/100ML; MG/100ML; MG/100ML
INJECTION, SOLUTION INTRAVENOUS CONTINUOUS
Status: ACTIVE | OUTPATIENT
Start: 2021-10-14 | End: 2021-10-14

## 2021-10-14 RX ORDER — HYDROMORPHONE HYDROCHLORIDE 1 MG/ML
0.5 INJECTION, SOLUTION INTRAMUSCULAR; INTRAVENOUS; SUBCUTANEOUS
Status: DISCONTINUED | OUTPATIENT
Start: 2021-10-14 | End: 2021-10-14 | Stop reason: HOSPADM

## 2021-10-14 RX ORDER — HALOPERIDOL 5 MG/ML
1 INJECTION INTRAMUSCULAR EVERY 6 HOURS PRN
Status: DISCONTINUED | OUTPATIENT
Start: 2021-10-14 | End: 2021-10-14 | Stop reason: HOSPADM

## 2021-10-14 RX ORDER — VANCOMYCIN HYDROCHLORIDE 1 G/20ML
INJECTION, POWDER, LYOPHILIZED, FOR SOLUTION INTRAVENOUS PRN
Status: DISCONTINUED | OUTPATIENT
Start: 2021-10-14 | End: 2021-10-14 | Stop reason: SURG

## 2021-10-14 RX ORDER — POLYETHYLENE GLYCOL 3350 17 G/17G
1 POWDER, FOR SOLUTION ORAL 2 TIMES DAILY PRN
Status: DISCONTINUED | OUTPATIENT
Start: 2021-10-14 | End: 2021-10-14 | Stop reason: HOSPADM

## 2021-10-14 RX ORDER — OXYCODONE HYDROCHLORIDE 5 MG/1
5 TABLET ORAL
Status: DISCONTINUED | OUTPATIENT
Start: 2021-10-14 | End: 2021-10-14 | Stop reason: HOSPADM

## 2021-10-14 RX ORDER — DIPHENHYDRAMINE HYDROCHLORIDE 50 MG/ML
12.5 INJECTION INTRAMUSCULAR; INTRAVENOUS
Status: DISCONTINUED | OUTPATIENT
Start: 2021-10-14 | End: 2021-10-14 | Stop reason: HOSPADM

## 2021-10-14 RX ORDER — BISACODYL 10 MG
10 SUPPOSITORY, RECTAL RECTAL
Status: DISCONTINUED | OUTPATIENT
Start: 2021-10-14 | End: 2021-10-14 | Stop reason: HOSPADM

## 2021-10-14 RX ORDER — ENEMA 19; 7 G/133ML; G/133ML
1 ENEMA RECTAL
Status: DISCONTINUED | OUTPATIENT
Start: 2021-10-14 | End: 2021-10-14 | Stop reason: HOSPADM

## 2021-10-14 RX ORDER — OXYCODONE HYDROCHLORIDE 5 MG/1
5 TABLET ORAL EVERY 4 HOURS PRN
Qty: 30 TABLET | Refills: 0 | Status: SHIPPED | OUTPATIENT
Start: 2021-10-14 | End: 2021-10-21

## 2021-10-14 RX ORDER — PHENYLEPHRINE HYDROCHLORIDE 10 MG/ML
INJECTION, SOLUTION INTRAMUSCULAR; INTRAVENOUS; SUBCUTANEOUS PRN
Status: DISCONTINUED | OUTPATIENT
Start: 2021-10-14 | End: 2021-10-14 | Stop reason: SURG

## 2021-10-14 RX ORDER — GABAPENTIN 300 MG/1
900 CAPSULE ORAL
Status: DISCONTINUED | OUTPATIENT
Start: 2021-10-14 | End: 2021-10-14 | Stop reason: HOSPADM

## 2021-10-14 RX ORDER — ONDANSETRON 2 MG/ML
4 INJECTION INTRAMUSCULAR; INTRAVENOUS
Status: DISCONTINUED | OUTPATIENT
Start: 2021-10-14 | End: 2021-10-14 | Stop reason: HOSPADM

## 2021-10-14 RX ORDER — ACETAMINOPHEN 500 MG
1000 TABLET ORAL ONCE
Status: COMPLETED | OUTPATIENT
Start: 2021-10-14 | End: 2021-10-14

## 2021-10-14 RX ORDER — HYDRALAZINE HYDROCHLORIDE 20 MG/ML
5 INJECTION INTRAMUSCULAR; INTRAVENOUS
Status: DISCONTINUED | OUTPATIENT
Start: 2021-10-14 | End: 2021-10-14 | Stop reason: HOSPADM

## 2021-10-14 RX ADMIN — ACETAMINOPHEN 1000 MG: 500 TABLET ORAL at 13:20

## 2021-10-14 RX ADMIN — TRANEXAMIC ACID 1000 MG: 100 INJECTION, SOLUTION INTRAVENOUS at 15:06

## 2021-10-14 RX ADMIN — SODIUM CHLORIDE, POTASSIUM CHLORIDE, SODIUM LACTATE AND CALCIUM CHLORIDE: 600; 310; 30; 20 INJECTION, SOLUTION INTRAVENOUS at 15:28

## 2021-10-14 RX ADMIN — CEFAZOLIN 3 G: 330 INJECTION, POWDER, FOR SOLUTION INTRAMUSCULAR; INTRAVENOUS at 14:47

## 2021-10-14 RX ADMIN — ONDANSETRON 4 MG: 2 INJECTION INTRAMUSCULAR; INTRAVENOUS at 14:49

## 2021-10-14 RX ADMIN — PROPOFOL 150 MG: 10 INJECTION, EMULSION INTRAVENOUS at 14:45

## 2021-10-14 RX ADMIN — LIDOCAINE HYDROCHLORIDE 2 ML: 20 INJECTION, SOLUTION EPIDURAL; INFILTRATION; INTRACAUDAL; PERINEURAL at 14:45

## 2021-10-14 RX ADMIN — SUGAMMADEX 200 MG: 100 INJECTION, SOLUTION INTRAVENOUS at 15:24

## 2021-10-14 RX ADMIN — PHENYLEPHRINE HYDROCHLORIDE 200 MCG: 10 INJECTION INTRAVENOUS at 15:38

## 2021-10-14 RX ADMIN — SODIUM CHLORIDE, POTASSIUM CHLORIDE, SODIUM LACTATE AND CALCIUM CHLORIDE: 600; 310; 30; 20 INJECTION, SOLUTION INTRAVENOUS at 13:20

## 2021-10-14 RX ADMIN — BUPIVACAINE HYDROCHLORIDE 20 ML: 2.5 INJECTION, SOLUTION EPIDURAL; INFILTRATION; INTRACAUDAL; PERINEURAL at 14:27

## 2021-10-14 RX ADMIN — FENTANYL CITRATE 150 MCG: 50 INJECTION, SOLUTION INTRAMUSCULAR; INTRAVENOUS at 14:43

## 2021-10-14 RX ADMIN — VANCOMYCIN 1.5 G: 1 INJECTION, SOLUTION INTRAVENOUS at 15:08

## 2021-10-14 RX ADMIN — DEXAMETHASONE SODIUM PHOSPHATE 4 MG: 4 INJECTION, SOLUTION INTRAMUSCULAR; INTRAVENOUS at 14:49

## 2021-10-14 RX ADMIN — ROCURONIUM BROMIDE 70 MG: 10 INJECTION, SOLUTION INTRAVENOUS at 14:45

## 2021-10-14 RX ADMIN — PHENYLEPHRINE HYDROCHLORIDE 200 MCG: 10 INJECTION INTRAVENOUS at 15:22

## 2021-10-14 RX ADMIN — LIDOCAINE HYDROCHLORIDE 1 ML: 20 INJECTION, SOLUTION EPIDURAL; INFILTRATION; INTRACAUDAL; PERINEURAL at 14:27

## 2021-10-14 RX ADMIN — PROPOFOL 50 MG: 10 INJECTION, EMULSION INTRAVENOUS at 15:27

## 2021-10-14 ASSESSMENT — FIBROSIS 4 INDEX: FIB4 SCORE: 1.44

## 2021-10-14 NOTE — ANESTHESIA TIME REPORT
Anesthesia Start and Stop Event Times     Date Time Event    10/14/2021 1423 Ready for Procedure     1441 Anesthesia Start     1542 Anesthesia Stop        Responsible Staff  10/14/21    Name Role Begin End    David Suarez M.D. Anesth 1441 1542        Preop Diagnosis (Free Text):  Pre-op Diagnosis     Presence of right artificial knee joint [Z96.651]        Preop Diagnosis (Codes):  Diagnosis Information     Diagnosis Code(s): Presence of right artificial knee joint [Z96.651]        Premium Reason  A. 3PM - 7AM    Comments: nerve block

## 2021-10-14 NOTE — ANESTHESIA PROCEDURE NOTES
Peripheral Block    Date/Time: 10/14/2021 2:25 PM  Performed by: David Suarez M.D.  Authorized by: David Suarez M.D.     Patient Location:  Pre-op  Start Time:  10/14/2021 2:25 PM  End Time:  10/14/2021 2:28 PM  Reason for Block: at surgeon's request and post-op pain management ONLY    patient identified, IV checked, site marked, risks and benefits discussed, surgical consent, monitors and equipment checked, pre-op evaluation and timeout performed    Patient Position:  Supine  Prep: ChloraPrep    Monitoring:  Heart rate and continuous pulse ox  Block Region:  Lower Extremity  Lower Extremity - Block Type:  Selective FEMORAL nerve block at the Adductor Canal    Laterality:  Right  Procedures: ultrasound guided  Image captured, interpreted and electronically stored.  Local Infiltration:  Lidocaine  Strength:  2 %  Dose:  1 ml  Block Type:  Single-shot  Needle Length:  100mm  Needle Gauge:  21 G  Needle Localization:  Ultrasound guidance  Injection Assessment:  Negative aspiration for heme, no paresthesia on injection, incremental injection and local visualized surrounding nerve on ultrasound  Evidence of intravascular injection: No     US Guided Selective Femoral Nerve Block at Adductor Canal:   US probe placed at mid-thigh level on externally rotated leg and femur identified.  Probe directed medially until Sartorius Muscle (SM), Femoral Artery (FA) and Saphenous Nerve (SN) identified in Adductor Canal (AC).  Needle inserted anterolateral to probe in an in plane approach into a subsartorial perivascular perineural position.  After negative aspiration LA injected with ease and visualized spreading within the AC. U/S picture in paper chart.

## 2021-10-14 NOTE — DISCHARGE INSTR - OTHER INFO
Discharge Instructions:  The first week after your joint replacement is a time to recover from the surgery. We expect light exercise to keep you active and mobile.   Most patients are prescribed two medications for pain control: a narcotic such as Oxycodone or Hydrocodone or a milder medication called Tramadol. These can be alternated for pain control, and the priority is to decrease use of the stronger narcotic as soon as tolerated. In addition, you were prescribed Celebrex, an anti-inflammatory medication. Do not take any other types of anti-inflammatories with this medication. Last, you should take acetaminophen, either 500mg every 4 hours, or 1000mg every 8 hours, around the clock. Do not exceed 3000mg in a 24 hour period. The anti-inflammatory and acetaminophen will help reduce the amount of narcotic medication you require.      Take your pain medication as appropriate to ensure that your pain is not limiting your recovery. You'll be seen in clinic in 7-10 days (this appointment has already been made, call our office if you're unsure of the time). At that time, we'll prescribe your physical therapy to help with the recovery phase.      -Keep dressing clean and dry. Leave dressing in place until follow up. If you have an incisional vac dressing, the battery may die before your first post operative appointment, but you can leave the surgical dressing in place and it will be removed at your clinic visit. The battery of the dressing may die, and the sponge will inflate because it is no longer holding suction. You can still leave the dressing in place and it will be removed at the office. Call the office if you notice drainage from the surgical dressing.     -OK to shower, keep dressing in place. Pat dressing dry, do not rub incision     -Do not soak incision in bath, hot tub or pool     -Follow up with Dr. Vang at regularly scheduled time     -Call [facility] office at [phone] for questions     -Weight bearing  status: as tolerated     -Take aspirin 81mg twice daily  for 1 month to prevent blood clots

## 2021-10-14 NOTE — CT SIMULATION
DATE OF SERVICE: 10/14/2021    Radiation Therapy Episodes     Active Episodes     Radiation Therapy: 3D CRT               Radiation Treatments       Plan Last Treated On Elapsed Days Fractions Treated Prescribed Fraction Dose (cGy) Prescribed Total Dose (cGy)    R_Knee 10/14/2021 0 @ [id number] 1 of 1 800 800                Reference Point Last Treated On Elapsed Days Most Recent Session Dose (cGy) Total Dose (cGy)    R_Knee 10/14/2021 0 @ [id number] 800 800    R_Knee CP 10/14/2021 0 @ [id number] 800 800                      First Visit Simple Simulation: Called by bttn machine to verify treatment parameters including:  treatment site, treatment dose, and treatment setup prior to first treatment. Image derived shifts reviewed in all appropriate plains.  Shifts approved.  Patient treated.    I have personally reviewed the relevant data, performed the target localization, and determined all relevant factors for this patient’s simulation.

## 2021-10-14 NOTE — OR SURGEON
Immediate Post OP Note    PreOp Diagnosis: Heterotopic ossification, left patella      PostOp Diagnosis: Same      Procedure(s):  EXCISION, HETEROTOPIC ossification - Wound Class: Clean    Surgeon(s):  ROBERTO Cheek M.D.    Anesthesiologist/Type of Anesthesia:  Anesthesiologist: David Suarez M.D./General    Surgical Staff:  Circulator: Alan Gamez R.N.  Relief Circulator: Casandra Yoo R.N.  Scrub Person: Solitario Frey; Felix Miller    Specimens removed if any:  * No specimens in log *    Estimated Blood Loss: 100cc    Findings: Heterotopic ossification at inferior pole of patella    Complications: None          10/14/2021 3:33 PM Shashank Desai M.D.

## 2021-10-14 NOTE — LETTER
September 20, 2021    [facility]  [address] [address]  [phone]    Patient Name: Cristino Keys  Surgeon Name: Surgeon(s):  Emiliano Watson M.D.  Shashank Desai M.D.  [facility] [facility]  Surgery Date: 10/14/2021    The time of your surgery is not final and may change up to and until the day of your surgery. You will be contacted 24-48 hours prior to your surgery date with your check-in and surgery time.    If you will not be at one of the below numbers please call/text the surgery scheduler at [phone]  Cell Phone: [phone]    BEFORE YOUR SURGERY  Pre Registration and/or Lab Work must be done within and no earlier than 28 days prior to your surgery date. Please call [facility] @ [phone] for an appointment as soon as possible.      Please refrain from smoking any substance after midnight prior to surgery. Smoking may interfere with the anesthetic and frequently produces nausea during the recovery period.    Continue taking all lifesaving medications. Including the morning of your surgery with small sip of water.    Please read the MEDICATION INSTRUCTIONS below completely.    DAY OF YOUR SURGERY  Nothing to eat or drink after midnight     Please arrive at the hospital/surgery center at the check-in time provided.     An adult will need to bring you and take you home after your surgery.     AFTER YOUR SURGERY  Post op Appointment:   Date: 10/27/21   Time:1045   With: DR WATSON    Location: [address] [place] [phone]    - Therapy- Your first appointment should be 1  week(s) after your surgery. For your convenience we have 4 Physical Therapy locations: [place], [facility], [place], and [facility]. Call our office ASAP to schedule an appointment at [phone] or take the enclosed Therapy Prescription to a facility of your choice.  - No dental work for 3-6 months after your  surgery.    TIME OFF WORK  FMLA or Disability forms can be faxed directly to: [phone] or you may drop them off at [address] [place] [phone]. Our office charges a $35.00 fee per form. Forms will be completed within 10 business days of drop off and payment received. For the status of your forms you may contact our disability office directly [phone].    MEDICATION INSTRUCTIONS  The following medications should be stopped a minimum of 10 days prior to surgery:  All over the counter, Supplements & Herbal medications    Anorectics: Phentermine (Adipex-P, Lomaira and Suprenza), Phentermine-topiramate (Qsymia), Bupropion-naltrexone (Contrave)    Opiod Partial Agonists/Opioid Antagonists: Buprenorphine (Subocone, Belbuca, Butrans, Probuphine Implant, Sublocade), Naltrexone (ReVia, Vivitrol), Naloxone    Amphetamines: Dextroamphetamine/Amphetamine (Adderall, Mydayis), Methylphenidate Hydrochloride (Concerta, Metadate, Methylin, Ritalin)    The following medications should be stopped 5 days prior to surgery:  Blood Thinners: Any Aspirin, Aspirin products, anti-inflammatories such as ibuprofen and any blood thinners such as Coumadin and Plavix. Please consult your prescribing physician if you are on life saving blood thinners, in regards to when to stop medications prior to surgery.     The following medications should be stopped a minimum of 3 days prior to surgery:  PDE-5 inhibitors: Sildenafil (Viagra), Tadalafil (Cialis), Vardenafil (Levitra), Avanafil (Stendra)    MAO Inhibitors: Rasagiline (Azilect), Selegiline (Eldepryl, Emsam, Selapar), Isocarboxazid (Marplan), Phenelzine (Nardil)

## 2021-10-14 NOTE — DISCHARGE INSTRUCTIONS
Safe Surgery and Sleep Apnea  Sleep apnea is a condition in which breathing pauses or becomes shallow during sleep. Most people with the condition are not aware that they have it. It is important for your health care providers to know whether or not you have sleep apnea, especially if you are having surgery. Sleep apnea can increase your risk of complications during and after surgery.  What is sleep apnea screening?  Sleep apnea screening is a test to determine if you are at risk for sleep apnea. Before you have surgery, get screened for sleep apnea and talk with your surgeon and primary health care provider about your results. Screening usually involves answering a list of questions about your sleep quality. Ask your health care provider if you can be screened, or take a screening test yourself. You can find these tests online at the American Sleep Apnea Association website. Some questions you may be asked include:  · Do you snore?  · Is your sleep restless?  · Do you have daytime sleepiness?  · Has a partner or spouse told you that you stop breathing during sleep?  · Have you had trouble concentrating or memory loss?  Answer these questions honestly. If a screening test is positive, this means you are at risk for the condition. Further testing may be needed to confirm a diagnosis of sleep apnea.  Why does sleep apnea increase the risk for complications?  Untreated sleep apnea increases the risk for certain complications during and after surgery. This is because when you have sleep apnea, your airways are more sensitive to medicines used during surgery. The airways can collapse and block the flow of air.   Having untreated sleep apnea can increase your risk for:  · A longer stay in the recovery room or hospital.  · Breathing difficulties such as low oxygen levels after surgery.  · Increased pain after surgery.  · Irregular heart rhythms.  · Stroke.  · Heart attack.  You and your health care provider can take steps  to help prevent these and other complications.  What should I do if I have sleep apnea?    Before surgery  · Tell your health care provider and anesthesia specialist that you have sleep apnea. Discuss your individual risks based on your screening results, the type of surgery you will be having, and other medical conditions that you have.  · If you have a sleep apnea device (positive airway pressure device), wear it as prescribed. If you have not been wearing your device, talk with your health care provider about why you have not been wearing it. There are ways to improve your use of the device, such as:  ? Adjusting the mask.  ? Adding humidified air.  ? Getting treatment for nasal congestion.  · Do not use any products that contain nicotine or tobacco, such as cigarettes and e-cigarettes. If you need help quitting, ask your health care provider.  On the day of surgery  · If instructed by your health care provider, bring your sleep apnea device with you.  · Wear your sleep apnea device when you are sleeping during your hospital stay, or as told by your health care provider.  · Ask your health care provider what special considerations will be taken during and after your surgery.  After surgery  · You may need to be given extra oxygen and wear a continuous oxygen monitor (pulse oximetry).  · For your safety, you may need to stay in the recovery room or hospital for longer than is normal.  · Follow instructions from your health care provider about wearing your sleep device:  ? Anytime you are sleeping, including during daytime naps.  ? While taking prescription pain medicines, sleeping medicines, or medicines that make you drowsy.  · If your health care provider approves, raise the head of your bed or lie on your side. Do not lie flat on your back.  · Follow instructions from your health care provider about medicines:  ? Avoid using sleep medicines unless they are prescribed by a health care provider who is aware of  the results of your sleep apnea screening.  ? Avoid using sleep medicines while taking opioid pain medicine.  ? Limit your use of opioid pain medicines as much as possible. Ask your health care provider what is a safe amount to use.  ? Ask about using pain medicines that do not affect your breathing, such as NSAIDs or acetaminophen.  Where to find more information  For more information about sleep apnea screening and healthy sleep, visit these websites:  · Centers for Disease Control and Prevention: www.cdc.gov/sleep/index.html  · American Sleep Apnea Association: www.sleepapnea.org  Contact a health care provider if:  · You have sleep apnea or think you may be at risk for sleep apnea, and you are scheduled for surgery.  Get help right away if:  · You have trouble breathing.  · You are very drowsy and cannot stay awake.  · You are told that you have pauses in your breathing during sleep after surgery.  · You have chest pain.  · You have a fast heartbeat.  Summary  · It is important for your health care providers to know whether or not you have sleep apnea, especially if you are having surgery.  · If you have sleep apnea, you are at an increased risk for complications during surgery.  · You and your health care provider can take precautions to help prevent complications. If you have sleep apnea, make sure to tell your health care provider and anesthesia specialist.  This information is not intended to replace advice given to you by your health care provider. Make sure you discuss any questions you have with your health care provider.  Document Released: 04/05/2018 Document Revised: 04/10/2020 Document Reviewed: 04/05/2018  [employer] Patient Education © 2020 [employer]    ACTIVITY: Rest and take it easy for the first 24 hours.  A responsible adult is recommended to remain with you during that time.  It is normal to feel sleepy.  We encourage you to not do anything that requires balance, judgment or  coordination.    MILD FLU-LIKE SYMPTOMS ARE NORMAL. YOU MAY EXPERIENCE GENERALIZED MUSCLE ACHES, THROAT IRRITATION, HEADACHE AND/OR SOME NAUSEA.    FOR 24 HOURS DO NOT:  Drive, operate machinery or run household appliances.  Drink beer or alcoholic beverages.   Make important decisions or sign legal documents.    SPECIAL INSTRUCTIONS: See Discharge instructions on first page.    DIET: To avoid nausea, slowly advance diet as tolerated, avoiding spicy or greasy foods for the first day.  Add more substantial food to your diet according to your physician's instructions.  Babies can be fed formula or breast milk as soon as they are hungry.  INCREASE FLUIDS AND FIBER TO AVOID CONSTIPATION    FOLLOW-UP APPOINTMENT:  A follow-up appointment should be arranged with your doctor as scheduled.    You should CALL YOUR PHYSICIAN if you develop:  Fever greater than 101 degrees F.  Pain not relieved by medication, or persistent nausea or vomiting.  Excessive bleeding (blood soaking through dressing) or unexpected drainage from the wound.  Extreme redness or swelling around the incision site, drainage of pus or foul smelling drainage.  Inability to urinate or empty your bladder within 8 hours.  Problems with breathing or chest pain.    You should call 911 if you develop problems with breathing or chest pain.  If you are unable to contact your doctor or surgical center, you should go to the nearest emergency room or urgent care center.  Physician's telephone #: [phone]    If any questions arise, call your doctor.  If your doctor is not available, please feel free to call the Surgical Center at [phone]. The Contact Center is open Monday through Friday 7AM to 5PM and may speak to a nurse at [phone], or toll free at [phone].     A registered nurse may call you a few days after your surgery to see how you are doing after your procedure.    MEDICATIONS: Resume taking daily medication.  Take prescribed pain medication  with food.  If no medication is prescribed, you may take non-aspirin pain medication if needed.  PAIN MEDICATION CAN BE VERY CONSTIPATING.  Take a stool softener or laxative such as senokot, pericolace, or milk of magnesia if needed.    Prescription given for celecoxib, oxycodone, tylenol.  Last pain medication given at ____________.    If your physician has prescribed pain medication that includes Acetaminophen (Tylenol), do not take additional Acetaminophen (Tylenol) while taking the prescribed medication.    Depression / Suicide Risk    As you are discharged from this [facility] facility, it is important to learn how to keep safe from harming yourself.    Recognize the warning signs:  · Abrupt changes in personality, positive or negative- including increase in energy   · Giving away possessions  · Change in eating patterns- significant weight changes-  positive or negative  · Change in sleeping patterns- unable to sleep or sleeping all the time   · Unwillingness or inability to communicate  · Depression  · Unusual sadness, discouragement and loneliness  · Talk of wanting to die  · Neglect of personal appearance   · Rebelliousness- reckless behavior  · Withdrawal from people/activities they love  · Confusion- inability to concentrate     If you or a loved one observes any of these behaviors or has concerns about self-harm, here's what you can do:  · Talk about it- your feelings and reasons for harming yourself  · Remove any means that you might use to hurt yourself (examples: pills, rope, extension cords, firearm)  · Get professional help from the community (Mental Health, Substance Abuse, psychological counseling)  · Do not be alone:Call your Safe Contact- someone whom you trust who will be there for you.  · Call your local CRISIS HOTLINE 055-7723 or 864-517-2504  · Call your local Children's Mobile Crisis Response Team [place] (559) 933-8745 or www.Etix  · Call the toll free National Suicide  "Prevention Hotlines   · National Suicide Prevention Lifeline 674-761-IXHI (4839)  [facility] 800-SUICIDE (453-7056)    Peripheral Nerve Block Discharge Instructions from Same Day Surgery and Inpatient :    What to Expect - Lower Extremity  · The block may cause you to experience numbness and weakness in your calf and foot on the same side as your surgery  · Numbness, tingling and / or weakness are all normal. For some people, this may be an unpleasant sensation  · These issues will be resolved when the local anesthetic wears off   · You may experience numbness and tingling in your thigh on the same side as your surgery if the block medicine was injected at your groin area  · Numbness will make it difficult to walk  · You may have problems with balance and walking so be very careful   · Follow your surgeon's direction regarding weight bearing on your surgical limb  · Be very careful with your numb limb  Precautions  · The numbness may affect your balance  · Be careful when walking or moving around  · Your leg may be weak: be very careful putting weight on it  · If your surgeon did not specify a time, you should not bear weight for 24 hours  · Be sure to ask for help when you need it  · It is better to have help than to fall and hurt yourself    Prevent Injury  · Protect the limb like a baby  · Beware of exposing your limb to extreme heat or cold or trauma  · The limb may be injured without you noticing because it is numb  · Keep the limb elevated whenever possible  · Do not sleep on the limb  · Change the position of the limb regularly  · Avoid putting pressure on your surgical limb  Pain Control  · The initial block on the day of surgery will make your extremity feel \"numb\"  · Any consecutive injection including prior to discharge from the hospital will make your extremity feel \"numb\"  · You may feel an aching or burning when the local anesthesia starts to wear off  · Take pain pills as prescribed by " your surgeon  · Call your surgeon or anesthesiologist if you do not have adequate pain control

## 2021-10-14 NOTE — ANESTHESIA PROCEDURE NOTES
Airway    Date/Time: 10/14/2021 2:46 PM  Performed by: aDvid Suarez M.D.  Authorized by: David Suarez M.D.     Location:  OR  Urgency:  Elective  Difficult Airway: No    Indications for Airway Management:  Anesthesia      Spontaneous Ventilation: absent    Sedation Level:  Deep  Preoxygenated: Yes    Patient Position:  Sniffing  Mask Difficulty Assessment:  2 - vent by mask + OA or adjuvant +/- NMBA  Final Airway Type:  Endotracheal airway  Final Endotracheal Airway:  ETT  Cuffed: Yes    Technique Used for Successful ETT Placement:  Video laryngoscopy    Insertion Site:  Oral  Blade Type:  Glide  Laryngoscope Blade/Videolaryngoscope Blade Size:  4  ETT Size (mm):  7.5  Measured from:  Teeth  ETT to Teeth (cm):  23  Placement Verified by: auscultation and capnometry    Cormack-Lehane Classification:  Grade I - full view of glottis  Number of Attempts at Approach:  1   Atraumatic DLx1

## 2021-10-14 NOTE — ADDENDUM NOTE
Encounter addended by: Alok SHAH M.D. on: 10/14/2021 8:45 AM   Actions taken: Reconcile Outside Information problem data discarded

## 2021-10-14 NOTE — OP REPORT
DIAGNOSIS: Heterotopic ossification right knee    PROCEDURE: Excision of abnormal bone growth right knee    ANESTHESIA: General.    COMPLICATIONS: None.    SURGEON: Emiliano Vang MD    ASSISTANT: Shashank Fan    INDICATIONS: This is a patient with severe pain secondary to osteoarthritis having failed conservative treatments.    DESCRIPTION OF PROCEDURE: Patient was identified in the preop area, site was   marked, taken back to the operating room and underwent general anesthesia.   The right lower extremity was prepped and draped in sterile manner.   Preoperative timeout was held and antibiotics were given. Incision was made   over the anterior knee, soft tissue was dissected down to the fascia. The   fascia was split in medial parapatellar approach.  Heterotopic ossification was removed from around the patella.  The wound was soaked with   dilute Betadine solution and was injected with Marcaine. Vicryl was used for   the fascia, Monocryl, soft tissue, skin and Dermabond for the final skin   layer. Patient was woken up, taken back to PACU, will be weightbear as   tolerated.

## 2021-10-14 NOTE — ANESTHESIA PREPROCEDURE EVALUATION
Right TKA with heterotopic ossification    Relevant Problems   ANESTHESIA   (positive) JULIAN (obstructive sleep apnea)      PULMONARY   (positive) History of MRSA infection      NEURO   (positive) History of MRSA infection      CARDIAC   (positive) Hypertension      ENDO   (positive) Hypothyroidism, acquired, autoimmune   (positive) Type 2 diabetes mellitus without complication (HCC)      Other   (positive) Arthritis of left ankle   (positive) Chronic pain   (positive) Heterotopic ossification of bone   (positive) Nocturnal hypoxemia   (positive) Presence of right artificial knee joint   (positive) Severe obesity with body mass index (BMI) of 35.0 to 39.9 with serious comorbidity (HCC)       Physical Exam    Airway   Mallampati: II  TM distance: >3 FB  Neck ROM: limited       Cardiovascular - normal exam  Rhythm: regular  Rate: normal  (-) murmur     Dental - normal exam  (+) upper dentures           Pulmonary - normal exam  Breath sounds clear to auscultation     Abdominal    Neurological - normal exam               Anesthesia Plan    ASA 3       Plan - general and peripheral nerve block     Peripheral nerve block will be post-op pain control  Airway plan will be ETT          Induction: intravenous    Postoperative Plan: Postoperative administration of opioids is intended.    Pertinent diagnostic labs and testing reviewed    Informed Consent:    Anesthetic plan and risks discussed with patient.    Use of blood products discussed with: patient whom consented to blood products.

## 2021-10-14 NOTE — OR NURSING
1536 To PACU from OR via bel, respirations spontaneous and non-labored-opa dc'd by anesthesia on admit to pacu, medicated by anesthesia for bp 67/46    1541 bp up to 113/62    1550 dozing off and on, vss, denies pain/nausea, ice chip given  Called Dr Vang to clarify admit/discharge orders,  to bedside, plan to discharge.    1605 resting comfortably, dozing off/on, vss     1620  No change.  Called wife with update.    1635 pt dozing, rouses to verbal stim, po fluids given to drink, Given IS and instructed on use with goal of 3000, pt currently inhaling volumes of approx 3500.     1650 trial room air sat down to 85%, reapplied O2 at 2L n/c-sat up to 91%    1705 awake, drinking juice and eating crackers, denies pain, sat 94%, decreased o2 to 1L    1715  Maintaining sat 92%-O2 off    1730  Awake, alert, using IS intermittently pulling [id number]    1745 maintaining sat 91-96 over last 15 min    1759 Continue to maintain sat on room air 92-97%, transferred to phase 2 in stable condition via bel w/cna

## 2021-10-18 ENCOUNTER — HOSPITAL ENCOUNTER (EMERGENCY)
Facility: MEDICAL CENTER | Age: 61
End: 2021-10-18
Attending: EMERGENCY MEDICINE
Payer: COMMERCIAL

## 2021-10-18 ENCOUNTER — APPOINTMENT (OUTPATIENT)
Dept: RADIOLOGY | Facility: MEDICAL CENTER | Age: 61
End: 2021-10-18
Attending: EMERGENCY MEDICINE
Payer: COMMERCIAL

## 2021-10-18 VITALS
TEMPERATURE: 98.2 F | DIASTOLIC BLOOD PRESSURE: 74 MMHG | SYSTOLIC BLOOD PRESSURE: 136 MMHG | RESPIRATION RATE: 14 BRPM | HEIGHT: 70 IN | HEART RATE: 85 BPM | WEIGHT: 268.74 LBS | BODY MASS INDEX: 38.47 KG/M2 | OXYGEN SATURATION: 97 %

## 2021-10-18 DIAGNOSIS — T18.128A ESOPHAGEAL OBSTRUCTION DUE TO FOOD IMPACTION: ICD-10-CM
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
## 2021-10-18 PROCEDURE — 99285 EMERGENCY DEPT VISIT HI MDM: CPT

## 2021-10-18 PROCEDURE — 160208 HCHG ENDO MINUTES - EA ADDL 1 MIN LEVEL 4: Performed by: INTERNAL MEDICINE

## 2021-10-18 PROCEDURE — 160048 HCHG OR STATISTICAL LEVEL 1-5: Performed by: INTERNAL MEDICINE

## 2021-10-18 PROCEDURE — A9270 NON-COVERED ITEM OR SERVICE: HCPCS | Performed by: EMERGENCY MEDICINE

## 2021-10-18 PROCEDURE — 96374 THER/PROPH/DIAG INJ IV PUSH: CPT

## 2021-10-18 PROCEDURE — 700111 HCHG RX REV CODE 636 W/ 250 OVERRIDE (IP): Performed by: EMERGENCY MEDICINE

## 2021-10-18 PROCEDURE — 160203 HCHG ENDO MINUTES - 1ST 30 MINS LEVEL 4: Performed by: INTERNAL MEDICINE

## 2021-10-18 PROCEDURE — 71045 X-RAY EXAM CHEST 1 VIEW: CPT

## 2021-10-18 PROCEDURE — 700102 HCHG RX REV CODE 250 W/ 637 OVERRIDE(OP): Performed by: EMERGENCY MEDICINE

## 2021-10-18 RX ORDER — PROPOFOL 10 MG/ML
INJECTION, EMULSION INTRAVENOUS
Status: COMPLETED | OUTPATIENT
Start: 2021-10-18 | End: 2021-10-18

## 2021-10-18 RX ORDER — PROPOFOL 10 MG/ML
INJECTION, EMULSION INTRAVENOUS
Status: COMPLETED
Start: 2021-10-18 | End: 2021-10-18

## 2021-10-18 RX ORDER — NITROGLYCERIN 0.4 MG/1
0.4 TABLET SUBLINGUAL ONCE
Status: COMPLETED | OUTPATIENT
Start: 2021-10-18 | End: 2021-10-18

## 2021-10-18 RX ADMIN — PROPOFOL 50 MG: 10 INJECTION, EMULSION INTRAVENOUS at 07:22

## 2021-10-18 RX ADMIN — PROPOFOL 10 MG: 10 INJECTION, EMULSION INTRAVENOUS at 07:23

## 2021-10-18 RX ADMIN — PROPOFOL 60 MG: 10 INJECTION, EMULSION INTRAVENOUS at 07:39

## 2021-10-18 RX ADMIN — PROPOFOL 50 MG: 10 INJECTION, EMULSION INTRAVENOUS at 07:40

## 2021-10-18 RX ADMIN — PROPOFOL 30 MG: 10 INJECTION, EMULSION INTRAVENOUS at 07:42

## 2021-10-18 RX ADMIN — PROPOFOL 20 MG: 10 INJECTION, EMULSION INTRAVENOUS at 07:34

## 2021-10-18 RX ADMIN — PROPOFOL 50 MG: 10 INJECTION, EMULSION INTRAVENOUS at 07:31

## 2021-10-18 RX ADMIN — PROPOFOL 20 MG: 10 INJECTION, EMULSION INTRAVENOUS at 07:25

## 2021-10-18 RX ADMIN — NITROGLYCERIN 0.4 MG: 0.4 TABLET, ORALLY DISINTEGRATING SUBLINGUAL at 05:18

## 2021-10-18 RX ADMIN — PROPOFOL 30 MG: 10 INJECTION, EMULSION INTRAVENOUS at 07:26

## 2021-10-18 RX ADMIN — PROPOFOL 50 MG: 10 INJECTION, EMULSION INTRAVENOUS at 07:21

## 2021-10-18 RX ADMIN — PROPOFOL 30 MG: 10 INJECTION, EMULSION INTRAVENOUS at 07:28

## 2021-10-18 RX ADMIN — PROPOFOL 30 MG: 10 INJECTION, EMULSION INTRAVENOUS at 07:27

## 2021-10-18 RX ADMIN — PROPOFOL 50 MG: 10 INJECTION, EMULSION INTRAVENOUS at 07:46

## 2021-10-18 RX ADMIN — PROPOFOL 20 MG: 10 INJECTION, EMULSION INTRAVENOUS at 07:23

## 2021-10-18 ASSESSMENT — FIBROSIS 4 INDEX: FIB4 SCORE: 1.44

## 2021-10-18 NOTE — PROCEDURES
DATE OF PROCEDURE:  10/18/2021        PROCEDURE:  This is a EGD with foreign body removal report.     ENDOSCOPIST:  Estiven Ruiz MD     INDICATIONS:  Suspected esophageal foreign body.     MEDICATIONS:  The patient received deep sedation by  _____.  Please see   anesthesia flow sheets for details.     DESCRIPTION OF PROCEDURE:  The patient was informed in detail of the   indications as well as the risks, the benefits, the alternatives of the   procedure and he and his wife indicated their understanding and agreed to   proceed.  Consent is signed and placed on chart.  After the patient was deemed   adequately sedated, a flexible gastroscope was lubricated and gently placed   through a bite block over the top of his tongue down into his esophagus.  From   here, it was noted immediately there was saliva with food particles, as much   as possible it was suctioned.  We then traversed into the distal esophagus,   whereby a very large food impaction was observed.  We were unable to push this   through into the stomach and then using combination of Mays net and snare we   were able to eventually remove the offending bolus with the exception of small   amounts of food that were then pushed into the stomach.  The scope was then   traversed through the distal esophagus, whereby a distal esophageal stricture   was appreciated.  There was evidence of esophagitis.  The scope was then   traversed into the stomach and any residual fluid was thoroughly suctioned.    Retroflexion was performed.  The scope was then traversed into the duodenum   and a quick inspection revealed no abnormalities.  The scope was withdrawn   back into the stomach and then back again into the esophagus. After inspecting   the stomach, there was some evidence of mild gastritis.  No biopsies were   obtained as the patient was not tolerating the procedure overly well and we   were able to confirm that there was no additional food in the esophagus on    withdrawal.  Of note, given the need to repetitively grab pieces of food and   remove them through the pharynx some concern exists as to possibility of   aspiration.  However, post-procedure exam reveals no rales or rhonchi.  The   patient denies any complaints.     FINDINGS:    1.  Esophageal food impaction.  2.  Distal esophageal stricture.  Estimated diameter of 11 mm.     COMPLICATIONS:  None.     THERAPY:  Esophageal foreign body removal.     BIOPSIES: None.     IMPRESSION/PLAN/MEDICAL DECISION MAKIN.  Esophageal foreign body, now status post removal.  I have counseled   patient and his wife that he needs to chew food very carefully and swallow   very carefully and to follow up with Dr. Paez, his primary gastroenterologist   to arrange for additional dilations.  In addition, I discussed with him he   needs to stay on his omeprazole to make sure it takes it on a daily basis.  2.  Dysphagia, esophageal phase.  See discussion above.  3.  Sleep apnea.  4.  Obesity.  5.  Diabetes mellitus.              ______________________________  AMOL LYNN MD    EMO/SUP    DD:  10/18/2021 08:24  DT:  10/18/2021 09:17    Job#:  [id number]    CC:Kevin Chavez MD(User)

## 2021-10-18 NOTE — ED PROVIDER NOTES
"ED Provider Note    CHIEF COMPLAINT  Chief Complaint   Patient presents with   • Difficulty Swallowing   • Chest Pain     Pt believes food is trapped in esophagus       HPI  Cristino Keys is a 61 y.o. male who presents with chest pain.  Patient has a history of some sort of esophageal dysmotility issue.  He reports he gets food hung up in his esophagus frequently.  Typically it goes down for the most part.  He has had several upper endoscopies.  He tells me that he does not have a stricture or any mechanical problem, but usually endoscopy helps things.  He was eating some stew with carrots and meat.  It feels like this got stuck in his throat at 6 PM.  He has not been able to keep anything down since then and periodically throws up his saliva.  He has never had obstruction this bad before in the past.  This results in discomfort in his central lower chest.  He denies any abdominal pain, nausea vomiting but reports he was recently identified to have an abdominal hernia.  He has had bowel movements and has been passing gas.  He has no abdominal pain.    REVIEW OF SYSTEMS  As per HPI, otherwise a 10 point review of systems is negative    PAST MEDICAL HISTORY  Past Medical History:   Diagnosis Date   • Anesthesia 11/03/2017    PONV with shoulder surgery approx 20 years ago.   • Anxiety and depression 11/03/2017   • Arthritis 11/03/2017    Osteoarthritis, \" all over\"   • Bronchitis     childhood   • Bunion    • Cancer (HCC) 2017    skin lesion cut out, on left shoulder   • Chronic autoimmune thyroiditis      + US / + TPO = 57   • Dental disorder 11/03/2017    \"Upper Plate\"   • Diabetes mellitus (HCC) 11/03/2017    \"Controlled with diet & Victoza\"   • Gout    • Hammertoe    • High cholesterol 11/03/2017    HX OF, not currently on medication for.   • Hypertension    • Hypothyroidism     chronic thyroiditis   • MRSA infection    • Open toe wound 03/2018    Left big toe, open wound, started 2/2018, receiving wound care " "therapy two times a week   • Pain 2017    \"Pain all over except right hip & ankle\"   • Psychiatric problem     depression/anxiety   • Sleep apnea 2017    CPAP USE   • Snoring 2017    DX JULIAN   • Tonsillitis        Family history  No pertinent family medical history    SOCIAL HISTORY  Social History     Tobacco Use   • Smoking status: Former Smoker     Packs/day: 1.00     Years: 20.00     Pack years: 20.00     Types: Cigarettes     Quit date: 2014     Years since quittin.8   • Smokeless tobacco: Former User     Types: Chew     Quit date: 1997   Vaping Use   • Vaping Use: Never used   Substance Use Topics   • Alcohol use: Yes     Comment: occasional- approx once a week   • Drug use: No       SURGICAL HISTORY  Past Surgical History:   Procedure Laterality Date   • HETEROTOPIC OSSIFICATION Right 10/14/2021    Procedure: EXCISION, HETEROTOPIC ossification;  Surgeon: Emiliano Vang M.D.;  Location: [facility];  Service: Orthopedics   • METATARSAL HEAD RESECTION Left 2020    Procedure: EXCISION, METATARSAL BONE, HEAD- PARTIAL DISTAL 2/3 METATARSAL;  Surgeon: Haroldo Kang M.D.;  Location: [facility];  Service: Orthopedics   • HAMMERTOE CORRECTION Left 2020    Procedure: CORRECTION, HAMMER TOE- 4/5;  Surgeon: Haroldo Kang M.D.;  Location: [facility];  Service: Orthopedics   • TOENAIL REMOVAL Left 2020    Procedure: REMOVAL, TOENAIL 3-5;  Surgeon: Haroldo Kang M.D.;  Location: [facility];  Service: Orthopedics   • METATARSAL HEAD RESECTION Left 6/10/2019    Procedure: METATARSAL HEAD RESECTION- FOR PARTIAL EXCISION;  Surgeon: Haroldo Kang M.D.;  Location: [facility];  Service: Orthopedics   • JOINT INJECTION DIAGNOSTIC  6/10/2019    Procedure: INJECTION, JOINT, DIAGNOSTIC- MIDFOOT;  Surgeon: Haroldo Kang M.D.;  Location: [facility];  Service: Orthopedics   • TOE AMPUTATION Right " 6/10/2019    Procedure: AMPUTATION, TOE- FIRST TOE;  Surgeon: Haroldo Kang M.D.;  Location: [facility];  Service: Orthopedics   • ORTHOPEDIC OSTEOTOMY Left 10/15/2018    Procedure: ORTHOPEDIC OSTEOTOMY-  FOR: 3RD METATARSAL PARTIAL EXCISION, AND LEFT GASTROC SOLEUS RECESSION;  Surgeon: Haroldo Kang M.D.;  Location: [facility];  Service: Orthopedics   • HARDWARE REMOVAL ORTHO Right 10/15/2018    Procedure: HARDWARE REMOVAL ORTHO-  FOOT METATARSALPHALANGEAL JOINT PLATE;  Surgeon: Haroldo Kagn M.D.;  Location: [facility];  Service: Orthopedics   • TOE AMPUTATION Left 6/25/2018    Procedure: TOE AMPUTATION-FOR :PARTIAL 1ST DISTAL PHALANX EXCISION, GREAT TOE AMPUTATION;  Surgeon: Haroldo Kang M.D.;  Location: [facility];  Service: Orthopedics   • INTERNAL FIXATION REMOVAL Left 6/25/2018    Procedure: INTERNAL FIXATION REMOVAL;  Surgeon: Haroldo Kang M.D.;  Location: [facility];  Service: Orthopedics   • NERVE ULNAR TRANSFER Right 4/3/2018    Procedure: NERVE ULNAR TRANSFER - TRANSPOSITION;  Surgeon: Ayad Luna M.D.;  Location: [facility];  Service: Orthopedics   • CARPAL TUNNEL RELEASE Right 4/3/2018    Procedure: CARPAL TUNNEL RELEASE;  Surgeon: Ayad Luna M.D.;  Location: [facility];  Service: Orthopedics   • ELBOW ARTHROTOMY Right 4/3/2018    Procedure: ELBOW ARTHROTOMY - FOR CONTRACTURE RELEASE AT THE ELBOW, RADIAL HEAD EXCISION;  Surgeon: Ayad Luna M.D.;  Location: [facility];  Service: Orthopedics   • SHOULDER SURGERY Right 12/02/2017    reversal   • METATARSAL HEAD RESECTION Right 11/13/2017    Procedure: METATARSAL HEAD RESECTION - EXCISION;  Surgeon: Haroldo Kang M.D.;  Location: [facility];  Service: Orthopedics   • HAMMERTOE CORRECTION Right 11/13/2017    Procedure: HAMMERTOE CORRECTION 2-5;  Surgeon: Haroldo Kang M.D.;  Location: [facility]  [facility];  Service: Orthopedics   • TOE FUSION Right 11/13/2017    Procedure: TOE FUSION - 1ST METATARSALPHALANGEAL;  Surgeon: Haroldo Kang M.D.;  Location: [facility];  Service: Orthopedics   • METATARSAL HEAD RESECTION Left 8/21/2017    Procedure: METATARSAL HEAD RESECTION - 2-5;  Surgeon: Haroldo Kang M.D.;  Location: [facility];  Service:    • TOE FUSION Left 8/21/2017    Procedure: TOE FUSION - 1ST MTP;  Surgeon: Haroldo Kang M.D.;  Location: [facility];  Service:    • HARDWARE REMOVAL ORTHO Left 8/21/2017    Procedure: HARDWARE REMOVAL ORTHO;  Surgeon: Haroldo Kang M.D.;  Location: [facility];  Service:    • LUMBAR FUSION POSTERIOR  4/14/2017    Procedure: LUMBAR FUSION POSTERIOR - MINIMALLY INVASIVE TLIF L4-5;  Surgeon: Bossman Caro M.D.;  Location: [facility];  Service:    • HAMMERTOE CORRECTION Bilateral 12/22/2016    Procedure: HAMMERTOE CORRECTION/METATARSALPHALANGEAL JOINT RELEASE 2/3 RIGHT, 2-3 LEFT;  Surgeon: Haroldo Kang M.D.;  Location: [facility];  Service:    • BUNIONECTOMY Left 12/22/2016    Procedure: BUNIONECTOMY FOR DISTAL HALLUX VALGUS CORRECTION WITH BRANDY;  Surgeon: Haroldo Kang M.D.;  Location: [facility];  Service:    • ORTHOPEDIC OSTEOTOMY Left 12/22/2016    Procedure: ORTHOPEDIC OSTEOTOMY DISTAL METATARSAL 2-5;  Surgeon: Haroldo Kang M.D.;  Location: [facility];  Service:    • HIP ARTH ANTERIOR TOTAL Left 8/18/2016    Procedure: HIP ARTHROPLASTY ANTERIOR TOTAL;  Surgeon: Emiliano Vang M.D.;  Location: [facility];  Service:    • OTHER ORTHOPEDIC SURGERY  6/2014    right shoulder  arthroplasty   • OTHER ORTHOPEDIC SURGERY  11/1/2012    left knee/left ankle/left shoulder   • OTHER ORTHOPEDIC SURGERY  2004    elbow surgery   • OTHER  2000    dislocation left foot surgery at [facility]   • OTHER      \"septoplasty 20 years ago\"   • OTHER ORTHOPEDIC " "SURGERY      brad knee replaced   • OTHER ORTHOPEDIC SURGERY      left total hip       CURRENT MEDICATIONS  Home Medications    **Home medications have not yet been reviewed for this encounter**         ALLERGIES  Allergies   Allergen Reactions   • Demerol Vomiting and Nausea     Rxn = years ago    • Cubicin [Daptomycin] Vomiting   • Meperidine      2004-02-25;NA   • Sulfa Drugs Hives     .   • Sulfamethoxazole W-Trimethoprim Rash     2004-02-25;NA       PHYSICAL EXAM  VITAL SIGNS: BP (!) 178/101   Pulse 90   Temp 36.2 °C (97.2 °F) (Temporal)   Resp 19   Ht 1.778 m (5' 10\")   Wt 122 kg (268 lb 11.9 oz)   SpO2 93%   BMI 38.56 kg/m²    Constitutional: Awake and alert.  Holding an emesis basin frequently spitting into an  HENT: Normal inspection  Eyes: Normal inspection  Neck: Grossly normal range of motion.  Cardiovascular: Normal heart rate, Normal rhythm.  Symmetric peripheral pulses.   Thorax & Lungs: No respiratory distress, No wheezing, No rales, No rhonchi, No chest tenderness.   Abdomen: Diastases recti mostly in the upper abdomen.  No tenderness  Skin: No obvious rash.  Back: No tenderness, No CVA tenderness.   Extremities: No clubbing, cyanosis, edema, no Homans or cords.  Neurologic: Grossly normal   Psychiatric: Normal for situation    RADIOLOGY/PROCEDURES  DX-CHEST-PORTABLE (1 VIEW)   Final Result         1.  No acute cardiopulmonary disease.           Imaging is interpreted by radiologist      Medications   nitroglycerin (NITROSTAT) tablet 0.4 mg (0.4 mg Sublingual Given 10/18/21 0518)       Conscious Sedation Procedure Note    Indication: procedural pain management    Consent: I have discussed with the patient and/or the patient representative the indication, alternatives, and the possible risks and/or complications of the planned procedure and the anesthesia methods. The patient and/or patient representative appear to understand and agree to proceed.    Physician Involvement: The attending " physician was present and supervising this procedure.    Pre-Sedation Documentation and Exam: I have personally completed a history, physical exam & review of systems for this patient (see notes).  Airway Assessment: normal  f3  Prior History of Anesthesia Complications: none    ASA Classification: Class 2 - A normal healthy patient with mild systemic disease    Sedation/ Anesthesia Plan: intravenous sedation    Medications Used: propofol intravenously.  Patient required a total of 400 mg incrementally dosed.    Monitoring and Safety: The patient was placed on a cardiac monitor and vital signs, pulse oximetry and level of consciousness were continuously evaluated throughout the procedure. The patient was closely monitored until recovery from the medications was complete and the patient had returned to baseline status. Respiratory therapy was on standby at all times during the procedure.    Post-Sedation Vital Signs: Vital signs were reviewed and were stable after the procedure (see flow sheet for vitals)            Intraservice Time: 45 minutes    Post-Sedation Exam: Lungs: clear after cough and Cardiovascular: normal           Complications: Question aspiration    I provided both the sedation and procedure, a nurse was present at the bedside for the entire procedure.       COURSE & MEDICAL DECISION MAKING  Patient presents with esophageal meat impaction.  Obtain chest x-ray because of complaints of chest discomfort.  This was negative.  Tried nitroglycerin without relief.  I discussed case with Dr. Ruiz.  He will see the patient in the ER.    Dr. Ruiz evaluated the patient in the ER.  He requested sedation with propofol for removal of meat impaction.    I assisted with the procedure as above.  Sedation was prolonged and challenging.  Patient was significantly resistant to propofol.  There was some concern that the patient may have aspirated near the end of the procedure the small amount of meat came off of the  snare in the patient's pharynx during removal.  He was able to cough and I think most likely he swallowed the saliva however watchful waiting is required.  I discussed the procedure with the patient and wife.  He will return to the ER for any significant cough, difficulty breathing, chest pain, fevers or concern.  He will follow up with Dr. Paez as directed by Dr. Ruiz.    FINAL IMPRESSION  1.  Esophageal meat impaction  2.  Procedural sedation        This dictation was created using voice recognition software. The accuracy of the dictation is limited to the abilities of the software.  The nursing notes were reviewed and certain aspects of this information were incorporated into this note.      Electronically signed by: Vignesh Calabrese M.D., 10/18/2021 5:38 AM

## 2021-10-18 NOTE — ED TRIAGE NOTES
"Chief Complaint   Patient presents with   • Difficulty Swallowing   • Chest Pain     Pt believes food is trapped in esophagus     Pt states he was eating diner and believes he has food caught in his esophagus. Pt states this issue is chronic but tonight is much worse than normal. Pt is experiencing excess salvation and vomits any liquid he tries to drink. Pt reports pain to be 9/10 and feels chest tightness.     BP (!) 178/101   Pulse 90   Temp 36.2 °C (97.2 °F) (Temporal)   Resp 19   Ht 1.778 m (5' 10\")   Wt 122 kg (268 lb 11.9 oz)   SpO2 93%     "

## 2021-10-18 NOTE — CONSULTS
DATE OF SERVICE:  10/18/2021     GASTROENTEROLOGY CONSULTATION     REQUESTING PHYSICIAN:  Vignesh Calabrese MD     REASON FOR REQUEST:  Dysphagia and esophageal food impaction.     HISTORY OF PRESENT ILLNESS:  The patient is a very pleasant 61-year-old male   who presents now with a feeling as if there is food in his esophagus.  He   states that last night he was eating dinner and suddenly had problems   swallowing stew meat and carrots.  He feels it was a very small piece it got   stuck around 6:00 p.m. yesterday evening.  He has since then been unable to   keep anything down and has been throwing up accumulated saliva.  He has had   issues with difficulty swallowing for years and has had esophageal dilations   by Dr. Paez in the past.  Usually when he feels as if something is getting   stuck, he is able to forcibly cough or vomit the offending bolus up without   difficulties. This time, however, he was unable to.  He denies any abdominal   pain, no nausea.  There has been vomiting; however, again of saliva contents.    He is able to pass gas.  Denies any melena, no hematochezia.  No current   chest pain, shortness of breath.     REVIEW OF SYSTEMS:  Positive as noted above and in addition, he complains of   knee pain after a very recent knee replacement surgery.  Otherwise, complete   review of systems is negative.     PAST MEDICAL HISTORY:  GERD, diabetes mellitus, hyperlipidemia, obesity,   chronic back pain, anxiety, hypothyroidism, sleep apnea, hypertension.     PAST SURGICAL HISTORY:  Includes bilateral knee replacements, most recently   his right knee 5 days ago, right shoulder replacement, ankle, hip replacement.     MEDICATIONS:  Include alprazolam as needed, oxycodone, Victoza, venlafaxine,   temazepam daily, losartan, lamotrigine, Synthroid, gabapentin, and   cyclobenzaprine.     ALLERGIES:  HE HAS ALLERGIES TO DEMEROL, DAPTOMYCIN, SULFA DRUGS.     SOCIAL HISTORY:  He denies any smoking history.  Does not  use any drugs.  He   does drink alcohol 3-4 drinks 1-2 times per week.     FAMILY MEDICAL HISTORY:  Noncontributory.     PHYSICAL EXAMINATION:  VITAL SIGNS:  Blood pressure 167/85, respiratory rate of 22, heart rate 93,   satting 92% on room air.  GENERAL:  He is a pleasant, although obese white male, in mild distress.  HEENT:  Reveals that his extraocular movements are intact bilaterally.  His   sclerae are anicteric bilaterally.  Oral exam reveals a Mallampati 2 score   without any oral lesions.  CARDIOVASCULAR:  Borderline tachycardic without murmurs.  LUNGS:  Clear to auscultation bilaterally.  ABDOMEN:  Soft, nontender, nondistended.  No organomegaly or masses   appreciated.  There is evidence of obesity.  EXTREMITIES:  Evaluation reveals fresh suture line on his right anterior knee.    There is mild pitting edema in the right lower extremity, left reveals no   pitting edema.  SKIN:  Grossly free of rashes.     LABORATORY DATA:  No recent labs.     DIAGNOSTIC DATA:  There is a chest x-ray that shows no acute cardiopulmonary   disease.     IMPRESSION/PLAN/MEDICAL DECISION MAKIN.  Foreign body in esophagus.  Certainly very good story for retained meat   impaction in the esophagus.  I have discussed this in detail with the patient   and his wife the semi-urgency of getting this out and we will perform an EGD   in the emergency room. Dr. Calabrese will be administering propofol for sedation   as I do not think he would tolerate moderate sedation given benzodiazepine,   alcohol and narcotic use.  We went over the indications here as well as the   risks, benefits and alternatives, he would like to proceed.  We specifically   discussed concerns about aspiration given the fact that there is known food in   his upper GI tract as well as increased risk of perforation given the fact   that the bolus has been present now for over 12 hours.  He indicates   understanding and would like to proceed.  Consent is signed and  placed on the   chart.  Further recommendations to follow procedure.  2.  Dysphagia, esophageal phase.  3.  Diabetes mellitus.  4.  Obesity.        ______________________________  MD NOEL FUNEZ/DIPIKA    DD:  10/18/2021 08:18  DT:  10/18/2021 08:59    Job#:  [id number]    CC:MD Kevin OLEA MD(User)

## 2021-10-18 NOTE — ED NOTES
Pt provided discharge instructions, pt verbalizes understanding. Pt leaving ER in stable condition. Pt wheeled out to car.

## 2021-10-19 LAB
CHEMOTHERAPY INFUSION START DATE: NORMAL
CHEMOTHERAPY INFUSION STOP DATE: NORMAL
CHEMOTHERAPY RECORDS: 8
CHEMOTHERAPY RECORDS: 800
CHEMOTHERAPY RECORDS: NORMAL
CHEMOTHERAPY RX CANCER: NORMAL
DATE 1ST CHEMO CANCER: NORMAL
RAD ONC ARIA COURSE LAST TREATMENT DATE: NORMAL
RAD ONC ARIA COURSE TREATMENT ELAPSED DAYS: NORMAL
RAD ONC ARIA REFERENCE POINT DOSAGE GIVEN TO DATE: 8
RAD ONC ARIA REFERENCE POINT DOSAGE GIVEN TO DATE: 8
RAD ONC ARIA REFERENCE POINT ID: NORMAL
RAD ONC ARIA REFERENCE POINT ID: NORMAL

## 2021-11-26 DIAGNOSIS — E29.1 HYPOGONADISM IN MALE: ICD-10-CM

## 2021-11-29 RX ORDER — TESTOSTERONE ENANTHATE 75 MG/.5ML
INJECTION SUBCUTANEOUS
Qty: 6 ML | Refills: 2 | Status: SHIPPED | OUTPATIENT
Start: 2021-11-29 | End: 2022-06-14

## 2021-12-02 ENCOUNTER — OFFICE VISIT (OUTPATIENT)
Dept: SLEEP MEDICINE | Facility: MEDICAL CENTER | Age: 61
End: 2021-12-02
Payer: COMMERCIAL

## 2021-12-02 VITALS
RESPIRATION RATE: 16 BRPM | HEART RATE: 94 BPM | OXYGEN SATURATION: 96 % | DIASTOLIC BLOOD PRESSURE: 78 MMHG | BODY MASS INDEX: 38.94 KG/M2 | SYSTOLIC BLOOD PRESSURE: 140 MMHG | HEIGHT: 70 IN | WEIGHT: 272 LBS

## 2021-12-02 DIAGNOSIS — I10 HYPERTENSION, UNSPECIFIED TYPE: ICD-10-CM

## 2021-12-02 DIAGNOSIS — G47.34 NOCTURNAL HYPOXEMIA: ICD-10-CM

## 2021-12-02 DIAGNOSIS — Z87.891 FORMER SMOKER: ICD-10-CM

## 2021-12-02 DIAGNOSIS — G47.33 OSA (OBSTRUCTIVE SLEEP APNEA): ICD-10-CM

## 2021-12-02 PROCEDURE — 99214 OFFICE O/P EST MOD 30 MIN: CPT | Performed by: NURSE PRACTITIONER

## 2021-12-02 RX ORDER — OXYCODONE AND ACETAMINOPHEN 10; 325 MG/1; MG/1
1 TABLET ORAL EVERY 6 HOURS PRN
COMMUNITY
Start: 2021-11-10

## 2021-12-02 RX ORDER — LAMOTRIGINE 200 MG/1
TABLET ORAL
COMMUNITY
Start: 2021-10-30 | End: 2022-02-15

## 2021-12-02 RX ORDER — OMEPRAZOLE 20 MG/1
CAPSULE, DELAYED RELEASE ORAL
COMMUNITY
Start: 2021-11-15 | End: 2022-07-12

## 2021-12-02 ASSESSMENT — FIBROSIS 4 INDEX: FIB4 SCORE: 1.44

## 2021-12-02 NOTE — LETTER
JAYLON Tapia  [facility] Pulmonary Medicine   [address] [place] [address]  Phone: [phone] - Fax:             Encounter Date: 12/2/2021  Provider: JAYLON Tapia  Location of Care: [place] [facility] PULMONARY [facility]ICINE  [address] [facility] [place] [address]      Patient:   Cristino Keys   MR Number: [id number]   YOB: 1960     PROGRESS NOTE:  Chief Complaint   Patient presents with   • Apnea     last seen 9/24/2020       HPI:  Cristino Keys is a 61 y.o. year old male here today for follow-up on JULIAN.  Last OV 9/24/20 with Manjinder NICOLE     Currently using CPAP @ 14cm H20 nightly with 2LPM; RESMED; device obtained 2017.    CNOX 10/13/2020 indicated basal SPO2 of 87%, O2 patel 74% less than 90% for 79% of testing time.  Bleed in O2 @2LPM was added since last OV.    Compliance report 11/2/21 through 12/1/2021 indicates 100% compliance, average nightly use 9 hours 6 minutes, minimal mask leak with an overall AHI of 9.9/h.  Detailed report notes OAI of 5.5 and EAGLE 0.1.  RERA 4.5/h.  Reviewed with patient.  Patient's weight has not changed since last sleep study which is recorded at 270 pounds.  He is 272 pounds today.  He does have a history of multiple muscle relaxers and narcotic use due to history of chronic pain with percocet, lidocaine patches, gabapentin, flexeril and valium.  He also uses temazepam 30 mg nightly and Xanax 0.25 mg up to 4 times daily.  He is on daytime stimulants Adderall 15 mg.  He had a recent ER visit 10/18/21 for choking of meat and had it removed with significant swelling noted to area. He had f/u with GI and recommended updating EGD due to ongoing choking issues. He had previous stricture 1.5yrs ago stretched.   He also notes now sleeping on his back in recent months due to ongoing shoulder pain.  He is generally a side sleeper.  His wife has noted snoring through his CPAP at night.   "I reviewed with the patient that his recent changes in health and position of sleep is related to his findings on his report.  Will make empirical pressure adjustments in hopes of reducing his AHI.  He has a very good fitting mask at this time and is tolerating it and pressure well.  He denies any morning headaches and has good daytime energy levels.  He feels the addition of oxygen has significantly improved his energy. He feels he generally sleeps well.  He denies cardiac or respiratory symptoms today.    Sleep hx:  Overnight oximetry performed on 4/9/2020 while on ACPAP 10 to 15 cm water indicated patel saturation was 84%, his mean saturation was 86%, and his saturations less than 80% for 240 minutes of the flow evaluation time.     OPO on autoCPAP 10-20 cmH2O from 5/20/20 indicated the patient spent 46% of the night below SPO2 of 90%, to a patel of 76%. In laboratory CPAP titration polysomnogram advised for accurate calibration of airway pressures vs oxygen bleed in.     PSG titration from 6/30/20 indicated successful CPAP titration at 14 cm of water. Elevated periodic limb movements. CPAP was started at 10 cm of water and titrated to 14 with resultant AHI of 2.7 and mean oximetry 93%. There was a mean oxygen saturation of 91.0% with a minimum oxygen saturation of 83.0%. Time spent with oxygen saturations below 89% was 61.7 minutes.    PSG split-night 6/3/2017 noted mild sleep apnea with an overall AHI of 6.1/h and O2 patel of 85%.  Sleep efficiency of 83.8%.    ROS: As per HPI and otherwise negative if not stated.    Past Medical History:   Diagnosis Date   • Anesthesia 11/03/2017    PONV with shoulder surgery approx 20 years ago.   • Anxiety and depression 11/03/2017   • Arthritis 11/03/2017    Osteoarthritis, \" all over\"   • Bronchitis     childhood   • Bunion    • Cancer (HCC) 2017    skin lesion cut out, on left shoulder   • Chronic autoimmune thyroiditis      + US / + TPO = 57   • Dental disorder " "11/03/2017    \"Upper Plate\"   • Diabetes mellitus (HCC) 11/03/2017    \"Controlled with diet & Victoza\"   • Gout    • Hammertoe    • High cholesterol 11/03/2017    HX OF, not currently on medication for.   • Hypertension    • Hypothyroidism     chronic thyroiditis   • MRSA infection    • Open toe wound 03/2018    Left big toe, open wound, started 2/2018, receiving wound care therapy two times a week   • Pain 11/03/2017    \"Pain all over except right hip & ankle\"   • Psychiatric problem     depression/anxiety   • Sleep apnea 11/03/2017    CPAP USE   • Snoring 11/03/2017    DX JULIAN   • Tonsillitis        Past Surgical History:   Procedure Laterality Date   • PB UPPER GI ENDOSCOPY,REMOV F.B.  10/18/2021    Procedure: GASTROSCOPY, WITH FOREIGN BODY REMOVAL;  Surgeon: Estiven Ruiz M.D.;  Location: SURGERY SAME DAY [facility];  Service: Gastroenterology   • PB UPPER GI ENDOSCOPY,DIAGNOSIS  10/18/2021    Procedure: GASTROSCOPY;  Surgeon: Estiven Ruiz M.D.;  Location: SURGERY SAME DAY [facility];  Service: Gastroenterology   • HETEROTOPIC OSSIFICATION Right 10/14/2021    Procedure: EXCISION, HETEROTOPIC ossification;  Surgeon: Emiliano Vang M.D.;  Location: SURGERY [facility];  Service: Orthopedics   • METATARSAL HEAD RESECTION Left 7/20/2020    Procedure: EXCISION, METATARSAL BONE, HEAD- PARTIAL DISTAL 2/3 METATARSAL;  Surgeon: Haroldo Kang M.D.;  Location: [facility];  Service: Orthopedics   • HAMMERTOE CORRECTION Left 7/20/2020    Procedure: CORRECTION, HAMMER TOE- 4/5;  Surgeon: Haroldo Kang M.D.;  Location: SURGERY [facility];  Service: Orthopedics   • TOENAIL REMOVAL Left 7/20/2020    Procedure: REMOVAL, TOENAIL 3-5;  Surgeon: Haroldo Kang M.D.;  Location: SURGERY [facility];  Service: Orthopedics   • METATARSAL HEAD RESECTION Left 6/10/2019    Procedure: METATARSAL HEAD RESECTION- FOR PARTIAL EXCISION;  Surgeon: Haroldo Kang M.D.;  Location: [facility];  " Service: Orthopedics   • JOINT INJECTION DIAGNOSTIC  6/10/2019    Procedure: INJECTION, JOINT, DIAGNOSTIC- MIDFOOT;  Surgeon: Haroldo Kang M.D.;  Location: [facility];  Service: Orthopedics   • TOE AMPUTATION Right 6/10/2019    Procedure: AMPUTATION, TOE- FIRST TOE;  Surgeon: Haroldo Kang M.D.;  Location: [facility];  Service: Orthopedics   • ORTHOPEDIC OSTEOTOMY Left 10/15/2018    Procedure: ORTHOPEDIC OSTEOTOMY-  FOR: 3RD METATARSAL PARTIAL EXCISION, AND LEFT GASTROC SOLEUS RECESSION;  Surgeon: Haroldo Kang M.D.;  Location: [facility];  Service: Orthopedics   • HARDWARE REMOVAL ORTHO Right 10/15/2018    Procedure: HARDWARE REMOVAL ORTHO-  FOOT METATARSALPHALANGEAL JOINT PLATE;  Surgeon: Haroldo Kang M.D.;  Location: [facility];  Service: Orthopedics   • TOE AMPUTATION Left 6/25/2018    Procedure: TOE AMPUTATION-FOR :PARTIAL 1ST DISTAL PHALANX EXCISION, GREAT TOE AMPUTATION;  Surgeon: Haroldo Kang M.D.;  Location: [facility];  Service: Orthopedics   • INTERNAL FIXATION REMOVAL Left 6/25/2018    Procedure: INTERNAL FIXATION REMOVAL;  Surgeon: Haroldo Kang M.D.;  Location: [facility];  Service: Orthopedics   • NERVE ULNAR TRANSFER Right 4/3/2018    Procedure: NERVE ULNAR TRANSFER - TRANSPOSITION;  Surgeon: Ayad Luna M.D.;  Location: [facility];  Service: Orthopedics   • CARPAL TUNNEL RELEASE Right 4/3/2018    Procedure: CARPAL TUNNEL RELEASE;  Surgeon: Ayad Luna M.D.;  Location: [facility];  Service: Orthopedics   • ELBOW ARTHROTOMY Right 4/3/2018    Procedure: ELBOW ARTHROTOMY - FOR CONTRACTURE RELEASE AT THE ELBOW, RADIAL HEAD EXCISION;  Surgeon: Ayad Luna M.D.;  Location: [facility];  Service: Orthopedics   • SHOULDER SURGERY Right 12/02/2017    reversal   • METATARSAL HEAD RESECTION Right 11/13/2017    Procedure: METATARSAL HEAD RESECTION - EXCISION;   Surgeon: Haroldo Kang M.D.;  Location: [facility];  Service: Orthopedics   • HAMMERTOE CORRECTION Right 11/13/2017    Procedure: HAMMERTOE CORRECTION 2-5;  Surgeon: Haroldo Kang M.D.;  Location: SURGERY [facility];  Service: Orthopedics   • TOE FUSION Right 11/13/2017    Procedure: TOE FUSION - 1ST METATARSALPHALANGEAL;  Surgeon: Haroldo Kang M.D.;  Location: SURGERY [facility];  Service: Orthopedics   • METATARSAL HEAD RESECTION Left 8/21/2017    Procedure: METATARSAL HEAD RESECTION - 2-5;  Surgeon: Haroldo Kang M.D.;  Location: SURGERY [facility];  Service:    • TOE FUSION Left 8/21/2017    Procedure: TOE FUSION - 1ST MTP;  Surgeon: Haroldo Kang M.D.;  Location: [facility];  Service:    • HARDWARE REMOVAL ORTHO Left 8/21/2017    Procedure: HARDWARE REMOVAL ORTHO;  Surgeon: Haroldo Kang M.D.;  Location: SURGERY [facility];  Service:    • LUMBAR FUSION POSTERIOR  4/14/2017    Procedure: LUMBAR FUSION POSTERIOR - MINIMALLY INVASIVE TLIF L4-5;  Surgeon: Bossman Caro M.D.;  Location: [facility];  Service:    • HAMMERTOE CORRECTION Bilateral 12/22/2016    Procedure: HAMMERTOE CORRECTION/METATARSALPHALANGEAL JOINT RELEASE 2/3 RIGHT, 2-3 LEFT;  Surgeon: Haroldo Kang M.D.;  Location: [facility];  Service:    • BUNIONECTOMY Left 12/22/2016    Procedure: BUNIONECTOMY FOR DISTAL HALLUX VALGUS CORRECTION WITH BRANDY;  Surgeon: Haroldo Kang M.D.;  Location: [facility];  Service:    • ORTHOPEDIC OSTEOTOMY Left 12/22/2016    Procedure: ORTHOPEDIC OSTEOTOMY DISTAL METATARSAL 2-5;  Surgeon: Haroldo Kang M.D.;  Location: SURGERY [facility];  Service:    • HIP ARTH ANTERIOR TOTAL Left 8/18/2016    Procedure: HIP ARTHROPLASTY ANTERIOR TOTAL;  Surgeon: Emiliano Vang M.D.;  Location: [facility];  Service:    • OTHER ORTHOPEDIC SURGERY  6/2014    right shoulder  arthroplasty   • OTHER  "ORTHOPEDIC SURGERY  2012    left knee/left ankle/left shoulder   • OTHER ORTHOPEDIC SURGERY  2004    elbow surgery   • OTHER  2000    dislocation left foot surgery at [facility]   • OTHER      \"septoplasty 20 years ago\"   • OTHER ORTHOPEDIC SURGERY      brad knee replaced   • OTHER ORTHOPEDIC SURGERY      left total hip       Family History   Problem Relation Age of Onset   • Heart Disease Mother    • Depression Mother    • Cancer Father         Colon cancer    • Cancer Sister         Skin cancer   • Sleep Apnea Neg Hx        Social History     Socioeconomic History   • Marital status:      Spouse name: Not on file   • Number of children: Not on file   • Years of education: Not on file   • Highest education level: Not on file   Occupational History   • Not on file   Tobacco Use   • Smoking status: Former Smoker     Packs/day: 1.00     Years: 20.00     Pack years: 20.00     Types: Cigarettes     Quit date: 2014     Years since quittin.9   • Smokeless tobacco: Former User     Types: Chew     Quit date: 1997   Vaping Use   • Vaping Use: Never used   Substance and Sexual Activity   • Alcohol use: Yes     Comment: occasional- approx once a week   • Drug use: No   • Sexual activity: Not on file   Other Topics Concern   • Not on file   Social History Narrative   • Not on file     Social Determinants of Health     Financial Resource Strain:    • Difficulty of Paying Living Expenses: Not on file   Food Insecurity:    • Worried About Running Out of Food in the Last Year: Not on file   • Ran Out of Food in the Last Year: Not on file   Transportation Needs:    • Lack of Transportation (Medical): Not on file   • Lack of Transportation (Non-Medical): Not on file   Physical Activity:    • Days of Exercise per Week: Not on file   • Minutes of Exercise per Session: Not on file   Stress:    • Feeling of Stress : Not on file   Social Connections:    • Frequency of Communication with Friends and Family: Not on file   • " "Frequency of Social Gatherings with Friends and Family: Not on file   • Attends Latter day Services: Not on file   • Active Member of Clubs or Organizations: Not on file   • Attends Club or Organization Meetings: Not on file   • Marital Status: Not on file   Intimate Partner Violence:    • Fear of Current or Ex-Partner: Not on file   • Emotionally Abused: Not on file   • Physically Abused: Not on file   • Sexually Abused: Not on file   Housing Stability:    • Unable to Pay for Housing in the Last Year: Not on file   • Number of Places Lived in the Last Year: Not on file   • Unstable Housing in the Last Year: Not on file       Allergies as of 12/02/2021 - Reviewed 12/02/2021   Allergen Reaction Noted   • Demerol Vomiting and Nausea 09/05/2014   • Cubicin [daptomycin] Vomiting 09/11/2018   • Meperidine  02/25/2004   • Sulfa drugs Hives 03/31/2017   • Sulfamethoxazole w-trimethoprim Rash 02/25/2004        Vitals:  /78 (BP Location: Left arm, Patient Position: Sitting, BP Cuff Size: Adult)   Pulse 94   Resp 16   Ht 1.778 m (5' 10\")   Wt 123 kg (272 lb)   SpO2 96%     Current medications as of today   Current Outpatient Medications   Medication Sig Dispense Refill   • VALACYCLOVIR HCL PO Take  by mouth.     • diazePAM (VALIUM) 5 MG Tab TAKE 1 TABLET BY MOUTH 30 MINUTES PRIOR TO CHECK IN APPOINTMENT     • amphetamine-dextroamphetamine (ADDERALL) 15 MG tablet TAKE 1 TABLET BY MOUTH IN THE MORNING FOR 28 DAYS     • XYOSTED 75 MG/0.5ML Solution Auto-injector INJECT 75MG UNDER THE SKIN EVERY 7 DAYS FOR 12 DOSES 6 mL 2   • gabapentin (NEURONTIN) 300 MG Cap TAKE 1 CAPSULE AT BEDTIME 90 Capsule 2   • polyethylene glycol/lytes (MIRALAX) 17 g Pack Take 1 Packet by mouth every day. 20 Each 3   • celecoxib (CELEBREX) 200 MG Cap Take 1 Capsule by mouth 2 times a day as needed for Moderate Pain. 60 Capsule 1   • acetaminophen (TYLENOL) 500 MG Tab Take 1-2 Tablets by mouth every 6 hours as needed. 30 Tablet 0   • ACCU-CHEK " "GUIDE strip      • ipratropium (ATROVENT) 0.06 % Solution      • Accu-Chek FastClix Lancets Misc      • PENNSAID 2 % Solution      • lidocaine (LIDODERM) 5 % Patch      • tadalafil (CIALIS) 20 MG tablet      • Venlafaxine HCl 75 MG TABLET SR 24 HR      • levothyroxine (SYNTHROID) 137 MCG Tab Take 1 Tablet by mouth every morning on an empty stomach. 90 Tablet 3   • naproxen (NAPROSYN) 500 MG Tab      • SYRINGE-NEEDLE, DISP, 3 ML (LUER LOCK SAFETY SYRINGES) 22G X 1-1/2\" 3 ML Misc 1 Each by Does not apply route every 7 days. 12 Each 3   • liraglutide (VICTOZA) 18 MG/3ML Solution Pen-injector Inject 1.8 mg as instructed every day.     • hydrOXYzine pamoate (VISTARIL) 50 MG Cap Take 50 mg by mouth 2 Times a Day.     • azelastine (ASTELIN) 137 MCG/SPRAY nasal spray [place] 1 Spray in nose 2 Times a Day.     • NOVOFINE PLUS Inject 1 Application as instructed 4 Times a Day,Before Meals and at Bedtime. Use before meals and at bedtime to check blood sugar.     • lamoTRIgine (LAMICTAL) 100 MG Tab Take 100 mg by mouth every day.     • cyclobenzaprine (FLEXERIL) 10 MG Tab Take 10 mg by mouth 3 times a day.  5   • tamsulosin (FLOMAX) 0.4 MG capsule Take 0.4 mg by mouth ONE-HALF HOUR AFTER BREAKFAST.     • temazepam (RESTORIL) 30 MG capsule Take 30 mg by mouth at bedtime as needed for Sleep.     • alprazolam (XANAX) 0.25 MG Tab Take 0.25-0.5 mg by mouth 4 times a day as needed for Anxiety.     • losartan (COZAAR) 100 MG Tab Take 100 mg by mouth every morning.       No current facility-administered medications for this visit.         Physical Exam:   Gen:           Alert and oriented, No apparent distress. Mood and affect appropriate, normal interaction with examiner.  Eyes:          PERRL, EOM intact, sclere white, conjunctive moist.  Ears:          Not examined.   Hearing:     Grossly intact.  Nose:          Normal, no lesions or deformities.  Dentition:    mask  Oropharynx:   mask  Mallampati Classification: mask  Neck:      "   Supple, trachea midline, no masses.  Respiratory Effort: No intercostal retractions or use of accessory muscles.   Lung Auscultation:      Clear to auscultation bilaterally; no rales, rhonchi or wheezing.  CV:            Regular rate and rhythm. No murmurs, rubs or gallops.  Abd:           Not examined.   Lymphadenopathy: Not examined.  Gait and Station: Normal.  Digits and Nails: No clubbing, cyanosis, petechiae, or nodes.   Cranial Nerves: II-XII grossly intact.  Skin:        No rashes, lesions or ulcers noted.               Ext:           No cyanosis or edema.      Assessment:  1. JULIAN (obstructive sleep apnea)     2. Nocturnal hypoxemia     3. Hypertension, unspecified type     4. BMI 39.0-39.9,adult  Height And Weight   5. Former smoker         Immunizations:    Flu:10/25/21  Pneumovax 23:not due  Prevnar 13:2020  COVID-19: 11/17/21, 4/5/21, 3/3/21    Plan:  1.  Patient's AHI has elevated since recent swelling of his esophagus and back lying position.  We will make empirical pressure adjustments to his CPAP to auto CPAP 14 to 18 cm.  He will continue bleed and nocturnal oxygen therapy as well.  DME mask/supplies  DME other; adjustment to auto CPAP 14-18 cm  CNOX on Pap/2 L/min O2 bleed in prior to next office visit; pending results make further adjustments in therapy.  2.  Discussed sleep hygiene.  3.  Encourage weight loss through diet and exercise  4.  For primary care for other health concerns including ongoing management of hypertension  5.  Follow-up in 2 months for review of compliance and CNOX results, sooner if needed.    Please note that this dictation was created using voice recognition software. I have made every reasonable attempt to correct obvious errors, but it is possible there are errors of grammar and possibly content that I did not discover before finalizing the note.        Electronically signed by DON [email or web address]  on 12/02/21    [email or web address].  [id number]  [place]  [address]  [place] [id number]  Via Fax: [phone]     Kevin Chavez M.D.  [address] [address]  [place] [address]  Via Fax: [phone]

## 2021-12-02 NOTE — PROGRESS NOTES
Chief Complaint   Patient presents with   • Apnea     last seen 9/24/2020       HPI:  Cristino Keys is a 61 y.o. year old male here today for follow-up on JULIAN.  Last OV 9/24/20 with Manjinder NICOLE     Currently using CPAP @ 14cm H20 nightly with 2LPM; RESMED; device obtained 2017.    CNOX 10/13/2020 indicated basal SPO2 of 87%, O2 patel 74% less than 90% for 79% of testing time.  Bleed in O2 @2LPM was added since last OV.    Compliance report 11/2/21 through 12/1/2021 indicates 100% compliance, average nightly use 9 hours 6 minutes, minimal mask leak with an overall AHI of 9.9/h.  Detailed report notes OAI of 5.5 and EAGLE 0.1.  RERA 4.5/h.  Reviewed with patient.  Patient's weight has not changed since last sleep study which is recorded at 270 pounds.  He is 272 pounds today.  He does have a history of multiple muscle relaxers and narcotic use due to history of chronic pain with percocet, lidocaine patches, gabapentin, flexeril and valium.  He also uses temazepam 30 mg nightly and Xanax 0.25 mg up to 4 times daily.  He is on daytime stimulants Adderall 15 mg.  He had a recent ER visit 10/18/21 for choking of meat and had it removed with significant swelling noted to area. He had f/u with GI and recommended updating EGD due to ongoing choking issues. He had previous stricture 1.5yrs ago stretched.   He also notes now sleeping on his back in recent months due to ongoing shoulder pain.  He is generally a side sleeper.  His wife has noted snoring through his CPAP at night.  I reviewed with the patient that his recent changes in health and position of sleep is related to his findings on his report.  Will make empirical pressure adjustments in hopes of reducing his AHI.  He has a very good fitting mask at this time and is tolerating it and pressure well.  He denies any morning headaches and has good daytime energy levels.  He feels the addition of oxygen has significantly improved his energy. He feels he generally sleeps  "well.  He denies cardiac or respiratory symptoms today.    Sleep hx:  Overnight oximetry performed on 4/9/2020 while on ACPAP 10 to 15 cm water indicated patel saturation was 84%, his mean saturation was 86%, and his saturations less than 80% for 240 minutes of the flow evaluation time.     OPO on autoCPAP 10-20 cmH2O from 5/20/20 indicated the patient spent 46% of the night below SPO2 of 90%, to a patel of 76%. In laboratory CPAP titration polysomnogram advised for accurate calibration of airway pressures vs oxygen bleed in.     PSG titration from 6/30/20 indicated successful CPAP titration at 14 cm of water. Elevated periodic limb movements. CPAP was started at 10 cm of water and titrated to 14 with resultant AHI of 2.7 and mean oximetry 93%. There was a mean oxygen saturation of 91.0% with a minimum oxygen saturation of 83.0%. Time spent with oxygen saturations below 89% was 61.7 minutes.    PSG split-night 6/3/2017 noted mild sleep apnea with an overall AHI of 6.1/h and O2 patel of 85%.  Sleep efficiency of 83.8%.    ROS: As per HPI and otherwise negative if not stated.    Past Medical History:   Diagnosis Date   • Anesthesia 11/03/2017    PONV with shoulder surgery approx 20 years ago.   • Anxiety and depression 11/03/2017   • Arthritis 11/03/2017    Osteoarthritis, \" all over\"   • Bronchitis     childhood   • Bunion    • Cancer (HCC) 2017    skin lesion cut out, on left shoulder   • Chronic autoimmune thyroiditis      + US / + TPO = 57   • Dental disorder 11/03/2017    \"Upper Plate\"   • Diabetes mellitus (HCC) 11/03/2017    \"Controlled with diet & Victoza\"   • Gout    • Hammertoe    • High cholesterol 11/03/2017    HX OF, not currently on medication for.   • Hypertension    • Hypothyroidism     chronic thyroiditis   • MRSA infection    • Open toe wound 03/2018    Left big toe, open wound, started 2/2018, receiving wound care therapy two times a week   • Pain 11/03/2017    \"Pain all over except right hip & " "ankle\"   • Psychiatric problem     depression/anxiety   • Sleep apnea 11/03/2017    CPAP USE   • Snoring 11/03/2017    DX JULIAN   • Tonsillitis        Past Surgical History:   Procedure Laterality Date   • PB UPPER GI ENDOSCOPY,REMOV F.B.  10/18/2021    Procedure: GASTROSCOPY, WITH FOREIGN BODY REMOVAL;  Surgeon: Estiven Ruiz M.D.;  Location: SURGERY SAME DAY [facility];  Service: Gastroenterology   • PB UPPER GI ENDOSCOPY,DIAGNOSIS  10/18/2021    Procedure: GASTROSCOPY;  Surgeon: Estiven Ruiz M.D.;  Location: SURGERY SAME DAY [facility];  Service: Gastroenterology   • HETEROTOPIC OSSIFICATION Right 10/14/2021    Procedure: EXCISION, HETEROTOPIC ossification;  Surgeon: Emiliano Vang M.D.;  Location: [facility];  Service: Orthopedics   • METATARSAL HEAD RESECTION Left 7/20/2020    Procedure: EXCISION, METATARSAL BONE, HEAD- PARTIAL DISTAL 2/3 METATARSAL;  Surgeon: Haroldo Kang M.D.;  Location: [facility];  Service: Orthopedics   • HAMMERTOE CORRECTION Left 7/20/2020    Procedure: CORRECTION, HAMMER TOE- 4/5;  Surgeon: Haroldo Kang M.D.;  Location: [facility];  Service: Orthopedics   • TOENAIL REMOVAL Left 7/20/2020    Procedure: REMOVAL, TOENAIL 3-5;  Surgeon: Haroldo Kang M.D.;  Location: [facility];  Service: Orthopedics   • METATARSAL HEAD RESECTION Left 6/10/2019    Procedure: METATARSAL HEAD RESECTION- FOR PARTIAL EXCISION;  Surgeon: Haroldo Kang M.D.;  Location: [facility];  Service: Orthopedics   • JOINT INJECTION DIAGNOSTIC  6/10/2019    Procedure: INJECTION, JOINT, DIAGNOSTIC- MIDFOOT;  Surgeon: Haroldo Kang M.D.;  Location: [facility];  Service: Orthopedics   • TOE AMPUTATION Right 6/10/2019    Procedure: AMPUTATION, TOE- FIRST TOE;  Surgeon: Haroldo Kang M.D.;  Location: [facility];  Service: Orthopedics   • ORTHOPEDIC OSTEOTOMY Left 10/15/2018    Procedure: ORTHOPEDIC OSTEOTOMY-  FOR: 3RD " METATARSAL PARTIAL EXCISION, AND LEFT GASTROC SOLEUS RECESSION;  Surgeon: Haroldo Kang M.D.;  Location: [facility];  Service: Orthopedics   • HARDWARE REMOVAL ORTHO Right 10/15/2018    Procedure: HARDWARE REMOVAL ORTHO-  FOOT METATARSALPHALANGEAL JOINT PLATE;  Surgeon: Haroldo Kang M.D.;  Location: [facility];  Service: Orthopedics   • TOE AMPUTATION Left 6/25/2018    Procedure: TOE AMPUTATION-FOR :PARTIAL 1ST DISTAL PHALANX EXCISION, GREAT TOE AMPUTATION;  Surgeon: aHroldo Kang M.D.;  Location: [facility];  Service: Orthopedics   • INTERNAL FIXATION REMOVAL Left 6/25/2018    Procedure: INTERNAL FIXATION REMOVAL;  Surgeon: Haroldo Kang M.D.;  Location: [facility];  Service: Orthopedics   • NERVE ULNAR TRANSFER Right 4/3/2018    Procedure: NERVE ULNAR TRANSFER - TRANSPOSITION;  Surgeon: Ayad Luna M.D.;  Location: [facility];  Service: Orthopedics   • CARPAL TUNNEL RELEASE Right 4/3/2018    Procedure: CARPAL TUNNEL RELEASE;  Surgeon: Ayad Luna M.D.;  Location: [facility];  Service: Orthopedics   • ELBOW ARTHROTOMY Right 4/3/2018    Procedure: ELBOW ARTHROTOMY - FOR CONTRACTURE RELEASE AT THE ELBOW, RADIAL HEAD EXCISION;  Surgeon: Ayad Luna M.D.;  Location: [facility];  Service: Orthopedics   • SHOULDER SURGERY Right 12/02/2017    reversal   • METATARSAL HEAD RESECTION Right 11/13/2017    Procedure: METATARSAL HEAD RESECTION - EXCISION;  Surgeon: Haroldo Kang M.D.;  Location: [facility];  Service: Orthopedics   • HAMMERTOE CORRECTION Right 11/13/2017    Procedure: HAMMERTOE CORRECTION 2-5;  Surgeon: Haroldo Kang M.D.;  Location: [facility];  Service: Orthopedics   • TOE FUSION Right 11/13/2017    Procedure: TOE FUSION - 1ST METATARSALPHALANGEAL;  Surgeon: Haroldo Kang M.D.;  Location: [facility];  Service: Orthopedics   • METATARSAL HEAD  "RESECTION Left 8/21/2017    Procedure: METATARSAL HEAD RESECTION - 2-5;  Surgeon: Haroldo Kang M.D.;  Location: SURGERY [facility];  Service:    • TOE FUSION Left 8/21/2017    Procedure: TOE FUSION - 1ST MTP;  Surgeon: Haroldo Kang M.D.;  Location: SURGERY [facility];  Service:    • HARDWARE REMOVAL ORTHO Left 8/21/2017    Procedure: HARDWARE REMOVAL ORTHO;  Surgeon: Haroldo Kang M.D.;  Location: SURGERY [facility];  Service:    • LUMBAR FUSION POSTERIOR  4/14/2017    Procedure: LUMBAR FUSION POSTERIOR - MINIMALLY INVASIVE TLIF L4-5;  Surgeon: Bossman Caro M.D.;  Location: [facility];  Service:    • HAMMERTOE CORRECTION Bilateral 12/22/2016    Procedure: HAMMERTOE CORRECTION/METATARSALPHALANGEAL JOINT RELEASE 2/3 RIGHT, 2-3 LEFT;  Surgeon: Haroldo Kang M.D.;  Location: [facility];  Service:    • BUNIONECTOMY Left 12/22/2016    Procedure: BUNIONECTOMY FOR DISTAL HALLUX VALGUS CORRECTION WITH BRANDY;  Surgeon: Haroldo Kang M.D.;  Location: SURGERY [facility];  Service:    • ORTHOPEDIC OSTEOTOMY Left 12/22/2016    Procedure: ORTHOPEDIC OSTEOTOMY DISTAL METATARSAL 2-5;  Surgeon: Haroldo Kang M.D.;  Location: [facility];  Service:    • HIP ARTH ANTERIOR TOTAL Left 8/18/2016    Procedure: HIP ARTHROPLASTY ANTERIOR TOTAL;  Surgeon: Emiliano Vang M.D.;  Location: [facility];  Service:    • OTHER ORTHOPEDIC SURGERY  6/2014    right shoulder  arthroplasty   • OTHER ORTHOPEDIC SURGERY  11/1/2012    left knee/left ankle/left shoulder   • OTHER ORTHOPEDIC SURGERY  2004    elbow surgery   • OTHER  2000    dislocation left foot surgery at [facility]   • OTHER      \"septoplasty 20 years ago\"   • OTHER ORTHOPEDIC SURGERY      brad knee replaced   • OTHER ORTHOPEDIC SURGERY      left total hip       Family History   Problem Relation Age of Onset   • Heart Disease Mother    • Depression Mother    • Cancer Father         Colon cancer    • " Cancer Sister         Skin cancer   • Sleep Apnea Neg Hx        Social History     Socioeconomic History   • Marital status:      Spouse name: Not on file   • Number of children: Not on file   • Years of education: Not on file   • Highest education level: Not on file   Occupational History   • Not on file   Tobacco Use   • Smoking status: Former Smoker     Packs/day: 1.00     Years: 20.00     Pack years: 20.00     Types: Cigarettes     Quit date: 2014     Years since quittin.9   • Smokeless tobacco: Former User     Types: Chew     Quit date: 1997   Vaping Use   • Vaping Use: Never used   Substance and Sexual Activity   • Alcohol use: Yes     Comment: occasional- approx once a week   • Drug use: No   • Sexual activity: Not on file   Other Topics Concern   • Not on file   Social History Narrative   • Not on file     Social Determinants of Health     Financial Resource Strain:    • Difficulty of Paying Living Expenses: Not on file   Food Insecurity:    • Worried About Running Out of Food in the Last Year: Not on file   • Ran Out of Food in the Last Year: Not on file   Transportation Needs:    • Lack of Transportation (Medical): Not on file   • Lack of Transportation (Non-Medical): Not on file   Physical Activity:    • Days of Exercise per Week: Not on file   • Minutes of Exercise per Session: Not on file   Stress:    • Feeling of Stress : Not on file   Social Connections:    • Frequency of Communication with Friends and Family: Not on file   • Frequency of Social Gatherings with Friends and Family: Not on file   • Attends [employer] Services: Not on file   • Active Member of Clubs or Organizations: Not on file   • Attends Club or Organization Meetings: Not on file   • Marital Status: Not on file   Intimate Partner Violence:    • Fear of Current or Ex-Partner: Not on file   • Emotionally Abused: Not on file   • Physically Abused: Not on file   • Sexually Abused: Not on file   Housing Stability:    •  "Unable to Pay for Housing in the Last Year: Not on file   • Number of Places Lived in the Last Year: Not on file   • Unstable Housing in the Last Year: Not on file       Allergies as of 12/02/2021 - Reviewed 12/02/2021   Allergen Reaction Noted   • Demerol Vomiting and Nausea 09/05/2014   • Cubicin [daptomycin] Vomiting 09/11/2018   • Meperidine  02/25/2004   • Sulfa drugs Hives 03/31/2017   • Sulfamethoxazole w-trimethoprim Rash 02/25/2004        Vitals:  /78 (BP Location: Left arm, Patient Position: Sitting, BP Cuff Size: Adult)   Pulse 94   Resp 16   Ht 1.778 m (5' 10\")   Wt 123 kg (272 lb)   SpO2 96%     Current medications as of today   Current Outpatient Medications   Medication Sig Dispense Refill   • VALACYCLOVIR HCL PO Take  by mouth.     • diazePAM (VALIUM) 5 MG Tab TAKE 1 TABLET BY MOUTH 30 MINUTES PRIOR TO CHECK IN APPOINTMENT     • amphetamine-dextroamphetamine (ADDERALL) 15 MG tablet TAKE 1 TABLET BY MOUTH IN THE MORNING FOR 28 DAYS     • XYOSTED 75 MG/0.5ML Solution Auto-injector INJECT 75MG UNDER THE SKIN EVERY 7 DAYS FOR 12 DOSES 6 mL 2   • gabapentin (NEURONTIN) 300 MG Cap TAKE 1 CAPSULE AT BEDTIME 90 Capsule 2   • polyethylene glycol/lytes (MIRALAX) 17 g Pack Take 1 Packet by mouth every day. 20 Each 3   • celecoxib (CELEBREX) 200 MG Cap Take 1 Capsule by mouth 2 times a day as needed for Moderate Pain. 60 Capsule 1   • acetaminophen (TYLENOL) 500 MG Tab Take 1-2 Tablets by mouth every 6 hours as needed. 30 Tablet 0   • ACCU-CHEK GUIDE strip      • ipratropium (ATROVENT) 0.06 % Solution      • Accu-Chek FastClix Lancets Misc      • PENNSAID 2 % Solution      • lidocaine (LIDODERM) 5 % Patch      • tadalafil (CIALIS) 20 MG tablet      • Venlafaxine HCl 75 MG TABLET SR 24 HR      • levothyroxine (SYNTHROID) 137 MCG Tab Take 1 Tablet by mouth every morning on an empty stomach. 90 Tablet 3   • naproxen (NAPROSYN) 500 MG Tab      • SYRINGE-NEEDLE, DISP, 3 ML (LUER LOCK SAFETY SYRINGES) 22G X " "1-1/2\" 3 ML Misc 1 Each by Does not apply route every 7 days. 12 Each 3   • liraglutide (VICTOZA) 18 MG/3ML Solution Pen-injector Inject 1.8 mg as instructed every day.     • hydrOXYzine pamoate (VISTARIL) 50 MG Cap Take 50 mg by mouth 2 Times a Day.     • azelastine (ASTELIN) 137 MCG/SPRAY nasal spray [place] 1 Spray in nose 2 Times a Day.     • NOVOFINE PLUS Inject 1 Application as instructed 4 Times a Day,Before Meals and at Bedtime. Use before meals and at bedtime to check blood sugar.     • lamoTRIgine (LAMICTAL) 100 MG Tab Take 100 mg by mouth every day.     • cyclobenzaprine (FLEXERIL) 10 MG Tab Take 10 mg by mouth 3 times a day.  5   • tamsulosin (FLOMAX) 0.4 MG capsule Take 0.4 mg by mouth ONE-HALF HOUR AFTER BREAKFAST.     • temazepam (RESTORIL) 30 MG capsule Take 30 mg by mouth at bedtime as needed for Sleep.     • alprazolam (XANAX) 0.25 MG Tab Take 0.25-0.5 mg by mouth 4 times a day as needed for Anxiety.     • losartan (COZAAR) 100 MG Tab Take 100 mg by mouth every morning.       No current facility-administered medications for this visit.         Physical Exam:   Gen:           Alert and oriented, No apparent distress. Mood and affect appropriate, normal interaction with examiner.  Eyes:          PERRL, EOM intact, sclere white, conjunctive moist.  Ears:          Not examined.   Hearing:     Grossly intact.  Nose:          Normal, no lesions or deformities.  Dentition:    mask  Oropharynx:   mask  Mallampati Classification: mask  Neck:        Supple, trachea midline, no masses.  Respiratory Effort: No intercostal retractions or use of accessory muscles.   Lung Auscultation:      Clear to auscultation bilaterally; no rales, rhonchi or wheezing.  CV:            Regular rate and rhythm. No murmurs, rubs or gallops.  Abd:           Not examined.   Lymphadenopathy: Not examined.  Gait and Station: Normal.  Digits and Nails: No clubbing, cyanosis, petechiae, or nodes.   Cranial Nerves: II-XII grossly " intact.  Skin:        No rashes, lesions or ulcers noted.               Ext:           No cyanosis or edema.      Assessment:  1. JULIAN (obstructive sleep apnea)     2. Nocturnal hypoxemia     3. Hypertension, unspecified type     4. BMI 39.0-39.9,adult  Height And Weight   5. Former smoker         Immunizations:    Flu:10/25/21  Pneumovax 23:not due  Prevnar 13:2020  COVID-19: 11/17/21, 4/5/21, 3/3/21    Plan:  1.  Patient's AHI has elevated since recent swelling of his esophagus and back lying position.  We will make empirical pressure adjustments to his CPAP to auto CPAP 14 to 18 cm.  He will continue bleed and nocturnal oxygen therapy as well.  DME mask/supplies  DME other; adjustment to auto CPAP 14-18 cm  CNOX on Pap/2 L/min O2 bleed in prior to next office visit; pending results make further adjustments in therapy.  2.  Discussed sleep hygiene.  3.  Encourage weight loss through diet and exercise  4.  For primary care for other health concerns including ongoing management of hypertension  5.  Follow-up in 2 months for review of compliance and CNOX results, sooner if needed.    Please note that this dictation was created using voice recognition software. I have made every reasonable attempt to correct obvious errors, but it is possible there are errors of grammar and possibly content that I did not discover before finalizing the note.

## 2021-12-03 ENCOUNTER — TELEPHONE (OUTPATIENT)
Dept: ENDOCRINOLOGY | Facility: MEDICAL CENTER | Age: 61
End: 2021-12-03

## 2021-12-03 NOTE — TELEPHONE ENCOUNTER
DOCUMENTATION OF PAR STATUS:    1. Name of Medication & Dose: Xyosted 50MG/0.5ML auto-injectors     2. Name of Prescription Coverage Company & phone #: [employer]    3. Date Prior Auth Submitted: 12/03/2021    4. What information was given to obtain insurance decision? In Chart    5. Prior Auth Status? Pending    6. Patient Notified: N\A

## 2021-12-03 NOTE — TELEPHONE ENCOUNTER
MEDICATION PRIOR AUTHORIZATION NEEDED:    1. Name of Medication: Xyosted 50MG/0.5ML auto-injectors         2. Requested By (Name of Pharmacy): [facility]     3. Is insurance on file current? Yes    4. What is the name & phone number of the 3rd party payor? [employer]

## 2021-12-07 ENCOUNTER — PATIENT MESSAGE (OUTPATIENT)
Dept: ENDOCRINOLOGY | Facility: MEDICAL CENTER | Age: 61
End: 2021-12-07

## 2021-12-07 ENCOUNTER — PATIENT MESSAGE (OUTPATIENT)
Dept: SLEEP MEDICINE | Facility: MEDICAL CENTER | Age: 61
End: 2021-12-07

## 2021-12-07 DIAGNOSIS — G47.33 OSA (OBSTRUCTIVE SLEEP APNEA): ICD-10-CM

## 2021-12-07 DIAGNOSIS — E06.3 HYPOTHYROIDISM, ACQUIRED, AUTOIMMUNE: ICD-10-CM

## 2021-12-07 RX ORDER — LEVOTHYROXINE SODIUM 137 UG/1
137 TABLET ORAL
Qty: 90 TABLET | Refills: 3 | Status: SHIPPED | OUTPATIENT
Start: 2021-12-07 | End: 2022-02-14 | Stop reason: SDUPTHER

## 2021-12-07 NOTE — TELEPHONE ENCOUNTER
From: Cristino Keys  To: Nurse Practitioner Samantha Hunt  Sent: 12/7/2021 11:13 AM PST  Subject: Pressure changes on my CPAP    Samantha I’m having issues with the new pressures set on my machine. It seems like it’s putting to much pressure and forcing my mouth open and mask leakage. A couple of hours before I get up I wake up with my mouth open and a extremely dry mouth like I’ve never had before. I’ve tried to tighten and adjust top and bottom straps but it still leaks. I would be fine with the leaking but the problem with my mouth being wide open is to difficult for me to deal with. I use a chin strap and have tightened it on two different days but it’s not helping.  I think you should lower the pressure some to test it. Can you call me to give you better details.  [phone]. Thank you Cristino

## 2021-12-09 ENCOUNTER — HOSPITAL ENCOUNTER (OUTPATIENT)
Dept: RADIOLOGY | Facility: MEDICAL CENTER | Age: 61
End: 2021-12-09
Attending: PAIN MEDICINE
Payer: COMMERCIAL

## 2021-12-09 DIAGNOSIS — M47.816 LUMBAR SPONDYLOSIS: ICD-10-CM

## 2021-12-09 PROCEDURE — 72158 MRI LUMBAR SPINE W/O & W/DYE: CPT | Mod: MH

## 2021-12-09 PROCEDURE — 700117 HCHG RX CONTRAST REV CODE 255: Performed by: PAIN MEDICINE

## 2021-12-09 PROCEDURE — A9576 INJ PROHANCE MULTIPACK: HCPCS | Performed by: PAIN MEDICINE

## 2021-12-09 RX ADMIN — GADOTERIDOL 20 ML: 279.3 INJECTION, SOLUTION INTRAVENOUS at 09:33

## 2021-12-20 ENCOUNTER — TELEPHONE (OUTPATIENT)
Dept: ENDOCRINOLOGY | Facility: MEDICAL CENTER | Age: 61
End: 2021-12-20

## 2021-12-20 ENCOUNTER — HOSPITAL ENCOUNTER (OUTPATIENT)
Dept: RADIOLOGY | Facility: MEDICAL CENTER | Age: 61
End: 2021-12-20
Attending: SURGERY
Payer: COMMERCIAL

## 2021-12-20 DIAGNOSIS — M62.08 SEPARATION OF MUSCLE (NONTRAUMATIC), OTHER SITE: ICD-10-CM

## 2021-12-20 PROCEDURE — 74176 CT ABD & PELVIS W/O CONTRAST: CPT | Mod: MH

## 2021-12-21 NOTE — TELEPHONE ENCOUNTER
VOICEMAIL  1. Caller Name: Cristino Keys                        Call Back Number: [phone] (home)      2. Message: Patient called and left a message stating he wanted to make a new patient appointment. I have called and left a message for patient to call us back and schedule an appointment.     3. Patient approves office to leave a detailed voicemail/MyChart message: yes

## 2021-12-30 ENCOUNTER — HOSPITAL ENCOUNTER (OUTPATIENT)
Dept: LAB | Facility: MEDICAL CENTER | Age: 61
End: 2021-12-30
Attending: INTERNAL MEDICINE
Payer: COMMERCIAL

## 2021-12-30 DIAGNOSIS — R79.89 LOW TESTOSTERONE IN MALE: ICD-10-CM

## 2021-12-30 DIAGNOSIS — E06.3 HYPOTHYROIDISM, ACQUIRED, AUTOIMMUNE: ICD-10-CM

## 2021-12-30 DIAGNOSIS — E55.9 VITAMIN D DEFICIENCY: ICD-10-CM

## 2021-12-30 DIAGNOSIS — D75.1 POLYCYTHEMIA: ICD-10-CM

## 2021-12-30 LAB
25(OH)D3 SERPL-MCNC: 34 NG/ML (ref 30–100)
ERYTHROCYTE [DISTWIDTH] IN BLOOD BY AUTOMATED COUNT: 44.2 FL (ref 35.9–50)
ESTRADIOL SERPL-MCNC: 24.6 PG/ML
HCT VFR BLD AUTO: 48.9 % (ref 42–52)
HGB BLD-MCNC: 16 G/DL (ref 14–18)
MCH RBC QN AUTO: 29.2 PG (ref 27–33)
MCHC RBC AUTO-ENTMCNC: 32.7 G/DL (ref 33.7–35.3)
MCV RBC AUTO: 89.2 FL (ref 81.4–97.8)
PLATELET # BLD AUTO: 218 K/UL (ref 164–446)
PMV BLD AUTO: 9.3 FL (ref 9–12.9)
RBC # BLD AUTO: 5.48 M/UL (ref 4.7–6.1)
T4 FREE SERPL-MCNC: 0.9 NG/DL (ref 0.93–1.7)
TSH SERPL DL<=0.005 MIU/L-ACNC: 1.3 UIU/ML (ref 0.38–5.33)
WBC # BLD AUTO: 6 K/UL (ref 4.8–10.8)

## 2021-12-30 PROCEDURE — 82670 ASSAY OF TOTAL ESTRADIOL: CPT

## 2021-12-30 PROCEDURE — 82306 VITAMIN D 25 HYDROXY: CPT

## 2021-12-30 PROCEDURE — 85027 COMPLETE CBC AUTOMATED: CPT

## 2021-12-30 PROCEDURE — 36415 COLL VENOUS BLD VENIPUNCTURE: CPT

## 2021-12-30 PROCEDURE — 84402 ASSAY OF FREE TESTOSTERONE: CPT

## 2021-12-30 PROCEDURE — 84439 ASSAY OF FREE THYROXINE: CPT

## 2021-12-30 PROCEDURE — 84443 ASSAY THYROID STIM HORMONE: CPT

## 2022-01-03 LAB — TESTOST FREE SERPL-MCNC: 61 PG/ML (ref 47–244)

## 2022-01-19 PROBLEM — M19.022 PRIMARY OSTEOARTHRITIS OF BOTH ELBOWS: Status: ACTIVE | Noted: 2022-01-19

## 2022-01-19 PROBLEM — M19.021 PRIMARY OSTEOARTHRITIS OF BOTH ELBOWS: Status: ACTIVE | Noted: 2022-01-19

## 2022-01-19 PROBLEM — M25.522 BILATERAL ELBOW JOINT PAIN: Status: ACTIVE | Noted: 2022-01-19

## 2022-01-19 PROBLEM — M25.521 BILATERAL ELBOW JOINT PAIN: Status: ACTIVE | Noted: 2022-01-19

## 2022-01-25 ENCOUNTER — APPOINTMENT (RX ONLY)
Dept: URBAN - METROPOLITAN AREA CLINIC 22 | Facility: CLINIC | Age: 62
Setting detail: DERMATOLOGY
End: 2022-01-25

## 2022-01-25 DIAGNOSIS — D18.0 HEMANGIOMA: ICD-10-CM

## 2022-01-25 DIAGNOSIS — L30.8 OTHER SPECIFIED DERMATITIS: ICD-10-CM | Status: INADEQUATELY CONTROLLED

## 2022-01-25 DIAGNOSIS — L81.4 OTHER MELANIN HYPERPIGMENTATION: ICD-10-CM

## 2022-01-25 DIAGNOSIS — D22 MELANOCYTIC NEVI: ICD-10-CM

## 2022-01-25 DIAGNOSIS — Z71.89 OTHER SPECIFIED COUNSELING: ICD-10-CM

## 2022-01-25 DIAGNOSIS — L57.0 ACTINIC KERATOSIS: ICD-10-CM

## 2022-01-25 DIAGNOSIS — Z85.828 PERSONAL HISTORY OF OTHER MALIGNANT NEOPLASM OF SKIN: ICD-10-CM

## 2022-01-25 DIAGNOSIS — L44.8 OTHER SPECIFIED PAPULOSQUAMOUS DISORDERS: ICD-10-CM

## 2022-01-25 DIAGNOSIS — L82.1 OTHER SEBORRHEIC KERATOSIS: ICD-10-CM

## 2022-01-25 PROBLEM — D18.01 HEMANGIOMA OF SKIN AND SUBCUTANEOUS TISSUE: Status: ACTIVE | Noted: 2022-01-25

## 2022-01-25 PROBLEM — D22.5 MELANOCYTIC NEVI OF TRUNK: Status: ACTIVE | Noted: 2022-01-25

## 2022-01-25 PROBLEM — D23.71 OTHER BENIGN NEOPLASM OF SKIN OF RIGHT LOWER LIMB, INCLUDING HIP: Status: ACTIVE | Noted: 2022-01-25

## 2022-01-25 PROCEDURE — ? LIQUID NITROGEN

## 2022-01-25 PROCEDURE — 17110 DESTRUCTION B9 LES UP TO 14: CPT

## 2022-01-25 PROCEDURE — 17003 DESTRUCT PREMALG LES 2-14: CPT | Mod: 59

## 2022-01-25 PROCEDURE — ? COUNSELING

## 2022-01-25 PROCEDURE — 99214 OFFICE O/P EST MOD 30 MIN: CPT | Mod: 25

## 2022-01-25 PROCEDURE — ? PRESCRIPTION

## 2022-01-25 PROCEDURE — ? SUNSCREEN RECOMMENDATIONS

## 2022-01-25 PROCEDURE — 17000 DESTRUCT PREMALG LESION: CPT | Mod: 59

## 2022-01-25 RX ORDER — TRIAMCINOLONE ACETONIDE 1 MG/G
CREAM TOPICAL BID
Qty: 45 | Refills: 1 | Status: ERX | COMMUNITY
Start: 2022-01-25

## 2022-01-25 RX ADMIN — TRIAMCINOLONE ACETONIDE: 1 CREAM TOPICAL at 00:00

## 2022-01-25 ASSESSMENT — LOCATION ZONE DERM
LOCATION ZONE: HAND
LOCATION ZONE: LEG
LOCATION ZONE: EAR
LOCATION ZONE: TRUNK
LOCATION ZONE: SCALP
LOCATION ZONE: ARM
LOCATION ZONE: FACE

## 2022-01-25 ASSESSMENT — LOCATION DETAILED DESCRIPTION DERM
LOCATION DETAILED: RIGHT DISTAL PRETIBIAL REGION
LOCATION DETAILED: RIGHT CENTRAL FRONTAL SCALP
LOCATION DETAILED: LEFT SUPERIOR MEDIAL UPPER BACK
LOCATION DETAILED: LEFT SUPERIOR POSTAURICULAR SKIN
LOCATION DETAILED: RIGHT INFERIOR LATERAL MALAR CHEEK
LOCATION DETAILED: EPIGASTRIC SKIN
LOCATION DETAILED: RIGHT INFERIOR FOREHEAD
LOCATION DETAILED: LEFT SUPERIOR HELIX
LOCATION DETAILED: RIGHT RADIAL DORSAL HAND
LOCATION DETAILED: RIGHT SUPERIOR MEDIAL MIDBACK
LOCATION DETAILED: LEFT ULNAR DORSAL HAND
LOCATION DETAILED: LEFT LATERAL EYEBROW
LOCATION DETAILED: LEFT LATERAL SHOULDER

## 2022-01-25 ASSESSMENT — LOCATION SIMPLE DESCRIPTION DERM
LOCATION SIMPLE: RIGHT PRETIBIAL REGION
LOCATION SIMPLE: RIGHT FOREHEAD
LOCATION SIMPLE: LEFT EYEBROW
LOCATION SIMPLE: RIGHT LOWER BACK
LOCATION SIMPLE: ABDOMEN
LOCATION SIMPLE: RIGHT SCALP
LOCATION SIMPLE: LEFT EAR
LOCATION SIMPLE: LEFT UPPER BACK
LOCATION SIMPLE: LEFT HAND
LOCATION SIMPLE: LEFT SHOULDER
LOCATION SIMPLE: SCALP
LOCATION SIMPLE: RIGHT HAND
LOCATION SIMPLE: RIGHT CHEEK

## 2022-01-25 NOTE — PROCEDURE: LIQUID NITROGEN
Medical Necessity Information: It is in your best interest to select a reason for this procedure from the list below. All of these items fulfill various CMS LCD requirements except the new and changing color options.
Number Of Freeze-Thaw Cycles: 3 freeze-thaw cycles
Show Aperture Variable?: Yes
Spray Paint Technique: No
Post-Care Instructions: I reviewed with the patient in detail post-care instructions. Patient is to wear sunprotection, and avoid picking at any of the treated lesions. Pt may apply Vaseline to crusted or scabbing areas.
Detail Level: Detailed
Duration Of Freeze Thaw-Cycle (Seconds): 3
Consent: The patient's consent was obtained including but not limited to risks of crusting, scabbing, blistering, scarring, darker or lighter pigmentary change, recurrence, incomplete removal and infection.
Spray Paint Text: The liquid nitrogen was applied to the skin utilizing a spray paint frosting technique.
Medical Necessity Clause: This procedure was medically necessary because the lesions that were treated were:
Number Of Freeze-Thaw Cycles: 2 freeze-thaw cycles

## 2022-02-04 ENCOUNTER — HOME STUDY (OUTPATIENT)
Dept: SLEEP MEDICINE | Facility: MEDICAL CENTER | Age: 62
End: 2022-02-04
Attending: NURSE PRACTITIONER
Payer: COMMERCIAL

## 2022-02-04 DIAGNOSIS — G47.34 NOCTURNAL HYPOXEMIA: ICD-10-CM

## 2022-02-04 DIAGNOSIS — G47.33 OSA (OBSTRUCTIVE SLEEP APNEA): ICD-10-CM

## 2022-02-04 PROCEDURE — 94762 N-INVAS EAR/PLS OXIMTRY CONT: CPT | Performed by: STUDENT IN AN ORGANIZED HEALTH CARE EDUCATION/TRAINING PROGRAM

## 2022-02-07 ENCOUNTER — APPOINTMENT (OUTPATIENT)
Dept: SLEEP MEDICINE | Facility: MEDICAL CENTER | Age: 62
End: 2022-02-07
Payer: COMMERCIAL

## 2022-02-07 NOTE — PROCEDURES
Overnight Pulse Oximetry    Interpretation:    This overnight oximetry was recorded on 2/4/2022 with the patient on CPAP 14-18 cm H2O with 2 L/min supplemental oxygen.  The analysis duration was 8hrs 18min. 50 total oximetric events occurred encompassing 46.9 minutes .  The average event duration was  56.3 seconds .  The saturations were less than 90% for 1.6% of the recording.  Basal oxygen saturation of 92.8%. The patel saturation was 77% (suspect artifact).  The patient spent 1 minutes with saturations < 88%.  Overall, this continuous nocturnal oximetry demonstrates unremarkable study while on CPAP 14-18 cm H2O with 2 L/min supplemental oxygen.    Recommendation:  Continue current therapy.  Clinical correlation is needed.

## 2022-02-14 ENCOUNTER — OFFICE VISIT (OUTPATIENT)
Dept: ENDOCRINOLOGY | Facility: MEDICAL CENTER | Age: 62
End: 2022-02-14
Payer: COMMERCIAL

## 2022-02-14 ENCOUNTER — TELEPHONE (OUTPATIENT)
Dept: ENDOCRINOLOGY | Facility: MEDICAL CENTER | Age: 62
End: 2022-02-14

## 2022-02-14 VITALS
HEART RATE: 118 BPM | OXYGEN SATURATION: 94 % | HEIGHT: 70 IN | BODY MASS INDEX: 38.02 KG/M2 | WEIGHT: 265.6 LBS | DIASTOLIC BLOOD PRESSURE: 82 MMHG | SYSTOLIC BLOOD PRESSURE: 138 MMHG

## 2022-02-14 DIAGNOSIS — E03.9 HYPOTHYROIDISM, ACQUIRED: ICD-10-CM

## 2022-02-14 DIAGNOSIS — E55.9 VITAMIN D DEFICIENCY: ICD-10-CM

## 2022-02-14 DIAGNOSIS — E06.3 HYPOTHYROIDISM, ACQUIRED, AUTOIMMUNE: ICD-10-CM

## 2022-02-14 DIAGNOSIS — E29.1 HYPOGONADISM IN MALE: ICD-10-CM

## 2022-02-14 PROCEDURE — 99211 OFF/OP EST MAY X REQ PHY/QHP: CPT

## 2022-02-14 PROCEDURE — 99214 OFFICE O/P EST MOD 30 MIN: CPT

## 2022-02-14 RX ORDER — LEVOTHYROXINE SODIUM 137 UG/1
137 TABLET ORAL
Qty: 90 TABLET | Refills: 3 | Status: SHIPPED | OUTPATIENT
Start: 2022-02-14 | End: 2022-06-14 | Stop reason: SDUPTHER

## 2022-02-14 RX ORDER — TESTOSTERONE ENANTHATE 75 MG/.5ML
INJECTION SUBCUTANEOUS
Qty: 1.96 ML | Refills: 2 | Status: SHIPPED | OUTPATIENT
Start: 2022-02-14 | End: 2022-02-16 | Stop reason: SDUPTHER

## 2022-02-14 ASSESSMENT — PATIENT HEALTH QUESTIONNAIRE - PHQ9: CLINICAL INTERPRETATION OF PHQ2 SCORE: 0

## 2022-02-14 ASSESSMENT — FIBROSIS 4 INDEX: FIB4 SCORE: 1.45

## 2022-02-14 NOTE — PROGRESS NOTES
Chief Complaint: Consult requested by Kevin Chavez M.D. for evaluation of Hypogonadism    HPI:   1.  Hypogonadism in male:  Cristino Keys is a 61 y.o. male with history of Hypogonadism diagnosed a few years ago and is here for initial evaluation.    She was a patient of Dr. Weiss for many years and is here today to establish care with me    He reports a past medical history of Diabetes, Obesity, Hypertension, Hyperlipidemia.  Dr. Lew oversees his diabetes related topics    Denies osteoporosis and Asthma or COPD.     He reports  chronic opioid use (on and off for many years due to ortho surgeries)   Denies chronic steroid use,HIV diagnosis.       He reports a family history of Hypothyroidism.    He denies headaches or blurring of vision    He denies any breast swelling or breast changes.    He denies any changes in hand or foot size.    He denies any severe illness.       He denies usage of blood thinners.    He is currently taking Xyosted 75mg/0.5mls, he is unable to inject using vials due to chronic join pain.      Ref. Range 12/30/2021 15:24   Estradiol-E2 Latest Units: pg/mL 24.6   Free Testosterone Latest Ref Range: 47 - 244 pg/mL 61     Prostate exams done by Dr. Weiss in the past.     2. Hypothyroidism:  Cristino Keys is a 61 y.o. male with history of Hypothyroidism diagnosed on a few year ago and is here for initial evaluation.      He is currently on Synthroid 137 mcg daily which has been her thyroid hormone dose since 2 years.     He denies any recent dose changes.   He reports Good compliance and takes his thyroid hormone daily before breakfast.    He denies taking any iron, calcium supplements or antacids.     He denies fatigue, weight changes, heat/cold intolerance, bowel/skin changes or CVS symptoms     He denies anxiousness, feeling excessive energy, tremulousness, palpitations, sweating and weight loss.      He denies lumps or enlargement in the neck.    He reports a family  history of Hypothyroidism with his mother, sister.       He is taking multivitamins daily and he is unsure if his multivitamins contain biotin     Ref. Range 12/30/2021 15:24   TSH Latest Ref Range: 0.380 - 5.330 uIU/mL 1.300   Free T-4 Latest Ref Range: 0.93 - 1.70 ng/dL 0.90 (L)     3. Vitamin D deficiency:  Currently taking vitamin D with prescription multivitamin   Ref. Range 12/30/2021 15:24   25-Hydroxy   Vitamin D 25 Latest Ref Range: 30 - 100 ng/mL 34     Patient's medications, allergies, and social histories were reviewed and updated as appropriate.      ROS:      CONS:     No fever, no chills, no weight loss, no fatigue   EYES:      No diplopia, no blurry vision, no redness of eyes, no swelling of eyelids   ENT:    No hearing loss, No ear pain, No sore throat, no dysphagia, no neck swelling   CV:     No chest pain, no palpitations, no claudication, no orthopnea, no PND   PULM:    No SOB, no cough, no hemoptysis, no wheezing    GI:   No nausea, no vomiting, no diarrhea, no constipation, no bloody stools   :  Passing urine well, no dysuria, no hematuria   ENDO:   No polyuria, no polydipsia, no heat intolerance, no cold intolerance   NEURO: No headaches, no dizziness, no convulsions, no tremors   MUSC:  No joint swellings, no arthralgias, no myalgias, no weakness   SKIN:   No rash, no ulcers, no dry skin   PSYCH:   No depression, no anxiety, no difficulty sleeping       Past Medical History:  Patient Active Problem List    Diagnosis Date Noted   • Primary osteoarthritis of both elbows 01/19/2022   • Bilateral elbow joint pain 01/19/2022   • Heterotopic ossification of bone 10/11/2021   • Presence of right artificial knee joint 08/30/2021   • Vitamin D deficiency 08/16/2021   • Polycythemia 07/01/2020   • History of MRSA infection 06/10/2020   • Nocturnal hypoxemia 04/29/2020   • Hypogonadism in male 03/18/2020   • Former smoker 03/11/2019   • Low testosterone in male 03/08/2019   • Wound infection  05/14/2018   • Diabetic ulcer of toe of left foot associated with type 2 diabetes mellitus, with fat layer exposed ([facility]) 05/14/2018   • Contracture of elbow joint, right 04/03/2018   • BMI 38.0-38.9,adult 11/01/2017   • Chronic pain 08/28/2017   • Chronic narcotic use 08/28/2017   • Arthritis of left ankle 08/21/2017   • Degeneration of lumbar intervertebral disc 04/14/2017   • Lower back pain 04/14/2017   • JULIAN (obstructive sleep apnea) 04/03/2017   • Snoring 04/03/2017   • Acquired hallux valgus of left foot 12/22/2016   • Knee pain, right 12/19/2016   • Type 2 diabetes mellitus without complication ([facility]) 12/13/2016   • Chronic autoimmune thyroiditis 10/03/2016   • Hypothyroidism, acquired, autoimmune 10/03/2016   • Presence of right artificial hip joint 08/31/2016   • Primary osteoarthritis of left hip 08/18/2016   • Foot pain, left 05/27/2016   • Diabetes ([facility]) 09/05/2014   • Lumbar disc disease 09/05/2014   • Gout 09/05/2014   • Hypertension 09/05/2014   • H/O arthroscopy of knee 09/05/2014   • Hx of elbow surgery 09/05/2014   • Severe obesity with body mass index (BMI) of 35.0 to 39.9 with serious comorbidity ([facility]) 09/05/2014   • Osteoarthritis 09/05/2014   • H/O shoulder replacement 09/05/2014   • Rotator cuff arthropathy 09/05/2014   • Bilateral bunions 09/05/2014       Past Surgical History:  Past Surgical History:   Procedure Laterality Date   • RI UPPER GI ENDOSCOPY,REMOV F.B.  10/18/2021    Procedure: GASTROSCOPY, WITH FOREIGN BODY REMOVAL;  Surgeon: Estiven Ruiz M.D.;  Location: SURGERY SAME DAY [facility];  Service: Gastroenterology   • RI UPPER GI ENDOSCOPY,DIAGNOSIS  10/18/2021    Procedure: GASTROSCOPY;  Surgeon: Estiven Ruiz M.D.;  Location: SURGERY SAME DAY [facility];  Service: Gastroenterology   • HETEROTOPIC OSSIFICATION Right 10/14/2021    Procedure: EXCISION, HETEROTOPIC ossification;  Surgeon: Emiliano Vang M.D.;  Location: SURGERY [facility];  Service: Orthopedics   • METATARSAL HEAD  RESECTION Left 7/20/2020    Procedure: EXCISION, METATARSAL BONE, HEAD- PARTIAL DISTAL 2/3 METATARSAL;  Surgeon: Haroldo Kang M.D.;  Location: [facility];  Service: Orthopedics   • HAMMERTOE CORRECTION Left 7/20/2020    Procedure: CORRECTION, HAMMER TOE- 4/5;  Surgeon: Haroldo Kang M.D.;  Location: [facility];  Service: Orthopedics   • TOENAIL REMOVAL Left 7/20/2020    Procedure: REMOVAL, TOENAIL 3-5;  Surgeon: Haroldo Kang M.D.;  Location: [facility];  Service: Orthopedics   • METATARSAL HEAD RESECTION Left 6/10/2019    Procedure: METATARSAL HEAD RESECTION- FOR PARTIAL EXCISION;  Surgeon: Haroldo Kang M.D.;  Location: [facility];  Service: Orthopedics   • JOINT INJECTION DIAGNOSTIC  6/10/2019    Procedure: INJECTION, JOINT, DIAGNOSTIC- MIDFOOT;  Surgeon: Haroldo Kang M.D.;  Location: [facility];  Service: Orthopedics   • TOE AMPUTATION Right 6/10/2019    Procedure: AMPUTATION, TOE- FIRST TOE;  Surgeon: Haroldo Kang M.D.;  Location: [facility];  Service: Orthopedics   • ORTHOPEDIC OSTEOTOMY Left 10/15/2018    Procedure: ORTHOPEDIC OSTEOTOMY-  FOR: 3RD METATARSAL PARTIAL EXCISION, AND LEFT GASTROC SOLEUS RECESSION;  Surgeon: Haroldo Kang M.D.;  Location: [facility];  Service: Orthopedics   • HARDWARE REMOVAL ORTHO Right 10/15/2018    Procedure: HARDWARE REMOVAL ORTHO-  FOOT METATARSALPHALANGEAL JOINT PLATE;  Surgeon: Haroldo Kang M.D.;  Location: [facility];  Service: Orthopedics   • TOE AMPUTATION Left 6/25/2018    Procedure: TOE AMPUTATION-FOR :PARTIAL 1ST DISTAL PHALANX EXCISION, GREAT TOE AMPUTATION;  Surgeon: Haroldo Kang M.D.;  Location: [facility];  Service: Orthopedics   • INTERNAL FIXATION REMOVAL Left 6/25/2018    Procedure: INTERNAL FIXATION REMOVAL;  Surgeon: Haroldo Kang M.D.;  Location: [facility];  Service: Orthopedics   • NERVE ULNAR  TRANSFER Right 4/3/2018    Procedure: NERVE ULNAR TRANSFER - TRANSPOSITION;  Surgeon: Ayad Luna M.D.;  Location: [facility];  Service: Orthopedics   • CARPAL TUNNEL RELEASE Right 4/3/2018    Procedure: CARPAL TUNNEL RELEASE;  Surgeon: Ayad Luna M.D.;  Location: [facility];  Service: Orthopedics   • ELBOW ARTHROTOMY Right 4/3/2018    Procedure: ELBOW ARTHROTOMY - FOR CONTRACTURE RELEASE AT THE ELBOW, RADIAL HEAD EXCISION;  Surgeon: Ayad Luna M.D.;  Location: [facility];  Service: Orthopedics   • SHOULDER SURGERY Right 12/02/2017    reversal   • METATARSAL HEAD RESECTION Right 11/13/2017    Procedure: METATARSAL HEAD RESECTION - EXCISION;  Surgeon: Haroldo Kang M.D.;  Location: [facility];  Service: Orthopedics   • HAMMERTOE CORRECTION Right 11/13/2017    Procedure: HAMMERTOE CORRECTION 2-5;  Surgeon: Haroldo Kang M.D.;  Location: [facility];  Service: Orthopedics   • TOE FUSION Right 11/13/2017    Procedure: TOE FUSION - 1ST METATARSALPHALANGEAL;  Surgeon: Haroldo Kang M.D.;  Location: [facility];  Service: Orthopedics   • METATARSAL HEAD RESECTION Left 8/21/2017    Procedure: METATARSAL HEAD RESECTION - 2-5;  Surgeon: Haroldo Kang M.D.;  Location: [facility];  Service:    • TOE FUSION Left 8/21/2017    Procedure: TOE FUSION - 1ST MTP;  Surgeon: Haroldo Kang M.D.;  Location: [facility];  Service:    • HARDWARE REMOVAL ORTHO Left 8/21/2017    Procedure: HARDWARE REMOVAL ORTHO;  Surgeon: Haroldo Kang M.D.;  Location: [facility];  Service:    • FUSION, SPINE, LUMBAR, PLIF  4/14/2017    Procedure: LUMBAR FUSION POSTERIOR - MINIMALLY INVASIVE TLIF L4-5;  Surgeon: Bossman Caro M.D.;  Location: [facility];  Service:    • HAMMERTOE CORRECTION Bilateral 12/22/2016    Procedure: HAMMERTOE CORRECTION/METATARSALPHALANGEAL JOINT RELEASE 2/3 RIGHT, 2-3  "LEFT;  Surgeon: Haroldo Kang M.D.;  Location: SURGERY [facility];  Service:    • BUNIONECTOMY Left 12/22/2016    Procedure: BUNIONECTOMY FOR DISTAL HALLUX VALGUS CORRECTION WITH BRANDY;  Surgeon: Haroldo Kang M.D.;  Location: SURGERY [facility];  Service:    • ORTHOPEDIC OSTEOTOMY Left 12/22/2016    Procedure: ORTHOPEDIC OSTEOTOMY DISTAL METATARSAL 2-5;  Surgeon: Haroldo Kang M.D.;  Location: SURGERY [facility];  Service:    • HIP ARTH ANTERIOR TOTAL Left 8/18/2016    Procedure: HIP ARTHROPLASTY ANTERIOR TOTAL;  Surgeon: Emiliano Vang M.D.;  Location: SURGERY [facility];  Service:    • OTHER ORTHOPEDIC SURGERY  6/2014    right shoulder  arthroplasty   • OTHER ORTHOPEDIC SURGERY  11/1/2012    left knee/left ankle/left shoulder   • OTHER ORTHOPEDIC SURGERY  2004    elbow surgery   • OTHER  2000    dislocation left foot surgery [facility]   • OTHER      \"septoplasty 20 years ago\"   • OTHER ORTHOPEDIC SURGERY      brad knee replaced   • OTHER ORTHOPEDIC SURGERY      left total hip        Allergies:  Demerol, Cubicin [daptomycin], Meperidine, Sulfa drugs, and Sulfamethoxazole w-trimethoprim     Current Medications:    Current Outpatient Medications:   •  amoxicillin (AMOXIL) 500 MG Cap, amoxicillin 500 mg capsule, Disp: , Rfl:   •  Testosterone Enanthate (XYOSTED) 50 MG/0.5ML Solution Auto-injector, Xyosted 50 mg/0.5 mL subcutaneous auto-injector, Disp: , Rfl:   •  Testosterone Enanthate (XYOSTED) 75 MG/0.5ML Solution Auto-injector, Xyosted 75 mg/0.5 mL subcutaneous auto-injector, Disp: , Rfl:   •  triamcinolone acetonide (KENALOG) 0.1 % Cream, triamcinolone acetonide 0.1 % topical cream, Disp: , Rfl:   •  ipratropium (ATROVENT) 0.03 % Solution, ipratropium bromide 21 mcg (0.03 %) nasal spray, Disp: , Rfl:   •  polyethylene glycol 3350 (MIRALAX) 17 GM/SCOOP Powder, Purelax 17 gram oral powder packet, Disp: , Rfl:   •  levoFLOXacin (LEVAQUIN) 500 MG tablet, , Disp: , Rfl:   •  valacyclovir " "(VALTREX) 1 GM Tab, , Disp: , Rfl:   •  levothyroxine (SYNTHROID) 137 MCG Tab, Take 1 Tablet by mouth every morning on an empty stomach., Disp: 90 Tablet, Rfl: 3  •  VALACYCLOVIR HCL PO, Take  by mouth., Disp: , Rfl:   •  lamotrigine (LAMICTAL) 200 MG tablet, , Disp: , Rfl:   •  omeprazole (PRILOSEC) 20 MG delayed-release capsule, , Disp: , Rfl:   •  oxyCODONE-acetaminophen (PERCOCET-10)  MG Tab, , Disp: , Rfl:   •  gabapentin (NEURONTIN) 300 MG Cap, TAKE 1 CAPSULE AT BEDTIME, Disp: 90 Capsule, Rfl: 2  •  polyethylene glycol/lytes (MIRALAX) 17 g Pack, Take 1 Packet by mouth every day., Disp: 20 Each, Rfl: 3  •  celecoxib (CELEBREX) 200 MG Cap, Take 1 Capsule by mouth 2 times a day as needed for Moderate Pain., Disp: 60 Capsule, Rfl: 1  •  acetaminophen (TYLENOL) 500 MG Tab, Take 1-2 Tablets by mouth every 6 hours as needed., Disp: 30 Tablet, Rfl: 0  •  diazePAM (VALIUM) 5 MG Tab, TAKE 1 TABLET BY MOUTH 30 MINUTES PRIOR TO CHECK IN APPOINTMENT, Disp: , Rfl:   •  ACCU-CHEK GUIDE strip, , Disp: , Rfl:   •  ipratropium (ATROVENT) 0.06 % Solution, , Disp: , Rfl:   •  Accu-Chek FastClix Lancets Misc, , Disp: , Rfl:   •  PENNSAID 2 % Solution, , Disp: , Rfl:   •  lidocaine (LIDODERM) 5 % Patch, , Disp: , Rfl:   •  tadalafil (CIALIS) 20 MG tablet, , Disp: , Rfl:   •  amphetamine-dextroamphetamine (ADDERALL) 15 MG tablet, TAKE 1 TABLET BY MOUTH IN THE MORNING FOR 28 DAYS, Disp: , Rfl:   •  Venlafaxine HCl 75 MG TABLET SR 24 HR, , Disp: , Rfl:   •  naproxen (NAPROSYN) 500 MG Tab, , Disp: , Rfl:   •  SYRINGE-NEEDLE, DISP, 3 ML (LUER LOCK SAFETY SYRINGES) 22G X 1-1/2\" 3 ML Misc, 1 Each by Does not apply route every 7 days., Disp: 12 Each, Rfl: 3  •  liraglutide (VICTOZA) 18 MG/3ML Solution Pen-injector, Inject 1.8 mg as instructed every day., Disp: , Rfl:   •  hydrOXYzine pamoate (VISTARIL) 50 MG Cap, Take 50 mg by mouth 2 Times a Day., Disp: , Rfl:   •  azelastine (ASTELIN) 137 MCG/SPRAY nasal spray, [place] 1 [place] in " nose 2 Times a Day., Disp: , Rfl:   •  NOVOFINE PLUS, Inject 1 Application as instructed 4 Times a Day,Before Meals and at Bedtime. Use before meals and at bedtime to check blood sugar., Disp: , Rfl:   •  lamoTRIgine (LAMICTAL) 100 MG Tab, Take 100 mg by mouth every day., Disp: , Rfl:   •  cyclobenzaprine (FLEXERIL) 10 MG Tab, Take 10 mg by mouth 3 times a day., Disp: , Rfl: 5  •  tamsulosin (FLOMAX) 0.4 MG capsule, Take 0.4 mg by mouth ONE-HALF HOUR AFTER BREAKFAST., Disp: , Rfl:   •  temazepam (RESTORIL) 30 MG capsule, Take 30 mg by mouth at bedtime as needed for Sleep., Disp: , Rfl:   •  alprazolam (XANAX) 0.25 MG Tab, Take 0.25-0.5 mg by mouth 4 times a day as needed for Anxiety., Disp: , Rfl:   •  losartan (COZAAR) 100 MG Tab, Take 100 mg by mouth every morning., Disp: , Rfl:     Social History:  Social History     Socioeconomic History   • Marital status:      Spouse name: Not on file   • Number of children: Not on file   • Years of education: Not on file   • Highest education level: Not on file   Occupational History   • Not on file   Tobacco Use   • Smoking status: Former Smoker     Packs/day: 1.00     Years: 20.00     Pack years: 20.00     Types: Cigarettes     Quit date: 2014     Years since quittin.1   • Smokeless tobacco: Former User     Types: Chew     Quit date: 1997   Vaping Use   • Vaping Use: Never used   Substance and Sexual Activity   • Alcohol use: Yes     Comment: occasional- approx once a week   • Drug use: No   • Sexual activity: Not on file   Other Topics Concern   • Not on file   Social History Narrative   • Not on file     Social Determinants of Health     Financial Resource Strain:    • Difficulty of Paying Living Expenses: Not on file   Food Insecurity:    • Worried About Running Out of Food in the Last Year: Not on file   • Ran Out of Food in the Last Year: Not on file   Transportation Needs:    • Lack of Transportation (Medical): Not on file   • Lack of Transportation  "(Non-Medical): Not on file   Physical Activity:    • Days of Exercise per Week: Not on file   • Minutes of Exercise per Session: Not on file   Stress:    • Feeling of Stress : Not on file   Social Connections:    • Frequency of Communication with Friends and Family: Not on file   • Frequency of Social Gatherings with Friends and Family: Not on file   • Attends Church Services: Not on file   • Active Member of Clubs or Organizations: Not on file   • Attends Club or Organization Meetings: Not on file   • Marital Status: Not on file   Intimate Partner Violence:    • Fear of Current or Ex-Partner: Not on file   • Emotionally Abused: Not on file   • Physically Abused: Not on file   • Sexually Abused: Not on file   Housing Stability:    • Unable to Pay for Housing in the Last Year: Not on file   • Number of Places Lived in the Last Year: Not on file   • Unstable Housing in the Last Year: Not on file        Family History:   Family History   Problem Relation Age of Onset   • Heart Disease Mother    • Depression Mother    • Cancer Father         Colon cancer    • Cancer Sister         Skin cancer   • Sleep Apnea Neg Hx          PHYSICAL EXAM:   Vital signs: /82 (BP Location: Left arm, Patient Position: Sitting, BP Cuff Size: Adult)   Pulse (!) 118   Ht 1.778 m (5' 10\")   Wt 120 kg (265 lb 9.6 oz)   SpO2 94%   BMI 38.11 kg/m²   GENERAL: Well-developed, well-nourished  in no apparent distress.   EYE: No ocular and eyelid asymmetry, Anicteric sclerae,  PERRL, No exophthalmos or lidlag  HENT: Hearing grossly intact, Normocephalic, atraumatic.   NECK: Supple. Trachea midline. thyroid is normal in size without nodules or tenderness  CHEST: no gynecomastia, no nipple discharge  CARDIOVASCULAR: Regular rate and rhythm. No murmurs, rubs, or gallops.   LUNGS: Clear to auscultation bilaterally   GENIT: deferred  EXTREMITIES: No clubbing, cyanosis, or edema.   NEUROLOGICAL: Cranial nerves II-XII are grossly intact "   Symmetric reflexes at the patella no proximal muscle weakness, No visible tremor with both outstretched hands  LYMPH: No cervical, supraclavicular,  adenopathy palpated.   SKIN: No rashes, lesions. Turgor is normal.    Labs:  Lab Results   Component Value Date/Time    WBC 6.0 12/30/2021 03:24 PM    RBC 5.48 12/30/2021 03:24 PM    HEMOGLOBIN 16.0 12/30/2021 03:24 PM    MCV 89.2 12/30/2021 03:24 PM    MCH 29.2 12/30/2021 03:24 PM    MCHC 32.7 (L) 12/30/2021 03:24 PM    RDW 44.2 12/30/2021 03:24 PM    MPV 9.3 12/30/2021 03:24 PM       Lab Results   Component Value Date/Time    SODIUM 139 09/29/2021 01:42 PM    POTASSIUM 4.4 09/29/2021 01:42 PM    CHLORIDE 102 09/29/2021 01:42 PM    CO2 22 09/29/2021 01:42 PM    ANION 15.0 09/29/2021 01:42 PM    GLUCOSE 139 (H) 09/29/2021 01:42 PM    BUN 17 09/29/2021 01:42 PM    CREATININE 1.03 09/29/2021 01:42 PM    CALCIUM 9.1 09/29/2021 01:42 PM    ASTSGOT 24 08/06/2021 12:29 PM    ALTSGPT 23 08/06/2021 12:29 PM    TBILIRUBIN 0.5 08/06/2021 12:29 PM    ALBUMIN 4.7 08/06/2021 12:29 PM    TOTPROTEIN 7.6 08/06/2021 12:29 PM    GLOBULIN 2.9 08/06/2021 12:29 PM    AGRATIO 1.6 08/06/2021 12:29 PM       Lab Results   Component Value Date/Time    TSHULTRASEN 1.300 12/30/2021 1524       Lab Results   Component Value Date/Time    TESTOSTERONE 243 (L) 12/30/2020 1042           Imaging:      ASSESSMENT/PLAN:    1. Hypogonadism in male  Unstable  Will continue current dose of testosterone replacement, prescription sent to pharmacy  - Testosterone Enanthate (XYOSTED) 75 MG/0.5ML Solution Auto-injector; Xyosted 75 mg/0.5 mL subcutaneous auto-injector  Dispense: 1.96 mL; Refill: 2    We discussed risks and benefits of treatment, we will evaluate PSA levels, and H/H     2. Hypothyroidism, acquired  Clinically stable  Biochemically stable    Please do the following blood work before your next appointment with me in 6 months  - TSH; Future  - FREE THYROXINE; Future  - TSH; Future  - FREE THYROXINE;  Future    Prescription sent to pharmacy  - levothyroxine (SYNTHROID) 137 MCG Tab; Take 1 Tablet by mouth every morning on an empty stomach.  Dispense: 90 Tablet; Refill: 3    3. Vitamin D deficiency  Unstable  Recommended to take 5000 IUs OTC daily  We will evaluate vitamin D levels, please do the following blood work before your appointment with me in 6 months  - VITAMIN D,25 HYDROXY; Future      Disposition: Make an appointment with me in 6 months for evaluation of hypogonadism, hypothyroidism and vitamin D deficiency  Please do the following blood work before your appointment with me in 6 months  - CBC WITHOUT DIFFERENTIAL; Future  - HEMOGLOBIN AND HEMATOCRIT; Future  - PROSTATE SPECIFIC AG DIAGNOSTIC; Future  - Testosterone, Free & Total, Adult Male (w/SHBG); Future  - SEX HORMONE BINDING GLOBULIN; Future  - HEMOGLOBIN AND HEMATOCRIT; Future  - Testosterone, Free & Total, Adult Male (w/SHBG); Future  - CBC WITHOUT DIFFERENTIAL; Future      Thank you kindly for allowing me to participate in the endocrine care plan for this patient.    GUSTABO TeeR.N.  02/14/22    CC:   Kvein Chavez M.D.

## 2022-02-15 ENCOUNTER — OFFICE VISIT (OUTPATIENT)
Dept: SLEEP MEDICINE | Facility: MEDICAL CENTER | Age: 62
End: 2022-02-15
Payer: COMMERCIAL

## 2022-02-15 VITALS
OXYGEN SATURATION: 95 % | DIASTOLIC BLOOD PRESSURE: 76 MMHG | HEART RATE: 97 BPM | RESPIRATION RATE: 16 BRPM | HEIGHT: 70 IN | WEIGHT: 264 LBS | BODY MASS INDEX: 37.8 KG/M2 | SYSTOLIC BLOOD PRESSURE: 130 MMHG

## 2022-02-15 DIAGNOSIS — I10 HYPERTENSION, UNSPECIFIED TYPE: ICD-10-CM

## 2022-02-15 DIAGNOSIS — G47.33 OSA (OBSTRUCTIVE SLEEP APNEA): Chronic | ICD-10-CM

## 2022-02-15 DIAGNOSIS — Z87.891 FORMER SMOKER: ICD-10-CM

## 2022-02-15 DIAGNOSIS — Z23 NEED FOR VACCINATION: ICD-10-CM

## 2022-02-15 DIAGNOSIS — G47.34 NOCTURNAL HYPOXEMIA: ICD-10-CM

## 2022-02-15 DIAGNOSIS — E66.01 SEVERE OBESITY WITH BODY MASS INDEX (BMI) OF 35.0 TO 39.9 WITH SERIOUS COMORBIDITY (HCC): ICD-10-CM

## 2022-02-15 PROBLEM — K40.90 LEFT INGUINAL HERNIA: Status: ACTIVE | Noted: 2022-01-06

## 2022-02-15 PROBLEM — G47.00 INSOMNIA: Status: ACTIVE | Noted: 2022-01-06

## 2022-02-15 PROBLEM — M62.08 DIASTASIS RECTI: Status: ACTIVE | Noted: 2021-12-14

## 2022-02-15 PROCEDURE — 90471 IMMUNIZATION ADMIN: CPT | Performed by: NURSE PRACTITIONER

## 2022-02-15 PROCEDURE — 90732 PPSV23 VACC 2 YRS+ SUBQ/IM: CPT | Performed by: NURSE PRACTITIONER

## 2022-02-15 PROCEDURE — 99213 OFFICE O/P EST LOW 20 MIN: CPT | Mod: 25 | Performed by: NURSE PRACTITIONER

## 2022-02-15 ASSESSMENT — FIBROSIS 4 INDEX: FIB4 SCORE: 1.45

## 2022-02-15 NOTE — PROGRESS NOTES
Chief Complaint   Patient presents with   • Apnea     last seen 12/2/2021   • Results     OPO 2/4/2022        HPI:  Cristino Keys is a 61 y.o. year old male here today for follow-up on JULIAN.  Last OV 12/2/21     Currently using CPAP 14-18cm H20 nightly w/2LPM O2 bleed in ; RESMED; device obtained 2017.    CNOX 2/4/2022 using auto CPAP 14 to 18 cm with 2 L oxygen bleed and indicated basal SPO2 of 92.8% less than 88% O2 sat for 1 minute. Adequate oxygenation on settings; reviewed with patient.    Compliance report 1/16/2022 through 2/14/2022 indicates 1 high percent compliance, average nightly use 8 hours 46 minutes, mean pressure 17.6 cm, minimal mask leak with an overall AHI 1.2/h.  Reviewed with patient.  He is currently mask with chinstrap.  He has of local tailor the mixes chinstraps for him.  He is overall happy with therapy but would like to consider a mask fit.  He denies morning headaches.  He notes consistent daytime energy levels.  No napping.  He is in need of an updated Pneumovax 23.    Since last office visit he was seen by GI for ongoing dysphagia and advised to start Benefiber 2 teaspoons daily in 8 ounces of water, omeprazole 40 mg daily, esophageal manometry, barium swallow with tablet and then follow-up.    Sleep hx:  PSG split-night 6/3/2017 noted mild sleep apnea with an overall AHI of 6.1/h and O2 patel of 85%.  Sleep efficiency of 83.8%.    Overnight oximetry performed on 4/9/2020 while on ACPAP 10 to 15 cm water indicated patel saturation was 84%, his mean saturation was 86%, and his saturations less than 80% for 240 minutes of the flow evaluation time.     OPO on autoCPAP 10-20 cmH2O from 5/20/20 indicated the patient spent 46% of the night below SPO2 of 90%, to a patel of 76%. In laboratory CPAP titration polysomnogram advised for accurate calibration of airway pressures vs oxygen bleed in.     PSG titration from 6/30/20 indicated successful CPAP titration at 14 cm of water. Elevated  "periodic limb movements. CPAP was started at 10 cm of water and titrated to 14 with resultant AHI of 2.7 and mean oximetry 93%. There was a mean oxygen saturation of 91.0% with a minimum oxygen saturation of 83.0%. Time spent with oxygen saturations below 89% was 61.7 minutes.     CNOX 10/13/2020 indicated basal SPO2 of 87%, O2 patel 74% less than 90% for 79% of testing time.  Bleed in O2 @2LPM was added     ROS: As per HPI and otherwise negative if not stated.    Past Medical History:   Diagnosis Date   • Anesthesia 11/03/2017    PONV with shoulder surgery approx 20 years ago.   • Anxiety and depression 11/03/2017   • Arthritis 11/03/2017    Osteoarthritis, \" all over\"   • Bronchitis     childhood   • Bunion    • Cancer (HCC) 2017    skin lesion cut out, on left shoulder   • Chronic autoimmune thyroiditis      + US / + TPO = 57   • Dental disorder 11/03/2017    \"Upper Plate\"   • Diabetes mellitus (HCC) 11/03/2017    \"Controlled with diet & Victoza\"   • Gout    • Hammertoe    • High cholesterol 11/03/2017    HX OF, not currently on medication for.   • Hypertension    • Hypothyroidism     chronic thyroiditis   • MRSA infection    • Open toe wound 03/2018    Left big toe, open wound, started 2/2018, receiving wound care therapy two times a week   • Pain 11/03/2017    \"Pain all over except right hip & ankle\"   • Psychiatric problem     depression/anxiety   • Sleep apnea 11/03/2017    CPAP USE   • Snoring 11/03/2017    DX JULIAN   • Tonsillitis        Past Surgical History:   Procedure Laterality Date   • MO UPPER GI ENDOSCOPY,REMOV F.B.  10/18/2021    Procedure: GASTROSCOPY, WITH FOREIGN BODY REMOVAL;  Surgeon: Estiven Ruiz M.D.;  Location: SURGERY [facility];  Service: Gastroenterology   • MO UPPER GI ENDOSCOPY,DIAGNOSIS  10/18/2021    Procedure: GASTROSCOPY;  Surgeon: Estiven Ruiz M.D.;  Location: SURGERY [facility];  Service: Gastroenterology   • HETEROTOPIC OSSIFICATION Right 10/14/2021    Procedure: " EXCISION, HETEROTOPIC ossification;  Surgeon: Emiliano Vang M.D.;  Location: [facility];  Service: Orthopedics   • METATARSAL HEAD RESECTION Left 7/20/2020    Procedure: EXCISION, METATARSAL BONE, HEAD- PARTIAL DISTAL 2/3 METATARSAL;  Surgeon: Haroldo Kang M.D.;  Location: [facility];  Service: Orthopedics   • HAMMERTOE CORRECTION Left 7/20/2020    Procedure: CORRECTION, HAMMER TOE- 4/5;  Surgeon: Haroldo Kang M.D.;  Location: [facility];  Service: Orthopedics   • TOENAIL REMOVAL Left 7/20/2020    Procedure: REMOVAL, TOENAIL 3-5;  Surgeon: Haroldo Kang M.D.;  Location: [facility];  Service: Orthopedics   • METATARSAL HEAD RESECTION Left 6/10/2019    Procedure: METATARSAL HEAD RESECTION- FOR PARTIAL EXCISION;  Surgeon: Haroldo Kang M.D.;  Location: [facility];  Service: Orthopedics   • JOINT INJECTION DIAGNOSTIC  6/10/2019    Procedure: INJECTION, JOINT, DIAGNOSTIC- MIDFOOT;  Surgeon: Haroldo Kang M.D.;  Location: [facility];  Service: Orthopedics   • TOE AMPUTATION Right 6/10/2019    Procedure: AMPUTATION, TOE- FIRST TOE;  Surgeon: Haroldo Kang M.D.;  Location: [facility];  Service: Orthopedics   • ORTHOPEDIC OSTEOTOMY Left 10/15/2018    Procedure: ORTHOPEDIC OSTEOTOMY-  FOR: 3RD METATARSAL PARTIAL EXCISION, AND LEFT GASTROC SOLEUS RECESSION;  Surgeon: Haroldo Kang M.D.;  Location: [facility];  Service: Orthopedics   • HARDWARE REMOVAL ORTHO Right 10/15/2018    Procedure: HARDWARE REMOVAL ORTHO-  FOOT METATARSALPHALANGEAL JOINT PLATE;  Surgeon: Haroldo Kang M.D.;  Location: [facility];  Service: Orthopedics   • TOE AMPUTATION Left 6/25/2018    Procedure: TOE AMPUTATION-FOR :PARTIAL 1ST DISTAL PHALANX EXCISION, GREAT TOE AMPUTATION;  Surgeon: Haroldo Kang M.D.;  Location: [facility];  Service: Orthopedics   • INTERNAL FIXATION REMOVAL Left 6/25/2018     Procedure: INTERNAL FIXATION REMOVAL;  Surgeon: Haroldo aKng M.D.;  Location: [facility];  Service: Orthopedics   • NERVE ULNAR TRANSFER Right 4/3/2018    Procedure: NERVE ULNAR TRANSFER - TRANSPOSITION;  Surgeon: Ayad Luna M.D.;  Location: [facility];  Service: Orthopedics   • CARPAL TUNNEL RELEASE Right 4/3/2018    Procedure: CARPAL TUNNEL RELEASE;  Surgeon: Ayad Luna M.D.;  Location: [facility];  Service: Orthopedics   • ELBOW ARTHROTOMY Right 4/3/2018    Procedure: ELBOW ARTHROTOMY - FOR CONTRACTURE RELEASE AT THE ELBOW, RADIAL HEAD EXCISION;  Surgeon: Ayad Luna M.D.;  Location: [facility];  Service: Orthopedics   • SHOULDER SURGERY Right 12/02/2017    reversal   • METATARSAL HEAD RESECTION Right 11/13/2017    Procedure: METATARSAL HEAD RESECTION - EXCISION;  Surgeon: Haroldo Kang M.D.;  Location: [facility];  Service: Orthopedics   • HAMMERTOE CORRECTION Right 11/13/2017    Procedure: HAMMERTOE CORRECTION 2-5;  Surgeon: Haroldo Kang M.D.;  Location: [facility];  Service: Orthopedics   • TOE FUSION Right 11/13/2017    Procedure: TOE FUSION - 1ST METATARSALPHALANGEAL;  Surgeon: Haroldo Kang M.D.;  Location: [facility];  Service: Orthopedics   • METATARSAL HEAD RESECTION Left 8/21/2017    Procedure: METATARSAL HEAD RESECTION - 2-5;  Surgeon: Haroldo Kang M.D.;  Location: [facility];  Service:    • TOE FUSION Left 8/21/2017    Procedure: TOE FUSION - 1ST MTP;  Surgeon: aHroldo Kang M.D.;  Location: [facility];  Service:    • HARDWARE REMOVAL ORTHO Left 8/21/2017    Procedure: HARDWARE REMOVAL ORTHO;  Surgeon: Haroldo Kang M.D.;  Location: [facility];  Service:    • FUSION, SPINE, LUMBAR, PLIF  4/14/2017    Procedure: LUMBAR FUSION POSTERIOR - MINIMALLY INVASIVE TLIF L4-5;  Surgeon: Bossman Caro M.D.;  Location: [facility];  Service:    • HAMMERTOE CORRECTION Bilateral 2016    Procedure: HAMMERTOE CORRECTION/METATARSALPHALANGEAL JOINT RELEASE 2/3 RIGHT, 2-3 LEFT;  Surgeon: Haroldo Kang M.D.;  Location: SURGERY [facility];  Service:    • BUNIONECTOMY Left 2016    Procedure: BUNIONECTOMY FOR DISTAL HALLUX VALGUS CORRECTION WITH BRANDY;  Surgeon: Haroldo Kang M.D.;  Location: SURGERY [facility];  Service:    • ORTHOPEDIC OSTEOTOMY Left 2016    Procedure: ORTHOPEDIC OSTEOTOMY DISTAL METATARSAL 2-5;  Surgeon: Haroldo Kang M.D.;  Location: SURGERY [facility];  Service:    • HIP ARTH ANTERIOR TOTAL Left 2016    Procedure: HIP ARTHROPLASTY ANTERIOR TOTAL;  Surgeon: Emiliano Vang M.D.;  Location: SURGERY [facility];  Service:    • OTHER ORTHOPEDIC SURGERY  2014    right shoulder  arthroplasty   • OTHER ORTHOPEDIC SURGERY  2012    left knee/left ankle/left shoulder   • OTHER ORTHOPEDIC SURGERY      elbow surgery   • OTHER  2000    dislocation left foot surgery at [facility]   • OTHER      \"septoplasty 20 years ago\"   • OTHER ORTHOPEDIC SURGERY      brad knee replaced   • OTHER ORTHOPEDIC SURGERY      left total hip       Family History   Problem Relation Age of Onset   • Heart Disease Mother    • Depression Mother    • Cancer Father         Colon cancer    • Cancer Sister         Skin cancer   • Sleep Apnea Neg Hx        Social History     Socioeconomic History   • Marital status:      Spouse name: Not on file   • Number of children: Not on file   • Years of education: Not on file   • Highest education level: Not on file   Occupational History   • Not on file   Tobacco Use   • Smoking status: Former Smoker     Packs/day: 1.00     Years: 20.00     Pack years: 20.00     Types: Cigarettes     Quit date: 2014     Years since quittin.1   • Smokeless tobacco: Former User     Types: Chew     Quit date: 1997   Vaping Use   • Vaping Use: Never used   Substance and Sexual " "Activity   • Alcohol use: Yes     Comment: occasional- approx once a week   • Drug use: No   • Sexual activity: Not on file   Other Topics Concern   • Not on file   Social History Narrative   • Not on file     Social Determinants of Health     Financial Resource Strain:    • Difficulty of Paying Living Expenses: Not on file   Food Insecurity:    • Worried About Running Out of Food in the Last Year: Not on file   • Ran Out of Food in the Last Year: Not on file   Transportation Needs:    • Lack of Transportation (Medical): Not on file   • Lack of Transportation (Non-Medical): Not on file   Physical Activity:    • Days of Exercise per Week: Not on file   • Minutes of Exercise per Session: Not on file   Stress:    • Feeling of Stress : Not on file   Social Connections:    • Frequency of Communication with Friends and Family: Not on file   • Frequency of Social Gatherings with Friends and Family: Not on file   • Attends Oriental orthodox Services: Not on file   • Active Member of Clubs or Organizations: Not on file   • Attends Club or Organization Meetings: Not on file   • Marital Status: Not on file   Intimate Partner Violence:    • Fear of Current or Ex-Partner: Not on file   • Emotionally Abused: Not on file   • Physically Abused: Not on file   • Sexually Abused: Not on file   Housing Stability:    • Unable to Pay for Housing in the Last Year: Not on file   • Number of Places Lived in the Last Year: Not on file   • Unstable Housing in the Last Year: Not on file       Allergies as of 02/15/2022 - Reviewed 02/15/2022   Allergen Reaction Noted   • Demerol Vomiting and Nausea 09/05/2014   • Cubicin [daptomycin] Vomiting 09/11/2018   • Meperidine  02/25/2004   • Sulfa drugs Hives 03/31/2017   • Sulfamethoxazole w-trimethoprim Rash 02/25/2004        Vitals:  /76 (BP Location: Left arm, Patient Position: Sitting, BP Cuff Size: Adult)   Pulse 97   Resp 16   Ht 1.778 m (5' 10\")   Wt 120 kg (264 lb)   SpO2 95%     Current " "medications as of today   Current Outpatient Medications   Medication Sig Dispense Refill   • levothyroxine (SYNTHROID) 137 MCG Tab Take 1 Tablet by mouth every morning on an empty stomach. 90 Tablet 3   • Testosterone Enanthate (XYOSTED) 75 MG/0.5ML Solution Auto-injector Xyosted 75 mg/0.5 mL subcutaneous auto-injector 1.96 mL 2   • amoxicillin (AMOXIL) 500 MG Cap amoxicillin 500 mg capsule     • ipratropium (ATROVENT) 0.03 % Solution ipratropium bromide 21 mcg (0.03 %) nasal spray     • polyethylene glycol 3350 (MIRALAX) 17 GM/SCOOP Powder Purelax 17 gram oral powder packet     • levoFLOXacin (LEVAQUIN) 500 MG tablet      • valacyclovir (VALTREX) 1 GM Tab      • omeprazole (PRILOSEC) 20 MG delayed-release capsule      • oxyCODONE-acetaminophen (PERCOCET-10)  MG Tab      • gabapentin (NEURONTIN) 300 MG Cap TAKE 1 CAPSULE AT BEDTIME 90 Capsule 2   • polyethylene glycol/lytes (MIRALAX) 17 g Pack Take 1 Packet by mouth every day. 20 Each 3   • celecoxib (CELEBREX) 200 MG Cap Take 1 Capsule by mouth 2 times a day as needed for Moderate Pain. 60 Capsule 1   • acetaminophen (TYLENOL) 500 MG Tab Take 1-2 Tablets by mouth every 6 hours as needed. 30 Tablet 0   • diazePAM (VALIUM) 5 MG Tab TAKE 1 TABLET BY MOUTH 30 MINUTES PRIOR TO CHECK IN APPOINTMENT     • ACCU-CHEK GUIDE strip      • ipratropium (ATROVENT) 0.06 % Solution      • Accu-Chek FastClix Lancets Misc      • PENNSAID 2 % Solution      • lidocaine (LIDODERM) 5 % Patch      • tadalafil (CIALIS) 20 MG tablet      • amphetamine-dextroamphetamine (ADDERALL) 15 MG tablet TAKE 1 TABLET BY MOUTH IN THE MORNING FOR 28 DAYS     • Venlafaxine HCl 75 MG TABLET SR 24 HR      • naproxen (NAPROSYN) 500 MG Tab      • SYRINGE-NEEDLE, DISP, 3 ML (LUER LOCK SAFETY SYRINGES) 22G X 1-1/2\" 3 ML Misc 1 Each by Does not apply route every 7 days. 12 Each 3   • liraglutide (VICTOZA) 18 MG/3ML Solution Pen-injector Inject 1.8 mg as instructed every day.     • hydrOXYzine pamoate " (VISTARIL) 50 MG Cap Take 50 mg by mouth 2 Times a Day.     • azelastine (ASTELIN) 137 MCG/SPRAY nasal spray Dixon 1 Spray in nose 2 Times a Day.     • NOVOFINE PLUS Inject 1 Application as instructed 4 Times a Day,Before Meals and at Bedtime. Use before meals and at bedtime to check blood sugar.     • lamoTRIgine (LAMICTAL) 100 MG Tab Take 100 mg by mouth every day.     • cyclobenzaprine (FLEXERIL) 10 MG Tab Take 10 mg by mouth 3 times a day.  5   • tamsulosin (FLOMAX) 0.4 MG capsule Take 0.4 mg by mouth ONE-HALF HOUR AFTER BREAKFAST.     • temazepam (RESTORIL) 30 MG capsule Take 30 mg by mouth at bedtime as needed for Sleep.     • alprazolam (XANAX) 0.25 MG Tab Take 0.25-0.5 mg by mouth 4 times a day as needed for Anxiety.     • losartan (COZAAR) 100 MG Tab Take 100 mg by mouth every morning.       No current facility-administered medications for this visit.         Physical Exam:   Gen:           Alert and oriented, No apparent distress. Mood and affect appropriate, normal interaction with examiner.  Eyes:          PERRL, EOM intact, sclere white, conjunctive moist.  Ears:          Not examined.   Hearing:     Grossly intact.  Nose:          Normal, no lesions or deformities.  Dentition:    mask  Oropharynx:   mask  Mallampati Classification: mask  Neck:        Supple, trachea midline, no masses.  Respiratory Effort: No intercostal retractions or use of accessory muscles.   Lung Auscultation:      Clear to auscultation bilaterally; no rales, rhonchi or wheezing.  CV:            Regular rate and rhythm. No murmurs, rubs or gallops.  Abd:           Not examined.   Lymphadenopathy: Not examined.  Gait and Station: Normal.  Digits and Nails: No clubbing, cyanosis, petechiae, or nodes.   Cranial Nerves: II-XII grossly intact.  Skin:        No rashes, lesions or ulcers noted.               Ext:           No cyanosis or edema.      Assessment:  1. JULIAN (obstructive sleep apnea)     2. Nocturnal hypoxemia     3.  Hypertension, unspecified type     4. Severe obesity with body mass index (BMI) of 35.0 to 39.9 with serious comorbidity (HCC)     5. Former smoker         Immunizations:    Flu:10/25/21  Pneumovax 23:given today  Prevnar 13:2020  COVID-19: 11/17/21, 4/5/21, 3/3/21    Plan:  1.  JULIAN is well controlled.  Continue CPAP 14 cm at 2 L oxygen bleed in.  We will continue to benefit from therapy.  DME mask/supplies; mask fit of patient choice  2.  Discussed sleep hygiene  3.  Encourage weight loss through diet and exercise  4.  Follow-up with primary care for ongoing management of hypertension  5.  Follow-up 1 year with compliance report, sooner if needed.    Please note that this dictation was created using voice recognition software. I have made every reasonable attempt to correct obvious errors, but it is possible there are errors of grammar and possibly content that I did not discover before finalizing the note.

## 2022-02-16 DIAGNOSIS — E29.1 HYPOGONADISM IN MALE: ICD-10-CM

## 2022-02-16 RX ORDER — TESTOSTERONE ENANTHATE 75 MG/.5ML
INJECTION SUBCUTANEOUS
Qty: 6 ML | Refills: 1 | Status: SHIPPED | OUTPATIENT
Start: 2022-02-16 | End: 2022-04-16

## 2022-02-17 ENCOUNTER — HOSPITAL ENCOUNTER (OUTPATIENT)
Dept: LAB | Facility: MEDICAL CENTER | Age: 62
End: 2022-02-17
Attending: INTERNAL MEDICINE
Payer: COMMERCIAL

## 2022-02-17 LAB
EST. AVERAGE GLUCOSE BLD GHB EST-MCNC: 151 MG/DL
HBA1C MFR BLD: 6.9 % (ref 4–5.6)
T3 SERPL-MCNC: 86.8 NG/DL (ref 60–181)
TSH SERPL DL<=0.005 MIU/L-ACNC: 1.75 UIU/ML (ref 0.38–5.33)

## 2022-02-17 PROCEDURE — 84480 ASSAY TRIIODOTHYRONINE (T3): CPT

## 2022-02-17 PROCEDURE — 83036 HEMOGLOBIN GLYCOSYLATED A1C: CPT | Mod: GA

## 2022-02-17 PROCEDURE — 36415 COLL VENOUS BLD VENIPUNCTURE: CPT | Mod: GA

## 2022-02-17 PROCEDURE — 84443 ASSAY THYROID STIM HORMONE: CPT

## 2022-02-17 PROCEDURE — 84439 ASSAY OF FREE THYROXINE: CPT

## 2022-02-21 LAB — T4 FREE SERPL DIALY-MCNC: 2 NG/DL (ref 1.1–2.4)

## 2022-02-28 ENCOUNTER — PATIENT MESSAGE (OUTPATIENT)
Dept: SLEEP MEDICINE | Facility: MEDICAL CENTER | Age: 62
End: 2022-02-28
Payer: COMMERCIAL

## 2022-03-07 ENCOUNTER — TELEPHONE (OUTPATIENT)
Dept: ENDOCRINOLOGY | Facility: MEDICAL CENTER | Age: 62
End: 2022-03-07
Payer: COMMERCIAL

## 2022-03-07 NOTE — TELEPHONE ENCOUNTER
Patient called regarding rx Xyosted he would like to speak to someone. He states that our office is not submitting correct paper and not enough information. Please contact him at [phone].

## 2022-03-29 ENCOUNTER — APPOINTMENT (RX ONLY)
Dept: URBAN - METROPOLITAN AREA CLINIC 22 | Facility: CLINIC | Age: 62
Setting detail: DERMATOLOGY
End: 2022-03-29

## 2022-03-29 DIAGNOSIS — L82.1 OTHER SEBORRHEIC KERATOSIS: ICD-10-CM

## 2022-03-29 DIAGNOSIS — L57.0 ACTINIC KERATOSIS: ICD-10-CM

## 2022-03-29 DIAGNOSIS — L82.0 INFLAMED SEBORRHEIC KERATOSIS: ICD-10-CM

## 2022-03-29 PROCEDURE — ? LIQUID NITROGEN

## 2022-03-29 PROCEDURE — 17110 DESTRUCTION B9 LES UP TO 14: CPT

## 2022-03-29 PROCEDURE — 17000 DESTRUCT PREMALG LESION: CPT | Mod: 59

## 2022-03-29 PROCEDURE — 99212 OFFICE O/P EST SF 10 MIN: CPT | Mod: 25

## 2022-03-29 PROCEDURE — ? COUNSELING

## 2022-03-29 PROCEDURE — 17003 DESTRUCT PREMALG LES 2-14: CPT | Mod: 59

## 2022-03-29 ASSESSMENT — LOCATION SIMPLE DESCRIPTION DERM
LOCATION SIMPLE: RIGHT FOREARM
LOCATION SIMPLE: LEFT FOREARM
LOCATION SIMPLE: RIGHT FOREHEAD
LOCATION SIMPLE: LEFT EAR
LOCATION SIMPLE: RIGHT EAR

## 2022-03-29 ASSESSMENT — LOCATION DETAILED DESCRIPTION DERM
LOCATION DETAILED: LEFT VENTRAL PROXIMAL FOREARM
LOCATION DETAILED: RIGHT VENTRAL DISTAL FOREARM
LOCATION DETAILED: RIGHT VENTRAL PROXIMAL FOREARM
LOCATION DETAILED: RIGHT SUPERIOR HELIX
LOCATION DETAILED: RIGHT FOREHEAD
LOCATION DETAILED: LEFT SUPERIOR HELIX

## 2022-03-29 ASSESSMENT — LOCATION ZONE DERM
LOCATION ZONE: ARM
LOCATION ZONE: EAR
LOCATION ZONE: FACE

## 2022-03-29 NOTE — PROCEDURE: LIQUID NITROGEN
Show Aperture Variable?: Yes
Medical Necessity Clause: This procedure was medically necessary because the lesions that were treated were:
Spray Paint Technique: No
Post-Care Instructions: I reviewed with the patient in detail post-care instructions. Patient is to wear sunprotection, and avoid picking at any of the treated lesions. Pt may apply Vaseline to crusted or scabbing areas.
Detail Level: Detailed
Spray Paint Text: The liquid nitrogen was applied to the skin utilizing a spray paint frosting technique.
Medical Necessity Information: It is in your best interest to select a reason for this procedure from the list below. All of these items fulfill various CMS LCD requirements except the new and changing color options.
Consent: The patient's consent was obtained including but not limited to risks of crusting, scabbing, blistering, scarring, darker or lighter pigmentary change, recurrence, incomplete removal and infection.
Duration Of Freeze Thaw-Cycle (Seconds): 0
Number Of Freeze-Thaw Cycles: 2 freeze-thaw cycles

## 2022-04-01 ENCOUNTER — TELEPHONE (OUTPATIENT)
Dept: SLEEP MEDICINE | Facility: MEDICAL CENTER | Age: 62
End: 2022-04-01
Payer: COMMERCIAL

## 2022-04-20 ENCOUNTER — HOSPITAL ENCOUNTER (OUTPATIENT)
Dept: LAB | Facility: MEDICAL CENTER | Age: 62
End: 2022-04-20
Payer: COMMERCIAL

## 2022-04-20 DIAGNOSIS — E03.9 HYPOTHYROIDISM, ACQUIRED: ICD-10-CM

## 2022-04-20 DIAGNOSIS — E55.9 VITAMIN D DEFICIENCY: ICD-10-CM

## 2022-04-20 DIAGNOSIS — E29.1 HYPOGONADISM IN MALE: ICD-10-CM

## 2022-04-20 LAB
25(OH)D3 SERPL-MCNC: 48 NG/ML (ref 30–100)
ERYTHROCYTE [DISTWIDTH] IN BLOOD BY AUTOMATED COUNT: 50.8 FL (ref 35.9–50)
HCT VFR BLD AUTO: 46.3 % (ref 42–52)
HGB BLD-MCNC: 15.7 G/DL (ref 14–18)
MCH RBC QN AUTO: 30.5 PG (ref 27–33)
MCHC RBC AUTO-ENTMCNC: 33.9 G/DL (ref 33.7–35.3)
MCV RBC AUTO: 90.1 FL (ref 81.4–97.8)
PLATELET # BLD AUTO: 232 K/UL (ref 164–446)
PMV BLD AUTO: 9.8 FL (ref 9–12.9)
PSA SERPL-MCNC: 0.47 NG/ML (ref 0–4)
RBC # BLD AUTO: 5.14 M/UL (ref 4.7–6.1)
T4 FREE SERPL-MCNC: 1.14 NG/DL (ref 0.93–1.7)
TSH SERPL DL<=0.005 MIU/L-ACNC: 1.53 UIU/ML (ref 0.38–5.33)
WBC # BLD AUTO: 6.2 K/UL (ref 4.8–10.8)

## 2022-04-20 PROCEDURE — 84153 ASSAY OF PSA TOTAL: CPT

## 2022-04-20 PROCEDURE — 85027 COMPLETE CBC AUTOMATED: CPT

## 2022-04-20 PROCEDURE — 84270 ASSAY OF SEX HORMONE GLOBUL: CPT

## 2022-04-20 PROCEDURE — 82306 VITAMIN D 25 HYDROXY: CPT

## 2022-04-20 PROCEDURE — 36415 COLL VENOUS BLD VENIPUNCTURE: CPT

## 2022-04-20 PROCEDURE — 84443 ASSAY THYROID STIM HORMONE: CPT

## 2022-04-20 PROCEDURE — 84402 ASSAY OF FREE TESTOSTERONE: CPT

## 2022-04-20 PROCEDURE — 84439 ASSAY OF FREE THYROXINE: CPT

## 2022-04-20 PROCEDURE — 84403 ASSAY OF TOTAL TESTOSTERONE: CPT

## 2022-04-23 LAB
SHBG SERPL-SCNC: 25 NMOL/L (ref 19–76)
TESTOST FREE MFR SERPL: 2 % (ref 1.6–2.9)
TESTOST FREE SERPL-MCNC: 48 PG/ML (ref 47–244)
TESTOST SERPL-MCNC: 238 NG/DL (ref 300–720)

## 2022-06-13 ENCOUNTER — OFFICE VISIT (OUTPATIENT)
Dept: SLEEP MEDICINE | Facility: MEDICAL CENTER | Age: 62
End: 2022-06-13
Payer: COMMERCIAL

## 2022-06-13 VITALS
HEART RATE: 98 BPM | OXYGEN SATURATION: 93 % | DIASTOLIC BLOOD PRESSURE: 72 MMHG | WEIGHT: 270 LBS | BODY MASS INDEX: 38.65 KG/M2 | SYSTOLIC BLOOD PRESSURE: 130 MMHG | HEIGHT: 70 IN

## 2022-06-13 DIAGNOSIS — E29.1 HYPOGONADISM IN MALE: ICD-10-CM

## 2022-06-13 DIAGNOSIS — G47.33 OSA (OBSTRUCTIVE SLEEP APNEA): ICD-10-CM

## 2022-06-13 DIAGNOSIS — I10 HYPERTENSION, UNSPECIFIED TYPE: ICD-10-CM

## 2022-06-13 DIAGNOSIS — Z87.891 FORMER SMOKER: ICD-10-CM

## 2022-06-13 DIAGNOSIS — G47.34 NOCTURNAL HYPOXEMIA: Chronic | ICD-10-CM

## 2022-06-13 PROCEDURE — 99213 OFFICE O/P EST LOW 20 MIN: CPT | Performed by: NURSE PRACTITIONER

## 2022-06-13 RX ORDER — OMEPRAZOLE 40 MG/1
CAPSULE, DELAYED RELEASE ORAL
COMMUNITY
Start: 2022-04-11 | End: 2022-07-12

## 2022-06-13 ASSESSMENT — FIBROSIS 4 INDEX: FIB4 SCORE: 1.38

## 2022-06-13 NOTE — PROGRESS NOTES
Chief Complaint   Patient presents with   • Apnea     JULIAN-Last seen 02/15/2022       HPI:  Cristino Keys is a 62 y.o. year old male here today for follow-up on JULIAN.  Last OV 2/15/22     Currently using APAP @ 14-18cm H20 nightly w/2LPM O2; RESMED; device obtained 2022. He obtained a new device since last OV.  Compliance report 5/14/2022 through 6/12/2022 indicates 100% compliance, average nightly use 8 hours 9 minutes, minimal to moderate mask leak with an overall AHI of 1.6/h.  Reviewed with patient.  He notes his pressures to feel slightly high and having significant dry mouth.  He currently uses a chinstrap but this appears to be ineffective.  He has not tried CPAP tape yet.  He feels that humidification heat function is too high at this time due to change in weather.  He is also asking for a mask fit.  He denies morning headaches or sleepiness/napping during the day.  He generally goes to bed at midnight will wake between 7:30 and 8 AM.  He occasionally wakes to use the restroom 1 time per night but sometimes sleeps through the night.  He denies any significant cardiac or respiratory symptoms.    Sleep hx:  PSG split-night 6/3/2017 noted mild sleep apnea with an overall AHI of 6.1/h and O2 patel of 85%.  Sleep efficiency of 83.8%.     Overnight oximetry performed on 4/9/2020 while on ACPAP 10 to 15 cm water indicated patel saturation was 84%, his mean saturation was 86%, and his saturations less than 80% for 240 minutes of the flow evaluation time.     OPO on autoCPAP 10-20 cmH2O from 5/20/20 indicated the patient spent 46% of the night below SPO2 of 90%, to a patel of 76%. In laboratory CPAP titration polysomnogram advised for accurate calibration of airway pressures vs oxygen bleed in.     PSG titration from 6/30/20 indicated successful CPAP titration at 14 cm of water. Elevated periodic limb movements. CPAP was started at 10 cm of water and titrated to 14 with resultant AHI of 2.7 and mean oximetry 93%.  "There was a mean oxygen saturation of 91.0% with a minimum oxygen saturation of 83.0%. Time spent with oxygen saturations below 89% was 61.7 minutes.     CNOX 10/13/2020 indicated basal SPO2 of 87%, O2 patel 74% less than 90% for 79% of testing time.  Bleed in O2 @2LPM was added       ROS: As per HPI and otherwise negative if not stated.    Past Medical History:   Diagnosis Date   • Anesthesia 11/03/2017    PONV with shoulder surgery approx 20 years ago.   • Anxiety and depression 11/03/2017   • Arthritis 11/03/2017    Osteoarthritis, \" all over\"   • Bronchitis     childhood   • Bunion    • Cancer (HCC) 2017    skin lesion cut out, on left shoulder   • Chronic autoimmune thyroiditis      + US / + TPO = 57   • Dental disorder 11/03/2017    \"Upper Plate\"   • Diabetes mellitus (HCC) 11/03/2017    \"Controlled with diet & Victoza\"   • Gout    • Hammertoe    • High cholesterol 11/03/2017    HX OF, not currently on medication for.   • Hypertension    • Hypothyroidism     chronic thyroiditis   • MRSA infection    • Open toe wound 03/2018    Left big toe, open wound, started 2/2018, receiving wound care therapy two times a week   • Pain 11/03/2017    \"Pain all over except right hip & ankle\"   • Psychiatric problem     depression/anxiety   • Sleep apnea 11/03/2017    CPAP USE   • Snoring 11/03/2017    DX JULIAN   • Tonsillitis        Past Surgical History:   Procedure Laterality Date   • MO UPPER GI ENDOSCOPY,REMOV F.B.  10/18/2021    Procedure: GASTROSCOPY, WITH FOREIGN BODY REMOVAL;  Surgeon: Estiven Ruiz M.D.;  Location: SURGERY SAME DAY [facility];  Service: Gastroenterology   • MO UPPER GI ENDOSCOPY,DIAGNOSIS  10/18/2021    Procedure: GASTROSCOPY;  Surgeon: Estiven Ruiz M.D.;  Location: SURGERY SAME DAY [facility];  Service: Gastroenterology   • HETEROTOPIC OSSIFICATION Right 10/14/2021    Procedure: EXCISION, HETEROTOPIC ossification;  Surgeon: Emiliano Vang M.D.;  Location: SURGERY [facility];  Service: Orthopedics "   • METATARSAL HEAD RESECTION Left 7/20/2020    Procedure: EXCISION, METATARSAL BONE, HEAD- PARTIAL DISTAL 2/3 METATARSAL;  Surgeon: Haroldo Kang M.D.;  Location: [facility];  Service: Orthopedics   • HAMMERTOE CORRECTION Left 7/20/2020    Procedure: CORRECTION, HAMMER TOE- 4/5;  Surgeon: Haroldo Kang M.D.;  Location: [facility];  Service: Orthopedics   • TOENAIL REMOVAL Left 7/20/2020    Procedure: REMOVAL, TOENAIL 3-5;  Surgeon: Haroldo Kang M.D.;  Location: [facility];  Service: Orthopedics   • METATARSAL HEAD RESECTION Left 6/10/2019    Procedure: METATARSAL HEAD RESECTION- FOR PARTIAL EXCISION;  Surgeon: Haroldo Kang M.D.;  Location: [facility];  Service: Orthopedics   • JOINT INJECTION DIAGNOSTIC  6/10/2019    Procedure: INJECTION, JOINT, DIAGNOSTIC- MIDFOOT;  Surgeon: Haroldo Kang M.D.;  Location: [facility];  Service: Orthopedics   • TOE AMPUTATION Right 6/10/2019    Procedure: AMPUTATION, TOE- FIRST TOE;  Surgeon: Haroldo Kang M.D.;  Location: [facility];  Service: Orthopedics   • ORTHOPEDIC OSTEOTOMY Left 10/15/2018    Procedure: ORTHOPEDIC OSTEOTOMY-  FOR: 3RD METATARSAL PARTIAL EXCISION, AND LEFT GASTROC SOLEUS RECESSION;  Surgeon: Haroldo Kang M.D.;  Location: [facility];  Service: Orthopedics   • HARDWARE REMOVAL ORTHO Right 10/15/2018    Procedure: HARDWARE REMOVAL ORTHO-  FOOT METATARSALPHALANGEAL JOINT PLATE;  Surgeon: Haroldo Kang M.D.;  Location: [facility];  Service: Orthopedics   • TOE AMPUTATION Left 6/25/2018    Procedure: TOE AMPUTATION-FOR :PARTIAL 1ST DISTAL PHALANX EXCISION, GREAT TOE AMPUTATION;  Surgeon: Haroldo Kang M.D.;  Location: [facility];  Service: Orthopedics   • INTERNAL FIXATION REMOVAL Left 6/25/2018    Procedure: INTERNAL FIXATION REMOVAL;  Surgeon: Haroldo Kang M.D.;  Location: [facility];  Service:  Orthopedics   • NERVE ULNAR TRANSFER Right 4/3/2018    Procedure: NERVE ULNAR TRANSFER - TRANSPOSITION;  Surgeon: Ayad Luna M.D.;  Location: [facility];  Service: Orthopedics   • CARPAL TUNNEL RELEASE Right 4/3/2018    Procedure: CARPAL TUNNEL RELEASE;  Surgeon: Ayad Luna M.D.;  Location: [facility];  Service: Orthopedics   • ELBOW ARTHROTOMY Right 4/3/2018    Procedure: ELBOW ARTHROTOMY - FOR CONTRACTURE RELEASE AT THE ELBOW, RADIAL HEAD EXCISION;  Surgeon: Ayad Luna M.D.;  Location: [facility];  Service: Orthopedics   • SHOULDER SURGERY Right 12/02/2017    reversal   • METATARSAL HEAD RESECTION Right 11/13/2017    Procedure: METATARSAL HEAD RESECTION - EXCISION;  Surgeon: Haroldo Kang M.D.;  Location: [facility];  Service: Orthopedics   • HAMMERTOE CORRECTION Right 11/13/2017    Procedure: HAMMERTOE CORRECTION 2-5;  Surgeon: Haroldo Kang M.D.;  Location: [facility];  Service: Orthopedics   • TOE FUSION Right 11/13/2017    Procedure: TOE FUSION - 1ST METATARSALPHALANGEAL;  Surgeon: Haroldo Kang M.D.;  Location: [facility];  Service: Orthopedics   • METATARSAL HEAD RESECTION Left 8/21/2017    Procedure: METATARSAL HEAD RESECTION - 2-5;  Surgeon: Haroldo Kang M.D.;  Location: [facility];  Service:    • TOE FUSION Left 8/21/2017    Procedure: TOE FUSION - 1ST MTP;  Surgeon: Haroldo Kang M.D.;  Location: [facility];  Service:    • HARDWARE REMOVAL ORTHO Left 8/21/2017    Procedure: HARDWARE REMOVAL ORTHO;  Surgeon: Haroldo Kang M.D.;  Location: [facility];  Service:    • FUSION, SPINE, LUMBAR, PLIF  4/14/2017    Procedure: LUMBAR FUSION POSTERIOR - MINIMALLY INVASIVE TLIF L4-5;  Surgeon: Bossman Caro M.D.;  Location: [facility];  Service:    • HAMMERTOE CORRECTION Bilateral 12/22/2016    Procedure: HAMMERTOE CORRECTION/METATARSALPHALANGEAL  "JOINT RELEASE 2/3 RIGHT, 2-3 LEFT;  Surgeon: Haroldo Kang M.D.;  Location: SURGERY [facility];  Service:    • BUNIONECTOMY Left 2016    Procedure: BUNIONECTOMY FOR DISTAL HALLUX VALGUS CORRECTION WITH BRANDY;  Surgeon: Haroldo Kang M.D.;  Location: SURGERY [facility];  Service:    • ORTHOPEDIC OSTEOTOMY Left 2016    Procedure: ORTHOPEDIC OSTEOTOMY DISTAL METATARSAL 2-5;  Surgeon: Haroldo Kang M.D.;  Location: SURGERY [facility];  Service:    • HIP ARTH ANTERIOR TOTAL Left 2016    Procedure: HIP ARTHROPLASTY ANTERIOR TOTAL;  Surgeon: Emiliano Vang M.D.;  Location: SURGERY [facility];  Service:    • OTHER ORTHOPEDIC SURGERY  2014    right shoulder  arthroplasty   • OTHER ORTHOPEDIC SURGERY  2012    left knee/left ankle/left shoulder   • OTHER ORTHOPEDIC SURGERY  2004    elbow surgery   • OTHER  2000    dislocation left foot surgery at [facility]   • OTHER      \"septoplasty 20 years ago\"   • OTHER ORTHOPEDIC SURGERY      brad knee replaced   • OTHER ORTHOPEDIC SURGERY      left total hip       Family History   Problem Relation Age of Onset   • Heart Disease Mother    • Depression Mother    • Cancer Father         Colon cancer    • Cancer Sister         Skin cancer   • Sleep Apnea Neg Hx        Social History     Socioeconomic History   • Marital status:      Spouse name: Not on file   • Number of children: Not on file   • Years of education: Not on file   • Highest education level: Not on file   Occupational History   • Not on file   Tobacco Use   • Smoking status: Former Smoker     Packs/day: 1.00     Years: 20.00     Pack years: 20.00     Types: Cigarettes     Quit date: 2014     Years since quittin.4   • Smokeless tobacco: Former User     Types: Chew     Quit date: 1997   Vaping Use   • Vaping Use: Never used   Substance and Sexual Activity   • Alcohol use: Yes     Comment: occasional- approx once a week   • Drug use: No   • Sexual activity: Not on " "file   Other Topics Concern   • Not on file   Social History Narrative   • Not on file     Social Determinants of Health     Financial Resource Strain: Not on file   Food Insecurity: Not on file   Transportation Needs: Not on file   Physical Activity: Not on file   Stress: Not on file   Social Connections: Not on file   Intimate Partner Violence: Not on file   Housing Stability: Not on file       Allergies as of 06/13/2022 - Reviewed 06/13/2022   Allergen Reaction Noted   • Demerol Vomiting and Nausea 09/05/2014   • Cubicin [daptomycin] Vomiting 09/11/2018   • Meperidine  02/25/2004   • Sulfa drugs Hives 03/31/2017   • Sulfamethoxazole w-trimethoprim Rash 02/25/2004        Vitals:  /72 (BP Location: Left arm, Patient Position: Sitting, BP Cuff Size: Large adult)   Pulse 98   Ht 1.778 m (5' 10\")   Wt 122 kg (270 lb)   SpO2 93%     Current medications as of today   Current Outpatient Medications   Medication Sig Dispense Refill   • levothyroxine (SYNTHROID) 137 MCG Tab Take 1 Tablet by mouth every morning on an empty stomach. 90 Tablet 3   • levoFLOXacin (LEVAQUIN) 500 MG tablet      • valacyclovir (VALTREX) 1 GM Tab      • omeprazole (PRILOSEC) 20 MG delayed-release capsule      • oxyCODONE-acetaminophen (PERCOCET-10)  MG Tab      • gabapentin (NEURONTIN) 300 MG Cap TAKE 1 CAPSULE AT BEDTIME 90 Capsule 2   • celecoxib (CELEBREX) 200 MG Cap Take 1 Capsule by mouth 2 times a day as needed for Moderate Pain. 60 Capsule 1   • acetaminophen (TYLENOL) 500 MG Tab Take 1-2 Tablets by mouth every 6 hours as needed. 30 Tablet 0   • ACCU-CHEK GUIDE strip      • Accu-Chek FastClix Lancets Misc      • PENNSAID 2 % Solution      • lidocaine (LIDODERM) 5 % Patch      • tadalafil (CIALIS) 20 MG tablet      • Venlafaxine HCl 75 MG TABLET SR 24 HR      • naproxen (NAPROSYN) 500 MG Tab      • liraglutide (VICTOZA) 18 MG/3ML Solution Pen-injector Inject 1.8 mg as instructed every day.     • hydrOXYzine pamoate (VISTARIL) " 50 MG Cap Take 50 mg by mouth 2 Times a Day.     • azelastine (ASTELIN) 137 MCG/SPRAY nasal spray [place] 1 Spray in nose 2 Times a Day.     • NOVOFINE PLUS Inject 1 Application as instructed 4 Times a Day,Before Meals and at Bedtime. Use before meals and at bedtime to check blood sugar.     • lamoTRIgine (LAMICTAL) 100 MG Tab Take 100 mg by mouth every day.     • tamsulosin (FLOMAX) 0.4 MG capsule Take 0.4 mg by mouth ONE-HALF HOUR AFTER BREAKFAST.     • temazepam (RESTORIL) 30 MG capsule Take 30 mg by mouth at bedtime as needed for Sleep.     • losartan (COZAAR) 100 MG Tab Take 100 mg by mouth every morning.       No current facility-administered medications for this visit.         Physical Exam:   Gen:           Alert and oriented, No apparent distress. Mood and affect appropriate, normal interaction with examiner.  Eyes:          PERRL, EOM intact, sclere white, conjunctive moist.  Ears:          Not examined.   Hearing:     Grossly intact.  Nose:          Normal, no lesions or deformities.  Dentition:    mask  Oropharynx:   mask  Mallampati Classification: mask  Neck:        Supple, trachea midline, no masses.  Respiratory Effort: No intercostal retractions or use of accessory muscles.   Lung Auscultation:      Clear to auscultation bilaterally; no rales, rhonchi or wheezing.  CV:            Regular rate and rhythm. No murmurs, rubs or gallops.  Abd:           Not examined.   Lymphadenopathy: Not examined.  Gait and Station: Normal.  Digits and Nails: No clubbing, cyanosis, petechiae, or nodes.   Cranial Nerves: II-XII grossly intact.  Skin:        No rashes, lesions or ulcers noted.               Ext:           No cyanosis or edema.      Assessment:  1. JULIAN (obstructive sleep apnea)  DME Mask and Supplies    DME Other   2. Nocturnal hypoxemia     3. Hypertension, unspecified type     4. BMI 38.0-38.9,adult  HEIGHT AND WEIGHT   5. Former smoker         Immunizations:    Flu:10/25/21  Pneumovax 23:2022  Prevnar  13:2020  COVID-19: 11/17/21, 4/5/21, 3/3/21    Plan:  1.  Patient sleep apnea is well controlled and he is compliant with therapy.  He is having some issues with his mask at this time.  He is also considering switching DME's.  He would like to have a mask fit performed.  He will continue CPAP nightly but we will also adjust his pressures for comfort.  DME other; adjust to CPAP 14 cm at 2 L oxygen bleed in  DME mask/supplies; mask fit now.  Also use CPAP to 8 with chinstrap at night for mouth dryness.  Patient will see me a Med [employer] message letting you know if he would like to switch to CPAP and more for his supplies.  He understands he will have to continue his O2 through [facility].  2.  Discussed sleep hygiene.  3.  Follow primary care for other health concerns  Management hypertension  4.  Encourage weight loss through dietary changes and exercise  5.  Follow-up in 1 year with compliance report, sooner if needed.    Please note that this dictation was created using voice recognition software. I have made every reasonable attempt to correct obvious errors, but it is possible there are errors of grammar and possibly content that I did not discover before finalizing the note.

## 2022-06-13 NOTE — PATIENT INSTRUCTIONS
DME:  [facility] / ph [phone] / fax [phone] call for mask fit or DME- [facility] Ph. [phone] Fax. [phone]  If you want to switch companies, send [employer] software message so we send your information to them    O2 will have to stay with [facility], but could switch medical equipment company for supplies    CPAP tape, chin strap

## 2022-06-14 ENCOUNTER — OFFICE VISIT (OUTPATIENT)
Dept: ENDOCRINOLOGY | Facility: MEDICAL CENTER | Age: 62
End: 2022-06-14
Payer: COMMERCIAL

## 2022-06-14 ENCOUNTER — APPOINTMENT (OUTPATIENT)
Dept: ENDOCRINOLOGY | Facility: MEDICAL CENTER | Age: 62
End: 2022-06-14
Payer: MEDICARE

## 2022-06-14 VITALS
WEIGHT: 272 LBS | OXYGEN SATURATION: 93 % | DIASTOLIC BLOOD PRESSURE: 82 MMHG | HEIGHT: 70 IN | HEART RATE: 65 BPM | BODY MASS INDEX: 38.94 KG/M2 | SYSTOLIC BLOOD PRESSURE: 132 MMHG

## 2022-06-14 DIAGNOSIS — E55.9 VITAMIN D DEFICIENCY: ICD-10-CM

## 2022-06-14 DIAGNOSIS — E06.3 HYPOTHYROIDISM, ACQUIRED, AUTOIMMUNE: ICD-10-CM

## 2022-06-14 DIAGNOSIS — E29.1 HYPOGONADISM IN MALE: ICD-10-CM

## 2022-06-14 PROCEDURE — 99211 OFF/OP EST MAY X REQ PHY/QHP: CPT

## 2022-06-14 PROCEDURE — 99214 OFFICE O/P EST MOD 30 MIN: CPT

## 2022-06-14 RX ORDER — TESTOSTERONE ENANTHATE 75 MG/.5ML
75 INJECTION SUBCUTANEOUS
Qty: 6 ML | Refills: 2 | Status: SHIPPED | OUTPATIENT
Start: 2022-06-14 | End: 2023-11-20

## 2022-06-14 RX ORDER — LEVOTHYROXINE SODIUM 137 UG/1
137 TABLET ORAL
Qty: 114 TABLET | Refills: 4 | Status: SHIPPED | OUTPATIENT
Start: 2022-06-14 | End: 2022-06-24

## 2022-06-14 ASSESSMENT — FIBROSIS 4 INDEX: FIB4 SCORE: 1.38

## 2022-06-14 NOTE — PROGRESS NOTES
"Chief Complaint: Follow-up on the following conditions    HPI:   1.  Hypogonadism in male:  Cristino Keys is a 61 y.o. male with history of Hypogonadism diagnosed a few years ago and is here for follow-up   he was a patient of Dr. Weiss for many years     He reports a past medical history of Diabetes, Obesity, Hypertension, Hyperlipidemia.  Dr. Lew oversees his diabetes related topics    Denies osteoporosis and Asthma or COPD.     He reports  chronic opioid use (on and off for many years due to ortho surgeries)   Denies chronic steroid use,HIV diagnosis.       He reports a family history of Hypothyroidism.     He denies any breast swelling or breast changes.      He is currently taking Xyosted 75mg/0.5mls weekly He is unable to inject using vials due to chronic join pain.  He has been out of his medications for the past 2 weeks    His testosterone medication helps with energy, increases libido, and he feels that he is \"more with it.\"     He does his blood work on Wednesday 1 week prior to appointment and takes his testosterone medication on Fridays.    Latest Reference Range & Units 04/20/22 15:30   Free Testosterone 47 - 244 pg/mL 48   Sex Hormone Bind Globulin 19 - 76 nmol/L 25   Testosterone % Free 1.6 - 2.9 % 2.0   Testosterone,Total 300 - 720 ng/dL 238 (L)      Latest Reference Range & Units 04/20/22 15:30   Prostatic Specific Antigen Tot 0.00 - 4.00 ng/mL 0.47     Last BOUBACAR on 9/16/2021    2. Hypothyroidism:  Cristino Keys is a 61 y.o. male with history of Hypothyroidism diagnosed on a few year ago     He is currently on Synthroid 137 mcg daily which has been her thyroid hormone dose since 2 years.   He has been out of his medication for the past 2 weeks   He denies any recent dose changes.   He reports Good compliance and takes his thyroid hormone daily before breakfast.    He denies taking any iron, calcium supplements or antacids.     He denies fatigue, weight changes, heat/cold " intolerance, bowel/skin changes or CVS symptoms   He denies anxiousness, feeling excessive energy, tremulousness, palpitations, sweating and weight loss.    He denies lumps or enlargement in the neck.  He reports a family history of Hypothyroidism with his mother, sister.          Latest Reference Range & Units 04/20/22 15:30   TSH 0.380 - 5.330 uIU/mL 1.530   Free T-4 0.93 - 1.70 ng/dL 1.14     3. Vitamin D deficiency:  Currently taking 5000 IUs OTC of vitamin D3 daily   Latest Reference Range & Units 04/20/22 15:30   25-Hydroxy   Vitamin D 25 30 - 100 ng/mL 48     Patient's medications, allergies, and social histories were reviewed and updated as appropriate.      ROS:      CONS:     No fever, no chills, no weight loss, no fatigue   EYES:      No diplopia, no blurry vision, no redness of eyes, no swelling of eyelids   ENT:    No hearing loss, No ear pain, No sore throat, no dysphagia, no neck swelling   CV:     No chest pain, no palpitations, no claudication, no orthopnea, no PND   PULM:    No SOB, no cough, no hemoptysis, no wheezing    GI:   No nausea, no vomiting, no diarrhea, no constipation, no bloody stools   :  Passing urine well, no dysuria, no hematuria   ENDO:   No polyuria, no polydipsia, no heat intolerance, no cold intolerance   NEURO: No headaches, no dizziness, no convulsions, no tremors   MUSC:  No joint swellings, no arthralgias, no myalgias, no weakness   SKIN:   No rash, no ulcers, no dry skin   PSYCH:   No depression, no anxiety, no difficulty sleeping       Past Medical History:  Patient Active Problem List    Diagnosis Date Noted   • Primary osteoarthritis of both elbows 01/19/2022   • Bilateral elbow joint pain 01/19/2022   • Left inguinal hernia 01/06/2022   • Insomnia 01/06/2022   • Diastasis recti 12/14/2021   • Heterotopic ossification of bone 10/11/2021   • Presence of right artificial knee joint 08/30/2021   • Vitamin D deficiency 08/16/2021   • Polycythemia 07/01/2020   • History of  MRSA infection 06/10/2020   • Nocturnal hypoxemia 04/29/2020   • Hypogonadism in male 03/18/2020   • Former smoker 03/11/2019   • Low testosterone in male 03/08/2019   • Wound infection 05/14/2018   • Diabetic ulcer of toe of left foot associated with type 2 diabetes mellitus, with fat layer exposed ([facility]) 05/14/2018   • Contracture of elbow joint, right 04/03/2018   • Chronic pain 08/28/2017   • Chronic narcotic use 08/28/2017   • Arthritis of left ankle 08/21/2017   • Degeneration of lumbar intervertebral disc 04/14/2017   • Lower back pain 04/14/2017   • JULIAN (obstructive sleep apnea) 04/03/2017   • Snoring 04/03/2017   • Acquired hallux valgus of left foot 12/22/2016   • Knee pain, right 12/19/2016   • Type 2 diabetes mellitus without complication ([facility]) 12/13/2016   • Chronic autoimmune thyroiditis 10/03/2016   • Hypothyroidism, acquired, autoimmune 10/03/2016   • Presence of right artificial hip joint 08/31/2016   • Primary osteoarthritis of left hip 08/18/2016   • Foot pain, left 05/27/2016   • Diabetes ([facility]) 09/05/2014   • Lumbar disc disease 09/05/2014   • Gout 09/05/2014   • Hypertension 09/05/2014   • H/O arthroscopy of knee 09/05/2014   • Hx of elbow surgery 09/05/2014   • Severe obesity with body mass index (BMI) of 35.0 to 39.9 with serious comorbidity ([facility]) 09/05/2014   • Osteoarthritis 09/05/2014   • H/O shoulder replacement 09/05/2014   • Rotator cuff arthropathy 09/05/2014   • Bilateral bunions 09/05/2014       Past Surgical History:  Past Surgical History:   Procedure Laterality Date   • CT UPPER GI ENDOSCOPY,REMOV F.B.  10/18/2021    Procedure: GASTROSCOPY, WITH FOREIGN BODY REMOVAL;  Surgeon: Estiven Ruiz M.D.;  Location: SURGERY SAME DAY [facility];  Service: Gastroenterology   • CT UPPER GI ENDOSCOPY,DIAGNOSIS  10/18/2021    Procedure: GASTROSCOPY;  Surgeon: Estiven Ruiz M.D.;  Location: SURGERY SAME DAY [facility];  Service: Gastroenterology   • HETEROTOPIC OSSIFICATION Right 10/14/2021     Procedure: EXCISION, HETEROTOPIC ossification;  Surgeon: Emiliano Vang M.D.;  Location: [facility];  Service: Orthopedics   • METATARSAL HEAD RESECTION Left 7/20/2020    Procedure: EXCISION, METATARSAL BONE, HEAD- PARTIAL DISTAL 2/3 METATARSAL;  Surgeon: Haroldo Kang M.D.;  Location: [facility];  Service: Orthopedics   • HAMMERTOE CORRECTION Left 7/20/2020    Procedure: CORRECTION, HAMMER TOE- 4/5;  Surgeon: Haroldo Kang M.D.;  Location: [facility];  Service: Orthopedics   • TOENAIL REMOVAL Left 7/20/2020    Procedure: REMOVAL, TOENAIL 3-5;  Surgeon: Haroldo Kang M.D.;  Location: [facility];  Service: Orthopedics   • METATARSAL HEAD RESECTION Left 6/10/2019    Procedure: METATARSAL HEAD RESECTION- FOR PARTIAL EXCISION;  Surgeon: Haroldo Kang M.D.;  Location: [facility];  Service: Orthopedics   • JOINT INJECTION DIAGNOSTIC  6/10/2019    Procedure: INJECTION, JOINT, DIAGNOSTIC- MIDFOOT;  Surgeon: Haroldo Kang M.D.;  Location: [facility];  Service: Orthopedics   • TOE AMPUTATION Right 6/10/2019    Procedure: AMPUTATION, TOE- FIRST TOE;  Surgeon: Haroldo Kang M.D.;  Location: [facility];  Service: Orthopedics   • ORTHOPEDIC OSTEOTOMY Left 10/15/2018    Procedure: ORTHOPEDIC OSTEOTOMY-  FOR: 3RD METATARSAL PARTIAL EXCISION, AND LEFT GASTROC SOLEUS RECESSION;  Surgeon: Haroldo Kang M.D.;  Location: [facility];  Service: Orthopedics   • HARDWARE REMOVAL ORTHO Right 10/15/2018    Procedure: HARDWARE REMOVAL ORTHO-  FOOT METATARSALPHALANGEAL JOINT PLATE;  Surgeon: Haroldo Kang M.D.;  Location: [facility];  Service: Orthopedics   • TOE AMPUTATION Left 6/25/2018    Procedure: TOE AMPUTATION-FOR :PARTIAL 1ST DISTAL PHALANX EXCISION, GREAT TOE AMPUTATION;  Surgeon: Haroldo Kang M.D.;  Location: [facility];  Service: Orthopedics   • INTERNAL FIXATION REMOVAL Left  6/25/2018    Procedure: INTERNAL FIXATION REMOVAL;  Surgeon: Haroldo Kang M.D.;  Location: [facility];  Service: Orthopedics   • NERVE ULNAR TRANSFER Right 4/3/2018    Procedure: NERVE ULNAR TRANSFER - TRANSPOSITION;  Surgeon: Ayad Luna M.D.;  Location: [facility];  Service: Orthopedics   • CARPAL TUNNEL RELEASE Right 4/3/2018    Procedure: CARPAL TUNNEL RELEASE;  Surgeon: Ayad Luna M.D.;  Location: [facility];  Service: Orthopedics   • ELBOW ARTHROTOMY Right 4/3/2018    Procedure: ELBOW ARTHROTOMY - FOR CONTRACTURE RELEASE AT THE ELBOW, RADIAL HEAD EXCISION;  Surgeon: Ayad Luna M.D.;  Location: [facility];  Service: Orthopedics   • SHOULDER SURGERY Right 12/02/2017    reversal   • METATARSAL HEAD RESECTION Right 11/13/2017    Procedure: METATARSAL HEAD RESECTION - EXCISION;  Surgeon: Harlodo Kang M.D.;  Location: [facility];  Service: Orthopedics   • HAMMERTOE CORRECTION Right 11/13/2017    Procedure: HAMMERTOE CORRECTION 2-5;  Surgeon: Haroldo Kang M.D.;  Location: [facility];  Service: Orthopedics   • TOE FUSION Right 11/13/2017    Procedure: TOE FUSION - 1ST METATARSALPHALANGEAL;  Surgeon: Haroldo Kang M.D.;  Location: [facility];  Service: Orthopedics   • METATARSAL HEAD RESECTION Left 8/21/2017    Procedure: METATARSAL HEAD RESECTION - 2-5;  Surgeon: Haroldo Kang M.D.;  Location: [facility];  Service:    • TOE FUSION Left 8/21/2017    Procedure: TOE FUSION - 1ST MTP;  Surgeon: Haroldo Kang M.D.;  Location: [facility];  Service:    • HARDWARE REMOVAL ORTHO Left 8/21/2017    Procedure: HARDWARE REMOVAL ORTHO;  Surgeon: Haroldo Kang M.D.;  Location: [facility];  Service:    • FUSION, SPINE, LUMBAR, PLIF  4/14/2017    Procedure: LUMBAR FUSION POSTERIOR - MINIMALLY INVASIVE TLIF L4-5;  Surgeon: Bossman Caro M.D.;  [place]  "[facility];  Service:    • HAMMERTOE CORRECTION Bilateral 12/22/2016    Procedure: HAMMERTOE CORRECTION/METATARSALPHALANGEAL JOINT RELEASE 2/3 RIGHT, 2-3 LEFT;  Surgeon: Haroldo Kang M.D.;  Location: SURGERY [facility];  Service:    • BUNIONECTOMY Left 12/22/2016    Procedure: BUNIONECTOMY FOR DISTAL HALLUX VALGUS CORRECTION WITH BRANDY;  Surgeon: Haroldo Kang M.D.;  Location: SURGERY [facility];  Service:    • ORTHOPEDIC OSTEOTOMY Left 12/22/2016    Procedure: ORTHOPEDIC OSTEOTOMY DISTAL METATARSAL 2-5;  Surgeon: Haroldo Kang M.D.;  Location: SURGERY [facility];  Service:    • HIP ARTH ANTERIOR TOTAL Left 8/18/2016    Procedure: HIP ARTHROPLASTY ANTERIOR TOTAL;  Surgeon: Emiliano Vang M.D.;  Location: SURGERY [facility];  Service:    • OTHER ORTHOPEDIC SURGERY  6/2014    right shoulder  arthroplasty   • OTHER ORTHOPEDIC SURGERY  11/1/2012    left knee/left ankle/left shoulder   • OTHER ORTHOPEDIC SURGERY  2004    elbow surgery   • OTHER  2000    dislocation left foot surgery at [facility]   • OTHER      \"septoplasty 20 years ago\"   • OTHER ORTHOPEDIC SURGERY      brad knee replaced   • OTHER ORTHOPEDIC SURGERY      left total hip        Allergies:  Demerol, Cubicin [daptomycin], Meperidine, Sulfa drugs, and Sulfamethoxazole w-trimethoprim     Current Medications:    Current Outpatient Medications:   •  omeprazole (PRILOSEC) 40 MG delayed-release capsule, , Disp: , Rfl:   •  levothyroxine (SYNTHROID) 137 MCG Tab, Take 1 Tablet by mouth every morning on an empty stomach., Disp: 90 Tablet, Rfl: 3  •  levoFLOXacin (LEVAQUIN) 500 MG tablet, , Disp: , Rfl:   •  valacyclovir (VALTREX) 1 GM Tab, , Disp: , Rfl:   •  omeprazole (PRILOSEC) 20 MG delayed-release capsule, , Disp: , Rfl:   •  oxyCODONE-acetaminophen (PERCOCET-10)  MG Tab, , Disp: , Rfl:   •  gabapentin (NEURONTIN) 300 MG Cap, TAKE 1 CAPSULE AT BEDTIME, Disp: 90 Capsule, Rfl: 2  •  celecoxib (CELEBREX) 200 MG Cap, Take 1 " Capsule by mouth 2 times a day as needed for Moderate Pain., Disp: 60 Capsule, Rfl: 1  •  ACCU-CHEK GUIDE strip, , Disp: , Rfl:   •  Accu-Chek FastClix Lancets Misc, , Disp: , Rfl:   •  PENNSAID 2 % Solution, , Disp: , Rfl:   •  lidocaine (LIDODERM) 5 % Patch, , Disp: , Rfl:   •  tadalafil (CIALIS) 20 MG tablet, , Disp: , Rfl:   •  Venlafaxine HCl 75 MG TABLET SR 24 HR, , Disp: , Rfl:   •  naproxen (NAPROSYN) 500 MG Tab, , Disp: , Rfl:   •  liraglutide (VICTOZA) 18 MG/3ML Solution Pen-injector, Inject 1.8 mg as instructed every day., Disp: , Rfl:   •  hydrOXYzine pamoate (VISTARIL) 50 MG Cap, Take 50 mg by mouth 2 Times a Day., Disp: , Rfl:   •  azelastine (ASTELIN) 137 MCG/SPRAY nasal spray, Spray 1 Spray in nose 2 Times a Day., Disp: , Rfl:   •  NOVOFINE PLUS, Inject 1 Application as instructed 4 Times a Day,Before Meals and at Bedtime. Use before meals and at bedtime to check blood sugar., Disp: , Rfl:   •  lamoTRIgine (LAMICTAL) 100 MG Tab, Take 100 mg by mouth every day., Disp: , Rfl:   •  tamsulosin (FLOMAX) 0.4 MG capsule, Take 0.4 mg by mouth ONE-HALF HOUR AFTER BREAKFAST., Disp: , Rfl:   •  temazepam (RESTORIL) 30 MG capsule, Take 30 mg by mouth at bedtime as needed for Sleep., Disp: , Rfl:   •  losartan (COZAAR) 100 MG Tab, Take 100 mg by mouth every morning., Disp: , Rfl:     Social History:  Social History     Socioeconomic History   • Marital status:      Spouse name: Not on file   • Number of children: Not on file   • Years of education: Not on file   • Highest education level: Not on file   Occupational History   • Not on file   Tobacco Use   • Smoking status: Former Smoker     Packs/day: 1.00     Years: 20.00     Pack years: 20.00     Types: Cigarettes     Quit date: 2014     Years since quittin.4   • Smokeless tobacco: Former User     Types: Chew     Quit date: 1997   Vaping Use   • Vaping Use: Never used   Substance and Sexual Activity   • Alcohol use: Yes     Comment:  "occasional- approx once a week   • Drug use: No   • Sexual activity: Not on file   Other Topics Concern   • Not on file   Social History Narrative   • Not on file     Social Determinants of Health     Financial Resource Strain: Not on file   Food Insecurity: Not on file   Transportation Needs: Not on file   Physical Activity: Not on file   Stress: Not on file   Social Connections: Not on file   Intimate Partner Violence: Not on file   Housing Stability: Not on file        Family History:   Family History   Problem Relation Age of Onset   • Heart Disease Mother    • Depression Mother    • Cancer Father         Colon cancer    • Cancer Sister         Skin cancer   • Sleep Apnea Neg Hx          PHYSICAL EXAM:   Vital signs: /82   Pulse 65   Ht 1.778 m (5' 10\")   Wt 123 kg (272 lb)   SpO2 93%   BMI 39.03 kg/m²   GENERAL: Well-developed, well-nourished  in no apparent distress.   EYE: No ocular and eyelid asymmetry, Anicteric sclerae,  PERRL, No exophthalmos or lidlag  HENT: Hearing grossly intact, Normocephalic, atraumatic.   NECK: Supple. Trachea midline. thyroid is normal in size without nodules or tenderness  CHEST: no gynecomastia, no nipple discharge  CARDIOVASCULAR: Regular rate and rhythm. No murmurs, rubs, or gallops.   LUNGS: Clear to auscultation bilaterally   GENIT: deferred  EXTREMITIES: No clubbing, cyanosis, or edema.   NEUROLOGICAL: Cranial nerves II-XII are grossly intact   Symmetric reflexes at the patella no proximal muscle weakness, No visible tremor with both outstretched hands  LYMPH: No cervical, supraclavicular,  adenopathy palpated.   SKIN: No rashes, lesions. Turgor is normal.    Labs:  Lab Results   Component Value Date/Time    WBC 6.2 04/20/2022 03:30 PM    RBC 5.14 04/20/2022 03:30 PM    HEMOGLOBIN 15.7 04/20/2022 03:30 PM    MCV 90.1 04/20/2022 03:30 PM    MCH 30.5 04/20/2022 03:30 PM    MCHC 33.9 04/20/2022 03:30 PM    RDW 50.8 (H) 04/20/2022 03:30 PM    MPV 9.8 04/20/2022 03:30 " PM       Lab Results   Component Value Date/Time    SODIUM 139 09/29/2021 01:42 PM    POTASSIUM 4.4 09/29/2021 01:42 PM    CHLORIDE 102 09/29/2021 01:42 PM    CO2 22 09/29/2021 01:42 PM    ANION 15.0 09/29/2021 01:42 PM    GLUCOSE 139 (H) 09/29/2021 01:42 PM    BUN 17 09/29/2021 01:42 PM    CREATININE 1.03 09/29/2021 01:42 PM    CALCIUM 9.1 09/29/2021 01:42 PM    ASTSGOT 24 08/06/2021 12:29 PM    ALTSGPT 23 08/06/2021 12:29 PM    TBILIRUBIN 0.5 08/06/2021 12:29 PM    ALBUMIN 4.7 08/06/2021 12:29 PM    TOTPROTEIN 7.6 08/06/2021 12:29 PM    GLOBULIN 2.9 08/06/2021 12:29 PM    AGRATIO 1.6 08/06/2021 12:29 PM       Lab Results   Component Value Date/Time    TSHULTRASEN 1.300 12/30/2021 1524       Lab Results   Component Value Date/Time    TESTOSTERONE 243 (L) 12/30/2020 1042           Imaging:      ASSESSMENT/PLAN:   1. Hypogonadism in male  Clinically stable    We will continue the same dose of Xyosted 75mg weekly  Prescription sent to pharmacy  - Testosterone, Free & Total, Adult Male (w/SHBG); Future  - HEMOGLOBIN AND HEMATOCRIT; Future  - Comp Metabolic Panel; Future    2. Hypothyroidism, acquired, autoimmune  Clinically stable  Biochemically stable  Prescription sent to pharmacy  - levothyroxine (SYNTHROID) 137 MCG Tab; Take 1 Tablet by mouth every morning on an empty stomach.  Dispense: 114 Tablet; Refill: 4  - TSH; Future  - FREE THYROXINE; Future    3. Vitamin D deficiency  Unstable  Continue regimen-5000 IUs OTC of vitamin D3  - VITAMIN D,25 HYDROXY; Future      Disposition: Make an appointment with me in 6 months   Please do your blood work before next appointment  Sample: 1 box of Synthroid 125 mcg given today while pending his pharmacy delivery (we did not have 137 mcg samples)    Thank you kindly for allowing me to participate in the endocrine care plan for this patient.    SANJEEV Tee.P.R.N.  06/14/2022    CC:   Kevin Chavez M.D.

## 2022-06-24 DIAGNOSIS — E06.3 HYPOTHYROIDISM, ACQUIRED, AUTOIMMUNE: ICD-10-CM

## 2022-06-24 RX ORDER — LEVOTHYROXINE SODIUM 137 UG/1
137 TABLET ORAL
Qty: 114 TABLET | Refills: 4 | Status: SHIPPED | OUTPATIENT
Start: 2022-06-24

## 2022-07-11 PROBLEM — M19.029 ELBOW ARTHRITIS: Status: ACTIVE | Noted: 2022-07-11

## 2022-07-12 ENCOUNTER — HOSPITAL ENCOUNTER (OUTPATIENT)
Facility: MEDICAL CENTER | Age: 62
End: 2022-07-12
Attending: PODIATRIST
Payer: COMMERCIAL

## 2022-07-12 PROCEDURE — 87205 SMEAR GRAM STAIN: CPT

## 2022-07-12 PROCEDURE — 87102 FUNGUS ISOLATION CULTURE: CPT

## 2022-07-12 ASSESSMENT — FIBROSIS 4 INDEX: FIB4 SCORE: 1.38

## 2022-07-13 LAB
FUNGUS SPEC FUNGUS STN: NORMAL
SIGNIFICANT IND 70042: NORMAL
SITE SITE: NORMAL
SOURCE SOURCE: NORMAL

## 2022-07-25 ENCOUNTER — APPOINTMENT (RX ONLY)
Dept: URBAN - METROPOLITAN AREA CLINIC 22 | Facility: CLINIC | Age: 62
Setting detail: DERMATOLOGY
End: 2022-07-25

## 2022-07-25 DIAGNOSIS — L82.1 OTHER SEBORRHEIC KERATOSIS: ICD-10-CM

## 2022-07-25 DIAGNOSIS — L57.0 ACTINIC KERATOSIS: ICD-10-CM

## 2022-07-25 DIAGNOSIS — L81.4 OTHER MELANIN HYPERPIGMENTATION: ICD-10-CM

## 2022-07-25 DIAGNOSIS — Z85.828 PERSONAL HISTORY OF OTHER MALIGNANT NEOPLASM OF SKIN: ICD-10-CM

## 2022-07-25 DIAGNOSIS — D18.0 HEMANGIOMA: ICD-10-CM

## 2022-07-25 DIAGNOSIS — L82.0 INFLAMED SEBORRHEIC KERATOSIS: ICD-10-CM

## 2022-07-25 DIAGNOSIS — D22 MELANOCYTIC NEVI: ICD-10-CM

## 2022-07-25 DIAGNOSIS — Z71.89 OTHER SPECIFIED COUNSELING: ICD-10-CM

## 2022-07-25 PROBLEM — D22.5 MELANOCYTIC NEVI OF TRUNK: Status: ACTIVE | Noted: 2022-07-25

## 2022-07-25 PROBLEM — D18.01 HEMANGIOMA OF SKIN AND SUBCUTANEOUS TISSUE: Status: ACTIVE | Noted: 2022-07-25

## 2022-07-25 PROCEDURE — ? SUNSCREEN RECOMMENDATIONS

## 2022-07-25 PROCEDURE — 17004 DESTROY PREMAL LESIONS 15/>: CPT

## 2022-07-25 PROCEDURE — ? LIQUID NITROGEN

## 2022-07-25 PROCEDURE — 17110 DESTRUCTION B9 LES UP TO 14: CPT | Mod: 59

## 2022-07-25 PROCEDURE — ? COUNSELING

## 2022-07-25 PROCEDURE — 99213 OFFICE O/P EST LOW 20 MIN: CPT | Mod: 25

## 2022-07-25 ASSESSMENT — LOCATION SIMPLE DESCRIPTION DERM
LOCATION SIMPLE: LEFT PRETIBIAL REGION
LOCATION SIMPLE: RIGHT LOWER BACK
LOCATION SIMPLE: LEFT CHEEK
LOCATION SIMPLE: RIGHT UPPER ARM
LOCATION SIMPLE: RIGHT CHEEK
LOCATION SIMPLE: LEFT FOREARM
LOCATION SIMPLE: ABDOMEN
LOCATION SIMPLE: RIGHT EAR
LOCATION SIMPLE: RIGHT ZYGOMA
LOCATION SIMPLE: LEFT UPPER BACK
LOCATION SIMPLE: RIGHT WRIST
LOCATION SIMPLE: RIGHT TEMPLE
LOCATION SIMPLE: RIGHT FOREARM
LOCATION SIMPLE: LEFT SHOULDER

## 2022-07-25 ASSESSMENT — LOCATION DETAILED DESCRIPTION DERM
LOCATION DETAILED: RIGHT CENTRAL ZYGOMA
LOCATION DETAILED: LEFT SUPERIOR MEDIAL UPPER BACK
LOCATION DETAILED: RIGHT CENTRAL MALAR CHEEK
LOCATION DETAILED: LEFT INFERIOR CENTRAL MALAR CHEEK
LOCATION DETAILED: LEFT CENTRAL MALAR CHEEK
LOCATION DETAILED: LEFT LATERAL SHOULDER
LOCATION DETAILED: RIGHT DORSAL WRIST
LOCATION DETAILED: LEFT DISTAL DORSAL FOREARM
LOCATION DETAILED: RIGHT CYMBA CONCHA
LOCATION DETAILED: RIGHT DISTAL POSTERIOR UPPER ARM
LOCATION DETAILED: RIGHT SUPERIOR CRUS OF ANTIHELIX
LOCATION DETAILED: LEFT MEDIAL DISTAL PRETIBIAL REGION
LOCATION DETAILED: EPIGASTRIC SKIN
LOCATION DETAILED: RIGHT SUPERIOR PREAURICULAR CHEEK
LOCATION DETAILED: LEFT DISTAL PRETIBIAL REGION
LOCATION DETAILED: RIGHT SUPERIOR MEDIAL MIDBACK
LOCATION DETAILED: LEFT PROXIMAL PRETIBIAL REGION
LOCATION DETAILED: RIGHT VENTRAL LATERAL DISTAL FOREARM
LOCATION DETAILED: RIGHT LATERAL TEMPLE
LOCATION DETAILED: RIGHT VENTRAL LATERAL PROXIMAL FOREARM
LOCATION DETAILED: RIGHT VENTRAL PROXIMAL FOREARM
LOCATION DETAILED: LEFT INFERIOR MEDIAL MALAR CHEEK
LOCATION DETAILED: RIGHT PROXIMAL DORSAL FOREARM
LOCATION DETAILED: LEFT PROXIMAL RADIAL DORSAL FOREARM

## 2022-07-25 ASSESSMENT — LOCATION ZONE DERM
LOCATION ZONE: TRUNK
LOCATION ZONE: EAR
LOCATION ZONE: LEG
LOCATION ZONE: ARM
LOCATION ZONE: FACE

## 2022-07-25 NOTE — PROCEDURE: LIQUID NITROGEN
Application Tool (Optional): Liquid Nitrogen Sprayer
Show Topical Anesthesia Variable?: Yes
Number Of Freeze-Thaw Cycles: 3 freeze-thaw cycles
Spray Paint Technique: No
Medical Necessity Clause: This procedure was medically necessary because the lesions that were treated were:
Detail Level: Detailed
Medical Necessity Information: It is in your best interest to select a reason for this procedure from the list below. All of these items fulfill various CMS LCD requirements except the new and changing color options.
Duration Of Freeze Thaw-Cycle (Seconds): 3
Post-Care Instructions: I reviewed with the patient in detail post-care instructions. Patient is to wear sunprotection, and avoid picking at any of the treated lesions. Pt may apply Vaseline to crusted or scabbing areas.
Spray Paint Text: The liquid nitrogen was applied to the skin utilizing a spray paint frosting technique.
Pared With?: curette
Consent: The patient's consent was obtained including but not limited to risks of crusting, scabbing, blistering, scarring, darker or lighter pigmentary change, recurrence, incomplete removal and infection.
Number Of Freeze-Thaw Cycles: 2 freeze-thaw cycles

## 2022-07-29 ENCOUNTER — HOSPITAL ENCOUNTER (OUTPATIENT)
Dept: LAB | Facility: MEDICAL CENTER | Age: 62
End: 2022-07-29
Attending: INTERNAL MEDICINE
Payer: COMMERCIAL

## 2022-07-29 LAB
EST. AVERAGE GLUCOSE BLD GHB EST-MCNC: 157 MG/DL
HBA1C MFR BLD: 7.1 % (ref 4–5.6)

## 2022-07-29 PROCEDURE — 84402 ASSAY OF FREE TESTOSTERONE: CPT

## 2022-07-29 PROCEDURE — 36415 COLL VENOUS BLD VENIPUNCTURE: CPT

## 2022-07-29 PROCEDURE — 83036 HEMOGLOBIN GLYCOSYLATED A1C: CPT

## 2022-07-29 PROCEDURE — 84270 ASSAY OF SEX HORMONE GLOBUL: CPT

## 2022-07-29 PROCEDURE — 84403 ASSAY OF TOTAL TESTOSTERONE: CPT

## 2022-08-01 LAB
SHBG SERPL-SCNC: 23 NMOL/L (ref 19–76)
TESTOST FREE MFR SERPL: 2 % (ref 1.6–2.9)
TESTOST FREE SERPL-MCNC: 28 PG/ML (ref 47–244)
TESTOST SERPL-MCNC: 139 NG/DL (ref 300–720)

## 2022-08-03 LAB
FUNGUS SPEC CULT: ABNORMAL
FUNGUS SPEC CULT: ABNORMAL
FUNGUS SPEC FUNGUS STN: ABNORMAL
SIGNIFICANT IND 70042: ABNORMAL
SITE SITE: ABNORMAL
SOURCE SOURCE: ABNORMAL

## 2022-09-13 ENCOUNTER — PATIENT MESSAGE (OUTPATIENT)
Dept: SLEEP MEDICINE | Facility: MEDICAL CENTER | Age: 62
End: 2022-09-13
Payer: COMMERCIAL

## 2022-09-13 NOTE — PATIENT COMMUNICATION
Orders from last OV was sent to DME:  [facility]  / [employer] [phone] / fax [phone]    Please advice.

## 2022-09-13 NOTE — PATIENT COMMUNICATION
Faxed OV, Orders, ID/Insurance to DME:  [employer] / ph [phone] / fax [phone].  Fax Confirmed 09/13/22  SP

## 2022-09-14 ENCOUNTER — PATIENT MESSAGE (OUTPATIENT)
Dept: SLEEP MEDICINE | Facility: MEDICAL CENTER | Age: 62
End: 2022-09-14
Payer: COMMERCIAL

## 2022-09-19 ENCOUNTER — PRE-ADMISSION TESTING (OUTPATIENT)
Dept: ADMISSIONS | Facility: MEDICAL CENTER | Age: 62
End: 2022-09-19
Attending: ORTHOPAEDIC SURGERY
Payer: COMMERCIAL

## 2022-09-20 DIAGNOSIS — Z00.6 RESEARCH STUDY PATIENT: ICD-10-CM

## 2022-09-21 ENCOUNTER — PRE-ADMISSION TESTING (OUTPATIENT)
Dept: ADMISSIONS | Facility: MEDICAL CENTER | Age: 62
End: 2022-09-21
Attending: ORTHOPAEDIC SURGERY
Payer: COMMERCIAL

## 2022-09-21 ENCOUNTER — HOSPITAL ENCOUNTER (OUTPATIENT)
Facility: MEDICAL CENTER | Age: 62
End: 2022-09-21
Attending: PATHOLOGY
Payer: COMMERCIAL

## 2022-09-21 DIAGNOSIS — Z01.810 PRE-OPERATIVE CARDIOVASCULAR EXAMINATION: ICD-10-CM

## 2022-09-21 DIAGNOSIS — Z01.812 PRE-OPERATIVE LABORATORY EXAMINATION: ICD-10-CM

## 2022-09-21 DIAGNOSIS — Z00.6 RESEARCH STUDY PATIENT: ICD-10-CM

## 2022-09-21 LAB
ANION GAP SERPL CALC-SCNC: 12 MMOL/L (ref 7–16)
BUN SERPL-MCNC: 14 MG/DL (ref 8–22)
CALCIUM SERPL-MCNC: 9 MG/DL (ref 8.5–10.5)
CHLORIDE SERPL-SCNC: 102 MMOL/L (ref 96–112)
CO2 SERPL-SCNC: 21 MMOL/L (ref 20–33)
CREAT SERPL-MCNC: 1.18 MG/DL (ref 0.5–1.4)
EKG IMPRESSION: NORMAL
ERYTHROCYTE [DISTWIDTH] IN BLOOD BY AUTOMATED COUNT: 57.9 FL (ref 35.9–50)
GFR SERPLBLD CREATININE-BSD FMLA CKD-EPI: 69 ML/MIN/1.73 M 2
GLUCOSE SERPL-MCNC: 142 MG/DL (ref 65–99)
HCT VFR BLD AUTO: 42.1 % (ref 42–52)
HGB BLD-MCNC: 14.1 G/DL (ref 14–18)
MCH RBC QN AUTO: 32.3 PG (ref 27–33)
MCHC RBC AUTO-ENTMCNC: 33.5 G/DL (ref 33.7–35.3)
MCV RBC AUTO: 96.3 FL (ref 81.4–97.8)
PLATELET # BLD AUTO: 182 K/UL (ref 164–446)
PMV BLD AUTO: 8.7 FL (ref 9–12.9)
POTASSIUM SERPL-SCNC: 4 MMOL/L (ref 3.6–5.5)
RBC # BLD AUTO: 4.37 M/UL (ref 4.7–6.1)
SODIUM SERPL-SCNC: 135 MMOL/L (ref 135–145)
WBC # BLD AUTO: 5.7 K/UL (ref 4.8–10.8)

## 2022-09-21 PROCEDURE — 80048 BASIC METABOLIC PNL TOTAL CA: CPT

## 2022-09-21 PROCEDURE — 85027 COMPLETE CBC AUTOMATED: CPT

## 2022-09-21 PROCEDURE — 93010 ELECTROCARDIOGRAM REPORT: CPT | Performed by: INTERNAL MEDICINE

## 2022-09-21 PROCEDURE — 36415 COLL VENOUS BLD VENIPUNCTURE: CPT

## 2022-09-21 PROCEDURE — 93005 ELECTROCARDIOGRAM TRACING: CPT

## 2022-09-23 LAB
ELF SCORE: 10.19
RELATIVE RISK: ABNORMAL
RISK GROUP: ABNORMAL
RISK: 23.6 %

## 2022-10-03 NOTE — H&P
Surgery Orthopedic History & Physical Note    Date  10/4/2022    Primary Care Physician  Kevin Chavez M.D.      Pre-Op Diagnosis Codes:     * Elbow arthritis [M19.029]    HPI  This is a 62 y.o. male who presented with Subjective   Patient ID:  Cristino Keys is a 62 y.o. male.    Chief Complaint:  Pain of the Right Elbow and Pain of the Left Elbow    Last Surgery: Nerve Ulnar Transfer - Transposition - Right, Carpal Tunnel Release - Right, and Elbow Arthrotomy - For Contracture Release At The Elbow, Radial Head Excision - Right on 4/3/2018      HPI  Patient is seen in clinic today for follow-up of bilateral elbow arthritis, left worse than right. He was last seen in January and received bilateral elbow injections that provided him great relief. However, his pain has returned and worsened in intensity. He now describes a radiating pain from his elbows down into his forearms. He continues to experience pain with his daily life activities and certain movements of his elbows. He is known to me for the above-mentioned procedure on the right, which he is doing really well with. He has not had any new injuries. He denies any numbness or tingling.     Review of Systems    Physical Exam  General:  Well appearing, in no acute distress. Appropriate and cooperative with the exam.  HEENT: Normocephalic, atraumatic. Cranial nerves II-XII are grossly intact.  Cardiovascular: 2+ distal pulses. No cyanosis, clubbing, or edema.  Pulmonary: Breathing comfortably on room air without labor.  Abdomen: Soft and unremarkable.  Skin: No rashes, jaundice, or cyanosis.  Lymphatic: No epitrochlear lymphadenopathy.  Spine:  Clinically midline.   Gait:  Patient ambulates without a limp.  Musculoskeletal: On the left, he flexes to about 110° about the elbow and he lacks about 30° of extension. There is palpable popping and catching with flexion and extension.  He has good pronation and supination. Full composite . He has most of his  pain to his medial epicondyle.   Neurological: Sensation intact to light touch median, radial, ulnar nerves.     Imaging  DX-ELBOW-COMPLETE 3+ RIGHT    Result Date: 7/11/2022  X-rays of the right elbow AP lateral and oblique reveal a previous radial head resection.  There is some degenerative changes at the trochlear articulation.  No acute fractures noted.    DX-ELBOW-COMPLETE 3+ LEFT    Result Date: 7/11/2022  X-rays of the left elbow AP lateral and oblique 3 view reveals degenerative changes at the radiocapitellar joint.  There is actually some osteophyte formation both in the anterior and posterior aspect as well in the olecranon fossa region.  No acute fracture dislocations noted.    Encounter Diagnoses:   1. Bilateral elbow arthritis, left worse than right.  2. 4 years status post right carpal tunnel release, elbow arthrotomy, radial head excision, doing well.     Plan  I had a thorough discussion with the patient regarding the diagnosis including both operative and nonoperative treatment.  After obtaining consent from the patient, we will proceed with operative management. I recommended a contracture release and osteophyte removal of the left elbow, radial head excision left elbow, repairs as indicated. Risks of surgery include, but not limited to, wound problems, infection, nerve injury, vascular injury, need for further surgery. Risk for weakness, stiffness, blood clots, chance for reinjury, malunion, nonunion, overcorrection, undercorrection and recurrence. I discussed the risks/ benefits/ complications associated with narcotic use including the potential for addiction. All of the patient's questions were answered today. We will schedule this in the near future at his convenience. He is planning to do this in November since he is taking care of his wife status post total knee arthroplasty. Today clinically, we will inject his bilateral elbows with 3 cc marcaine, 3 cc lidocaine and 3 cc Celestone since he  "is not planning his surgery until 4 months from now. He tolerated this well and had immediate relief from this injection. I will follow up with him postoperatively.     Orders Placed This Encounter    Medium Joint Injection / Arthrocentesis: elbow, bilateral elbow    Surgical Case Request: contracture release and osteophyte removal left elbow, Radial head excision left elbow    DX-ELBOW-COMPLETE 3+ LEFT    DX-ELBOW-COMPLETE 3+ RIGHT    DX-ELBOW-COMPLETE 3+ LEFT     No follow-ups on file.    I, Praveena Jacome, am scribing for, and in the presence of Ayad Luna MD.    I, Ayad Luna MD, personally performed the services described in this documentation, as scribed by, Praveena Jacome, in my presence. I do hereby attest this information is true, accurate, and complete to the best of my knowledge.         Past Medical History:   Diagnosis Date    Anesthesia 11/03/2017    PONV with shoulder surgery approx 20 years ago.    Anxiety and depression 11/03/2017    Arthritis 11/03/2017    Osteoarthritis, \" all over\"    Bronchitis     childhood    Bunion     Cancer (HCC) 2017    skin lesion cut out, on left shoulder    Chronic autoimmune thyroiditis      + US / + TPO = 57    Dental disorder 11/03/2017    \"Upper Plate\"    Diabetes mellitus (HCC) 11/03/2017    \"Controlled with diet & Victoza\"    Gout     Hammertoe     High cholesterol 11/03/2017    HX OF, not currently on medication for.    Hypertension     Hypothyroidism     chronic thyroiditis    MRSA infection     Open toe wound 03/2018    Left big toe, open wound, started 2/2018, receiving wound care therapy two times a week    Pain 11/03/2017    \"Pain all over except right hip & ankle\"    Pain 09/19/2022    \"All over \"    Psychiatric problem     depression/anxiety    Sleep apnea 11/03/2017    CPAP USE    Snoring 11/03/2017    DX JULIAN    Tonsillitis        Past Surgical History:   Procedure Laterality Date    MT UPPER GI ENDOSCOPY,REMOV F.B.  10/18/2021    Procedure: GASTROSCOPY, " WITH FOREIGN BODY REMOVAL;  Surgeon: Estiven Ruiz M.D.;  Location: SURGERY SAME DAY [facility];  Service: Gastroenterology    WI UPPER GI ENDOSCOPY,DIAGNOSIS  10/18/2021    Procedure: GASTROSCOPY;  Surgeon: Estiven Ruiz M.D.;  Location: SURGERY SAME DAY [facility];  Service: Gastroenterology    HETEROTOPIC OSSIFICATION Right 10/14/2021    Procedure: EXCISION, HETEROTOPIC ossification;  Surgeon: Emiliano Vang M.D.;  Location: [facility];  Service: Orthopedics    METATARSAL HEAD RESECTION Left 7/20/2020    Procedure: EXCISION, METATARSAL BONE, HEAD- PARTIAL DISTAL 2/3 METATARSAL;  Surgeon: Haroldo Kang M.D.;  Location: [facility];  Service: Orthopedics    HAMMERTOE CORRECTION Left 7/20/2020    Procedure: CORRECTION, HAMMER TOE- 4/5;  Surgeon: Haroldo Kang M.D.;  Location: [facility];  Service: Orthopedics    TOENAIL REMOVAL Left 7/20/2020    Procedure: REMOVAL, TOENAIL 3-5;  Surgeon: Haroldo Kang M.D.;  Location: [facility];  Service: Orthopedics    METATARSAL HEAD RESECTION Left 6/10/2019    Procedure: METATARSAL HEAD RESECTION- FOR PARTIAL EXCISION;  Surgeon: Haroldo Kang M.D.;  Location: [facility];  Service: Orthopedics    JOINT INJECTION DIAGNOSTIC  6/10/2019    Procedure: INJECTION, JOINT, DIAGNOSTIC- MIDFOOT;  Surgeon: Haroldo Kang M.D.;  Location: [facility];  Service: Orthopedics    TOE AMPUTATION Right 6/10/2019    Procedure: AMPUTATION, TOE- FIRST TOE;  Surgeon: Haroldo Kang M.D.;  Location: [facility];  Service: Orthopedics    ORTHOPEDIC OSTEOTOMY Left 10/15/2018    Procedure: ORTHOPEDIC OSTEOTOMY-  FOR: 3RD METATARSAL PARTIAL EXCISION, AND LEFT GASTROC SOLEUS RECESSION;  Surgeon: Haroldo Kang M.D.;  Location: [facility];  Service: Orthopedics    HARDWARE REMOVAL ORTHO Right 10/15/2018    Procedure: HARDWARE REMOVAL ORTHO-  FOOT METATARSALPHALANGEAL JOINT PLATE;  Surgeon:  Haroldo Kang M.D.;  Location: [facility];  Service: Orthopedics    TOE AMPUTATION Left 6/25/2018    Procedure: TOE AMPUTATION-FOR :PARTIAL 1ST DISTAL PHALANX EXCISION, GREAT TOE AMPUTATION;  Surgeon: Haroldo Kang M.D.;  Location: [facility];  Service: Orthopedics    INTERNAL FIXATION REMOVAL Left 6/25/2018    Procedure: INTERNAL FIXATION REMOVAL;  Surgeon: Haroldo Kang M.D.;  Location: [facility];  Service: Orthopedics    NERVE ULNAR TRANSFER Right 4/3/2018    Procedure: NERVE ULNAR TRANSFER - TRANSPOSITION;  Surgeon: Ayad Luna M.D.;  Location: [facility];  Service: Orthopedics    CARPAL TUNNEL RELEASE Right 4/3/2018    Procedure: CARPAL TUNNEL RELEASE;  Surgeon: Ayad Luna M.D.;  Location: [facility];  Service: Orthopedics    ELBOW ARTHROTOMY Right 4/3/2018    Procedure: ELBOW ARTHROTOMY - FOR CONTRACTURE RELEASE AT THE ELBOW, RADIAL HEAD EXCISION;  Surgeon: Ayad Luna M.D.;  Location: [facility];  Service: Orthopedics    SHOULDER SURGERY Right 12/02/2017    reversal    METATARSAL HEAD RESECTION Right 11/13/2017    Procedure: METATARSAL HEAD RESECTION - EXCISION;  Surgeon: Haroldo Kang M.D.;  Location: [facility];  Service: Orthopedics    HAMMERTOE CORRECTION Right 11/13/2017    Procedure: HAMMERTOE CORRECTION 2-5;  Surgeon: Haroldo Kang M.D.;  Location: [facility];  Service: Orthopedics    TOE FUSION Right 11/13/2017    Procedure: TOE FUSION - 1ST METATARSALPHALANGEAL;  Surgeon: Haroldo Kang M.D.;  Location: [facility];  Service: Orthopedics    METATARSAL HEAD RESECTION Left 8/21/2017    Procedure: METATARSAL HEAD RESECTION - 2-5;  Surgeon: Haroldo Kang M.D.;  Location: [facility];  Service:     TOE FUSION Left 8/21/2017    Procedure: TOE FUSION - 1ST MTP;  Surgeon: Haroldo Kang M.D.;  Location: [facility];  Service:      "HARDWARE REMOVAL ORTHO Left 8/21/2017    Procedure: HARDWARE REMOVAL ORTHO;  Surgeon: Haroldo Kang M.D.;  Location: SURGERY [facility];  Service:     FUSION, SPINE, LUMBAR, PLIF  4/14/2017    Procedure: LUMBAR FUSION POSTERIOR - MINIMALLY INVASIVE TLIF L4-5;  Surgeon: Bossman Caro M.D.;  Location: SURGERY [facility];  Service:     HAMMERTOE CORRECTION Bilateral 12/22/2016    Procedure: HAMMERTOE CORRECTION/METATARSALPHALANGEAL JOINT RELEASE 2/3 RIGHT, 2-3 LEFT;  Surgeon: Haroldo Kang M.D.;  Location: SURGERY [facility];  Service:     BUNIONECTOMY Left 12/22/2016    Procedure: BUNIONECTOMY FOR DISTAL HALLUX VALGUS CORRECTION WITH BRANDY;  Surgeon: Haroldo Kang M.D.;  Location: SURGERY [facility];  Service:     ORTHOPEDIC OSTEOTOMY Left 12/22/2016    Procedure: ORTHOPEDIC OSTEOTOMY DISTAL METATARSAL 2-5;  Surgeon: Haroldo Kang M.D.;  Location: SURGERY [facility];  Service:     HIP ARTH ANTERIOR TOTAL Left 8/18/2016    Procedure: HIP ARTHROPLASTY ANTERIOR TOTAL;  Surgeon: Emiliano Vang M.D.;  Location: SURGERY [facility];  Service:     OTHER ORTHOPEDIC SURGERY  6/2014    right shoulder  arthroplasty    OTHER ORTHOPEDIC SURGERY  11/1/2012    left knee/left ankle/left shoulder    OTHER ORTHOPEDIC SURGERY  2004    elbow surgery    OTHER  2000    dislocation left foot surgery at [facility]    OTHER      \"septoplasty 20 years ago\"    OTHER ORTHOPEDIC SURGERY      brad knee replaced    OTHER ORTHOPEDIC SURGERY      left total hip       No current facility-administered medications for this encounter.     Current Outpatient Medications   Medication Sig Dispense Refill    levothyroxine (SYNTHROID) 137 MCG Tab TAKE 1 TABLET BY MOUTH EVERY MORNING ON AN EMPTY STOMACH. 114 Tablet 4    Testosterone Enanthate (XYOSTED) 75 MG/0.5ML Solution Auto-injector Inject 75 mg into the shoulder, thigh, or buttocks every 7 days for 90 doses. 6 mL 2    levoFLOXacin (LEVAQUIN) 500 MG tablet Take 500 " mg by mouth every day.      oxyCODONE-acetaminophen (PERCOCET-10)  MG Tab Take 1 Tablet by mouth every 6 hours as needed.      gabapentin (NEURONTIN) 300 MG Cap TAKE 1 CAPSULE AT BEDTIME (Patient taking differently: Take 300 mg by mouth 1 time a day as needed.) 90 Capsule 2    ACCU-CHEK GUIDE strip       Accu-Chek FastClix Lancets Misc       PENNSAID 2 % Solution Apply  topically as needed.      lidocaine (LIDODERM) 5 % Patch Place 2 Patches on the skin 1 time a day as needed.      tadalafil (CIALIS) 20 MG tablet Take 20 mg by mouth as needed.      Venlafaxine HCl 75 MG TABLET SR 24 HR Take 1 Tablet by mouth every day.      naproxen (NAPROSYN) 500 MG Tab Take 500 mg by mouth 2 times a day with meals.      liraglutide (VICTOZA) 18 MG/3ML Solution Pen-injector Inject 1.8 mg as instructed every day.      hydrOXYzine pamoate (VISTARIL) 50 MG Cap Take 50 mg by mouth 2 Times a Day.      azelastine (ASTELIN) 137 MCG/SPRAY nasal spray [place] 1 Spray in nose 2 Times a Day.      NOVOFINE PLUS Inject 1 Application as instructed 4 Times a Day,Before Meals and at Bedtime. Use before meals and at bedtime to check blood sugar.      lamoTRIgine (LAMICTAL) 100 MG Tab Take 100 mg by mouth every day.      tamsulosin (FLOMAX) 0.4 MG capsule Take 0.4 mg by mouth ONE-HALF HOUR AFTER BREAKFAST.      temazepam (RESTORIL) 30 MG capsule Take 30 mg by mouth at bedtime as needed for Sleep.      losartan (COZAAR) 100 MG Tab Take 100 mg by mouth every morning.         Social History     Socioeconomic History    Marital status:      Spouse name: Not on file    Number of children: Not on file    Years of education: Not on file    Highest education level: Not on file   Occupational History    Not on file   Tobacco Use    Smoking status: Former     Packs/day: 1.00     Years: 20.00     Pack years: 20.00     Types: Cigarettes     Quit date: 2014     Years since quittin.7    Smokeless tobacco: Former     Types: Chew     Quit date:  1/1/1997   Vaping Use    Vaping Use: Never used   Substance and Sexual Activity    Alcohol use: Yes     Comment: occasional- approx once a week    Drug use: No    Sexual activity: Not on file   Other Topics Concern    Not on file   Social History Narrative    Not on file     Social Determinants of Health     Financial Resource Strain: Not on file   Food Insecurity: Not on file   Transportation Needs: Not on file   Physical Activity: Not on file   Stress: Not on file   Social Connections: Not on file   Intimate Partner Violence: Not on file   Housing Stability: Not on file       Family History   Problem Relation Age of Onset    Heart Disease Mother     Depression Mother     Cancer Father         Colon cancer     Cancer Sister         Skin cancer    Sleep Apnea Neg Hx        Allergies  Demerol, Cubicin [daptomycin], Meperidine, Sulfa drugs, and Sulfamethoxazole w-trimethoprim    Review of Systems  Negative    Physical Exam    Vital Signs                          Labs:                    Radiology:  No orders to display         Assessment/Plan:  Pre-Op Diagnosis Codes:     * Elbow arthritis [M19.029]  Procedure(s):  LEFT ELBOW CONTRACTURE RELEASE OSTEOPHYTE REMOVAL RADIAL HEAD EXCISION  EXCISION, RADIUS, HEAD, PARTIAL OR TOTAL

## 2022-10-04 ENCOUNTER — APPOINTMENT (OUTPATIENT)
Dept: RADIOLOGY | Facility: MEDICAL CENTER | Age: 62
End: 2022-10-04
Attending: ORTHOPAEDIC SURGERY
Payer: COMMERCIAL

## 2022-10-04 ENCOUNTER — HOSPITAL ENCOUNTER (OUTPATIENT)
Facility: MEDICAL CENTER | Age: 62
End: 2022-10-04
Attending: ORTHOPAEDIC SURGERY | Admitting: ORTHOPAEDIC SURGERY
Payer: COMMERCIAL

## 2022-10-04 ENCOUNTER — ANESTHESIA (OUTPATIENT)
Dept: SURGERY | Facility: MEDICAL CENTER | Age: 62
End: 2022-10-04
Payer: COMMERCIAL

## 2022-10-04 ENCOUNTER — ANESTHESIA EVENT (OUTPATIENT)
Dept: SURGERY | Facility: MEDICAL CENTER | Age: 62
End: 2022-10-04
Payer: COMMERCIAL

## 2022-10-04 VITALS
OXYGEN SATURATION: 89 % | WEIGHT: 264.33 LBS | HEIGHT: 70 IN | HEART RATE: 106 BPM | TEMPERATURE: 97.9 F | DIASTOLIC BLOOD PRESSURE: 79 MMHG | BODY MASS INDEX: 37.84 KG/M2 | SYSTOLIC BLOOD PRESSURE: 157 MMHG | RESPIRATION RATE: 18 BRPM

## 2022-10-04 DIAGNOSIS — M19.029 ELBOW ARTHRITIS: ICD-10-CM

## 2022-10-04 DIAGNOSIS — G89.18 POSTOPERATIVE PAIN: ICD-10-CM

## 2022-10-04 LAB — GLUCOSE BLD STRIP.AUTO-MCNC: 139 MG/DL (ref 65–99)

## 2022-10-04 PROCEDURE — 700105 HCHG RX REV CODE 258: Performed by: ANESTHESIOLOGY

## 2022-10-04 PROCEDURE — 76942 ECHO GUIDE FOR BIOPSY: CPT | Mod: 26 | Performed by: ANESTHESIOLOGY

## 2022-10-04 PROCEDURE — 160029 HCHG SURGERY MINUTES - 1ST 30 MINS LEVEL 4: Performed by: ORTHOPAEDIC SURGERY

## 2022-10-04 PROCEDURE — 700101 HCHG RX REV CODE 250: Performed by: ORTHOPAEDIC SURGERY

## 2022-10-04 PROCEDURE — 24130 EXCISION RADIAL HEAD: CPT | Mod: LT | Performed by: ORTHOPAEDIC SURGERY

## 2022-10-04 PROCEDURE — 700111 HCHG RX REV CODE 636 W/ 250 OVERRIDE (IP): Performed by: ORTHOPAEDIC SURGERY

## 2022-10-04 PROCEDURE — 160025 RECOVERY II MINUTES (STATS): Performed by: ORTHOPAEDIC SURGERY

## 2022-10-04 PROCEDURE — 160041 HCHG SURGERY MINUTES - EA ADDL 1 MIN LEVEL 4: Performed by: ORTHOPAEDIC SURGERY

## 2022-10-04 PROCEDURE — A9270 NON-COVERED ITEM OR SERVICE: HCPCS | Performed by: ANESTHESIOLOGY

## 2022-10-04 PROCEDURE — 64450 NJX AA&/STRD OTHER PN/BRANCH: CPT | Performed by: ORTHOPAEDIC SURGERY

## 2022-10-04 PROCEDURE — 700111 HCHG RX REV CODE 636 W/ 250 OVERRIDE (IP): Performed by: ANESTHESIOLOGY

## 2022-10-04 PROCEDURE — 700105 HCHG RX REV CODE 258: Performed by: ORTHOPAEDIC SURGERY

## 2022-10-04 PROCEDURE — 160048 HCHG OR STATISTICAL LEVEL 1-5: Performed by: ORTHOPAEDIC SURGERY

## 2022-10-04 PROCEDURE — 160046 HCHG PACU - 1ST 60 MINS PHASE II: Performed by: ORTHOPAEDIC SURGERY

## 2022-10-04 PROCEDURE — 160036 HCHG PACU - EA ADDL 30 MINS PHASE I: Performed by: ORTHOPAEDIC SURGERY

## 2022-10-04 PROCEDURE — 160035 HCHG PACU - 1ST 60 MINS PHASE I: Performed by: ORTHOPAEDIC SURGERY

## 2022-10-04 PROCEDURE — 160002 HCHG RECOVERY MINUTES (STAT): Performed by: ORTHOPAEDIC SURGERY

## 2022-10-04 PROCEDURE — 700102 HCHG RX REV CODE 250 W/ 637 OVERRIDE(OP): Performed by: ANESTHESIOLOGY

## 2022-10-04 PROCEDURE — 82962 GLUCOSE BLOOD TEST: CPT

## 2022-10-04 PROCEDURE — 160009 HCHG ANES TIME/MIN: Performed by: ORTHOPAEDIC SURGERY

## 2022-10-04 PROCEDURE — 24149 RADICAL RESECTION OF ELBOW: CPT | Mod: 59 | Performed by: ORTHOPAEDIC SURGERY

## 2022-10-04 PROCEDURE — 01630 ANES OPN/ARTHR PX SHO JT NOS: CPT | Performed by: ANESTHESIOLOGY

## 2022-10-04 PROCEDURE — 700101 HCHG RX REV CODE 250: Performed by: ANESTHESIOLOGY

## 2022-10-04 PROCEDURE — 64415 NJX AA&/STRD BRCH PLXS IMG: CPT | Mod: 59,LT | Performed by: ANESTHESIOLOGY

## 2022-10-04 RX ORDER — LIDOCAINE HYDROCHLORIDE 20 MG/ML
INJECTION, SOLUTION EPIDURAL; INFILTRATION; INTRACAUDAL; PERINEURAL PRN
Status: DISCONTINUED | OUTPATIENT
Start: 2022-10-04 | End: 2022-10-04 | Stop reason: SURG

## 2022-10-04 RX ORDER — ROPIVACAINE HYDROCHLORIDE 5 MG/ML
INJECTION, SOLUTION EPIDURAL; INFILTRATION; PERINEURAL PRN
Status: DISCONTINUED | OUTPATIENT
Start: 2022-10-04 | End: 2022-10-04 | Stop reason: SURG

## 2022-10-04 RX ORDER — PHENYLEPHRINE HYDROCHLORIDE 10 MG/ML
INJECTION, SOLUTION INTRAMUSCULAR; INTRAVENOUS; SUBCUTANEOUS PRN
Status: DISCONTINUED | OUTPATIENT
Start: 2022-10-04 | End: 2022-10-04 | Stop reason: SURG

## 2022-10-04 RX ORDER — OXYCODONE HYDROCHLORIDE AND ACETAMINOPHEN 5; 325 MG/1; MG/1
2 TABLET ORAL
Status: COMPLETED | OUTPATIENT
Start: 2022-10-04 | End: 2022-10-04

## 2022-10-04 RX ORDER — HYDROMORPHONE HYDROCHLORIDE 1 MG/ML
0.4 INJECTION, SOLUTION INTRAMUSCULAR; INTRAVENOUS; SUBCUTANEOUS
Status: DISCONTINUED | OUTPATIENT
Start: 2022-10-04 | End: 2022-10-04 | Stop reason: HOSPADM

## 2022-10-04 RX ORDER — BUPIVACAINE HYDROCHLORIDE 5 MG/ML
INJECTION, SOLUTION EPIDURAL; INTRACAUDAL
Status: DISCONTINUED
Start: 2022-10-04 | End: 2022-10-04 | Stop reason: HOSPADM

## 2022-10-04 RX ORDER — SODIUM CHLORIDE, SODIUM LACTATE, POTASSIUM CHLORIDE, CALCIUM CHLORIDE 600; 310; 30; 20 MG/100ML; MG/100ML; MG/100ML; MG/100ML
INJECTION, SOLUTION INTRAVENOUS CONTINUOUS
Status: ACTIVE | OUTPATIENT
Start: 2022-10-04 | End: 2022-10-04

## 2022-10-04 RX ORDER — ROPIVACAINE HYDROCHLORIDE 5 MG/ML
INJECTION, SOLUTION EPIDURAL; INFILTRATION; PERINEURAL
Status: COMPLETED | OUTPATIENT
Start: 2022-10-04 | End: 2022-10-04

## 2022-10-04 RX ORDER — VASOPRESSIN 20 U/ML
INJECTION PARENTERAL PRN
Status: DISCONTINUED | OUTPATIENT
Start: 2022-10-04 | End: 2022-10-04 | Stop reason: SURG

## 2022-10-04 RX ORDER — CEFAZOLIN SODIUM 1 G/3ML
3 INJECTION, POWDER, FOR SOLUTION INTRAMUSCULAR; INTRAVENOUS ONCE
Status: COMPLETED | OUTPATIENT
Start: 2022-10-04 | End: 2022-10-04

## 2022-10-04 RX ORDER — OXYCODONE AND ACETAMINOPHEN 10; 325 MG/1; MG/1
1 TABLET ORAL EVERY 4 HOURS PRN
Qty: 15 TABLET | Refills: 0 | Status: SHIPPED | OUTPATIENT
Start: 2022-10-04 | End: 2022-10-08

## 2022-10-04 RX ORDER — LABETALOL HYDROCHLORIDE 5 MG/ML
5 INJECTION, SOLUTION INTRAVENOUS
Status: DISCONTINUED | OUTPATIENT
Start: 2022-10-04 | End: 2022-10-04 | Stop reason: HOSPADM

## 2022-10-04 RX ORDER — OXYCODONE HYDROCHLORIDE AND ACETAMINOPHEN 5; 325 MG/1; MG/1
1 TABLET ORAL
Status: COMPLETED | OUTPATIENT
Start: 2022-10-04 | End: 2022-10-04

## 2022-10-04 RX ORDER — BUPIVACAINE HYDROCHLORIDE 5 MG/ML
INJECTION, SOLUTION EPIDURAL; INTRACAUDAL
Status: DISCONTINUED | OUTPATIENT
Start: 2022-10-04 | End: 2022-10-04 | Stop reason: HOSPADM

## 2022-10-04 RX ORDER — SODIUM CHLORIDE, SODIUM LACTATE, POTASSIUM CHLORIDE, CALCIUM CHLORIDE 600; 310; 30; 20 MG/100ML; MG/100ML; MG/100ML; MG/100ML
INJECTION, SOLUTION INTRAVENOUS CONTINUOUS
Status: DISCONTINUED | OUTPATIENT
Start: 2022-10-04 | End: 2022-10-04 | Stop reason: HOSPADM

## 2022-10-04 RX ORDER — ONDANSETRON 2 MG/ML
4 INJECTION INTRAMUSCULAR; INTRAVENOUS
Status: DISCONTINUED | OUTPATIENT
Start: 2022-10-04 | End: 2022-10-04 | Stop reason: HOSPADM

## 2022-10-04 RX ORDER — KETOROLAC TROMETHAMINE 30 MG/ML
15 INJECTION, SOLUTION INTRAMUSCULAR; INTRAVENOUS ONCE
Status: COMPLETED | OUTPATIENT
Start: 2022-10-04 | End: 2022-10-04

## 2022-10-04 RX ORDER — DEXAMETHASONE SODIUM PHOSPHATE 4 MG/ML
INJECTION, SOLUTION INTRA-ARTICULAR; INTRALESIONAL; INTRAMUSCULAR; INTRAVENOUS; SOFT TISSUE PRN
Status: DISCONTINUED | OUTPATIENT
Start: 2022-10-04 | End: 2022-10-04 | Stop reason: SURG

## 2022-10-04 RX ORDER — DIPHENHYDRAMINE HYDROCHLORIDE 50 MG/ML
12.5 INJECTION INTRAMUSCULAR; INTRAVENOUS
Status: DISCONTINUED | OUTPATIENT
Start: 2022-10-04 | End: 2022-10-04 | Stop reason: HOSPADM

## 2022-10-04 RX ORDER — HYDROMORPHONE HYDROCHLORIDE 1 MG/ML
0.1 INJECTION, SOLUTION INTRAMUSCULAR; INTRAVENOUS; SUBCUTANEOUS
Status: DISCONTINUED | OUTPATIENT
Start: 2022-10-04 | End: 2022-10-04 | Stop reason: HOSPADM

## 2022-10-04 RX ORDER — LIDOCAINE HYDROCHLORIDE 10 MG/ML
INJECTION, SOLUTION INFILTRATION; PERINEURAL
Status: DISCONTINUED | OUTPATIENT
Start: 2022-10-04 | End: 2022-10-04 | Stop reason: HOSPADM

## 2022-10-04 RX ORDER — ONDANSETRON 2 MG/ML
INJECTION INTRAMUSCULAR; INTRAVENOUS PRN
Status: DISCONTINUED | OUTPATIENT
Start: 2022-10-04 | End: 2022-10-04 | Stop reason: SURG

## 2022-10-04 RX ORDER — KETOROLAC TROMETHAMINE 30 MG/ML
INJECTION, SOLUTION INTRAMUSCULAR; INTRAVENOUS PRN
Status: DISCONTINUED | OUTPATIENT
Start: 2022-10-04 | End: 2022-10-04 | Stop reason: SURG

## 2022-10-04 RX ORDER — HALOPERIDOL 5 MG/ML
1 INJECTION INTRAMUSCULAR
Status: DISCONTINUED | OUTPATIENT
Start: 2022-10-04 | End: 2022-10-04 | Stop reason: HOSPADM

## 2022-10-04 RX ORDER — HYDROMORPHONE HYDROCHLORIDE 1 MG/ML
0.2 INJECTION, SOLUTION INTRAMUSCULAR; INTRAVENOUS; SUBCUTANEOUS
Status: DISCONTINUED | OUTPATIENT
Start: 2022-10-04 | End: 2022-10-04 | Stop reason: HOSPADM

## 2022-10-04 RX ORDER — METOPROLOL TARTRATE 1 MG/ML
1 INJECTION, SOLUTION INTRAVENOUS
Status: DISCONTINUED | OUTPATIENT
Start: 2022-10-04 | End: 2022-10-04 | Stop reason: HOSPADM

## 2022-10-04 RX ORDER — IPRATROPIUM BROMIDE AND ALBUTEROL SULFATE 2.5; .5 MG/3ML; MG/3ML
3 SOLUTION RESPIRATORY (INHALATION)
Status: DISCONTINUED | OUTPATIENT
Start: 2022-10-04 | End: 2022-10-04 | Stop reason: HOSPADM

## 2022-10-04 RX ADMIN — KETOROLAC TROMETHAMINE 15 MG: 30 INJECTION, SOLUTION INTRAMUSCULAR at 12:30

## 2022-10-04 RX ADMIN — VASOPRESSIN 2 UNITS: 20 INJECTION INTRAVENOUS at 11:02

## 2022-10-04 RX ADMIN — ROPIVACAINE HYDROCHLORIDE 15 ML: 5 INJECTION, SOLUTION EPIDURAL; INFILTRATION; PERINEURAL at 11:59

## 2022-10-04 RX ADMIN — PROPOFOL 200 MG: 10 INJECTION, EMULSION INTRAVENOUS at 10:40

## 2022-10-04 RX ADMIN — CEFAZOLIN 2 G: 330 INJECTION, POWDER, FOR SOLUTION INTRAMUSCULAR; INTRAVENOUS at 10:45

## 2022-10-04 RX ADMIN — PROPOFOL 50 MG: 10 INJECTION, EMULSION INTRAVENOUS at 10:46

## 2022-10-04 RX ADMIN — DEXAMETHASONE SODIUM PHOSPHATE 4 MG: 4 INJECTION, SOLUTION INTRA-ARTICULAR; INTRALESIONAL; INTRAMUSCULAR; INTRAVENOUS; SOFT TISSUE at 11:59

## 2022-10-04 RX ADMIN — ROPIVACAINE HYDROCHLORIDE 15 ML: 5 INJECTION, SOLUTION EPIDURAL; INFILTRATION; PERINEURAL at 11:42

## 2022-10-04 RX ADMIN — PROPOFOL 50 MG: 10 INJECTION, EMULSION INTRAVENOUS at 11:35

## 2022-10-04 RX ADMIN — FENTANYL CITRATE 50 MCG: 50 INJECTION, SOLUTION INTRAMUSCULAR; INTRAVENOUS at 10:59

## 2022-10-04 RX ADMIN — CEFAZOLIN 1 G: 330 INJECTION, POWDER, FOR SOLUTION INTRAMUSCULAR; INTRAVENOUS at 11:40

## 2022-10-04 RX ADMIN — SODIUM CHLORIDE, POTASSIUM CHLORIDE, SODIUM LACTATE AND CALCIUM CHLORIDE: 600; 310; 30; 20 INJECTION, SOLUTION INTRAVENOUS at 10:35

## 2022-10-04 RX ADMIN — EPHEDRINE SULFATE 10 MG: 50 INJECTION INTRAMUSCULAR; INTRAVENOUS; SUBCUTANEOUS at 11:41

## 2022-10-04 RX ADMIN — HYDROMORPHONE HYDROCHLORIDE 0.4 MG: 1 INJECTION, SOLUTION INTRAMUSCULAR; INTRAVENOUS; SUBCUTANEOUS at 13:38

## 2022-10-04 RX ADMIN — FENTANYL CITRATE 25 MCG: 50 INJECTION, SOLUTION INTRAMUSCULAR; INTRAVENOUS at 12:52

## 2022-10-04 RX ADMIN — VASOPRESSIN 2 UNITS: 20 INJECTION INTRAVENOUS at 11:42

## 2022-10-04 RX ADMIN — FENTANYL CITRATE 50 MCG: 50 INJECTION, SOLUTION INTRAMUSCULAR; INTRAVENOUS at 10:48

## 2022-10-04 RX ADMIN — LIDOCAINE HYDROCHLORIDE 100 MG: 20 INJECTION, SOLUTION EPIDURAL; INFILTRATION; INTRACAUDAL at 11:59

## 2022-10-04 RX ADMIN — FENTANYL CITRATE 50 MCG: 50 INJECTION, SOLUTION INTRAMUSCULAR; INTRAVENOUS at 13:00

## 2022-10-04 RX ADMIN — MIDAZOLAM 2 MG: 1 INJECTION INTRAMUSCULAR; INTRAVENOUS at 10:35

## 2022-10-04 RX ADMIN — OXYCODONE AND ACETAMINOPHEN 2 TABLET: 5; 325 TABLET ORAL at 12:37

## 2022-10-04 RX ADMIN — PROPOFOL 50 MG: 10 INJECTION, EMULSION INTRAVENOUS at 11:38

## 2022-10-04 RX ADMIN — VASOPRESSIN 1 UNITS: 20 INJECTION INTRAVENOUS at 10:56

## 2022-10-04 RX ADMIN — DEXAMETHASONE SODIUM PHOSPHATE 8 MG: 4 INJECTION, SOLUTION INTRA-ARTICULAR; INTRALESIONAL; INTRAMUSCULAR; INTRAVENOUS; SOFT TISSUE at 10:50

## 2022-10-04 RX ADMIN — EPHEDRINE SULFATE 20 MG: 50 INJECTION INTRAMUSCULAR; INTRAVENOUS; SUBCUTANEOUS at 11:06

## 2022-10-04 RX ADMIN — SODIUM CHLORIDE, POTASSIUM CHLORIDE, SODIUM LACTATE AND CALCIUM CHLORIDE: 600; 310; 30; 20 INJECTION, SOLUTION INTRAVENOUS at 11:08

## 2022-10-04 RX ADMIN — KETOROLAC TROMETHAMINE 30 MG: 30 INJECTION, SOLUTION INTRAMUSCULAR at 11:37

## 2022-10-04 RX ADMIN — PHENYLEPHRINE HYDROCHLORIDE 100 MCG: 10 INJECTION INTRAVENOUS at 11:05

## 2022-10-04 RX ADMIN — HYDROMORPHONE HYDROCHLORIDE 0.4 MG: 1 INJECTION, SOLUTION INTRAMUSCULAR; INTRAVENOUS; SUBCUTANEOUS at 14:50

## 2022-10-04 RX ADMIN — ONDANSETRON 4 MG: 2 INJECTION INTRAMUSCULAR; INTRAVENOUS at 11:35

## 2022-10-04 ASSESSMENT — PAIN DESCRIPTION - PAIN TYPE
TYPE: SURGICAL PAIN
TYPE: CHRONIC PAIN
TYPE: SURGICAL PAIN

## 2022-10-04 ASSESSMENT — PAIN SCALES - GENERAL: PAIN_LEVEL: 0

## 2022-10-04 ASSESSMENT — FIBROSIS 4 INDEX: FIB4 SCORE: 1.76

## 2022-10-04 NOTE — LETTER
July 19, 2022    Patient Name: Critsino Keys  Surgeon Name: Ayad Luna M.D.  Surgery Facility: [facility] ([address] [facility])  Surgery Date: 10/4/2022    The time of your surgery is not final and may change up to and until the day of your surgery. You will be contacted 24-48 hours prior to your surgery date with your check-in and surgery time.    If you will not be at one of the below numbers please call/text the surgery scheduler at [phone]  Preferred Phone: [phone]    BEFORE YOUR SURGERY   Pre Registration and/or Lab Work must be done within and no earlier than 28 days prior to your surgery date. Please call [facility] at [phone] for an appointment as soon as possible.    Instructions: Bring a list of all medications you are taking including the dosing and frequency.    Please refrain from smoking any substance after midnight prior to surgery. Smoking may interfere with the anesthetic and frequently produces nausea during the recovery period.    Continue taking all lifesaving medications. Including the morning of your surgery with small sip of water.    Please read the MEDICATION INSTRUCTIONS below completely.    DAY OF YOUR SURGERY  Nothing to eat or drink after midnight     Please arrive at the hospital/surgery center at the check-in time provided.     An adult will need to bring you and take you home after your surgery.     AFTER YOUR SURGERY  Post op Appointment:   Date: 10/17   Time: 330PM   With: Ayad Luna M.D.   Location: [address] [place] [address]            TIME OFF WORK  FMLA or Disability forms can be faxed directly to: [phone] or you may drop them off at [facility] [place] [address]. Our office charges a $35.00 fee per form. Forms will be completed within 10 business days of drop off and payment received. For the status of your forms you may contact our disability office directly [phone].    MEDICATION  INSTRUCTIONS  The following medications should be stopped a minimum of 10 days prior to surgery:  All over the counter, Supplements & Herbal medications    Anorectics: Phentermine (Adipex-P, Lomaira and Suprenza), Phentermine-topiramate (Qsymia), Bupropion-naltrexone (Contrave)    Opiod Partial Agonists/Opioid Antagonists: Buprenorphine (Subocone, Belbuca, Butrans, Probuphine Implant, Sublocade), Naltrexone (ReVia, Vivitrol), Naloxone    Amphetamines: Dextroamphetamine/Amphetamine (Adderall, Mydayis), Methylphenidate Hydrochloride (Concerta, Metadate, Methylin, Ritalin)    The following medications should be stopped 5 days prior to surgery:  Blood Thinners: Any Aspirin, Aspirin products, anti-inflammatories such as ibuprofen and any blood thinners such as Coumadin and Plavix. Please consult your prescribing physician if you are on life saving blood thinners, in regards to when to stop medications prior to surgery.     The following medications should be stopped a minimum of 3 days prior to surgery:  PDE-5 inhibitors: Sildenafil (Viagra), Tadalafil (Cialis), Vardenafil (Levitra), Avanafil (Stendra)    MAO Inhibitors: Rasagiline (Azilect), Selegiline (Eldepryl, Emsam, Selapar), Isocarboxazid (Marplan), Phenelzine (Nardil)

## 2022-10-04 NOTE — DISCHARGE INSTRUCTIONS
What to Expect Post Anesthesia    Rest and take it easy for the first 24 hours.  A responsible adult is recommended to remain with you during that time.  It is normal to feel sleepy.  We encourage you to not do anything that requires balance, judgment or coordination.    FOR 24 HOURS DO NOT:  Drive, operate machinery or run household appliances.  Drink beer or alcoholic beverages.  Make important decisions or sign legal documents.    To avoid nausea, slowly advance diet as tolerated, avoiding spicy or greasy foods for the first day.  Add more substantial food to your diet according to your provider's instructions.  Babies can be fed formula or breast milk as soon as they are hungry.  INCREASE FLUIDS AND FIBER TO AVOID CONSTIPATION.    MILD FLU-LIKE SYMPTOMS ARE NORMAL.  YOU MAY EXPERIENCE GENERALIZED MUSCLE ACHES, THROAT IRRITATION, HEADACHE AND/OR SOME NAUSEA.    If any questions arise, call your provider.  If your provider is not available, please feel free to call the Surgical Center at [phone].    MEDICATIONS: Resume taking daily medication.  Take prescribed pain medication with food.  If no medication is prescribed, you may take non-aspirin pain medication if needed.  PAIN MEDICATION CAN BE VERY CONSTIPATING.  Take a stool softener or laxative such as senokot, pericolace, or milk of magnesia if needed.    Toradol (NSAID similar to ibuprofen/Motrin) given at 11:30am. Ok to take ibuprofen if needed after 5:30pm.    Percocet given at 12:30pm. Next dose at 4:30 PM.

## 2022-10-04 NOTE — OR NURSING
"1203 Patient arrived to PACU from OR. Report received. Patient attached to monitoring. VSS. Patient oxygenating well on 10 L via mask. No complaints of pain or nausea. LUE elevated on pillow with ice pack.     1230 Report given to April RN     1306 Reassumed care of patient. Patient continuing to have 7/10 pain in Left arm. Danii BREAUX notified of ongoing pain. RN asked MD to come assess patient. MD notified RN to give dilaudid and assess pain after. Patient stated the pain went down \"a little bit\" but its still at a 6-7/10. Patient does not think he can go home with this amount of pain.     1325 RN updated patients wife via telephone call    1400 RN went over discharge instructions with patients wife. All questions answered.     1420 Danii BREAUX at bedside for additional block attempt in LUE. MD unable to have success with block. MD consulted with wife, Natalie on POC. MD told patient and patients wife to ice extremity, to continue on pain medication regimen with gabapentin (only with supervision), to wear CPAP,and sleep sitting up. Patient states he has oxygen concentrator and oxygen monitor at home. Rn advised patient to use them when sleeping at home. Wife assured MD and RN that she is on fall break from school and will be caring for him.     1430 Patient meets phase two criteria. Tolerating PO liquids without complication.     1450 Patient requesting additional dose of dilaudid before discharge.     1520 IV removed    1523 Patient meets discharge criteria. VSS. Pt discharged via wheelchair by Rn. Pt in possession of all personal belongings.    "

## 2022-10-04 NOTE — ANESTHESIA PROCEDURE NOTES
Airway    Date/Time: 10/4/2022 10:42 AM  Performed by: Rain Foy M.D.  Authorized by: Rain Foy M.D.     Location:  OR  Urgency:  Elective  Difficult Airway: No    Indications for Airway Management:  Anesthesia      Spontaneous Ventilation: present    Sedation Level:  Deep  Preoxygenated: Yes    Patient Position:  Sniffing  Mask Difficulty Assessment:  0 - not attempted  Final Airway Type:  Supraglottic airway  Final Supraglottic Airway:  Standard LMA    SGA Size:  4  Number of Attempts at Approach:  1  Ventilation Between Attempts:  Supraglottic airway  Number of Other Approaches Attempted:  0   Atraumatic: +ETCO2; Eyes/tube taped

## 2022-10-04 NOTE — OR NURSING
1230- Report from Nusrat VIDALES, care assumed    1237- Pt medicated per MAR for pain    1252- Pt medicated per MAR for pain    1306- Report to Nusrat VIDALES

## 2022-10-04 NOTE — ANESTHESIA PROCEDURE NOTES
Peripheral Block    Date/Time: 10/4/2022 11:42 AM  Performed by: Rain Foy M.D.  Authorized by: Rain Foy M.D.     Patient Location:  OR  Start Time:  10/4/2022 11:42 AM  End Time:  10/4/2022 11:59 AM  Reason for Block: at surgeon's request and post-op pain management ONLY    patient identified, IV checked, site marked, risks and benefits discussed, surgical consent, monitors and equipment checked, pre-op evaluation and timeout performed    Patient Position:  Supine  Prep: ChloraPrep    Monitoring:  Heart rate, continuous pulse ox and cardiac monitor  Block Region:  Head/Neck  Head/Neck - Block Type: other  Other Block Type: Left supraclavicular nerve block  Laterality:  Left  Procedures: ultrasound guided  Image captured, interpreted and electronically stored.  Local Infiltration:  Lidocaine  Strength:  1 %  Dose:  3 ml  Block Type:  Single-shot  Needle Length:  100mm  Needle Gauge:  21 G  Needle Localization:  Ultrasound guidance  Injection Assessment:  Negative aspiration for heme, no paresthesia on injection, incremental injection and local visualized surrounding nerve on ultrasound  Evidence of intravascular injection: No

## 2022-10-04 NOTE — ANESTHESIA PREPROCEDURE EVALUATION
Case: [id number] Date/Time: 10/04/22 1045    Procedures:       LEFT ELBOW CONTRACTURE RELEASE OSTEOPHYTE REMOVAL RADIAL HEAD EXCISION (Left)      EXCISION, RADIUS, HEAD, PARTIAL OR TOTAL    Diagnosis: Elbow arthritis [M19.029]    Pre-op diagnosis: Elbow arthritis [M19.029]    Location: [facility] ROOM 21 / SURGERY SAME DAY [facility]    Surgeons: Ayad Luna M.D.          Relevant Problems   ANESTHESIA   (positive) JULIAN (obstructive sleep apnea)      PULMONARY   (positive) History of MRSA infection      NEURO   (positive) History of MRSA infection      CARDIAC   (positive) Hypertension      ENDO   (positive) Hypothyroidism, acquired, autoimmune   (positive) Type 2 diabetes mellitus without complication (HCC)      Other   (positive) Arthritis of left ankle   (positive) Elbow arthritis       Physical Exam    Airway   Mallampati: III  TM distance: <3 FB  Neck ROM: limited       Cardiovascular   Rhythm: regular     Dental    Pulmonary   (+) decreased breath sounds     Abdominal   (+) obese     Neurological              Anesthesia Plan    ASA 3 (see medical history; extensive)   ASA physical status 3 criteria: other (comment)    Plan - general and peripheral nerve block     Peripheral nerve block will be post-op pain control          Induction: intravenous      Pertinent diagnostic labs and testing reviewed    Informed Consent:    Anesthetic plan and risks discussed with patient.

## 2022-10-04 NOTE — DISCHARGE INSTR - OTHER INFO
DR. ESTRELLA POST-OPERATIVE INSTRUCTIONS    You have just undergone a sugery by Dr. Estrella in the operating room.  It is our wish that your postoperative recovery be as quick and comfortable as possible.  Please carefully review the following items that are important for your recovery.    After any operation, a certain degree of pain is to be expected. Take Advil (ibuprofen) and Extra Strength Tylenol as first line medications for mild to moderate pain. Taking each one every 6 hours, and staggering them so that you are taking one medicine every 3 hours, is the most effective. Refer to dosing instructions on the bottle, but in general ibuprofen dose is 600-800mg and Tylenol dose is 500-1000mg. For most small procedures, this should be enough to keep you comfortable. Take these medications until your followup visit. You may have been given a small prescription for stronger pain medicine which will help relieve more severe pain.  Pain medicine may make you drowsy so please keep this in mind.  Do not drive while taking pain medicine.      When you go home, please keep your operated arm elevated at all times (above the level of your heart).  If you do this, your swelling will resolve more quickly and your pain will be improve more quickly as well. You may also place an ice pack over your dressing or splint to help with swelling and pain.    It is also very important to keep your fingers moving after most procedures, unless you had a tendon repair or fracture repair in which case you will be in a splint. Keep all of your fingers moving through a full range of motion to prevent stiffness.    For small hand procedures such as carpal tunnel release, trigger finger release, and cyst excision, the dressing that you have on your extremity should remain on for 3-4 days. It may then be removed, you can wash the incision gently  with soap and water, and keep the incision covered with a band-aid or similar clean dressing. For larger procedures or if you have a splint on, these should remain on until follow up or as specifically instructed. If you feel that your dressing is too tight during the first 3 days after surgery, you may loosen it. It is normal to see minor staining on the dressing after surgery. If there is significant bleeding, you are advised to call the office during regular office hours to have this checked.  Make sure that your dressing is kept dry at all times.  You can take a shower if you cover your arm with a plastic bag. If your dressing gets wet, replace it with sterile dressing that you can obtain from your local drug store.    Follow-up appointments can usually be found on the pre-op paperwork if not please call our office for a follow-up appointment, [phone]. Follow up after surgery is typically 10-14 days, unless you were specifically instructed otherwise. The sutures will be removed and you may be asked to see a hand therapist to optimize your functional result. Each of the hand therapists that you will be referred to have received special training in the care of the hand and upper extremity.    If you have questions regarding your surgery postop that you feel requires attention, please call the office at [phone] during business hours, or [phone] after hours for the answering service. If you feel that you have a surgical emergency postoperatively that requires immediate attention, please call the above numbers or go to the Emergency Department and ask for the Orthopedic Surgeon on call.

## 2022-10-04 NOTE — OP REPORT
DATE OF SERVICE:  10/04/2022     PREOPERATIVE DIAGNOSIS:  Left elbow osteoarthritis.     POSTOPERATIVE DIAGNOSIS:  Left elbow osteoarthritis.     PROCEDURES:  1.  Capsular releases, left elbow.  2.  Excision of osteophytes, left elbow.  3.  Excision radial head, left elbow.     SURGEON:  Ayad Luna MD     ANESTHESIA:  General.     ESTIMATED BLOOD LOSS:  Minimal.     DRAINS:  None.     COMPLICATIONS:  None.     INDICATIONS:  The patient is a gentleman who has significant elbow arthritis.    He is not a total elbow arthroplasty candidate at this point in time, wishes   to proceed with the above-mentioned surgery.  Risks, benefits, complications   and alternatives were explained to the patient.  All questions were answered.    No guarantees given.  Signed consent was obtained.     PROCEDURE:  The patient was taken to the operating room and laid supine on the   table.  All bony prominences were well padded.  Adequate general was obtained   without difficulty.  The left upper extremity was prepped and draped in the   usual sterile fashion using ChloraPrep.  Limb was exsanguinated with   elevation, tourniquet was raised.  Total tourniquet time was about an hour.  I   made an extended incision over the lateral epicondyle proximally and distally   through skin and subcutaneous tissues.  Full-thickness skin flaps were   elevated off the fascia.  I then went and made an anterior limb along the   anterior border of the distal humerus over the lateral epicondyle and carried   more in the anterolateral aspect of the elbow with care to protect the   collateral ligaments.  I was then able to open the capsule, elevate the bone   off the distal humerus.  There was a lot of fluid within the joint.  Using   Hohmann retractors, I was able to excise the radial head without difficulty.    I then was able to isolate the capsule along all the way across using Hohmann   retractors.  I removed approximately 1.5 cm width the capsule  along its   entirety.  There was a fairly large osteophyte on the anterior aspect of the   coronoid that also removed.  There was no evidence of impingement.  Once I   removed the osteophyte, then I did into the anterior aspect of the distal   humerus.  I then made a separate posterior limb posterior to the collateral   ligament to enter the posterior aspect of the joint.  I removed some of the   capsule fat pad.  I then subsequently removed an osteophyte at the tip of the   olecranon.  It still had some impingement, so using a bur, I took down some of   the excess bone in the olecranon fossa.  After this, there was no evidence of   impingement on the olecranon posteriorly.  The wounds were copiously   irrigated.  I then closed the posterior limb with #1 Vicryl in simple and   figure-of-eight fashion.  I closed the anterior fascia with #1 Vicryl in   figure-of-eight fashion, subcutaneous with 2-0 Vicryl, skin with staples.    Local without epinephrine was injected.  Sterile dressing, Xeroform, 4x4's,   Sof-Rol, Kerlix, bias and a bulky soft dressing was applied.  All needle and   sponge counts were correct.  The patient tolerated the procedure well and was   transferred to recovery room in stable condition.        ______________________________  MD RUFINO BAILON/ANDRZEJ    DD:  10/04/2022 14:29  DT:  10/04/2022 16:51    Job#:  [id number]

## 2022-10-04 NOTE — ANESTHESIA POSTPROCEDURE EVALUATION
Patient: Cristino Keys    Procedure Summary     Date: 10/04/22 Room / Location: [facility] ROOM 21 / SURGERY SAME DAY [facility]    Anesthesia Start: 1035 Anesthesia Stop: 1210    Procedures:       LEFT ELBOW CONTRACTURE RELEASE OSTEOPHYTE REMOVAL RADIAL HEAD EXCISION (Left: Elbow)      EXCISION, RADIUS, HEAD, TOTAL (Left: Elbow) Diagnosis:       Elbow arthritis      (Left elbow arthritis )    Surgeons: Ayad Luna M.D. Responsible Provider: Rain Foy M.D.    Anesthesia Type: general, peripheral nerve block ASA Status: 3          Final Anesthesia Type: general, peripheral nerve block  Last vitals  BP   Blood Pressure: 137/58    Temp   36.6 °C (97.9 °F)    Pulse   89   Resp   18    SpO2   93 %      Anesthesia Post Evaluation    Patient location during evaluation: PACU  Patient participation: complete - patient participated  Level of consciousness: awake  Pain score: 0    Airway patency: patent  Anesthetic complications: no  Cardiovascular status: adequate  Respiratory status: acceptable  Hydration status: acceptable  Comments: Patient appeared to be comfortable initially in PACU but over an hour later, the patient complained of pain in elbow. I will evaluated him and  Possibly repeat the block although it was quite tricky to do because of his anatomy.    PONV: none          No notable events documented.     Nurse Pain Score: 6 (NPRS)

## 2022-10-18 PROBLEM — M24.522 CONTRACTURE OF LEFT ELBOW: Status: ACTIVE | Noted: 2022-10-18

## 2022-11-02 ENCOUNTER — PATIENT MESSAGE (OUTPATIENT)
Dept: HEALTH INFORMATION MANAGEMENT | Facility: OTHER | Age: 62
End: 2022-11-02

## 2022-12-28 ENCOUNTER — TELEPHONE (OUTPATIENT)
Dept: CARDIOLOGY | Facility: MEDICAL CENTER | Age: 62
End: 2022-12-28
Payer: COMMERCIAL

## 2022-12-28 PROBLEM — M25.832 ULNAR IMPACTION SYNDROME, LEFT: Status: ACTIVE | Noted: 2022-12-28

## 2022-12-28 PROBLEM — M65.4 RADIAL STYLOID TENOSYNOVITIS (DE QUERVAIN): Status: ACTIVE | Noted: 2022-12-28

## 2022-12-28 NOTE — TELEPHONE ENCOUNTER
Called and spoke to pt regarding NP appt with [facility] 1/3/23. Pt denies seeing any outside cardiology. This will be his first time seeing a cardiology. Confirm appt date, time & location. Pt thank me for calling.

## 2023-01-03 ENCOUNTER — OFFICE VISIT (OUTPATIENT)
Dept: CARDIOLOGY | Facility: MEDICAL CENTER | Age: 63
End: 2023-01-03
Payer: COMMERCIAL

## 2023-01-03 VITALS
DIASTOLIC BLOOD PRESSURE: 80 MMHG | BODY MASS INDEX: 37.37 KG/M2 | SYSTOLIC BLOOD PRESSURE: 128 MMHG | OXYGEN SATURATION: 92 % | HEART RATE: 102 BPM | RESPIRATION RATE: 16 BRPM | WEIGHT: 261 LBS | HEIGHT: 70 IN

## 2023-01-03 DIAGNOSIS — Z91.89 OTHER SPECIFIED PERSONAL RISK FACTORS, NOT ELSEWHERE CLASSIFIED: ICD-10-CM

## 2023-01-03 DIAGNOSIS — I49.9 IRREGULAR HEART BEAT: ICD-10-CM

## 2023-01-03 PROCEDURE — 99204 OFFICE O/P NEW MOD 45 MIN: CPT | Performed by: INTERNAL MEDICINE

## 2023-01-03 ASSESSMENT — FIBROSIS 4 INDEX: FIB4 SCORE: 1.76

## 2023-01-03 NOTE — PROGRESS NOTES
"CARDIOLOGY NEW PATIENT CONSULTATION    PCP: Kevin Chavez M.D.    1. Irregular heart beat    2. Other specified personal risk factors, not elsewhere classified        Cristino Keys is at risk for ASCVD as well as cardiac arrhythmia.  I offered statin therapy though he declines and instead we agreed to proceed with updating the calcium score.  I will also arrange a Zio patch and discussed the potential utility of personal ECG devices as well as his home blood pressure cuff and surveilling for arrhythmia.    Follow up: to be determined after testing is complete    Chief Complaint   Patient presents with    Irregular Heart Beat       History: Cristino Keys is a 62 y.o. male with diabetes, sleep apnea, testosterone replacement therapy presenting for assessment of irregular heartbeat as well as cardiovascular risk.  He was incidentally discovered as having irregular heartbeat during routine physical.  EKG showed no arrhythmia.  He underwent surgery on the shoulder as well as endoscopic esophageal intervention without any reported arrhythmia.  He does not have any symptoms related to this.  He also reports a negative calcium score 3 years ago.      ROS:   10 point review systems is otherwise negative except as per the HPI    PE:  /80 (BP Location: Left arm, Patient Position: Sitting, BP Cuff Size: Adult)   Pulse (!) 102   Resp 16   Ht 1.778 m (5' 10\")   Wt 118 kg (261 lb)   SpO2 92%   BMI 37.45 kg/m²   Gen: no acute distress  HEENT: Symmetric face. Anicteric sclerae. Moist mucus membranes  NECK: No JVD. No lymphadenopathy  CARDIAC: Regular, Normal S1, S2, No murmur  VASCULATURE: carotids are normal bilaterally without bruit  RESP: Clear to auscultation bilaterally  ABD: Soft, non-tender, non-distended  EXT: No edema, no clubbing or cyanosis  SKIN: Warm and dry  NEURO: No gross deficits  PSYCH: Appropriate affect, participates in conversation    The 10-year ASCVD risk score (Devin ROSS, et al., 2019) " "is: 28.4%    Past Medical History:   Diagnosis Date    Anesthesia 11/03/2017    PONV with shoulder surgery approx 20 years ago.    Anxiety and depression 11/03/2017    Arthritis 11/03/2017    Osteoarthritis, \" all over\"    Bronchitis     childhood    Bunion     Cancer (HCC) 2017    skin lesion cut out, on left shoulder    Chronic autoimmune thyroiditis      + US / + TPO = 57    Dental disorder 11/03/2017    \"Upper Plate\"    Diabetes mellitus (HCC) 11/03/2017    \"Controlled with diet & Victoza\"    Gout     Hammertoe     High cholesterol 11/03/2017    HX OF, not currently on medication for.    Hypertension     Hypothyroidism     chronic thyroiditis    MRSA infection     Open toe wound 03/2018    Left big toe, open wound, started 2/2018, receiving wound care therapy two times a week    Pain 11/03/2017    \"Pain all over except right hip & ankle\"    Pain 09/19/2022    \"All over \"    Psychiatric problem     depression/anxiety    Sleep apnea 11/03/2017    CPAP USE    Snoring 11/03/2017    DX JULIAN    Tonsillitis      Past Surgical History:   Procedure Laterality Date    PB RECONSTR TOTAL SHOULDER IMPLANT Left 11/22/2022    Procedure: Left reverse total shoulder arthroplasty, biceps tenodesis and repairs as indicated;  Surgeon: Jd Villegas M.D.;  Location: [facility];  Service: Orthopedics    PB REPAIR BICEPS LONG TENDON  11/22/2022    Procedure: TENODESIS, BICEPS;  Surgeon: Jd Villegas M.D.;  Location: [facility];  Service: Orthopedics    ND NEUROPLASTY & OR TRANSPOS ULNAR NERVE ELBOW Left 10/4/2022    Procedure: LEFT ELBOW CONTRACTURE RELEASE OSTEOPHYTE REMOVAL RADIAL HEAD EXCISION;  Surgeon: Ayad Luna M.D.;  Location: SURGERY SAME DAY [facility];  Service: Orthopedics    RADIAL HEAD EXCISION Left 10/4/2022    Procedure: EXCISION, RADIUS, HEAD, TOTAL;  Surgeon: Ayad Luna M.D.;  Location: SURGERY SAME DAY [facility];  Service: Orthopedics    ND UPPER GI " ENDOSCOPY,REMOV F.B.  10/18/2021    Procedure: GASTROSCOPY, WITH FOREIGN BODY REMOVAL;  Surgeon: Estiven Ruiz M.D.;  Location: SURGERY SAME DAY [facility];  Service: Gastroenterology    AK UPPER GI ENDOSCOPY,DIAGNOSIS  10/18/2021    Procedure: GASTROSCOPY;  Surgeon: Estiven Ruiz M.D.;  Location: SURGERY SAME DAY [facility];  Service: Gastroenterology    HETEROTOPIC OSSIFICATION Right 10/14/2021    Procedure: EXCISION, HETEROTOPIC ossification;  Surgeon: Emiliano Vang M.D.;  Location: [facility];  Service: Orthopedics    METATARSAL HEAD RESECTION Left 7/20/2020    Procedure: EXCISION, METATARSAL BONE, HEAD- PARTIAL DISTAL 2/3 METATARSAL;  Surgeon: Haroldo Kang M.D.;  Location: [facility];  Service: Orthopedics    HAMMERTOE CORRECTION Left 7/20/2020    Procedure: CORRECTION, HAMMER TOE- 4/5;  Surgeon: Haroldo Kang M.D.;  Location: SURGERY [facility];  Service: Orthopedics    TOENAIL REMOVAL Left 7/20/2020    Procedure: REMOVAL, TOENAIL 3-5;  Surgeon: Haroldo Kang M.D.;  Location: SURGERY [facility];  Service: Orthopedics    METATARSAL HEAD RESECTION Left 6/10/2019    Procedure: METATARSAL HEAD RESECTION- FOR PARTIAL EXCISION;  Surgeon: Haroldo Kang M.D.;  Location: [facility];  Service: Orthopedics    JOINT INJECTION DIAGNOSTIC  6/10/2019    Procedure: INJECTION, JOINT, DIAGNOSTIC- MIDFOOT;  Surgeon: Haroldo Kang M.D.;  Location: SURGERY [facility];  Service: Orthopedics    TOE AMPUTATION Right 6/10/2019    Procedure: AMPUTATION, TOE- FIRST TOE;  Surgeon: Haroldo Kang M.D.;  Location: SURGERY [facility];  Service: Orthopedics    ORTHOPEDIC OSTEOTOMY Left 10/15/2018    Procedure: ORTHOPEDIC OSTEOTOMY-  FOR: 3RD METATARSAL PARTIAL EXCISION, AND LEFT GASTROC SOLEUS RECESSION;  Surgeon: Haroldo Kang M.D.;  Location: [facility];  Service: Orthopedics    HARDWARE REMOVAL ORTHO Right 10/15/2018    Procedure: HARDWARE REMOVAL  ORTHO-  FOOT METATARSALPHALANGEAL JOINT PLATE;  Surgeon: Haroldo Kang M.D.;  Location: [facility];  Service: Orthopedics    TOE AMPUTATION Left 6/25/2018    Procedure: TOE AMPUTATION-FOR :PARTIAL 1ST DISTAL PHALANX EXCISION, GREAT TOE AMPUTATION;  Surgeon: Haroldo Kang M.D.;  Location: [facility];  Service: Orthopedics    INTERNAL FIXATION REMOVAL Left 6/25/2018    Procedure: INTERNAL FIXATION REMOVAL;  Surgeon: Haroldo Kang M.D.;  Location: [facility];  Service: Orthopedics    NERVE ULNAR TRANSFER Right 4/3/2018    Procedure: NERVE ULNAR TRANSFER - TRANSPOSITION;  Surgeon: Ayad Luna M.D.;  Location: [facility];  Service: Orthopedics    CARPAL TUNNEL RELEASE Right 4/3/2018    Procedure: CARPAL TUNNEL RELEASE;  Surgeon: Ayad Luna M.D.;  Location: [facility];  Service: Orthopedics    ELBOW ARTHROTOMY Right 4/3/2018    Procedure: ELBOW ARTHROTOMY - FOR CONTRACTURE RELEASE AT THE ELBOW, RADIAL HEAD EXCISION;  Surgeon: Ayad Luna M.D.;  Location: [facility];  Service: Orthopedics    SHOULDER SURGERY Right 12/02/2017    reversal    METATARSAL HEAD RESECTION Right 11/13/2017    Procedure: METATARSAL HEAD RESECTION - EXCISION;  Surgeon: Haroldo Kang M.D.;  Location: [facility];  Service: Orthopedics    HAMMERTOE CORRECTION Right 11/13/2017    Procedure: HAMMERTOE CORRECTION 2-5;  Surgeon: Haroldo Kang M.D.;  Location: [facility];  Service: Orthopedics    TOE FUSION Right 11/13/2017    Procedure: TOE FUSION - 1ST METATARSALPHALANGEAL;  Surgeon: aHroldo Kang M.D.;  Location: [facility];  Service: Orthopedics    METATARSAL HEAD RESECTION Left 8/21/2017    Procedure: METATARSAL HEAD RESECTION - 2-5;  Surgeon: Haroldo Kang M.D.;  Location: [facility];  Service:     TOE FUSION Left 8/21/2017    Procedure: TOE FUSION - 1ST MTP;  Surgeon: Haroldo Kang  "M.D.;  Location: SURGERY [facility];  Service:     HARDWARE REMOVAL ORTHO Left 8/21/2017    Procedure: HARDWARE REMOVAL ORTHO;  Surgeon: Haroldo Kang M.D.;  Location: SURGERY [facility];  Service:     FUSION, SPINE, LUMBAR, PLIF  4/14/2017    Procedure: LUMBAR FUSION POSTERIOR - MINIMALLY INVASIVE TLIF L4-5;  Surgeon: Bossman Caro M.D.;  Location: SURGERY [facility];  Service:     HAMMERTOE CORRECTION Bilateral 12/22/2016    Procedure: HAMMERTOE CORRECTION/METATARSALPHALANGEAL JOINT RELEASE 2/3 RIGHT, 2-3 LEFT;  Surgeon: Haroldo Kang M.D.;  Location: SURGERY [facility];  Service:     BUNIONECTOMY Left 12/22/2016    Procedure: BUNIONECTOMY FOR DISTAL HALLUX VALGUS CORRECTION WITH BRANDY;  Surgeon: Haroldo Kang M.D.;  Location: SURGERY [facility];  Service:     ORTHOPEDIC OSTEOTOMY Left 12/22/2016    Procedure: ORTHOPEDIC OSTEOTOMY DISTAL METATARSAL 2-5;  Surgeon: Haroldo Kang M.D.;  Location: SURGERY [facility];  Service:     HIP ARTH ANTERIOR TOTAL Left 8/18/2016    Procedure: HIP ARTHROPLASTY ANTERIOR TOTAL;  Surgeon: Emiliano Vang M.D.;  Location: SURGERY [facility];  Service:     OTHER ORTHOPEDIC SURGERY  6/2014    right shoulder  arthroplasty    OTHER ORTHOPEDIC SURGERY  11/1/2012    left knee/left ankle/left shoulder    OTHER ORTHOPEDIC SURGERY  2004    elbow surgery    OTHER  2000    dislocation left foot surgery at [facility]    OTHER      \"septoplasty 20 years ago\"    OTHER ORTHOPEDIC SURGERY      brad knee replaced    OTHER ORTHOPEDIC SURGERY      left total hip     Allergies   Allergen Reactions    Demerol Vomiting and Nausea     Rxn = years ago     Cubicin [Daptomycin] Vomiting    Meperidine      2004-02-25;NA    Sulfa Drugs Hives     .    Sulfamethoxazole W-Trimethoprim Rash     2004-02-25;NA    Sulfamethoxazole-Trimethoprim Rash     2004-02-25;NA  2004-02-25;NA     Outpatient Encounter Medications as of 1/3/2023   Medication Sig Dispense Refill    " hydrOXYzine HCl (ATARAX) 50 MG Tab Take 50 mg by mouth every day.      losartan (COZAAR) 100 MG Tab Take 100 mg by mouth every day.      temazepam (RESTORIL) 15 MG Cap Take 15 mg by mouth.      amphetamine-dextroamphetamine (ADDERALL) 15 MG tablet TAKE 1 TABLET BY MOUTH EVERY DAY FOR 28 DAYS. F90.9      cyclobenzaprine (FLEXERIL) 10 mg Tab       PAXLOVID, 300/100, 20 x 150 MG & 10 x 100MG Tablet Therapy Pack TAKE 3 TABLETS BY MOUTH TWICE A DAY FOR 5 DAYS      levothyroxine (SYNTHROID) 137 MCG Tab TAKE 1 TABLET BY MOUTH EVERY MORNING ON AN EMPTY STOMACH. 114 Tablet 4    Testosterone Enanthate (XYOSTED) 75 MG/0.5ML Solution Auto-injector Inject 75 mg into the shoulder, thigh, or buttocks every 7 days for 90 doses. 6 mL 2    oxyCODONE-acetaminophen (PERCOCET-10)  MG Tab Take 1 Tablet by mouth every 6 hours as needed.      ACCU-CHEK GUIDE strip       Accu-Chek FastClix Lancets Misc       tadalafil (CIALIS) 20 MG tablet Take 20 mg by mouth as needed.      Venlafaxine HCl 75 MG TABLET SR 24 HR Take 1 Tablet by mouth every day.      naproxen (NAPROSYN) 500 MG Tab Take 500 mg by mouth 2 times a day with meals.      hydrOXYzine pamoate (VISTARIL) 50 MG Cap Take 50 mg by mouth 2 Times a Day.      NOVOFINE PLUS Inject 1 Application as instructed 4 Times a Day,Before Meals and at Bedtime. Use before meals and at bedtime to check blood sugar.      lamoTRIgine (LAMICTAL) 100 MG Tab Take 100 mg by mouth every day.      tamsulosin (FLOMAX) 0.4 MG capsule Take 0.4 mg by mouth ONE-HALF HOUR AFTER BREAKFAST.      [DISCONTINUED] gabapentin (NEURONTIN) 300 MG Cap Take 300 mg by mouth.      [DISCONTINUED] levothyroxine (SYNTHROID) 137 MCG Tab Take 137 mcg by mouth every day.      [DISCONTINUED] lidocaine (LIDODERM) 5 % Patch Place 1 Patch on the skin.      [DISCONTINUED] omeprazole (PRILOSEC) 20 MG Tablet Delayed Response delayed-release tablet Take 1 Tablet by mouth 2 times a day. (Patient not taking: Reported on 1/3/2023)       [DISCONTINUED] sucralfate (CARAFATE) 1 GM Tab TAKE 1 TABS ORAL 4 TIMES A DAY FOR 14 DAYS. MAKE INTO A SLURRY (Patient not taking: Reported on 1/3/2023)      [DISCONTINUED] Testosterone Enanthate (XYOSTED) 75 MG/0.5ML Solution Auto-injector Inject 75 mg under the skin.      [DISCONTINUED] amoxicillin (AMOXIL) 500 MG Cap START TAKING 24 HOURS PRIOR TO SURGERY THEN TAKE 1 EVERY 8 HOURS UNTIL GONE      [DISCONTINUED] benzonatate (TESSALON) 100 MG Cap TAKE 1-2 CAPSULES BY MOUTH EVERY 6 HOURS AS NEEDED      [DISCONTINUED] dexamethasone (DECADRON) 4 MG Tab TAKE 2 TABS AT LEAST ONE HOUR PRIOR TO SURGERY      [DISCONTINUED] levoFLOXacin (LEVAQUIN) 500 MG tablet Take 500 mg by mouth every day.      [DISCONTINUED] gabapentin (NEURONTIN) 300 MG Cap TAKE 1 CAPSULE AT BEDTIME (Patient taking differently: Take 300 mg by mouth 1 time a day as needed.) 90 Capsule 2    [DISCONTINUED] PENNSAID 2 % Solution Apply  topically as needed.      [DISCONTINUED] lidocaine (LIDODERM) 5 % Patch Place 2 Patches on the skin 1 time a day as needed.      [DISCONTINUED] liraglutide (VICTOZA) 18 MG/3ML Solution Pen-injector Inject 1.8 mg as instructed every day.      [DISCONTINUED] azelastine (ASTELIN) 137 MCG/SPRAY nasal spray Arjay 1 Spray in nose 2 Times a Day.      [DISCONTINUED] temazepam (RESTORIL) 30 MG capsule Take 30 mg by mouth at bedtime as needed for Sleep.      [DISCONTINUED] losartan (COZAAR) 100 MG Tab Take 100 mg by mouth every morning.       No facility-administered encounter medications on file as of 1/3/2023.     Social History     Socioeconomic History    Marital status:      Spouse name: Not on file    Number of children: Not on file    Years of education: Not on file    Highest education level: Not on file   Occupational History    Not on file   Tobacco Use    Smoking status: Former     Packs/day: 1.00     Years: 20.00     Pack years: 20.00     Types: Cigarettes     Quit date: 2014     Years since quittin.0     Smokeless tobacco: Former     Types: Chew     Quit date: 1/1/1997   Vaping Use    Vaping Use: Never used   Substance and Sexual Activity    Alcohol use: Yes     Comment: occasional- approx once a week    Drug use: No    Sexual activity: Not on file   Other Topics Concern    Not on file   Social History Narrative    Not on file     Social Determinants of Health     Financial Resource Strain: Not on file   Food Insecurity: Not on file   Transportation Needs: Not on file   Physical Activity: Not on file   Stress: Not on file   Social Connections: Not on file   Intimate Partner Violence: Not on file   Housing Stability: Not on file     Family History   Problem Relation Age of Onset    Heart Disease Mother     Depression Mother     Cancer Father         Colon cancer     Cancer Sister         Skin cancer    Sleep Apnea Neg Hx          Studies  Lab Results   Component Value Date/Time    CHOLSTRLTOT 217 (H) 08/06/2021 12:29 PM     (H) 08/06/2021 12:29 PM    HDL 43 08/06/2021 12:29 PM    TRIGLYCERIDE 303 (H) 08/06/2021 12:29 PM       Lab Results   Component Value Date/Time    SODIUM 135 09/21/2022 11:04 AM    POTASSIUM 4.0 09/21/2022 11:04 AM    CHLORIDE 102 09/21/2022 11:04 AM    CO2 21 09/21/2022 11:04 AM    GLUCOSE 142 (H) 09/21/2022 11:04 AM    BUN 14 09/21/2022 11:04 AM    CREATININE 1.18 09/21/2022 11:04 AM     Lab Results   Component Value Date/Time    ALKPHOSPHAT 83 08/06/2021 12:29 PM    ASTSGOT 24 08/06/2021 12:29 PM    ALTSGPT 23 08/06/2021 12:29 PM    TBILIRUBIN 0.5 08/06/2021 12:29 PM

## 2023-01-05 ENCOUNTER — HOSPITAL ENCOUNTER (OUTPATIENT)
Dept: RADIOLOGY | Facility: MEDICAL CENTER | Age: 63
End: 2023-01-05
Attending: INTERNAL MEDICINE
Payer: COMMERCIAL

## 2023-01-05 DIAGNOSIS — Z91.89 OTHER SPECIFIED PERSONAL RISK FACTORS, NOT ELSEWHERE CLASSIFIED: ICD-10-CM

## 2023-01-05 PROCEDURE — 4410556 CT-CARDIAC SCORING (SELF PAY ONLY)

## 2023-01-13 ENCOUNTER — NON-PROVIDER VISIT (OUTPATIENT)
Dept: CARDIOLOGY | Facility: MEDICAL CENTER | Age: 63
End: 2023-01-13
Attending: INTERNAL MEDICINE
Payer: COMMERCIAL

## 2023-01-13 DIAGNOSIS — I49.9 IRREGULAR HEART BEAT: ICD-10-CM

## 2023-01-13 DIAGNOSIS — I49.3 PVCS (PREMATURE VENTRICULAR CONTRACTIONS): ICD-10-CM

## 2023-01-13 NOTE — PROGRESS NOTES
Patient enrolled in the 14 Zio XT Holter monitoring program per Bossman Walton MD..   >In-Clinic hook-up, serial # [id number].   >Currently pending EOS.

## 2023-01-24 ENCOUNTER — APPOINTMENT (RX ONLY)
Dept: URBAN - METROPOLITAN AREA CLINIC 22 | Facility: CLINIC | Age: 63
Setting detail: DERMATOLOGY
End: 2023-01-24

## 2023-01-24 DIAGNOSIS — L82.0 INFLAMED SEBORRHEIC KERATOSIS: ICD-10-CM

## 2023-01-24 DIAGNOSIS — D22 MELANOCYTIC NEVI: ICD-10-CM

## 2023-01-24 DIAGNOSIS — L82.1 OTHER SEBORRHEIC KERATOSIS: ICD-10-CM

## 2023-01-24 DIAGNOSIS — L81.4 OTHER MELANIN HYPERPIGMENTATION: ICD-10-CM

## 2023-01-24 DIAGNOSIS — Z71.89 OTHER SPECIFIED COUNSELING: ICD-10-CM

## 2023-01-24 DIAGNOSIS — B35.3 TINEA PEDIS: ICD-10-CM | Status: INADEQUATELY CONTROLLED

## 2023-01-24 DIAGNOSIS — D18.0 HEMANGIOMA: ICD-10-CM

## 2023-01-24 DIAGNOSIS — L57.0 ACTINIC KERATOSIS: ICD-10-CM

## 2023-01-24 DIAGNOSIS — Z85.828 PERSONAL HISTORY OF OTHER MALIGNANT NEOPLASM OF SKIN: ICD-10-CM

## 2023-01-24 DIAGNOSIS — L91.8 OTHER HYPERTROPHIC DISORDERS OF THE SKIN: ICD-10-CM

## 2023-01-24 PROBLEM — D18.01 HEMANGIOMA OF SKIN AND SUBCUTANEOUS TISSUE: Status: ACTIVE | Noted: 2023-01-24

## 2023-01-24 PROBLEM — D22.5 MELANOCYTIC NEVI OF TRUNK: Status: ACTIVE | Noted: 2023-01-24

## 2023-01-24 PROCEDURE — 17004 DESTROY PREMAL LESIONS 15/>: CPT

## 2023-01-24 PROCEDURE — ? PRESCRIPTION

## 2023-01-24 PROCEDURE — ? LIQUID NITROGEN

## 2023-01-24 PROCEDURE — ? COUNSELING

## 2023-01-24 PROCEDURE — 17110 DESTRUCTION B9 LES UP TO 14: CPT | Mod: 59

## 2023-01-24 PROCEDURE — 99214 OFFICE O/P EST MOD 30 MIN: CPT | Mod: 25

## 2023-01-24 PROCEDURE — 11200 RMVL SKIN TAGS UP TO&INC 15: CPT | Mod: 59

## 2023-01-24 PROCEDURE — ? SKIN TAG REMOVAL

## 2023-01-24 PROCEDURE — ? ADDITIONAL NOTES

## 2023-01-24 PROCEDURE — ? SUNSCREEN RECOMMENDATIONS

## 2023-01-24 RX ORDER — KETOCONAZOLE 20 MG/G
CREAM TOPICAL BID
Qty: 60 | Refills: 2 | Status: ERX | COMMUNITY
Start: 2023-01-24

## 2023-01-24 RX ADMIN — KETOCONAZOLE: 20 CREAM TOPICAL at 00:00

## 2023-01-24 ASSESSMENT — LOCATION DETAILED DESCRIPTION DERM
LOCATION DETAILED: RIGHT VENTRAL PROXIMAL FOREARM
LOCATION DETAILED: RIGHT POSTERIOR SHOULDER
LOCATION DETAILED: RIGHT SUPERIOR UPPER BACK
LOCATION DETAILED: RIGHT SUPERIOR FOREHEAD
LOCATION DETAILED: RIGHT CENTRAL TEMPLE
LOCATION DETAILED: LEFT PLANTAR FOREFOOT OVERLYING 2ND METATARSAL
LOCATION DETAILED: RIGHT PROXIMAL DORSAL FOREARM
LOCATION DETAILED: LEFT SUPERIOR LATERAL UPPER BACK
LOCATION DETAILED: RIGHT PROXIMAL ULNAR DORSAL FOREARM
LOCATION DETAILED: LEFT MEDIAL ZYGOMA
LOCATION DETAILED: LEFT SUPERIOR MEDIAL UPPER BACK
LOCATION DETAILED: LEFT PROXIMAL DORSAL FOREARM
LOCATION DETAILED: RIGHT CENTRAL ZYGOMA
LOCATION DETAILED: LEFT CLAVICULAR NECK
LOCATION DETAILED: RIGHT MEDIAL PLANTAR MIDFOOT
LOCATION DETAILED: LEFT DORSAL FOOT
LOCATION DETAILED: RIGHT DORSAL FOOT
LOCATION DETAILED: RIGHT VENTRAL DISTAL FOREARM
LOCATION DETAILED: LEFT FOREHEAD
LOCATION DETAILED: RIGHT FOREHEAD
LOCATION DETAILED: RIGHT VENTRAL LATERAL DISTAL FOREARM
LOCATION DETAILED: RIGHT SUPERIOR MEDIAL MIDBACK
LOCATION DETAILED: RIGHT INFERIOR PREAURICULAR CHEEK
LOCATION DETAILED: EPIGASTRIC SKIN
LOCATION DETAILED: LEFT LATERAL SHOULDER

## 2023-01-24 ASSESSMENT — LOCATION SIMPLE DESCRIPTION DERM
LOCATION SIMPLE: LEFT FOREHEAD
LOCATION SIMPLE: RIGHT SHOULDER
LOCATION SIMPLE: RIGHT UPPER BACK
LOCATION SIMPLE: LEFT ANTERIOR NECK
LOCATION SIMPLE: LEFT PLANTAR SURFACE
LOCATION SIMPLE: RIGHT ZYGOMA
LOCATION SIMPLE: LEFT SHOULDER
LOCATION SIMPLE: LEFT FOREARM
LOCATION SIMPLE: LEFT FOOT
LOCATION SIMPLE: RIGHT LOWER BACK
LOCATION SIMPLE: RIGHT PLANTAR SURFACE
LOCATION SIMPLE: LEFT ZYGOMA
LOCATION SIMPLE: RIGHT CHEEK
LOCATION SIMPLE: LEFT UPPER BACK
LOCATION SIMPLE: RIGHT TEMPLE
LOCATION SIMPLE: ABDOMEN
LOCATION SIMPLE: RIGHT FOOT
LOCATION SIMPLE: RIGHT FOREHEAD
LOCATION SIMPLE: RIGHT FOREARM

## 2023-01-24 ASSESSMENT — SEVERITY ASSESSMENT: SEVERITY: MODERATE

## 2023-01-24 ASSESSMENT — LOCATION ZONE DERM
LOCATION ZONE: TRUNK
LOCATION ZONE: NECK
LOCATION ZONE: ARM
LOCATION ZONE: FACE
LOCATION ZONE: FEET

## 2023-01-24 NOTE — PROCEDURE: ADDITIONAL NOTES
Render Risk Assessment In Note?: no
Additional Notes: Avoid use of Triamcinolone cream.
Detail Level: Generalized

## 2023-01-24 NOTE — PROCEDURE: LIQUID NITROGEN
Consent: The patient's consent was obtained including but not limited to risks of crusting, scabbing, blistering, scarring, darker or lighter pigmentary change, recurrence, incomplete removal and infection.
Show Aperture Variable?: Yes
Number Of Freeze-Thaw Cycles: 2 freeze-thaw cycles
Post-Care Instructions: I reviewed with the patient in detail post-care instructions. Patient is to wear sunprotection, and avoid picking at any of the treated lesions. Pt may apply Vaseline to crusted or scabbing areas.
Render Post-Care Instructions In Note?: no
Duration Of Freeze Thaw-Cycle (Seconds): 3
Detail Level: Detailed
Application Tool (Optional): Liquid Nitrogen Sprayer
Number Of Freeze-Thaw Cycles: 3 freeze-thaw cycles
Medical Necessity Clause: This procedure was medically necessary because the lesions that were treated were:
Medical Necessity Information: It is in your best interest to select a reason for this procedure from the list below. All of these items fulfill various CMS LCD requirements except the new and changing color options.
Spray Paint Text: The liquid nitrogen was applied to the skin utilizing a spray paint frosting technique.
Pared With?: curette

## 2023-01-25 ENCOUNTER — HOSPITAL ENCOUNTER (OUTPATIENT)
Dept: LAB | Facility: MEDICAL CENTER | Age: 63
End: 2023-01-25
Attending: FAMILY MEDICINE
Payer: COMMERCIAL

## 2023-01-25 LAB
ALBUMIN SERPL BCP-MCNC: 4.5 G/DL (ref 3.2–4.9)
ALBUMIN/GLOB SERPL: 1.7 G/DL
ALP SERPL-CCNC: 108 U/L (ref 30–99)
ALT SERPL-CCNC: 27 U/L (ref 2–50)
ANION GAP SERPL CALC-SCNC: 12 MMOL/L (ref 7–16)
APPEARANCE UR: CLEAR
AST SERPL-CCNC: 24 U/L (ref 12–45)
BACTERIA #/AREA URNS HPF: NEGATIVE /HPF
BASOPHILS # BLD AUTO: 0.6 % (ref 0–1.8)
BASOPHILS # BLD: 0.03 K/UL (ref 0–0.12)
BILIRUB SERPL-MCNC: 0.5 MG/DL (ref 0.1–1.5)
BILIRUB UR QL STRIP.AUTO: NEGATIVE
BUN SERPL-MCNC: 16 MG/DL (ref 8–22)
CALCIUM ALBUM COR SERPL-MCNC: 9.3 MG/DL (ref 8.5–10.5)
CALCIUM SERPL-MCNC: 9.7 MG/DL (ref 8.5–10.5)
CHLORIDE SERPL-SCNC: 97 MMOL/L (ref 96–112)
CO2 SERPL-SCNC: 24 MMOL/L (ref 20–33)
COLOR UR: ABNORMAL
CREAT SERPL-MCNC: 1.02 MG/DL (ref 0.5–1.4)
EOSINOPHIL # BLD AUTO: 0.14 K/UL (ref 0–0.51)
EOSINOPHIL NFR BLD: 2.6 % (ref 0–6.9)
EPI CELLS #/AREA URNS HPF: NEGATIVE /HPF
ERYTHROCYTE [DISTWIDTH] IN BLOOD BY AUTOMATED COUNT: 46.4 FL (ref 35.9–50)
GFR SERPLBLD CREATININE-BSD FMLA CKD-EPI: 82 ML/MIN/1.73 M 2
GLOBULIN SER CALC-MCNC: 2.7 G/DL (ref 1.9–3.5)
GLUCOSE SERPL-MCNC: 219 MG/DL (ref 65–99)
GLUCOSE UR STRIP.AUTO-MCNC: NEGATIVE MG/DL
HCT VFR BLD AUTO: 47.9 % (ref 42–52)
HGB BLD-MCNC: 15.9 G/DL (ref 14–18)
HYALINE CASTS #/AREA URNS LPF: ABNORMAL /LPF
IMM GRANULOCYTES # BLD AUTO: 0.05 K/UL (ref 0–0.11)
IMM GRANULOCYTES NFR BLD AUTO: 0.9 % (ref 0–0.9)
KETONES UR STRIP.AUTO-MCNC: NEGATIVE MG/DL
LEUKOCYTE ESTERASE UR QL STRIP.AUTO: NEGATIVE
LYMPHOCYTES # BLD AUTO: 1.33 K/UL (ref 1–4.8)
LYMPHOCYTES NFR BLD: 25 % (ref 22–41)
MCH RBC QN AUTO: 29.9 PG (ref 27–33)
MCHC RBC AUTO-ENTMCNC: 33.2 G/DL (ref 33.7–35.3)
MCV RBC AUTO: 90 FL (ref 81.4–97.8)
MICRO URNS: ABNORMAL
MONOCYTES # BLD AUTO: 0.46 K/UL (ref 0–0.85)
MONOCYTES NFR BLD AUTO: 8.6 % (ref 0–13.4)
NEUTROPHILS # BLD AUTO: 3.31 K/UL (ref 1.82–7.42)
NEUTROPHILS NFR BLD: 62.3 % (ref 44–72)
NITRITE UR QL STRIP.AUTO: NEGATIVE
NRBC # BLD AUTO: 0 K/UL
NRBC BLD-RTO: 0 /100 WBC
PH UR STRIP.AUTO: 6 [PH] (ref 5–8)
PLATELET # BLD AUTO: 211 K/UL (ref 164–446)
PMV BLD AUTO: 9 FL (ref 9–12.9)
POTASSIUM SERPL-SCNC: 4.1 MMOL/L (ref 3.6–5.5)
PROT SERPL-MCNC: 7.2 G/DL (ref 6–8.2)
PROT UR QL STRIP: 300 MG/DL
PSA SERPL-MCNC: 0.42 NG/ML (ref 0–4)
RBC # BLD AUTO: 5.32 M/UL (ref 4.7–6.1)
RBC # URNS HPF: ABNORMAL /HPF
RBC UR QL AUTO: NEGATIVE
SODIUM SERPL-SCNC: 133 MMOL/L (ref 135–145)
SP GR UR STRIP.AUTO: 1.03
T4 SERPL-MCNC: 6.1 UG/DL (ref 4–12)
TSH SERPL DL<=0.005 MIU/L-ACNC: 1.8 UIU/ML (ref 0.38–5.33)
UROBILINOGEN UR STRIP.AUTO-MCNC: 0.2 MG/DL
WBC # BLD AUTO: 5.3 K/UL (ref 4.8–10.8)
WBC #/AREA URNS HPF: ABNORMAL /HPF

## 2023-01-25 PROCEDURE — 84443 ASSAY THYROID STIM HORMONE: CPT

## 2023-01-25 PROCEDURE — 85025 COMPLETE CBC W/AUTO DIFF WBC: CPT

## 2023-01-25 PROCEDURE — 84436 ASSAY OF TOTAL THYROXINE: CPT

## 2023-01-25 PROCEDURE — 36415 COLL VENOUS BLD VENIPUNCTURE: CPT

## 2023-01-25 PROCEDURE — 80053 COMPREHEN METABOLIC PANEL: CPT

## 2023-01-25 PROCEDURE — 84153 ASSAY OF PSA TOTAL: CPT

## 2023-01-25 PROCEDURE — 81001 URINALYSIS AUTO W/SCOPE: CPT

## 2023-02-02 ENCOUNTER — TELEPHONE (OUTPATIENT)
Dept: CARDIOLOGY | Facility: MEDICAL CENTER | Age: 63
End: 2023-02-02
Payer: COMMERCIAL

## 2023-02-03 PROCEDURE — 93248 EXT ECG>7D<15D REV&INTERPJ: CPT | Performed by: INTERNAL MEDICINE

## 2023-02-03 PROCEDURE — 93246 EXT ECG>7D<15D RECORDING: CPT | Performed by: INTERNAL MEDICINE

## 2023-02-09 ENCOUNTER — OFFICE VISIT (OUTPATIENT)
Dept: CARDIOLOGY | Facility: MEDICAL CENTER | Age: 63
End: 2023-02-09
Payer: COMMERCIAL

## 2023-02-09 VITALS
DIASTOLIC BLOOD PRESSURE: 70 MMHG | HEIGHT: 70 IN | SYSTOLIC BLOOD PRESSURE: 128 MMHG | WEIGHT: 268 LBS | BODY MASS INDEX: 38.37 KG/M2 | OXYGEN SATURATION: 98 % | RESPIRATION RATE: 12 BRPM | HEART RATE: 104 BPM

## 2023-02-09 DIAGNOSIS — I49.3 PVC (PREMATURE VENTRICULAR CONTRACTION): ICD-10-CM

## 2023-02-09 DIAGNOSIS — I10 PRIMARY HYPERTENSION: ICD-10-CM

## 2023-02-09 DIAGNOSIS — R00.0 SINUS TACHYCARDIA: ICD-10-CM

## 2023-02-09 PROBLEM — Z79.85 LONG-TERM (CURRENT) USE OF INJECTABLE NON-INSULIN ANTIDIABETIC DRUGS: Status: ACTIVE | Noted: 2023-02-06

## 2023-02-09 PROCEDURE — 99214 OFFICE O/P EST MOD 30 MIN: CPT | Performed by: NURSE PRACTITIONER

## 2023-02-09 RX ORDER — GABAPENTIN 300 MG/1
300 CAPSULE ORAL
COMMUNITY

## 2023-02-09 RX ORDER — AZELASTINE 1 MG/ML
1 SPRAY, METERED NASAL
Status: ON HOLD | COMMUNITY
Start: 2022-09-01 | End: 2024-02-05

## 2023-02-09 RX ORDER — CYCLOBENZAPRINE HCL 10 MG
10 TABLET ORAL 3 TIMES DAILY PRN
COMMUNITY
Start: 2022-08-24

## 2023-02-09 RX ORDER — ALPRAZOLAM 0.25 MG/1
0.25 TABLET ORAL
COMMUNITY
End: 2023-08-30

## 2023-02-09 RX ORDER — BENZONATATE 100 MG/1
100 CAPSULE ORAL
COMMUNITY
Start: 2022-08-24 | End: 2023-08-30

## 2023-02-09 RX ORDER — LIRAGLUTIDE 6 MG/ML
1.8 INJECTION SUBCUTANEOUS DAILY
COMMUNITY
Start: 2022-12-11 | End: 2023-11-20

## 2023-02-09 ASSESSMENT — FIBROSIS 4 INDEX: FIB4 SCORE: 1.4

## 2023-02-09 NOTE — PROGRESS NOTES
"      Cardiology Clinic Follow-up Note    Date of note:    2/9/2023  Primary Care Provider: Kevin Chavez M.D.    Name:             Cristino Keys  YOB: 1960  MRN:               [id number]    CC: [facility]    Primary Cardiologist: Dr. Walton    Patient HPI:   Cristino Keys is a 62 y.o. male with current medical problems including  diabetes, sleep apnea, testosterone replacement therapy      Interim History:  Mr. Keys was last seen in this cardiology office by Dr. Walton on 1/3/23.  At that time 2-week cardiac event monitor was ordered, which showed 2% burden of PVCs.  CT CAC score ordered, total calcium score of only 16.  Feels quite asymptomatic at this time does admit to drinking quite a bit of coffee, not interested in decreasing this.  He has has 42 bone operations.  About recent surgeries, feels like this might be irritating his heart.   He is not interested in starting any new medications currently.  He denies palpitations, chest pain, shortness of breath, dyspnea on exertion, dizziness or syncopal episodes, orthopnea, PND, lower extremity swelling, and recent weight gain.     Patient endorses medication compliance.  Has been on chronic Adderall therapy.     Review of systems:  All others systems reviewed and negative except for what is outlined in the above HPI    Past Medical History:   Diagnosis Date    Anesthesia 11/03/2017    PONV with shoulder surgery approx 20 years ago.    Anxiety and depression 11/03/2017    Arthritis 11/03/2017    Osteoarthritis, \" all over\"    Bronchitis     childhood    Bunion     Cancer (HCC) 2017    skin lesion cut out, on left shoulder    Chronic autoimmune thyroiditis      + US / + TPO = 57    Dental disorder 11/03/2017    \"Upper Plate\"    Diabetes mellitus (HCC) 11/03/2017    \"Controlled with diet & Victoza\"    Gout     Hammertoe     High cholesterol 11/03/2017    HX OF, not currently on medication for.    Hypertension     Hypothyroidism     chronic thyroiditis    " "MRSA infection     Open toe wound 03/2018    Left big toe, open wound, started 2/2018, receiving wound care therapy two times a week    Pain 11/03/2017    \"Pain all over except right hip & ankle\"    Pain 09/19/2022    \"All over \"    Psychiatric problem     depression/anxiety    Sleep apnea 11/03/2017    CPAP USE    Snoring 11/03/2017    DX JULIAN    Tonsillitis      Past Surgical History:   Procedure Laterality Date    PB RECONSTR TOTAL SHOULDER IMPLANT Left 11/22/2022    Procedure: Left reverse total shoulder arthroplasty, biceps tenodesis and repairs as indicated;  Surgeon: Jd Villegas M.D.;  Location: [facility];  Service: Orthopedics    PB REPAIR BICEPS LONG TENDON  11/22/2022    Procedure: TENODESIS, BICEPS;  Surgeon: Jd Villegas M.D.;  Location: [facility];  Service: Orthopedics    SC NEUROPLASTY & OR TRANSPOS ULNAR NERVE ELBOW Left 10/4/2022    Procedure: LEFT ELBOW CONTRACTURE RELEASE OSTEOPHYTE REMOVAL RADIAL HEAD EXCISION;  Surgeon: Ayad Luna M.D.;  Location: SURGERY SAME DAY [facility];  Service: Orthopedics    RADIAL HEAD EXCISION Left 10/4/2022    Procedure: EXCISION, RADIUS, HEAD, TOTAL;  Surgeon: Ayad Luna M.D.;  Location: SURGERY SAME DAY [facility];  Service: Orthopedics    SC UPPER GI ENDOSCOPY,REMOV F.B.  10/18/2021    Procedure: GASTROSCOPY, WITH FOREIGN BODY REMOVAL;  Surgeon: Estiven Ruiz M.D.;  Location: SURGERY SAME DAY [facility];  Service: Gastroenterology    SC UPPER GI ENDOSCOPY,DIAGNOSIS  10/18/2021    Procedure: GASTROSCOPY;  Surgeon: Estiven Ruiz M.D.;  Location: SURGERY SAME DAY [facility];  Service: Gastroenterology    HETEROTOPIC OSSIFICATION Right 10/14/2021    Procedure: EXCISION, HETEROTOPIC ossification;  Surgeon: Emiliano Vang M.D.;  Location: [facility];  Service: Orthopedics    METATARSAL HEAD RESECTION Left 7/20/2020    Procedure: EXCISION, METATARSAL BONE, HEAD- PARTIAL DISTAL 2/3 METATARSAL;  Surgeon: " Haroldo Kang M.D.;  Location: [facility];  Service: Orthopedics    HAMMERTOE CORRECTION Left 7/20/2020    Procedure: CORRECTION, HAMMER TOE- 4/5;  Surgeon: Haroldo Kang M.D.;  Location: [facility];  Service: Orthopedics    TOENAIL REMOVAL Left 7/20/2020    Procedure: REMOVAL, TOENAIL 3-5;  Surgeon: Haroldo Kang M.D.;  Location: [facility];  Service: Orthopedics    METATARSAL HEAD RESECTION Left 6/10/2019    Procedure: METATARSAL HEAD RESECTION- FOR PARTIAL EXCISION;  Surgeon: Haroldo Kang M.D.;  Location: [facility];  Service: Orthopedics    JOINT INJECTION DIAGNOSTIC  6/10/2019    Procedure: INJECTION, JOINT, DIAGNOSTIC- MIDFOOT;  Surgeon: Haroldo Kang M.D.;  Location: [facility];  Service: Orthopedics    TOE AMPUTATION Right 6/10/2019    Procedure: AMPUTATION, TOE- FIRST TOE;  Surgeon: Haroldo Kang M.D.;  Location: [facility];  Service: Orthopedics    ORTHOPEDIC OSTEOTOMY Left 10/15/2018    Procedure: ORTHOPEDIC OSTEOTOMY-  FOR: 3RD METATARSAL PARTIAL EXCISION, AND LEFT GASTROC SOLEUS RECESSION;  Surgeon: Haroldo Kang M.D.;  Location: [facility];  Service: Orthopedics    HARDWARE REMOVAL ORTHO Right 10/15/2018    Procedure: HARDWARE REMOVAL ORTHO-  FOOT METATARSALPHALANGEAL JOINT PLATE;  Surgeon: Haroldo Kang M.D.;  Location: [facility];  Service: Orthopedics    TOE AMPUTATION Left 6/25/2018    Procedure: TOE AMPUTATION-FOR :PARTIAL 1ST DISTAL PHALANX EXCISION, GREAT TOE AMPUTATION;  Surgeon: Haroldo Kang M.D.;  Location: [facility];  Service: Orthopedics    INTERNAL FIXATION REMOVAL Left 6/25/2018    Procedure: INTERNAL FIXATION REMOVAL;  Surgeon: Haroldo Kang M.D.;  Location: [facility];  Service: Orthopedics    NERVE ULNAR TRANSFER Right 4/3/2018    Procedure: NERVE ULNAR TRANSFER - TRANSPOSITION;  Surgeon: Ayad Luna M.D.;  Location:  SURGERY [facility];  Service: Orthopedics    CARPAL TUNNEL RELEASE Right 4/3/2018    Procedure: CARPAL TUNNEL RELEASE;  Surgeon: Ayad Luna M.D.;  Location: [facility];  Service: Orthopedics    ELBOW ARTHROTOMY Right 4/3/2018    Procedure: ELBOW ARTHROTOMY - FOR CONTRACTURE RELEASE AT THE ELBOW, RADIAL HEAD EXCISION;  Surgeon: Ayad Luna M.D.;  Location: [facility];  Service: Orthopedics    SHOULDER SURGERY Right 12/02/2017    reversal    METATARSAL HEAD RESECTION Right 11/13/2017    Procedure: METATARSAL HEAD RESECTION - EXCISION;  Surgeon: Haroldo Kang M.D.;  Location: [facility];  Service: Orthopedics    HAMMERTOE CORRECTION Right 11/13/2017    Procedure: HAMMERTOE CORRECTION 2-5;  Surgeon: Haroldo Kang M.D.;  Location: [facility];  Service: Orthopedics    TOE FUSION Right 11/13/2017    Procedure: TOE FUSION - 1ST METATARSALPHALANGEAL;  Surgeon: Haroldo Kang M.D.;  Location: [facility];  Service: Orthopedics    METATARSAL HEAD RESECTION Left 8/21/2017    Procedure: METATARSAL HEAD RESECTION - 2-5;  Surgeon: Haroldo Kang M.D.;  Location: [facility];  Service:     TOE FUSION Left 8/21/2017    Procedure: TOE FUSION - 1ST MTP;  Surgeon: Haorldo Kang M.D.;  Location: [facility];  Service:     HARDWARE REMOVAL ORTHO Left 8/21/2017    Procedure: HARDWARE REMOVAL ORTHO;  Surgeon: Haroldo Kang M.D.;  Location: [facility];  Service:     FUSION, SPINE, LUMBAR, PLIF  4/14/2017    Procedure: LUMBAR FUSION POSTERIOR - MINIMALLY INVASIVE TLIF L4-5;  Surgeon: Bossman Caro M.D.;  Location: [facility];  Service:     HAMMERTOE CORRECTION Bilateral 12/22/2016    Procedure: HAMMERTOE CORRECTION/METATARSALPHALANGEAL JOINT RELEASE 2/3 RIGHT, 2-3 LEFT;  Surgeon: Haroldo Kang M.D.;  Location: [facility];  Service:     BUNIONECTOMY Left 12/22/2016     "Procedure: BUNIONECTOMY FOR DISTAL HALLUX VALGUS CORRECTION WITH BRANDY;  Surgeon: Haroldo Kang M.D.;  Location: SURGERY [facility];  Service:     ORTHOPEDIC OSTEOTOMY Left 2016    Procedure: ORTHOPEDIC OSTEOTOMY DISTAL METATARSAL 2-5;  Surgeon: Haroldo Kang M.D.;  Location: SURGERY [facility];  Service:     HIP ARTH ANTERIOR TOTAL Left 2016    Procedure: HIP ARTHROPLASTY ANTERIOR TOTAL;  Surgeon: Emiliano Vang M.D.;  Location: SURGERY [facility];  Service:     OTHER ORTHOPEDIC SURGERY  2014    right shoulder  arthroplasty    OTHER ORTHOPEDIC SURGERY  2012    left knee/left ankle/left shoulder    OTHER ORTHOPEDIC SURGERY  2004    elbow surgery    OTHER  2000    dislocation left foot surgery at [facility]    OTHER      \"septoplasty 20 years ago\"    OTHER ORTHOPEDIC SURGERY      brad knee replaced    OTHER ORTHOPEDIC SURGERY      left total hip     Family History   Problem Relation Age of Onset    Heart Disease Mother     Depression Mother     Cancer Father         Colon cancer     Cancer Sister         Skin cancer    Sleep Apnea Neg Hx      Social History     Socioeconomic History    Marital status:      Spouse name: Not on file    Number of children: Not on file    Years of education: Not on file    Highest education level: Not on file   Occupational History    Not on file   Tobacco Use    Smoking status: Former     Packs/day: 1.00     Years: 20.00     Pack years: 20.00     Types: Cigarettes     Quit date: 2014     Years since quittin.1    Smokeless tobacco: Former     Types: Chew     Quit date: 1997   Vaping Use    Vaping Use: Never used   Substance and Sexual Activity    Alcohol use: Yes     Comment: occasional- approx once a week    Drug use: No    Sexual activity: Not on file   Other Topics Concern    Not on file   Social History Narrative    Not on file     Social Determinants of Health     Financial Resource Strain: Not on file   Food Insecurity: Not on " file   Transportation Needs: Not on file   Physical Activity: Not on file   Stress: Not on file   Social Connections: Not on file   Intimate Partner Violence: Not on file   Housing Stability: Not on file     Allergies   Allergen Reactions    Daptomycin Vomiting     Other reaction(s): Other (See Comments)  Projectile vomiting.     Demerol Vomiting and Nausea     Rxn = years ago     Meperidine      2004-02-25;NA  Other reaction(s): GI Upset  2004-02-25;NA  Rxn = years ago   2004-02-25;NA    Meperidine Hcl Nausea and Vomiting     Rxn = years ago     Sulfa Drugs Hives     .    Sulfamethoxazole W-Trimethoprim Rash     2004-02-25;NA    Sulfamethoxazole-Trimethoprim Rash     2004-02-25;NA  2004-02-25;NA     Current Outpatient Medications   Medication Sig Dispense Refill    losartan (COZAAR) 100 MG Tab Take 100 mg by mouth every day.      temazepam (RESTORIL) 15 MG Cap Take 15 mg by mouth.      amphetamine-dextroamphetamine (ADDERALL) 15 MG tablet TAKE 1 TABLET BY MOUTH EVERY DAY FOR 28 DAYS. F90.9      cyclobenzaprine (FLEXERIL) 10 mg Tab       levothyroxine (SYNTHROID) 137 MCG Tab TAKE 1 TABLET BY MOUTH EVERY MORNING ON AN EMPTY STOMACH. 114 Tablet 4    Testosterone Enanthate (XYOSTED) 75 MG/0.5ML Solution Auto-injector Inject 75 mg into the shoulder, thigh, or buttocks every 7 days for 90 doses. 6 mL 2    oxyCODONE-acetaminophen (PERCOCET-10)  MG Tab Take 1 Tablet by mouth every 6 hours as needed.      ACCU-CHEK GUIDE strip       Accu-Chek FastClix Lancets Misc       tadalafil (CIALIS) 20 MG tablet Take 20 mg by mouth as needed.      Venlafaxine HCl 75 MG TABLET SR 24 HR Take 1 Tablet by mouth every day.      naproxen (NAPROSYN) 500 MG Tab Take 500 mg by mouth 2 times a day with meals.      hydrOXYzine pamoate (VISTARIL) 50 MG Cap Take 50 mg by mouth 2 Times a Day.      NOVOFINE PLUS Inject 1 Application as instructed 4 Times a Day,Before Meals and at Bedtime. Use before meals and at bedtime to check blood sugar.    "   lamoTRIgine (LAMICTAL) 100 MG Tab Take 100 mg by mouth every day.      tamsulosin (FLOMAX) 0.4 MG capsule Take 0.4 mg by mouth ONE-HALF HOUR AFTER BREAKFAST.      hydrOXYzine HCl (ATARAX) 50 MG Tab Take 50 mg by mouth every day. (Patient not taking: Reported on 2/9/2023)      PAXLOVID, 300/100, 20 x 150 MG & 10 x 100MG Tablet Therapy Pack TAKE 3 TABLETS BY MOUTH TWICE A DAY FOR 5 DAYS (Patient not taking: Reported on 2/9/2023)       No current facility-administered medications for this visit.     Physical Exam:  Ambulatory Vitals  /70 (BP Location: Left arm, Patient Position: Sitting, BP Cuff Size: Adult)   Pulse (!) 104   Resp 12   Ht 1.778 m (5' 10\")   Wt 122 kg (268 lb)   SpO2 98%    BP Readings from Last 4 Encounters:   02/09/23 128/70   01/03/23 128/80   10/04/22 (!) 157/79   06/14/22 132/82       Weight/BMI: Body mass index is 38.45 kg/m².  Wt Readings from Last 4 Encounters:   02/09/23 122 kg (268 lb)   01/03/23 118 kg (261 lb)   10/04/22 120 kg (264 lb 5.3 oz)   06/14/22 123 kg (272 lb)     General: No apparent distress. Well nourished. BMI 38.5  Neck: No JVD. No caroid bruits, trachea midline  Lungs: CTAB. Normal effort, without crackles/rhonchi, no wheezing  Heart: RRR. Normal S1/S2, no murmur, no rub. no lower extremity edema. 2+ radial pulses, 2+ DT pulses  Ext: No clubbing or cyanosis.  Abdomen: soft, non tender, non distended, no elie hepatomegaly.  Neurological: No focal deficits, no facial asymmetry.  Normal speech.  Psychiatric: Appropriate affect, alert and oriented x 4.   Skin: Warm and dry, no rash.    Lab Data Review:  Lab Results   Component Value Date/Time    CHOLSTRLTOT 217 (H) 08/06/2021 12:29 PM     (H) 08/06/2021 12:29 PM    HDL 43 08/06/2021 12:29 PM    TRIGLYCERIDE 303 (H) 08/06/2021 12:29 PM       Lab Results   Component Value Date/Time    SODIUM 133 (L) 01/25/2023 12:14 PM    POTASSIUM 4.1 01/25/2023 12:14 PM    CHLORIDE 97 01/25/2023 12:14 PM    CO2 24 01/25/2023 " 12:14 PM    GLUCOSE 219 (H) 2023 12:14 PM    BUN 16 2023 12:14 PM    CREATININE 1.02 2023 12:14 PM     Lab Results   Component Value Date/Time    ALKPHOSPHAT 108 (H) 2023 12:14 PM    ASTSGOT 24 2023 12:14 PM    ALTSGPT 27 2023 12:14 PM    TBILIRUBIN 0.5 2023 12:14 PM      Lab Results   Component Value Date/Time    WBC 5.3 2023 12:14 PM       Cardiac Imaging and Procedures Review:      EKG 22: My Personal interpretation reveals SR 97    Cardiac Event monitor 2023  Symptomatic 2-week monitor, symptoms correspond to sinus rhythm and PVC   2% PVC burden.    Assessment and Clinical Decision Makin. Primary hypertension        2. PVC (premature ventricular contraction)        3. Sinus tachycardia          The following treatment plan was discussed    -BP well controlled on, continue losartan 100 mg daily  -Discussed that Adderall can cause sinus tachycardia and PVCs as well as higher amounts of caffeine  -Prefers to not initiate any medication such as a beta-blocker at this time given his long list of current medications  -Discussed that if he becomes more symptomatic a beta-blocker medication such as daily metoprolol would be indicated to help decrease rate of PVCs  -Also reviewed recent CT CAC with a score of 16, he was made aware previously by Dr. Walton through [employer]    Plan reviewed in detail with the patient, verbalizes understanding and is in agreement.  Pt is to follow up with myself or Dr. Walton as needed  Encouraged Pt to follow up with us over the phone or electronically using my [employer] as cardiac issues/concerns arise.      PLEASE NOTE: This dictation was created using voice recognition software. I have made every reasonable attempt to correct obvious errors, but I expect that there are errors of grammar and possibly content that I did not discover before finalizing the note.       KINA Lacey.   [facility]  [phone]    Collaborating Physician: DR Cowart

## 2023-02-21 ENCOUNTER — HOSPITAL ENCOUNTER (OUTPATIENT)
Dept: RADIOLOGY | Facility: MEDICAL CENTER | Age: 63
End: 2023-02-21
Attending: ORTHOPAEDIC SURGERY
Payer: COMMERCIAL

## 2023-02-21 PROCEDURE — 73200 CT UPPER EXTREMITY W/O DYE: CPT | Mod: LT

## 2023-02-22 ENCOUNTER — HOSPITAL ENCOUNTER (OUTPATIENT)
Dept: RADIOLOGY | Facility: MEDICAL CENTER | Age: 63
End: 2023-02-22
Payer: COMMERCIAL

## 2023-02-24 ENCOUNTER — APPOINTMENT (OUTPATIENT)
Dept: RADIOLOGY | Facility: MEDICAL CENTER | Age: 63
End: 2023-02-24
Attending: ORTHOPAEDIC SURGERY
Payer: COMMERCIAL

## 2023-02-28 ENCOUNTER — TELEPHONE (OUTPATIENT)
Dept: HEALTH INFORMATION MANAGEMENT | Facility: OTHER | Age: 63
End: 2023-02-28

## 2023-03-03 ENCOUNTER — OFFICE VISIT (OUTPATIENT)
Dept: SLEEP MEDICINE | Facility: MEDICAL CENTER | Age: 63
End: 2023-03-03
Attending: PREVENTIVE MEDICINE
Payer: MEDICARE

## 2023-03-03 VITALS
DIASTOLIC BLOOD PRESSURE: 84 MMHG | BODY MASS INDEX: 38.63 KG/M2 | OXYGEN SATURATION: 94 % | RESPIRATION RATE: 16 BRPM | SYSTOLIC BLOOD PRESSURE: 130 MMHG | WEIGHT: 269.8 LBS | HEIGHT: 70 IN | HEART RATE: 114 BPM

## 2023-03-03 DIAGNOSIS — G47.33 OSA ON CPAP: ICD-10-CM

## 2023-03-03 DIAGNOSIS — G47.34 NOCTURNAL OXYGEN DESATURATION: ICD-10-CM

## 2023-03-03 PROCEDURE — 99215 OFFICE O/P EST HI 40 MIN: CPT | Performed by: PREVENTIVE MEDICINE

## 2023-03-03 PROCEDURE — 99213 OFFICE O/P EST LOW 20 MIN: CPT | Performed by: PREVENTIVE MEDICINE

## 2023-03-03 ASSESSMENT — FIBROSIS 4 INDEX: FIB4 SCORE: 1.4

## 2023-03-03 NOTE — PROGRESS NOTES
CHIEF COMPLAINT: Annual visit   LAST SEEN: KRISH Hunt on 6/13/22  HISTORY OF PRESENT ILLNESS:  Cristino Keys is a 62 y.o.male   who is here today to follow-up.  Has just switched to [facility]    COMPLIANCE DATA greater than 4 hours: 97%  Machine type: AirSense 10 Autoset  Date range: 02/02/23-03/03/23  AHI: 8.7 with majority apneas  TIME USED: 8hrs 50 mins   PRESSURE SETTINGS:CPAP at 14 cm, customized chin strap   LEAK: minimal, patient is attempting to use nasal mask and using mask tap and chin strap to keep mouth closed.   2L oxygen bleed in    This patient is using PAP therapy consistently and is benefiting from it ..     Sleep hx:  PSG split-night 6/3/2017 noted mild sleep apnea with an overall AHI of 6.1/h and O2 patel of 85%.  Sleep efficiency of 83.8%.     Overnight oximetry performed on 4/9/2020 while on ACPAP 10 to 15 cm water indicated patel saturation was 84%, his mean saturation was 86%, and his saturations less than 80% for 240 minutes of the flow evaluation time.     OPO on autoCPAP 10-20 cmH2O from 5/20/20 indicated the patient spent 46% of the night below SPO2 of 90%, to a patel of 76%. In laboratory CPAP titration polysomnogram advised for accurate calibration of airway pressures vs oxygen bleed in.     PSG titration from 6/30/20 indicated successful CPAP titration at 14 cm of water. Elevated periodic limb movements. CPAP was started at 10 cm of water and titrated to 14 with resultant AHI of 2.7 and mean oximetry 93%. There was a mean oxygen saturation of 91.0% with a minimum oxygen saturation of 83.0%. Time spent with oxygen saturations below 89% was 61.7 minutes.     CNOX 10/13/2020 indicated basal SPO2 of 87%, O2 patel 74% less than 90% for 79% of testing time.  Bleed in O2 @2LPM was added        Significant comorbidities and modifying factors: see below    PAST MEDICAL HISTORY:  Past Medical History:   Diagnosis Date    Anesthesia 11/03/2017    PONV with shoulder surgery approx 20 years  "ago.    Anxiety and depression 11/03/2017    Arthritis 11/03/2017    Osteoarthritis, \" all over\"    Bronchitis     childhood    Bunion     Cancer (HCC) 2017    skin lesion cut out, on left shoulder    Chronic autoimmune thyroiditis      + US / + TPO = 57    Dental disorder 11/03/2017    \"Upper Plate\"    Diabetes mellitus ([facility]) 11/03/2017    \"Controlled with diet & Victoza\"    Gout     Hammertoe     High cholesterol 11/03/2017    HX OF, not currently on medication for.    Hypertension     Hypothyroidism     chronic thyroiditis    MRSA infection     Open toe wound 03/2018    Left big toe, open wound, started 2/2018, receiving wound care therapy two times a week    Pain 11/03/2017    \"Pain all over except right hip & ankle\"    Pain 09/19/2022    \"All over \"    Psychiatric problem     depression/anxiety    Sleep apnea 11/03/2017    CPAP USE    Snoring 11/03/2017    DX JULIAN    Tonsillitis       PROBLEM LIST:  Patient Active Problem List    Diagnosis Date Noted    PVC (premature ventricular contraction) 02/09/2023    Sinus tachycardia 02/09/2023    Long-term (current) use of injectable non-insulin antidiabetic drugs 02/06/2023    Radial styloid tenosynovitis (de quervain) 12/28/2022    Ulnar impaction syndrome, left 12/28/2022    Contracture of left elbow 10/18/2022    Elbow arthritis 07/11/2022    Primary osteoarthritis of both elbows 01/19/2022    Bilateral elbow joint pain 01/19/2022    Left inguinal hernia 01/06/2022    Insomnia 01/06/2022    Diastasis recti 12/14/2021    Heterotopic ossification of bone 10/11/2021    Presence of right artificial knee joint 08/30/2021    Vitamin D deficiency 08/16/2021    Polycythemia 07/01/2020    History of MRSA infection 06/10/2020    Nocturnal hypoxemia 04/29/2020    Hypogonadism in male 03/18/2020    Former smoker 03/11/2019    Low testosterone in male 03/08/2019    Wound infection 05/14/2018    Diabetic ulcer of toe of left foot associated with type 2 diabetes mellitus, with fat " layer exposed ([facility]) 05/14/2018    Contracture of elbow joint, right 04/03/2018    Chronic pain 08/28/2017    Chronic narcotic use 08/28/2017    Arthritis of left ankle 08/21/2017    Degeneration of lumbar intervertebral disc 04/14/2017    Lower back pain 04/14/2017    JULIAN (obstructive sleep apnea) 04/03/2017    Snoring 04/03/2017    Acquired hallux valgus of left foot 12/22/2016    Knee pain, right 12/19/2016    Type 2 diabetes mellitus without complication ([facility]) 12/13/2016    Chronic autoimmune thyroiditis 10/03/2016    Hypothyroidism, acquired, autoimmune 10/03/2016    Presence of right artificial hip joint 08/31/2016    Primary osteoarthritis of left hip 08/18/2016    Foot pain, left 05/27/2016    Diabetes ([facility]) 09/05/2014    Lumbar disc disease 09/05/2014    Gout 09/05/2014    Hypertension 09/05/2014    H/O arthroscopy of knee 09/05/2014    Hx of elbow surgery 09/05/2014    Severe obesity with body mass index (BMI) of 35.0 to 39.9 with serious comorbidity ([facility]) 09/05/2014    Osteoarthritis 09/05/2014    H/O shoulder replacement 09/05/2014    Rotator cuff arthropathy 09/05/2014    Bilateral bunions 09/05/2014     PAST SOCIAL HISTORY:  Past Surgical History:   Procedure Laterality Date    PB RECONSTR TOTAL SHOULDER IMPLANT Left 11/22/2022    Procedure: Left reverse total shoulder arthroplasty, biceps tenodesis and repairs as indicated;  Surgeon: Jd Villegas M.D.;  Location: [facility];  Service: Orthopedics    PB REPAIR BICEPS LONG TENDON  11/22/2022    Procedure: TENODESIS, BICEPS;  Surgeon: Jd Villegas M.D.;  Location: [facility];  Service: Orthopedics    HI NEUROPLASTY & OR TRANSPOS ULNAR NERVE ELBOW Left 10/4/2022    Procedure: LEFT ELBOW CONTRACTURE RELEASE OSTEOPHYTE REMOVAL RADIAL HEAD EXCISION;  Surgeon: Ayad Luna M.D.;  Location: SURGERY SAME DAY [facility];  Service: Orthopedics    RADIAL HEAD EXCISION Left 10/4/2022    Procedure: EXCISION, RADIUS,  HEAD, TOTAL;  Surgeon: Ayad Luna M.D.;  Location: SURGERY SAME DAY [facility];  Service: Orthopedics    DE UPPER GI ENDOSCOPY,REMOV F.B.  10/18/2021    Procedure: GASTROSCOPY, WITH FOREIGN BODY REMOVAL;  Surgeon: Estiven Ruiz M.D.;  Location: SURGERY SAME DAY [facility];  Service: Gastroenterology    DE UPPER GI ENDOSCOPY,DIAGNOSIS  10/18/2021    Procedure: GASTROSCOPY;  Surgeon: Estiven Ruiz M.D.;  Location: SURGERY SAME DAY [facility];  Service: Gastroenterology    HETEROTOPIC OSSIFICATION Right 10/14/2021    Procedure: EXCISION, HETEROTOPIC ossification;  Surgeon: Emiliano Vang M.D.;  Location: [facility];  Service: Orthopedics    METATARSAL HEAD RESECTION Left 7/20/2020    Procedure: EXCISION, METATARSAL BONE, HEAD- PARTIAL DISTAL 2/3 METATARSAL;  Surgeon: Haroldo Kang M.D.;  Location: [facility];  Service: Orthopedics    HAMMERTOE CORRECTION Left 7/20/2020    Procedure: CORRECTION, HAMMER TOE- 4/5;  Surgeon: Haroldo Kang M.D.;  Location: [facility];  Service: Orthopedics    TOENAIL REMOVAL Left 7/20/2020    Procedure: REMOVAL, TOENAIL 3-5;  Surgeon: Haroldo Kang M.D.;  Location: [facility];  Service: Orthopedics    METATARSAL HEAD RESECTION Left 6/10/2019    Procedure: METATARSAL HEAD RESECTION- FOR PARTIAL EXCISION;  Surgeon: Haroldo Kang M.D.;  Location: [facility];  Service: Orthopedics    JOINT INJECTION DIAGNOSTIC  6/10/2019    Procedure: INJECTION, JOINT, DIAGNOSTIC- MIDFOOT;  Surgeon: Haroldo Kang M.D.;  Location: [facility];  Service: Orthopedics    TOE AMPUTATION Right 6/10/2019    Procedure: AMPUTATION, TOE- FIRST TOE;  Surgeon: Haroldo Kang M.D.;  Location: [facility];  Service: Orthopedics    ORTHOPEDIC OSTEOTOMY Left 10/15/2018    Procedure: ORTHOPEDIC OSTEOTOMY-  FOR: 3RD METATARSAL PARTIAL EXCISION, AND LEFT GASTROC SOLEUS RECESSION;  Surgeon: Haroldo Kang M.D.;  Location:  SURGERY [facility];  Service: Orthopedics    HARDWARE REMOVAL ORTHO Right 10/15/2018    Procedure: HARDWARE REMOVAL ORTHO-  FOOT METATARSALPHALANGEAL JOINT PLATE;  Surgeon: Haroldo Kang M.D.;  Location: [facility];  Service: Orthopedics    TOE AMPUTATION Left 6/25/2018    Procedure: TOE AMPUTATION-FOR :PARTIAL 1ST DISTAL PHALANX EXCISION, GREAT TOE AMPUTATION;  Surgeon: Haroldo Kang M.D.;  Location: [facility];  Service: Orthopedics    INTERNAL FIXATION REMOVAL Left 6/25/2018    Procedure: INTERNAL FIXATION REMOVAL;  Surgeon: Haroldo Kang M.D.;  Location: [facility];  Service: Orthopedics    NERVE ULNAR TRANSFER Right 4/3/2018    Procedure: NERVE ULNAR TRANSFER - TRANSPOSITION;  Surgeon: Ayad Luna M.D.;  Location: [facility];  Service: Orthopedics    CARPAL TUNNEL RELEASE Right 4/3/2018    Procedure: CARPAL TUNNEL RELEASE;  Surgeon: Ayad Luna M.D.;  Location: [facility];  Service: Orthopedics    ELBOW ARTHROTOMY Right 4/3/2018    Procedure: ELBOW ARTHROTOMY - FOR CONTRACTURE RELEASE AT THE ELBOW, RADIAL HEAD EXCISION;  Surgeon: Ayad Luna M.D.;  Location: [facility];  Service: Orthopedics    SHOULDER SURGERY Right 12/02/2017    reversal    METATARSAL HEAD RESECTION Right 11/13/2017    Procedure: METATARSAL HEAD RESECTION - EXCISION;  Surgeon: Haroldo Kang M.D.;  Location: [facility];  Service: Orthopedics    HAMMERTOE CORRECTION Right 11/13/2017    Procedure: HAMMERTOE CORRECTION 2-5;  Surgeon: Haroldo Kang M.D.;  Location: [facility];  Service: Orthopedics    TOE FUSION Right 11/13/2017    Procedure: TOE FUSION - 1ST METATARSALPHALANGEAL;  Surgeon: Haroldo Kang M.D.;  Location: [facility];  Service: Orthopedics    METATARSAL HEAD RESECTION Left 8/21/2017    Procedure: METATARSAL HEAD RESECTION - 2-5;  Surgeon: Haroldo Kang M.D.;  Location: [facility]  [facility];  Service:     TOE FUSION Left 8/21/2017    Procedure: TOE FUSION - 1ST MTP;  Surgeon: Haroldo Kang M.D.;  Location: [facility];  Service:     HARDWARE REMOVAL ORTHO Left 8/21/2017    Procedure: HARDWARE REMOVAL ORTHO;  Surgeon: Haroldo Kang M.D.;  Location: [facility];  Service:     FUSION, SPINE, LUMBAR, PLIF  4/14/2017    Procedure: LUMBAR FUSION POSTERIOR - MINIMALLY INVASIVE TLIF L4-5;  Surgeon: Bossman Caro M.D.;  Location: [facility];  Service:     HAMMERTOE CORRECTION Bilateral 12/22/2016    Procedure: HAMMERTOE CORRECTION/METATARSALPHALANGEAL JOINT RELEASE 2/3 RIGHT, 2-3 LEFT;  Surgeon: Haroldo Kang M.D.;  Location: [facility];  Service:     BUNIONECTOMY Left 12/22/2016    Procedure: BUNIONECTOMY FOR DISTAL HALLUX VALGUS CORRECTION WITH BRANDY;  Surgeon: Haroldo Kang M.D.;  Location: [facility];  Service:     ORTHOPEDIC OSTEOTOMY Left 12/22/2016    Procedure: ORTHOPEDIC OSTEOTOMY DISTAL METATARSAL 2-5;  Surgeon: Haroldo Kang M.D.;  Location: [facility];  Service:     HIP ARTH ANTERIOR TOTAL Left 8/18/2016    Procedure: HIP ARTHROPLASTY ANTERIOR TOTAL;  Surgeon: Emiliano Vang M.D.;  Location: [facility];  Service:     OTHER ORTHOPEDIC SURGERY  6/2014    right shoulder  arthroplasty    OTHER ORTHOPEDIC SURGERY  11/1/2012    left knee/left ankle/left shoulder    OTHER ORTHOPEDIC SURGERY  2004    elbow surgery    OTHER  2000    dislocation left foot surgery at [facility]    OTHER      \"septoplasty 20 years ago\"    OTHER ORTHOPEDIC SURGERY      brad knee replaced    OTHER ORTHOPEDIC SURGERY      left total hip     PAST FAMILY HISTORY:  Family History   Problem Relation Age of Onset    Heart Disease Mother     Depression Mother     Cancer Father         Colon cancer     Cancer Sister         Skin cancer    Sleep Apnea Neg Hx      SOCIAL HISTORY:  Social History     Socioeconomic History "    Marital status:      Spouse name: Not on file    Number of children: Not on file    Years of education: Not on file    Highest education level: Not on file   Occupational History    Not on file   Tobacco Use    Smoking status: Former     Packs/day: 1.00     Years: 20.00     Pack years: 20.00     Types: Cigarettes     Quit date: 2014     Years since quittin.1    Smokeless tobacco: Former     Types: Chew     Quit date: 1997   Vaping Use    Vaping Use: Never used   Substance and Sexual Activity    Alcohol use: Yes     Comment: occasional- approx once a week    Drug use: No    Sexual activity: Not on file   Other Topics Concern    Not on file   Social History Narrative    Not on file     Social Determinants of Health     Financial Resource Strain: Not on file   Food Insecurity: Not on file   Transportation Needs: Not on file   Physical Activity: Not on file   Stress: Not on file   Social Connections: Not on file   Intimate Partner Violence: Not on file   Housing Stability: Not on file     ALLERGIES: Daptomycin, Demerol, Meperidine, Meperidine hcl, Sulfa drugs, Sulfamethoxazole w-trimethoprim, and Sulfamethoxazole-trimethoprim  MEDICATIONS:  Current Outpatient Medications   Medication Sig Dispense Refill    liraglutide (VICTOZA) 18 MG/3ML Solution Pen-injector Inject 1.8 mg under the skin every day.      gabapentin (NEURONTIN) 300 MG Cap Take 300 mg by mouth.      cyclobenzaprine (FLEXERIL) 10 mg Tab Take 10 mg by mouth.      benzonatate (TESSALON) 100 MG Cap Take 100 mg by mouth.      azelastine (ASTELIN) 137 MCG/SPRAY nasal spray Administer 1 Spray into affected nostril(S).      ALPRAZolam (XANAX) 0.25 MG Tab Take 0.25 mg by mouth.      losartan (COZAAR) 100 MG Tab Take 100 mg by mouth every day.      temazepam (RESTORIL) 15 MG Cap Take 15 mg by mouth.      cyclobenzaprine (FLEXERIL) 10 mg Tab       levothyroxine (SYNTHROID) 137 MCG Tab TAKE 1 TABLET BY MOUTH EVERY MORNING ON AN EMPTY STOMACH. 114  "Tablet 4    Testosterone Enanthate (XYOSTED) 75 MG/0.5ML Solution Auto-injector Inject 75 mg into the shoulder, thigh, or buttocks every 7 days for 90 doses. 6 mL 2    oxyCODONE-acetaminophen (PERCOCET-10)  MG Tab Take 1 Tablet by mouth every 6 hours as needed.      ACCU-CHEK GUIDE strip       Accu-Chek FastClix Lancets Misc       tadalafil (CIALIS) 20 MG tablet Take 20 mg by mouth as needed.      Venlafaxine HCl 75 MG TABLET SR 24 HR Take 1 Tablet by mouth every day.      naproxen (NAPROSYN) 500 MG Tab Take 500 mg by mouth 2 times a day with meals.      hydrOXYzine pamoate (VISTARIL) 50 MG Cap Take 50 mg by mouth 2 Times a Day.      NOVOFINE PLUS Inject 1 Application as instructed 4 Times a Day,Before Meals and at Bedtime. Use before meals and at bedtime to check blood sugar.      lamoTRIgine (LAMICTAL) 100 MG Tab Take 100 mg by mouth every day.      tamsulosin (FLOMAX) 0.4 MG capsule Take 0.4 mg by mouth ONE-HALF HOUR AFTER BREAKFAST.      hydrOXYzine HCl (ATARAX) 50 MG Tab Take 50 mg by mouth every day. (Patient not taking: Reported on 2/9/2023)      amphetamine-dextroamphetamine (ADDERALL) 15 MG tablet TAKE 1 TABLET BY MOUTH EVERY DAY FOR 28 DAYS. F90.9      PAXLOVID, 300/100, 20 x 150 MG & 10 x 100MG Tablet Therapy Pack TAKE 3 TABLETS BY MOUTH TWICE A DAY FOR 5 DAYS (Patient not taking: Reported on 2/9/2023)       No current facility-administered medications for this visit.    \"CURRENT RX\"    REVIEW OF SYSTEMS:  Constitutional: Denies weight loss  Eyes: Denies vision changes  Ears/Nose/Mouth/Throat: Denies rhinitis/nasal congestion, injury, decayed teeth/toothaches.  Cardiovascular: Denies chest pain, tightness, palpitations, swelling in legs/feet, difficulty breathing when lying down but gets better when sitting up.   Respiratory: Denies shortness of breath while awake,  Sleep: per HPI  Gastrointestinal: Denies  difficulty swallowing,  heartburn.  Genitourinary: REPORTS nocturia  Musculoskeletal: Denies " "painful joints, sore muscles, back pain.   Neurological: Denies frequent headaches,weakness, dizziness.    PHYSICAL EXAM/VITALS:  /84 (BP Location: Left arm, Patient Position: Sitting, BP Cuff Size: Large adult)   Pulse (!) 114   Resp 16   Ht 1.778 m (5' 10\")   Wt 122 kg (269 lb 12.8 oz)   SpO2 94%   BMI 38.71 kg/m²   Appearance: Well-nourished, well-developed,  looks stated age, no acute distress  Eyes:   EOMI  ENMT: MASKED   Neck: Supple, trachea midline  Respiratory effort:  No intercostal retractions or use of accessory muscles  Musculoskeletal:  Grossly normal; gait and station normal  Neurologic:  oriented to person, time, place, and purpose; judgement intact  Psychiatric:  No depression, anxiety, agitation    MEDICAL DECISION MAKING:  The medical record was reviewed as it pertains to this referral. This includes records from primary care,consultants notes, referral request, hospital records, labs and imaging. Any available diagnostic and titration nocturnal polysomnograms, home sleep apnea tests, continuous nocturnal oximetry results, multiple sleep latency tests, and recent compliance reports were reviewed with the patient.    ASSESSMENT/PLAN:  Cristino Keys is a 62 y.o.male who is doing well on PAP therapy. Pressures were changed due to elevated AHI. His current set up is an autoset with min 14 and max 17. He was also given a sample Hypfix mouth tape to keep mouth closed when using a nasal mask. Mask fit was requested for him to try some hybrid masks.   As we are changing his DME to [facility] a new order for O2 set up has been provided.    1. JULIAN on CPAP  - DME Mask Fitting; Future  - DME Mask and Supplies  - DME Other  - DME O2 New Set Up    2. Nocturnal oxygen desaturation  - DME Mask Fitting; Future  - DME Mask and Supplies  - DME Other  - DME O2 New Set Up        The risks of untreated sleep apnea were discussed with the patient at length. Patients with JULIAN are at increased risk of " cardiovascular disease including coronary artery disease, systemic arterial hypertension, pulmonary arterial hypertension, cardiac arrhythmias, and stroke. JULIAN patients have an increased risk of motor vehicle accidents, type 2 diabetes, chronic kidney disease, and non-alcoholic liver disease. The patient was advised to avoid driving a motor vehicle when drowsy.  Have advised the patient to follow up with the appropriate healthcare practitioners for all other medical problems and issues.    RETURN TO CLINIC: Return in about 1 year (around 3/3/2024) for Annual visit.    My total time spent caring for the patient on the day of the encounter was 40 minutes. This includes time spent on a thorough chart review including other physician notes, all sleep studies, as well as critical labs and pulmonary and cardiac studies.  Additionally, it includes a thorough discussion of good sleep hygiene and stimulus control, as well as  the need for consistency in terms of sleep preparation and practice.    Please note that this dictation was created using voice recognition software.  I have made every reasonable attempt to correct obvious errors, I expect that there are errors of grammar and possibly content that I did not discover before finalizing this note.

## 2023-03-16 ENCOUNTER — PHARMACY VISIT (OUTPATIENT)
Dept: PHARMACY | Facility: MEDICAL CENTER | Age: 63
End: 2023-03-16
Payer: COMMERCIAL

## 2023-03-16 PROCEDURE — RXMED WILLOW AMBULATORY MEDICATION CHARGE: Performed by: INTERNAL MEDICINE

## 2023-03-16 RX ORDER — CLOSTRIDIUM TETANI TOXOID ANTIGEN (FORMALDEHYDE INACTIVATED), CORYNEBACTERIUM DIPHTHERIAE TOXOID ANTIGEN (FORMALDEHYDE INACTIVATED), BORDETELLA PERTUSSIS TOXOID ANTIGEN (GLUTARALDEHYDE INACTIVATED), BORDETELLA PERTUSSIS FILAMENTOUS HEMAGGLUTININ ANTIGEN (FORMALDEHYDE INACTIVATED), BORDETELLA PERTUSSIS PERTACTIN ANTIGEN, AND BORDETELLA PERTUSSIS FIMBRIAE 2/3 ANTIGEN 5; 2; 2.5; 5; 3; 5 [LF]/.5ML; [LF]/.5ML; UG/.5ML; UG/.5ML; UG/.5ML; UG/.5ML
INJECTION, SUSPENSION INTRAMUSCULAR
Qty: 0.5 ML | Refills: 0 | Status: SHIPPED | OUTPATIENT
Start: 2023-03-16 | End: 2023-08-30

## 2023-04-03 ENCOUNTER — HOSPITAL ENCOUNTER (OUTPATIENT)
Dept: RADIOLOGY | Facility: MEDICAL CENTER | Age: 63
End: 2023-04-03
Attending: ORTHOPAEDIC SURGERY
Payer: MEDICARE

## 2023-04-03 PROCEDURE — 73200 CT UPPER EXTREMITY W/O DYE: CPT | Mod: LT

## 2023-04-04 ENCOUNTER — HOSPITAL ENCOUNTER (OUTPATIENT)
Dept: LAB | Facility: MEDICAL CENTER | Age: 63
End: 2023-04-04
Attending: ORTHOPAEDIC SURGERY
Payer: MEDICARE

## 2023-04-04 DIAGNOSIS — Z96.612 STATUS POST REVERSE TOTAL ARTHROPLASTY OF LEFT SHOULDER: ICD-10-CM

## 2023-04-04 LAB
BASOPHILS # BLD AUTO: 0.6 % (ref 0–1.8)
BASOPHILS # BLD: 0.03 K/UL (ref 0–0.12)
EOSINOPHIL # BLD AUTO: 0.14 K/UL (ref 0–0.51)
EOSINOPHIL NFR BLD: 2.8 % (ref 0–6.9)
ERYTHROCYTE [DISTWIDTH] IN BLOOD BY AUTOMATED COUNT: 44.3 FL (ref 35.9–50)
ERYTHROCYTE [SEDIMENTATION RATE] IN BLOOD BY WESTERGREN METHOD: 6 MM/HOUR (ref 0–20)
HCT VFR BLD AUTO: 48.9 % (ref 42–52)
HGB BLD-MCNC: 16 G/DL (ref 14–18)
IMM GRANULOCYTES # BLD AUTO: 0.06 K/UL (ref 0–0.11)
IMM GRANULOCYTES NFR BLD AUTO: 1.2 % (ref 0–0.9)
LYMPHOCYTES # BLD AUTO: 1.19 K/UL (ref 1–4.8)
LYMPHOCYTES NFR BLD: 23.8 % (ref 22–41)
MCH RBC QN AUTO: 28.9 PG (ref 27–33)
MCHC RBC AUTO-ENTMCNC: 32.7 G/DL (ref 33.7–35.3)
MCV RBC AUTO: 88.4 FL (ref 81.4–97.8)
MONOCYTES # BLD AUTO: 0.43 K/UL (ref 0–0.85)
MONOCYTES NFR BLD AUTO: 8.6 % (ref 0–13.4)
NEUTROPHILS # BLD AUTO: 3.14 K/UL (ref 1.82–7.42)
NEUTROPHILS NFR BLD: 63 % (ref 44–72)
NRBC # BLD AUTO: 0 K/UL
NRBC BLD-RTO: 0 /100 WBC
PLATELET # BLD AUTO: 198 K/UL (ref 164–446)
PMV BLD AUTO: 9.4 FL (ref 9–12.9)
RBC # BLD AUTO: 5.53 M/UL (ref 4.7–6.1)
WBC # BLD AUTO: 5 K/UL (ref 4.8–10.8)

## 2023-04-04 PROCEDURE — 85025 COMPLETE CBC W/AUTO DIFF WBC: CPT

## 2023-04-04 PROCEDURE — 85652 RBC SED RATE AUTOMATED: CPT

## 2023-04-04 PROCEDURE — 36415 COLL VENOUS BLD VENIPUNCTURE: CPT

## 2023-05-23 ENCOUNTER — HOSPITAL ENCOUNTER (OUTPATIENT)
Dept: LAB | Facility: MEDICAL CENTER | Age: 63
End: 2023-05-23
Attending: FAMILY MEDICINE
Payer: MEDICARE

## 2023-05-23 ENCOUNTER — HOSPITAL ENCOUNTER (OUTPATIENT)
Dept: LAB | Facility: MEDICAL CENTER | Age: 63
End: 2023-05-23
Payer: MEDICARE

## 2023-05-23 ENCOUNTER — HOSPITAL ENCOUNTER (OUTPATIENT)
Dept: LAB | Facility: MEDICAL CENTER | Age: 63
End: 2023-05-23
Attending: INTERNAL MEDICINE
Payer: MEDICARE

## 2023-05-23 DIAGNOSIS — E55.9 VITAMIN D DEFICIENCY: ICD-10-CM

## 2023-05-23 DIAGNOSIS — E06.3 HYPOTHYROIDISM, ACQUIRED, AUTOIMMUNE: ICD-10-CM

## 2023-05-23 DIAGNOSIS — E29.1 HYPOGONADISM IN MALE: ICD-10-CM

## 2023-05-23 LAB
ALBUMIN SERPL BCP-MCNC: 4.2 G/DL (ref 3.2–4.9)
ALBUMIN SERPL BCP-MCNC: 4.4 G/DL (ref 3.2–4.9)
ALBUMIN/GLOB SERPL: 1.4 G/DL
ALBUMIN/GLOB SERPL: 1.5 G/DL
ALP SERPL-CCNC: 103 U/L (ref 30–99)
ALP SERPL-CCNC: 105 U/L (ref 30–99)
ALT SERPL-CCNC: 33 U/L (ref 2–50)
ALT SERPL-CCNC: 34 U/L (ref 2–50)
ANION GAP SERPL CALC-SCNC: 14 MMOL/L (ref 7–16)
ANION GAP SERPL CALC-SCNC: 14 MMOL/L (ref 7–16)
AST SERPL-CCNC: 28 U/L (ref 12–45)
AST SERPL-CCNC: 32 U/L (ref 12–45)
BASOPHILS # BLD AUTO: 0.9 % (ref 0–1.8)
BASOPHILS # BLD: 0.05 K/UL (ref 0–0.12)
BILIRUB SERPL-MCNC: 0.6 MG/DL (ref 0.1–1.5)
BILIRUB SERPL-MCNC: 0.6 MG/DL (ref 0.1–1.5)
BUN SERPL-MCNC: 13 MG/DL (ref 8–22)
BUN SERPL-MCNC: 13 MG/DL (ref 8–22)
CALCIUM ALBUM COR SERPL-MCNC: 8.7 MG/DL (ref 8.5–10.5)
CALCIUM ALBUM COR SERPL-MCNC: 9.3 MG/DL (ref 8.5–10.5)
CALCIUM SERPL-MCNC: 9 MG/DL (ref 8.5–10.5)
CALCIUM SERPL-MCNC: 9.5 MG/DL (ref 8.5–10.5)
CHLORIDE SERPL-SCNC: 100 MMOL/L (ref 96–112)
CHLORIDE SERPL-SCNC: 98 MMOL/L (ref 96–112)
CHOLEST SERPL-MCNC: 192 MG/DL (ref 100–199)
CO2 SERPL-SCNC: 21 MMOL/L (ref 20–33)
CO2 SERPL-SCNC: 22 MMOL/L (ref 20–33)
CREAT SERPL-MCNC: 1.01 MG/DL (ref 0.5–1.4)
CREAT SERPL-MCNC: 1.03 MG/DL (ref 0.5–1.4)
EOSINOPHIL # BLD AUTO: 0.12 K/UL (ref 0–0.51)
EOSINOPHIL NFR BLD: 2.2 % (ref 0–6.9)
ERYTHROCYTE [DISTWIDTH] IN BLOOD BY AUTOMATED COUNT: 46.5 FL (ref 35.9–50)
EST. AVERAGE GLUCOSE BLD GHB EST-MCNC: 151 MG/DL
GFR SERPLBLD CREATININE-BSD FMLA CKD-EPI: 81 ML/MIN/1.73 M 2
GFR SERPLBLD CREATININE-BSD FMLA CKD-EPI: 82 ML/MIN/1.73 M 2
GLOBULIN SER CALC-MCNC: 3 G/DL (ref 1.9–3.5)
GLOBULIN SER CALC-MCNC: 3 G/DL (ref 1.9–3.5)
GLUCOSE SERPL-MCNC: 158 MG/DL (ref 65–99)
GLUCOSE SERPL-MCNC: 163 MG/DL (ref 65–99)
HBA1C MFR BLD: 6.9 % (ref 4–5.6)
HCT VFR BLD AUTO: 51 % (ref 42–52)
HCT VFR BLD AUTO: 51.4 % (ref 42–52)
HDLC SERPL-MCNC: 27 MG/DL
HGB BLD-MCNC: 16.7 G/DL (ref 14–18)
HGB BLD-MCNC: 16.8 G/DL (ref 14–18)
IMM GRANULOCYTES # BLD AUTO: 0.03 K/UL (ref 0–0.11)
IMM GRANULOCYTES NFR BLD AUTO: 0.6 % (ref 0–0.9)
LDLC SERPL CALC-MCNC: ABNORMAL MG/DL
LYMPHOCYTES # BLD AUTO: 1.3 K/UL (ref 1–4.8)
LYMPHOCYTES NFR BLD: 23.9 % (ref 22–41)
MCH RBC QN AUTO: 28.5 PG (ref 27–33)
MCHC RBC AUTO-ENTMCNC: 32.7 G/DL (ref 32.3–36.5)
MCV RBC AUTO: 87.3 FL (ref 81.4–97.8)
MONOCYTES # BLD AUTO: 0.51 K/UL (ref 0–0.85)
MONOCYTES NFR BLD AUTO: 9.4 % (ref 0–13.4)
NEUTROPHILS # BLD AUTO: 3.43 K/UL (ref 1.82–7.42)
NEUTROPHILS NFR BLD: 63 % (ref 44–72)
NRBC # BLD AUTO: 0 K/UL
NRBC BLD-RTO: 0 /100 WBC (ref 0–0.2)
PLATELET # BLD AUTO: 230 K/UL (ref 164–446)
PMV BLD AUTO: 9.3 FL (ref 9–12.9)
POTASSIUM SERPL-SCNC: 4.4 MMOL/L (ref 3.6–5.5)
POTASSIUM SERPL-SCNC: 4.5 MMOL/L (ref 3.6–5.5)
PROT SERPL-MCNC: 7.2 G/DL (ref 6–8.2)
PROT SERPL-MCNC: 7.4 G/DL (ref 6–8.2)
RBC # BLD AUTO: 5.89 M/UL (ref 4.7–6.1)
SODIUM SERPL-SCNC: 134 MMOL/L (ref 135–145)
SODIUM SERPL-SCNC: 135 MMOL/L (ref 135–145)
T4 FREE SERPL-MCNC: 1.06 NG/DL (ref 0.93–1.7)
T4 FREE SERPL-MCNC: 1.09 NG/DL (ref 0.93–1.7)
TRIGL SERPL-MCNC: 592 MG/DL (ref 0–149)
TSH SERPL DL<=0.005 MIU/L-ACNC: 1.53 UIU/ML (ref 0.38–5.33)
WBC # BLD AUTO: 5.4 K/UL (ref 4.8–10.8)

## 2023-05-23 PROCEDURE — 36415 COLL VENOUS BLD VENIPUNCTURE: CPT

## 2023-05-23 PROCEDURE — 84402 ASSAY OF FREE TESTOSTERONE: CPT | Mod: 91

## 2023-05-23 PROCEDURE — 84403 ASSAY OF TOTAL TESTOSTERONE: CPT

## 2023-05-23 PROCEDURE — 85018 HEMOGLOBIN: CPT | Mod: XU

## 2023-05-23 PROCEDURE — 80053 COMPREHEN METABOLIC PANEL: CPT

## 2023-05-23 PROCEDURE — 85014 HEMATOCRIT: CPT | Mod: XU

## 2023-05-23 PROCEDURE — 84443 ASSAY THYROID STIM HORMONE: CPT

## 2023-05-23 PROCEDURE — 85025 COMPLETE CBC W/AUTO DIFF WBC: CPT

## 2023-05-23 PROCEDURE — 84403 ASSAY OF TOTAL TESTOSTERONE: CPT | Mod: 91

## 2023-05-23 PROCEDURE — 84439 ASSAY OF FREE THYROXINE: CPT | Mod: 91

## 2023-05-23 PROCEDURE — 83036 HEMOGLOBIN GLYCOSYLATED A1C: CPT

## 2023-05-23 PROCEDURE — 80061 LIPID PANEL: CPT

## 2023-05-23 PROCEDURE — 82306 VITAMIN D 25 HYDROXY: CPT

## 2023-05-23 PROCEDURE — 84439 ASSAY OF FREE THYROXINE: CPT

## 2023-05-23 PROCEDURE — 84270 ASSAY OF SEX HORMONE GLOBUL: CPT | Mod: 91

## 2023-05-23 PROCEDURE — 80053 COMPREHEN METABOLIC PANEL: CPT | Mod: 91

## 2023-05-23 PROCEDURE — 84270 ASSAY OF SEX HORMONE GLOBUL: CPT

## 2023-05-23 PROCEDURE — 84402 ASSAY OF FREE TESTOSTERONE: CPT

## 2023-05-24 LAB — 25(OH)D3 SERPL-MCNC: 26 NG/ML (ref 30–100)

## 2023-05-25 LAB
SHBG SERPL-SCNC: 25 NMOL/L (ref 19–76)
SHBG SERPL-SCNC: 25 NMOL/L (ref 19–76)
TESTOST FREE MFR SERPL: 2.2 % (ref 1.6–2.9)
TESTOST FREE MFR SERPL: 2.2 % (ref 1.6–2.9)
TESTOST FREE SERPL-MCNC: 105 PG/ML (ref 47–244)
TESTOST FREE SERPL-MCNC: 106 PG/ML (ref 47–244)
TESTOST SERPL-MCNC: 487 NG/DL (ref 300–720)
TESTOST SERPL-MCNC: 489 NG/DL (ref 300–720)

## 2023-06-12 ENCOUNTER — HOSPITAL ENCOUNTER (OUTPATIENT)
Dept: RADIOLOGY | Facility: MEDICAL CENTER | Age: 63
End: 2023-06-12
Attending: ORTHOPAEDIC SURGERY
Payer: MEDICARE

## 2023-06-12 PROCEDURE — 73200 CT UPPER EXTREMITY W/O DYE: CPT | Mod: LT

## 2023-06-28 DIAGNOSIS — E06.3 HYPOTHYROIDISM, ACQUIRED, AUTOIMMUNE: ICD-10-CM

## 2023-06-28 RX ORDER — LEVOTHYROXINE SODIUM 137 UG/1
137 TABLET ORAL
Qty: 114 TABLET | Refills: 4 | Status: CANCELLED | OUTPATIENT
Start: 2023-06-28

## 2023-06-28 NOTE — TELEPHONE ENCOUNTER
Received a fax requesting a 90 day supply of Synthroid to [facility]     Received request via: Pharmacy    Was the patient seen in the last year in this department? Yes    Does the patient have an active prescription (recently filled or refills available) for medication(s) requested? No    Does the patient have care home Plus and need 100 day supply (blood pressure, diabetes and cholesterol meds only)? Yes, quantity updated to 100 days  .

## 2023-07-25 ENCOUNTER — APPOINTMENT (RX ONLY)
Dept: URBAN - METROPOLITAN AREA CLINIC 22 | Facility: CLINIC | Age: 63
Setting detail: DERMATOLOGY
End: 2023-07-25

## 2023-07-25 DIAGNOSIS — L57.0 ACTINIC KERATOSIS: ICD-10-CM

## 2023-07-25 DIAGNOSIS — Z85.828 PERSONAL HISTORY OF OTHER MALIGNANT NEOPLASM OF SKIN: ICD-10-CM

## 2023-07-25 DIAGNOSIS — Z71.89 OTHER SPECIFIED COUNSELING: ICD-10-CM

## 2023-07-25 DIAGNOSIS — D18.0 HEMANGIOMA: ICD-10-CM

## 2023-07-25 DIAGNOSIS — L82.1 OTHER SEBORRHEIC KERATOSIS: ICD-10-CM

## 2023-07-25 DIAGNOSIS — L30.1 DYSHIDROSIS [POMPHOLYX]: ICD-10-CM

## 2023-07-25 DIAGNOSIS — D22 MELANOCYTIC NEVI: ICD-10-CM

## 2023-07-25 DIAGNOSIS — L82.0 INFLAMED SEBORRHEIC KERATOSIS: ICD-10-CM

## 2023-07-25 DIAGNOSIS — L81.4 OTHER MELANIN HYPERPIGMENTATION: ICD-10-CM

## 2023-07-25 PROBLEM — D22.5 MELANOCYTIC NEVI OF TRUNK: Status: ACTIVE | Noted: 2023-07-25

## 2023-07-25 PROBLEM — D18.01 HEMANGIOMA OF SKIN AND SUBCUTANEOUS TISSUE: Status: ACTIVE | Noted: 2023-07-25

## 2023-07-25 PROBLEM — L30.9 DERMATITIS, UNSPECIFIED: Status: ACTIVE | Noted: 2023-07-25

## 2023-07-25 PROCEDURE — ? COUNSELING

## 2023-07-25 PROCEDURE — ? SUNSCREEN RECOMMENDATIONS

## 2023-07-25 PROCEDURE — ? LIQUID NITROGEN

## 2023-07-25 PROCEDURE — 17003 DESTRUCT PREMALG LES 2-14: CPT | Mod: 59

## 2023-07-25 PROCEDURE — 11102 TANGNTL BX SKIN SINGLE LES: CPT | Mod: 59

## 2023-07-25 PROCEDURE — 99213 OFFICE O/P EST LOW 20 MIN: CPT | Mod: 25

## 2023-07-25 PROCEDURE — 17000 DESTRUCT PREMALG LESION: CPT | Mod: 59

## 2023-07-25 PROCEDURE — 17110 DESTRUCTION B9 LES UP TO 14: CPT

## 2023-07-25 PROCEDURE — ? BIOPSY BY SHAVE METHOD

## 2023-07-25 ASSESSMENT — LOCATION SIMPLE DESCRIPTION DERM
LOCATION SIMPLE: LEFT TEMPLE
LOCATION SIMPLE: LEFT PRETIBIAL REGION
LOCATION SIMPLE: NOSE
LOCATION SIMPLE: RIGHT UPPER BACK
LOCATION SIMPLE: RIGHT FOOT
LOCATION SIMPLE: RIGHT FOREARM
LOCATION SIMPLE: LEFT CALF
LOCATION SIMPLE: RIGHT PRETIBIAL REGION
LOCATION SIMPLE: ABDOMEN
LOCATION SIMPLE: LEFT SHOULDER
LOCATION SIMPLE: NECK
LOCATION SIMPLE: LEFT CHEEK
LOCATION SIMPLE: RIGHT LOWER BACK
LOCATION SIMPLE: RIGHT TEMPLE
LOCATION SIMPLE: LEFT UPPER BACK
LOCATION SIMPLE: LEFT THIGH
LOCATION SIMPLE: RIGHT CHEEK
LOCATION SIMPLE: RIGHT UPPER ARM
LOCATION SIMPLE: RIGHT CALF
LOCATION SIMPLE: LEFT UPPER ARM

## 2023-07-25 ASSESSMENT — LOCATION DETAILED DESCRIPTION DERM
LOCATION DETAILED: NASAL INFRATIP
LOCATION DETAILED: RIGHT PROXIMAL RADIAL DORSAL FOREARM
LOCATION DETAILED: RIGHT DISTAL POSTERIOR UPPER ARM
LOCATION DETAILED: RIGHT PROXIMAL DORSAL FOREARM
LOCATION DETAILED: LEFT DISTAL POSTERIOR UPPER ARM
LOCATION DETAILED: RIGHT SUPERIOR MEDIAL MIDBACK
LOCATION DETAILED: RIGHT PROXIMAL PRETIBIAL REGION
LOCATION DETAILED: LEFT PROXIMAL PRETIBIAL REGION
LOCATION DETAILED: RIGHT SUPERIOR MEDIAL MALAR CHEEK
LOCATION DETAILED: LEFT SUPERIOR MEDIAL MALAR CHEEK
LOCATION DETAILED: LEFT ANTERIOR PROXIMAL THIGH
LOCATION DETAILED: RIGHT INFERIOR TEMPLE
LOCATION DETAILED: EPIGASTRIC SKIN
LOCATION DETAILED: RIGHT DISTAL CALF
LOCATION DETAILED: LEFT CENTRAL LATERAL NECK
LOCATION DETAILED: LEFT SUPERIOR LATERAL BUCCAL CHEEK
LOCATION DETAILED: LEFT DISTAL CALF
LOCATION DETAILED: LEFT SUPERIOR MEDIAL UPPER BACK
LOCATION DETAILED: LEFT SUPERIOR CENTRAL MALAR CHEEK
LOCATION DETAILED: RIGHT LATERAL PLANTAR FOOT
LOCATION DETAILED: RIGHT SUPERIOR UPPER BACK
LOCATION DETAILED: LEFT MID TEMPLE
LOCATION DETAILED: RIGHT SUPERIOR CENTRAL MALAR CHEEK
LOCATION DETAILED: LEFT LATERAL SHOULDER

## 2023-07-25 ASSESSMENT — LOCATION ZONE DERM
LOCATION ZONE: NECK
LOCATION ZONE: TRUNK
LOCATION ZONE: ARM
LOCATION ZONE: NOSE
LOCATION ZONE: FEET
LOCATION ZONE: LEG
LOCATION ZONE: FACE

## 2023-07-25 NOTE — PROCEDURE: LIQUID NITROGEN
Add 52 Modifier (Optional): no
Medical Necessity Clause: This procedure was medically necessary because the lesions that were treated were:
Spray Paint Text: The liquid nitrogen was applied to the skin utilizing a spray paint frosting technique.
Show Spray Paint Technique Variable?: Yes
Post-Care Instructions: I reviewed with the patient in detail post-care instructions. Patient is to wear sunprotection, and avoid picking at any of the treated lesions. Pt may apply Vaseline to crusted or scabbing areas.
Detail Level: Zone
Medical Necessity Information: It is in your best interest to select a reason for this procedure from the list below. All of these items fulfill various CMS LCD requirements except the new and changing color options.
Consent: The patient's consent was obtained including but not limited to risks of crusting, scabbing, blistering, scarring, darker or lighter pigmentary change, recurrence, incomplete removal and infection.
Total Number Of Lesions Treated: 20
Duration Of Freeze Thaw-Cycle (Seconds): 3
Detail Level: Detailed
Application Tool (Optional): Liquid Nitrogen Sprayer
Number Of Freeze-Thaw Cycles: 3 freeze-thaw cycles
Number Of Freeze-Thaw Cycles: 2 freeze-thaw cycles

## 2023-07-31 RX ORDER — KETOCONAZOLE 20 MG/G
CREAM TOPICAL BID
Qty: 60 | Refills: 2 | Status: ERX

## 2023-08-22 PROBLEM — F11.20 OPIOID TYPE DEPENDENCE, CONTINUOUS (HCC): Status: ACTIVE | Noted: 2023-08-22

## 2023-08-22 PROBLEM — I73.9 PERIPHERAL VASCULAR DISEASE, UNSPECIFIED (HCC): Status: ACTIVE | Noted: 2023-08-22

## 2023-08-22 PROBLEM — E78.5 DYSLIPIDEMIA: Status: ACTIVE | Noted: 2023-08-22

## 2023-08-22 PROBLEM — F39 MOOD DISORDER (HCC): Status: ACTIVE | Noted: 2023-08-22

## 2023-08-22 PROBLEM — E66.01 MORBID OBESITY (HCC): Status: ACTIVE | Noted: 2023-08-22

## 2023-08-22 PROBLEM — J96.11 CHRONIC RESPIRATORY FAILURE WITH HYPOXIA (HCC): Status: ACTIVE | Noted: 2023-08-22

## 2023-08-22 PROBLEM — F13.20 SEDATIVE HYPNOTIC OR ANXIOLYTIC DEPENDENCE (HCC): Status: ACTIVE | Noted: 2023-08-22

## 2023-08-23 ENCOUNTER — HOSPITAL ENCOUNTER (OUTPATIENT)
Dept: RADIOLOGY | Facility: MEDICAL CENTER | Age: 63
End: 2023-08-23
Attending: PAIN MEDICINE
Payer: MEDICARE

## 2023-08-23 DIAGNOSIS — M47.817 SPONDYLOSIS OF LUMBOSACRAL REGION, UNSPECIFIED SPINAL OSTEOARTHRITIS COMPLICATION STATUS: ICD-10-CM

## 2023-08-23 PROCEDURE — 72148 MRI LUMBAR SPINE W/O DYE: CPT

## 2023-10-19 ENCOUNTER — HOSPITAL ENCOUNTER (OUTPATIENT)
Dept: RADIOLOGY | Facility: MEDICAL CENTER | Age: 63
End: 2023-10-19
Attending: PHYSICIAN ASSISTANT
Payer: MEDICARE

## 2023-10-19 DIAGNOSIS — M53.3 SI (SACROILIAC) PAIN: ICD-10-CM

## 2023-10-19 DIAGNOSIS — M54.59 LUMBAR FACET JOINT PAIN: ICD-10-CM

## 2023-10-19 PROCEDURE — 72195 MRI PELVIS W/O DYE: CPT

## 2023-10-30 ENCOUNTER — HOSPITAL ENCOUNTER (OUTPATIENT)
Dept: LAB | Facility: MEDICAL CENTER | Age: 63
End: 2023-10-30
Attending: STUDENT IN AN ORGANIZED HEALTH CARE EDUCATION/TRAINING PROGRAM
Payer: MEDICARE

## 2023-10-30 LAB
CREAT UR-MCNC: 43.77 MG/DL
MICROALBUMIN UR-MCNC: 58.1 MG/DL
MICROALBUMIN/CREAT UR: 1327 MG/G (ref 0–30)

## 2023-10-30 PROCEDURE — 36415 COLL VENOUS BLD VENIPUNCTURE: CPT

## 2023-10-30 PROCEDURE — 84270 ASSAY OF SEX HORMONE GLOBUL: CPT

## 2023-10-30 PROCEDURE — 82043 UR ALBUMIN QUANTITATIVE: CPT

## 2023-10-30 PROCEDURE — 84403 ASSAY OF TOTAL TESTOSTERONE: CPT

## 2023-10-30 PROCEDURE — 82570 ASSAY OF URINE CREATININE: CPT

## 2023-10-30 PROCEDURE — 84402 ASSAY OF FREE TESTOSTERONE: CPT

## 2023-11-01 LAB
SHBG SERPL-SCNC: 26 NMOL/L (ref 19–76)
TESTOST FREE MFR SERPL: 1.9 % (ref 1.6–2.9)
TESTOST FREE SERPL-MCNC: 7 PG/ML (ref 47–244)
TESTOST SERPL-MCNC: 37 NG/DL (ref 300–720)

## 2023-11-09 PROBLEM — M19.019 ACROMIOCLAVICULAR ARTHROSIS: Status: ACTIVE | Noted: 2023-11-09

## 2023-11-09 PROBLEM — S43.432A SUPERIOR GLENOID LABRUM LESION OF LEFT SHOULDER: Status: ACTIVE | Noted: 2023-11-09

## 2023-11-09 PROBLEM — M75.50 SUBACROMIAL BURSITIS: Status: ACTIVE | Noted: 2023-11-09

## 2023-11-13 ASSESSMENT — FIBROSIS 4 INDEX: FIB4 SCORE: 1.36

## 2023-11-16 ENCOUNTER — APPOINTMENT (OUTPATIENT)
Dept: ADMISSIONS | Facility: MEDICAL CENTER | Age: 63
End: 2023-11-16
Attending: ORTHOPAEDIC SURGERY
Payer: MEDICARE

## 2023-11-20 ENCOUNTER — PRE-ADMISSION TESTING (OUTPATIENT)
Dept: ADMISSIONS | Facility: MEDICAL CENTER | Age: 63
End: 2023-11-20
Attending: ORTHOPAEDIC SURGERY
Payer: MEDICARE

## 2023-11-20 DIAGNOSIS — Z01.810 PRE-OPERATIVE CARDIOVASCULAR EXAMINATION: ICD-10-CM

## 2023-11-20 DIAGNOSIS — Z01.812 PRE-OPERATIVE LABORATORY EXAMINATION: ICD-10-CM

## 2023-11-20 RX ORDER — (INSULIN DEGLUDEC AND LIRAGLUTIDE) 100; 3.6 [IU]/ML; MG/ML
20 INJECTION, SOLUTION SUBCUTANEOUS DAILY
COMMUNITY
Start: 2023-10-26 | End: 2024-01-23

## 2023-11-20 RX ORDER — TESTOSTERONE CYPIONATE 200 MG/ML
200 INJECTION, SOLUTION INTRAMUSCULAR
COMMUNITY
Start: 2023-10-16 | End: 2024-01-23

## 2023-11-20 NOTE — PREPROCEDURE INSTRUCTIONS
PreAdmit Telephone Appointment: Reviewed the Preparing for your procedure handout with patient over the phone. Patient instructed per pharmacy guidelines regarding taking or holding regularly prescribed medications before surgery. Instructed to take the following medications the day of surgery with a sip of water per pharmacy medication guidelines: hydroxyzine, Lamictal, synthroid, ritalin, tamsulosin, venlafaxine. PRN- percocet, gabapentin, flexeril, nasal spray.    Testing appointment on 11/21.

## 2023-11-21 ENCOUNTER — PRE-ADMISSION TESTING (OUTPATIENT)
Dept: ADMISSIONS | Facility: MEDICAL CENTER | Age: 63
End: 2023-11-21
Attending: ORTHOPAEDIC SURGERY
Payer: MEDICARE

## 2023-11-21 DIAGNOSIS — Z01.810 PRE-OPERATIVE CARDIOVASCULAR EXAMINATION: ICD-10-CM

## 2023-11-21 DIAGNOSIS — Z01.812 PRE-OPERATIVE LABORATORY EXAMINATION: ICD-10-CM

## 2023-11-21 LAB
ALBUMIN SERPL BCP-MCNC: 4.1 G/DL (ref 3.2–4.9)
ALBUMIN/GLOB SERPL: 1.6 G/DL
ALP SERPL-CCNC: 98 U/L (ref 30–99)
ALT SERPL-CCNC: 20 U/L (ref 2–50)
ANION GAP SERPL CALC-SCNC: 9 MMOL/L (ref 7–16)
AST SERPL-CCNC: 16 U/L (ref 12–45)
BILIRUB SERPL-MCNC: 0.5 MG/DL (ref 0.1–1.5)
BUN SERPL-MCNC: 15 MG/DL (ref 8–22)
CALCIUM ALBUM COR SERPL-MCNC: 8.9 MG/DL (ref 8.5–10.5)
CALCIUM SERPL-MCNC: 9 MG/DL (ref 8.4–10.2)
CHLORIDE SERPL-SCNC: 97 MMOL/L (ref 96–112)
CO2 SERPL-SCNC: 28 MMOL/L (ref 20–33)
CREAT SERPL-MCNC: 1.02 MG/DL (ref 0.5–1.4)
EKG IMPRESSION: NORMAL
ERYTHROCYTE [DISTWIDTH] IN BLOOD BY AUTOMATED COUNT: 42.7 FL (ref 35.9–50)
GFR SERPLBLD CREATININE-BSD FMLA CKD-EPI: 81 ML/MIN/1.73 M 2
GLOBULIN SER CALC-MCNC: 2.6 G/DL (ref 1.9–3.5)
GLUCOSE SERPL-MCNC: 125 MG/DL (ref 65–99)
HCT VFR BLD AUTO: 44.2 % (ref 42–52)
HGB BLD-MCNC: 15.1 G/DL (ref 14–18)
MCH RBC QN AUTO: 31.9 PG (ref 27–33)
MCHC RBC AUTO-ENTMCNC: 34.2 G/DL (ref 32.3–36.5)
MCV RBC AUTO: 93.2 FL (ref 81.4–97.8)
PLATELET # BLD AUTO: 143 K/UL (ref 164–446)
PMV BLD AUTO: 8.3 FL (ref 9–12.9)
POTASSIUM SERPL-SCNC: 4.6 MMOL/L (ref 3.6–5.5)
PROT SERPL-MCNC: 6.7 G/DL (ref 6–8.2)
RBC # BLD AUTO: 4.74 M/UL (ref 4.7–6.1)
SODIUM SERPL-SCNC: 134 MMOL/L (ref 135–145)
WBC # BLD AUTO: 5.4 K/UL (ref 4.8–10.8)

## 2023-11-21 PROCEDURE — 36415 COLL VENOUS BLD VENIPUNCTURE: CPT

## 2023-11-21 PROCEDURE — 85027 COMPLETE CBC AUTOMATED: CPT

## 2023-11-21 PROCEDURE — 93005 ELECTROCARDIOGRAM TRACING: CPT

## 2023-11-21 PROCEDURE — 93010 ELECTROCARDIOGRAM REPORT: CPT | Performed by: INTERNAL MEDICINE

## 2023-11-21 PROCEDURE — 80053 COMPREHEN METABOLIC PANEL: CPT

## 2023-12-05 ENCOUNTER — HOSPITAL ENCOUNTER (OUTPATIENT)
Facility: MEDICAL CENTER | Age: 63
End: 2023-12-05
Attending: ORTHOPAEDIC SURGERY | Admitting: ORTHOPAEDIC SURGERY
Payer: MEDICARE

## 2023-12-05 ENCOUNTER — ANESTHESIA EVENT (OUTPATIENT)
Dept: SURGERY | Facility: MEDICAL CENTER | Age: 63
End: 2023-12-05
Payer: MEDICARE

## 2023-12-05 ENCOUNTER — ANESTHESIA (OUTPATIENT)
Dept: SURGERY | Facility: MEDICAL CENTER | Age: 63
End: 2023-12-05
Payer: MEDICARE

## 2023-12-05 VITALS
BODY MASS INDEX: 37.42 KG/M2 | TEMPERATURE: 97.2 F | HEIGHT: 70 IN | WEIGHT: 261.36 LBS | DIASTOLIC BLOOD PRESSURE: 73 MMHG | OXYGEN SATURATION: 92 % | HEART RATE: 84 BPM | SYSTOLIC BLOOD PRESSURE: 125 MMHG | RESPIRATION RATE: 18 BRPM

## 2023-12-05 DIAGNOSIS — G89.18 POSTOPERATIVE PAIN: ICD-10-CM

## 2023-12-05 LAB — GLUCOSE BLD STRIP.AUTO-MCNC: 112 MG/DL (ref 65–99)

## 2023-12-05 PROCEDURE — 160029 HCHG SURGERY MINUTES - 1ST 30 MINS LEVEL 4: Performed by: ORTHOPAEDIC SURGERY

## 2023-12-05 PROCEDURE — 700101 HCHG RX REV CODE 250: Performed by: ANESTHESIOLOGY

## 2023-12-05 PROCEDURE — 29823 SHO ARTHRS SRG XTNSV DBRDMT: CPT | Mod: ASROC,LT | Performed by: PHYSICIAN ASSISTANT

## 2023-12-05 PROCEDURE — 160002 HCHG RECOVERY MINUTES (STAT): Performed by: ORTHOPAEDIC SURGERY

## 2023-12-05 PROCEDURE — 82962 GLUCOSE BLOOD TEST: CPT

## 2023-12-05 PROCEDURE — 160035 HCHG PACU - 1ST 60 MINS PHASE I: Performed by: ORTHOPAEDIC SURGERY

## 2023-12-05 PROCEDURE — A9270 NON-COVERED ITEM OR SERVICE: HCPCS | Performed by: ANESTHESIOLOGY

## 2023-12-05 PROCEDURE — 64415 NJX AA&/STRD BRCH PLXS IMG: CPT | Performed by: ORTHOPAEDIC SURGERY

## 2023-12-05 PROCEDURE — 160041 HCHG SURGERY MINUTES - EA ADDL 1 MIN LEVEL 4: Performed by: ORTHOPAEDIC SURGERY

## 2023-12-05 PROCEDURE — 160048 HCHG OR STATISTICAL LEVEL 1-5: Performed by: ORTHOPAEDIC SURGERY

## 2023-12-05 PROCEDURE — 700101 HCHG RX REV CODE 250: Performed by: ORTHOPAEDIC SURGERY

## 2023-12-05 PROCEDURE — 160046 HCHG PACU - 1ST 60 MINS PHASE II: Performed by: ORTHOPAEDIC SURGERY

## 2023-12-05 PROCEDURE — 160009 HCHG ANES TIME/MIN: Performed by: ORTHOPAEDIC SURGERY

## 2023-12-05 PROCEDURE — 700102 HCHG RX REV CODE 250 W/ 637 OVERRIDE(OP): Performed by: ANESTHESIOLOGY

## 2023-12-05 PROCEDURE — 700105 HCHG RX REV CODE 258: Performed by: ANESTHESIOLOGY

## 2023-12-05 PROCEDURE — 160036 HCHG PACU - EA ADDL 30 MINS PHASE I: Performed by: ORTHOPAEDIC SURGERY

## 2023-12-05 PROCEDURE — 160025 RECOVERY II MINUTES (STATS): Performed by: ORTHOPAEDIC SURGERY

## 2023-12-05 PROCEDURE — 700111 HCHG RX REV CODE 636 W/ 250 OVERRIDE (IP): Performed by: ANESTHESIOLOGY

## 2023-12-05 PROCEDURE — 700111 HCHG RX REV CODE 636 W/ 250 OVERRIDE (IP): Mod: JZ | Performed by: ORTHOPAEDIC SURGERY

## 2023-12-05 PROCEDURE — 29823 SHO ARTHRS SRG XTNSV DBRDMT: CPT | Mod: LT | Performed by: ORTHOPAEDIC SURGERY

## 2023-12-05 RX ORDER — BUPIVACAINE HYDROCHLORIDE AND EPINEPHRINE 5; 5 MG/ML; UG/ML
INJECTION, SOLUTION EPIDURAL; INTRACAUDAL; PERINEURAL
Status: DISCONTINUED | OUTPATIENT
Start: 2023-12-05 | End: 2023-12-05 | Stop reason: HOSPADM

## 2023-12-05 RX ORDER — BUPIVACAINE HYDROCHLORIDE 5 MG/ML
INJECTION, SOLUTION EPIDURAL; INTRACAUDAL PRN
Status: DISCONTINUED | OUTPATIENT
Start: 2023-12-05 | End: 2023-12-05 | Stop reason: SURG

## 2023-12-05 RX ORDER — HYDRALAZINE HYDROCHLORIDE 20 MG/ML
5 INJECTION INTRAMUSCULAR; INTRAVENOUS
Status: DISCONTINUED | OUTPATIENT
Start: 2023-12-05 | End: 2023-12-05 | Stop reason: HOSPADM

## 2023-12-05 RX ORDER — DEXAMETHASONE SODIUM PHOSPHATE 4 MG/ML
INJECTION, SOLUTION INTRA-ARTICULAR; INTRALESIONAL; INTRAMUSCULAR; INTRAVENOUS; SOFT TISSUE PRN
Status: DISCONTINUED | OUTPATIENT
Start: 2023-12-05 | End: 2023-12-05 | Stop reason: SURG

## 2023-12-05 RX ORDER — METOPROLOL TARTRATE 1 MG/ML
INJECTION, SOLUTION INTRAVENOUS PRN
Status: DISCONTINUED | OUTPATIENT
Start: 2023-12-05 | End: 2023-12-05 | Stop reason: SURG

## 2023-12-05 RX ORDER — HALOPERIDOL 5 MG/ML
1 INJECTION INTRAMUSCULAR
Status: DISCONTINUED | OUTPATIENT
Start: 2023-12-05 | End: 2023-12-05 | Stop reason: HOSPADM

## 2023-12-05 RX ORDER — CELECOXIB 200 MG/1
200 CAPSULE ORAL ONCE
Status: COMPLETED | OUTPATIENT
Start: 2023-12-05 | End: 2023-12-05

## 2023-12-05 RX ORDER — SODIUM CHLORIDE, SODIUM LACTATE, POTASSIUM CHLORIDE, CALCIUM CHLORIDE 600; 310; 30; 20 MG/100ML; MG/100ML; MG/100ML; MG/100ML
INJECTION, SOLUTION INTRAVENOUS CONTINUOUS
Status: DISCONTINUED | OUTPATIENT
Start: 2023-12-05 | End: 2023-12-05 | Stop reason: HOSPADM

## 2023-12-05 RX ORDER — ROCURONIUM BROMIDE 10 MG/ML
INJECTION, SOLUTION INTRAVENOUS PRN
Status: DISCONTINUED | OUTPATIENT
Start: 2023-12-05 | End: 2023-12-05 | Stop reason: SURG

## 2023-12-05 RX ORDER — MIDAZOLAM HYDROCHLORIDE 1 MG/ML
INJECTION INTRAMUSCULAR; INTRAVENOUS PRN
Status: DISCONTINUED | OUTPATIENT
Start: 2023-12-05 | End: 2023-12-05 | Stop reason: SURG

## 2023-12-05 RX ORDER — LIDOCAINE HYDROCHLORIDE 20 MG/ML
INJECTION, SOLUTION EPIDURAL; INFILTRATION; INTRACAUDAL; PERINEURAL PRN
Status: DISCONTINUED | OUTPATIENT
Start: 2023-12-05 | End: 2023-12-05 | Stop reason: SURG

## 2023-12-05 RX ORDER — TRANEXAMIC ACID 100 MG/ML
INJECTION, SOLUTION INTRAVENOUS PRN
Status: DISCONTINUED | OUTPATIENT
Start: 2023-12-05 | End: 2023-12-05 | Stop reason: SURG

## 2023-12-05 RX ORDER — OXYCODONE HCL 5 MG/5 ML
5 SOLUTION, ORAL ORAL
Status: DISCONTINUED | OUTPATIENT
Start: 2023-12-05 | End: 2023-12-05 | Stop reason: HOSPADM

## 2023-12-05 RX ORDER — ONDANSETRON 2 MG/ML
INJECTION INTRAMUSCULAR; INTRAVENOUS PRN
Status: DISCONTINUED | OUTPATIENT
Start: 2023-12-05 | End: 2023-12-05 | Stop reason: SURG

## 2023-12-05 RX ORDER — SODIUM CHLORIDE, SODIUM LACTATE, POTASSIUM CHLORIDE, CALCIUM CHLORIDE 600; 310; 30; 20 MG/100ML; MG/100ML; MG/100ML; MG/100ML
INJECTION, SOLUTION INTRAVENOUS
Status: DISCONTINUED | OUTPATIENT
Start: 2023-12-05 | End: 2023-12-05 | Stop reason: SURG

## 2023-12-05 RX ORDER — PHENYLEPHRINE HCL IN 0.9% NACL 0.5 MG/5ML
SYRINGE (ML) INTRAVENOUS PRN
Status: DISCONTINUED | OUTPATIENT
Start: 2023-12-05 | End: 2023-12-05 | Stop reason: SURG

## 2023-12-05 RX ORDER — ACETAMINOPHEN 500 MG
1000 TABLET ORAL ONCE
Status: COMPLETED | OUTPATIENT
Start: 2023-12-05 | End: 2023-12-05

## 2023-12-05 RX ORDER — OXYCODONE HCL 5 MG/5 ML
10 SOLUTION, ORAL ORAL
Status: DISCONTINUED | OUTPATIENT
Start: 2023-12-05 | End: 2023-12-05 | Stop reason: HOSPADM

## 2023-12-05 RX ORDER — ONDANSETRON 2 MG/ML
4 INJECTION INTRAMUSCULAR; INTRAVENOUS
Status: DISCONTINUED | OUTPATIENT
Start: 2023-12-05 | End: 2023-12-05 | Stop reason: HOSPADM

## 2023-12-05 RX ORDER — OXYCODONE HYDROCHLORIDE 5 MG/1
5 TABLET ORAL EVERY 4 HOURS PRN
Qty: 30 TABLET | Refills: 0 | Status: SHIPPED | OUTPATIENT
Start: 2023-12-05 | End: 2023-12-10

## 2023-12-05 RX ORDER — ALBUTEROL SULFATE 2.5 MG/3ML
2.5 SOLUTION RESPIRATORY (INHALATION)
Status: DISCONTINUED | OUTPATIENT
Start: 2023-12-05 | End: 2023-12-05 | Stop reason: HOSPADM

## 2023-12-05 RX ORDER — CEFAZOLIN SODIUM 1 G/3ML
2 INJECTION, POWDER, FOR SOLUTION INTRAMUSCULAR; INTRAVENOUS ONCE
Status: COMPLETED | OUTPATIENT
Start: 2023-12-05 | End: 2023-12-05

## 2023-12-05 RX ADMIN — TRANEXAMIC ACID 1000 MG: 100 INJECTION, SOLUTION INTRAVENOUS at 14:05

## 2023-12-05 RX ADMIN — DEXAMETHASONE SODIUM PHOSPHATE 1.3 MG: 4 INJECTION INTRA-ARTICULAR; INTRALESIONAL; INTRAMUSCULAR; INTRAVENOUS; SOFT TISSUE at 13:49

## 2023-12-05 RX ADMIN — SODIUM CHLORIDE, POTASSIUM CHLORIDE, SODIUM LACTATE AND CALCIUM CHLORIDE: 600; 310; 30; 20 INJECTION, SOLUTION INTRAVENOUS at 14:00

## 2023-12-05 RX ADMIN — LIDOCAINE HYDROCHLORIDE 100 MG: 20 INJECTION, SOLUTION EPIDURAL; INFILTRATION; INTRACAUDAL at 14:05

## 2023-12-05 RX ADMIN — CEFAZOLIN 3 G: 1 INJECTION, POWDER, FOR SOLUTION INTRAMUSCULAR; INTRAVENOUS at 14:00

## 2023-12-05 RX ADMIN — PROPOFOL 100 MG: 10 INJECTION, EMULSION INTRAVENOUS at 14:05

## 2023-12-05 RX ADMIN — ALBUTEROL SULFATE 2.5 MG: 2.5 SOLUTION RESPIRATORY (INHALATION) at 15:41

## 2023-12-05 RX ADMIN — FENTANYL CITRATE 100 MCG: 50 INJECTION, SOLUTION INTRAMUSCULAR; INTRAVENOUS at 14:05

## 2023-12-05 RX ADMIN — ONDANSETRON 4 MG: 2 INJECTION INTRAMUSCULAR; INTRAVENOUS at 14:45

## 2023-12-05 RX ADMIN — PROPOFOL 50 MG: 10 INJECTION, EMULSION INTRAVENOUS at 14:07

## 2023-12-05 RX ADMIN — MIDAZOLAM HYDROCHLORIDE 1 MG: 1 INJECTION, SOLUTION INTRAMUSCULAR; INTRAVENOUS at 13:46

## 2023-12-05 RX ADMIN — ACETAMINOPHEN 1000 MG: 500 TABLET ORAL at 13:56

## 2023-12-05 RX ADMIN — DEXAMETHASONE SODIUM PHOSPHATE 8 MG: 4 INJECTION INTRA-ARTICULAR; INTRALESIONAL; INTRAMUSCULAR; INTRAVENOUS; SOFT TISSUE at 14:10

## 2023-12-05 RX ADMIN — SUGAMMADEX 200 MG: 100 INJECTION, SOLUTION INTRAVENOUS at 14:46

## 2023-12-05 RX ADMIN — METOPROLOL TARTRATE 2.5 MG: 5 INJECTION INTRAVENOUS at 14:42

## 2023-12-05 RX ADMIN — CELECOXIB 200 MG: 200 CAPSULE ORAL at 13:56

## 2023-12-05 RX ADMIN — BUPIVACAINE HYDROCHLORIDE 12.5 ML: 5 INJECTION, SOLUTION EPIDURAL; INTRACAUDAL at 13:49

## 2023-12-05 RX ADMIN — Medication 100 MCG: at 14:37

## 2023-12-05 RX ADMIN — ROCURONIUM BROMIDE 100 MG: 50 INJECTION, SOLUTION INTRAVENOUS at 14:05

## 2023-12-05 ASSESSMENT — PAIN DESCRIPTION - PAIN TYPE
TYPE: SURGICAL PAIN

## 2023-12-05 ASSESSMENT — FIBROSIS 4 INDEX: FIB4 SCORE: 1.626231256363483416

## 2023-12-05 ASSESSMENT — PAIN SCALES - GENERAL: PAIN_LEVEL: 5

## 2023-12-05 NOTE — ANESTHESIA PROCEDURE NOTES
Airway    Date/Time: 12/5/2023 2:07 PM    Performed by: Davide Rodriguez M.D.  Authorized by: Davide Rodriguez M.D.    Location:  OR  Urgency:  Elective  Difficult Airway: No    Indications for Airway Management:  Anesthesia      Spontaneous Ventilation: absent    Sedation Level:  Deep  Preoxygenated: Yes    Patient Position:  Sniffing  Mask Difficulty Assessment:  2 - vent by mask + OA or adjuvant +/- NMBA  Final Airway Type:  Endotracheal airway  Final Endotracheal Airway:  ETT  Cuffed: Yes    Technique Used for Successful ETT Placement:  Direct laryngoscopy    Insertion Site:  Oral  Blade Type:  Mccain  Laryngoscope Blade/Videolaryngoscope Blade Size:  2  ETT Size (mm):  7.0  Measured from:  Lips  ETT to Lips (cm):  22  Placement Verified by: auscultation and capnometry    Cormack-Lehane Classification:  Grade I - full view of glottis  Number of Attempts at Approach:  1

## 2023-12-05 NOTE — DISCHARGE INSTRUCTIONS
Directions from Dr. Villegas  Keep dressings on for 2 days.  After two days, you may shower with wounds uncovered using normal soap and water.  Apply band aids to wounds after shower.    DO NOT SUBMERGE INCSIONS for 3 weeks.    Keep sling on until the block wears off - approximately 24 hours after surgery.    At that point you may remove the sling and use it only as needed.   Begin gentle range of motion with the shoulder as tolerated.    Ice regularly.        What to Expect Post Anesthesia    Rest and take it easy for the first 24 hours.  A responsible adult is recommended to remain with you during that time.  It is normal to feel sleepy.  We encourage you to not do anything that requires balance, judgment or coordination.    FOR 24 HOURS DO NOT:  Drive, operate machinery or run household appliances.  Drink beer or alcoholic beverages.  Make important decisions or sign legal documents.    To avoid nausea, slowly advance diet as tolerated, avoiding spicy or greasy foods for the first day.  Add more substantial food to your diet according to your provider's instructions.    INCREASE FLUIDS AND FIBER TO AVOID CONSTIPATION.    MILD FLU-LIKE SYMPTOMS ARE NORMAL.  YOU MAY EXPERIENCE GENERALIZED MUSCLE ACHES, THROAT IRRITATION, HEADACHE AND/OR SOME NAUSEA.    If any questions arise, call your provider.  If your provider is not available, please feel free to call the Surgical Center at [phone].    MEDICATIONS: Resume taking daily medication.  Take prescribed pain medication with food.  If no medication is prescribed, you may take non-aspirin pain medication if needed.  PAIN MEDICATION CAN BE VERY CONSTIPATING.  Take a stool softener or laxative such as senokot, pericolace, or milk of magnesia if needed.  Diet    Resume your normal diet as tolerated.  A diet low in cholesterol, fat, and sodium is recommended for good health.     Shoulder Surgery Discharge Instructions    ACTIVITY  It is important to move your shoulder,  as well as your elbow, wrist, and hand several times daily, starting the day after surgery.  You may do pendulum exercises with your operative arm starting the day after surgery.  Pendulum (dangling [place]) exercises are encouraged 2-3 times daily.    WEIGHT BEARING AS TOLERATED    Use the arm only as much as needed.    SLING / SHOULDER IMMOBILIZER:  Removed the sling 24 hours after surgery or when nerve block wears off.     DRESSING AND WOUND CARE    Keep your shoulder dressing clean and dry after surgery.  Be aware that some leaking of blood or fluid from your dressing can occur and is normal. You may remove your dressing 2 days after the operation.  Notice that you have a single incision and/or several small incisions that have been closed with stitches.  Cover each of these incisions with a light dressing or band-aids.  This keeps the surgical incisions clean, as well as preventing your clothes from spotting with blood or fluid.  Change band-aids or light dressing daily.    SHOWER AND BATHING  Keep the shoulder dry for 2 days after your surgery.  Then, you may shower. You may let soap and water run over skin incisions, but do not immerse your shoulder in water.  No swimming pools, hot tubs, or baths are recommended until after your follow up and then only if cleared by your surgeon.     ICE  Apply an ice pack to your shoulder (15 minutes on the shoulder, 15 minutes off the shoulder), as you feel necessary to help with the pain and swelling.        FOLLOW-UP  Make sure that you have an appointment 7-14 days following surgery.Your procedure/rehabilitation will be discussed and physical therapy may be prescribed at that time.         Peripheral Nerve Block Discharge Instructions from Same Day Surgery and Inpatient :    What to Expect - Upper Extremity  You may experience numbness and weakness in shoulder, arm, and hand  on the same side as your surgery  This is normal. For some people, this may be an unpleasant  "sensation. Be very careful with your numb limb  Ask for help when you need it  Shoulder Surgery Side Effects  In addition to numbness and weakness you may experience other symptoms  Other nerves that are close to those nerves injected can also be affected by local anesthesia  You may experience a hoarseness in your voice  Your breathing may feel different  You may also notice drooping of your eyelid, pupil constriction, and decreased sweating, on the side of your surgery  All of these side effects are normal and will resolve when the local anesthetic wears off   Prevent Injury  Protect the limb like a baby  Beware of exposing your limb to extreme heat or cold or trauma  The limb may be injured without you noticing because it is numb  Keep the limb elevated whenever possible  Do not sleep on the limb  Change the position of the limb regularly  Avoid putting pressure on your surgical limb  Pain Control  The initial block on the day of surgery will make your extremity feel \"numb\"  Any consecutive injection including prior to discharge from the hospital will make your extremity feel \"numb\"  You may feel an aching or burning when the local anesthesia starts to wear off  Take pain pills as prescribed by your surgeon  Call your surgeon or anesthesiologist if you do not have adequate pain control        "

## 2023-12-05 NOTE — DISCHARGE INSTR - OTHER INFO
Keep dressings on for 2 days.  After two days, you may shower with wounds uncovered using normal soap and water.  Apply band aids to wounds after shower.  DO NOT SUBMERGE INCSIONS for 3 weeks.  Keep sling on until the block wears off - approximately 24 hours after surgery.  At that point you may remove the sling and use it only as needed. Begin gentle range of motion with the shoulder as tolerated.  Ice regularly.

## 2023-12-05 NOTE — H&P
"Surgery Orthopedic History & Physical Note    Date  12/5/2023    Primary Care Physician  Kevin Chavez M.D.    CC  Pre-Op Diagnosis Codes:     * Subacromial bursitis [M75.50]     * Acromioclavicular arthrosis [M19.019]     * Superior glenoid labrum lesion of left shoulder [S43.432A]    HPI  This is a 63 y.o. male who presented with left shoulder pain.    Past Medical History:   Diagnosis Date    Anesthesia 11/03/2017    PONV with shoulder surgery approx 20 years ago.    Anxiety and depression 11/03/2017    Arthritis 11/03/2017    Osteoarthritis, \" all over\"    Bronchitis     childhood    Bunion     Cancer (formerly [facility]) 2017    skin lesion cut out, on left shoulder    Chronic autoimmune thyroiditis      + US / + TPO = 57    Dental disorder 11/03/2017    \"Upper Plate\"    Diabetes mellitus (formerly [facility]) 11/03/2017    type 2 \"Controlled with diet & Victoza\"    Gout     Hammertoe     High cholesterol 11/03/2017    HX OF, not currently on medication for.    Hypertension     Hypothyroidism     chronic thyroiditis    MRSA infection     left big toe- amputation    Open toe wound 03/2018    Left big toe, open wound, started 2/2018, receiving wound care therapy two times a week    Pain 11/03/2017    \"Pain all over except right hip & ankle\"    Pain 09/19/2022    \"All over \"    Psychiatric problem     depression/anxiety    Seasonal allergies     Sleep apnea 11/03/2017    CPAP USE- with 4 L oxygen    Snoring 11/03/2017    DX JULIAN    Tonsillitis        Past Surgical History:   Procedure Laterality Date    OTHER  12/2022    Peroral endoscopic Myotomy    PB RECONSTR TOTAL SHOULDER IMPLANT Left 11/22/2022    Procedure: Left reverse total shoulder arthroplasty, biceps tenodesis and repairs as indicated;  Surgeon: Jd Villegas M.D.;  Location: [facility];  Service: Orthopedics    PB REPAIR BICEPS LONG TENDON  11/22/2022    Procedure: TENODESIS, BICEPS;  Surgeon: Jd Villegas M.D.;  Location: [facility];  Service: Orthopedics    NV NEUROPLASTY & OR TRANSPOS ULNAR NERVE ELBOW Left 10/04/2022    Procedure: LEFT ELBOW CONTRACTURE RELEASE OSTEOPHYTE REMOVAL RADIAL HEAD EXCISION;  Surgeon: Ayad Luna M.D.;  Location: SURGERY SAME DAY [facility];  Service: Orthopedics    RADIAL HEAD EXCISION Left 10/04/2022    Procedure: EXCISION, RADIUS, HEAD, TOTAL;  Surgeon: Ayad Luna M.D.;  Location: SURGERY SAME DAY [facility];  Service: Orthopedics    NV UPPER GI ENDOSCOPY,REMOV F.B.  10/18/2021    Procedure: GASTROSCOPY, WITH FOREIGN BODY REMOVAL;  Surgeon: Estiven Ruiz M.D.;  Location: SURGERY SAME DAY [facility];  Service: Gastroenterology    NV UPPER GI ENDOSCOPY,DIAGNOSIS  10/18/2021    Procedure: GASTROSCOPY;  Surgeon: Estiven Ruiz M.D.;  Location: SURGERY SAME DAY [facility];  Service: Gastroenterology    HETEROTOPIC OSSIFICATION Right 10/14/2021    Procedure: EXCISION, HETEROTOPIC ossification;  Surgeon: Emiliano Vang M.D.;  Location: [facility];  Service: Orthopedics    METATARSAL HEAD RESECTION Left 07/20/2020    Procedure: EXCISION, METATARSAL BONE, HEAD- PARTIAL DISTAL 2/3 METATARSAL;  Surgeon: Haroldo Kang M.D.;  Location: [facility];  Service: Orthopedics    HAMMERTOE CORRECTION Left 07/20/2020    Procedure: CORRECTION, HAMMER TOE- 4/5;  Surgeon: Haroldo Kang M.D.;  Location: [facility];  Service: Orthopedics    TOENAIL REMOVAL Left 07/20/2020    Procedure: REMOVAL, TOENAIL 3-5;  Surgeon: Haroldo Kang M.D.;  Location: SURGERY [facility];  Service: Orthopedics    METATARSAL HEAD RESECTION Left 06/10/2019    Procedure: METATARSAL HEAD RESECTION- FOR PARTIAL EXCISION;  Surgeon: Haroldo Kang M.D.;  Location: SURGERY [facility];  Service: Orthopedics    JOINT INJECTION DIAGNOSTIC  06/10/2019    Procedure: INJECTION, JOINT, DIAGNOSTIC- MIDFOOT;  Surgeon: Haroldo Kang M.D.;  Location: [facility];  Service: Orthopedics    TOE  AMPUTATION Right 06/10/2019    Procedure: AMPUTATION, TOE- FIRST TOE;  Surgeon: Haroldo Kang M.D.;  Location: [facility];  Service: Orthopedics    ORTHOPEDIC OSTEOTOMY Left 10/15/2018    Procedure: ORTHOPEDIC OSTEOTOMY-  FOR: 3RD METATARSAL PARTIAL EXCISION, AND LEFT GASTROC SOLEUS RECESSION;  Surgeon: Haroldo Kang M.D.;  Location: [facility];  Service: Orthopedics    HARDWARE REMOVAL ORTHO Right 10/15/2018    Procedure: HARDWARE REMOVAL ORTHO-  FOOT METATARSALPHALANGEAL JOINT PLATE;  Surgeon: Haroldo Kang M.D.;  Location: [facility];  Service: Orthopedics    TOE AMPUTATION Left 06/25/2018    Procedure: TOE AMPUTATION-FOR :PARTIAL 1ST DISTAL PHALANX EXCISION, GREAT TOE AMPUTATION;  Surgeon: Haroldo Kang M.D.;  Location: [facility];  Service: Orthopedics    INTERNAL FIXATION REMOVAL Left 06/25/2018    Procedure: INTERNAL FIXATION REMOVAL;  Surgeon: Haroldo Kang M.D.;  Location: [facility];  Service: Orthopedics    NERVE ULNAR TRANSFER Right 04/03/2018    Procedure: NERVE ULNAR TRANSFER - TRANSPOSITION;  Surgeon: Ayad Luna M.D.;  Location: [facility];  Service: Orthopedics    CARPAL TUNNEL RELEASE Right 04/03/2018    Procedure: CARPAL TUNNEL RELEASE;  Surgeon: Ayad Luna M.D.;  Location: [facility];  Service: Orthopedics    ELBOW ARTHROTOMY Right 04/03/2018    Procedure: ELBOW ARTHROTOMY - FOR CONTRACTURE RELEASE AT THE ELBOW, RADIAL HEAD EXCISION;  Surgeon: Ayad Luna M.D.;  Location: [facility];  Service: Orthopedics    SHOULDER SURGERY Right 12/02/2017    reversal    METATARSAL HEAD RESECTION Right 11/13/2017    Procedure: METATARSAL HEAD RESECTION - EXCISION;  Surgeon: Haroldo Kang M.D.;  Location: [facility];  Service: Orthopedics    HAMMERTOE CORRECTION Right 11/13/2017    Procedure: HAMMERTOE CORRECTION 2-5;  Surgeon: Haroldo Kang M.D.;  Location:  [facility];  Service: Orthopedics    TOE FUSION Right 11/13/2017    Procedure: TOE FUSION - 1ST METATARSALPHALANGEAL;  Surgeon: Haroldo Kang M.D.;  Location: [facility];  Service: Orthopedics    METATARSAL HEAD RESECTION Left 08/21/2017    Procedure: METATARSAL HEAD RESECTION - 2-5;  Surgeon: Haroldo Kang M.D.;  Location: [facility];  Service:     TOE FUSION Left 08/21/2017    Procedure: TOE FUSION - 1ST MTP;  Surgeon: Haroldo Kang M.D.;  Location: [facility];  Service:     HARDWARE REMOVAL ORTHO Left 08/21/2017    Procedure: HARDWARE REMOVAL ORTHO;  Surgeon: Haroldo Kang M.D.;  Location: [facility];  Service:     FUSION, SPINE, LUMBAR, PLIF  04/14/2017    Procedure: LUMBAR FUSION POSTERIOR - MINIMALLY INVASIVE TLIF L4-5;  Surgeon: Bossman Caro M.D.;  Location: [facility];  Service:     HAMMERTOE CORRECTION Bilateral 12/22/2016    Procedure: HAMMERTOE CORRECTION/METATARSALPHALANGEAL JOINT RELEASE 2/3 RIGHT, 2-3 LEFT;  Surgeon: Haroldo Kang M.D.;  Location: [facility];  Service:     BUNIONECTOMY Left 12/22/2016    Procedure: BUNIONECTOMY FOR DISTAL HALLUX VALGUS CORRECTION WITH BRANDY;  Surgeon: Haroldo Kang M.D.;  Location: [facility];  Service:     ORTHOPEDIC OSTEOTOMY Left 12/22/2016    Procedure: ORTHOPEDIC OSTEOTOMY DISTAL METATARSAL 2-5;  Surgeon: Haroldo Kang M.D.;  Location: [facility];  Service:     HIP ARTH ANTERIOR TOTAL Left 08/18/2016    Procedure: HIP ARTHROPLASTY ANTERIOR TOTAL;  Surgeon: Emiliano Vang M.D.;  Location: [facility];  Service:     OTHER ORTHOPEDIC SURGERY  06/2014    right shoulder  arthroplasty    OTHER ORTHOPEDIC SURGERY  11/01/2012    left knee/left ankle/left shoulder    OTHER ORTHOPEDIC SURGERY Bilateral 2004    elbow surgery major operations    OTHER  2000    dislocation left foot surgery at [facility]    ORIF, FRACTURE, TIBIA  "Left     tib/fib plate, has since been removed    OTHER      \"septoplasty 20 years ago\"    OTHER ORTHOPEDIC SURGERY      brad knee replaced    OTHER ORTHOPEDIC SURGERY      left total hip       Current Facility-Administered Medications   Medication Dose Route Frequency Provider Last Rate Last Admin    celecoxib (CeleBREX) capsule 200 mg  200 mg Oral Once Davide Rodriguez M.D.        acetaminophen (Tylenol) tablet 1,000 mg  1,000 mg Oral Once Davide Rodriguez M.D.        ceFAZolin (Ancef) injection 2 g  2 g Intravenous Once Jd Villegas M.D.           Social History     Socioeconomic History    Marital status:      Spouse name: Not on file    Number of children: Not on file    Years of education: Not on file    Highest education level: Not on file   Occupational History    Not on file   Tobacco Use    Smoking status: Former     Current packs/day: 0.00     Average packs/day: 1 pack/day for 20.0 years (20.0 ttl pk-yrs)     Types: Cigarettes     Start date: 1994     Quit date: 2014     Years since quittin.9    Smokeless tobacco: Former     Types: Chew     Quit date: 1997   Vaping Use    Vaping Use: Never used   Substance and Sexual Activity    Alcohol use: Yes     Alcohol/week: 4.8 oz     Types: 8 Cans of beer per week     Comment: occasional- approx once a week 4-5 beers    Drug use: No    Sexual activity: Not on file   Other Topics Concern    Not on file   Social History Narrative    Not on file     Social Determinants of Health     Financial Resource Strain: Not on file   Food Insecurity: Not on file   Transportation Needs: Not on file   Physical Activity: Not on file   Stress: Not on file   Social Connections: Not on file   Intimate Partner Violence: Not on file   Housing Stability: Not on file       Family History   Problem Relation Age of Onset    Heart Disease Mother     Depression Mother     Cancer Father         Colon cancer     Cancer Sister         Skin cancer    Sleep Apnea Neg " Hx        Allergies  Daptomycin, Demerol, Meperidine, Meperidine hcl, Sulfa drugs, Sulfamethoxazole w-trimethoprim, and Sulfamethoxazole-trimethoprim    Review of Systems  Negative    Physical Exam  Vitals and nursing note reviewed.   HENT:      Head: Normocephalic.      Nose: Nose normal.      Mouth/Throat:      Mouth: Mucous membranes are normal.  Eyes:      Extraocular Movements: Extraocular movements intact.   Cardiovascular:      Rate and Rhythm: Normal rate and regular rhythm.   Pulmonary:      Effort: Pulmonary effort is normal.   Abdominal:      General: Abdomen is flat.   Musculoskeletal:         General: Tenderness present.      Cervical back: Normal range of motion.   Skin:     General: Skin is warm and dry.   Neurological:      General: No focal deficit present.      Mental Status: The are alert.   Psychiatric:         Mood and Affect: Mood normal.       Vital Signs  Blood Pressure : (!) 147/92   Temperature: 36.4 °C (97.5 °F)   Pulse: 94   Respiration: 16   Pulse Oximetry: 90 %       Labs:                    Radiology:  No orders to display         Assessment/Plan:  Pre-Op Diagnosis Codes:     * Subacromial bursitis [M75.50]     * Acromioclavicular arthrosis [M19.019]     * Superior glenoid labrum lesion of left shoulder [S43.432A]  Procedure(s):  Left shoulder arthroscopic subacromial decompression, distal clavicle excision, joint debridement and repairs as indicated  ARTHROSCOPY, SHOULDER, WITH EXTENSIVE DEBRIDEMENT

## 2023-12-05 NOTE — LETTER
November 16, 2023    Patient Name: Cristino Keys  Surgeon Name: Jd Villegas M.D.  Surgery Facility: [facility] ([address] [place] [address])  Surgery Date: 12/5/2023    The time of your surgery is not final and may change up to and until the day of your surgery. You will be contacted 24-48 hours prior to your surgery date with your check-in and surgery time.    If you will not be at one of the below numbers please call the surgery scheduler at [phone]  Preferred Phone: [phone]    BEFORE YOUR SURGERY   Pre Registration and/or Lab Work must be done within and no earlier than 28 days prior to your surgery date. Your scheduled facility will contact you regarding all required preregistration and/or lab work. If you have not been contacted within 7 days of your scheduled procedure please call [facility] at [phone] for an appointment as soon as possible.    DAY OF YOUR SURGERY  Nothing to eat or drink after midnight     Refrain from smoking any substance after midnight prior to surgery. Smoking may interfere with the anesthetic and frequently produces nausea during the recovery period.    Continue taking all lifesaving medications. Including the morning of your surgery with small sip of water.    Please do NOT take on the day of surgery:  Diuretics: examples- furosemide (Lasix), spironolactone, hydrochlorothiazide  ACE-inhibitors: examples- lisinopril, ramipril, enalapril  “ARBs”: examples- losartan, Olmesartan, valsartan    Please arrive at the hospital/surgery center at the check-in time provided.     An adult will need to bring you and take you home after your surgery.     AFTER YOUR SURGERY  Post op Appointment:   Date: 12/18   Time: 2 PM   With: Jd Villegas MD   Location: [address] [place] [id number]          - You must have someone provide transportation post surgery and someone to monitor you for at least 24 hours post-surgery. If you  don't have either of these your appointment will be canceled.       TIME OFF WORK  FMLA or Disability forms can be faxed directly to: [phone] or you may drop them off at [address] [address]. Our office charges a $35.00 fee per form. Forms will be completed within 10 business days of drop off and payment received. For the status of your forms you may contact our disability office directly [phone].

## 2023-12-05 NOTE — OP REPORT
SURGEON:  Jd Villegas M.D.    PREOPERATIVE DIAGNOSIS:    Left shoulder subacromial impingement with an avulsion fracture from the lateral acromion after reverse total shoulder arthroplasty     POSTOPERATIVE DIAGNOSIS:    Left shoulder subacromial impingement with a nonunited avulsion fracture from the lateral acromion  Left shoulder extensive scarring from previous surgeries  Left shoulder synovitis    PROCEDURES PERFORMED:      Left shoulder arthroscopic subacromial decompression with resection of a nonunited avulsion fracture from the lateral acromion  Left shoulder extensive debridement    ASSISTANT:  Rebeca Salgado PA-C    ANESTHESIA:   General and an interscalene nerve block    ANESTHESIOLOGIST:   Homero Rodriguez MD    IMPLANTS:   None    COMPLICATIONS:  None.    DISPOSITION:  Stable to Post Anesthesia Care Unit.    INDICATIONS:  The patient has had progressive shoulder pain that has been unresponsive to conservative management. The risks, benefits, alternative and limitations of surgical intervention were discussed in detail. They have expressed understanding and desire to proceed with surgical intervention.    PROCEDURE IN DETAIL:  The patient and the correct operative extremity were identified in the preoperative area.  The extremity was marked.  The patient was brought to the operating room where the correct operative extremity was again confirmed.  They were placed supine on the OR table after undergoing an interscalene nerve block performed at my request for post-operative pain management. General anesthesia was then induced without complication.  Examination under anesthesia showed mildly limited range of motion.  The extremity was prepped with alcohol and the subacromial space injected with 1% lidocaine with epinephrine.  The extremity was then prepped and draped in the usual sterile fashion using ChloraPrep.    Diagnostic arthroscopy was then performed which showed extensive scarring within the  subacromial space.  The joint itself was not entered.  It was sealed by scarring and bursa.  We removed an extensive amount of scar from the subacromial space.  Remove the inflamed bursa from the subacromial space.  Then exposed the avulsed fragment from the lateral acromion.  We then resected that with a 5 5 full-radius resector on bur mode, then obtained hemostasis.  We made sure to preserve the fascia connecting the deltoid to the acromion.  We then remove the bony debris from the subacromial space.  We kept the joint sealed with the scar below.    A spinal needle was then placed into the subacromial space under direct vision. The portals were closed with 3-0 Nylon. The extremity was injected with 0.5% bupivacaine with epinephrine.  Sterile dressings were applied. The patient was placed into a comfort sling. The patient was then allowed to awake from anesthesia, transferred to their hospital cart, and taken to the Post Anesthesia Care Unit in stable condition.  The patient tolerated the procedure well.  There were no immediate complications.

## 2023-12-05 NOTE — ANESTHESIA TIME REPORT
Anesthesia Start and Stop Event Times       Date Time Event    12/5/2023 1300 Ready for Procedure     1400 Anesthesia Start     1459 Anesthesia Stop          Responsible Staff  12/05/23      Name Role Begin End    Davide Rodriguez M.D. Anesth 1400 1459          Overtime Reason:  no overtime (within assigned shift)    Comments:

## 2023-12-05 NOTE — OR NURSING
1454-To PACU from OR via [facility], sleeping, respirations spontaneous and non-labored via OPA. LUE elevated. Cold pack applied over clean, dry, intact left shoulder dressing.  LUE: radial pulse 2+ cap refill less than 3 seconds, warm, pink.   Plan to keep pt in PACU for full hour per [facility] protocol.    1500- Pt rouses, Oral Airway dc'd. Pt immediately goes back to sleep.     1515- CPAP applied to the patient. Oxygen saturation decreased to 87% on room air. Connected 4 L of oxygen to the CPAP. Pt denies pain or nausea.     1530- Pt resting at this time. Pt denies having pain. No change to surgical dressing.  Dr. Rodriguez to the bedside. Received direction for the patient to receive a breathing treatment while in the PACU.     1545- Albuterol treatment started. Pt sleeping at this time. Not roused.     1600- Pt sleeping. Breathing treatment finished. Roused the patient and placed the patient back on his CPAP.     1615- CPAP removed. Patient sitting up drinking water. The patient denies pain or nausea. No change to surgical dressing. Updated the family again.     1630-Patient met criteria for transfer to stage II. Oxygen saturation above 92%.     1635- Pt transferred to stage II via [facility] with RN.  Settled in to recliner and dressed the patient.     1659-D/Fco to care of family post uneventful stay in PACU. Left shoulder surgical dressing C/D/I. PIV removed. Discharge instructions read to the patient and family.  Questions answered at this time.  Pt discharged home via wheelchair by CNA.

## 2023-12-05 NOTE — ANESTHESIA PREPROCEDURE EVALUATION
Case: [id number] Date/Time: 12/05/23 1430    Procedures:       Left shoulder arthroscopic subacromial decompression, distal clavicle excision, joint debridement and repairs as indicated      ARTHROSCOPY, SHOULDER, WITH EXTENSIVE DEBRIDEMENT    Diagnosis:       Subacromial bursitis [M75.50]      Acromioclavicular arthrosis [M19.019]      Superior glenoid labrum lesion of left shoulder [S43.432A]    Pre-op diagnosis: Subacromial bursitis [M75.50]Acromioclavicular arthrosis [M19.019]Superior glenoid labrum lesion of left shoulder [S43.432A]    Location:  OR 04 / SURGERY [facility]    Surgeons: Jd Villegas M.D.          HTN, JULIAN with oxygen, PONV over 20 years ago (none since), DM type 2  Denies: MI/CHF/smoking (former smoker)/CVA/CKD    Relevant Problems   ANESTHESIA   (positive) JULIAN (obstructive sleep apnea)      CARDIAC   (positive) Hypertension   (positive) PVC (premature ventricular contraction)      ENDO   (positive) Hypothyroidism, acquired, autoimmune   (positive) Type 2 diabetes mellitus without complication (HCC)      Other   (positive) Arthritis of left ankle   (positive) Elbow arthritis       Physical Exam    Airway   Mallampati: II  TM distance: >3 FB  Neck ROM: full       Cardiovascular - normal exam  Rhythm: regular  Rate: normal  (-) murmur     Dental - normal exam  Comments: Upper denture, removed         Pulmonary - normal exam  Breath sounds clear to auscultation     Abdominal    Neurological - normal exam                   Anesthesia Plan    ASA 3 (JULIAN requiring CPAP and supplemental oxygen)   ASA physical status 3 criteria: other (comment)    Plan - general and peripheral nerve block     Peripheral nerve block will be post-op pain control  Airway plan will be ETT          Induction: intravenous    Postoperative Plan: Postoperative administration of opioids is intended.    Pertinent diagnostic labs and testing reviewed    Informed Consent:    Anesthetic plan and risks discussed with patient.    Use  of blood products discussed with: patient whom consented to blood products.

## 2023-12-05 NOTE — ANESTHESIA POSTPROCEDURE EVALUATION
Patient: Cristino Keys    Procedure Summary       Date: 12/05/23 Room / Location:  OR  / SURGERY [facility]    Anesthesia Start: 1400 Anesthesia Stop: 1459    Procedures:       Left shoulder arthroscopic subacromial decompression, distal clavicle excision, joint debridement and repairs as indicated (Left: Shoulder)      ARTHROSCOPY, SHOULDER, WITH EXTENSIVE DEBRIDEMENT (Left: Shoulder) Diagnosis:       Subacromial bursitis      Acromioclavicular arthrosis      Superior glenoid labrum lesion of left shoulder      (Subacromial bursitis, Acromioclavicular arthrosis, Superior glenoid labrum lesion of left shoulder)    Surgeons: Jd Villegas M.D. Responsible Provider: Davide Rodriguez M.D.    Anesthesia Type: general, peripheral nerve block ASA Status: 3            Final Anesthesia Type: general, peripheral nerve block  Last vitals  BP   Blood Pressure : 133/69    Temp   36.4 °C (97.5 °F)    Pulse   85   Resp   16    SpO2   92 %      Anesthesia Post Evaluation    Patient location during evaluation: PACU  Patient participation: complete - patient participated  Level of consciousness: awake and alert  Pain score: 5    Airway patency: patent  Anesthetic complications: no  Cardiovascular status: hemodynamically stable  Respiratory status: acceptable  Hydration status: euvolemic    PONV: none          There were no known notable events for this encounter.

## 2023-12-05 NOTE — ANESTHESIA PROCEDURE NOTES
Peripheral Block    Date/Time: 12/5/2023 1:46 PM    Performed by: Davide Rodriguez M.D.  Authorized by: Davide Rodriguez M.D.    Patient Location:  Pre-op  Start Time:  12/5/2023 1:46 PM  End Time:  12/5/2023 1:50 PM  Reason for Block: at surgeon's request and post-op pain management ONLY    patient identified, IV checked, site marked, risks and benefits discussed, surgical consent, monitors and equipment checked, pre-op evaluation and timeout performed    Patient Position:  Sitting  Prep: ChloraPrep    Monitoring:  Heart rate, continuous pulse ox and cardiac monitor  Block Region:  Upper Extremity  Upper Extremity - Block Type:  BRACHIAL PLEXUS block, Interscalene approach    Laterality:  Left  Procedures: ultrasound guided  Image captured, interpreted and electronically stored.  Local Infiltration:  Lidocaine  Strength:  1 %  Dose:  3 ml  Block Type:  Single-shot  Needle Length:  100mm  Needle Gauge:  21 G  Needle Localization:  Ultrasound guidance  Ultrasound picture in chart  Injection Assessment:  Negative aspiration for heme, no paresthesia on injection, incremental injection and local visualized surrounding nerve on ultrasound  Evidence of intravascular injection: No     US Guided Interscalene Brachial Plexus Block   US transducer placed on the neck in oblique plane approximately at the level of C6.  Anterior and Middle Scalene (MSM) muscles identified with nerve trunks identified between the muscles.  Needle inserted lateral to probe and advanced under direct visualization through the MSM into a perineural position.  After negative aspiration, LA injected with ease and visualized surrounding the nerve trunks.

## 2024-01-02 ENCOUNTER — HOSPITAL ENCOUNTER (OUTPATIENT)
Dept: LAB | Facility: MEDICAL CENTER | Age: 64
End: 2024-01-02
Attending: INTERNAL MEDICINE
Payer: MEDICARE

## 2024-01-02 LAB
BASOPHILS # BLD AUTO: 0.7 % (ref 0–1.8)
BASOPHILS # BLD: 0.05 K/UL (ref 0–0.12)
EOSINOPHIL # BLD AUTO: 0.13 K/UL (ref 0–0.51)
EOSINOPHIL NFR BLD: 1.9 % (ref 0–6.9)
ERYTHROCYTE [DISTWIDTH] IN BLOOD BY AUTOMATED COUNT: 41.2 FL (ref 35.9–50)
HCT VFR BLD AUTO: 52.1 % (ref 42–52)
HGB BLD-MCNC: 17.6 G/DL (ref 14–18)
IMM GRANULOCYTES # BLD AUTO: 0.06 K/UL (ref 0–0.11)
IMM GRANULOCYTES NFR BLD AUTO: 0.9 % (ref 0–0.9)
LYMPHOCYTES # BLD AUTO: 1.6 K/UL (ref 1–4.8)
LYMPHOCYTES NFR BLD: 23.6 % (ref 22–41)
MCH RBC QN AUTO: 30.6 PG (ref 27–33)
MCHC RBC AUTO-ENTMCNC: 33.8 G/DL (ref 32.3–36.5)
MCV RBC AUTO: 90.6 FL (ref 81.4–97.8)
MONOCYTES # BLD AUTO: 0.55 K/UL (ref 0–0.85)
MONOCYTES NFR BLD AUTO: 8.1 % (ref 0–13.4)
NEUTROPHILS # BLD AUTO: 4.38 K/UL (ref 1.82–7.42)
NEUTROPHILS NFR BLD: 64.8 % (ref 44–72)
NRBC # BLD AUTO: 0 K/UL
NRBC BLD-RTO: 0 /100 WBC (ref 0–0.2)
PLATELET # BLD AUTO: 214 K/UL (ref 164–446)
PMV BLD AUTO: 9.4 FL (ref 9–12.9)
RBC # BLD AUTO: 5.75 M/UL (ref 4.7–6.1)
WBC # BLD AUTO: 6.8 K/UL (ref 4.8–10.8)

## 2024-01-02 PROCEDURE — 84403 ASSAY OF TOTAL TESTOSTERONE: CPT

## 2024-01-02 PROCEDURE — 85025 COMPLETE CBC W/AUTO DIFF WBC: CPT

## 2024-01-02 PROCEDURE — 80053 COMPREHEN METABOLIC PANEL: CPT

## 2024-01-02 PROCEDURE — 82670 ASSAY OF TOTAL ESTRADIOL: CPT

## 2024-01-02 PROCEDURE — 36415 COLL VENOUS BLD VENIPUNCTURE: CPT

## 2024-01-03 LAB
ALBUMIN SERPL BCP-MCNC: 4.4 G/DL (ref 3.2–4.9)
ALBUMIN/GLOB SERPL: 1.3 G/DL
ALP SERPL-CCNC: 107 U/L (ref 30–99)
ALT SERPL-CCNC: 21 U/L (ref 2–50)
ANION GAP SERPL CALC-SCNC: 12 MMOL/L (ref 7–16)
AST SERPL-CCNC: 24 U/L (ref 12–45)
BILIRUB SERPL-MCNC: 0.4 MG/DL (ref 0.1–1.5)
BUN SERPL-MCNC: 19 MG/DL (ref 8–22)
CALCIUM ALBUM COR SERPL-MCNC: 9.2 MG/DL (ref 8.5–10.5)
CALCIUM SERPL-MCNC: 9.5 MG/DL (ref 8.5–10.5)
CHLORIDE SERPL-SCNC: 98 MMOL/L (ref 96–112)
CO2 SERPL-SCNC: 25 MMOL/L (ref 20–33)
CREAT SERPL-MCNC: 0.84 MG/DL (ref 0.5–1.4)
ESTRADIOL SERPL-MCNC: 25.8 PG/ML
GFR SERPLBLD CREATININE-BSD FMLA CKD-EPI: 96 ML/MIN/1.73 M 2
GLOBULIN SER CALC-MCNC: 3.3 G/DL (ref 1.9–3.5)
GLUCOSE SERPL-MCNC: 184 MG/DL (ref 65–99)
POTASSIUM SERPL-SCNC: 4.4 MMOL/L (ref 3.6–5.5)
PROT SERPL-MCNC: 7.7 G/DL (ref 6–8.2)
SODIUM SERPL-SCNC: 135 MMOL/L (ref 135–145)
TESTOST SERPL-MCNC: 430 NG/DL (ref 175–781)

## 2024-01-18 PROBLEM — M53.3 SACROILIAC JOINT PAIN: Status: ACTIVE | Noted: 2024-01-18

## 2024-01-22 ENCOUNTER — APPOINTMENT (OUTPATIENT)
Dept: ADMISSIONS | Facility: MEDICAL CENTER | Age: 64
End: 2024-01-22
Attending: NEUROLOGICAL SURGERY
Payer: MEDICARE

## 2024-01-23 ENCOUNTER — PRE-ADMISSION TESTING (OUTPATIENT)
Dept: ADMISSIONS | Facility: MEDICAL CENTER | Age: 64
End: 2024-01-23
Attending: NEUROLOGICAL SURGERY
Payer: MEDICARE

## 2024-01-30 ENCOUNTER — APPOINTMENT (OUTPATIENT)
Dept: ADMISSIONS | Facility: MEDICAL CENTER | Age: 64
End: 2024-01-30
Attending: NEUROLOGICAL SURGERY
Payer: MEDICARE

## 2024-01-31 ENCOUNTER — HOSPITAL ENCOUNTER (OUTPATIENT)
Dept: RADIOLOGY | Facility: MEDICAL CENTER | Age: 64
End: 2024-01-31
Attending: NEUROLOGICAL SURGERY | Admitting: NEUROLOGICAL SURGERY
Payer: MEDICARE

## 2024-01-31 ENCOUNTER — PRE-ADMISSION TESTING (OUTPATIENT)
Dept: ADMISSIONS | Facility: MEDICAL CENTER | Age: 64
End: 2024-01-31
Attending: NEUROLOGICAL SURGERY
Payer: MEDICARE

## 2024-01-31 DIAGNOSIS — M53.3 SACROILIAC DYSFUNCTION: ICD-10-CM

## 2024-01-31 LAB
25(OH)D3 SERPL-MCNC: 27 NG/ML (ref 30–100)
ABO GROUP BLD: NORMAL
ANION GAP SERPL CALC-SCNC: 12 MMOL/L (ref 7–16)
APPEARANCE UR: CLEAR
APTT PPP: 27.4 SEC (ref 24.7–36)
BACTERIA #/AREA URNS HPF: NEGATIVE /HPF
BASOPHILS # BLD AUTO: 0.4 % (ref 0–1.8)
BASOPHILS # BLD: 0.02 K/UL (ref 0–0.12)
BILIRUB UR QL STRIP.AUTO: NEGATIVE
BLD GP AB SCN SERPL QL: NORMAL
BUN SERPL-MCNC: 24 MG/DL (ref 8–22)
CALCIUM SERPL-MCNC: 8.8 MG/DL (ref 8.5–10.5)
CHLORIDE SERPL-SCNC: 97 MMOL/L (ref 96–112)
CO2 SERPL-SCNC: 25 MMOL/L (ref 20–33)
COLOR UR: YELLOW
CREAT SERPL-MCNC: 1.07 MG/DL (ref 0.5–1.4)
EKG IMPRESSION: NORMAL
EOSINOPHIL # BLD AUTO: 0.12 K/UL (ref 0–0.51)
EOSINOPHIL NFR BLD: 2.2 % (ref 0–6.9)
EPI CELLS #/AREA URNS HPF: NEGATIVE /HPF
ERYTHROCYTE [DISTWIDTH] IN BLOOD BY AUTOMATED COUNT: 47.7 FL (ref 35.9–50)
EST. AVERAGE GLUCOSE BLD GHB EST-MCNC: 137 MG/DL
GFR SERPLBLD CREATININE-BSD FMLA CKD-EPI: 77 ML/MIN/1.73 M 2
GLUCOSE SERPL-MCNC: 244 MG/DL (ref 65–99)
GLUCOSE UR STRIP.AUTO-MCNC: 100 MG/DL
HBA1C MFR BLD: 6.4 % (ref 4–5.6)
HCT VFR BLD AUTO: 50.1 % (ref 42–52)
HGB BLD-MCNC: 16.9 G/DL (ref 14–18)
HYALINE CASTS #/AREA URNS LPF: ABNORMAL /LPF
IMM GRANULOCYTES # BLD AUTO: 0.06 K/UL (ref 0–0.11)
IMM GRANULOCYTES NFR BLD AUTO: 1.1 % (ref 0–0.9)
INR PPP: 0.95 (ref 0.87–1.13)
KETONES UR STRIP.AUTO-MCNC: NEGATIVE MG/DL
LEUKOCYTE ESTERASE UR QL STRIP.AUTO: NEGATIVE
LYMPHOCYTES # BLD AUTO: 1.45 K/UL (ref 1–4.8)
LYMPHOCYTES NFR BLD: 27 % (ref 22–41)
MCH RBC QN AUTO: 30.7 PG (ref 27–33)
MCHC RBC AUTO-ENTMCNC: 33.7 G/DL (ref 32.3–36.5)
MCV RBC AUTO: 91.1 FL (ref 81.4–97.8)
MICRO URNS: ABNORMAL
MONOCYTES # BLD AUTO: 0.46 K/UL (ref 0–0.85)
MONOCYTES NFR BLD AUTO: 8.6 % (ref 0–13.4)
NEUTROPHILS # BLD AUTO: 3.27 K/UL (ref 1.82–7.42)
NEUTROPHILS NFR BLD: 60.7 % (ref 44–72)
NITRITE UR QL STRIP.AUTO: NEGATIVE
NRBC # BLD AUTO: 0 K/UL
NRBC BLD-RTO: 0 /100 WBC (ref 0–0.2)
PH UR STRIP.AUTO: 6 [PH] (ref 5–8)
PLATELET # BLD AUTO: 158 K/UL (ref 164–446)
PMV BLD AUTO: 8.8 FL (ref 9–12.9)
POTASSIUM SERPL-SCNC: 4.5 MMOL/L (ref 3.6–5.5)
PROT UR QL STRIP: 100 MG/DL
PROTHROMBIN TIME: 12.8 SEC (ref 12–14.6)
RBC # BLD AUTO: 5.5 M/UL (ref 4.7–6.1)
RBC # URNS HPF: ABNORMAL /HPF
RBC UR QL AUTO: NEGATIVE
RH BLD: NORMAL
SODIUM SERPL-SCNC: 134 MMOL/L (ref 135–145)
SP GR UR STRIP.AUTO: 1.01
UROBILINOGEN UR STRIP.AUTO-MCNC: 0.2 MG/DL
WBC # BLD AUTO: 5.4 K/UL (ref 4.8–10.8)
WBC #/AREA URNS HPF: ABNORMAL /HPF

## 2024-01-31 PROCEDURE — 85730 THROMBOPLASTIN TIME PARTIAL: CPT

## 2024-01-31 PROCEDURE — 93005 ELECTROCARDIOGRAM TRACING: CPT

## 2024-01-31 PROCEDURE — 86850 RBC ANTIBODY SCREEN: CPT

## 2024-01-31 PROCEDURE — 85610 PROTHROMBIN TIME: CPT

## 2024-01-31 PROCEDURE — 86901 BLOOD TYPING SEROLOGIC RH(D): CPT

## 2024-01-31 PROCEDURE — 81001 URINALYSIS AUTO W/SCOPE: CPT

## 2024-01-31 PROCEDURE — 80048 BASIC METABOLIC PNL TOTAL CA: CPT

## 2024-01-31 PROCEDURE — 36415 COLL VENOUS BLD VENIPUNCTURE: CPT

## 2024-01-31 PROCEDURE — 83036 HEMOGLOBIN GLYCOSYLATED A1C: CPT

## 2024-01-31 PROCEDURE — 82306 VITAMIN D 25 HYDROXY: CPT

## 2024-01-31 PROCEDURE — 85025 COMPLETE CBC W/AUTO DIFF WBC: CPT

## 2024-01-31 PROCEDURE — 93010 ELECTROCARDIOGRAM REPORT: CPT | Performed by: INTERNAL MEDICINE

## 2024-01-31 PROCEDURE — 86900 BLOOD TYPING SEROLOGIC ABO: CPT

## 2024-01-31 PROCEDURE — 71046 X-RAY EXAM CHEST 2 VIEWS: CPT

## 2024-02-05 ENCOUNTER — APPOINTMENT (OUTPATIENT)
Dept: RADIOLOGY | Facility: MEDICAL CENTER | Age: 64
End: 2024-02-05
Attending: NEUROLOGICAL SURGERY
Payer: MEDICARE

## 2024-02-05 ENCOUNTER — ANESTHESIA (OUTPATIENT)
Dept: SURGERY | Facility: MEDICAL CENTER | Age: 64
End: 2024-02-05
Payer: MEDICARE

## 2024-02-05 ENCOUNTER — ANESTHESIA EVENT (OUTPATIENT)
Dept: SURGERY | Facility: MEDICAL CENTER | Age: 64
End: 2024-02-05
Payer: MEDICARE

## 2024-02-05 ENCOUNTER — HOSPITAL ENCOUNTER (OUTPATIENT)
Facility: MEDICAL CENTER | Age: 64
End: 2024-02-05
Attending: NEUROLOGICAL SURGERY | Admitting: NEUROLOGICAL SURGERY
Payer: MEDICARE

## 2024-02-05 VITALS
SYSTOLIC BLOOD PRESSURE: 118 MMHG | WEIGHT: 258.16 LBS | HEART RATE: 91 BPM | TEMPERATURE: 97.1 F | BODY MASS INDEX: 36.96 KG/M2 | HEIGHT: 70 IN | RESPIRATION RATE: 16 BRPM | DIASTOLIC BLOOD PRESSURE: 55 MMHG | OXYGEN SATURATION: 97 %

## 2024-02-05 LAB — GLUCOSE BLD STRIP.AUTO-MCNC: 144 MG/DL (ref 65–99)

## 2024-02-05 PROCEDURE — 160002 HCHG RECOVERY MINUTES (STAT): Performed by: NEUROLOGICAL SURGERY

## 2024-02-05 PROCEDURE — 95861 NEEDLE EMG 2 EXTREMITIES: CPT | Performed by: NEUROLOGICAL SURGERY

## 2024-02-05 PROCEDURE — A9270 NON-COVERED ITEM OR SERVICE: HCPCS | Performed by: ANESTHESIOLOGY

## 2024-02-05 PROCEDURE — 95940 IONM IN OPERATNG ROOM 15 MIN: CPT | Performed by: NEUROLOGICAL SURGERY

## 2024-02-05 PROCEDURE — 110454 HCHG SHELL REV 250: Performed by: NEUROLOGICAL SURGERY

## 2024-02-05 PROCEDURE — 160009 HCHG ANES TIME/MIN: Performed by: NEUROLOGICAL SURGERY

## 2024-02-05 PROCEDURE — 700111 HCHG RX REV CODE 636 W/ 250 OVERRIDE (IP): Performed by: NEUROLOGICAL SURGERY

## 2024-02-05 PROCEDURE — 95937 NEUROMUSCULAR JUNCTION TEST: CPT | Performed by: NEUROLOGICAL SURGERY

## 2024-02-05 PROCEDURE — 700111 HCHG RX REV CODE 636 W/ 250 OVERRIDE (IP): Mod: JZ | Performed by: ANESTHESIOLOGY

## 2024-02-05 PROCEDURE — 160048 HCHG OR STATISTICAL LEVEL 1-5: Performed by: NEUROLOGICAL SURGERY

## 2024-02-05 PROCEDURE — 700106 HCHG RX REV CODE 271: Performed by: NEUROLOGICAL SURGERY

## 2024-02-05 PROCEDURE — 700105 HCHG RX REV CODE 258: Performed by: NEUROLOGICAL SURGERY

## 2024-02-05 PROCEDURE — 160042 HCHG SURGERY MINUTES - EA ADDL 1 MIN LEVEL 5: Performed by: NEUROLOGICAL SURGERY

## 2024-02-05 PROCEDURE — 95938 SOMATOSENSORY TESTING: CPT | Performed by: NEUROLOGICAL SURGERY

## 2024-02-05 PROCEDURE — C1713 ANCHOR/SCREW BN/BN,TIS/BN: HCPCS | Performed by: NEUROLOGICAL SURGERY

## 2024-02-05 PROCEDURE — 27279 ARTHRD SI JT PLMT TARTCLR DV: CPT | Mod: ASROC | Performed by: PHYSICIAN ASSISTANT

## 2024-02-05 PROCEDURE — 160035 HCHG PACU - 1ST 60 MINS PHASE I: Performed by: NEUROLOGICAL SURGERY

## 2024-02-05 PROCEDURE — 27279 ARTHRD SI JT PLMT TARTCLR DV: CPT | Performed by: NEUROLOGICAL SURGERY

## 2024-02-05 PROCEDURE — 502000 HCHG MISC OR IMPLANTS RC 0278: Performed by: NEUROLOGICAL SURGERY

## 2024-02-05 PROCEDURE — 700101 HCHG RX REV CODE 250: Performed by: ANESTHESIOLOGY

## 2024-02-05 PROCEDURE — 61783 SCAN PROC SPINAL: CPT | Performed by: NEUROLOGICAL SURGERY

## 2024-02-05 PROCEDURE — 160031 HCHG SURGERY MINUTES - 1ST 30 MINS LEVEL 5: Performed by: NEUROLOGICAL SURGERY

## 2024-02-05 PROCEDURE — 82962 GLUCOSE BLOOD TEST: CPT

## 2024-02-05 PROCEDURE — 95870 NDL EMG LMTD STD MUSC 1 XTR: CPT | Mod: XU | Performed by: NEUROLOGICAL SURGERY

## 2024-02-05 PROCEDURE — 700111 HCHG RX REV CODE 636 W/ 250 OVERRIDE (IP): Performed by: ANESTHESIOLOGY

## 2024-02-05 PROCEDURE — 160046 HCHG PACU - 1ST 60 MINS PHASE II: Performed by: NEUROLOGICAL SURGERY

## 2024-02-05 PROCEDURE — C1821 INTERSPINOUS IMPLANT: HCPCS | Performed by: NEUROLOGICAL SURGERY

## 2024-02-05 PROCEDURE — 61783 SCAN PROC SPINAL: CPT | Mod: ASROC | Performed by: PHYSICIAN ASSISTANT

## 2024-02-05 PROCEDURE — 700101 HCHG RX REV CODE 250: Performed by: NEUROLOGICAL SURGERY

## 2024-02-05 PROCEDURE — 700102 HCHG RX REV CODE 250 W/ 637 OVERRIDE(OP): Performed by: ANESTHESIOLOGY

## 2024-02-05 PROCEDURE — 160025 RECOVERY II MINUTES (STATS): Performed by: NEUROLOGICAL SURGERY

## 2024-02-05 DEVICE — GRAFT BONE PASTE GRAFTON PLUS 10CC (1EA): Type: IMPLANTABLE DEVICE | Site: SPINE LUMBAR | Status: FUNCTIONAL

## 2024-02-05 DEVICE — IMPLANTABLE DEVICE: Type: IMPLANTABLE DEVICE | Site: SPINE LUMBAR | Status: FUNCTIONAL

## 2024-02-05 RX ORDER — OXYCODONE AND ACETAMINOPHEN 10; 325 MG/1; MG/1
1 TABLET ORAL EVERY 6 HOURS PRN
Status: CANCELLED | OUTPATIENT
Start: 2024-02-05

## 2024-02-05 RX ORDER — SODIUM CHLORIDE, SODIUM LACTATE, POTASSIUM CHLORIDE, AND CALCIUM CHLORIDE .6; .31; .03; .02 G/100ML; G/100ML; G/100ML; G/100ML
IRRIGANT IRRIGATION
Status: DISCONTINUED | OUTPATIENT
Start: 2024-02-05 | End: 2024-02-05 | Stop reason: HOSPADM

## 2024-02-05 RX ORDER — DIPHENHYDRAMINE HYDROCHLORIDE 50 MG/ML
12.5 INJECTION INTRAMUSCULAR; INTRAVENOUS
Status: DISCONTINUED | OUTPATIENT
Start: 2024-02-05 | End: 2024-02-05 | Stop reason: HOSPADM

## 2024-02-05 RX ORDER — CEFAZOLIN SODIUM 1 G/3ML
INJECTION, POWDER, FOR SOLUTION INTRAMUSCULAR; INTRAVENOUS PRN
Status: DISCONTINUED | OUTPATIENT
Start: 2024-02-05 | End: 2024-02-05 | Stop reason: SURG

## 2024-02-05 RX ORDER — CEFAZOLIN SODIUM 1 G/3ML
INJECTION, POWDER, FOR SOLUTION INTRAMUSCULAR; INTRAVENOUS
Status: DISCONTINUED | OUTPATIENT
Start: 2024-02-05 | End: 2024-02-05 | Stop reason: HOSPADM

## 2024-02-05 RX ORDER — HALOPERIDOL 5 MG/ML
1 INJECTION INTRAMUSCULAR
Status: DISCONTINUED | OUTPATIENT
Start: 2024-02-05 | End: 2024-02-05 | Stop reason: HOSPADM

## 2024-02-05 RX ORDER — DEXAMETHASONE SODIUM PHOSPHATE 4 MG/ML
INJECTION, SOLUTION INTRA-ARTICULAR; INTRALESIONAL; INTRAMUSCULAR; INTRAVENOUS; SOFT TISSUE PRN
Status: DISCONTINUED | OUTPATIENT
Start: 2024-02-05 | End: 2024-02-05 | Stop reason: SURG

## 2024-02-05 RX ORDER — HYDRALAZINE HYDROCHLORIDE 20 MG/ML
5 INJECTION INTRAMUSCULAR; INTRAVENOUS
Status: DISCONTINUED | OUTPATIENT
Start: 2024-02-05 | End: 2024-02-05 | Stop reason: HOSPADM

## 2024-02-05 RX ORDER — SODIUM CHLORIDE, SODIUM LACTATE, POTASSIUM CHLORIDE, CALCIUM CHLORIDE 600; 310; 30; 20 MG/100ML; MG/100ML; MG/100ML; MG/100ML
INJECTION, SOLUTION INTRAVENOUS CONTINUOUS
Status: DISCONTINUED | OUTPATIENT
Start: 2024-02-05 | End: 2024-02-05 | Stop reason: HOSPADM

## 2024-02-05 RX ORDER — CYCLOBENZAPRINE HCL 10 MG
10 TABLET ORAL 3 TIMES DAILY PRN
Status: CANCELLED | OUTPATIENT
Start: 2024-02-05

## 2024-02-05 RX ORDER — ONDANSETRON 2 MG/ML
4 INJECTION INTRAMUSCULAR; INTRAVENOUS
Status: DISCONTINUED | OUTPATIENT
Start: 2024-02-05 | End: 2024-02-05 | Stop reason: HOSPADM

## 2024-02-05 RX ORDER — MIDAZOLAM HYDROCHLORIDE 1 MG/ML
INJECTION INTRAMUSCULAR; INTRAVENOUS PRN
Status: DISCONTINUED | OUTPATIENT
Start: 2024-02-05 | End: 2024-02-05 | Stop reason: SURG

## 2024-02-05 RX ORDER — OXYCODONE HCL 5 MG/5 ML
10 SOLUTION, ORAL ORAL
Status: COMPLETED | OUTPATIENT
Start: 2024-02-05 | End: 2024-02-05

## 2024-02-05 RX ORDER — KETOROLAC TROMETHAMINE 30 MG/ML
INJECTION, SOLUTION INTRAMUSCULAR; INTRAVENOUS PRN
Status: DISCONTINUED | OUTPATIENT
Start: 2024-02-05 | End: 2024-02-05 | Stop reason: SURG

## 2024-02-05 RX ORDER — CEFAZOLIN SODIUM 1 G/3ML
2 INJECTION, POWDER, FOR SOLUTION INTRAMUSCULAR; INTRAVENOUS ONCE
Status: DISCONTINUED | OUTPATIENT
Start: 2024-02-05 | End: 2024-02-05 | Stop reason: HOSPADM

## 2024-02-05 RX ORDER — SODIUM CHLORIDE, SODIUM LACTATE, POTASSIUM CHLORIDE, CALCIUM CHLORIDE 600; 310; 30; 20 MG/100ML; MG/100ML; MG/100ML; MG/100ML
INJECTION, SOLUTION INTRAVENOUS CONTINUOUS
Status: ACTIVE | OUTPATIENT
Start: 2024-02-05 | End: 2024-02-05

## 2024-02-05 RX ORDER — HYDROMORPHONE HYDROCHLORIDE 1 MG/ML
0.2 INJECTION, SOLUTION INTRAMUSCULAR; INTRAVENOUS; SUBCUTANEOUS
Status: DISCONTINUED | OUTPATIENT
Start: 2024-02-05 | End: 2024-02-05 | Stop reason: HOSPADM

## 2024-02-05 RX ORDER — VANCOMYCIN HYDROCHLORIDE 1 G/20ML
INJECTION, POWDER, LYOPHILIZED, FOR SOLUTION INTRAVENOUS
Status: COMPLETED | OUTPATIENT
Start: 2024-02-05 | End: 2024-02-05

## 2024-02-05 RX ORDER — OXYCODONE HCL 5 MG/5 ML
5 SOLUTION, ORAL ORAL
Status: COMPLETED | OUTPATIENT
Start: 2024-02-05 | End: 2024-02-05

## 2024-02-05 RX ORDER — ONDANSETRON 2 MG/ML
INJECTION INTRAMUSCULAR; INTRAVENOUS PRN
Status: DISCONTINUED | OUTPATIENT
Start: 2024-02-05 | End: 2024-02-05 | Stop reason: SURG

## 2024-02-05 RX ORDER — MAGNESIUM HYDROXIDE 1200 MG/15ML
LIQUID ORAL
Status: COMPLETED | OUTPATIENT
Start: 2024-02-05 | End: 2024-02-05

## 2024-02-05 RX ORDER — HYDROMORPHONE HYDROCHLORIDE 1 MG/ML
0.4 INJECTION, SOLUTION INTRAMUSCULAR; INTRAVENOUS; SUBCUTANEOUS
Status: DISCONTINUED | OUTPATIENT
Start: 2024-02-05 | End: 2024-02-05 | Stop reason: HOSPADM

## 2024-02-05 RX ORDER — CEPHALEXIN 500 MG/1
500 CAPSULE ORAL 4 TIMES DAILY
Qty: 20 CAPSULE | Refills: 0 | Status: SHIPPED | OUTPATIENT
Start: 2024-02-05 | End: 2024-02-10

## 2024-02-05 RX ORDER — TESTOSTERONE CYPIONATE 200 MG/ML
100 INJECTION, SOLUTION INTRAMUSCULAR
COMMUNITY

## 2024-02-05 RX ORDER — BUPIVACAINE HYDROCHLORIDE AND EPINEPHRINE 5; 5 MG/ML; UG/ML
INJECTION, SOLUTION EPIDURAL; INTRACAUDAL; PERINEURAL
Status: DISCONTINUED | OUTPATIENT
Start: 2024-02-05 | End: 2024-02-05 | Stop reason: HOSPADM

## 2024-02-05 RX ORDER — HYDROMORPHONE HYDROCHLORIDE 1 MG/ML
0.6 INJECTION, SOLUTION INTRAMUSCULAR; INTRAVENOUS; SUBCUTANEOUS
Status: DISCONTINUED | OUTPATIENT
Start: 2024-02-05 | End: 2024-02-05 | Stop reason: HOSPADM

## 2024-02-05 RX ADMIN — ONDANSETRON 4 MG: 2 INJECTION INTRAMUSCULAR; INTRAVENOUS at 10:52

## 2024-02-05 RX ADMIN — MIDAZOLAM HYDROCHLORIDE 2 MG: 1 INJECTION, SOLUTION INTRAMUSCULAR; INTRAVENOUS at 10:04

## 2024-02-05 RX ADMIN — CEFAZOLIN 2 G: 1 INJECTION, POWDER, FOR SOLUTION INTRAMUSCULAR; INTRAVENOUS at 10:13

## 2024-02-05 RX ADMIN — SUGAMMADEX 100 MG: 100 INJECTION, SOLUTION INTRAVENOUS at 10:31

## 2024-02-05 RX ADMIN — PROPOFOL 100 MG: 10 INJECTION, EMULSION INTRAVENOUS at 10:43

## 2024-02-05 RX ADMIN — ROCURONIUM BROMIDE 50 MG: 10 INJECTION, SOLUTION INTRAVENOUS at 10:07

## 2024-02-05 RX ADMIN — HYDROMORPHONE HYDROCHLORIDE 0.6 MG: 1 INJECTION, SOLUTION INTRAMUSCULAR; INTRAVENOUS; SUBCUTANEOUS at 12:35

## 2024-02-05 RX ADMIN — OXYCODONE HYDROCHLORIDE 10 MG: 5 SOLUTION ORAL at 11:48

## 2024-02-05 RX ADMIN — DEXAMETHASONE SODIUM PHOSPHATE 4 MG: 4 INJECTION INTRA-ARTICULAR; INTRALESIONAL; INTRAMUSCULAR; INTRAVENOUS; SOFT TISSUE at 10:52

## 2024-02-05 RX ADMIN — FENTANYL CITRATE 50 MCG: 50 INJECTION, SOLUTION INTRAMUSCULAR; INTRAVENOUS at 12:29

## 2024-02-05 RX ADMIN — KETOROLAC TROMETHAMINE 30 MG: 30 INJECTION, SOLUTION INTRAMUSCULAR; INTRAVENOUS at 10:51

## 2024-02-05 RX ADMIN — SODIUM CHLORIDE, POTASSIUM CHLORIDE, SODIUM LACTATE AND CALCIUM CHLORIDE: 600; 310; 30; 20 INJECTION, SOLUTION INTRAVENOUS at 10:04

## 2024-02-05 RX ADMIN — PROPOFOL 200 MG: 10 INJECTION, EMULSION INTRAVENOUS at 10:07

## 2024-02-05 RX ADMIN — FENTANYL CITRATE 100 MCG: 50 INJECTION, SOLUTION INTRAMUSCULAR; INTRAVENOUS at 10:06

## 2024-02-05 ASSESSMENT — PAIN SCALES - GENERAL: PAIN_LEVEL: 1

## 2024-02-05 ASSESSMENT — FIBROSIS 4 INDEX: FIB4 SCORE: 2.15

## 2024-02-05 NOTE — PROGRESS NOTES
UPDATE TO HISTORY AND PHYSICAL    I met with patient and any family members in preop. Again discussed planned surgery. I went over the risks, benefits and alternatives of the surgery in the office visit. I had discussed any off label use of products. The patient had been given literature to read about the procedure in person and via email if able and had access to the office note via the patient portal. I Answered any questions remaining from prior visits.     There was no change to my last H and P (it may be labelled a progress note or a H and P in EPIC).   The note was made by either myself, or my PAs Shay Yao or Emanuel Glover but is signed by me, if it was done by my physician assistant.   I verify it is correct and has my co-signature.    Jayne Molina MD PhD REGINALD

## 2024-02-05 NOTE — OP REPORT
1. DATE OF SURGERY:  2/5/2024      2. SURGEON:  Jayne Molina MD, PhD, REGINALD, Neurosurgeon    3. ASSISTANT:  MAILE Glover    An assistant was crucial to have during the case as the assistant helped with positioning, keeping the field clear of blood and retraction. This case could not be done easily without a qualified assistant. It was medically necessary  .     4. TYPE OF ANESTHESIA:  General anesthesia with endotracheal intubation    5. PREOPERATIVE DIAGNOSIS:      Status post L4-5 TLIF in 2017  Facet mediated low back pain namely L3-4 and L5-S1 well-controlled with ablations from Doctor Zollinger  Bilateral, left greater than righ sacroiliac joint pain - 100% relief for 3 days with injections  Multiple orthopedic complaints, underlying diabetes  5.  Good response in 2 out of 3 left SI joint injections      6. POSTOPERATIVE DIAGNOSIS: as above      Status post L4-5 TLIF in 2017  Facet mediated low back pain namely L3-4 and L5-S1 well-controlled with ablations from Doctor Zollinger  Bilateral, left greater than righ sacroiliac joint pain - 100% relief for 3 days with injections  Multiple orthopedic complaints, underlying diabetes  5.  Good response in 2 out of 3 left SI joint injections    7. HISTORY:     8/30/2023   Cristino Keys is a 63 y.o. male seen today in a neurosurgery/spine consultation.  He is well-known to this practice.  I have seen him previously and reviewed the notes dated 10/7/2020, 11/18/2020, 1/4/2021.  At that time he had history of previous L4-5 TLIF with Dr. Caro.  He noticed improvement of his preoperative back and left leg symptoms.  He persisted with low back pain.  He had facet ablations which were helping.  He continues to get a facet ablations with Doctor Zollinger.  He has noticed increasing pain in his bilateral buttock region.  He is taking anti-inflammatories and pain medications which do help to a degree.  He does feel as if his left leg is shorter than the right leg.  He rates his  pain today as 6-10/10.  He has had episodes where this causes him to keel over.        In essence, there is a 63-year-old gentleman status post L4-5 TLIF in 2017 who persists with low back pain which is facet mediated and well controlled with facet ablations and increasing bilateral buttock pain.        Pertinent past medical history is remarkable for sleep apnea, diabetes and thyroid issues.  He previously smoked and quit in 2012.  He has had 42 orthopedic operations which are ongoing.     10/17/2023  Returns for review.  Since our last visit he has been working with physical therapy which she does feel is helping.  He had bilateral sacroiliac joint injections which afforded him 100% relief for 2 to 3 days.  Continues with sharp stabbing pain in his left greater than right buttock.  No radicular complaints.  He is taking Percocet and naproxen.     12/7/2023  Patient returns.  Recent shoulder surgery.  She is recovering.  Continues to live: Right buttock pain.  Pelvic MRI scan was unremarkable.     The repeat SI joint injections did not work.  We can proceed with surgery unless we get clinical information this is his pain.  Will repeat a 3rd injection and regroup.     1/18/2024  Patient returns.  Note is that SI joint injection.  This helped him.  He had relief of his pain and 2 out of 3 injections.  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  He wishes to proceed with an SI joint fusion.  Risk benefits alternatives discussed.    8.  PREOPERATIVE PHYSICAL EXAMINATION: See formal admission H and P    She was exquisitely tender over the affected sacroiliac joint.    9. TITLE OF THE PROCEDURE:      1.  Left SI joint fusion using the [place] Medtronic mini open screw/cage system  2.  Morcellized allograft for left sacroiliac joint fusion with harvested local bone.  3.  O-arm navigation for screw placement    10. OPERATIVE FINDINGS:      The 2 cages were placed without incident across the sacroiliac joint from  the ilium to the sacral ala    11. OPERATED LEVELS: As above    12. IMPLANTS USED:    Medtronic [place] sacroiliac screw/cage system: 60 mm x 12 mm cage x 2  [place] fiber allograft and local harvested autograft.     13. COMPLICATIONS:  Nil.    14. ESTIMATED BLOOD LOSS:       10 mL    15. OPERATIVE DETAILS:    After fully informed consent, the patient was brought to the operating room. General anesthesia was administered.  The patient was given intravenous antibiotic. The patient was carefully turned prone on the OSI operating table in the Ovi frame.  All pressure points were padded.      The patient was prepped and draped in standard fashion.        The right   iliac spike was placed contralateral to the side to be symptomatic sacroiliac joint....    The O arm was brought in.  This spin was affected.     I made a 1 inch incision over the affected left side medial to the PSIS ocer the iliac crest.      I used the O-arm to plan out to trajectories for 2 screws through the left sacroiliac joint.  I tapped along the shaft trajectories using navigation.  I used the smaller tap and a large tap.  We stimulated throughout all of this to ensure there was no breach. Local bone was harvested with the tap and combined with morcellized allograft.  Through 2 separate trajectories I placed 2 cages packed with robert allograft.  I also used with a local bone this.  He is combination of local bone and allograft and packed the cage.  The 2 cages were placed without incident across the sacroiliac joint from the ilium to the sacral ala. They transfixed the joint.    We did 1 more o-arm spin to ensure everything was in place.  The cages were stimulated to ensure there was no breach.     Meticulous hemostasis was obtained.  Vancomycin powder was placed throughout the wound.       The wound was then closed in multiple layers using 0 Vicryl for the fascia, then 2-0 Vicryl for the subdermis, and then staples for skin.  A dressing was  applied.  All counts were correct and all instruments were accounted for.  Hemostasis obtained.     All counts were correct and all instruments accounted for.      16. PROGNOSIS:    The surgery went well.  The patient was moving both lower extremities well at the end of the case.  We will see how things progress.  I would anticipate discharge today unless there are issues that require admission.  The patient will be asked to abstain from smoking for  3 months.    The patient has also been instructed that they need to avoid any bending, lifting, or twisting, and stay away from anti-inflammatories for 2 weeks..  The patient was instructed not to shower for the next 72 hours.  She has been asked to contact her office if there is any questions or concerns.      Jayne Molina MD, PhD, REGINALD      CC:   Vance S Alm, M.D. Zollinger

## 2024-02-05 NOTE — DISCHARGE INSTRUCTIONS
HOME CARE INSTRUCTIONS    ACTIVITY: Rest and take it easy for the first 24 hours.  A responsible adult is recommended to remain with you during that time.  It is normal to feel sleepy.  We encourage you to not do anything that requires balance, judgment or coordination.    FOR 24 HOURS DO NOT:  Drive, operate machinery or run household appliances.  Drink beer or alcoholic beverages.  Make important decisions or sign legal documents.    SPECIAL INSTRUCTIONS: The patient will be asked to abstain from smoking for 3 months. The patient has also been instructed that they need to avoid any bending, lifting, or twisting, and stay away from anti-inflammatories for 2 weeks.    DIET: To avoid nausea, slowly advance diet as tolerated, avoiding spicy or greasy foods for the first day.  Add more substantial food to your diet according to your physician's instructions.  Babies can be fed formula or breast milk as soon as they are hungry.  INCREASE FLUIDS AND FIBER TO AVOID CONSTIPATION.    SURGICAL DRESSING/BATHING: No showering for the next 72 hours. Then you may remove the dressing and shower but no submerging.     MEDICATIONS: Resume taking daily medication.  Take prescribed pain medication with food.  If no medication is prescribed, you may take non-aspirin pain medication if needed.  PAIN MEDICATION CAN BE VERY CONSTIPATING.  Take a stool softener or laxative such as senokot, pericolace, or milk of magnesia if needed.    Prescription given for Keflex - [facility].  Last pain medication given  _10 mg of Roxicodone at 1148__.    A follow-up appointment should be arranged with your doctor in 1-2 weeks; call to schedule.    You should CALL YOUR PHYSICIAN if you develop:  Fever greater than 101 degrees F.  Pain not relieved by medication, or persistent nausea or vomiting.  Excessive bleeding (blood soaking through dressing) or unexpected drainage from the wound.  Extreme redness or swelling around the incision site, drainage of pus or  foul smelling drainage.  Inability to urinate or empty your bladder within 8 hours.  Problems with breathing or chest pain.    You should call 911 if you develop problems with breathing or chest pain.  If you are unable to contact your doctor or surgical center, you should go to the nearest emergency room or urgent care center.  Physician's telephone #: [phone]    MILD FLU-LIKE SYMPTOMS ARE NORMAL.  YOU MAY EXPERIENCE GENERALIZED MUSCLE ACHES, THROAT IRRITATION, HEADACHE AND/OR SOME NAUSEA.    If any questions arise, call your doctor.  If your doctor is not available, please feel free to call the Surgical Center at [phone].  The Center is open Monday through Friday from 7AM to 7PM.      A registered nurse may call you a few days after your surgery to see how you are doing after your procedure.    You may also receive a survey in the mail within the next two weeks and we ask that you take a few moments to complete the survey and return it to us.  Our goal is to provide you with very good care and we value your comments.     Depression / Suicide Risk    As you are discharged from this [facility] facility, it is important to learn how to keep safe from harming yourself.    Recognize the warning signs:  Abrupt changes in personality, positive or negative- including increase in energy   Giving away possessions  Change in eating patterns- significant weight changes-  positive or negative  Change in sleeping patterns- unable to sleep or sleeping all the time   Unwillingness or inability to communicate  Depression  Unusual sadness, discouragement and loneliness  Talk of wanting to die  Neglect of personal appearance   Rebelliousness- reckless behavior  Withdrawal from people/activities they love  Confusion- inability to concentrate     If you or a loved one observes any of these behaviors or has concerns about self-harm, here's what you can do:  Talk about it- your feelings and reasons for harming  yourself  Remove any means that you might use to hurt yourself (examples: pills, rope, extension cords, firearm)  Get professional help from the community (Mental Health, Substance Abuse, psychological counseling)  Do not be alone:Call your Safe Contact- someone whom you trust who will be there for you.  Call your local CRISIS HOTLINE 514-3729 or 890-159-5791  Call your local Children's Mobile Crisis Response Team [place] (520) 139-7699 or www.[employer]  Call the toll free National Suicide Prevention Hotlines   National Suicide Prevention Lifeline 591-461-QIPL (2308)  National Hope Line Network 800-SUICIDE (030-1642)    I acknowledge receipt and understanding of these Home Care instructions.

## 2024-02-05 NOTE — OR NURSING
1300: Report received from FLASH Baldwin. Patient to Phase II 32 pending transport. Patient provided with water. Patient tolerating PO intake.    1312: Patient to Phase II Rm 32. Bilateral lumbar surgical dressing CDI. Vitals taken upon arrival. Plan of care discussed with patient. Patient belongings returned to patient at bedside.    1314: Patient and family updated regarding Plan of care.    1330: Pt discharged home. Discharge instructions given to pt prior to leaving unit including when to see PCP, and new medications if applicable. PIV removed. All questions answered. Verbalized understanding. All belongings with pt when leaving unit via wheelchair with CNA.

## 2024-02-05 NOTE — ANESTHESIA PREPROCEDURE EVALUATION
Case: [id number] Date/Time: 02/05/24 0945    Procedure: left Sacroiliac joint fusion (Spine Lumbar)    Anesthesia type: General    Diagnosis: Sacroiliac joint pain [M53.3]    Pre-op diagnosis: Sacroiliac joint pain [M53.3]    Location: [place] [address] / SURGERY [facility]    Surgeons: Jayne Molina M.D.            Relevant Problems   ANESTHESIA   (positive) JULIAN (obstructive sleep apnea)      CARDIAC   (positive) Hypertension   (positive) PVC (premature ventricular contraction)      ENDO   (positive) Hypothyroidism, acquired, autoimmune   (positive) Type 2 diabetes mellitus without complication (HCC)      Other   (positive) Arthritis of left ankle   (positive) Elbow arthritis       Physical Exam    Airway   Mallampati: II  TM distance: >3 FB  Neck ROM: full       Cardiovascular - normal exam  Rhythm: regular  Rate: normal  (-) murmur     Dental - normal exam           Pulmonary - normal exam  Breath sounds clear to auscultation     Abdominal    Neurological - normal exam                   Anesthesia Plan    ASA 3   ASA physical status 3 criteria: hypertension - poorly controlled and diabetes - poorly controlled    Plan - general       Airway plan will be ETT          Induction: intravenous    Postoperative Plan: Postoperative administration of opioids is intended.    Pertinent diagnostic labs and testing reviewed    Informed Consent:    Anesthetic plan and risks discussed with patient.    Use of blood products discussed with: patient whom consented to blood products.

## 2024-02-05 NOTE — ANESTHESIA PROCEDURE NOTES
Airway    Date/Time: 2/5/2024 10:08 AM    Performed by: Jd Luna M.D.  Authorized by: Jd Luna M.D.    Location:  OR  Urgency:  Elective  Indications for Airway Management:  Anesthesia      Spontaneous Ventilation: absent    Sedation Level:  Deep  Preoxygenated: Yes    Patient Position:  Sniffing  Final Airway Type:  Endotracheal airway  Final Endotracheal Airway:  ETT  Cuffed: Yes    Technique Used for Successful ETT Placement:  Direct laryngoscopy    Insertion Site:  Oral  Blade Type:  Patricia  Laryngoscope Blade/Videolaryngoscope Blade Size:  3  ETT Size (mm):  7.0  Measured from:  Teeth  ETT to Teeth (cm):  23  Placement Verified by: auscultation and capnometry    Cormack-Lehane Classification:  Grade I - full view of glottis  Number of Attempts at Approach:  1  Ventilation Between Attempts:  2 hand mask and BVM

## 2024-02-05 NOTE — ANESTHESIA TIME REPORT
Anesthesia Start and Stop Event Times       Date Time Event    2/5/2024 0836 Ready for Procedure     1004 Anesthesia Start     1116 Anesthesia Stop          Responsible Staff  02/05/24      Name Role Begin End    Jd Luna M.D. Anesth 1004 1116          Overtime Reason:  no overtime (within assigned shift)    Comments: PRACHI

## 2024-02-05 NOTE — OR NURSING
PACU - Respirations easy.  Gauze and medipore tape clean and dry to left SI area.  Pain meds with good effect.  MAEW.  Denies nausea. Wife updated.d  report to Phase 2.

## 2024-02-05 NOTE — ANESTHESIA POSTPROCEDURE EVALUATION
Patient: Cristino Keys    Procedure Summary       Date: 02/05/24 Room / Location: [facility] 07 / SURGERY [facility]    Anesthesia Start: 1004 Anesthesia Stop: 1116    Procedure: left Sacroiliac joint fusion, with O-Arm imaging guidance (Spine Lumbar) Diagnosis:       Sacroiliac joint pain      (Sacroiliac joint pain [M53.3])    Surgeons: Jayne Molina M.D. Responsible Provider: Jd Luna M.D.    Anesthesia Type: general ASA Status: 3            Final Anesthesia Type: general  Last vitals  BP   Blood Pressure : 120/59    Temp   36.7 °C (98.1 °F)    Pulse   62   Resp   18    SpO2   95 %      Anesthesia Post Evaluation    Patient location during evaluation: PACU  Patient participation: complete - patient participated  Level of consciousness: awake and alert  Pain score: 1    Airway patency: patent  Anesthetic complications: no  Cardiovascular status: hemodynamically stable  Respiratory status: acceptable  Hydration status: euvolemic    PONV: none          No notable events documented.     Nurse Pain Score: 0 (NPRS)

## 2024-02-05 NOTE — OR SURGEON
Immediate Post OP Note    PreOp Diagnosis:     Status post L4-5 TLIF in 2017  Facet mediated low back pain namely L3-4 and L5-S1 well-controlled with ablations from Doctor Zollinger  Bilateral, left greater than righ sacroiliac joint pain - 100% relief for 3 days with injections  Multiple orthopedic complaints, underlying diabetes  5.  Good response in 2 out of 3 left SI joint injections      PostOp Diagnosis:    Status post L4-5 TLIF in 2017  Facet mediated low back pain namely L3-4 and L5-S1 well-controlled with ablations from Doctor Zollinger  Bilateral, left greater than righ sacroiliac joint pain - 100% relief for 3 days with injections  Multiple orthopedic complaints, underlying diabetes  5.  Good response in 2 out of 3 left SI joint injections      Procedure(s):  left Sacroiliac joint fusion, with O-Arm imaging guidance - Wound Class: Clean    Surgeon(s):  Jayne Molina M.D.    Anesthesiologist/Type of Anesthesia:  Anesthesiologist: Jd Luna M.D./General    Surgical Staff:  Assistant: Emanuel Glover P.A.-C.  Circulator: Demi Cassidy R.N.  Scrub Person: Franca Bobo  Radiology Technologist: Margie East    Specimens removed if any:  * No specimens in log *    Assistants: / Emanuel Glover PA-C  ,  Specimen: nil    Estimated Blood Loss: 10 cc    Findings: n/a    Complications: nil        2/5/2024 11:10 AM Jayne Molina M.D.

## 2024-03-18 ENCOUNTER — APPOINTMENT (RX ONLY)
Dept: URBAN - METROPOLITAN AREA CLINIC 22 | Facility: CLINIC | Age: 64
Setting detail: DERMATOLOGY
End: 2024-03-18

## 2024-03-18 DIAGNOSIS — D18.0 HEMANGIOMA: ICD-10-CM

## 2024-03-18 DIAGNOSIS — Z85.828 PERSONAL HISTORY OF OTHER MALIGNANT NEOPLASM OF SKIN: ICD-10-CM

## 2024-03-18 DIAGNOSIS — L81.4 OTHER MELANIN HYPERPIGMENTATION: ICD-10-CM

## 2024-03-18 DIAGNOSIS — L57.0 ACTINIC KERATOSIS: ICD-10-CM

## 2024-03-18 DIAGNOSIS — Z71.89 OTHER SPECIFIED COUNSELING: ICD-10-CM

## 2024-03-18 DIAGNOSIS — L82.1 OTHER SEBORRHEIC KERATOSIS: ICD-10-CM

## 2024-03-18 DIAGNOSIS — D22 MELANOCYTIC NEVI: ICD-10-CM

## 2024-03-18 PROBLEM — D22.5 MELANOCYTIC NEVI OF TRUNK: Status: ACTIVE | Noted: 2024-03-18

## 2024-03-18 PROBLEM — D18.01 HEMANGIOMA OF SKIN AND SUBCUTANEOUS TISSUE: Status: ACTIVE | Noted: 2024-03-18

## 2024-03-18 PROCEDURE — 99213 OFFICE O/P EST LOW 20 MIN: CPT | Mod: 25

## 2024-03-18 PROCEDURE — ? COUNSELING

## 2024-03-18 PROCEDURE — 17000 DESTRUCT PREMALG LESION: CPT

## 2024-03-18 PROCEDURE — 17003 DESTRUCT PREMALG LES 2-14: CPT

## 2024-03-18 PROCEDURE — ? SUNSCREEN RECOMMENDATIONS

## 2024-03-18 PROCEDURE — ? LIQUID NITROGEN (COSMETIC)

## 2024-03-18 PROCEDURE — ? LIQUID NITROGEN

## 2024-03-18 ASSESSMENT — LOCATION DETAILED DESCRIPTION DERM
LOCATION DETAILED: LEFT DISTAL CALF
LOCATION DETAILED: LEFT MEDIAL ZYGOMA
LOCATION DETAILED: RIGHT SUPERIOR MEDIAL MIDBACK
LOCATION DETAILED: RIGHT PROXIMAL PRETIBIAL REGION
LOCATION DETAILED: LEFT PROXIMAL PRETIBIAL REGION
LOCATION DETAILED: RIGHT SUPERIOR LATERAL MALAR CHEEK
LOCATION DETAILED: LEFT LATERAL SHOULDER
LOCATION DETAILED: RIGHT INFERIOR LATERAL FOREHEAD
LOCATION DETAILED: EPIGASTRIC SKIN
LOCATION DETAILED: NASAL DORSUM
LOCATION DETAILED: RIGHT CENTRAL FRONTAL SCALP
LOCATION DETAILED: RIGHT DISTAL CALF
LOCATION DETAILED: LEFT DISTAL PRETIBIAL REGION
LOCATION DETAILED: LEFT SUPERIOR MEDIAL MALAR CHEEK
LOCATION DETAILED: LEFT SUPERIOR MEDIAL UPPER BACK
LOCATION DETAILED: RIGHT PROXIMAL CALF
LOCATION DETAILED: LEFT SUPERIOR LATERAL NECK
LOCATION DETAILED: RIGHT INFERIOR LATERAL MALAR CHEEK
LOCATION DETAILED: RIGHT LATERAL DISTAL PRETIBIAL REGION
LOCATION DETAILED: LEFT CENTRAL BUCCAL CHEEK

## 2024-03-18 ASSESSMENT — LOCATION ZONE DERM
LOCATION ZONE: SCALP
LOCATION ZONE: NECK
LOCATION ZONE: LEG
LOCATION ZONE: TRUNK
LOCATION ZONE: FACE
LOCATION ZONE: ARM
LOCATION ZONE: NOSE

## 2024-03-18 ASSESSMENT — LOCATION SIMPLE DESCRIPTION DERM
LOCATION SIMPLE: NOSE
LOCATION SIMPLE: LEFT PRETIBIAL REGION
LOCATION SIMPLE: LEFT SHOULDER
LOCATION SIMPLE: ABDOMEN
LOCATION SIMPLE: RIGHT PRETIBIAL REGION
LOCATION SIMPLE: LEFT CHEEK
LOCATION SIMPLE: NECK
LOCATION SIMPLE: RIGHT CALF
LOCATION SIMPLE: RIGHT CHEEK
LOCATION SIMPLE: LEFT ZYGOMA
LOCATION SIMPLE: RIGHT LOWER BACK
LOCATION SIMPLE: LEFT UPPER BACK
LOCATION SIMPLE: RIGHT FOREHEAD
LOCATION SIMPLE: LEFT CALF
LOCATION SIMPLE: RIGHT SCALP

## 2024-03-18 NOTE — PROCEDURE: LIQUID NITROGEN
Render Note In Bullet Format When Appropriate: No
Application Tool (Optional): Liquid Nitrogen Sprayer
Number Of Freeze-Thaw Cycles: 2 freeze-thaw cycles
Show Applicator Variable?: Yes
Post-Care Instructions: I reviewed with the patient in detail post-care instructions. Patient is to wear sunprotection, and avoid picking at any of the treated lesions. Pt may apply Vaseline to crusted or scabbing areas.
Detail Level: Detailed
Duration Of Freeze Thaw-Cycle (Seconds): 3
Consent: The patient's consent was obtained including but not limited to risks of crusting, scabbing, blistering, scarring, darker or lighter pigmentary change, recurrence, incomplete removal and infection.

## 2024-03-18 NOTE — PROCEDURE: LIQUID NITROGEN (COSMETIC)
Render Post-Care Instructions In Note?: no
Show Spray Paint Technique Variable?: Yes
Spray Paint Text: The liquid nitrogen was applied to the skin utilizing a spray paint frosting technique.
Consent: The patient's consent was obtained including but not limited to risks of crusting, scabbing, blistering, scarring, darker or lighter pigmentary change, recurrence, incomplete removal and infection. The patient understands that the procedure is cosmetic in nature and is not covered by insurance.
Detail Level: Detailed
Billing Information: Bill by Static [employer]
Post-Care Instructions: I reviewed with the patient in detail post-care instructions. Patient is to wear sunprotection, and avoid picking at any of the treated lesions. Pt may apply Vaseline to crusted or scabbing areas.

## 2024-04-08 ENCOUNTER — HOSPITAL ENCOUNTER (OUTPATIENT)
Dept: LAB | Facility: MEDICAL CENTER | Age: 64
End: 2024-04-08
Attending: STUDENT IN AN ORGANIZED HEALTH CARE EDUCATION/TRAINING PROGRAM
Payer: MEDICARE

## 2024-04-08 LAB
T3FREE SERPL-MCNC: 2.64 PG/ML (ref 2–4.4)
T4 FREE SERPL-MCNC: 1.18 NG/DL (ref 0.93–1.7)
TSH SERPL DL<=0.005 MIU/L-ACNC: 0.93 UIU/ML (ref 0.38–5.33)

## 2024-04-08 PROCEDURE — 84439 ASSAY OF FREE THYROXINE: CPT

## 2024-04-08 PROCEDURE — 84443 ASSAY THYROID STIM HORMONE: CPT

## 2024-04-08 PROCEDURE — 36415 COLL VENOUS BLD VENIPUNCTURE: CPT

## 2024-04-08 PROCEDURE — 84481 FREE ASSAY (FT-3): CPT

## 2024-04-30 PROBLEM — L08.9 WOUND INFECTION: Status: RESOLVED | Noted: 2018-05-14 | Resolved: 2024-04-30

## 2024-04-30 PROBLEM — T14.8XXA WOUND INFECTION: Status: RESOLVED | Noted: 2018-05-14 | Resolved: 2024-04-30

## 2024-04-30 PROBLEM — Z89.411 ACQUIRED ABSENCE OF RIGHT GREAT TOE (HCC): Status: ACTIVE | Noted: 2024-04-30

## 2024-05-07 ENCOUNTER — APPOINTMENT (OUTPATIENT)
Dept: SLEEP MEDICINE | Facility: MEDICAL CENTER | Age: 64
End: 2024-05-07
Attending: PREVENTIVE MEDICINE
Payer: MEDICARE

## 2024-05-20 ENCOUNTER — HOSPITAL ENCOUNTER (OUTPATIENT)
Dept: LAB | Facility: MEDICAL CENTER | Age: 64
End: 2024-05-20
Attending: INTERNAL MEDICINE
Payer: MEDICARE

## 2024-05-20 LAB
BASOPHILS # BLD AUTO: 0.4 % (ref 0–1.8)
BASOPHILS # BLD: 0.02 K/UL (ref 0–0.12)
EOSINOPHIL # BLD AUTO: 0.11 K/UL (ref 0–0.51)
EOSINOPHIL NFR BLD: 2.2 % (ref 0–6.9)
ERYTHROCYTE [DISTWIDTH] IN BLOOD BY AUTOMATED COUNT: 49.4 FL (ref 35.9–50)
HCT VFR BLD AUTO: 51.2 % (ref 42–52)
HGB BLD-MCNC: 16.8 G/DL (ref 14–18)
IMM GRANULOCYTES # BLD AUTO: 0.04 K/UL (ref 0–0.11)
IMM GRANULOCYTES NFR BLD AUTO: 0.8 % (ref 0–0.9)
LYMPHOCYTES # BLD AUTO: 1.1 K/UL (ref 1–4.8)
LYMPHOCYTES NFR BLD: 22 % (ref 22–41)
MCH RBC QN AUTO: 31 PG (ref 27–33)
MCHC RBC AUTO-ENTMCNC: 32.8 G/DL (ref 32.3–36.5)
MCV RBC AUTO: 94.5 FL (ref 81.4–97.8)
MONOCYTES # BLD AUTO: 0.42 K/UL (ref 0–0.85)
MONOCYTES NFR BLD AUTO: 8.4 % (ref 0–13.4)
NEUTROPHILS # BLD AUTO: 3.3 K/UL (ref 1.82–7.42)
NEUTROPHILS NFR BLD: 66.2 % (ref 44–72)
NRBC # BLD AUTO: 0 K/UL
NRBC BLD-RTO: 0 /100 WBC (ref 0–0.2)
RBC # BLD AUTO: 5.42 M/UL (ref 4.7–6.1)
WBC # BLD AUTO: 5 K/UL (ref 4.8–10.8)

## 2024-05-21 LAB
ALBUMIN SERPL BCP-MCNC: 4.2 G/DL (ref 3.2–4.9)
ALBUMIN/GLOB SERPL: 1.4 G/DL
ALP SERPL-CCNC: 109 U/L (ref 30–99)
ALT SERPL-CCNC: 20 U/L (ref 2–50)
ANION GAP SERPL CALC-SCNC: 13 MMOL/L (ref 7–16)
AST SERPL-CCNC: 20 U/L (ref 12–45)
BILIRUB SERPL-MCNC: 0.4 MG/DL (ref 0.1–1.5)
BUN SERPL-MCNC: 18 MG/DL (ref 8–22)
CALCIUM ALBUM COR SERPL-MCNC: 9 MG/DL (ref 8.5–10.5)
CALCIUM SERPL-MCNC: 9.2 MG/DL (ref 8.5–10.5)
CHLORIDE SERPL-SCNC: 103 MMOL/L (ref 96–112)
CO2 SERPL-SCNC: 23 MMOL/L (ref 20–33)
CREAT SERPL-MCNC: 1.13 MG/DL (ref 0.5–1.4)
GFR SERPLBLD CREATININE-BSD FMLA CKD-EPI: 72 ML/MIN/1.73 M 2
GLOBULIN SER CALC-MCNC: 2.9 G/DL (ref 1.9–3.5)
GLUCOSE SERPL-MCNC: 176 MG/DL (ref 65–99)
POTASSIUM SERPL-SCNC: 4.8 MMOL/L (ref 3.6–5.5)
PROT SERPL-MCNC: 7.1 G/DL (ref 6–8.2)
SODIUM SERPL-SCNC: 139 MMOL/L (ref 135–145)

## 2024-05-22 LAB
SHBG SERPL-SCNC: 27 NMOL/L (ref 19–76)
TESTOST FREE MFR SERPL: 2 % (ref 1.6–2.9)
TESTOST FREE SERPL-MCNC: 65 PG/ML (ref 47–244)
TESTOST SERPL-MCNC: 326 NG/DL (ref 300–720)

## 2024-08-03 ENCOUNTER — APPOINTMENT (OUTPATIENT)
Dept: RADIOLOGY | Facility: MEDICAL CENTER | Age: 64
End: 2024-08-03
Attending: EMERGENCY MEDICINE
Payer: MEDICARE

## 2024-08-03 ENCOUNTER — HOSPITAL ENCOUNTER (EMERGENCY)
Facility: MEDICAL CENTER | Age: 64
End: 2024-08-03
Attending: EMERGENCY MEDICINE
Payer: MEDICARE

## 2024-08-03 ENCOUNTER — APPOINTMENT (OUTPATIENT)
Dept: RADIOLOGY | Facility: MEDICAL CENTER | Age: 64
End: 2024-08-03
Payer: MEDICARE

## 2024-08-03 VITALS
HEART RATE: 94 BPM | BODY MASS INDEX: 36.47 KG/M2 | SYSTOLIC BLOOD PRESSURE: 172 MMHG | OXYGEN SATURATION: 95 % | TEMPERATURE: 97.7 F | WEIGHT: 254.19 LBS | RESPIRATION RATE: 24 BRPM | DIASTOLIC BLOOD PRESSURE: 95 MMHG

## 2024-08-03 DIAGNOSIS — U07.1 COVID-19: ICD-10-CM

## 2024-08-03 LAB
ALBUMIN SERPL BCP-MCNC: 4.2 G/DL (ref 3.2–4.9)
ALBUMIN/GLOB SERPL: 1.3 G/DL
ALP SERPL-CCNC: 133 U/L (ref 30–99)
ALT SERPL-CCNC: 19 U/L (ref 2–50)
ANION GAP SERPL CALC-SCNC: 16 MMOL/L (ref 7–16)
AST SERPL-CCNC: 19 U/L (ref 12–45)
BASOPHILS # BLD AUTO: 0.7 % (ref 0–1.8)
BASOPHILS # BLD: 0.05 K/UL (ref 0–0.12)
BILIRUB SERPL-MCNC: 0.8 MG/DL (ref 0.1–1.5)
BUN SERPL-MCNC: 10 MG/DL (ref 8–22)
CALCIUM ALBUM COR SERPL-MCNC: 9.4 MG/DL (ref 8.5–10.5)
CALCIUM SERPL-MCNC: 9.6 MG/DL (ref 8.5–10.5)
CHLORIDE SERPL-SCNC: 98 MMOL/L (ref 96–112)
CO2 SERPL-SCNC: 23 MMOL/L (ref 20–33)
CREAT SERPL-MCNC: 0.93 MG/DL (ref 0.5–1.4)
EKG IMPRESSION: NORMAL
EOSINOPHIL # BLD AUTO: 0.19 K/UL (ref 0–0.51)
EOSINOPHIL NFR BLD: 2.7 % (ref 0–6.9)
ERYTHROCYTE [DISTWIDTH] IN BLOOD BY AUTOMATED COUNT: 45.2 FL (ref 35.9–50)
FLUAV RNA SPEC QL NAA+PROBE: NEGATIVE
FLUBV RNA SPEC QL NAA+PROBE: NEGATIVE
GFR SERPLBLD CREATININE-BSD FMLA CKD-EPI: 90 ML/MIN/1.73 M 2
GLOBULIN SER CALC-MCNC: 3.3 G/DL (ref 1.9–3.5)
GLUCOSE SERPL-MCNC: 143 MG/DL (ref 65–99)
HCT VFR BLD AUTO: 52.4 % (ref 42–52)
HGB BLD-MCNC: 17.9 G/DL (ref 14–18)
IMM GRANULOCYTES # BLD AUTO: 0.12 K/UL (ref 0–0.11)
IMM GRANULOCYTES NFR BLD AUTO: 1.7 % (ref 0–0.9)
LYMPHOCYTES # BLD AUTO: 1.16 K/UL (ref 1–4.8)
LYMPHOCYTES NFR BLD: 16.7 % (ref 22–41)
MCH RBC QN AUTO: 30.5 PG (ref 27–33)
MCHC RBC AUTO-ENTMCNC: 34.2 G/DL (ref 32.3–36.5)
MCV RBC AUTO: 89.4 FL (ref 81.4–97.8)
MONOCYTES # BLD AUTO: 0.65 K/UL (ref 0–0.85)
MONOCYTES NFR BLD AUTO: 9.4 % (ref 0–13.4)
NEUTROPHILS # BLD AUTO: 4.76 K/UL (ref 1.82–7.42)
NEUTROPHILS NFR BLD: 68.8 % (ref 44–72)
NRBC # BLD AUTO: 0 K/UL
NRBC BLD-RTO: 0 /100 WBC (ref 0–0.2)
PLATELET # BLD AUTO: 219 K/UL (ref 164–446)
PMV BLD AUTO: 8.5 FL (ref 9–12.9)
POTASSIUM SERPL-SCNC: 4.3 MMOL/L (ref 3.6–5.5)
PROT SERPL-MCNC: 7.5 G/DL (ref 6–8.2)
RBC # BLD AUTO: 5.86 M/UL (ref 4.7–6.1)
RSV RNA SPEC QL NAA+PROBE: NEGATIVE
SARS-COV-2 RNA RESP QL NAA+PROBE: DETECTED
SODIUM SERPL-SCNC: 137 MMOL/L (ref 135–145)
TROPONIN T SERPL-MCNC: 24 NG/L (ref 6–19)
WBC # BLD AUTO: 6.9 K/UL (ref 4.8–10.8)

## 2024-08-03 PROCEDURE — 700102 HCHG RX REV CODE 250 W/ 637 OVERRIDE(OP): Performed by: EMERGENCY MEDICINE

## 2024-08-03 PROCEDURE — 99285 EMERGENCY DEPT VISIT HI MDM: CPT

## 2024-08-03 PROCEDURE — 93005 ELECTROCARDIOGRAM TRACING: CPT

## 2024-08-03 PROCEDURE — 36415 COLL VENOUS BLD VENIPUNCTURE: CPT

## 2024-08-03 PROCEDURE — 0241U HCHG SARS-COV-2 COVID-19 NFCT DS RESP RNA 4 TRGT ED POC: CPT

## 2024-08-03 PROCEDURE — 84484 ASSAY OF TROPONIN QUANT: CPT

## 2024-08-03 PROCEDURE — 85025 COMPLETE CBC W/AUTO DIFF WBC: CPT

## 2024-08-03 PROCEDURE — 93005 ELECTROCARDIOGRAM TRACING: CPT | Performed by: EMERGENCY MEDICINE

## 2024-08-03 PROCEDURE — 71045 X-RAY EXAM CHEST 1 VIEW: CPT

## 2024-08-03 PROCEDURE — 80053 COMPREHEN METABOLIC PANEL: CPT

## 2024-08-03 PROCEDURE — A9270 NON-COVERED ITEM OR SERVICE: HCPCS | Performed by: EMERGENCY MEDICINE

## 2024-08-03 RX ORDER — LOSARTAN POTASSIUM 50 MG/1
100 TABLET ORAL
Status: COMPLETED | OUTPATIENT
Start: 2024-08-03 | End: 2024-08-03

## 2024-08-03 RX ADMIN — LOSARTAN POTASSIUM 100 MG: 50 TABLET, FILM COATED ORAL at 09:46

## 2024-08-03 ASSESSMENT — PAIN DESCRIPTION - PAIN TYPE: TYPE: ACUTE PAIN

## 2024-08-03 ASSESSMENT — FIBROSIS 4 INDEX: FIB4 SCORE: 1.87

## 2024-08-03 NOTE — ED NOTES
.  Chief Complaint   Patient presents with    Shortness of Breath    Weakness     Reports traveled to [place] came home with above c/c. Was prescribed albuterol and Augmentin no improvement.   Covid swab collected at triage

## 2024-08-03 NOTE — ED PROVIDER NOTES
"ED Provider Note    CHIEF COMPLAINT  Chief Complaint   Patient presents with    Shortness of Breath    Weakness       EXTERNAL RECORDS REVIEWED  Patient's last encounter was in the outpatient setting.  Seen in follow-up, status post left SI joint fusion as well as previous L4-5 fusion and a total hip replacement.  He was complaining of myofascial pain, lumbar pain, lumbar radiculopathy with a history of spinal stenosis and neurogenic claudication.    Numerous outpatient encounters.  Patient's last ED visit was in October 2021, patient was seen with chest pain and difficulty swallowing and was found to have an esophageal food impaction.    HPI/ROS  LIMITATION TO HISTORY   Select: : None  OUTSIDE HISTORIAN(S):  None    Cristino Keys is a 64 y.o. male who presents to the emergency department with a chief complaint of just generally not feeling well.  He returned from [place] on Tuesday night and it started before that.  States he picked up conjunctivitis during his travels.  He managed to arrange a televisit with his physician and was diagnosed with a MDI, albuterol and there was concern for sinusitis so he was prescribed Augmentin which he started on Wednesday night.  He took 2 doses Thursday, 2 Friday and 1 this morning.  He reports no nausea, vomiting or diarrhea.  No urinary complaints.  No sore throat.  Generally just feeling achy with intermittent sweating and chills.  He is also hypertensive.  A fire department was out to his house recently and told him that he should be evaluated that he had \"fluid in his lungs\" and that his blood pressure was very high.  He does have a history of hypertension which is controlled with losartan and he also takes potassium supplement.  He has not used an inhaler in the past, this is a new medication for him.  He is diabetic and his blood sugar was noted to be slightly elevated this morning, 138 but is typically well-controlled.    PAST MEDICAL HISTORY   has a past " medical history of Anemia, Anesthesia (11/03/2017), Anxiety and depression (11/03/2017), Arthritis (11/03/2017), Bronchitis, Bunion, Cancer (HCC) (2017), Chronic autoimmune thyroiditis, Dental disorder (11/03/2017), Diabetes mellitus (HCC) (11/03/2017), Gout, Hammertoe, High cholesterol (11/03/2017), Hypertension (01/23/2024), Hypothyroidism, MRSA (methicillin resistant Staphylococcus aureus), MRSA infection, Open toe wound (03/2018), Pain (11/03/2017), Pain (09/19/2022), Psychiatric problem, Seasonal allergies, Sleep apnea (11/03/2017), Snoring (11/03/2017), Spinal disorder, Tonsillitis, and Wound infection (05/14/2018).    SURGICAL HISTORY   has a past surgical history that includes hip arth anterior total (Left, 08/18/2016); hammertoe correction (Bilateral, 12/22/2016); bunionectomy (Left, 12/22/2016); fusion, spine, lumbar, plif (04/14/2017); metatarsal head resection (Left, 08/21/2017); metatarsal head resection (Right, 11/13/2017); hammertoe correction (Right, 11/13/2017); nerve ulnar transfer (Right, 04/03/2018); metatarsal head resection (Left, 06/10/2019); joint injection diagnostic (06/10/2019); other; other (2000); metatarsal head resection (Left, 07/20/2020); hammertoe correction (Left, 07/20/2020); toenail removal (Left, 07/20/2020); other orthopedic surgery (06/2014); other orthopedic surgery (11/01/2012); other orthopedic surgery (Bilateral, 2004); orthopedic osteotomy (Left, 12/22/2016); toe fusion (Left, 08/21/2017); hardware removal ortho (Left, 08/21/2017); toe fusion (Right, 11/13/2017); carpal tunnel release (Right, 04/03/2018); elbow arthrotomy (Right, 04/03/2018); toe amputation (Left, 06/25/2018); internal fixation removal (Left, 06/25/2018); shoulder surgery (Right, 12/02/2017); orthopedic osteotomy (Left, 10/15/2018); hardware removal ortho (Right, 10/15/2018); toe amputation (Right, 06/10/2019); other orthopedic surgery; other orthopedic surgery; heterotopic ossification (Right,  10/14/2021); upper gi endoscopy,remov f.b. (10/18/2021); upper gi endoscopy,diagnosis (10/18/2021); neuroplasty & or transpos ulnar nerve elbow (Left, 10/04/2022); radial head excision (Left, 10/04/2022); reconstr total shoulder implant (Left, 11/22/2022); repair biceps long tendon (11/22/2022); other (12/2022); orif, fracture, tibia (Left); shldr arthroscop,part acromioplas (Left, 12/05/2023); shldr arthroscop exten debride 3+ (Left, 12/05/2023); orif, elbow; orif, wrist; orif, hip; orif, knee; orif, ankle; foot surgery (Too many to list.  All at [facility]); and fusion, sacroiliac joint, posterior approach, using frameless stereotaxy, with o-arm imaging guidance (2/5/2024).    FAMILY HISTORY  Family History   Problem Relation Age of Onset    Heart Disease Mother     Depression Mother     Cancer Father         Colon cancer     Cancer Sister         Skin cancer    Sleep Apnea Neg Hx        SOCIAL HISTORY  Social History     Tobacco Use    Smoking status: Former     Current packs/day: 0.00     Average packs/day: 1 pack/day for 20.0 years (20.0 ttl pk-yrs)     Types: Cigarettes     Start date: 1/1/1994     Quit date: 1/1/2014     Years since quitting: 10.5    Smokeless tobacco: Former     Types: Chew     Quit date: 1/1/1997   Vaping Use    Vaping status: Never Used   Substance and Sexual Activity    Alcohol use: Not Currently     Comment: occasional- approx once a week 4-5 beers    Drug use: No    Sexual activity: Not on file       CURRENT MEDICATIONS  Home Medications       Reviewed by Juliana Rhoades R.N. (Registered Nurse) on 08/03/24 at 0746  Med List Status: Partial     Medication Last Dose Status   Accu-Chek FastClix Lancets Misc  Active   ACCU-CHEK GUIDE strip  Active   Ascorbic Acid (VITAMIN C PO)  Active   cyclobenzaprine (FLEXERIL) 10 mg Tab  Active   diazePAM (VALIUM) 5 MG Tab  Active   gabapentin (NEURONTIN) 300 MG Cap  Active   hydrOXYzine pamoate (VISTARIL) 50 MG Cap  Active   lamotrigine (LAMICTAL) 200 MG  tablet  Active   levothyroxine (SYNTHROID) 137 MCG Tab  Active   levothyroxine (SYNTHROID) 137 MCG Tab  Active   losartan (COZAAR) 100 MG Tab  Active   MAGNESIUM PO  Active   methylphenidate (RITALIN) 10 MG Tab  Active   Multiple Vitamins-Minerals (ICAPS AREDS 2 PO)  Active   Multiple Vitamins-Minerals (MENS 50+ MULTIVITAMIN PO)  Active   oxyCODONE-acetaminophen (PERCOCET-10)  MG Tab  Active   Semaglutide, 1 MG/DOSE, 4 MG/3ML Solution Pen-injector  Active   Semaglutide,0.25 or 0.5MG/DOS, 2 MG/3ML Solution Pen-injector  Active   SSD 1 % Cream  Active   tadalafil (CIALIS) 20 MG tablet  Active   tamsulosin (FLOMAX) 0.4 MG capsule  Active   temazepam (RESTORIL) 30 MG capsule  Active   testosterone cypionate (DEPO-TESTOSTERONE) 200 MG/ML injection  Active   testosterone cypionate (DEPO-TESTOSTERONE) 200 MG/ML injection  Active   Venlafaxine HCl 75 MG TABLET SR 24 HR  Active   venlafaxine XR (EFFEXOR XR) 75 MG CAPSULE SR 24 HR  Active                    ALLERGIES  Allergies   Allergen Reactions    Daptomycin Vomiting     Other reaction(s): Other (See Comments)  Projectile vomiting.     Demerol Vomiting and Nausea     Rxn = years ago     Meperidine Hcl Nausea and Vomiting     Rxn = years ago     Sulfa Drugs Hives     .       PHYSICAL EXAM  VITAL SIGNS: BP (!) 172/95   Pulse 94   Temp 36.5 °C (97.7 °F) (Temporal)   Resp (!) 24   Wt 115 kg (254 lb 3.1 oz)   SpO2 95%   BMI 36.47 kg/m²    Vitals reviewed.  Constitutional: Patient is oriented to person, place, and time. Appears well-developed and well-nourished. Mild distress.    Head: Normocephalic and atraumatic.   Ears: Normal external ears bilaterally.   Mouth/Throat: Oropharynx is clear and moist, no exudates.   Eyes: Conjunctivae are normal. Pupils are equal, meningeal signs  Cardiovascular: Normal rate, regular rhythm and normal heart sounds.   Pulmonary/Chest: Effort normal and breath sounds normal. No respiratory distress, no wheezes, rhonchi, or  rales.  Abdominal: Soft. Bowel sounds are normal. There is no tenderness, rebound or guarding, or peritoneal signs  Musculoskeletal: No edema and no tenderness.   Lymphadenopathy: No cervical adenopathy.   Neurological: Normal gait. No focal deficits.   Skin: Skin is warm and dry. No erythema. No pallor.   Psychiatric: Patient has a normal mood and affect.     EKG/LABS  Results for orders placed or performed during the hospital encounter of 08/03/24   CBC with Differential   Result Value Ref Range    WBC 6.9 4.8 - 10.8 K/uL    RBC 5.86 4.70 - 6.10 M/uL    Hemoglobin 17.9 14.0 - 18.0 g/dL    Hematocrit 52.4 (H) 42.0 - 52.0 %    MCV 89.4 81.4 - 97.8 fL    MCH 30.5 27.0 - 33.0 pg    MCHC 34.2 32.3 - 36.5 g/dL    RDW 45.2 35.9 - 50.0 fL    Platelet Count 219 164 - 446 K/uL    MPV 8.5 (L) 9.0 - 12.9 fL    Neutrophils-Polys 68.80 44.00 - 72.00 %    Lymphocytes 16.70 (L) 22.00 - 41.00 %    Monocytes 9.40 0.00 - 13.40 %    Eosinophils 2.70 0.00 - 6.90 %    Basophils 0.70 0.00 - 1.80 %    Immature Granulocytes 1.70 (H) 0.00 - 0.90 %    Nucleated RBC 0.00 0.00 - 0.20 /100 WBC    Neutrophils (Absolute) 4.76 1.82 - 7.42 K/uL    Lymphs (Absolute) 1.16 1.00 - 4.80 K/uL    Monos (Absolute) 0.65 0.00 - 0.85 K/uL    Eos (Absolute) 0.19 0.00 - 0.51 K/uL    Baso (Absolute) 0.05 0.00 - 0.12 K/uL    Immature Granulocytes (abs) 0.12 (H) 0.00 - 0.11 K/uL    NRBC (Absolute) 0.00 K/uL   Comp Metabolic Panel   Result Value Ref Range    Sodium 137 135 - 145 mmol/L    Potassium 4.3 3.6 - 5.5 mmol/L    Chloride 98 96 - 112 mmol/L    Co2 23 20 - 33 mmol/L    Anion Gap 16.0 7.0 - 16.0    Glucose 143 (H) 65 - 99 mg/dL    Bun 10 8 - 22 mg/dL    Creatinine 0.93 0.50 - 1.40 mg/dL    Calcium 9.6 8.5 - 10.5 mg/dL    Correct Calcium 9.4 8.5 - 10.5 mg/dL    AST(SGOT) 19 12 - 45 U/L    ALT(SGPT) 19 2 - 50 U/L    Alkaline Phosphatase 133 (H) 30 - 99 U/L    Total Bilirubin 0.8 0.1 - 1.5 mg/dL    Albumin 4.2 3.2 - 4.9 g/dL    Total Protein 7.5 6.0 - 8.2 g/dL     Globulin 3.3 1.9 - 3.5 g/dL    A-G Ratio 1.3 g/dL   TROPONIN   Result Value Ref Range    Troponin T 24 (H) 6 - 19 ng/L   ESTIMATED GFR   Result Value Ref Range    GFR (CKD-EPI) 90 >60 mL/min/1.73 m 2   EKG   Result Value Ref Range    Report       [facility]    Test Date:  2024  Pt Name:    OUMOU BADILLO                 Department: ER  MRN:        [id number]                      Room:       [facility]  Gender:     Male                         Technician: [id number]  :        1960                   Requested By:ER TRIAGE PROTOCOL  Order #:    [id number]                    Reading MD: JAMAL MONTOYA,     Measurements  Intervals                                Axis  Rate:       97                           P:          49  SC:         157                          QRS:        -6  QRSD:       107                          T:          -13  QT:         366  QTc:        465    Interpretive Statements  Sinus rhythm  Borderline T abnormalities, inferior leads  Compared to ECG 2024 11:26:22  T-wave abnormality now present  Sinus tachycardia no longer present  Electronically Signed On 2024 08:12:49 PDT by JAMAL MONTOYA DO     POC CoV-2, FLU A/B, RSV by PCR   Result Value Ref Range    POC Influenza A RNA, PCR Negative Negative    POC Influenza B RNA, PCR Negative Negative    POC RSV, by PCR Negative Negative    POC SARS-CoV-2, PCR DETECTED (AA) NotDetected     I have independently interpreted this EKG    RADIOLOGY/PROCEDURES   I have independently interpreted the diagnostic imaging associated with this visit and am waiting the final reading from the radiologist.   My preliminary interpretation is as follows: NAPD    Radiologist interpretation:  DX-CHEST-PORTABLE (1 VIEW)   Final Result      No acute cardiopulmonary abnormality identified.          COURSE & MEDICAL DECISION MAKING    ASSESSMENT, COURSE AND PLAN  Care Narrative:     This is a pleasant 64-year-old male who has been  feeling ill with flulike symptoms, since Tuesday.  He did travel across the country at that time.  He is tachycardic.  He is hypertensive.  Experiencing subjective fever and chills.  Generally feeling achy.  He was swabbed for COVID.  Chest x-ray has been ordered as well as labs, CBC, chemistry.    10:10 AM patient's reevaluated at the bedside.  Blood pressures improved 182 systolic.  He is not hypoxic and over multiple checks, I have not seen any persistent hypoxia.  Chest x-ray is reassuring.  At this time, he does not criteria for hospital admission and I think he is a candidate for discharge to home and outpatient therapy.  He does test positive for COVID.  He is a candidate for Paxlovid if he starts today.  He tells me now that he has had COVID a few times before and he believes that he has taken Paxlovid and tolerated it.  There is no interaction with his medications and his kidney function is normal.  We do no longer have this medication here he will need to fill a prescription for it and he is aware of that.  Sons at the bedside.  Currently, and there is no criteria for hospital admission.  There is no increased work of breathing.  His heart rate has come down, 93 on my repeat evaluation.  He is given strict return precautions.  At this point, I feel he can safely be discharged to home.    ADDITIONAL PROBLEMS MANAGED    DISPOSITION AND DISCUSSIONS  I have discussed management of the patient with the following physicians and REYNOLD's:  None    Discussion of management with other QHP or appropriate source(s): None     Escalation of care considered, and ultimately not performed:acute inpatient care management, however at this time, the patient is most appropriate for outpatient management    Barriers to care at this time, including but not limited to: none.     Decision tools and prescription drugs considered including, but not limited to: None.    FINAL DIAGNOSIS  1. COVID-19         Electronically signed by:  Alexia Hernandez D.O., 8/3/2024 8:09 AM

## 2024-08-03 NOTE — DISCHARGE INSTRUCTIONS
It is important that you start Paxlovid tonight.  It needs to be started within 5 days of symptom onset.  If you have worsening symptoms, difficulty breathing, please return for reevaluation.

## 2024-08-03 NOTE — ED NOTES
Alerted ERP of infrequent desaturations on RA and episode of hemoptysis.  Pt desaturating to 89%, but improved with position change.

## 2024-08-04 ENCOUNTER — APPOINTMENT (OUTPATIENT)
Dept: RADIOLOGY | Facility: MEDICAL CENTER | Age: 64
End: 2024-08-04
Attending: EMERGENCY MEDICINE
Payer: MEDICARE

## 2024-08-04 ENCOUNTER — HOSPITAL ENCOUNTER (EMERGENCY)
Facility: MEDICAL CENTER | Age: 64
End: 2024-08-04
Attending: EMERGENCY MEDICINE
Payer: MEDICARE

## 2024-08-04 VITALS
SYSTOLIC BLOOD PRESSURE: 178 MMHG | HEART RATE: 92 BPM | OXYGEN SATURATION: 93 % | RESPIRATION RATE: 18 BRPM | TEMPERATURE: 97.7 F | DIASTOLIC BLOOD PRESSURE: 98 MMHG | HEIGHT: 70 IN | WEIGHT: 247.58 LBS | BODY MASS INDEX: 35.44 KG/M2

## 2024-08-04 DIAGNOSIS — I10 PRIMARY HYPERTENSION: ICD-10-CM

## 2024-08-04 DIAGNOSIS — U07.1 COVID: ICD-10-CM

## 2024-08-04 LAB
ANION GAP SERPL CALC-SCNC: 15 MMOL/L (ref 7–16)
BASOPHILS # BLD AUTO: 0.3 % (ref 0–1.8)
BASOPHILS # BLD: 0.02 K/UL (ref 0–0.12)
BUN SERPL-MCNC: 11 MG/DL (ref 8–22)
CALCIUM SERPL-MCNC: 9.6 MG/DL (ref 8.5–10.5)
CHLORIDE SERPL-SCNC: 94 MMOL/L (ref 96–112)
CO2 SERPL-SCNC: 22 MMOL/L (ref 20–33)
CREAT SERPL-MCNC: 0.94 MG/DL (ref 0.5–1.4)
D DIMER PPP IA.FEU-MCNC: 0.45 UG/ML (FEU) (ref 0–0.5)
EOSINOPHIL # BLD AUTO: 0.06 K/UL (ref 0–0.51)
EOSINOPHIL NFR BLD: 0.8 % (ref 0–6.9)
ERYTHROCYTE [DISTWIDTH] IN BLOOD BY AUTOMATED COUNT: 43.9 FL (ref 35.9–50)
GFR SERPLBLD CREATININE-BSD FMLA CKD-EPI: 89 ML/MIN/1.73 M 2
GLUCOSE SERPL-MCNC: 145 MG/DL (ref 65–99)
HCT VFR BLD AUTO: 53.2 % (ref 42–52)
HGB BLD-MCNC: 17.6 G/DL (ref 14–18)
IMM GRANULOCYTES # BLD AUTO: 0.1 K/UL (ref 0–0.11)
IMM GRANULOCYTES NFR BLD AUTO: 1.3 % (ref 0–0.9)
LYMPHOCYTES # BLD AUTO: 0.91 K/UL (ref 1–4.8)
LYMPHOCYTES NFR BLD: 12.1 % (ref 22–41)
MCH RBC QN AUTO: 29.4 PG (ref 27–33)
MCHC RBC AUTO-ENTMCNC: 33.1 G/DL (ref 32.3–36.5)
MCV RBC AUTO: 88.8 FL (ref 81.4–97.8)
MONOCYTES # BLD AUTO: 0.69 K/UL (ref 0–0.85)
MONOCYTES NFR BLD AUTO: 9.1 % (ref 0–13.4)
NEUTROPHILS # BLD AUTO: 5.77 K/UL (ref 1.82–7.42)
NEUTROPHILS NFR BLD: 76.4 % (ref 44–72)
NRBC # BLD AUTO: 0 K/UL
NRBC BLD-RTO: 0 /100 WBC (ref 0–0.2)
NT-PROBNP SERPL IA-MCNC: 2485 PG/ML (ref 0–125)
PLATELET # BLD AUTO: 237 K/UL (ref 164–446)
PMV BLD AUTO: 8.6 FL (ref 9–12.9)
POTASSIUM SERPL-SCNC: 4 MMOL/L (ref 3.6–5.5)
RBC # BLD AUTO: 5.99 M/UL (ref 4.7–6.1)
SODIUM SERPL-SCNC: 131 MMOL/L (ref 135–145)
TROPONIN T SERPL-MCNC: 22 NG/L (ref 6–19)
WBC # BLD AUTO: 7.6 K/UL (ref 4.8–10.8)

## 2024-08-04 PROCEDURE — 700102 HCHG RX REV CODE 250 W/ 637 OVERRIDE(OP): Performed by: EMERGENCY MEDICINE

## 2024-08-04 PROCEDURE — 36415 COLL VENOUS BLD VENIPUNCTURE: CPT

## 2024-08-04 PROCEDURE — 83880 ASSAY OF NATRIURETIC PEPTIDE: CPT

## 2024-08-04 PROCEDURE — 99284 EMERGENCY DEPT VISIT MOD MDM: CPT

## 2024-08-04 PROCEDURE — 71045 X-RAY EXAM CHEST 1 VIEW: CPT

## 2024-08-04 PROCEDURE — 85379 FIBRIN DEGRADATION QUANT: CPT

## 2024-08-04 PROCEDURE — 80048 BASIC METABOLIC PNL TOTAL CA: CPT

## 2024-08-04 PROCEDURE — 84484 ASSAY OF TROPONIN QUANT: CPT

## 2024-08-04 PROCEDURE — 85025 COMPLETE CBC W/AUTO DIFF WBC: CPT

## 2024-08-04 PROCEDURE — A9270 NON-COVERED ITEM OR SERVICE: HCPCS | Performed by: EMERGENCY MEDICINE

## 2024-08-04 RX ORDER — LOSARTAN POTASSIUM 100 MG/1
100 TABLET ORAL DAILY
Qty: 30 TABLET | Refills: 0 | Status: SHIPPED | OUTPATIENT
Start: 2024-08-04

## 2024-08-04 RX ORDER — LOSARTAN POTASSIUM 50 MG/1
100 TABLET ORAL ONCE
Status: COMPLETED | OUTPATIENT
Start: 2024-08-04 | End: 2024-08-04

## 2024-08-04 RX ADMIN — LOSARTAN POTASSIUM 100 MG: 50 TABLET, FILM COATED ORAL at 10:03

## 2024-08-04 ASSESSMENT — FIBROSIS 4 INDEX: FIB4 SCORE: 1.31

## 2024-08-04 NOTE — ED TRIAGE NOTES
"Chief Complaint   Patient presents with    Flu Like Symptoms     Pt was seen yesterday and evaluated.  Pt tested positive for covid. Per pt, he was told to wear his CPAP at home but pt states he feels like he is suffocating when it is on. Pt endorses feeling worse today with fever, chills, and feeling diaphoretic overnight.     Medication Refill     Pt states he is out of his blood pressure medication     Pt ambulatory from [facility] with steady gait.      Pt is alert and oriented, speaking in full sentences, follows commands and responds appropriately to questions. Resp are even and unlabored.      Pt placed in [facility]. Pt educated on triage process. Pt encouraged to alert staff for any changes.     Patient and staff wearing appropriate PPE.    BP (!) 162/102   Pulse 95   Temp 36.7 °C (98 °F) (Temporal)   Resp 18   Ht 1.778 m (5' 10\")   Wt 112 kg (247 lb 9.2 oz)   SpO2 94%      "

## 2024-08-04 NOTE — ED PROVIDER NOTES
"  ER Provider Note    Scribed for Demi Lopez M.d. by Salma Moore. 8/4/2024  9:18 AM    Primary Care Provider: Kevin Chavez M.D.    CHIEF COMPLAINT  Chief Complaint   Patient presents with    Flu Like Symptoms     Pt was seen yesterday and evaluated.  Pt tested positive for covid. Per pt, he was told to wear his CPAP at home but pt states he feels like he is suffocating when it is on. Pt endorses feeling worse today with fever, chills, and feeling diaphoretic overnight.     Medication Refill     Pt states he is out of his blood pressure medication     LIMITATION TO HISTORY   Select: : None    HPI/ROS  OUTSIDE HISTORIAN(S):  None    EXTERNAL RECORDS REVIEWED  Outpatient Notes Patient was seen in the ED yesterday and found to have COVID-19. His chest x-ray was reassuring, and he was given Paxlovid.    Cristino Keys is a 64 y.o. male who presents to the ED for fever and chills onset yesterday and worsening this morning. The patient states he was seen yesterday and found to have Covid and adds he was told to wear his CPAP machine at home. He reports his symptoms have been worsening since then in addition to feeling diaphoretic overnight. He notes he has been taking tylenol for his fever with mild alleviation and took some this morning. The patient adds he ran out of his blood pressure medications yesterday. He usually takes Losartan 100 mg daily and hasn't taken it yet this morning due to running out.     PAST MEDICAL HISTORY  Past Medical History:   Diagnosis Date    Anemia     Anesthesia 11/03/2017    PONV with shoulder surgery approx 20 years ago.    Anxiety and depression 11/03/2017    Arthritis 11/03/2017    Osteoarthritis, \" all over\"    Bronchitis     childhood    Bunion     Cancer ([facility]) 2017    skin lesion cut out, on left shoulder basal cell    Chronic autoimmune thyroiditis      + US / + TPO = 57    Dental disorder 11/03/2017    \"Upper Plate\"    Diabetes mellitus ([facility]) 11/03/2017    type 2 " "\"Controlled with diet & Victoza\"    Gout     Hammertoe     High cholesterol 11/03/2017    HX OF, not currently on medication for.    Hypertension 01/23/2024    states controlled on meds    Hypothyroidism     chronic thyroiditis    MRSA (methicillin resistant Staphylococcus aureus)     MRSA infection     left big toe- amputation    Open toe wound 03/2018    Left big toe, open wound, started 2/2018, receiving wound care therapy two times a week    Pain 11/03/2017    \"Pain all over except right hip & ankle\"    Pain 09/19/2022    \"All over \"    Psychiatric problem     depression/anxiety adhd    Seasonal allergies     Sleep apnea 11/03/2017    CPAP USE- with 4 L oxygen    Snoring 11/03/2017    DX JULIAN    Spinal disorder     Tonsillitis     Wound infection 05/14/2018       SURGICAL HISTORY  Past Surgical History:   Procedure Laterality Date    FUSION, SACROILIAC JOINT, POSTERIOR APPROACH, USING FRAMELESS STEREOTAXY, WITH O-ARM IMAGING GUIDANCE  2/5/2024    Procedure: left Sacroiliac joint fusion, with O-Arm imaging guidance;  Surgeon: Jayne Molina M.D.;  Location: [facility];  Service: Neurosurgery    IN SHLDR ARTHROSCOP,PART ACROMIOPLAS Left 12/05/2023    Procedure: Left shoulder arthroscopic subacromial decompression, distal clavicle excision, joint debridement and repairs as indicated;  Surgeon: Jd Villegas M.D.;  Location: [facility];  Service: Orthopedics    IN SHLDR ARTHROSCOP EXTEN DEBRIDE 3+ Left 12/05/2023    Procedure: ARTHROSCOPY, SHOULDER, WITH EXTENSIVE DEBRIDEMENT;  Surgeon: Jd Villegas M.D.;  Location: [facility];  Service: Orthopedics    OTHER  12/2022    Peroral endoscopic Myotomy    PB RECONSTR TOTAL SHOULDER IMPLANT Left 11/22/2022    Procedure: Left reverse total shoulder arthroplasty, biceps tenodesis and repairs as indicated;  Surgeon: Jd Villegas M.D.;  Location: [facility]  External [facility];  Service: Orthopedics    PB REPAIR BICEPS LONG TENDON  11/22/2022 "    Procedure: TENODESIS, BICEPS;  Surgeon: Jd Villegas M.D.;  Location: [facility]  External [facility];  Service: Orthopedics    RI NEUROPLASTY & OR TRANSPOS ULNAR NERVE ELBOW Left 10/04/2022    Procedure: LEFT ELBOW CONTRACTURE RELEASE OSTEOPHYTE REMOVAL RADIAL HEAD EXCISION;  Surgeon: Ayad Luna M.D.;  Location: SURGERY SAME DAY [facility];  Service: Orthopedics    RADIAL HEAD EXCISION Left 10/04/2022    Procedure: EXCISION, RADIUS, HEAD, TOTAL;  Surgeon: Ayad Luna M.D.;  Location: SURGERY SAME DAY [facility];  Service: Orthopedics    RI UPPER GI ENDOSCOPY,REMOV F.B.  10/18/2021    Procedure: GASTROSCOPY, WITH FOREIGN BODY REMOVAL;  Surgeon: Estiven Ruiz M.D.;  Location: SURGERY SAME DAY [facility];  Service: Gastroenterology    RI UPPER GI ENDOSCOPY,DIAGNOSIS  10/18/2021    Procedure: GASTROSCOPY;  Surgeon: Estiven Ruiz M.D.;  Location: SURGERY SAME DAY [facility];  Service: Gastroenterology    HETEROTOPIC OSSIFICATION Right 10/14/2021    Procedure: EXCISION, HETEROTOPIC ossification;  Surgeon: Emiliano Vang M.D.;  Location: [facility];  Service: Orthopedics    METATARSAL HEAD RESECTION Left 07/20/2020    Procedure: EXCISION, METATARSAL BONE, HEAD- PARTIAL DISTAL 2/3 METATARSAL;  Surgeon: Haroldo Kang M.D.;  Location: [facility];  Service: Orthopedics    HAMMERTOE CORRECTION Left 07/20/2020    Procedure: CORRECTION, HAMMER TOE- 4/5;  Surgeon: Haroldo Kang M.D.;  Location: SURGERY [facility];  Service: Orthopedics    TOENAIL REMOVAL Left 07/20/2020    Procedure: REMOVAL, TOENAIL 3-5;  Surgeon: Haroldo Kang M.D.;  Location: SURGERY [facility];  Service: Orthopedics    METATARSAL HEAD RESECTION Left 06/10/2019    Procedure: METATARSAL HEAD RESECTION- FOR PARTIAL EXCISION;  Surgeon: Haroldo Kang M.D.;  Location: [facility];  Service: Orthopedics    JOINT INJECTION DIAGNOSTIC  06/10/2019    Procedure: INJECTION, JOINT, DIAGNOSTIC- MIDFOOT;   Surgeon: Haroldo Kang M.D.;  Location: [facility];  Service: Orthopedics    TOE AMPUTATION Right 06/10/2019    Procedure: AMPUTATION, TOE- FIRST TOE;  Surgeon: Haroldo Kang M.D.;  Location: [facility];  Service: Orthopedics    ORTHOPEDIC OSTEOTOMY Left 10/15/2018    Procedure: ORTHOPEDIC OSTEOTOMY-  FOR: 3RD METATARSAL PARTIAL EXCISION, AND LEFT GASTROC SOLEUS RECESSION;  Surgeon: Haroldo Kang M.D.;  Location: [facility];  Service: Orthopedics    HARDWARE REMOVAL ORTHO Right 10/15/2018    Procedure: HARDWARE REMOVAL ORTHO-  FOOT METATARSALPHALANGEAL JOINT PLATE;  Surgeon: Haroldo Kang M.D.;  Location: [facility];  Service: Orthopedics    TOE AMPUTATION Left 06/25/2018    Procedure: TOE AMPUTATION-FOR :PARTIAL 1ST DISTAL PHALANX EXCISION, GREAT TOE AMPUTATION;  Surgeon: Haroldo Kang M.D.;  Location: [facility];  Service: Orthopedics    INTERNAL FIXATION REMOVAL Left 06/25/2018    Procedure: INTERNAL FIXATION REMOVAL;  Surgeon: Haroldo Kang M.D.;  Location: [facility];  Service: Orthopedics    NERVE ULNAR TRANSFER Right 04/03/2018    Procedure: NERVE ULNAR TRANSFER - TRANSPOSITION;  Surgeon: Ayad Luna M.D.;  Location: [facility];  Service: Orthopedics    CARPAL TUNNEL RELEASE Right 04/03/2018    Procedure: CARPAL TUNNEL RELEASE;  Surgeon: Ayad Luna M.D.;  Location: [facility];  Service: Orthopedics    ELBOW ARTHROTOMY Right 04/03/2018    Procedure: ELBOW ARTHROTOMY - FOR CONTRACTURE RELEASE AT THE ELBOW, RADIAL HEAD EXCISION;  Surgeon: Ayad Luna M.D.;  Location: [facility];  Service: Orthopedics    SHOULDER SURGERY Right 12/02/2017    reversal    METATARSAL HEAD RESECTION Right 11/13/2017    Procedure: METATARSAL HEAD RESECTION - EXCISION;  Surgeon: Haroldo Kang M.D.;  Location: [facility];  Service: Orthopedics    HAMMERTOE CORRECTION Right  11/13/2017    Procedure: HAMMERTOE CORRECTION 2-5;  Surgeon: Haroldo Kang M.D.;  Location: SURGERY [facility];  Service: Orthopedics    TOE FUSION Right 11/13/2017    Procedure: TOE FUSION - 1ST METATARSALPHALANGEAL;  Surgeon: Haroldo Kang M.D.;  Location: [facility];  Service: Orthopedics    METATARSAL HEAD RESECTION Left 08/21/2017    Procedure: METATARSAL HEAD RESECTION - 2-5;  Surgeon: Haroldo Kang M.D.;  Location: [facility];  Service:     TOE FUSION Left 08/21/2017    Procedure: TOE FUSION - 1ST MTP;  Surgeon: Haroldo Kang M.D.;  Location: [facility];  Service:     HARDWARE REMOVAL ORTHO Left 08/21/2017    Procedure: HARDWARE REMOVAL ORTHO;  Surgeon: Haroldo Kang M.D.;  Location: [facility];  Service:     FUSION, SPINE, LUMBAR, PLIF  04/14/2017    Procedure: LUMBAR FUSION POSTERIOR - MINIMALLY INVASIVE TLIF L4-5;  Surgeon: Bossman Caro M.D.;  Location: [facility];  Service:     HAMMERTOE CORRECTION Bilateral 12/22/2016    Procedure: HAMMERTOE CORRECTION/METATARSALPHALANGEAL JOINT RELEASE 2/3 RIGHT, 2-3 LEFT;  Surgeon: Haroldo Kang M.D.;  Location: [facility];  Service:     BUNIONECTOMY Left 12/22/2016    Procedure: BUNIONECTOMY FOR DISTAL HALLUX VALGUS CORRECTION WITH BRANDY;  Surgeon: Haroldo Kang M.D.;  Location: [facility];  Service:     ORTHOPEDIC OSTEOTOMY Left 12/22/2016    Procedure: ORTHOPEDIC OSTEOTOMY DISTAL METATARSAL 2-5;  Surgeon: Haroldo Kang M.D.;  Location: [facility];  Service:     HIP ARTH ANTERIOR TOTAL Left 08/18/2016    Procedure: HIP ARTHROPLASTY ANTERIOR TOTAL;  Surgeon: Emiliano Vang M.D.;  Location: [facility];  Service:     OTHER ORTHOPEDIC SURGERY  06/2014    right shoulder  arthroplasty    OTHER ORTHOPEDIC SURGERY  11/01/2012    left knee/left ankle/left shoulder    OTHER ORTHOPEDIC SURGERY Bilateral 2004    elbow surgery  "major operations    OTHER  2000    dislocation left foot surgery at [facility]    FOOT SURGERY  Too many to list.  All at [facility]    20 total    ORIF, ANKLE      2 time left side    ORIF, ELBOW      2 on each elbow    ORIF, FRACTURE, TIBIA Left     tib/fib plate, has since been removed    ORIF, HIP      Left replaced    ORIF, KNEE      7 between the 2 knees, and replacements    ORIF, WRIST      Carpal tunnel    OTHER      \"septoplasty 20 years ago\"    OTHER ORTHOPEDIC SURGERY      brad knee replaced    OTHER ORTHOPEDIC SURGERY      left total hip       FAMILY HISTORY  Family History   Problem Relation Age of Onset    Heart Disease Mother     Depression Mother     Cancer Father         Colon cancer     Cancer Sister         Skin cancer    Sleep Apnea Neg Hx        SOCIAL HISTORY   reports that he quit smoking about 10 years ago. His smoking use included cigarettes. He started smoking about 30 years ago. He has a 20 pack-year smoking history. He quit smokeless tobacco use about 27 years ago.  His smokeless tobacco use included chew. He reports that he does not currently use alcohol. He reports that he does not use drugs.    CURRENT MEDICATIONS  Previous Medications    ACCU-CHEK FASTCLIX LANCETS MISC        ACCU-CHEK GUIDE STRIP    1 Strip by Extracorporeal route every day.    AMOXICILLIN-CLAVULANATE (AUGMENTIN) 875-125 MG TAB    Take 1 Tablet by mouth 2 times a day.    ASCORBIC ACID (VITAMIN C PO)    Take 500 mg by mouth every day.    CYCLOBENZAPRINE (FLEXERIL) 10 MG TAB    Take 10 mg by mouth 3 times a day as needed for Muscle Spasms.    DIAZEPAM (VALIUM) 5 MG TAB    TAKE 2 TABLETS BY MOUTH AS DIRECTED. TAKE ONE 30 MINS PRIOR TO APPT AND ONE AT THE TIME OF CHECK IN    GABAPENTIN (NEURONTIN) 300 MG CAP    Take 300 mg by mouth at bedtime as needed.    HYDROXYZINE PAMOATE (VISTARIL) 50 MG CAP    Take 50 mg by mouth every day.    LAMOTRIGINE (LAMICTAL) 200 MG TABLET    Take 200 mg by mouth every day.    LEVOTHYROXINE (SYNTHROID) 137 " MCG TAB    TAKE 1 TABLET BY MOUTH EVERY MORNING ON AN EMPTY STOMACH.    LEVOTHYROXINE (SYNTHROID) 137 MCG TAB    Take 137 mcg by mouth every day.    LOSARTAN (COZAAR) 100 MG TAB    Take 100 mg by mouth every day.    MAGNESIUM PO    Take 1 Tablet by mouth every day.    METHYLPHENIDATE (RITALIN) 10 MG TAB    Take 10 mg by mouth 2 times a day. 0900  1400    MULTIPLE VITAMINS-MINERALS (ICAPS AREDS 2 PO)    Take 2 Tablets by mouth every morning.    MULTIPLE VITAMINS-MINERALS (MENS 50+ MULTIVITAMIN PO)    Take 1 Tablet by mouth every day.    NIRMATRELVIR&RITONAVIR 300/100 20 X 150 MG & 10 X 100MG TABLET THERAPY PACK    Take 300 mg nirmatrelvir (two 150 mg tablets) with 100 mg ritonavir (one 100 mg tablet) by mouth, with all three tablets taken together twice daily for 5 days.    OXYCODONE-ACETAMINOPHEN (PERCOCET-10)  MG TAB    Take 1 Tablet by mouth every 6 hours as needed for Moderate Pain.    SEMAGLUTIDE, 1 MG/DOSE, 4 MG/3ML SOLUTION PEN-INJECTOR    Inject 1 mg under the skin.    SEMAGLUTIDE,0.25 OR 0.5MG/DOS, 2 MG/3ML SOLUTION PEN-INJECTOR    Inject 0.25 mg under the skin every 7 days.    SSD 1 % CREAM    APPLY TO WOUND EVERY DAY    TADALAFIL (CIALIS) 20 MG TABLET    Take 20 mg by mouth 1 time a day as needed for Erectile Dysfunction.    TAMSULOSIN (FLOMAX) 0.4 MG CAPSULE    Take 0.4 mg by mouth ONE-HALF HOUR AFTER BREAKFAST.    TEMAZEPAM (RESTORIL) 30 MG CAPSULE    Take 30 mg by mouth at bedtime as needed for Sleep.    TESTOSTERONE CYPIONATE (DEPO-TESTOSTERONE) 200 MG/ML INJECTION    Inject 100 mg into the shoulder, thigh, or buttocks every 7 days. 0.5 ml (100 mg)    TESTOSTERONE CYPIONATE (DEPO-TESTOSTERONE) 200 MG/ML INJECTION    Inject 200 mg into the shoulder, thigh, or buttocks.    VENLAFAXINE HCL 75 MG TABLET SR 24 HR    Take 1 Tablet by mouth every day.    VENLAFAXINE XR (EFFEXOR XR) 75 MG CAPSULE SR 24 HR    Take 75 mg by mouth every day.       ALLERGIES  Daptomycin, Demerol, Meperidine hcl, and Sulfa  "drugs    PHYSICAL EXAM  BP (!) 168/100   Pulse 94   Temp 36.7 °C (98 °F) (Temporal)   Resp 18   Ht 1.778 m (5' 10\")   Wt 112 kg (247 lb 9.2 oz)   SpO2 93%   BMI 35.52 kg/m²   Constitutional: Alert in no apparent distress, Sweaty.  HENT: No signs of trauma, Bilateral external ears normal, Nose normal.   Eyes: Pupils are equal and reactive, Conjunctiva normal, Non-icteric.   Neck: No stridor.   Cardiovascular: Regular rate and rhythm, no murmurs.   Thorax & Lungs: Normal breath sounds, No respiratory distress, No wheezing, No chest tenderness.   Abdomen: Bowel sounds normal, Soft, No tenderness, No masses, No peritoneal signs.  Skin: Warm, Dry, No erythema, No rash.   Musculoskeletal:  No major deformities noted. No lower extremity edema.  Neurologic: Alert, moving all extremities without difficulty, no focal deficits.     DIAGNOSTIC STUDIES & PROCEDURES    Labs:   Results for orders placed or performed during the hospital encounter of 08/04/24   TROPONIN   Result Value Ref Range    Troponin T 22 (H) 6 - 19 ng/L   proBrain Natriuretic Peptide, NT   Result Value Ref Range    NT-proBNP 2485 (H) 0 - 125 pg/mL   CBC WITH DIFFERENTIAL   Result Value Ref Range    WBC 7.6 4.8 - 10.8 K/uL    RBC 5.99 4.70 - 6.10 M/uL    Hemoglobin 17.6 14.0 - 18.0 g/dL    Hematocrit 53.2 (H) 42.0 - 52.0 %    MCV 88.8 81.4 - 97.8 fL    MCH 29.4 27.0 - 33.0 pg    MCHC 33.1 32.3 - 36.5 g/dL    RDW 43.9 35.9 - 50.0 fL    Platelet Count 237 164 - 446 K/uL    MPV 8.6 (L) 9.0 - 12.9 fL    Neutrophils-Polys 76.40 (H) 44.00 - 72.00 %    Lymphocytes 12.10 (L) 22.00 - 41.00 %    Monocytes 9.10 0.00 - 13.40 %    Eosinophils 0.80 0.00 - 6.90 %    Basophils 0.30 0.00 - 1.80 %    Immature Granulocytes 1.30 (H) 0.00 - 0.90 %    Nucleated RBC 0.00 0.00 - 0.20 /100 WBC    Neutrophils (Absolute) 5.77 1.82 - 7.42 K/uL    Lymphs (Absolute) 0.91 (L) 1.00 - 4.80 K/uL    Monos (Absolute) 0.69 0.00 - 0.85 K/uL    Eos (Absolute) 0.06 0.00 - 0.51 K/uL    Baso " (Absolute) 0.02 0.00 - 0.12 K/uL    Immature Granulocytes (abs) 0.10 0.00 - 0.11 K/uL    NRBC (Absolute) 0.00 K/uL   BASIC METABOLIC PANEL   Result Value Ref Range    Sodium 131 (L) 135 - 145 mmol/L    Potassium 4.0 3.6 - 5.5 mmol/L    Chloride 94 (L) 96 - 112 mmol/L    Co2 22 20 - 33 mmol/L    Glucose 145 (H) 65 - 99 mg/dL    Bun 11 8 - 22 mg/dL    Creatinine 0.94 0.50 - 1.40 mg/dL    Calcium 9.6 8.5 - 10.5 mg/dL    Anion Gap 15.0 7.0 - 16.0   ESTIMATED GFR   Result Value Ref Range    GFR (CKD-EPI) 89 >60 mL/min/1.73 m 2   D-DIMER   Result Value Ref Range    D-Dimer 0.45 0.00 - 0.50 ug/mL (FEU)      All labs reviewed by me.    Radiology:   The attending Emergency Physician has independently interpreted the diagnostic imaging associated with this visit and is awaiting the final reading from the radiologist, which will be displayed below.    Preliminary interpretation is a follows: Normal-appearing chest x-ray  Radiologist interpretation:   DX-CHEST-PORTABLE (1 VIEW)   Final Result         No acute cardiac or pulmonary abnormality is identified.           COURSE & MEDICAL DECISION MAKING    ED Observation Status? No; Patient does not meet criteria for ED Observation.     9:18 AM - Patient seen and evaluated at bedside. Patient will be treated with Losartan 100 mg PO for his symptoms. Ordered BMP, CBC w/ diff, Probrain natriuretic peptide NT, and Troponin to evaluate. He understands and agrees to the plan of care.     10:42 AM - Ordered for DX-Chest and D-dimer to further evaluate.     11:34 AM - Patient was reevaluated at bedside. Discussed lab and radiology results with the patient and informed him results are reassuring. I informed him his symptoms are likely due to his COVID. Discussed plan for discharge, including recommendation to drink fluids with electrolytes. The patient is advised to follow up with his PCP and to return for further evaluation should the patient's symptoms worsen. The patient understands and  agrees to discharge home.     Vitals:    08/04/24 1210   BP: (!) 178/98   Pulse: 92   Resp: 18   Temp: 36.5 °C (97.7 °F)   SpO2: 93%        INITIAL ASSESSMENT AND PLAN  Care Narrative: This is a 64-year-old gentleman with underlying hypertension sleep apnea diabetes who presents with continued not feeling well.  He was diagnosed with COVID yesterday and started on Paxlovid.  He is concerned for worsening pulmonary because of his covid, was also concerned for myocarditis volume overload PE.    Repeat labs are obtained he has some mild hyponatremia often associated with COVID.  His troponin is 22 which is actually the same if not a little lower than yesterday at 24.  BNP is slightly elevated 2400.  However his chest x-ray does not show significant fluid overload CBC is normal.  D-dimer was performed and is normal as well making PE less likely.  He is persistently not hypoxic on room air even with the mask on.  He was monitored for several hours without significant hypoxia.  At this point given reassuring labs negative D-dimer I do not think he needs a CTA nor do I think he meets criteria for hospitalization at this time.  I will refill his losartan his blood pressure was adequate.  He will be discharged home.  He is agreeable to this plan.                     DISPOSITION AND DISCUSSIONS  I have discussed management of the patient with the following physicians and REYNOLD's: None    Discussion of management with other QHP or appropriate source(s): None     Escalation of care considered, and ultimately not performed: acute inpatient care management, however at this time, the patient is most appropriate for outpatient management.    Barriers to care at this time, including but not limited to:  None known at this time .     Decision tools and prescription drugs considered including, but not limited to:  none .    The patient will return for new or worsening symptoms and is stable at the time of discharge. Patient was given  return precautions. Patient and/or family member verbalizes understanding and will comply.    DISPOSITION:  Patient will be discharged home in stable condition.    FOLLOW UP:  Kevin Chavez M.D.  [address]  [address]  [facility] [id number] [phone]    Schedule an appointment as soon as possible for a visit in 1 week      [facility], Emergency Dept  [address] [facility] [id number] [phone]    Return to the emergency department for worsening difficulty breathing, persistent low oxygen saturations or other concerns.      FINAL IMPRESSION   1. COVID    2. Primary hypertension      Salma LEWIS (Scribe), am scribing for, and in the presence of, Demi Lopez M.D..    Electronically signed by: Salma Moore (Scribe), 8/4/2024    Demi LEWIS M.D. personally performed the services described in this documentation, as scribed by Salma Moore in my presence, and it is both accurate and complete.    The note accurately reflects work and decisions made by me.  Demi Lopez M.D.  8/4/2024  12:33 PM

## 2024-08-04 NOTE — ED NOTES
Pt called tech into room stating he was feeling very hot and requesting temp to be taken, this tech took pts oral temp, temp reading 97.7

## 2024-09-04 ENCOUNTER — PATIENT MESSAGE (OUTPATIENT)
Dept: HEALTH INFORMATION MANAGEMENT | Facility: OTHER | Age: 64
End: 2024-09-04

## 2024-10-28 ENCOUNTER — TELEPHONE (OUTPATIENT)
Dept: HEALTH INFORMATION MANAGEMENT | Facility: OTHER | Age: 64
End: 2024-10-28
Payer: MEDICARE

## 2024-11-18 ASSESSMENT — ACTIVITIES OF DAILY LIVING (ADL): BATHING_REQUIRES_ASSISTANCE: 0

## 2024-11-18 ASSESSMENT — PATIENT HEALTH QUESTIONNAIRE - PHQ9
2. FEELING DOWN, DEPRESSED, IRRITABLE, OR HOPELESS: NEARLY EVERY DAY
1. LITTLE INTEREST OR PLEASURE IN DOING THINGS: NEARLY EVERY DAY

## 2024-11-18 ASSESSMENT — ENCOUNTER SYMPTOMS: GENERAL WELL-BEING: FAIR

## 2024-11-19 ENCOUNTER — OFFICE VISIT (OUTPATIENT)
Dept: FAMILY PLANNING/WOMEN'S HEALTH CLINIC | Facility: PHYSICIAN GROUP | Age: 64
End: 2024-11-19
Payer: MEDICARE

## 2024-11-19 ENCOUNTER — HOSPITAL ENCOUNTER (OUTPATIENT)
Facility: MEDICAL CENTER | Age: 64
End: 2024-11-19
Payer: MEDICARE

## 2024-11-19 VITALS
WEIGHT: 231 LBS | RESPIRATION RATE: 16 BRPM | OXYGEN SATURATION: 97 % | HEIGHT: 70 IN | SYSTOLIC BLOOD PRESSURE: 134 MMHG | BODY MASS INDEX: 33.07 KG/M2 | DIASTOLIC BLOOD PRESSURE: 76 MMHG | HEART RATE: 60 BPM

## 2024-11-19 DIAGNOSIS — I73.9 PERIPHERAL VASCULAR DISEASE, UNSPECIFIED (HCC): ICD-10-CM

## 2024-11-19 DIAGNOSIS — J96.11 CHRONIC RESPIRATORY FAILURE WITH HYPOXIA (HCC): ICD-10-CM

## 2024-11-19 DIAGNOSIS — Z89.411 ACQUIRED ABSENCE OF RIGHT GREAT TOE (HCC): ICD-10-CM

## 2024-11-19 DIAGNOSIS — F39 MOOD DISORDER (HCC): ICD-10-CM

## 2024-11-19 DIAGNOSIS — F11.20 OPIOID TYPE DEPENDENCE, CONTINUOUS (HCC): ICD-10-CM

## 2024-11-19 DIAGNOSIS — Z23 NEED FOR VACCINATION: ICD-10-CM

## 2024-11-19 DIAGNOSIS — E06.3 HYPOTHYROIDISM, ACQUIRED, AUTOIMMUNE: ICD-10-CM

## 2024-11-19 DIAGNOSIS — E11.29 TYPE 2 DIABETES MELLITUS WITH DIABETIC MICROALBUMINURIA, WITHOUT LONG-TERM CURRENT USE OF INSULIN (HCC): ICD-10-CM

## 2024-11-19 DIAGNOSIS — F13.20 SEDATIVE HYPNOTIC OR ANXIOLYTIC DEPENDENCE (HCC): ICD-10-CM

## 2024-11-19 DIAGNOSIS — R80.9 TYPE 2 DIABETES MELLITUS WITH DIABETIC MICROALBUMINURIA, WITHOUT LONG-TERM CURRENT USE OF INSULIN (HCC): ICD-10-CM

## 2024-11-19 DIAGNOSIS — E78.5 DYSLIPIDEMIA: ICD-10-CM

## 2024-11-19 PROCEDURE — 82043 UR ALBUMIN QUANTITATIVE: CPT

## 2024-11-19 PROCEDURE — 3078F DIAST BP <80 MM HG: CPT

## 2024-11-19 PROCEDURE — 90662 IIV NO PRSV INCREASED AG IM: CPT

## 2024-11-19 PROCEDURE — 82570 ASSAY OF URINE CREATININE: CPT

## 2024-11-19 PROCEDURE — 3075F SYST BP GE 130 - 139MM HG: CPT

## 2024-11-19 PROCEDURE — G0439 PPPS, SUBSEQ VISIT: HCPCS | Mod: 25

## 2024-11-19 PROCEDURE — G0008 ADMIN INFLUENZA VIRUS VAC: HCPCS

## 2024-11-19 RX ORDER — QUETIAPINE FUMARATE 400 MG/1
400 TABLET, FILM COATED ORAL 2 TIMES DAILY
COMMUNITY

## 2024-11-19 RX ORDER — ALPRAZOLAM 1 MG/1
1 TABLET ORAL NIGHTLY PRN
COMMUNITY

## 2024-11-19 SDOH — ECONOMIC STABILITY: FOOD INSECURITY: WITHIN THE PAST 12 MONTHS, THE FOOD YOU BOUGHT JUST DIDN'T LAST AND YOU DIDN'T HAVE MONEY TO GET MORE.: NEVER TRUE

## 2024-11-19 SDOH — ECONOMIC STABILITY: HOUSING INSECURITY: IN THE LAST 12 MONTHS, WAS THERE A TIME WHEN YOU WERE NOT ABLE TO PAY THE MORTGAGE OR RENT ON TIME?: NO

## 2024-11-19 SDOH — ECONOMIC STABILITY: TRANSPORTATION INSECURITY: IN THE PAST 12 MONTHS, HAS LACK OF TRANSPORTATION KEPT YOU FROM MEDICAL APPOINTMENTS OR FROM GETTING MEDICATIONS?: NO

## 2024-11-19 SDOH — HEALTH STABILITY: PHYSICAL HEALTH: ON AVERAGE, HOW MANY MINUTES DO YOU ENGAGE IN EXERCISE AT THIS LEVEL?: 30 MIN

## 2024-11-19 SDOH — ECONOMIC STABILITY: HOUSING INSECURITY: AT ANY TIME IN THE PAST 12 MONTHS, WERE YOU HOMELESS OR LIVING IN A SHELTER (INCLUDING NOW)?: NO

## 2024-11-19 SDOH — HEALTH STABILITY: PHYSICAL HEALTH: ON AVERAGE, HOW MANY DAYS PER WEEK DO YOU ENGAGE IN MODERATE TO STRENUOUS EXERCISE (LIKE A BRISK WALK)?: 3 DAYS

## 2024-11-19 SDOH — ECONOMIC STABILITY: FOOD INSECURITY: WITHIN THE PAST 12 MONTHS, YOU WORRIED THAT YOUR FOOD WOULD RUN OUT BEFORE YOU GOT THE MONEY TO BUY MORE.: NEVER TRUE

## 2024-11-19 SDOH — ECONOMIC STABILITY: FOOD INSECURITY: HOW HARD IS IT FOR YOU TO PAY FOR THE VERY BASICS LIKE FOOD, HOUSING, MEDICAL CARE, AND HEATING?: NOT HARD AT ALL

## 2024-11-19 ASSESSMENT — PATIENT HEALTH QUESTIONNAIRE - PHQ9
SUM OF ALL RESPONSES TO PHQ QUESTIONS 1-9: 16
5. POOR APPETITE OR OVEREATING: 1 - SEVERAL DAYS
CLINICAL INTERPRETATION OF PHQ2 SCORE: 6

## 2024-11-19 ASSESSMENT — ACTIVITIES OF DAILY LIVING (ADL): LACK_OF_TRANSPORTATION: NO

## 2024-11-19 ASSESSMENT — PAIN SCALES - GENERAL: PAINLEVEL_OUTOF10: NO PAIN

## 2024-11-19 ASSESSMENT — FIBROSIS 4 INDEX: FIB4 SCORE: 1.21

## 2024-11-19 NOTE — ASSESSMENT & PLAN NOTE
Chronic, stable. The patient is s/p partial right great toe amputation in 2018 by Dr. Kang.  He denies any balance issues or pain. No current treatment. Follow-up at least annually.

## 2024-11-19 NOTE — PROGRESS NOTES
Comprehensive Health Assessment Program     Cristino Keys is a 64 y.o. here for his comprehensive health assessment.    Patient Active Problem List    Diagnosis Date Noted    Need for vaccination 11/20/2024    Acquired absence of right great toe (HCC) 04/30/2024    Sacroiliac joint pain 01/18/2024    Subacromial bursitis 11/09/2023    Acromioclavicular arthrosis 11/09/2023    Superior glenoid labrum lesion of left shoulder 11/09/2023    Peripheral vascular disease, unspecified ([facility]) 08/22/2023    Dyslipidemia 08/22/2023    Mood disorder ([facility]) 08/22/2023    Opioid type dependence, continuous ([facility]) 08/22/2023    Sedative hypnotic or anxiolytic dependence ([facility]) 08/22/2023    Chronic respiratory failure with hypoxia ([facility]) 08/22/2023    BMI 33.0-33.9,adult 08/22/2023    PVC (premature ventricular contraction) 02/09/2023    Sinus tachycardia 02/09/2023    Long-term (current) use of injectable non-insulin antidiabetic drugs 02/06/2023    Radial styloid tenosynovitis (de quervain) 12/28/2022    Ulnar impaction syndrome, left 12/28/2022    Contracture of left elbow 10/18/2022    Elbow arthritis 07/11/2022    Primary osteoarthritis of both elbows 01/19/2022    Bilateral elbow joint pain 01/19/2022    Left inguinal hernia 01/06/2022    Insomnia 01/06/2022    Diastasis recti 12/14/2021    Heterotopic ossification of bone 10/11/2021    Presence of right artificial knee joint 08/30/2021    Vitamin D deficiency 08/16/2021    Polycythemia 07/01/2020    History of MRSA infection 06/10/2020    Nocturnal hypoxemia 04/29/2020    Hypogonadism in male 03/18/2020    Former smoker 03/11/2019    Low testosterone in male 03/08/2019    Contracture of elbow joint, right 04/03/2018    Chronic pain 08/28/2017    Chronic narcotic use 08/28/2017    Arthritis of left ankle 08/21/2017    Degeneration of lumbar intervertebral disc 04/14/2017    Lower back pain 04/14/2017    JULIAN (obstructive sleep apnea) 04/03/2017    Snoring 04/03/2017     Acquired hallux valgus of left foot 12/22/2016    Knee pain, right 12/19/2016    Chronic autoimmune thyroiditis 10/03/2016    Hypothyroidism, acquired, autoimmune 10/03/2016    Presence of right artificial hip joint 08/31/2016    Primary osteoarthritis of left hip 08/18/2016    Foot pain, left 05/27/2016    Diabetes (HCC) 09/05/2014    Lumbar disc disease 09/05/2014    Gout 09/05/2014    Hypertension 09/05/2014    H/O arthroscopy of knee 09/05/2014    Hx of elbow surgery 09/05/2014    Osteoarthritis 09/05/2014    H/O shoulder replacement 09/05/2014    Rotator cuff arthropathy 09/05/2014    Bilateral bunions 09/05/2014       Current Outpatient Medications   Medication Sig Dispense Refill    quetiapine (SEROQUEL) 400 MG tablet Take 400 mg by mouth 2 times a day.      ALPRAZolam (XANAX) 1 MG Tab Take 1 mg by mouth at bedtime as needed for Sleep.      losartan (COZAAR) 100 MG Tab Take 1 Tablet by mouth every day. 30 Tablet 0    amoxicillin-clavulanate (AUGMENTIN) 875-125 MG Tab Take 1 Tablet by mouth 2 times a day.      Nirmatrelvir&Ritonavir 300/100 20 x 150 MG & 10 x 100MG Tablet Therapy Pack Take 300 mg nirmatrelvir (two 150 mg tablets) with 100 mg ritonavir (one 100 mg tablet) by mouth, with all three tablets taken together twice daily for 5 days. 30 Each 0    diazePAM (VALIUM) 5 MG Tab TAKE 2 TABLETS BY MOUTH AS DIRECTED. TAKE ONE 30 MINS PRIOR TO APPT AND ONE AT THE TIME OF CHECK IN (Patient not taking: Reported on 11/19/2024)      ACCU-CHEK GUIDE strip 1 Strip by Extracorporeal route every day.      Accu-Chek FastClix Lancets Misc       SSD 1 % Cream APPLY TO WOUND EVERY DAY      levothyroxine (SYNTHROID) 137 MCG Tab Take 137 mcg by mouth every day.      Semaglutide, 1 MG/DOSE, 4 MG/3ML Solution Pen-injector Inject 1 mg under the skin. (Patient not taking: Reported on 11/20/2024)      testosterone cypionate (DEPO-TESTOSTERONE) 200 MG/ML injection Inject 200 mg into the shoulder, thigh, or buttocks.       venlafaxine XR (EFFEXOR XR) 75 MG CAPSULE SR 24 HR Take 75 mg by mouth every day.      testosterone cypionate (DEPO-TESTOSTERONE) 200 MG/ML injection Inject 100 mg into the shoulder, thigh, or buttocks every 7 days. 0.5 ml (100 mg)      Multiple Vitamins-Minerals (ICAPS AREDS 2 PO) Take 2 Tablets by mouth every morning.      Semaglutide,0.25 or 0.5MG/DOS, 2 MG/3ML Solution Pen-injector Inject 0.25 mg under the skin every 7 days. (Patient not taking: Reported on 11/20/2024)      hydrOXYzine pamoate (VISTARIL) 50 MG Cap Take 50 mg by mouth every day.      lamotrigine (LAMICTAL) 200 MG tablet Take 200 mg by mouth every day.      Multiple Vitamins-Minerals (MENS 50+ MULTIVITAMIN PO) Take 1 Tablet by mouth every day.      MAGNESIUM PO Take 1 Tablet by mouth every day.      Ascorbic Acid (VITAMIN C PO) Take 500 mg by mouth every day.      methylphenidate (RITALIN) 10 MG Tab Take 10 mg by mouth 2 times a day. 0900  1400 (Patient not taking: Reported on 11/19/2024)      gabapentin (NEURONTIN) 300 MG Cap Take 300 mg by mouth at bedtime as needed.      cyclobenzaprine (FLEXERIL) 10 mg Tab Take 10 mg by mouth 3 times a day as needed for Muscle Spasms.      temazepam (RESTORIL) 30 MG capsule Take 30 mg by mouth at bedtime as needed for Sleep.      levothyroxine (SYNTHROID) 137 MCG Tab TAKE 1 TABLET BY MOUTH EVERY MORNING ON AN EMPTY STOMACH. 114 Tablet 4    oxyCODONE-acetaminophen (PERCOCET-10)  MG Tab Take 1 Tablet by mouth every 6 hours as needed for Moderate Pain.      tadalafil (CIALIS) 20 MG tablet Take 20 mg by mouth 1 time a day as needed for Erectile Dysfunction.      Venlafaxine HCl 75 MG TABLET SR 24 HR Take 1 Tablet by mouth every day.      tamsulosin (FLOMAX) 0.4 MG capsule Take 0.4 mg by mouth ONE-HALF HOUR AFTER BREAKFAST.       No current facility-administered medications for this visit.          Current supplements as per medication list.     Allergies:   Daptomycin, Demerol, Meperidine hcl, and Sulfa  drugs  Social History     Tobacco Use    Smoking status: Former     Current packs/day: 0.00     Average packs/day: 1 pack/day for 20.0 years (20.0 ttl pk-yrs)     Types: Cigarettes     Start date: 1/1/1994     Quit date: 1/1/2014     Years since quitting: 10.9    Smokeless tobacco: Former     Types: Chew     Quit date: 1/1/1997   Vaping Use    Vaping status: Never Used   Substance Use Topics    Alcohol use: Not Currently     Comment: occasional- approx once a week 4-5 beers    Drug use: No     Family History   Problem Relation Age of Onset    Heart Disease Mother     Depression Mother     Cancer Father         Colon cancer     Cancer Sister         Skin cancer    Sleep Apnea Neg Hx      Cristino  has a past medical history of Anemia, Anesthesia (11/03/2017), Anxiety and depression (11/03/2017), Arthritis (11/03/2017), Bronchitis, Bunion, Cancer ([facility]) (2017), Chronic autoimmune thyroiditis, Dental disorder (11/03/2017), Diabetes mellitus ([facility]) (11/03/2017), Gout, [place], High cholesterol (11/03/2017), Hypertension (01/23/2024), Hypothyroidism, MRSA (methicillin resistant Staphylococcus aureus), MRSA infection, Open toe wound (03/2018), Pain (11/03/2017), Pain (09/19/2022), Psychiatric problem, Seasonal allergies, Severe obesity with body mass index (BMI) of 35.0 to 39.9 with serious comorbidity ([facility]) (09/05/2014), Sleep apnea (11/03/2017), Snoring (11/03/2017), Spinal disorder, Tonsillitis, and Wound infection (05/14/2018).    He has no past medical history of Acute nasopharyngitis, Anginal syndrome ([facility]), Arrhythmia, Asthma, Blood clotting disorder ([facility]), Breath shortness, Carcinoma in situ of respiratory system, Cataract, Congestive heart failure ([facility]), Continuous ambulatory peritoneal dialysis status ([facility]), Coughing blood, Dialysis patient ([facility]), Glaucoma, Gynecological disorder, Heart burn, Heart murmur, Heart valve disease, Hemorrhagic disorder ([facility]), Hepatitis B, Hepatitis C, Hiatus hernia syndrome, Jaundice,  Pacemaker, Pneumonia, Pregnant, Renal disorder, Rheumatic fever, Seizure (HCC), Stroke (HCC), Tuberculosis, or Urinary incontinence.   Past Surgical History:   Procedure Laterality Date    FUSION, SACROILIAC JOINT, POSTERIOR APPROACH, USING FRAMELESS STEREOTAXY, WITH O-ARM IMAGING GUIDANCE  2/5/2024    Procedure: left Sacroiliac joint fusion, with O-Arm imaging guidance;  Surgeon: Jayne Molina M.D.;  Location: SURGERY [facility];  Service: Neurosurgery    OK SHLDR ARTHROSCOP,PART ACROMIOPLAS Left 12/05/2023    Procedure: Left shoulder arthroscopic subacromial decompression, distal clavicle excision, joint debridement and repairs as indicated;  Surgeon: Jd Villegas M.D.;  Location: SURGERY [facility];  Service: Orthopedics    OK SHLDR ARTHROSCOP EXTEN DEBRIDE 3+ Left 12/05/2023    Procedure: ARTHROSCOPY, SHOULDER, WITH EXTENSIVE DEBRIDEMENT;  Surgeon: Jd Villegas M.D.;  Location: SURGERY [facility];  Service: Orthopedics    OTHER  12/2022    Peroral endoscopic Myotomy    PB RECONSTR TOTAL SHOULDER IMPLANT Left 11/22/2022    Procedure: Left reverse total shoulder arthroplasty, biceps tenodesis and repairs as indicated;  Surgeon: Jd Villegas M.D.;  Location: [facility];  Service: Orthopedics    PB REPAIR BICEPS LONG TENDON  11/22/2022    Procedure: TENODESIS, BICEPS;  Surgeon: Jd Villegas M.D.;  Location: [facility];  Service: Orthopedics    OK NEUROPLASTY & OR TRANSPOS ULNAR NERVE ELBOW Left 10/04/2022    Procedure: LEFT ELBOW CONTRACTURE RELEASE OSTEOPHYTE REMOVAL RADIAL HEAD EXCISION;  Surgeon: Ayad Luna M.D.;  Location: SURGERY SAME DAY [facility];  Service: Orthopedics    RADIAL HEAD EXCISION Left 10/04/2022    Procedure: EXCISION, RADIUS, HEAD, TOTAL;  Surgeon: Ayad Luna M.D.;  Location: SURGERY SAME DAY [facility];  Service: Orthopedics    OK UPPER GI ENDOSCOPY,REMOV F.B.  10/18/2021    Procedure: GASTROSCOPY, WITH FOREIGN BODY REMOVAL;   Surgeon: Estiven Ruiz M.D.;  Location: SURGERY SAME DAY [facility];  Service: Gastroenterology    WV UPPER GI ENDOSCOPY,DIAGNOSIS  10/18/2021    Procedure: GASTROSCOPY;  Surgeon: Estiven Ruiz M.D.;  Location: SURGERY SAME DAY [facility];  Service: Gastroenterology    HETEROTOPIC OSSIFICATION Right 10/14/2021    Procedure: EXCISION, HETEROTOPIC ossification;  Surgeon: Emiliano Vang M.D.;  Location: SURGERY [facility];  Service: Orthopedics    METATARSAL HEAD RESECTION Left 07/20/2020    Procedure: EXCISION, METATARSAL BONE, HEAD- PARTIAL DISTAL 2/3 METATARSAL;  Surgeon: Haroldo Kang M.D.;  Location: [facility];  Service: Orthopedics    HAMMERTOE CORRECTION Left 07/20/2020    Procedure: CORRECTION, HAMMER TOE- 4/5;  Surgeon: Haroldo Kang M.D.;  Location: [facility];  Service: Orthopedics    TOENAIL REMOVAL Left 07/20/2020    Procedure: REMOVAL, TOENAIL 3-5;  Surgeon: Haroldo Kang M.D.;  Location: [facility];  Service: Orthopedics    METATARSAL HEAD RESECTION Left 06/10/2019    Procedure: METATARSAL HEAD RESECTION- FOR PARTIAL EXCISION;  Surgeon: Haroldo Kang M.D.;  Location: [facility];  Service: Orthopedics    JOINT INJECTION DIAGNOSTIC  06/10/2019    Procedure: INJECTION, JOINT, DIAGNOSTIC- MIDFOOT;  Surgeon: Haroldo Kang M.D.;  Location: [facility];  Service: Orthopedics    TOE AMPUTATION Right 06/10/2019    Procedure: AMPUTATION, TOE- FIRST TOE;  Surgeon: Haroldo Kang M.D.;  Location: [facility];  Service: Orthopedics    ORTHOPEDIC OSTEOTOMY Left 10/15/2018    Procedure: ORTHOPEDIC OSTEOTOMY-  FOR: 3RD METATARSAL PARTIAL EXCISION, AND LEFT GASTROC SOLEUS RECESSION;  Surgeon: Haroldo Kang M.D.;  Location: [facility];  Service: Orthopedics    HARDWARE REMOVAL ORTHO Right 10/15/2018    Procedure: HARDWARE REMOVAL ORTHO-  FOOT METATARSALPHALANGEAL JOINT PLATE;  Surgeon: Haroldo Kang M.D.;   Location: [facility];  Service: Orthopedics    TOE AMPUTATION Left 06/25/2018    Procedure: TOE AMPUTATION-FOR :PARTIAL 1ST DISTAL PHALANX EXCISION, GREAT TOE AMPUTATION;  Surgeon: Haroldo Kang M.D.;  Location: [facility];  Service: Orthopedics    INTERNAL FIXATION REMOVAL Left 06/25/2018    Procedure: INTERNAL FIXATION REMOVAL;  Surgeon: Haroldo Kang M.D.;  Location: [facility];  Service: Orthopedics    NERVE ULNAR TRANSFER Right 04/03/2018    Procedure: NERVE ULNAR TRANSFER - TRANSPOSITION;  Surgeon: Ayad Luna M.D.;  Location: [facility];  Service: Orthopedics    CARPAL TUNNEL RELEASE Right 04/03/2018    Procedure: CARPAL TUNNEL RELEASE;  Surgeon: Ayad Luna M.D.;  Location: [facility];  Service: Orthopedics    ELBOW ARTHROTOMY Right 04/03/2018    Procedure: ELBOW ARTHROTOMY - FOR CONTRACTURE RELEASE AT THE ELBOW, RADIAL HEAD EXCISION;  Surgeon: Ayad Luna M.D.;  Location: [facility];  Service: Orthopedics    SHOULDER SURGERY Right 12/02/2017    reversal    METATARSAL HEAD RESECTION Right 11/13/2017    Procedure: METATARSAL HEAD RESECTION - EXCISION;  Surgeon: Haroldo Kang M.D.;  Location: [facility];  Service: Orthopedics    HAMMERTOE CORRECTION Right 11/13/2017    Procedure: HAMMERTOE CORRECTION 2-5;  Surgeon: Haroldo Kang M.D.;  Location: [facility];  Service: Orthopedics    TOE FUSION Right 11/13/2017    Procedure: TOE FUSION - 1ST METATARSALPHALANGEAL;  Surgeon: Haroldo Kang M.D.;  Location: [facility];  Service: Orthopedics    METATARSAL HEAD RESECTION Left 08/21/2017    Procedure: METATARSAL HEAD RESECTION - 2-5;  Surgeon: Haroldo Kang M.D.;  Location: [facility];  Service:     TOE FUSION Left 08/21/2017    Procedure: TOE FUSION - 1ST MTP;  Surgeon: Haroldo Kang M.D.;  Location: [facility];  Service:     HARDWARE REMOVAL  "ORTHO Left 08/21/2017    Procedure: HARDWARE REMOVAL ORTHO;  Surgeon: Haroldo Kang M.D.;  Location: SURGERY [facility];  Service:     FUSION, SPINE, LUMBAR, PLIF  04/14/2017    Procedure: LUMBAR FUSION POSTERIOR - MINIMALLY INVASIVE TLIF L4-5;  Surgeon: Bossman Caro M.D.;  Location: SURGERY [facility];  Service:     HAMMERTOE CORRECTION Bilateral 12/22/2016    Procedure: HAMMERTOE CORRECTION/METATARSALPHALANGEAL JOINT RELEASE 2/3 RIGHT, 2-3 LEFT;  Surgeon: Haroldo Kang M.D.;  Location: SURGERY [facility];  Service:     BUNIONECTOMY Left 12/22/2016    Procedure: BUNIONECTOMY FOR DISTAL HALLUX VALGUS CORRECTION WITH BRANDY;  Surgeon: Haroldo Kang M.D.;  Location: SURGERY [facility];  Service:     ORTHOPEDIC OSTEOTOMY Left 12/22/2016    Procedure: ORTHOPEDIC OSTEOTOMY DISTAL METATARSAL 2-5;  Surgeon: Haroldo Kang M.D.;  Location: SURGERY [facility];  Service:     HIP ARTH ANTERIOR TOTAL Left 08/18/2016    Procedure: HIP ARTHROPLASTY ANTERIOR TOTAL;  Surgeon: Emiliano Vang M.D.;  Location: SURGERY [facility];  Service:     OTHER ORTHOPEDIC SURGERY  06/2014    right shoulder  arthroplasty    OTHER ORTHOPEDIC SURGERY  11/01/2012    left knee/left ankle/left shoulder    OTHER ORTHOPEDIC SURGERY Bilateral 2004    elbow surgery major operations    OTHER  2000    dislocation left foot surgery at [facility]    FOOT SURGERY  Too many to list.  All at [facility]    20 total    ORIF, ANKLE      2 time left side    ORIF, ELBOW      2 on each elbow    ORIF, FRACTURE, TIBIA Left     tib/fib plate, has since been removed    ORIF, HIP      Left replaced    ORIF, KNEE      7 between the 2 knees, and replacements    ORIF, WRIST      Carpal tunnel    OTHER      \"septoplasty 20 years ago\"    OTHER ORTHOPEDIC SURGERY      brad knee replaced    OTHER ORTHOPEDIC SURGERY      left total hip       Screening:  In the last six months have you experienced any leakage of urine? No    Depression " Screening  Little interest or pleasure in doing things?  3 - nearly every day  Feeling down, depressed , or hopeless? 3 - nearly every day  Trouble falling or staying asleep, or sleeping too much?  2 - more than half the days  Feeling tired or having little energy?  2 - more than half the days  Poor appetite or overeating?  1 - several days  Feeling bad about yourself - or that you are a failure or have let yourself or your family down? 2 - more than half the days  Trouble concentrating on things, such as reading the newspaper or watching television? 1 - several days  Moving or speaking so slowly that other people could have noticed.  Or the opposite - being so fidgety or restless that you have been moving around a lot more than usual?  2 - more than half the days  Thoughts that you would be better off dead, or of hurting yourself?  0 - not at all  Patient Health Questionnaire Score: 16    If depressive symptoms identified deferred to follow up visit unless specifically addressed in assessment and plan.    Interpretation of PHQ-9 Total Score   Score Severity   1-4 No Depression   5-9 Mild Depression   10-14 Moderate Depression   15-19 Moderately Severe Depression   20-27 Severe Depression    Screening for Cognitive Impairment  Do you or any of your friends or family members have any concern about your memory? Yes  Three Minute Recall (Leader, Season, Table) 3/3    Joseph clock face with all 12 numbers and set the hands to show 10 minutes after 11.  Yes 5  Cognitive concerns identified deferred for follow up unless specifically addressed in assessment and plan.    Fall Risk Assessment  Has the patient had two or more falls in the last year or any fall with injury in the last year?  No    Safety Assessment  Do you always wear your seatbelt?  Yes  Any changes to home needed to function safely? No  Difficulty hearing.  No  Patient counseled about all safety risks that were identified.    Functional Assessment ADLs  Are  there any barriers preventing you from cooking for yourself or meeting nutritional needs?  No.    Are there any barriers preventing you from driving safely or obtaining transportation?  No.    Are there any barriers preventing you from using a telephone or calling for help?  No    Are there any barriers preventing you from shopping?  Yes.    Are there any barriers preventing you from taking care of your own finances?  Yes    Are there any barriers preventing you from managing your medications?  No    Are there any barriers preventing you from showering, bathing or dressing yourself? No    Are there any barriers preventing you from doing housework or laundry? No    Are there any barriers preventing you from using the toilet?No    Are you currently engaging in any exercise or physical activity?  No.      Self-Assessment of Health  What is your perception of your health? Fair    Do you sleep more than six hours a night? Yes    In the past 7 days, how much did pain keep you from doing your normal work? Some    Do you spend quality time with family or friends (virtually or in person)? Yes    Do you usually eat a heart healthy diet that constists of a variety of fruits, vegetables, whole grains and fiber? Yes    Do you eat foods high in fat and/or Fast Food more than three times per week? No    How concerned are you that your medical conditions are not being well managed? very    Are you worried that in the next 2 months, you may not have stable housing that you own, rent, or stay in as part of a household? No        Advance Care Planning  Do you have an Advance Directive, Living Will, Durable Power of , or POLST? Yes                 Health Maintenance Summary            Overdue - Hepatitis C Screening (Once) Never done      No completion history exists for this topic.              Overdue - Lung Cancer Screening Shared Decision Making (Once) Never done      No completion history exists for this topic.               Overdue - Diabetes: Monofilament / LE Exam (Yearly) Overdue since 6/25/2021 06/25/2020  SmartData: WORKFLOW - DIABETES - DIABETIC FOOT EXAM PERFORMED    06/18/2020  Done - decreased sensation in feet    06/18/2020  SmartData: WORKFLOW - DIABETES - DIABETIC FOOT EXAM PERFORMED              Overdue - Abdominal Aortic Aneurysm (AAA) Screening (Once) Never done      No completion history exists for this topic.              Ordered - Diabetes: Retinopathy Screening (Yearly) Ordered on 11/19/2024 11/03/2022  Done              Overdue - Fasting Lipid Profile (Yearly) Overdue since 5/23/2024 05/23/2023  Lipid Profile    08/06/2021  Lipid Profile    12/30/2020  Lipid Profile    05/06/2019  Lipid Profile    02/22/2018  LIPID PROFILE    Only the first 5 history entries have been loaded, but more history exists.              Overdue - COVID-19 Vaccine (5 - 2024-25 season) Overdue since 9/1/2024 11/13/2023  Imm Admin: COVID-19, mRNA, LNP-S, PF, elan-sucrose, 30 mcg/0.3 mL    01/12/2023  Imm Admin: MODERNA BIVALENT BOOSTER SARS-COV-2 VACCINE (6+)    11/03/2022  Imm Admin: MODERNA BIVALENT BOOSTER SARS-COV-2 VACCINE (6+)    11/17/2021  Imm Admin: MODERNA SARS-COV-2 VACCINE (12+)    04/05/2021  Imm Admin: MODERNA SARS-COV-2 VACCINE (12+)    Only the first 5 history entries have been loaded, but more history exists.              Overdue - A1c Screening (Every 6 Months) Overdue since 11/23/2024 05/23/2024  POCT A1C    01/31/2024  A1c    01/11/2024  POCT A1C    09/14/2023  POCT A1C    05/23/2023  HEMOGLOBIN A1C    Only the first 5 history entries have been loaded, but more history exists.              Colorectal Cancer Screening (Colonoscopy - Every 3 Years) Next due on 6/27/2025 06/27/2022  Colonoscopy (Reason not specified)    03/26/2020  REFERRAL TO GI FOR COLONOSCOPY              SERUM CREATININE (Yearly) Next due on 8/4/2025 08/04/2024  BASIC METABOLIC PANEL    08/03/2024  Comp Metabolic  Panel    05/20/2024  Comp Metabolic Panel    01/31/2024  Basic Metabolic Panel    01/02/2024  Comp Metabolic Panel    Only the first 5 history entries have been loaded, but more history exists.              Diabetes: Urine Protein Screening (Yearly) Next due on 11/19/2025 11/19/2024  MICROALBUMIN CREAT RATIO URINE    10/30/2023  MICROALBUMIN CREAT RATIO URINE    01/16/2020  MICROALBUMIN CREAT RATIO URINE              Annual Wellness Visit (Yearly) Next due on 11/19/2025 11/19/2024  Level of Service: [place] ANNUAL WELLNESS VISIT-INCLUDES PPPS SUBSEQUE*    08/22/2023  Level of Service: [place] ANNUAL WELLNESS VISIT-INCLUDES PPPS SUBSEQUE*              Pneumococcal Vaccine: 65+ Years (3 of 3 - PPSV23 or PCV20) Next due on 2/15/2027      02/15/2022  Imm Admin: Pneumococcal polysaccharide vaccine (PPSV-23)    10/31/2020  Imm Admin: Pneumococcal Conjugate Vaccine (Prevnar/PCV-13)              IMM DTaP/Tdap/Td Vaccine (2 - Td or Tdap) Next due on 3/16/2033      03/16/2023  Imm Admin: Tdap Vaccine              Zoster (Shingles) Vaccines (Series Information) Completed      06/01/2021  Imm Admin: Zoster Vaccine Recombinant (RZV) (SHINGRIX)    12/10/2020  Imm Admin: Zoster Vaccine Recombinant (RZV) (SHINGRIX)              Influenza Vaccine (Series Information) Completed      11/19/2024  Imm Admin: Influenza high-dose trivalent (PF)    11/13/2023  Imm Admin: Influenza Vaccine, Quadrivalent, Adjuvanted (Pf)    11/03/2022  Imm Admin: Influenza Vac Subunit Quad Inj (Pf)    10/25/2021  Imm Admin: Influenza Vac Subunit Quad Inj (Pf)    09/11/2020  Imm Admin: Influenza Vaccine Quad Inj (Pf)    Only the first 5 history entries have been loaded, but more history exists.              Hepatitis A Vaccine (Hep A) (Series Information) Aged Out      No completion history exists for this topic.              Hepatitis B Vaccine (Hep B) (Series Information) Aged Out      No completion history exists for this topic.              HPV Vaccines  "(Series Information) Aged Out      No completion history exists for this topic.              Polio Vaccine (Inactivated Polio) (Series Information) Aged Out      No completion history exists for this topic.              Meningococcal Immunization (Series Information) Aged Out      No completion history exists for this topic.                    Patient Care Team:  Kevin Chavez M.D. as PCP - General  Florentin Weiss M.D. (Inactive) as Consulting Physician (Endocrinology)  Jeffrey S Zollinger, D.O. (Phys Med and Rehab)  Samantha Molina M.D. (Radiation Oncology)  [employer] - NEW DME (DME Supplier)    Financial Resource Strain: Low Risk  (11/19/2024)    Overall Financial Resource Strain (CARDIA)     Difficulty of Paying Living Expenses: Not hard at all      Transportation Needs: No Transportation Needs (11/19/2024)    PRAPARE - Transportation     Lack of Transportation (Medical): No     Lack of Transportation (Non-Medical): No      Food Insecurity: No Food Insecurity (11/19/2024)    Hunger Vital Sign     Worried About Running Out of Food in the Last Year: Never true     Ran Out of Food in the Last Year: Never true        Encounter Vitals  Blood Pressure : 134/76  Pulse: 60  Respiration: 16  Pulse Oximetry: 97 %  Weight: 105 kg (231 lb)  Height: 177.8 cm (5' 10\")  BMI (Calculated): 33.15  Pain Score: No pain     ROS:  No fever, chills, nausea, vomiting, diarrhea, chest pain or shortness of breath. See HPI.    Physical Exam:  Constitutional: NAD  HENMT: NC/AT, PERRLA, EOMI, OP clear, TM's clear, no lymphadenopathy, no thyromegaly.  No JVD.  Cardiovascular: RRR, No m/r/g  Lungs: CTAB, no w/r/r  Extremities: 2+ DP, PT and Radial pulses bilaterally. No c/c/e  Skin: No legions notes  Neurologic: Alert & oriented x3, CN II-XII grossly intact    Assessment and Plan. The following treatment and monitoring plan is recommended:  Mood disorder (HCC)  Chronic, stable.  PHQ-9 score today is 16. The patient reports that his " symptoms of depression have been worsening since last August 2024. He is being followed by Psychiatry.   She denies SI/HI. Not currently in counseling. Discussed Behavioral health referral for individual therapy needs. The patient verbalized understanding and agreement.   Continue with current defined treatment plan: venlafaxine XR (EFFEXOR XR) 75 MG CAPSULE SR 24 HR , lamotrigine (LAMICTAL) 200 MG tablet, ALPRAZolam (XANAX) 1 MG Tab. Follow-up at least annually.      Acquired absence of right great toe (HCC)  Chronic, stable. The patient is s/p partial right great toe amputation in 2018 by Dr. Kang.  He denies any balance issues or pain. No current treatment. Follow-up at least annually.      Chronic respiratory failure with hypoxia (HCC)  Chronic, stable.  The patient reports that he is on supplemental oxygen at 3 LPM at night with his CPAP.  Continue with current defined treatment plan: supplemental oxygen. Follow-up at least annually.      Diabetes (HCC)  Chronic, stable. The patient stopped taking semaglutide about 5 months ago due to cost. He has an appointment with his endocrinologist Dr. Myrick on 11/22/24.   Follow-up at least annually.      Dyslipidemia  Chronic, stable. No current treatment. Discussed a heart healthy diet and regular exercise routine.  Follow-up at least annually.      Opioid type dependence, continuous (HCC)  Chronic, stable. The patient has been taking opioid for pain in multiple joints. He denies any adverse side effects at this time. The patient Continue with current defined treatment plan: oxyCODONE-acetaminophen (PERCOCET-10)  MG Tab . Follow-up at least annually.      Sedative hypnotic or anxiolytic dependence (HCC)  Chronic, stable. The patient is taking xanax for sleep . He denies any adverse side effects at this time. Continue with current defined treatment plan: ALPRAZolam (XANAX) 1 MG Tab . Follow-up at least annually.      Type 2 diabetes mellitus with diabetic  microalbuminuria (HCC)  Chronic, stable. The patient stopped taking semaglutide about 5 months ago due to cost. He has an appointment with his endocrinologist Dr. Myrick on 11/22/24.   Follow-up at least annually.     Need for vaccination  I have placed the below orders and discussed them with an approved delegated provider. The MA is performing the below orders under the direction of Dr. Nunn.      Peripheral vascular disease, unspecified (HCC)  Chronic, stable. The patient denies claudication at this time. No current treatment. Follow-up at least annually.        Services suggested: No services needed at this time  Health Care Screening: Age-appropriate preventive services recommended by USPTF and ACIP covered by Medicare were discussed today. Services ordered if indicated and agreed upon by the patient.  Referrals offered: Community-based lifestyle interventions to reduce health risks and promote self-management and wellness, fall prevention, nutrition, physical activity, tobacco-use cessation, weight loss, and mental health services as per orders if indicated.    Discussion today about general wellness and lifestyle habits:    Prevent falls and reduce trip hazards; Cautioned about securing or removing rugs.  Have a working fire alarm and carbon monoxide detector.  Engage in regular physical activity and social activities.    Follow-up: Return for appointment with Primary Care Provider as needed.

## 2024-11-19 NOTE — ASSESSMENT & PLAN NOTE
Chronic, stable. The patient stopped taking semaglutide about 5 months ago due to cost. He has an appointment with his endocrinologist Dr. Myrick on 11/22/24.   Follow-up at least annually.

## 2024-11-19 NOTE — ASSESSMENT & PLAN NOTE
Chronic, stable.  The patient reports that he is on supplemental oxygen at 3 LPM at night with his CPAP.  Continue with current defined treatment plan: supplemental oxygen. Follow-up at least annually.

## 2024-11-19 NOTE — ASSESSMENT & PLAN NOTE
Chronic, stable.  PHQ-9 score today is 16. The patient reports that his symptoms of depression have been worsening since last August 2024. He is being followed by Psychiatry.   She denies SI/HI. Not currently in counseling. Discussed Behavioral health referral for individual therapy needs. The patient verbalized understanding and agreement.   Continue with current defined treatment plan: venlafaxine XR (EFFEXOR XR) 75 MG CAPSULE SR 24 HR , lamotrigine (LAMICTAL) 200 MG tablet, ALPRAZolam (XANAX) 1 MG Tab. Follow-up at least annually.

## 2024-11-20 DIAGNOSIS — E11.65 TYPE 2 DIABETES MELLITUS WITH HYPERGLYCEMIA, WITHOUT LONG-TERM CURRENT USE OF INSULIN (HCC): ICD-10-CM

## 2024-11-20 PROBLEM — Z23 NEED FOR VACCINATION: Status: ACTIVE | Noted: 2024-11-20

## 2024-11-20 LAB
AMBIGUOUS DTTM AMBI4: NORMAL
CREAT UR-MCNC: 193.58 MG/DL
MICROALBUMIN UR-MCNC: 96.7 MG/DL
MICROALBUMIN/CREAT UR: 500 MG/G (ref 0–30)

## 2024-11-20 NOTE — ASSESSMENT & PLAN NOTE
I have placed the below orders and discussed them with an approved delegated provider. The MA is performing the below orders under the direction of Dr. Nunn.

## 2024-11-20 NOTE — ASSESSMENT & PLAN NOTE
Chronic, stable. The patient is taking xanax for sleep . He denies any adverse side effects at this time. Continue with current defined treatment plan: ALPRAZolam (XANAX) 1 MG Tab . Follow-up at least annually.

## 2024-11-20 NOTE — ASSESSMENT & PLAN NOTE
Chronic, stable. No current treatment. Discussed a heart healthy diet and regular exercise routine.  Follow-up at least annually.

## 2024-11-20 NOTE — ASSESSMENT & PLAN NOTE
Chronic, stable. The patient has been taking opioid for pain in multiple joints. He denies any adverse side effects at this time. The patient Continue with current defined treatment plan: oxyCODONE-acetaminophen (PERCOCET-10)  MG Tab . Follow-up at least annually.

## 2024-11-20 NOTE — ASSESSMENT & PLAN NOTE
Chronic, stable. The patient denies claudication at this time. No current treatment. Follow-up at least annually.

## 2024-11-21 ENCOUNTER — APPOINTMENT (OUTPATIENT)
Dept: BEHAVIORAL HEALTH | Facility: CLINIC | Age: 64
End: 2024-11-21
Payer: MEDICARE

## 2024-12-19 ENCOUNTER — APPOINTMENT (OUTPATIENT)
Dept: RADIOLOGY | Facility: MEDICAL CENTER | Age: 64
End: 2024-12-19
Attending: EMERGENCY MEDICINE
Payer: MEDICARE

## 2024-12-19 ENCOUNTER — HOSPITAL ENCOUNTER (EMERGENCY)
Facility: MEDICAL CENTER | Age: 64
End: 2024-12-19
Attending: EMERGENCY MEDICINE
Payer: MEDICARE

## 2024-12-19 VITALS
RESPIRATION RATE: 20 BRPM | HEIGHT: 70 IN | OXYGEN SATURATION: 95 % | TEMPERATURE: 98.1 F | SYSTOLIC BLOOD PRESSURE: 152 MMHG | WEIGHT: 216.27 LBS | DIASTOLIC BLOOD PRESSURE: 98 MMHG | HEART RATE: 79 BPM | BODY MASS INDEX: 30.96 KG/M2

## 2024-12-19 DIAGNOSIS — K59.00 CONSTIPATION, UNSPECIFIED CONSTIPATION TYPE: ICD-10-CM

## 2024-12-19 DIAGNOSIS — R16.0 HEPATOMEGALY: ICD-10-CM

## 2024-12-19 LAB
ALBUMIN SERPL BCP-MCNC: 4.6 G/DL (ref 3.2–4.9)
ALBUMIN/GLOB SERPL: 1.6 G/DL
ALP SERPL-CCNC: 112 U/L (ref 30–99)
ALT SERPL-CCNC: 13 U/L (ref 2–50)
ANION GAP SERPL CALC-SCNC: 13 MMOL/L (ref 7–16)
AST SERPL-CCNC: 16 U/L (ref 12–45)
BASOPHILS # BLD AUTO: 0.6 % (ref 0–1.8)
BASOPHILS # BLD: 0.03 K/UL (ref 0–0.12)
BILIRUB SERPL-MCNC: 0.5 MG/DL (ref 0.1–1.5)
BUN SERPL-MCNC: 22 MG/DL (ref 8–22)
CALCIUM ALBUM COR SERPL-MCNC: 8.7 MG/DL (ref 8.5–10.5)
CALCIUM SERPL-MCNC: 9.2 MG/DL (ref 8.5–10.5)
CHLORIDE SERPL-SCNC: 104 MMOL/L (ref 96–112)
CO2 SERPL-SCNC: 23 MMOL/L (ref 20–33)
CREAT SERPL-MCNC: 1.32 MG/DL (ref 0.5–1.4)
EOSINOPHIL # BLD AUTO: 0.03 K/UL (ref 0–0.51)
EOSINOPHIL NFR BLD: 0.6 % (ref 0–6.9)
ERYTHROCYTE [DISTWIDTH] IN BLOOD BY AUTOMATED COUNT: 45.4 FL (ref 35.9–50)
GFR SERPLBLD CREATININE-BSD FMLA CKD-EPI: 59 ML/MIN/1.73 M 2
GLOBULIN SER CALC-MCNC: 2.8 G/DL (ref 1.9–3.5)
GLUCOSE SERPL-MCNC: 114 MG/DL (ref 65–99)
HCT VFR BLD AUTO: 37.8 % (ref 42–52)
HGB BLD-MCNC: 12.8 G/DL (ref 14–18)
IMM GRANULOCYTES # BLD AUTO: 0.03 K/UL (ref 0–0.11)
IMM GRANULOCYTES NFR BLD AUTO: 0.6 % (ref 0–0.9)
LIPASE SERPL-CCNC: 124 U/L (ref 11–82)
LYMPHOCYTES # BLD AUTO: 1.31 K/UL (ref 1–4.8)
LYMPHOCYTES NFR BLD: 25.4 % (ref 22–41)
MCH RBC QN AUTO: 31.5 PG (ref 27–33)
MCHC RBC AUTO-ENTMCNC: 33.9 G/DL (ref 32.3–36.5)
MCV RBC AUTO: 93.1 FL (ref 81.4–97.8)
MONOCYTES # BLD AUTO: 0.42 K/UL (ref 0–0.85)
MONOCYTES NFR BLD AUTO: 8.2 % (ref 0–13.4)
NEUTROPHILS # BLD AUTO: 3.33 K/UL (ref 1.82–7.42)
NEUTROPHILS NFR BLD: 64.6 % (ref 44–72)
NRBC # BLD AUTO: 0 K/UL
NRBC BLD-RTO: 0 /100 WBC (ref 0–0.2)
PLATELET # BLD AUTO: 207 K/UL (ref 164–446)
PMV BLD AUTO: 8.7 FL (ref 9–12.9)
POTASSIUM SERPL-SCNC: 4.1 MMOL/L (ref 3.6–5.5)
PROT SERPL-MCNC: 7.4 G/DL (ref 6–8.2)
RBC # BLD AUTO: 4.06 M/UL (ref 4.7–6.1)
SODIUM SERPL-SCNC: 140 MMOL/L (ref 135–145)
WBC # BLD AUTO: 5.2 K/UL (ref 4.8–10.8)

## 2024-12-19 PROCEDURE — 99283 EMERGENCY DEPT VISIT LOW MDM: CPT

## 2024-12-19 PROCEDURE — 700117 HCHG RX CONTRAST REV CODE 255: Performed by: EMERGENCY MEDICINE

## 2024-12-19 PROCEDURE — 36415 COLL VENOUS BLD VENIPUNCTURE: CPT

## 2024-12-19 PROCEDURE — 74177 CT ABD & PELVIS W/CONTRAST: CPT

## 2024-12-19 PROCEDURE — 83690 ASSAY OF LIPASE: CPT

## 2024-12-19 PROCEDURE — 85025 COMPLETE CBC W/AUTO DIFF WBC: CPT

## 2024-12-19 PROCEDURE — 80053 COMPREHEN METABOLIC PANEL: CPT

## 2024-12-19 RX ORDER — SENNOSIDES 8.6 MG
1 TABLET ORAL
Qty: 14 TABLET | Refills: 1 | Status: SHIPPED | OUTPATIENT
Start: 2024-12-19

## 2024-12-19 RX ADMIN — IOHEXOL 100 ML: 350 INJECTION, SOLUTION INTRAVENOUS at 21:15

## 2024-12-19 ASSESSMENT — FIBROSIS 4 INDEX: FIB4 SCORE: 1.21

## 2024-12-19 ASSESSMENT — LIFESTYLE VARIABLES: DO YOU DRINK ALCOHOL: NO

## 2024-12-20 NOTE — DISCHARGE INSTRUCTIONS
Today your CT scan was overall reassuring.  It did show some enlarged liver and fat-containing inguinal hernias that are not contributing to your constipation.  There is a mild to moderate amount of stool throughout your colon but no large stool ball in your rectum.  I would recommend picking up the GoLytely bowel cleanout and doing it over the course of 1 to 2 days.  Also use the senna to help your colon squeeze.  If you get cleaned out from this I would do a maintenance bowel regimen of 1-2 caps of MiraLAX daily and a high-fiber diet.  If you experience severe abdominal pain or uncontrollable vomiting, please come back to the ER.

## 2024-12-20 NOTE — ED NOTES
Patient has sugar provided written and verbal education on constipation. Follow up primary care discussed. He is ambulatory on DC. 2 prescribed medications sent to his home pharmacy .

## 2024-12-20 NOTE — ED TRIAGE NOTES
"Chief Complaint   Patient presents with    Constipation     Pt states that he has not had a bowel movement in one month, pt states that he has tried milk of magnesia with little relief. States he has some mild abdominal pain and milk low back pain. -n/v       Ambulatory to triage for above complaint. Protocol ordered     Pt is alert and oriented, speaking in full sentences, follows commands and responds appropriately to questions. NAD. Resp are even and unlabored.      Pt placed in lobby. Pt educated on triage process. Pt encouraged to alert staff for any changes.     Patient and staff wearing appropriate PPE    BP (!) 170/109   Pulse 72   Temp 36.7 °C (98.1 °F) (Temporal)   Resp 16   Ht 1.778 m (5' 10\")   Wt 98.1 kg (216 lb 4.3 oz)   SpO2 94%   BMI 31.03 kg/m²     "

## 2024-12-20 NOTE — ED PROVIDER NOTES
ED Provider Note    CHIEF COMPLAINT  Chief Complaint   Patient presents with    Constipation     Pt states that he has not had a bowel movement in one month, pt states that he has tried milk of magnesia with little relief. States he has some mild abdominal pain and milk low back pain. -n/v       EXTERNAL RECORDS REVIEWED  Outpatient visit 11/19/2024 seen for comprehensive health assessment.  Has a history of mood disorder on venlafaxine, lamotrigine and alprazolam.  Chronic respiratory failure with hypoxia on 3 L nasal cannula at night with CPAP.  Diabetes not on meds.  Opioid dependence currently on oxycodone.    HPI/ROS  LIMITATION TO HISTORY   Select: : None  OUTSIDE HISTORIAN(S):  Significant other wife at bedside provides additional details on recent symptoms    Cristino Keys is a 64 y.o. male who presents to the ER for concern of constipation.  Has not really had any bowel movements over the past month.  Maybe 2 weeks ago had some small hard stools.  No significant abdominal pain.  No nausea or vomiting.  Has tried milk of magnesia couple times but no help.  Is getting some low back pain now.    PAST MEDICAL HISTORY   has a past medical history of Anemia, Anesthesia (11/03/2017), Anxiety and depression (11/03/2017), Arthritis (11/03/2017), Bronchitis, Bunion, Cancer (HCC) (2017), Chronic autoimmune thyroiditis, Dental disorder (11/03/2017), Diabetes mellitus (HCC) (11/03/2017), Gout, Hammertoe, High cholesterol (11/03/2017), Hypertension (01/23/2024), Hypothyroidism, MRSA (methicillin resistant Staphylococcus aureus), MRSA infection, Open toe wound (03/2018), Pain (11/03/2017), Pain (09/19/2022), Psychiatric problem, Seasonal allergies, Severe obesity with body mass index (BMI) of 35.0 to 39.9 with serious comorbidity (HCC) (09/05/2014), Sleep apnea (11/03/2017), Snoring (11/03/2017), Spinal disorder, Tonsillitis, and Wound infection (05/14/2018).    SURGICAL HISTORY   has a past surgical history that  includes hip arth anterior total (Left, 08/18/2016); hammertoe correction (Bilateral, 12/22/2016); bunionectomy (Left, 12/22/2016); fusion, spine, lumbar, plif (04/14/2017); metatarsal head resection (Left, 08/21/2017); metatarsal head resection (Right, 11/13/2017); hammertoe correction (Right, 11/13/2017); nerve ulnar transfer (Right, 04/03/2018); metatarsal head resection (Left, 06/10/2019); joint injection diagnostic (06/10/2019); other; other (2000); metatarsal head resection (Left, 07/20/2020); hammertoe correction (Left, 07/20/2020); toenail removal (Left, 07/20/2020); other orthopedic surgery (06/2014); other orthopedic surgery (11/01/2012); other orthopedic surgery (Bilateral, 2004); orthopedic osteotomy (Left, 12/22/2016); toe fusion (Left, 08/21/2017); hardware removal ortho (Left, 08/21/2017); toe fusion (Right, 11/13/2017); carpal tunnel release (Right, 04/03/2018); elbow arthrotomy (Right, 04/03/2018); toe amputation (Left, 06/25/2018); internal fixation removal (Left, 06/25/2018); shoulder surgery (Right, 12/02/2017); orthopedic osteotomy (Left, 10/15/2018); hardware removal ortho (Right, 10/15/2018); toe amputation (Right, 06/10/2019); other orthopedic surgery; other orthopedic surgery; heterotopic ossification (Right, 10/14/2021); upper gi endoscopy,remov f.b. (10/18/2021); upper gi endoscopy,diagnosis (10/18/2021); neuroplasty & or transpos ulnar nerve elbow (Left, 10/04/2022); radial head excision (Left, 10/04/2022); reconstr total shoulder implant (Left, 11/22/2022); repair biceps long tendon (11/22/2022); other (12/2022); orif, fracture, tibia (Left); shldr arthroscop,part acromioplas (Left, 12/05/2023); shldr arthroscop exten debride 3+ (Left, 12/05/2023); orif, elbow; orif, wrist; orif, hip; orif, knee; orif, ankle; foot surgery (Too many to list.  All at [facility]); and fusion, sacroiliac joint, posterior approach, using frameless stereotaxy, with o-arm imaging guidance (2/5/2024).    FAMILY  HISTORY  Family History   Problem Relation Age of Onset    Heart Disease Mother     Depression Mother     Cancer Father         Colon cancer     Cancer Sister         Skin cancer    Sleep Apnea Neg Hx        SOCIAL HISTORY  Social History     Tobacco Use    Smoking status: Former     Current packs/day: 0.00     Average packs/day: 1 pack/day for 20.0 years (20.0 ttl pk-yrs)     Types: Cigarettes     Start date: 1/1/1994     Quit date: 1/1/2014     Years since quitting: 10.9    Smokeless tobacco: Former     Types: Chew     Quit date: 1/1/1997   Vaping Use    Vaping status: Never Used   Substance and Sexual Activity    Alcohol use: Not Currently     Comment: occasional- approx once a week 4-5 beers    Drug use: No    Sexual activity: Not on file       CURRENT MEDICATIONS  Home Medications       Reviewed by Maira Best R.N. (Registered Nurse) on 12/19/24 at 1920  Med List Status: Not Addressed     Medication Last Dose Status   Accu-Chek FastClix Lancets Misc  Active   ACCU-CHEK GUIDE strip  Active   ALPRAZolam (XANAX) 1 MG Tab  Active   Ascorbic Acid (VITAMIN C PO)  Active   cyclobenzaprine (FLEXERIL) 10 mg Tab  Active   diazePAM (VALIUM) 5 MG Tab  Active   gabapentin (NEURONTIN) 300 MG Cap  Active   hydrOXYzine pamoate (VISTARIL) 50 MG Cap  Active   lamotrigine (LAMICTAL) 200 MG tablet  Active   levothyroxine (SYNTHROID) 137 MCG Tab  Active   levothyroxine (SYNTHROID) 137 MCG Tab  Active   losartan (COZAAR) 100 MG Tab  Active   MAGNESIUM PO  Active   methylphenidate (RITALIN) 10 MG Tab  Active   Multiple Vitamins-Minerals (ICAPS AREDS 2 PO)  Active   Multiple Vitamins-Minerals (MENS 50+ MULTIVITAMIN PO)  Active   Nirmatrelvir&Ritonavir 300/100 20 x 150 MG & 10 x 100MG Tablet Therapy Pack  Active   oxyCODONE-acetaminophen (PERCOCET-10)  MG Tab  Active   quetiapine (SEROQUEL) 400 MG tablet  Active   Semaglutide, 1 MG/DOSE, 4 MG/3ML Solution Pen-injector  Active   Semaglutide,0.25 or 0.5MG/DOS, 2 MG/3ML  "Solution Pen-injector  Active   SSD 1 % Cream  Active   tadalafil (CIALIS) 20 MG tablet  Active   tamsulosin (FLOMAX) 0.4 MG capsule  Active   temazepam (RESTORIL) 30 MG capsule  Active   testosterone cypionate (DEPO-TESTOSTERONE) 200 MG/ML injection  Active   testosterone cypionate (DEPO-TESTOSTERONE) 200 MG/ML injection  Active   Venlafaxine HCl 75 MG TABLET SR 24 HR  Active   venlafaxine XR (EFFEXOR XR) 75 MG CAPSULE SR 24 HR  Active                  Audit from Redirected Encounters    **Home medications have not yet been reviewed for this encounter**         ALLERGIES  Allergies   Allergen Reactions    Daptomycin Vomiting     Other reaction(s): Other (See Comments)  Projectile vomiting.     Demerol Vomiting and Nausea     Rxn = years ago     Meperidine Hcl Nausea and Vomiting     Rxn = years ago     Sulfa Drugs Hives     .       PHYSICAL EXAM  VITAL SIGNS: BP (!) 152/98   Pulse 79   Temp 36.7 °C (98.1 °F) (Temporal)   Resp 20   Ht 1.778 m (5' 10\")   Wt 98.1 kg (216 lb 4.3 oz)   SpO2 95%   BMI 31.03 kg/m²    General: Laying calmly in stretcher, no distress  CV: Regular rate and rhythm no murmurs  Pulmonary: CTAB  Abdomen: Soft nondistended does not appear to be overly tender, no obvious masses    EKG/LABS  CBC with mild normocytic anemia.  No leukocytosis to suggest infectious process.  CMP no evidence of acute hepatobiliary syndrome.  Lipase mildly elevated 124 but not having signs of pancreatitis at this time.  Normal renal function.    RADIOLOGY/PROCEDURES   I have independently interpreted the diagnostic imaging associated with this visit and am waiting the final reading from the radiologist.   My preliminary interpretation is as follows: Hepatomegaly with cirrhotic changes, fat-containing bilateral inguinal hernias.  Mild to moderate stool burden throughout the colon but no large rectal stool ball    Radiologist interpretation:  CT-ABDOMEN-PELVIS WITH   Final Result         1.  Hepatomegaly   2.  " Irregular hepatic contour favoring changes of cirrhosis   3.  Fat-containing bilateral inguinal hernias   4.  Atherosclerosis          COURSE & MEDICAL DECISION MAKING    ASSESSMENT, COURSE AND PLAN  Care Narrative: Differential includes functional constipation, dehydration, electrolyte derangement, bowel obstruction, malignancy    On arrival the patient is well-appearing.  Abdomen is soft I do not feel any masses.  No signs of peritonitis no significant tenderness.  He appears well-hydrated.  Differential above considered.  Labs were obtained.  CBC without leukocytosis to suggest infectious process.  CMP without electrolyte derangement, no signs of dehydration, no evidence of acute hepatobiliary syndrome.  Lipase is somewhat elevated but he is not having any signs or symptoms of pancreatitis at this time.  CT was obtained which did not show any signs of bowel obstruction or obvious malignancy.  Hepatomegaly was noted which was relayed to the patient as well as his bilateral fat-containing inguinal hernias.  These are not contributing to his presentation today.  He does not appear to have any large rectal stool ball that would benefit from disimpaction.  I do notice mild to moderate stool burden throughout the colon.  Discussed increasing bowel regimen with the patient.  Plan is to trial a 4 L GoLytely cleanout and start senna.  Followed by maintenance MiraLAX.  He was amenable to this and will  prescription tomorrow.  Return precautions were provided.  Discharged home in stable condition    DISPOSITION AND DISCUSSIONS    Escalation of care considered, and ultimately not performed: Disimpaction but unlikely to benefit based on CT    Barriers to care at this time, including but not limited to: None.     Decision tools and prescription drugs considered including, but not limited to: GoLytely jug, senna.    FINAL DIAGNOSIS  1. Constipation, unspecified constipation type    2. Hepatomegaly         Electronically  signed by: Estiven Alexander M.D., 12/19/2024 7:24 PM

## 2024-12-23 ENCOUNTER — APPOINTMENT (OUTPATIENT)
Dept: RADIOLOGY | Facility: MEDICAL CENTER | Age: 64
End: 2024-12-23
Attending: FAMILY MEDICINE
Payer: MEDICARE

## 2024-12-23 ENCOUNTER — DOCUMENTATION (OUTPATIENT)
Dept: VASCULAR LAB | Facility: MEDICAL CENTER | Age: 64
End: 2024-12-23
Payer: MEDICARE

## 2024-12-23 ENCOUNTER — TELEPHONE (OUTPATIENT)
Dept: VASCULAR LAB | Facility: MEDICAL CENTER | Age: 64
End: 2024-12-23
Payer: MEDICARE

## 2024-12-23 NOTE — TELEPHONE ENCOUNTER
[facility] [facility]    Spoke with pt concerning interaction:     Alprazolam  Diazepam  Oxycodone    Significant interactions exist with these medications and I recommend pt only utilizes one of the three above.  Medicare measures concomitant therapy and I will forward this note to the PCP requesting pt does not have active prescription for all three substances above.  I spoke with the pt today to educate him not to use them in combination.  PT is aware, but I'm still concerned he has active prescriptions.    CC  Dr Kevin Perez, PharmD   Managed Care Pharmacist  [facility] [facility]

## 2024-12-23 NOTE — PROGRESS NOTES
[facility]    Message sent to provider to evaluate dual anticholinergics. Suggested to avoid this combination.    Shorty Perez, AlaynaD

## 2025-03-05 ENCOUNTER — APPOINTMENT (OUTPATIENT)
Dept: URBAN - METROPOLITAN AREA CLINIC 22 | Facility: CLINIC | Age: 65
Setting detail: DERMATOLOGY
End: 2025-03-05

## 2025-03-05 DIAGNOSIS — L57.0 ACTINIC KERATOSIS: ICD-10-CM

## 2025-03-05 DIAGNOSIS — Z85.828 PERSONAL HISTORY OF OTHER MALIGNANT NEOPLASM OF SKIN: ICD-10-CM

## 2025-03-05 DIAGNOSIS — Z71.89 OTHER SPECIFIED COUNSELING: ICD-10-CM

## 2025-03-05 DIAGNOSIS — L81.4 OTHER MELANIN HYPERPIGMENTATION: ICD-10-CM

## 2025-03-05 DIAGNOSIS — D22 MELANOCYTIC NEVI: ICD-10-CM

## 2025-03-05 DIAGNOSIS — L82.1 OTHER SEBORRHEIC KERATOSIS: ICD-10-CM

## 2025-03-05 DIAGNOSIS — D18.0 HEMANGIOMA: ICD-10-CM

## 2025-03-05 PROBLEM — D18.01 HEMANGIOMA OF SKIN AND SUBCUTANEOUS TISSUE: Status: ACTIVE | Noted: 2025-03-05

## 2025-03-05 PROBLEM — D48.5 NEOPLASM OF UNCERTAIN BEHAVIOR OF SKIN: Status: ACTIVE | Noted: 2025-03-05

## 2025-03-05 PROBLEM — D22.5 MELANOCYTIC NEVI OF TRUNK: Status: ACTIVE | Noted: 2025-03-05

## 2025-03-05 PROCEDURE — 99213 OFFICE O/P EST LOW 20 MIN: CPT | Mod: 25

## 2025-03-05 PROCEDURE — 17003 DESTRUCT PREMALG LES 2-14: CPT

## 2025-03-05 PROCEDURE — ? BIOPSY BY SHAVE METHOD

## 2025-03-05 PROCEDURE — ? COUNSELING

## 2025-03-05 PROCEDURE — ? LIQUID NITROGEN

## 2025-03-05 PROCEDURE — 11102 TANGNTL BX SKIN SINGLE LES: CPT

## 2025-03-05 PROCEDURE — ? SUNSCREEN RECOMMENDATIONS

## 2025-03-05 PROCEDURE — 17000 DESTRUCT PREMALG LESION: CPT | Mod: 59

## 2025-03-05 ASSESSMENT — LOCATION DETAILED DESCRIPTION DERM
LOCATION DETAILED: RIGHT SUPERIOR MEDIAL MIDBACK
LOCATION DETAILED: RIGHT PROXIMAL DORSAL FOREARM
LOCATION DETAILED: LEFT INFERIOR LATERAL FOREHEAD
LOCATION DETAILED: RIGHT FOREHEAD
LOCATION DETAILED: LEFT CENTRAL MALAR CHEEK
LOCATION DETAILED: LEFT PROXIMAL DORSAL FOREARM
LOCATION DETAILED: RIGHT PROXIMAL LATERAL POSTERIOR UPPER ARM
LOCATION DETAILED: EPIGASTRIC SKIN
LOCATION DETAILED: LEFT SUPERIOR MEDIAL UPPER BACK
LOCATION DETAILED: RIGHT CENTRAL TEMPLE
LOCATION DETAILED: LEFT LATERAL SHOULDER
LOCATION DETAILED: RIGHT DISTAL POSTERIOR UPPER ARM

## 2025-03-05 ASSESSMENT — LOCATION ZONE DERM
LOCATION ZONE: TRUNK
LOCATION ZONE: ARM
LOCATION ZONE: FACE

## 2025-03-05 ASSESSMENT — LOCATION SIMPLE DESCRIPTION DERM
LOCATION SIMPLE: LEFT SHOULDER
LOCATION SIMPLE: LEFT FOREHEAD
LOCATION SIMPLE: LEFT CHEEK
LOCATION SIMPLE: RIGHT LOWER BACK
LOCATION SIMPLE: RIGHT TEMPLE
LOCATION SIMPLE: LEFT UPPER BACK
LOCATION SIMPLE: RIGHT FOREHEAD
LOCATION SIMPLE: RIGHT UPPER ARM
LOCATION SIMPLE: LEFT FOREARM
LOCATION SIMPLE: ABDOMEN
LOCATION SIMPLE: RIGHT FOREARM

## 2025-03-05 NOTE — HPI: SKIN LESION
Is This A New Presentation, Or A Follow-Up?: Skin Lesion
What Type Of Note Output Would You Prefer (Optional)?: Standard Output
How Severe Is Your Skin Lesion?: moderate
Has Your Skin Lesion Been Treated?: not been treated
Generalized edema

## 2025-03-05 NOTE — PROCEDURE: LIQUID NITROGEN
Detail Level: Detailed
Number Of Freeze-Thaw Cycles: 2 freeze-thaw cycles
Render Note In Bullet Format When Appropriate: No
Show Applicator Variable?: Yes
Post-Care Instructions: I reviewed with the patient in detail post-care instructions. Patient is to wear sunprotection, and avoid picking at any of the treated lesions. Pt may apply Vaseline to crusted or scabbing areas.
Duration Of Freeze Thaw-Cycle (Seconds): 3
Consent: The patient's consent was obtained including but not limited to risks of crusting, scabbing, blistering, scarring, darker or lighter pigmentary change, recurrence, incomplete removal and infection.

## 2025-03-19 ENCOUNTER — HOSPITAL ENCOUNTER (OUTPATIENT)
Dept: LAB | Facility: MEDICAL CENTER | Age: 65
End: 2025-03-19
Attending: INTERNAL MEDICINE
Payer: MEDICARE

## 2025-03-19 LAB
ALBUMIN SERPL BCP-MCNC: 4.6 G/DL (ref 3.2–4.9)
ALBUMIN/GLOB SERPL: 1.4 G/DL
ALP SERPL-CCNC: 135 U/L (ref 30–99)
ALT SERPL-CCNC: 28 U/L (ref 2–50)
ANION GAP SERPL CALC-SCNC: 14 MMOL/L (ref 7–16)
AST SERPL-CCNC: 27 U/L (ref 12–45)
BASOPHILS # BLD AUTO: 0.4 % (ref 0–1.8)
BASOPHILS # BLD: 0.02 K/UL (ref 0–0.12)
BILIRUB SERPL-MCNC: 0.4 MG/DL (ref 0.1–1.5)
BUN SERPL-MCNC: 23 MG/DL (ref 8–22)
CALCIUM ALBUM COR SERPL-MCNC: 9.1 MG/DL (ref 8.5–10.5)
CALCIUM SERPL-MCNC: 9.6 MG/DL (ref 8.5–10.5)
CHLORIDE SERPL-SCNC: 104 MMOL/L (ref 96–112)
CO2 SERPL-SCNC: 21 MMOL/L (ref 20–33)
CREAT SERPL-MCNC: 1.24 MG/DL (ref 0.5–1.4)
EOSINOPHIL # BLD AUTO: 0.21 K/UL (ref 0–0.51)
EOSINOPHIL NFR BLD: 4.1 % (ref 0–6.9)
ERYTHROCYTE [DISTWIDTH] IN BLOOD BY AUTOMATED COUNT: 44.7 FL (ref 35.9–50)
GFR SERPLBLD CREATININE-BSD FMLA CKD-EPI: 64 ML/MIN/1.73 M 2
GLOBULIN SER CALC-MCNC: 3.2 G/DL (ref 1.9–3.5)
GLUCOSE SERPL-MCNC: 122 MG/DL (ref 65–99)
HCT VFR BLD AUTO: 37.6 % (ref 42–52)
HGB BLD-MCNC: 12.6 G/DL (ref 14–18)
IMM GRANULOCYTES # BLD AUTO: 0.04 K/UL (ref 0–0.11)
IMM GRANULOCYTES NFR BLD AUTO: 0.8 % (ref 0–0.9)
LYMPHOCYTES # BLD AUTO: 1.22 K/UL (ref 1–4.8)
LYMPHOCYTES NFR BLD: 23.6 % (ref 22–41)
MCH RBC QN AUTO: 31.2 PG (ref 27–33)
MCHC RBC AUTO-ENTMCNC: 33.5 G/DL (ref 32.3–36.5)
MCV RBC AUTO: 93.1 FL (ref 81.4–97.8)
MONOCYTES # BLD AUTO: 0.51 K/UL (ref 0–0.85)
MONOCYTES NFR BLD AUTO: 9.8 % (ref 0–13.4)
NEUTROPHILS # BLD AUTO: 3.18 K/UL (ref 1.82–7.42)
NEUTROPHILS NFR BLD: 61.3 % (ref 44–72)
NRBC # BLD AUTO: 0 K/UL
NRBC BLD-RTO: 0 /100 WBC (ref 0–0.2)
POTASSIUM SERPL-SCNC: 4.4 MMOL/L (ref 3.6–5.5)
PROT SERPL-MCNC: 7.8 G/DL (ref 6–8.2)
RBC # BLD AUTO: 4.04 M/UL (ref 4.7–6.1)
SODIUM SERPL-SCNC: 139 MMOL/L (ref 135–145)
WBC # BLD AUTO: 5.2 K/UL (ref 4.8–10.8)

## 2025-03-19 PROCEDURE — 85041 AUTOMATED RBC COUNT: CPT

## 2025-03-19 PROCEDURE — 85048 AUTOMATED LEUKOCYTE COUNT: CPT

## 2025-03-19 PROCEDURE — 84402 ASSAY OF FREE TESTOSTERONE: CPT

## 2025-03-19 PROCEDURE — 80053 COMPREHEN METABOLIC PANEL: CPT

## 2025-03-19 PROCEDURE — 36415 COLL VENOUS BLD VENIPUNCTURE: CPT

## 2025-03-19 PROCEDURE — 84270 ASSAY OF SEX HORMONE GLOBUL: CPT

## 2025-03-19 PROCEDURE — 84403 ASSAY OF TOTAL TESTOSTERONE: CPT

## 2025-03-19 PROCEDURE — 85014 HEMATOCRIT: CPT

## 2025-03-19 PROCEDURE — 85018 HEMOGLOBIN: CPT

## 2025-03-24 LAB
SHBG SERPL-SCNC: 41 NMOL/L (ref 19–76)
TESTOST FREE SERPL-MCNC: 2.4 PG/ML (ref 47–244)
TESTOST SERPL-MCNC: 17 NG/DL (ref 300–720)
TESTOSTERONE.FREE+WB SERPL-MCNC: 7.2 NG/DL (ref 130–680)

## 2025-04-11 ENCOUNTER — APPOINTMENT (OUTPATIENT)
Dept: URBAN - METROPOLITAN AREA CLINIC 22 | Facility: CLINIC | Age: 65
Setting detail: DERMATOLOGY
End: 2025-04-11

## 2025-04-11 PROBLEM — D04.39 CARCINOMA IN SITU OF SKIN OF OTHER PARTS OF FACE: Status: ACTIVE | Noted: 2025-04-11

## 2025-04-11 PROCEDURE — ? MOHS SURGERY

## 2025-04-11 PROCEDURE — 17312 MOHS ADDL STAGE: CPT

## 2025-04-11 PROCEDURE — 17311 MOHS 1 STAGE H/N/HF/G: CPT

## 2025-04-11 PROCEDURE — 12053 INTMD RPR FACE/MM 5.1-7.5 CM: CPT

## 2025-04-11 NOTE — PROCEDURE: MOHS SURGERY
Mohs Case Number: [id number]
Biopsy Photograph Reviewed: Yes
Referring Physician (Optional): Erlin
Consent Type: Consent 1 (Standard)
Eye Shield Used: No
Surgeon Performing Repair (Optional): Alex Valentine M.D.
Initial Size Of Lesion: 0.5
X Size Of Lesion In Cm (Optional): 0.4
Number Of Stages: 1
Primary Defect Length In Cm (Final Defect Size - Required For Flaps/Grafts): 1.9
Primary Defect Width In Cm (Final Defect Size - Required For Flaps/Grafts): 1.7
Primary Defect Depth In Cm (Optional But Required For Some Insurers): 0
Repair Type: Intermediate Layered Repair
Which Instrument Did You Use For Dermabrasion?: Wire Brush
Which Eyelid Repair Cpt Are You Using?: 35972
Oculoplastic Surgeon Procedure Text (A): After obtaining clear surgical margins the patient was sent to oculoplastics for surgical repair.  The patient understands they will receive post-surgical care and follow-up from the referring physician's office.
Otolaryngologist Procedure Text (A): After obtaining clear surgical margins the patient was sent to otolaryngology for surgical repair.  The patient understands they will receive post-surgical care and follow-up from the referring physician's office.
Plastic Surgeon Procedure Text (A): After obtaining clear surgical margins the patient was sent to plastics for surgical repair.  The patient understands they will receive post-surgical care and follow-up from the referring physician's office.
Mid-Level Procedure Text (A): After obtaining clear surgical margins the patient was sent to a mid-level provider for surgical repair.  The patient understands they will receive post-surgical care and follow-up from the mid-level provider.
Provider Procedure Text (A): After obtaining clear surgical margins the defect was repaired by another provider.
Asc Procedure Text (A): After obtaining clear surgical margins the patient was sent to an ASC for surgical repair.  The patient understands they will receive post-surgical care and follow-up from the ASC physician.
Simple / Intermediate / Complex Repair - Final Wound Length In Cm: 5.6
Deep Sutures: 4-0 Maxon
Epidermal Sutures: 5-0 Fast Absorbing Gut
Suturegard Retention Suture: 2-0 Nylon
Retention Suture Bite Size: 3 mm
Length To Time In Minutes Device Was In Place: 10
Undermining Type: Entire Wound
Debridement Text: The wound edges were debrided prior to proceeding with the closure to facilitate wound healing.
Helical Rim Text: The closure involved the helical rim.
Vermilion Border Text: The closure involved the vermilion border.
Nostril Rim Text: The closure involved the nostril rim.
Retention Suture Text: Retention sutures were placed to support the closure and prevent dehiscence.
Area H Indication Text: Tumors in this location are included in Area H (eyelids, eyebrows, nose, lips, chin, ear, pre-auricular, post-auricular, temple, genitalia, hands, feet, ankles and areola).  Tissue conservation is critical in these anatomic locations.
Area M Indication Text: Tumors in this location are included in Area M (cheek, forehead, scalp, neck, jawline and pretibial skin).  Mohs surgery is indicated for tumors in these anatomic locations.
Area L Indication Text: Tumors in this location are included in Area L (trunk and extremities).  Mohs surgery is indicated for larger tumors, or tumors with aggressive histologic features, in these anatomic locations.
Tumor Debulked?: curette
Depth Of Tumor Invasion (For Histology): dermis
Perineural Invasion (For Histology - Be Specific If Possible): absent
Presence Of Scar Tissue (For Histology): present
Surgical Defect Length In Cm (Optional): 1.4
Surgical Defect Width In Cm (Optional): 1.2
Special Stains Stage 1 - Results: Base On Clearance Noted Above
Stage 2: Additional Anesthesia Volume In Cc: 3.0
Stage 2: Additional Anesthesia Type: 1% lidocaine with epinephrine and a 1:10 solution of 8.4% sodium bicarbonate
Stage 4: Additional Anesthesia Type: 1% lidocaine with epinephrine
Include Tumor Staging In Mohs Note?: Please Select the Appropriate Response
Staging Info: By selecting yes to the question above you will include information on AJCC 8 tumor staging in your Mohs note. Information on tumor staging will be automatically added for SCCs on the head and neck. AJCC 8 includes tumor size, tumor depth, perineural involvement and bone invasion.
Tumor Depth: Less than 6mm from granular layer and no invasion beyond the subcutaneous fat
Medical Necessity Statement: Based on my medical judgement, Mohs surgery is the most appropriate treatment for this cancer compared to other treatments.
Alternatives Discussed Intro (Do Not Add Period): I discussed alternative treatments to Mohs surgery and specifically discussed the risks and benefits of
Consent 1/Introductory Paragraph: The rationale for Mohs was explained to the patient and consent was obtained. The risks, benefits and alternatives to therapy were discussed in detail. Specifically, the risks of infection, scarring, bleeding, prolonged wound healing, incomplete removal, allergy to anesthesia, nerve injury and recurrence were addressed. Prior to the procedure, the treatment site was clearly identified and confirmed by the patient. All components of Universal Protocol/PAUSE Rule completed.
Consent 2/Introductory Paragraph: Mohs surgery was explained to the patient and consent was obtained. The risks, benefits and alternatives to therapy were discussed in detail. Specifically, the risks of infection, scarring, bleeding, prolonged wound healing, incomplete removal, allergy to anesthesia, nerve injury and recurrence were addressed. Prior to the procedure, the treatment site was clearly identified and confirmed by the patient. All components of Universal Protocol/PAUSE Rule completed.
Consent 3/Introductory Paragraph: I gave the patient a chance to ask questions they had about the procedure.  Following this I explained the Mohs procedure and consent was obtained. The risks, benefits and alternatives to therapy were discussed in detail. Specifically, the risks of infection, scarring, bleeding, prolonged wound healing, incomplete removal, allergy to anesthesia, nerve injury and recurrence were addressed. Prior to the procedure, the treatment site was clearly identified and confirmed by the patient. All components of Universal Protocol/PAUSE Rule completed.
Consent (Temporal Branch)/Introductory Paragraph: The rationale for Mohs was explained to the patient and consent was obtained. The risks, benefits and alternatives to therapy were discussed in detail. Specifically, the risks of damage to the temporal branch of the facial nerve, infection, scarring, bleeding, prolonged wound healing, incomplete removal, allergy to anesthesia, and recurrence were addressed. Prior to the procedure, the treatment site was clearly identified and confirmed by the patient. All components of Universal Protocol/PAUSE Rule completed.
Consent (Marginal Mandibular)/Introductory Paragraph: The rationale for Mohs was explained to the patient and consent was obtained. The risks, benefits and alternatives to therapy were discussed in detail. Specifically, the risks of damage to the marginal mandibular branch of the facial nerve, infection, scarring, bleeding, prolonged wound healing, incomplete removal, allergy to anesthesia, and recurrence were addressed. Prior to the procedure, the treatment site was clearly identified and confirmed by the patient. All components of Universal Protocol/PAUSE Rule completed.
Consent (Spinal Accessory)/Introductory Paragraph: The rationale for Mohs was explained to the patient and consent was obtained. The risks, benefits and alternatives to therapy were discussed in detail. Specifically, the risks of damage to the spinal accessory nerve, infection, scarring, bleeding, prolonged wound healing, incomplete removal, allergy to anesthesia, and recurrence were addressed. Prior to the procedure, the treatment site was clearly identified and confirmed by the patient. All components of Universal Protocol/PAUSE Rule completed.
Consent (Near Eyelid Margin)/Introductory Paragraph: The rationale for Mohs was explained to the patient and consent was obtained. The risks, benefits and alternatives to therapy were discussed in detail. Specifically, the risks of ectropion or eyelid deformity, infection, scarring, bleeding, prolonged wound healing, incomplete removal, allergy to anesthesia, nerve injury and recurrence were addressed. Prior to the procedure, the treatment site was clearly identified and confirmed by the patient. All components of Universal Protocol/PAUSE Rule completed.
Consent (Ear)/Introductory Paragraph: The rationale for Mohs was explained to the patient and consent was obtained. The risks, benefits and alternatives to therapy were discussed in detail. Specifically, the risks of ear deformity, infection, scarring, bleeding, prolonged wound healing, incomplete removal, allergy to anesthesia, nerve injury and recurrence were addressed. Prior to the procedure, the treatment site was clearly identified and confirmed by the patient. All components of Universal Protocol/PAUSE Rule completed.
Consent (Nose)/Introductory Paragraph: The rationale for Mohs was explained to the patient and consent was obtained. The risks, benefits and alternatives to therapy were discussed in detail. Specifically, the risks of nasal deformity, changes in the flow of air through the nose, infection, scarring, bleeding, prolonged wound healing, incomplete removal, allergy to anesthesia, nerve injury and recurrence were addressed. Prior to the procedure, the treatment site was clearly identified and confirmed by the patient. All components of Universal Protocol/PAUSE Rule completed.
Consent (Lip)/Introductory Paragraph: The rationale for Mohs was explained to the patient and consent was obtained. The risks, benefits and alternatives to therapy were discussed in detail. Specifically, the risks of lip deformity, changes in the oral aperture, infection, scarring, bleeding, prolonged wound healing, incomplete removal, allergy to anesthesia, nerve injury and recurrence were addressed. Prior to the procedure, the treatment site was clearly identified and confirmed by the patient. All components of Universal Protocol/PAUSE Rule completed.
Consent (Scalp)/Introductory Paragraph: The rationale for Mohs was explained to the patient and consent was obtained. The risks, benefits and alternatives to therapy were discussed in detail. Specifically, the risks of changes in hair growth pattern secondary to repair, infection, scarring, bleeding, prolonged wound healing, incomplete removal, allergy to anesthesia, nerve injury and recurrence were addressed. Prior to the procedure, the treatment site was clearly identified and confirmed by the patient. All components of Universal Protocol/PAUSE Rule completed.
Detail Level: Detailed
Postop Diagnosis: same
Anesthesia Volume In Cc: 17
Hemostasis: Electrodesiccation
Estimated Blood Loss (Cc): minimal
Repair Anesthesia Method: local infiltration
Anesthesia Volume In Cc: 8
Brow Lift Text: A midfrontal incision was made medially to the defect to allow access to the tissues just superior to the left eyebrow. Following careful dissection inferiorly in a supraperiosteal plane to the level of the left eyebrow, several 3-0 monocryl sutures were used to resuspend the eyebrow orbicularis oculi muscular unit to the superior frontal bone periosteum. This resulted in an appropriate reapproximation of static eyebrow symmetry and correction of the left brow ptosis.
Epidermal Closure: running and interrupted
Suturegard Intro: Intraoperative tissue expansion was performed, utilizing the SUTUREGARD device, in order to reduce wound tension.
Suturegard Body: The suture ends were repeatedly re-tightened and re-clamped to achieve the desired tissue expansion.
Hemigard Intro: Due to skin fragility and wound tension, it was decided to use HEMIGARD adhesive retention suture devices to permit a linear closure. The skin was cleaned and dried for a 6cm distance away from the wound. Excessive hair, if present, was removed to allow for adhesion.
Hemigard Postcare Instructions: The HEMIGARD strips are to remain completely dry for at least 5-7 days.
Donor Site Anesthesia Type: same as repair anesthesia
Epidermal Closure Graft Donor Site (Optional): simple interrupted
Graft Donor Site Bandage (Optional-Leave Blank If You Don't Want In Note): Steri-strips and a pressure bandage were applied to the donor site.
Closure 2 Information: This tab is for additional flaps and grafts, including complex repair and grafts and complex repair and flaps. You can also specify a different location for the additional defect, if the location is the same you do not need to select a new one. We will insert the automated text for the repair you select below just as we do for solitary flaps and grafts. Please note that at this time if you select a location with a different insurance zone you will need to override the ICD10 and CPT if appropriate.
Closure 3 Information: This tab is for additional flaps and grafts above and beyond our usual structured repairs.  Please note if you enter information here it will not currently bill and you will need to add the billing information manually.
Wound Care: Petrolatum
Dressing: pressure dressing with telfa
Dressing (No Sutures): dry sterile dressing
Unna Boot Text: An Unna boot was placed to help immobilize the limb and facilitate more rapid healing.
Home Suture Removal Text: Patient was provided instructions on removing sutures and will remove their sutures at home.  If they have any questions or difficulties they will call the office.
Post-Care Instructions: I reviewed with the patient in detail post-care instructions. Patient is not to engage in any heavy lifting, exercise, or swimming for the next 14 days. Should the patient develop any fevers, chills, bleeding, severe pain patient will contact the office immediately.
Pain Refusal Text: I offered to prescribe pain medication but the patient refused to take this medication.
Mauc Instructions: By selecting yes to the question below the MAUC number will be added into the note.  This will be calculated automatically based on the diagnosis chosen, the size entered, the body zone selected (H,M,L) and the specific indications you chose. You will also have the option to override the Mohs AUC if you disagree with the automatically calculated number and this option is found in the Case Summary tab.
Where Do You Want The Question To Include Opioid Counseling Located?: Case Summary Tab
Eye Protection Verbiage: Before proceeding with the stage, a plastic scleral shield was inserted. The globe was anesthetized with a few drops of 1% lidocaine with 1:100,000 epinephrine. Then, an appropriate sized scleral shield was chosen and coated with lacrilube ointment. The shield was gently inserted and left in place for the duration of each stage. After the stage was completed, the shield was gently removed.
Mohs Method Verbiage: An incision at a 45 degree angle following the standard Mohs approach was done and the specimen was harvested as a microscopic controlled layer.
Surgeon/Pathologist Verbiage (Will Incorporate Name Of Surgeon From Intro If Not Blank): operated in two distinct and integrated capacities as the surgeon and pathologist.
Mohs Histo Method Verbiage: Each section was then chromacoded and processed in the Mohs lab using the Mohs protocol and submitted for frozen section.
Subsequent Stages Histo Method Verbiage: Using a similar technique to that described above, a thin layer of tissue was removed from all areas where tumor was visible on the previous stage.  The tissue was again oriented, mapped, dyed, and processed as above.
Mohs Rapid Report Verbiage: The area of clinically evident tumor was marked with skin marking ink and appropriately hatched.  The initial incision was made following the Mohs approach through the skin.  The specimen was taken to the lab, divided into the necessary number of pieces, chromacoded and processed according to the Mohs protocol.  This was repeated in successive stages until a tumor free defect was achieved.
Complex Repair Preamble Text (Leave Blank If You Do Not Want): Extensive wide undermining was performed.
Intermediate Repair Preamble Text (Leave Blank If You Do Not Want): Undermining was performed with blunt dissection.
Graft Cartilage Fenestration Text: The cartilage was fenestrated with a 2mm punch biopsy to help facilitate graft survival and healing.
Non-Graft Cartilage Fenestration Text: The cartilage was fenestrated with a 2mm punch biopsy to help facilitate healing.
Secondary Intention Text (Leave Blank If You Do Not Want): The defect will heal with secondary intention.
No Repair - Repaired With Adjacent Surgical Defect Text (Leave Blank If You Do Not Want): After obtaining clear surgical margins the defect was repaired concurrently with another surgical defect which was in close approximation.
Adjacent Tissue Transfer Text: The defect edges were debeveled with a #15 scalpel blade. Given the location of the defect and the proximity to free margins an adjacent tissue transfer was deemed most appropriate. Using a sterile surgical marker, an appropriate flap was drawn incorporating the defect and placing the expected incisions within the relaxed skin tension lines where possible. The area thus outlined was incised deep to adipose tissue with a #15 scalpel blade. The skin margins were undermined to an appropriate distance in all directions utilizing iris scissors and carried over to close the primary defect.
Advancement Flap (Single) Text: The defect edges were debeveled with a #15 scalpel blade.  Given the location of the defect and the proximity to free margins a single advancement flap was deemed most appropriate.  Using a sterile surgical marker, an appropriate advancement flap was drawn incorporating the defect and placing the expected incisions within the relaxed skin tension lines where possible.    The area thus outlined was incised deep to adipose tissue with a #15 scalpel blade.  The skin margins were undermined to an appropriate distance in all directions utilizing iris scissors.
Advancement Flap (Double) Text: The defect edges were debeveled with a #15 scalpel blade.  Given the location of the defect and the proximity to free margins a double advancement flap was deemed most appropriate.  Using a sterile surgical marker, the appropriate advancement flaps were drawn incorporating the defect and placing the expected incisions within the relaxed skin tension lines where possible.    The area thus outlined was incised deep to adipose tissue with a #15 scalpel blade.  The skin margins were undermined to an appropriate distance in all directions utilizing iris scissors.
Advancement-Rotation Flap Text: The defect edges were debeveled with a #15 scalpel blade.  Given the location of the defect, shape of the defect and the proximity to free margins an advancement-rotation flap was deemed most appropriate.  Using a sterile surgical marker, an appropriate flap was drawn incorporating the defect and placing the expected incisions within the relaxed skin tension lines where possible. The area thus outlined was incised deep to adipose tissue with a #15 scalpel blade.  The skin margins were undermined to an appropriate distance in all directions utilizing iris scissors.
Alar Island Pedicle Flap Text: The defect edges were debeveled with a #15 scalpel blade.  Given the location of the defect, shape of the defect and the proximity to the alar rim an island pedicle advancement flap was deemed most appropriate.  Using a sterile surgical marker, an appropriate advancement flap was drawn incorporating the defect, outlining the appropriate donor tissue and placing the expected incisions within the nasal ala running parallel to the alar rim. The area thus outlined was incised with a #15 scalpel blade.  The skin margins were undermined minimally to an appropriate distance in all directions around the primary defect and laterally outward around the island pedicle utilizing iris scissors.  There was minimal undermining beneath the pedicle flap.
A-T Advancement Flap Text: The defect edges were debeveled with a #15 scalpel blade.  Given the location of the defect, shape of the defect and the proximity to free margins an A-T advancement flap was deemed most appropriate.  Using a sterile surgical marker, an appropriate advancement flap was drawn incorporating the defect and placing the expected incisions within the relaxed skin tension lines where possible.    The area thus outlined was incised deep to adipose tissue with a #15 scalpel blade.  The skin margins were undermined to an appropriate distance in all directions utilizing iris scissors.
Banner Transposition Flap Text: The defect edges were debeveled with a #15 scalpel blade.  Given the location of the defect and the proximity to free margins a Banner transposition flap was deemed most appropriate.  Using a sterile surgical marker, an appropriate flap drawn around the defect. The area thus outlined was incised deep to adipose tissue with a #15 scalpel blade.  The skin margins were undermined to an appropriate distance in all directions utilizing iris scissors.
Bilateral Helical Rim Advancement Flap Text: The defect edges were debeveled with a #15 blade scalpel.  Given the location of the defect and the proximity to free margins (helical rim) a bilateral helical rim advancement flap was deemed most appropriate.  Using a sterile surgical marker, the appropriate advancement flaps were drawn incorporating the defect and placing the expected incisions between the helical rim and antihelix where possible.  The area thus outlined was incised through and through with a #15 scalpel blade.  With a skin hook and iris scissors, the flaps were gently and sharply undermined and freed up.
Bilateral Rotation Flap Text: The defect edges were debeveled with a #15 scalpel blade. Given the location of the defect, shape of the defect and the proximity to free margins a bilateral rotation flap was deemed most appropriate. Using a sterile surgical marker, an appropriate rotation flap was drawn incorporating the defect and placing the expected incisions within the relaxed skin tension lines where possible. The area thus outlined was incised deep to adipose tissue with a #15 scalpel blade. The skin margins were undermined to an appropriate distance in all directions utilizing iris scissors. Following this, the designed flap was carried over into the primary defect and sutured into place.
Bilobed Flap Text: The defect edges were debeveled with a #15 scalpel blade.  Given the location of the defect and the proximity to free margins a bilobe flap was deemed most appropriate.  Using a sterile surgical marker, an appropriate bilobe flap drawn around the defect.    The area thus outlined was incised deep to adipose tissue with a #15 scalpel blade.  The skin margins were undermined to an appropriate distance in all directions utilizing iris scissors.
Bilobed Transposition Flap Text: The defect edges were debeveled with a #15 scalpel blade.  Given the location of the defect and the proximity to free margins a bilobed transposition flap was deemed most appropriate.  Using a sterile surgical marker, an appropriate bilobe flap drawn around the defect.    The area thus outlined was incised deep to adipose tissue with a #15 scalpel blade.  The skin margins were undermined to an appropriate distance in all directions utilizing iris scissors.
Bi-Rhombic Flap Text: The defect edges were debeveled with a #15 scalpel blade.  Given the location of the defect and the proximity to free margins a bi-rhombic flap was deemed most appropriate.  Using a sterile surgical marker, an appropriate rhombic flap was drawn incorporating the defect. The area thus outlined was incised deep to adipose tissue with a #15 scalpel blade.  The skin margins were undermined to an appropriate distance in all directions utilizing iris scissors.
Burow's Advancement Flap Text: The defect edges were debeveled with a #15 scalpel blade.  Given the location of the defect and the proximity to free margins a Burow's advancement flap was deemed most appropriate.  Using a sterile surgical marker, the appropriate advancement flap was drawn incorporating the defect and placing the expected incisions within the relaxed skin tension lines where possible.    The area thus outlined was incised deep to adipose tissue with a #15 scalpel blade.  The skin margins were undermined to an appropriate distance in all directions utilizing iris scissors.
Chonodrocutaneous Helical Advancement Flap Text: The defect edges were debeveled with a #15 scalpel blade. Given the location of the defect and the proximity to free margins a chondrocutaneous helical advancement flap was deemed most appropriate. Using a sterile surgical marker, the appropriate advancement flap was drawn incorporating the defect and placing the expected incisions within the relaxed skin tension lines where possible. The area thus outlined was incised deep to adipose tissue with a #15 scalpel blade. The skin margins were undermined to an appropriate distance in all directions utilizing iris scissors. Following this, the designed flap was advanced and carried over into the primary defect and sutured into place.
Crescentic Advancement Flap Text: The defect edges were debeveled with a #15 scalpel blade.  Given the location of the defect and the proximity to free margins a crescentic advancement flap was deemed most appropriate.  Using a sterile surgical marker, the appropriate advancement flap was drawn incorporating the defect and placing the expected incisions within the relaxed skin tension lines where possible.    The area thus outlined was incised deep to adipose tissue with a #15 scalpel blade.  The skin margins were undermined to an appropriate distance in all directions utilizing iris scissors.
Dorsal Nasal Flap Text: The defect edges were debeveled with a #15 scalpel blade.  Given the location of the defect and the proximity to free margins a dorsal nasal flap was deemed most appropriate.  Using a sterile surgical marker, an appropriate dorsal nasal flap was drawn around the defect.    The area thus outlined was incised deep to adipose tissue with a #15 scalpel blade.  The skin margins were undermined to an appropriate distance in all directions utilizing iris scissors.
Double Island Pedicle Flap Text: The defect edges were debeveled with a #15 scalpel blade.  Given the location of the defect, shape of the defect and the proximity to free margins a double island pedicle advancement flap was deemed most appropriate.  Using a sterile surgical marker, an appropriate advancement flap was drawn incorporating the defect, outlining the appropriate donor tissue and placing the expected incisions within the relaxed skin tension lines where possible.    The area thus outlined was incised deep to adipose tissue with a #15 scalpel blade.  The skin margins were undermined to an appropriate distance in all directions around the primary defect and laterally outward around the island pedicle utilizing iris scissors.  There was minimal undermining beneath the pedicle flap.
Double O-Z Flap Text: The defect edges were debeveled with a #15 scalpel blade. Given the location of the defect, shape of the defect and the proximity to free margins a Double O-Z flap was deemed most appropriate. Using a sterile surgical marker, an appropriate transposition flap was drawn incorporating the defect and placing the expected incisions within the relaxed skin tension lines where possible. The area thus outlined was incised deep to adipose tissue with a #15 scalpel blade. The skin margins were undermined to an appropriate distance in all directions utilizing iris scissors. Following this, the designed flap was carried over into the primary defect and sutured into place.
Double O-Z Plasty Text: The defect edges were debeveled with a #15 scalpel blade. Given the location of the defect, shape of the defect and the proximity to free margins a Double O-Z plasty (double transposition flap) was deemed most appropriate. Using a sterile surgical marker, the appropriate transposition flaps were drawn incorporating the defect and placing the expected incisions within the relaxed skin tension lines where possible. The area thus outlined was incised deep to adipose tissue with a #15 scalpel blade. The skin margins were undermined to an appropriate distance in all directions utilizing iris scissors. Hemostasis was achieved with electrocautery. The flaps were then transposed and carried over into place, one clockwise and the other counterclockwise, and anchored with interrupted buried subcutaneous sutures.
Double Z Plasty Text: The lesion was extirpated to the level of the fat with a #15 scalpel blade. Given the location of the defect, shape of the defect and the proximity to free margins a double Z-plasty was deemed most appropriate for repair. Using a sterile surgical marker, the appropriate transposition arms of the double Z-plasty were drawn incorporating the defect and placing the expected incisions within the relaxed skin tension lines where possible. The area thus outlined was incised deep to adipose tissue with a #15 scalpel blade. The skin margins were undermined to an appropriate distance in all directions utilizing iris scissors. The opposing transposition arms were then transposed and carried over into place in opposite direction and anchored with interrupted buried subcutaneous sutures.
Ear Star Wedge Flap Text: The defect edges were debeveled with a #15 blade scalpel.  Given the location of the defect and the proximity to free margins (helical rim) an ear star wedge flap was deemed most appropriate.  Using a sterile surgical marker, the appropriate flap was drawn incorporating the defect and placing the expected incisions between the helical rim and antihelix where possible.  The area thus outlined was incised through and through with a #15 scalpel blade.
Flip-Flop Flap Text: The defect edges were debeveled with a #15 blade scalpel.  Given the location of the defect and the proximity to free margins a flip-flop flap was deemed most appropriate. Using a sterile surgical marker, the appropriate flap was drawn incorporating the defect and placing the expected incisions between the helical rim and antihelix where possible.  The area thus outlined was incised through and through with a #15 scalpel blade. Following this, the designed flap was carried over into the primary defect and sutured into place.
Hatchet Flap Text: The defect edges were debeveled with a #15 scalpel blade.  Given the location of the defect, shape of the defect and the proximity to free margins a hatchet flap was deemed most appropriate.  Using a sterile surgical marker, an appropriate hatchet flap was drawn incorporating the defect and placing the expected incisions within the relaxed skin tension lines where possible.    The area thus outlined was incised deep to adipose tissue with a #15 scalpel blade.  The skin margins were undermined to an appropriate distance in all directions utilizing iris scissors.
Helical Rim Advancement Flap Text: The defect edges were debeveled with a #15 blade scalpel.  Given the location of the defect and the proximity to free margins (helical rim) a double helical rim advancement flap was deemed most appropriate.  Using a sterile surgical marker, the appropriate advancement flaps were drawn incorporating the defect and placing the expected incisions between the helical rim and antihelix where possible.  The area thus outlined was incised through and through with a #15 scalpel blade.  With a skin hook and iris scissors, the flaps were gently and sharply undermined and freed up.
H Plasty Text: Given the location of the defect, shape of the defect and the proximity to free margins a H-plasty was deemed most appropriate for repair.  Using a sterile surgical marker, the appropriate advancement arms of the H-plasty were drawn incorporating the defect and placing the expected incisions within the relaxed skin tension lines where possible. The area thus outlined was incised deep to adipose tissue with a #15 scalpel blade. The skin margins were undermined to an appropriate distance in all directions utilizing iris scissors.  The opposing advancement arms were then advanced into place in opposite direction and anchored with interrupted buried subcutaneous sutures.
Island Pedicle Flap Text: The defect edges were debeveled with a #15 scalpel blade.  Given the location of the defect, shape of the defect and the proximity to free margins an island pedicle advancement flap was deemed most appropriate.  Using a sterile surgical marker, an appropriate advancement flap was drawn incorporating the defect, outlining the appropriate donor tissue and placing the expected incisions within the relaxed skin tension lines where possible.    The area thus outlined was incised deep to adipose tissue with a #15 scalpel blade.  The skin margins were undermined to an appropriate distance in all directions around the primary defect and laterally outward around the island pedicle utilizing iris scissors.  There was minimal undermining beneath the pedicle flap.
Island Pedicle Flap With Canthal Suspension Text: The defect edges were debeveled with a #15 scalpel blade.  Given the location of the defect, shape of the defect and the proximity to free margins an island pedicle advancement flap was deemed most appropriate.  Using a sterile surgical marker, an appropriate advancement flap was drawn incorporating the defect, outlining the appropriate donor tissue and placing the expected incisions within the relaxed skin tension lines where possible. The area thus outlined was incised deep to adipose tissue with a #15 scalpel blade.  The skin margins were undermined to an appropriate distance in all directions around the primary defect and laterally outward around the island pedicle utilizing iris scissors.  There was minimal undermining beneath the pedicle flap. A suspension suture was placed in the canthal tendon to prevent tension and prevent ectropion.
Island Pedicle Flap-Requiring Vessel Identification Text: The defect edges were debeveled with a #15 scalpel blade.  Given the location of the defect, shape of the defect and the proximity to free margins an island pedicle advancement flap was deemed most appropriate.  Using a sterile surgical marker, an appropriate advancement flap was drawn, based on the axial vessel mentioned above, incorporating the defect, outlining the appropriate donor tissue and placing the expected incisions within the relaxed skin tension lines where possible.    The area thus outlined was incised deep to adipose tissue with a #15 scalpel blade.  The skin margins were undermined to an appropriate distance in all directions around the primary defect and laterally outward around the island pedicle utilizing iris scissors.  There was minimal undermining beneath the pedicle flap.
Keystone Flap Text: The defect edges were debeveled with a #15 scalpel blade.  Given the location of the defect, shape of the defect a keystone flap was deemed most appropriate.  Using a sterile surgical marker, an appropriate keystone flap was drawn incorporating the defect, outlining the appropriate donor tissue and placing the expected incisions within the relaxed skin tension lines where possible. The area thus outlined was incised deep to adipose tissue with a #15 scalpel blade.  The skin margins were undermined to an appropriate distance in all directions around the primary defect and laterally outward around the flap utilizing iris scissors.
Melolabial Transposition Flap Text: The defect edges were debeveled with a #15 scalpel blade.  Given the location of the defect and the proximity to free margins a melolabial flap was deemed most appropriate.  Using a sterile surgical marker, an appropriate melolabial transposition flap was drawn incorporating the defect.    The area thus outlined was incised deep to adipose tissue with a #15 scalpel blade.  The skin margins were undermined to an appropriate distance in all directions utilizing iris scissors.
Mercedes Flap Text: The defect edges were debeveled with a #15 scalpel blade.  Given the location of the defect, shape of the defect and the proximity to free margins a Mercedes flap was deemed most appropriate.  Using a sterile surgical marker, an appropriate advancement flap was drawn incorporating the defect and placing the expected incisions within the relaxed skin tension lines where possible. The area thus outlined was incised deep to adipose tissue with a #15 scalpel blade.  The skin margins were undermined to an appropriate distance in all directions utilizing iris scissors.
Modified Advancement Flap Text: The defect edges were debeveled with a #15 scalpel blade.  Given the location of the defect, shape of the defect and the proximity to free margins a modified advancement flap was deemed most appropriate.  Using a sterile surgical marker, an appropriate advancement flap was drawn incorporating the defect and placing the expected incisions within the relaxed skin tension lines where possible.    The area thus outlined was incised deep to adipose tissue with a #15 scalpel blade.  The skin margins were undermined to an appropriate distance in all directions utilizing iris scissors.
Mucosal Advancement Flap Text: Given the location of the defect, shape of the defect and the proximity to free margins a mucosal advancement flap was deemed most appropriate. Incisions were made with a 15 blade scalpel in the appropriate fashion along the cutaneous vermilion border and the mucosal lip. The remaining actinically damaged mucosal tissue was excised.  The mucosal advancement flap was then elevated to the gingival sulcus with care taken to preserve the neurovascular structures and advanced into the primary defect. Care was taken to ensure that precise realignment of the vermilion border was achieved.
Muscle Hinge Flap Text: The defect edges were debeveled with a #15 scalpel blade.  Given the size, depth and location of the defect and the proximity to free margins a muscle hinge flap was deemed most appropriate.  Using a sterile surgical marker, an appropriate hinge flap was drawn incorporating the defect. The area thus outlined was incised with a #15 scalpel blade.  The skin margins were undermined to an appropriate distance in all directions utilizing iris scissors.
Mustarde Flap Text: The defect edges were debeveled with a #15 scalpel blade.  Given the size, depth and location of the defect and the proximity to free margins a Mustarde flap was deemed most appropriate. Using a sterile surgical marker, an appropriate flap was drawn incorporating the defect. The area thus outlined was incised with a #15 scalpel blade. The skin margins were undermined to an appropriate distance in all directions utilizing iris scissors. Following this, the designed flap was carried into the primary defect and sutured into place.
Nasal Turnover Hinge Flap Text: The defect edges were debeveled with a #15 scalpel blade.  Given the size, depth, location of the defect and the defect being full thickness a nasal turnover hinge flap was deemed most appropriate. Using a sterile surgical marker, an appropriate hinge flap was drawn incorporating the defect. The area thus outlined was incised with a #15 scalpel blade. The flap was designed to recreate the nasal mucosal lining and the alar rim. The skin margins were undermined to an appropriate distance in all directions utilizing iris scissors. Following this, the designed flap was carried over into the primary defect and sutured into place
Nasalis-Muscle-Based Myocutaneous Island Pedicle Flap Text: Using a #15 blade, an incision was made around the donor flap to the level of the nasalis muscle. Wide lateral undermining was then performed in both the subcutaneous plane above the nasalis muscle, and in a submuscular plane just above periosteum. This allowed the formation of a free nasalis muscle axial pedicle (based on the angular artery) which was still attached to the actual cutaneous flap, increasing its mobility and vascular viability. Hemostasis was obtained with pinpoint electrocoagulation. The flap was mobilized into position and the pivotal anchor points positioned and stabilized with buried interrupted sutures. Subcutaneous and dermal tissues were closed in a multilayered fashion with sutures. Tissue redundancies were excised, and the epidermal edges were apposed without significant tension and sutured with sutures.
Nasalis Myocutaneous Flap Text: Using a #15 blade, an incision was made around the donor flap to the level of the nasalis muscle. Wide lateral undermining was then performed in both the subcutaneous plane above the nasalis muscle, and in a submuscular plane just above periosteum. This allowed the formation of a free nasalis muscle axial pedicle which was still attached to the actual cutaneous flap, increasing its mobility and vascular viability. Hemostasis was obtained with pinpoint electrocoagulation. The flap was mobilized into position and the pivotal anchor points positioned and stabilized with buried interrupted sutures. Subcutaneous and dermal tissues were closed in a multilayered fashion with sutures. Tissue redundancies were excised, and the epidermal edges were apposed without significant tension and sutured with sutures.
Nasolabial Transposition Flap Text: The defect edges were debeveled with a #15 scalpel blade.  Given the size, depth and location of the defect and the proximity to free margins a nasolabial transposition flap was deemed most appropriate. Using a sterile surgical marker, an appropriate flap was drawn incorporating the defect. The area thus outlined was incised with a #15 scalpel blade. The skin margins were undermined to an appropriate distance in all directions utilizing iris scissors. Following this, the designed flap was carried into the primary defect and sutured into place.
Orbicularis Oris Muscle Flap Text: The defect edges were debeveled with a #15 scalpel blade.  Given that the defect affected the competency of the oral sphincter an orbicularis oris muscle flap was deemed most appropriate to restore this competency and normal muscle function.  Using a sterile surgical marker, an appropriate flap was drawn incorporating the defect. The area thus outlined was incised with a #15 scalpel blade. Following this, the designed flap was carried over into the primary defect and sutured into place.
O-T Advancement Flap Text: The defect edges were debeveled with a #15 scalpel blade.  Given the location of the defect, shape of the defect and the proximity to free margins an O-T advancement flap was deemed most appropriate.  Using a sterile surgical marker, an appropriate advancement flap was drawn incorporating the defect and placing the expected incisions within the relaxed skin tension lines where possible.    The area thus outlined was incised deep to adipose tissue with a #15 scalpel blade.  The skin margins were undermined to an appropriate distance in all directions utilizing iris scissors.
O-T Plasty Text: The defect edges were debeveled with a #15 scalpel blade.  Given the location of the defect, shape of the defect and the proximity to free margins an O-T plasty was deemed most appropriate.  Using a sterile surgical marker, an appropriate O-T plasty was drawn incorporating the defect and placing the expected incisions within the relaxed skin tension lines where possible.    The area thus outlined was incised deep to adipose tissue with a #15 scalpel blade.  The skin margins were undermined to an appropriate distance in all directions utilizing iris scissors.
O-L Flap Text: The defect edges were debeveled with a #15 scalpel blade.  Given the location of the defect, shape of the defect and the proximity to free margins an O-L flap was deemed most appropriate.  Using a sterile surgical marker, an appropriate advancement flap was drawn incorporating the defect and placing the expected incisions within the relaxed skin tension lines where possible.    The area thus outlined was incised deep to adipose tissue with a #15 scalpel blade.  The skin margins were undermined to an appropriate distance in all directions utilizing iris scissors.
O-Z Flap Text: The defect edges were debeveled with a #15 scalpel blade. Given the location of the defect, shape of the defect and the proximity to free margins an O-Z flap was deemed most appropriate. Using a sterile surgical marker, an appropriate transposition flap was drawn incorporating the defect and placing the expected incisions within the relaxed skin tension lines where possible. The area thus outlined was incised deep to adipose tissue with a #15 scalpel blade. The skin margins were undermined to an appropriate distance in all directions utilizing iris scissors. Following this, the designed flap was carried over into the primary defect and sutured into place.
O-Z Plasty Text: The defect edges were debeveled with a #15 scalpel blade.  Given the location of the defect, shape of the defect and the proximity to free margins an O-Z plasty (double transposition flap) was deemed most appropriate.  Using a sterile surgical marker, the appropriate transposition flaps were drawn incorporating the defect and placing the expected incisions within the relaxed skin tension lines where possible.    The area thus outlined was incised deep to adipose tissue with a #15 scalpel blade.  The skin margins were undermined to an appropriate distance in all directions utilizing iris scissors.  Hemostasis was achieved with electrocautery.  The flaps were then transposed into place, one clockwise and the other counterclockwise, and anchored with interrupted buried subcutaneous sutures.
Peng Advancement Flap Text: The defect edges were debeveled with a #15 scalpel blade. Given the location of the defect, shape of the defect and the proximity to free margins a Peng advancement flap was deemed most appropriate. Using a sterile surgical marker, an appropriate advancement flap was drawn incorporating the defect and placing the expected incisions within the relaxed skin tension lines where possible. The area thus outlined was incised deep to adipose tissue with a #15 scalpel blade. The skin margins were undermined to an appropriate distance in all directions utilizing iris scissors. Following this, the designed flap was advanced and carried over into the primary defect and sutured into place.
Rectangular Flap Text: The defect edges were debeveled with a #15 scalpel blade. Given the location of the defect and the proximity to free margins a rectangular flap was deemed most appropriate. Using a sterile surgical marker, an appropriate rectangular flap was drawn incorporating the defect. The area thus outlined was incised deep to adipose tissue with a #15 scalpel blade. The skin margins were undermined to an appropriate distance in all directions utilizing iris scissors. Following this, the designed flap was carried over into the primary defect and sutured into place.
Rhombic Flap Text: The defect edges were debeveled with a #15 scalpel blade.  Given the location of the defect and the proximity to free margins a rhombic flap was deemed most appropriate.  Using a sterile surgical marker, an appropriate rhombic flap was drawn incorporating the defect.    The area thus outlined was incised deep to adipose tissue with a #15 scalpel blade.  The skin margins were undermined to an appropriate distance in all directions utilizing iris scissors.
Rhomboid Transposition Flap Text: The defect edges were debeveled with a #15 scalpel blade. Given the location of the defect and the proximity to free margins a rhomboid transposition flap was deemed most appropriate. Using a sterile surgical marker, an appropriate rhomboid flap was drawn incorporating the defect. The area thus outlined was incised deep to adipose tissue with a #15 scalpel blade. The skin margins were undermined to an appropriate distance in all directions utilizing iris scissors. Following this, the designed flap was carried over into the primary defect and sutured into place.
Rotation Flap Text: The defect edges were debeveled with a #15 scalpel blade.  Given the location of the defect, shape of the defect and the proximity to free margins a rotation flap was deemed most appropriate.  Using a sterile surgical marker, an appropriate rotation flap was drawn incorporating the defect and placing the expected incisions within the relaxed skin tension lines where possible.    The area thus outlined was incised deep to adipose tissue with a #15 scalpel blade.  The skin margins were undermined to an appropriate distance in all directions utilizing iris scissors.
Spiral Flap Text: The defect edges were debeveled with a #15 scalpel blade.  Given the location of the defect, shape of the defect and the proximity to free margins a spiral flap was deemed most appropriate.  Using a sterile surgical marker, an appropriate rotation flap was drawn incorporating the defect and placing the expected incisions within the relaxed skin tension lines where possible. The area thus outlined was incised deep to adipose tissue with a #15 scalpel blade.  The skin margins were undermined to an appropriate distance in all directions utilizing iris scissors.
Staged Advancement Flap Text: The defect edges were debeveled with a #15 scalpel blade. Given the location of the defect, shape of the defect and the proximity to free margins a staged advancement flap was deemed most appropriate. Using a sterile surgical marker, an appropriate advancement flap was drawn incorporating the defect and placing the expected incisions within the relaxed skin tension lines where possible. The area thus outlined was incised deep to adipose tissue with a #15 scalpel blade. The skin margins were undermined to an appropriate distance in all directions utilizing iris scissors. Following this, the designed flap was carried over into the primary defect and sutured into place.
Star Wedge Flap Text: The defect edges were debeveled with a #15 scalpel blade.  Given the location of the defect, shape of the defect and the proximity to free margins a star wedge flap was deemed most appropriate.  Using a sterile surgical marker, an appropriate rotation flap was drawn incorporating the defect and placing the expected incisions within the relaxed skin tension lines where possible. The area thus outlined was incised deep to adipose tissue with a #15 scalpel blade.  The skin margins were undermined to an appropriate distance in all directions utilizing iris scissors.
Transposition Flap Text: The defect edges were debeveled with a #15 scalpel blade.  Given the location of the defect and the proximity to free margins a transposition flap was deemed most appropriate.  Using a sterile surgical marker, an appropriate transposition flap was drawn incorporating the defect.    The area thus outlined was incised deep to adipose tissue with a #15 scalpel blade.  The skin margins were undermined to an appropriate distance in all directions utilizing iris scissors.
Trilobed Flap Text: The defect edges were debeveled with a #15 scalpel blade.  Given the location of the defect and the proximity to free margins a trilobed flap was deemed most appropriate.  Using a sterile surgical marker, an appropriate trilobed flap drawn around the defect.    The area thus outlined was incised deep to adipose tissue with a #15 scalpel blade.  The skin margins were undermined to an appropriate distance in all directions utilizing iris scissors.
V-Y Flap Text: The defect edges were debeveled with a #15 scalpel blade.  Given the location of the defect, shape of the defect and the proximity to free margins a V-Y flap was deemed most appropriate.  Using a sterile surgical marker, an appropriate advancement flap was drawn incorporating the defect and placing the expected incisions within the relaxed skin tension lines where possible.    The area thus outlined was incised deep to adipose tissue with a #15 scalpel blade.  The skin margins were undermined to an appropriate distance in all directions utilizing iris scissors.
V-Y Plasty Text: The defect edges were debeveled with a #15 scalpel blade.  Given the location of the defect, shape of the defect and the proximity to free margins an V-Y advancement flap was deemed most appropriate.  Using a sterile surgical marker, an appropriate advancement flap was drawn incorporating the defect and placing the expected incisions within the relaxed skin tension lines where possible.    The area thus outlined was incised deep to adipose tissue with a #15 scalpel blade.  The skin margins were undermined to an appropriate distance in all directions utilizing iris scissors.
W Plasty Text: The lesion was extirpated to the level of the fat with a #15 scalpel blade.  Given the location of the defect, shape of the defect and the proximity to free margins a W-plasty was deemed most appropriate for repair.  Using a sterile surgical marker, the appropriate transposition arms of the W-plasty were drawn incorporating the defect and placing the expected incisions within the relaxed skin tension lines where possible.    The area thus outlined was incised deep to adipose tissue with a #15 scalpel blade.  The skin margins were undermined to an appropriate distance in all directions utilizing iris scissors.  The opposing transposition arms were then transposed into place in opposite direction and anchored with interrupted buried subcutaneous sutures.
Z Plasty Text: The lesion was extirpated to the level of the fat with a #15 scalpel blade.  Given the location of the defect, shape of the defect and the proximity to free margins a Z-plasty was deemed most appropriate for repair.  Using a sterile surgical marker, the appropriate transposition arms of the Z-plasty were drawn incorporating the defect and placing the expected incisions within the relaxed skin tension lines where possible.    The area thus outlined was incised deep to adipose tissue with a #15 scalpel blade.  The skin margins were undermined to an appropriate distance in all directions utilizing iris scissors.  The opposing transposition arms were then transposed into place in opposite direction and anchored with interrupted buried subcutaneous sutures.
Zygomaticofacial Flap Text: Given the location of the defect, shape of the defect and the proximity to free margins a zygomaticofacial flap was deemed most appropriate for repair. Using a sterile surgical marker, the appropriate flap was drawn incorporating the defect and placing the expected incisions within the relaxed skin tension lines where possible. The area thus outlined was incised deep to adipose tissue with a #15 scalpel blade with preservation of a vascular pedicle.  The skin margins were undermined to an appropriate distance in all directions utilizing iris scissors. The flap was then carried over into the defect and anchored with interrupted buried subcutaneous sutures.
Abbe Flap (Lower To Upper Lip) Text: The defect of the upper lip was assessed and measured.  Given the location and size of the defect, an Abbe flap was deemed most appropriate. Using a sterile surgical marker, an appropriate Abbe flap was measured and drawn on the lower lip. Local anesthesia was then infiltrated. A scalpel was then used to incise the upper lip through and through the skin, vermilion, muscle and mucosa, leaving the flap pedicled on the opposite side.  The flap was then rotated and transferred to the lower lip defect.  The flap was then sutured into place with a three layer technique, closing the orbicularis oris muscle layer with subcutaneous buried sutures, followed by a mucosal layer and an epidermal layer.
Abbe Flap (Upper To Lower Lip) Text: The defect of the lower lip was assessed and measured.  Given the location and size of the defect, an Abbe flap was deemed most appropriate. Using a sterile surgical marker, an appropriate Abbe flap was measured and drawn on the upper lip. Local anesthesia was then infiltrated.  A scalpel was then used to incise the upper lip through and through the skin, vermilion, muscle and mucosa, leaving the flap pedicled on the opposite side.  The flap was then rotated and transferred to the lower lip defect.  The flap was then sutured into place with a three layer technique, closing the orbicularis oris muscle layer with subcutaneous buried sutures, followed by a mucosal layer and an epidermal layer.
Cheek Interpolation Flap Text: A decision was made to reconstruct the defect utilizing an interpolation axial flap and a staged reconstruction.  A telfa template was made of the defect.  This telfa template was then used to outline the Cheek Interpolation flap.  The donor area for the pedicle flap was then injected with anesthesia.  The flap was excised through the skin and subcutaneous tissue down to the layer of the underlying musculature.  The interpolation flap was carefully excised within this deep plane to maintain its blood supply.  The edges of the donor site were undermined.   The donor site was closed in a primary fashion.  The pedicle was then rotated into position and sutured.  Once the tube was sutured into place, adequate blood supply was confirmed with blanching and refill.  The pedicle was then wrapped with xeroform gauze and dressed appropriately with a telfa and gauze bandage to ensure continued blood supply and protect the attached pedicle.
Cheek-To-Nose Interpolation Flap Text: A decision was made to reconstruct the defect utilizing an interpolation axial flap and a staged reconstruction.  A telfa template was made of the defect.  This telfa template was then used to outline the Cheek-To-Nose Interpolation flap.  The donor area for the pedicle flap was then injected with anesthesia.  The flap was excised through the skin and subcutaneous tissue down to the layer of the underlying musculature.  The interpolation flap was carefully excised within this deep plane to maintain its blood supply.  The edges of the donor site were undermined.   The donor site was closed in a primary fashion.  The pedicle was then rotated into position and sutured.  Once the tube was sutured into place, adequate blood supply was confirmed with blanching and refill.  The pedicle was then wrapped with xeroform gauze and dressed appropriately with a telfa and gauze bandage to ensure continued blood supply and protect the attached pedicle.
Estlander Flap (Lower To Upper Lip) Text: The defect of the lower lip was assessed and measured.  Given the location and size of the defect, an Estlander flap was deemed most appropriate. Using a sterile surgical marker, an appropriate Estlander flap was measured and drawn on the upper lip. Local anesthesia was then infiltrated. A scalpel was then used to incise the lateral aspect of the flap, through skin, muscle and mucosa, leaving the flap pedicled medially.  The flap was then rotated and positioned to fill the lower lip defect.  The flap was then sutured into place with a three layer technique, closing the orbicularis oris muscle layer with subcutaneous buried sutures, followed by a mucosal layer and an epidermal layer.
Interpolation Flap Text: A decision was made to reconstruct the defect utilizing an interpolation axial flap and a staged reconstruction.  A telfa template was made of the defect.  This telfa template was then used to outline the interpolation flap.  The donor area for the pedicle flap was then injected with anesthesia.  The flap was excised through the skin and subcutaneous tissue down to the layer of the underlying musculature.  The interpolation flap was carefully excised within this deep plane to maintain its blood supply.  The edges of the donor site were undermined.   The donor site was closed in a primary fashion.  The pedicle was then rotated into position and sutured.  Once the tube was sutured into place, adequate blood supply was confirmed with blanching and refill.  The pedicle was then wrapped with xeroform gauze and dressed appropriately with a telfa and gauze bandage to ensure continued blood supply and protect the attached pedicle.
Melolabial Interpolation Flap Text: A decision was made to reconstruct the defect utilizing an interpolation axial flap and a staged reconstruction.  A telfa template was made of the defect.  This telfa template was then used to outline the melolabial interpolation flap.  The donor area for the pedicle flap was then injected with anesthesia.  The flap was excised through the skin and subcutaneous tissue down to the layer of the underlying musculature.  The pedicle flap was carefully excised within this deep plane to maintain its blood supply.  The edges of the donor site were undermined.   The donor site was closed in a primary fashion.  The pedicle was then rotated into position and sutured.  Once the tube was sutured into place, adequate blood supply was confirmed with blanching and refill.  The pedicle was then wrapped with xeroform gauze and dressed appropriately with a telfa and gauze bandage to ensure continued blood supply and protect the attached pedicle.
Mastoid Interpolation Flap Text: A decision was made to reconstruct the defect utilizing an interpolation axial flap and a staged reconstruction.  A telfa template was made of the defect.  This telfa template was then used to outline the mastoid interpolation flap.  The donor area for the pedicle flap was then injected with anesthesia.  The flap was excised through the skin and subcutaneous tissue down to the layer of the underlying musculature.  The pedicle flap was carefully excised within this deep plane to maintain its blood supply.  The edges of the donor site were undermined.   The donor site was closed in a primary fashion.  The pedicle was then rotated into position and sutured.  Once the tube was sutured into place, adequate blood supply was confirmed with blanching and refill.  The pedicle was then wrapped with xeroform gauze and dressed appropriately with a telfa and gauze bandage to ensure continued blood supply and protect the attached pedicle.
Paramedian Forehead Flap Text: A decision was made to reconstruct the defect utilizing an interpolation axial flap and a staged reconstruction.  A telfa template was made of the defect.  This telfa template was then used to outline the paramedian forehead pedicle flap.  The donor area for the pedicle flap was then injected with anesthesia.  The flap was excised through the skin and subcutaneous tissue down to the layer of the underlying musculature.  The pedicle flap was carefully excised within this deep plane to maintain its blood supply.  The edges of the donor site were undermined.   The donor site was closed in a primary fashion.  The pedicle was then rotated into position and sutured.  Once the tube was sutured into place, adequate blood supply was confirmed with blanching and refill.  The pedicle was then wrapped with xeroform gauze and dressed appropriately with a telfa and gauze bandage to ensure continued blood supply and protect the attached pedicle.
Posterior Auricular Interpolation Flap Text: A decision was made to reconstruct the defect utilizing an interpolation axial flap and a staged reconstruction.  A telfa template was made of the defect.  This telfa template was then used to outline the posterior auricular interpolation flap.  The donor area for the pedicle flap was then injected with anesthesia.  The flap was excised through the skin and subcutaneous tissue down to the layer of the underlying musculature.  The pedicle flap was carefully excised within this deep plane to maintain its blood supply.  The edges of the donor site were undermined.   The donor site was closed in a primary fashion.  The pedicle was then rotated into position and sutured.  Once the tube was sutured into place, adequate blood supply was confirmed with blanching and refill.  The pedicle was then wrapped with xeroform gauze and dressed appropriately with a telfa and gauze bandage to ensure continued blood supply and protect the attached pedicle.
Cheiloplasty (Complex) Text: A decision was made to reconstruct the defect with a  cheiloplasty.  The defect was undermined extensively.  Additional obicularis oris muscle was excised with a 15 blade scalpel.  The defect was converted into a full thickness wedge to facilite a better cosmetic result.  Small vessels were then tied off with 5-0 monocyrl. The obicularis oris, superficial fascia, adipose and dermis were then reapproximated.  After the deeper layers were approximated the epidermis was reapproximated with particular care given to realign the vermilion border.
Cheiloplasty (Less Than 50%) Text: A decision was made to reconstruct the defect with a  cheiloplasty.  The defect was undermined extensively.  Additional obicularis oris muscle was excised with a 15 blade scalpel.  The defect was converted into a full thickness wedge, of less than 50% of the vertical height of the lip, to facilite a better cosmetic result.  Small vessels were then tied off with 5-0 monocyrl. The obicularis oris, superficial fascia, adipose and dermis were then reapproximated.  After the deeper layers were approximated the epidermis was reapproximated with particular care given to realign the vermilion border.
Ear Wedge Repair Text: A wedge excision was completed by carrying down an excision through the full thickness of the ear and cartilage with an inward facing Burow's triangle. The wound was then closed in a layered fashion.
Full Thickness Lip Wedge Repair (Flap) Text: Given the location of the defect and the proximity to free margins a full thickness wedge repair was deemed most appropriate.  Using a sterile surgical marker, the appropriate repair was drawn incorporating the defect and placing the expected incisions perpendicular to the vermilion border.  The vermilion border was also meticulously outlined to ensure appropriate reapproximation during the repair.  The area thus outlined was incised through and through with a #15 scalpel blade.  The muscularis and dermis were reaproximated with deep sutures following hemostasis. Care was taken to realign the vermilion border before proceeding with the superficial closure.  Once the vermilion was realigned the superfical and mucosal closure was finished.
Burow's Graft Text: The defect edges were debeveled with a #15 scalpel blade. Given the location of the defect, shape of the defect, the proximity to free margins and the presence of a standing cone deformity a Burow's skin graft was deemed most appropriate. The standing cone was removed and this tissue was then trimmed to the shape of the primary defect. The adipose tissue was also removed until only dermis and epidermis were left.  The skin graft was then placed in the primary defect and oriented appropriately.
Cartilage Graft Text: The defect edges were debeveled with a #15 scalpel blade.  Given the location of the defect, shape of the defect, the fact the defect involved a full thickness cartilage defect a cartilage graft was deemed most appropriate.  An appropriate donor site was identified, cleansed, and anesthetized. The cartilage graft was then harvested and transferred to the recipient site, oriented appropriately and then sutured into place.  The secondary defect was then repaired using a primary closure.
Composite Graft Text: The defect edges were debeveled with a #15 scalpel blade.  Given the location of the defect, shape of the defect, the proximity to free margins and the fact the defect was full thickness a composite graft was deemed most appropriate.  The defect was outline and then transferred to the donor site.  A full thickness graft was then excised from the donor site. The graft was then placed in the primary defect, oriented appropriately and then sutured into place.  The secondary defect was then repaired using a primary closure.
Epidermal Autograft Text: The defect edges were debeveled with a #15 scalpel blade.  Given the location of the defect, shape of the defect and the proximity to free margins an epidermal autograft was deemed most appropriate.  Using a sterile surgical marker, the primary defect shape was transferred to the donor site. The epidermal graft was then harvested.  The skin graft was then placed in the primary defect and oriented appropriately.
Dermal Autograft Text: The defect edges were debeveled with a #15 scalpel blade.  Given the location of the defect, shape of the defect and the proximity to free margins a dermal autograft was deemed most appropriate.  Using a sterile surgical marker, the primary defect shape was transferred to the donor site. The area thus outlined was incised deep to adipose tissue with a #15 scalpel blade.  The harvested graft was then trimmed of adipose and epidermal tissue until only dermis was left.  The skin graft was then placed in the primary defect and oriented appropriately.
Ftsg Text: The defect edges were debeveled with a #15 scalpel blade.  Given the location of the defect, shape of the defect and the proximity to free margins a full thickness skin graft was deemed most appropriate.  Using a sterile surgical marker, the primary defect shape was transferred to the donor site. The area thus outlined was incised deep to adipose tissue with a #15 scalpel blade.  The harvested graft was then trimmed of adipose tissue until only dermis and epidermis was left.  The skin margins of the secondary defect were undermined to an appropriate distance in all directions utilizing iris scissors.  The secondary defect was closed with interrupted buried subcutaneous sutures.  The skin edges were then re-apposed with running  sutures.  The skin graft was then placed in the primary defect and oriented appropriately.
Pinch Graft Text: The defect edges were debeveled with a #15 scalpel blade. Given the location of the defect, shape of the defect and the proximity to free margins a pinch graft was deemed most appropriate. Using a sterile surgical marker, the primary defect shape was transferred to the donor site. The area thus outlined was incised deep to adipose tissue with a #15 scalpel blade.  The harvested graft was then trimmed of adipose tissue until only dermis and epidermis was left. The skin graft was then placed in the primary defect and oriented appropriately.
Skin Substitute Text: The defect edges were debeveled with a #15 scalpel blade.  Given the location of the defect, shape of the defect and the proximity to free margins a skin substitute graft was deemed most appropriate.  The graft material was trimmed to fit the size of the defect. The graft was then placed in the primary defect and oriented appropriately.
Split-Thickness Skin Graft Text: The defect edges were debeveled with a #15 scalpel blade.  Given the location of the defect, shape of the defect and the proximity to free margins a split thickness skin graft was deemed most appropriate.  Using a sterile surgical marker, the primary defect shape was transferred to the donor site. The split thickness graft was then harvested.  The skin graft was then placed in the primary defect and oriented appropriately.
Tissue Cultured Epidermal Autograft Text: The defect edges were debeveled with a #15 scalpel blade.  Given the location of the defect, shape of the defect and the proximity to free margins a tissue cultured epidermal autograft was deemed most appropriate.  The graft was then trimmed to fit the size of the defect.  The graft was then placed in the primary defect and oriented appropriately.
Xenograft Text: The defect edges were debeveled with a #15 scalpel blade.  Given the location of the defect, shape of the defect and the proximity to free margins a xenograft was deemed most appropriate.  The graft was then trimmed to fit the size of the defect.  The graft was then placed in the primary defect and oriented appropriately.
Complex Repair And Flap Additional Text (Will Appearing After The Standard Complex Repair Text): The complex repair was not sufficient to completely close the primary defect. The remaining additional defect was repaired with the flap mentioned below.
Complex Repair And Graft Additional Text (Will Appearing After The Standard Complex Repair Text): The complex repair was not sufficient to completely close the primary defect. The remaining additional defect was repaired with the graft mentioned below.
Eyelid Full Thickness Repair - 43362: The eyelid defect was full thickness which required a wedge repair of the eyelid. Special care was taken to ensure that the eyelid margin was realligned when placing sutures.
Eyelid Partial Thickness Repair - 27662: The eyelid defect was partial thickness which required a wedge repair of the eyelid. Special care was taken to ensure that the eyelid margin was realligned when placing sutures.
Intermediate Repair And Flap Additional Text (Will Appearing After The Standard Complex Repair Text): The intermediate repair was not sufficient to completely close the primary defect. The remaining additional defect was repaired with the flap mentioned below.
Intermediate Repair And Graft Additional Text (Will Appearing After The Standard Complex Repair Text): The intermediate repair was not sufficient to completely close the primary defect. The remaining additional defect was repaired with the graft mentioned below.
Localized Dermabrasion With 15 Blade Text: The patient was draped in routine manner.  Localized dermabrasion using a 15 blade was performed in routine manner to papillary dermis. This spot dermabrasion is being performed to complete skin cancer reconstruction. It also will eliminate the other sun damaged precancerous cells that are known to be part of the regional effect of a lifetime's worth of sun exposure. This localized dermabrasion is therapeutic and should not be considered cosmetic in any regard.
Localized Dermabrasion With Sand Papertext: The patient was draped in routine manner.  Localized dermabrasion using sterile sand paper was performed in routine manner to papillary dermis. This spot dermabrasion is being performed to complete skin cancer reconstruction. It also will eliminate the other sun damaged precancerous cells that are known to be part of the regional effect of a lifetime's worth of sun exposure. This localized dermabrasion is therapeutic and should not be considered cosmetic in any regard.
Localized Dermabrasion With Wire Brush Text: The patient was draped in routine manner.  Localized dermabrasion using 3 x 17 mm wire brush was performed in routine manner to papillary dermis. This spot dermabrasion is being performed to complete skin cancer reconstruction. It also will eliminate the other sun damaged precancerous cells that are known to be part of the regional effect of a lifetime's worth of sun exposure. This localized dermabrasion is therapeutic and should not be considered cosmetic in any regard.
Purse String (Simple) Text: Given the location of the defect and the characteristics of the surrounding skin a purse string closure was deemed most appropriate.  Undermining was performed circumfirentially around the surgical defect.  A purse string suture was then placed and tightened.
Purse String (Intermediate) Text: Given the location of the defect and the characteristics of the surrounding skin a purse string intermediate closure was deemed most appropriate.  Undermining was performed circumfirentially around the surgical defect.  A purse string suture was then placed and tightened.
Partial Purse String (Simple) Text: Given the location of the defect and the characteristics of the surrounding skin a simple purse string closure was deemed most appropriate.  Undermining was performed circumfirentially around the surgical defect.  A purse string suture was then placed and tightened. Wound tension only allowed a partial closure of the circular defect.
Partial Purse String (Intermediate) Text: Given the location of the defect and the characteristics of the surrounding skin an intermediate purse string closure was deemed most appropriate.  Undermining was performed circumfirentially around the surgical defect.  A purse string suture was then placed and tightened. Wound tension only allowed a partial closure of the circular defect.
Tarsorrhaphy Text: A tarsorrhaphy was performed using Frost sutures.
Manual Repair Warning Statement: We plan on removing the manually selected variable below in favor of our much easier automatic structured text blocks found in the previous tab. We decided to do this to help make the flow better and give you the full power of structured data. Manual selection is never going to be ideal in our platform and I would encourage you to avoid using manual selection from this point on, especially since I will be sunsetting this feature. It is important that you do one of two things with the customized text below. First, you can save all of the text in a word file so you can have it for future reference. Second, transfer the text to the appropriate area in the Library tab. Lastly, if there is a flap or graft type which we do not have you need to let us know right away so I can add it in before the variable is hidden. No need to panic, we plan to give you roughly 6 months to make the change.
Same Histology In Subsequent Stages Text: The pattern and morphology of the tumor is as described in the first stage.
No Residual Tumor Seen Histology Text: There were no malignant cells seen in the sections examined.
Inflammation Suggestive Of Cancer Camouflage Histology Text: There was a dense lymphocytic infiltrate which prevented adequate histologic evaluation of adjacent structures.
Bcc Histology Text: There were numerous aggregates of basaloid cells.
Bcc Infiltrative Histology Text: There were numerous aggregates of basaloid cells demonstrating an infiltrative pattern.
Mart-1 - Positive Histology Text: MART-1 staining demonstrates areas of higher density and clustering of melanocytes with Pagetoid spread upwards within the epidermis. The surgical margins are positive for tumor cells.
Mart-1 - Negative Histology Text: MART-1 staining demonstrates a normal density and pattern of melanocytes along the dermal-epidermal junction. The surgical margins are negative for tumor cells.
Information: Selecting Yes will display possible errors in your note based on the variables you have selected. This validation is only offered as a suggestion for you. PLEASE NOTE THAT THE VALIDATION TEXT WILL BE REMOVED WHEN YOU FINALIZE YOUR NOTE. IF YOU WANT TO FAX A PRELIMINARY NOTE YOU WILL NEED TO TOGGLE THIS TO 'NO' IF YOU DO NOT WANT IT IN YOUR FAXED NOTE.
Bill 59 Modifier?: No - Continue to Bill 79 Modifier
Dermal Closure: buried vertical mattress

## 2025-06-19 ENCOUNTER — HOSPITAL ENCOUNTER (OUTPATIENT)
Dept: LAB | Facility: MEDICAL CENTER | Age: 65
End: 2025-06-19
Attending: INTERNAL MEDICINE
Payer: MEDICARE

## 2025-06-19 LAB
ALBUMIN SERPL BCP-MCNC: 4.6 G/DL (ref 3.2–4.9)
ALBUMIN/GLOB SERPL: 1.6 G/DL
ALP SERPL-CCNC: 127 U/L (ref 30–99)
ALT SERPL-CCNC: 14 U/L (ref 2–50)
ANION GAP SERPL CALC-SCNC: 14 MMOL/L (ref 7–16)
AST SERPL-CCNC: 23 U/L (ref 12–45)
BASOPHILS # BLD AUTO: 0.6 % (ref 0–1.8)
BASOPHILS # BLD: 0.03 K/UL (ref 0–0.12)
BILIRUB SERPL-MCNC: 0.6 MG/DL (ref 0.1–1.5)
BUN SERPL-MCNC: 24 MG/DL (ref 8–22)
CALCIUM ALBUM COR SERPL-MCNC: 8.9 MG/DL (ref 8.5–10.5)
CALCIUM SERPL-MCNC: 9.4 MG/DL (ref 8.5–10.5)
CHLORIDE SERPL-SCNC: 101 MMOL/L (ref 96–112)
CO2 SERPL-SCNC: 21 MMOL/L (ref 20–33)
CREAT SERPL-MCNC: 1.4 MG/DL (ref 0.5–1.4)
EOSINOPHIL # BLD AUTO: 0.07 K/UL (ref 0–0.51)
EOSINOPHIL NFR BLD: 1.4 % (ref 0–6.9)
ERYTHROCYTE [DISTWIDTH] IN BLOOD BY AUTOMATED COUNT: 47.4 FL (ref 35.9–50)
ESTRADIOL SERPL-MCNC: 63 PG/ML
FASTING STATUS PATIENT QL REPORTED: NORMAL
GFR SERPLBLD CREATININE-BSD FMLA CKD-EPI: 55 ML/MIN/1.73 M 2
GLOBULIN SER CALC-MCNC: 2.8 G/DL (ref 1.9–3.5)
GLUCOSE SERPL-MCNC: 151 MG/DL (ref 65–99)
HCT VFR BLD AUTO: 47.5 % (ref 42–52)
HGB BLD-MCNC: 15.2 G/DL (ref 14–18)
IMM GRANULOCYTES # BLD AUTO: 0.04 K/UL (ref 0–0.11)
IMM GRANULOCYTES NFR BLD AUTO: 0.8 % (ref 0–0.9)
LYMPHOCYTES # BLD AUTO: 1.02 K/UL (ref 1–4.8)
LYMPHOCYTES NFR BLD: 19.9 % (ref 22–41)
MCH RBC QN AUTO: 31 PG (ref 27–33)
MCHC RBC AUTO-ENTMCNC: 32 G/DL (ref 32.3–36.5)
MCV RBC AUTO: 96.7 FL (ref 81.4–97.8)
MONOCYTES # BLD AUTO: 0.52 K/UL (ref 0–0.85)
MONOCYTES NFR BLD AUTO: 10.2 % (ref 0–13.4)
NEUTROPHILS # BLD AUTO: 3.44 K/UL (ref 1.82–7.42)
NEUTROPHILS NFR BLD: 67.1 % (ref 44–72)
NRBC # BLD AUTO: 0 K/UL
NRBC BLD-RTO: 0 /100 WBC (ref 0–0.2)
PLATELET # BLD AUTO: 165 K/UL (ref 164–446)
PMV BLD AUTO: 9.3 FL (ref 9–12.9)
POTASSIUM SERPL-SCNC: 4.4 MMOL/L (ref 3.6–5.5)
PROT SERPL-MCNC: 7.4 G/DL (ref 6–8.2)
RBC # BLD AUTO: 4.91 M/UL (ref 4.7–6.1)
SODIUM SERPL-SCNC: 136 MMOL/L (ref 135–145)
T4 FREE SERPL-MCNC: 0.95 NG/DL (ref 0.93–1.7)
TSH SERPL-ACNC: 1.48 UIU/ML (ref 0.38–5.33)
WBC # BLD AUTO: 5.1 K/UL (ref 4.8–10.8)

## 2025-06-19 PROCEDURE — 82670 ASSAY OF TOTAL ESTRADIOL: CPT

## 2025-06-19 PROCEDURE — 85025 COMPLETE CBC W/AUTO DIFF WBC: CPT

## 2025-06-19 PROCEDURE — 84402 ASSAY OF FREE TESTOSTERONE: CPT

## 2025-06-19 PROCEDURE — 80053 COMPREHEN METABOLIC PANEL: CPT

## 2025-06-19 PROCEDURE — 84439 ASSAY OF FREE THYROXINE: CPT

## 2025-06-19 PROCEDURE — 84403 ASSAY OF TOTAL TESTOSTERONE: CPT

## 2025-06-19 PROCEDURE — 84443 ASSAY THYROID STIM HORMONE: CPT

## 2025-06-19 PROCEDURE — 84270 ASSAY OF SEX HORMONE GLOBUL: CPT

## 2025-06-19 PROCEDURE — 36415 COLL VENOUS BLD VENIPUNCTURE: CPT

## 2025-06-21 LAB
SHBG SERPL-SCNC: 22 NMOL/L (ref 19–76)
TESTOST FREE MFR SERPL: 2.5 % (ref 1.6–2.9)
TESTOST FREE SERPL-MCNC: 227 PG/ML (ref 47–244)
TESTOST SERPL-MCNC: 913 NG/DL (ref 300–720)

## 2025-07-30 PROBLEM — E11.22 TYPE 2 DIABETES MELLITUS WITH DIABETIC CHRONIC KIDNEY DISEASE (HCC): Status: ACTIVE | Noted: 2025-07-30

## 2025-07-30 PROBLEM — E11.29 TYPE 2 DIABETES MELLITUS WITH OTHER DIABETIC KIDNEY COMPLICATION (HCC): Status: ACTIVE | Noted: 2025-07-30

## 2025-08-05 ENCOUNTER — APPOINTMENT (OUTPATIENT)
Dept: MEDICAL GROUP | Facility: IMAGING CENTER | Age: 65
End: 2025-08-05
Payer: MEDICARE

## 2025-08-05 ASSESSMENT — FIBROSIS 4 INDEX: FIB4 SCORE: 2.5

## 2025-08-21 ENCOUNTER — TELEPHONE (OUTPATIENT)
Dept: HEALTH INFORMATION MANAGEMENT | Facility: OTHER | Age: 65
End: 2025-08-21
Payer: MEDICARE

## (undated) DEVICE — SUTURE 3-0 ETHILON PS-1 (36PK/BX)

## (undated) DEVICE — SUTURE 3-0 VICRYL PLUS SH - 8X 18 INCH (12/BX)

## (undated) DEVICE — MASK ANESTHESIA ADULT  - (100/CA)

## (undated) DEVICE — SODIUM CHL IRRIGATION 0.9% 1000ML (12EA/CA)

## (undated) DEVICE — GOWN WARMING STANDARD FLEX - (30/CA)

## (undated) DEVICE — HEAD HOLDER JUNIOR/ADULT

## (undated) DEVICE — GLOVE BIOGEL SZ 8 SURGICAL PF LTX - (50PR/BX 4BX/CA)

## (undated) DEVICE — PROTECTOR ULNA NERVE - (36PR/CA)

## (undated) DEVICE — KIT ROOM DECONTAMINATION

## (undated) DEVICE — DRESSING XEROFORM 1X8 - (50/BX 4BX/CA)

## (undated) DEVICE — GLOVE SZ 7 BIOGEL PI MICRO - PF LF (50PR/BX 4BX/CA)

## (undated) DEVICE — PADDING CAST 4 IN STERILE - 4 X 4 YDS (24/CA)

## (undated) DEVICE — PACK SHOULDER ARTHROSCOPY SM - (2EA/CA)

## (undated) DEVICE — STOCKINET BIAS 6 IN STERILE - (20/CA)

## (undated) DEVICE — SENSOR OXIMETER ADULT SPO2 RD SET (20EA/BX)

## (undated) DEVICE — PAD LAP STERILE 18 X 18 - (5/PK 40PK/CA)

## (undated) DEVICE — PIN PERCUTANEOUS STERILE 150MM

## (undated) DEVICE — SYRINGE SAFETY 10 ML 18 GA X 1 1/2 BLUNT LL (100/BX 4BX/CA)

## (undated) DEVICE — BUR ROUND 4.0 X 55MM

## (undated) DEVICE — SET EXTENSION WITH 2 PORTS (48EA/CA) ***PART #2C8610 IS A SUBSTITUTE*****

## (undated) DEVICE — GLOVE BIOGEL INDICATOR SZ 6.5 SURGICAL PF LTX - (50PR/BX 4BX/CA)

## (undated) DEVICE — GLOVE BIOGEL PI INDICATOR SZ 8.0 SURGICAL PF LF -(50/BX 4BX/CA)

## (undated) DEVICE — NEPTUNE 4 PORT MANIFOLD - (20/PK)

## (undated) DEVICE — HEADREST PRONEVIEW LARGE - (10/CA)

## (undated) DEVICE — SPHERE NAVIGATION STEALTH (5EA/TY 12TY/PK)

## (undated) DEVICE — PACK NEURO - (2EA/CA)

## (undated) DEVICE — ELECTRODE DUAL RETURN W/ CORD - (50/PK)

## (undated) DEVICE — CHLORAPREP 26 ML APPLICATOR - ORANGE TINT(25/CA)

## (undated) DEVICE — PACK LOWER EXTREMITY - (2/CA)

## (undated) DEVICE — TUBING CLEARLINK DUO-VENT - C-FLO (48EA/CA)

## (undated) DEVICE — DRESSING TRANSPARENT FILM TEGADERM 4 X 4.75" (50EA/BX)"

## (undated) DEVICE — GOWN SURGEONS X-LARGE - DISP. (30/CA)

## (undated) DEVICE — GLOVE BIOGEL INDICATOR SZ 8 SURGICAL PF LTX - (50/BX 4BX/CA)

## (undated) DEVICE — BANDAGE ELASTIC 4 HONEYCOMB - 4"X5YD LF (20/CA)"

## (undated) DEVICE — STAPLER 35MM SKIN WIDE (6EA/BX)

## (undated) DEVICE — KIT ANESTHESIA W/CIRCUIT & 3/LT BAG W/FILTER (20EA/CA)

## (undated) DEVICE — SET LEADWIRE 5 LEAD BEDSIDE DISPOSABLE ECG (1SET OF 5/EA)

## (undated) DEVICE — ARMREST CRADLE FOAM - (2PR/PK 12PR/CA)

## (undated) DEVICE — HANDPIECE 10FT INTPLS SCT PLS IRRIGATION HAND CONTROL SET (6/PK)

## (undated) DEVICE — CANISTER SUCTION 3000ML MECHANICAL FILTER AUTO SHUTOFF MEDI-VAC NONSTERILE LF DISP  (40EA/CA)

## (undated) DEVICE — TUBE E-T HI-LO CUFF 8.0MM (10EA/PK)

## (undated) DEVICE — WATER IRRIG. STER. 1500 ML - (9/CA)

## (undated) DEVICE — DRAPETIBURON SHOULDER W/POUCH - (5EA/CA)

## (undated) DEVICE — SUCTION INSTRUMENT YANKAUER BULBOUS TIP W/O VENT (50EA/CA)

## (undated) DEVICE — SENSOR SPO2 NEO LNCS ADHESIVE (20/BX) SEE USER NOTES

## (undated) DEVICE — BOVIE BLADE COATED - (50/PK)

## (undated) DEVICE — DRAIN PENROSE STERILE 1/4 X - 18 IN  (25EA/BX)

## (undated) DEVICE — GLOVE BIOGEL SZ 7 SURGICAL PF LTX - (50PR/BX 4BX/CA)

## (undated) DEVICE — GLOVE BIOGEL ECLIPSE  PF LATEX SIZE 6.5 (50PR/BX)

## (undated) DEVICE — BOVIE  BLADE 6 EXTENDED - (50/PK)

## (undated) DEVICE — HUMID-VENT HEAT AND MOISTURE EXCHANGE- (50/BX)

## (undated) DEVICE — GLOVE BIOGEL PI INDICATOR SZ 7.0 SURGICAL PF LF - (50/BX 4BX/CA)

## (undated) DEVICE — DERMABOND ADVANCED - (12EA/BX)

## (undated) DEVICE — GLOVE BIOGEL SZ 6.5 SURGICAL PF LTX (50PR/BX 4BX/CA)

## (undated) DEVICE — KIT SURGIFLO W/OUT THROMBIN - (6EA/CA)

## (undated) DEVICE — BLOCK

## (undated) DEVICE — SUTURE 1 VICRYL PLUSCT-1 27IN - (36/BX)

## (undated) DEVICE — SLEEVE VASO CALF MED - (10PR/CA)

## (undated) DEVICE — TUBE E-T HI-LO CUFF 7.0MM (10EA/PK)

## (undated) DEVICE — GLOVE, LITE (PAIR)

## (undated) DEVICE — GLOVE BIOGEL INDICATOR SZ 7.5 SURGICAL PF LTX - (50PR/BX 4BX/CA)

## (undated) DEVICE — BANDAGE ELASTIC 6 IN X 5 YDS - LATEX FREE (10/BX)

## (undated) DEVICE — DRAPE C-ARM LARGE 41IN X 74 IN - (10/BX 2BX/CA)

## (undated) DEVICE — GOWN WARMING X-LARGE FLEX - (20/CA)

## (undated) DEVICE — PACK UPPER EXTREMITY (2EA/CA)

## (undated) DEVICE — SODIUM CHL. IRRIGATION 0.9% 3000ML (4EA/CA 65CA/PF)

## (undated) DEVICE — SHAVER4.0 AGGRESSIVE + FORMLA (5EA/BX)

## (undated) DEVICE — SUTURE 3-0 ETHILON FS-1 - (36/BX) 30 INCH

## (undated) DEVICE — TOWEL STOP TIMEOUT SAFETY FLAG (40EA/CA)

## (undated) DEVICE — DRESSING 3X8 ADAPTIC GAUZE - NON-ADHERING (36/PK 6PK/BX)

## (undated) DEVICE — ELECTRODE 850 FOAM ADHESIVE - HYDROGEL RADIOTRNSPRNT (50/PK)

## (undated) DEVICE — SUTURE 4-0 30CM STRATAFIX SPIRAL PS-2 (12EA/BX)

## (undated) DEVICE — BLADE SURGICAL #15 - (50/BX 3BX/CA)

## (undated) DEVICE — BANDAGE ROLL STERILE BULKEE 4.5 IN X 4 YD (100EA/CA)

## (undated) DEVICE — SUTURE 1 VICRYL PLUS CT-1 - 18 INCH (12/BX)

## (undated) DEVICE — BONE WAX (12PK/BX)

## (undated) DEVICE — SPIDER SHOULDER HOLDER (12EA/BX)

## (undated) DEVICE — SPONGE GAUZE STER 4X4 8-PL - (2/PK 50PK/BX 12BX/CS)

## (undated) DEVICE — LACTATED RINGERS INJ 1000 ML - (14EA/CA 60CA/PF)

## (undated) DEVICE — SUTURE GENERAL

## (undated) DEVICE — FILM CASSETTE ENDO

## (undated) DEVICE — WATER IRRIGATION STERILE 1000ML (12EA/CA)

## (undated) DEVICE — NEEDLE NON SAFETY HYPO 22 GA X 1 1/2 IN (100/BX)

## (undated) DEVICE — MASK OXYGEN VNYL ADLT MED CONC WITH 7 FOOT TUBING  - (50EA/CA)

## (undated) DEVICE — Device

## (undated) DEVICE — SOD. CHL. INJ. 0.9% 1000 ML - (14EA/CA 60CA/PF)

## (undated) DEVICE — GLOVE SZ 6.5 BIOGEL PI MICRO - PF LF (50PR/BX)

## (undated) DEVICE — TUBE CONNECTING SUCTION - CLEAR PLASTIC STERILE 72 IN (50EA/CA)

## (undated) DEVICE — DETERGENT RENUZYME PLUS 10 OZ PACKET (50/BX)

## (undated) DEVICE — TOURNIQUET CUFF 34 X 4 ONE PORT DISP - STERILE (10/BX)

## (undated) DEVICE — GLOVE SIZE 7.0 SURGEON ACCELERATOR FREE GREEN (50PR/BX 4BX/CA)

## (undated) DEVICE — GLOVE BIOGEL ECLIPSE PF LATEX SIZE 7.5

## (undated) DEVICE — KIT MAXCESS 4 STERILE DISPOSABLE XLIF

## (undated) DEVICE — GLOVE BIOGEL ECLIPSE PF LATEX SIZE 8.0  (50PR/BX)

## (undated) DEVICE — TUBE E-T HI-LO CUFF 7.5MM (10EA/PK)

## (undated) DEVICE — BITE BLOCK ADULT 60FR (100EA/CA)

## (undated) DEVICE — DRAPE LARGE 3 QUARTER - (20/CA)

## (undated) DEVICE — SPONGE GAUZESTER 4 X 4 4PLY - (128PK/CA)

## (undated) DEVICE — TUBING DAY USE W/CARTRIDGE (10EA/BX)

## (undated) DEVICE — SUTURE 4-0 ETHILON PS-2 18 (12PK/BX)"

## (undated) DEVICE — BLADE SAGITTAL SAW 9.4MM X 25.5MM X .4MM FINE TOOTH (1/EA)

## (undated) DEVICE — DRAPE MICROSCOPE X-LONG (10EA/CA)

## (undated) DEVICE — SHEET BILATERAL 76X120 - (12/CA)

## (undated) DEVICE — TUBING C&T SET FLYING LEADS DRAIN TUBING (10EA/BX)

## (undated) DEVICE — DRAPE SURGICAL U 77X120 - (10/CA)

## (undated) DEVICE — BIT DRILL 2.0MM AO (2TX2=4)

## (undated) DEVICE — PACK TOTAL KNEE  (1/CA)

## (undated) DEVICE — KIT  I.V. START (100EA/CA)

## (undated) DEVICE — BOVIE NEEDLE TIP INSULATD NON-SAFETY 2CM (50/PK)

## (undated) DEVICE — LEAD SET 6 DISP. EKG NIHON KOHDEN (100EA/CA) [9859].

## (undated) DEVICE — TIP INTPLS HFLO ML ORFC BTRY - (12/CS)  FOR SURGILAV

## (undated) DEVICE — BLADE SURGICAL CLIPPER - (50EA/CA)

## (undated) DEVICE — PADDING CAST 6 IN STERILE - 6 X 4 YDS (24/CA)

## (undated) DEVICE — BAG, SPONGE COUNT 50600

## (undated) DEVICE — DEVICE SKIN CLOSURE SURGICAL ZIP 16 (10EA/PK)

## (undated) DEVICE — SLEEVE, VASO, THIGH, MED

## (undated) DEVICE — STOCKINET TUBULAR 6IN STERILE - 6 X 48YDS (25/CA)

## (undated) DEVICE — SHAVER 5.5 RESECTOR FORMULA (5EA/BX )

## (undated) DEVICE — GUIDEWIRE WITH TROCAR TIP .062 (6EA/BX)(1TX4+1TX3=7)"

## (undated) DEVICE — SYRINGE 30 ML LL (56/BX)

## (undated) DEVICE — TUBING CASSETTE CROSSFLOW INTEGRATED (10EA/CA)

## (undated) DEVICE — PACK UPPER EXTREMITY SM OR - (3/CA)

## (undated) DEVICE — SUTURE 2-0 VICRYL PLUS CT-1 - 8 X 18 INCH(12/BX)

## (undated) DEVICE — DRAPE U SPLIT IMP 54 X 76 - (24/CA)

## (undated) DEVICE — SUTURE 2-0 MONOCRYL PLUS UNDYED CT-1 1 X 36 (36EA/BX)"

## (undated) DEVICE — SLEEVE, VASO, REPROC, LARGE

## (undated) DEVICE — KIT CUSTOM PROCEDURE SINGLE FOR ENDO  (15/CA)

## (undated) DEVICE — SPLINT PLASTER 5 IN X 30 IN - (50EA/BX 6BX/CA)

## (undated) DEVICE — PACK JACKSON TABLE KIT W/OUT - HR (6EA/CA)

## (undated) DEVICE — GLOVE SZ 7.5 LF PROTEXIS (50PR/BX)

## (undated) DEVICE — GLOVE SZ 8 BIOGEL PI MICRO - PF LF (50PR/BX)

## (undated) DEVICE — GLOVE BIOGEL SZ 7.5 SURGICAL PF LTX - (50PR/BX 4BX/CA)

## (undated) DEVICE — COVER MAYO STAND X-LG - (22EA/CA)

## (undated) DEVICE — CANNULA W/ SUPPLY TUBING O2 - (50/CA)

## (undated) DEVICE — DEVICE SKIN CLOSURE SURGICAL ZIP 8 (10EA/PK)

## (undated) DEVICE — DRAPE STRLE REG TOWEL 18X24 - (10/BX 4BX/CA)"

## (undated) DEVICE — COVER LIGHT HANDLE FLEXIBLE - SOFT (2EA/PK 80PK/CA)

## (undated) DEVICE — GLOVE BIOGEL PI INDICATOR SZ 6.5 SURGICAL PF LF - (50/BX 4BX/CA)

## (undated) DEVICE — PADDING CAST 6 IN X 4 YDS - SOF-ROLL (6RL/BG 6BG/CA)

## (undated) DEVICE — 1.6MM K-WIRE

## (undated) DEVICE — NEEDLE, DISP  18G X 1-1/2 BLUNT

## (undated) DEVICE — GLOVE SZ 7.5 BIOGEL PI MICRO - PF LF (50PR/BX)

## (undated) DEVICE — SLING ORTH UNV TIETX VLFM ARM

## (undated) DEVICE — LENS/HOOD FOR SPACESUIT - (32/PK) PEEL AWAY FACE

## (undated) DEVICE — TOOL DISSECT MATCH HEAD

## (undated) DEVICE — DRESSING ABDOMINAL PAD STERILE 8 X 10" (360EA/CA)"

## (undated) DEVICE — CONTAINER SPECIMEN BAG OR - STERILE 4 OZ W/LID (100EA/CA)

## (undated) DEVICE — PADDING CAST 3 IN STERILE - 3 X 4 YDS (24EA/CA)

## (undated) DEVICE — STAPLER SKIN DISP - (6/BX 10BX/CA) VISISTAT

## (undated) DEVICE — SUTURE 0 VICRYL PLUS CT-2 - 8 X 18 INCH (12/BX)

## (undated) DEVICE — LACTATED RINGERS INJ. 500 ML - (24EA/CA)

## (undated) DEVICE — GLOVE BIOGEL PI ORTHO SZ 8 PF LF (40PR/BX)

## (undated) DEVICE — MASK, LARYNGEAL AIRWAY #4

## (undated) DEVICE — TOURNIQUET CUFF 18 X 3 ONE PORT DISP - STERILE (10/BX)

## (undated) DEVICE — SUTURE 1 VICRYL PLUS CTX - 8 X 18 INCH (12/BX)

## (undated) DEVICE — GLOVE BIOGEL SZ 6 PF LATEX - (50EA/BX 4BX/CA)

## (undated) DEVICE — TOWELS CLOTH SURGICAL - (4/PK 20PK/CA)

## (undated) DEVICE — CANISTER SUCTION RIGID RED 1500CC (40EA/CA)

## (undated) DEVICE — SUTURE 3-0 MONOCRYL PLUS PS-1 - 27 INCH (36/BX)

## (undated) DEVICE — GLOVE BIOGEL INDICATOR SZ 7SURGICAL PF LTX - (50/BX 4BX/CA)

## (undated) DEVICE — ABLATOR WAND SERFAS 90-S CRUISE

## (undated) DEVICE — SEALER BIPOLAR 2.3 AQUAMANTYS

## (undated) DEVICE — SYRINGE SAFETY 3 ML 18 GA X 1 1/2 BLUNT LL (100/BX 8BX/CA)

## (undated) DEVICE — BAG SPONGE COUNT 10.25 X 32 - BLUE (250/CA)

## (undated) DEVICE — STOCKINET BIAS 4 IN STERILE - (20/CA)

## (undated) DEVICE — NEEDLE SAFETY 18 GA X 1 1/2 IN (100EA/BX)

## (undated) DEVICE — CORDS BIPOLAR COAGULATION - 12FT STERILE DISP. (10EA/BX)

## (undated) DEVICE — CONTAINER, SPECIMEN, STERILE

## (undated) DEVICE — MASK, LARYNGEAL AIRWAY #5

## (undated) DEVICE — NEEDLE SPINAL NON-SAFETY 18 GA X 3 IN (25EA/BX)

## (undated) DEVICE — MIDAS LUBRICATOR DIFFUSER PACK (4EA/CA)

## (undated) DEVICE — 2.4MM DRILL BIT

## (undated) DEVICE — DRAPE U ORTHOPEDIC - (10/BX)

## (undated) DEVICE — DEVICE MONOPOLAR RF PEAK PLASMABLADE 3.0S

## (undated) DEVICE — COVER LIGHT HANDLE ALC PLUS DISP (18EA/BX)

## (undated) DEVICE — GLOVE BIOGEL INDICATOR SZ 8.5 SURGICAL PF LTX - (50/BX 4BX/CA)

## (undated) DEVICE — INTRAOP NEURO IN OR 1:1 PER 15 MIN

## (undated) DEVICE — DRAPE LAPAROTOMY T SHEET - (12EA/CA)